# Patient Record
Sex: FEMALE | Race: WHITE | NOT HISPANIC OR LATINO | Employment: FULL TIME | URBAN - METROPOLITAN AREA
[De-identification: names, ages, dates, MRNs, and addresses within clinical notes are randomized per-mention and may not be internally consistent; named-entity substitution may affect disease eponyms.]

---

## 2020-01-14 ENCOUNTER — HOSPITAL ENCOUNTER (EMERGENCY)
Facility: HOSPITAL | Age: 65
Discharge: HOME | End: 2020-01-14
Attending: EMERGENCY MEDICINE
Payer: SUBSIDIZED

## 2020-01-14 VITALS
HEART RATE: 92 BPM | RESPIRATION RATE: 20 BRPM | SYSTOLIC BLOOD PRESSURE: 175 MMHG | DIASTOLIC BLOOD PRESSURE: 92 MMHG | TEMPERATURE: 98.3 F | WEIGHT: 130 LBS | OXYGEN SATURATION: 96 %

## 2020-01-14 DIAGNOSIS — T78.40XA ALLERGIC REACTION, INITIAL ENCOUNTER: Primary | ICD-10-CM

## 2020-01-14 DIAGNOSIS — L50.9 URTICARIA: ICD-10-CM

## 2020-01-14 PROCEDURE — 99284 EMERGENCY DEPT VISIT MOD MDM: CPT | Performed by: EMERGENCY MEDICINE

## 2020-01-14 PROCEDURE — 99282 EMERGENCY DEPT VISIT SF MDM: CPT

## 2020-01-14 RX ORDER — FAMOTIDINE 20 MG/1
40 TABLET, FILM COATED ORAL ONCE
Status: COMPLETED | OUTPATIENT
Start: 2020-01-14 | End: 2020-01-14

## 2020-01-14 RX ORDER — PREDNISONE 50 MG/1
50 TABLET ORAL DAILY
Qty: 5 TABLET | Refills: 0 | Status: SHIPPED | OUTPATIENT
Start: 2020-01-14 | End: 2020-01-19

## 2020-01-14 RX ORDER — FAMOTIDINE 20 MG/1
20 TABLET, FILM COATED ORAL 2 TIMES DAILY
Qty: 30 TABLET | Refills: 0 | Status: SHIPPED | OUTPATIENT
Start: 2020-01-14 | End: 2021-04-11

## 2020-01-14 RX ORDER — DIPHENHYDRAMINE HCL 25 MG
50 TABLET ORAL ONCE
Status: COMPLETED | OUTPATIENT
Start: 2020-01-14 | End: 2020-01-14

## 2020-01-14 RX ORDER — CETIRIZINE HYDROCHLORIDE 10 MG/1
10 TABLET ORAL DAILY
Qty: 10 TABLET | Refills: 0 | Status: SHIPPED | OUTPATIENT
Start: 2020-01-14 | End: 2021-04-11

## 2020-01-14 RX ADMIN — PREDNISONE 50 MG: 20 TABLET ORAL at 04:49

## 2020-01-14 RX ADMIN — FAMOTIDINE 40 MG: 20 TABLET ORAL at 04:49

## 2020-01-14 RX ADMIN — DIPHENHYDRAMINE HCL 50 MG: 25 TABLET, COATED ORAL at 04:49

## 2020-01-14 NOTE — DISCHARGE INSTRUCTIONS
Please take a list of all of your medications and discharge paperwork with you to all of your follow-up medical visits  Please take all of your medications as directed  Please call your family doctor or return to the ER if you have increased shortness of breath, chest pain, fevers, chills, nausea, vomiting, diarrhea, or any other worsening symptoms  General Allergic Reaction   WHAT YOU NEED TO KNOW:   An allergic reaction is your body's response to an allergen  Allergens include medicines, food, insect stings, animal dander, mold, latex, chemicals, and dust mites  Pollen from trees, grass, and weeds can also cause an allergic reaction  DISCHARGE INSTRUCTIONS:   Return to the emergency department if:   · You have a skin rash, hives, swelling, or itching that gets worse  · You have trouble breathing, shortness of breath, wheezing, or coughing  · Your throat tightens, or your lips or tongue swell  · You have trouble swallowing or speaking  · You have dizziness, lightheadedness, fainting, or confusion  · You have nausea, vomiting, diarrhea, or abdominal cramps  · You have chest pain or tightness  Contact your healthcare provider if:   · You have questions or concerns about your condition or care  Medicines:   · Medicines  may be given to relieve certain allergy symptoms such as itching, sneezing, and swelling  You may take them as a pill or use drops in your nose or eyes  Topical treatments may be given to put directly on your skin to help decrease itching or swelling  · Take your medicine as directed  Contact your healthcare provider if you think your medicine is not helping or if you have side effects  Tell him of her if you are allergic to any medicine  Keep a list of the medicines, vitamins, and herbs you take  Include the amounts, and when and why you take them  Bring the list or the pill bottles to follow-up visits   Carry your medicine list with you in case of an emergency  Follow up with your healthcare provider as directed:  Write down your questions so you remember to ask them during your visits  Self-care:   · Avoid the allergen  that you think may have caused your allergic reaction  · Use cold compresses  on your skin or eyes if they were affected by the allergic reaction  Cold compresses may help to soothe your skin or eyes  · Rinse your nasal passages  with a saline solution  Daily rinsing may help clear your nose of allergens  · Do not smoke  Your allergy symptoms may decrease if you are not around smoke  Nicotine and other chemicals in cigarettes and cigars can also cause lung damage  Ask your healthcare provider for information if you currently smoke and need help to quit  E-cigarettes or smokeless tobacco still contain nicotine  Talk to your healthcare provider before you use these products  © 2017 [facility] Information is for End User's use only and may not be sold, redistributed or otherwise used for commercial purposes  All illustrations and images included in CareNotes® are the copyrighted property of [employer]  or [employer]  The above information is an  only  It is not intended as medical advice for individual conditions or treatments  Talk to your doctor, nurse or pharmacist before following any medical regimen to see if it is safe and effective for you  Urticaria   WHAT YOU NEED TO KNOW:   What is urticaria? Urticaria is also called hives  Hives can change size and shape, and appear anywhere on your skin  They can be mild or severe and last from a few minutes to a few days  Hives may be a sign of a severe allergic reaction called anaphylaxis that needs immediate treatment  Urticaria that lasts longer than 6 weeks may be a chronic condition that needs long-term treatment  What causes urticaria? Hives are caused by an immune system reaction   The following are common triggers:  · Food allergies, such as to nuts, eggs, or shellfish    · Food dyes, additives, or preservatives    · Medicine allergies, such as to ibuprofen or antibiotics    · Infections, such as a cold or mono    · Bug bites    · Pets, plants, or latex    · Stress  How is the cause of urticaria diagnosed? Your healthcare provider will examine you and ask about your symptoms  He may also ask about your family medical history, medicines you take, and foods you eat  Tell your healthcare provider about any recent trauma, stress, or contact with allergens  You may need additional testing if you developed anaphylaxis after you were exposed to a trigger and then exercised  This is called exercise-induced anaphylaxis  You may need any of the following:  · A skin test  is used to see how your skin reacts to possible triggers  Your healthcare provider will put a small amount of the trigger onto your skin  He will cover the area with a patch that stays on for 2 days  Then he will check your skin for a reaction  · A challenge test  is used to give you increasing doses of what may be causing your hives  Your healthcare provider will watch for a reaction  How is urticaria treated? Hives often go away without treatment  Chronic urticaria may need to be treated with more than one medicine, or other medicines than listed below  The following are common medicines used to treat urticaria:  · Antihistamines  decrease mild symptoms such as itching or a rash  · Steroids  decrease redness, pain, and swelling  · Epinephrine  is used to treat severe allergic reactions such as anaphylaxis  What steps do I need to take for signs or symptoms of anaphylaxis? · Immediately  give 1 shot of epinephrine only into the outer thigh muscle  · Leave the shot in place  as directed  Your healthcare provider may recommend you leave it in place for up to 10 seconds before you remove it   This helps make sure all of the epinephrine is delivered  · Call 911 and go to the emergency department,  even if the shot improved symptoms  Do not drive yourself  Bring the used epinephrine shot with you  What safety precautions do I need to take if I am at risk for anaphylaxis? · Keep 2 shots of epinephrine with you at all times  You may need a second shot, because epinephrine only works for about 20 minutes and symptoms may return  Your healthcare provider can show you and family members how to give the shot  Check the expiration date every month and replace it before it expires  · Create an action plan  Your healthcare provider can help you create a written plan that explains the allergy and an emergency plan to treat a reaction  The plan explains when to give a second epinephrine shot if symptoms return or do not improve after the first  Give copies of the action plan and emergency instructions to family members, work and school staff, and  providers  Show them how to give a shot of epinephrine  · Be careful when you exercise  If you have had exercise-induced anaphylaxis, do not exercise right after you eat  Stop exercising right away if you start to develop any signs or symptoms of anaphylaxis  You may first feel tired, warm, or have itchy skin  Hives, swelling, and severe breathing problems may develop if you continue to exercise  · Carry medical alert identification  Wear medical alert jewelry or carry a card that explains the allergy  Ask your healthcare provider where to get these items  · Keep a record of triggers and symptoms  Record everything you eat, drink, or apply to your skin for 3 weeks  Include stressful events and what you were doing right before your hives started  Bring the record with you to follow-up visits with your healthcare provider  What can I do to manage urticaria? · Cool your skin  This may help decrease itching  Apply a cool pack to your hives   Dip a hand towel in cool water, wring it out, and place it on your hives  You may also soak your skin in a cool oatmeal bath  · Do not rub your hives  This can irritate your skin and cause more hives  · Wear loose clothing  Tight clothes may irritate your skin and cause more hives  · Manage stress  Stress may trigger hives, or make them worse  Learn new ways to relax, such as deep breathing  Call 911 for signs or symptoms of anaphylaxis,  such as trouble breathing, swelling in your mouth or throat, or wheezing  You may also have itching, a rash, or feel like you are going to faint  When should I seek immediate care? · Your heart is beating faster than it normally does  · You have cramping or severe pain in your abdomen  When should I contact my healthcare provider? · You have a fever  · Your skin still itches 24 hours after you take your medicine  · You still have hives after 7 days  · Your joints are painful and swollen  · You have questions or concerns about your condition or care  CARE AGREEMENT:   You have the right to help plan your care  Learn about your health condition and how it may be treated  Discuss treatment options with your caregivers to decide what care you want to receive  You always have the right to refuse treatment  The above information is an  only  It is not intended as medical advice for individual conditions or treatments  Talk to your doctor, nurse or pharmacist before following any medical regimen to see if it is safe and effective for you  © 2017 [address] [employer] Information is for End User's use only and may not be sold, redistributed or otherwise used for commercial purposes  All illustrations and images included in CareNotes® are the copyrighted property of [employer]  or [employer]

## 2020-01-14 NOTE — ED PROVIDER NOTES
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  Patient has had generalized cold symptoms since yesterday  She woke up this morning and took some Sudafed for nasal congestion  20 minutes later she started to have red, itchy rash throughout her body  Patient denies any shortness of breath, tongue swelling, throat/along, breathing difficulty  Patient came to the ER for further evaluation  Patient took Sudafed for the 1st time today  History provided by:  Patient  Rash   Associated symptoms: no abdominal pain, no diarrhea, no fever, no headaches, no joint pain, no nausea, no shortness of breath and not wheezing        None       History reviewed  No pertinent past medical history  Past Surgical History:   Procedure Laterality Date    HYSTERECTOMY      SINUS SURGERY         History reviewed  No pertinent family history  I have reviewed and agree with the history as documented  Social History     Tobacco Use    Smoking status: Current Every Day Smoker     Packs/day: 0 50    Smokeless tobacco: Never Used   Substance Use Topics    Alcohol use: Never     Frequency: Never    Drug use: Not on file        Review of Systems   Constitutional: Negative for activity change, appetite change, chills and fever  HENT: Negative for congestion and ear pain  Eyes: Negative for pain and discharge  Respiratory: Negative for cough, chest tightness, shortness of breath, wheezing and stridor  Cardiovascular: Negative for chest pain and palpitations  Gastrointestinal: Negative for abdominal distention, abdominal pain, constipation, diarrhea and nausea  Endocrine: Negative for cold intolerance  Genitourinary: Negative for dysuria, frequency and urgency  Musculoskeletal: Negative for arthralgias and back pain  Skin: Positive for rash  Negative for color change  Allergic/Immunologic: Negative for environmental allergies and food allergies  Neurological: Negative for dizziness, weakness, numbness and headaches  Hematological: Negative for adenopathy  Psychiatric/Behavioral: Negative for agitation, behavioral problems and confusion  The patient is not nervous/anxious  All other systems reviewed and are negative  Physical Exam  Physical Exam   Constitutional: She is oriented to person, place, and time  She appears well-developed and well-nourished  HENT:   Head: Normocephalic and atraumatic  Mouth/Throat: Oropharynx is clear and moist    No erythema, edema, signs of airway compromise noted to posterior oropharynx  No tongue swelling or lip swelling noted  Eyes: Conjunctivae and EOM are normal    Neck: Normal range of motion  Neck supple  Cardiovascular: Normal rate, regular rhythm, normal heart sounds and intact distal pulses  Pulmonary/Chest: Effort normal and breath sounds normal    Lungs are clear to auscultation bilateral    Abdominal: Soft  Bowel sounds are normal  She exhibits no distension  There is no tenderness  Musculoskeletal: Normal range of motion  Neurological: She is alert and oriented to person, place, and time  Skin: Skin is warm and dry  Scattered, maculopapular, urticarial lesions noted throughout upper extremity and anterior trunk  Psychiatric: She has a normal mood and affect  Her behavior is normal  Judgment and thought content normal    Nursing note and vitals reviewed        Vital Signs  ED Triage Vitals   Temperature Pulse Respirations Blood Pressure SpO2   01/14/20 0454 01/14/20 0445 01/14/20 0445 01/14/20 0445 01/14/20 0445   98 3 °F (36 8 °C) 92 20 (!) 198/100 96 %      Temp Source Heart Rate Source Patient Position - Orthostatic VS BP Location FiO2 (%)   01/14/20 0454 01/14/20 0445 01/14/20 0445 01/14/20 0445 --   Tympanic Monitor Sitting Left arm       Pain Score       01/14/20 0441       No Pain           Vitals:    01/14/20 0445 01/14/20 0520   BP: (!) 198/100 (!) 175/92   Pulse: 92    Patient Position - Orthostatic VS: Sitting          Visual Acuity      ED Medications  Medications   famotidine (PEPCID) tablet 40 mg (40 mg Oral Given 1/14/20 0449)   predniSONE tablet 50 mg (50 mg Oral Given 1/14/20 0449)   diphenhydrAMINE (BENADRYL) tablet 50 mg (50 mg Oral Given 1/14/20 0449)       Diagnostic Studies  Results Reviewed     None                 No orders to display              Procedures  Procedures         ED Course  ED Course as of Jan 14 0642 Tue Jan 14, 2020   0600 Patient re-examined at bedside  Patient's rash is significantly improved  Patient would like to go home  MDM  Number of Diagnoses or Management Options  Allergic reaction, initial encounter: new and requires workup  Urticaria: new and requires workup  Diagnosis management comments: Give prednisone, Benadryl, Pepcid and reassess symptoms  Amount and/or Complexity of Data Reviewed  Tests in the medicine section of CPT®: ordered and reviewed  Review and summarize past medical records: yes  Independent visualization of images, tracings, or specimens: yes    Risk of Complications, Morbidity, and/or Mortality  General comments: Patient's rash significantly improved in the ED with steroids, Pepcid, Benadryl  At this time patient's rash is most likely secondary to an allergic reaction to Sudafed  At this time patient placed on prednisone burst, Zyrtec and Pepcid b i d  With p r n  Benadryl for pruritus  Patient discharged home with follow-up to PCP as well as allergy  Close return instructions given to return to the ER for any worsening symptoms  Patient agrees with discharge plan  Patient well appearing at time of discharge        Patient Progress  Patient progress: stable        Disposition  Final diagnoses: Allergic reaction, initial encounter   Urticaria     Time reflects when diagnosis was documented in both MDM as applicable and the Disposition within this note     Time User Action Codes Description Comment    1/14/2020  6:00 AM Ferdous, Georgeana Cooks Add [T78 40XA] Allergic reaction, initial encounter     1/14/2020  6:00 AM Kalin Bonds Add [L50 9] Urticaria       ED Disposition     ED Disposition Condition Date/Time Comment    Discharge Stable Tue Jan 14, 2020  6:00 AM Anne Sung discharge to home/self care  Follow-up Information     Follow up With Specialties Details Why Contact Info        Follow up with family doctor in 3-4 days  All[facility] In 1 week If symptoms worsen [facility]  [phone]            Discharge Medication List as of 1/14/2020  6:02 AM      START taking these medications    Details   cetirizine (ZyrTEC) 10 mg tablet Take 1 tablet (10 mg total) by mouth daily, Starting Tue 1/14/2020, Print      famotidine (PEPCID) 20 mg tablet Take 1 tablet (20 mg total) by mouth 2 (two) times a day, Starting Tue 1/14/2020, Print      predniSONE 50 mg tablet Take 1 tablet (50 mg total) by mouth daily for 5 days, Starting Tue 1/14/2020, Until Sun 1/19/2020, Print           No discharge procedures on file      ED Provider  Electronically Signed by           Hilda Duran DO  01/14/20 9738

## 2021-02-18 ENCOUNTER — APPOINTMENT (EMERGENCY)
Dept: RADIOLOGY | Facility: HOSPITAL | Age: 66
End: 2021-02-18
Payer: MEDICARE

## 2021-02-18 ENCOUNTER — HOSPITAL ENCOUNTER (EMERGENCY)
Facility: HOSPITAL | Age: 66
Discharge: HOME | End: 2021-02-18
Attending: EMERGENCY MEDICINE | Admitting: EMERGENCY MEDICINE
Payer: MEDICARE

## 2021-02-18 ENCOUNTER — TELEPHONE (OUTPATIENT)
Dept: GASTROENTEROLOGY | Facility: AMBULARY SURGERY CENTER | Age: 66
End: 2021-02-18

## 2021-02-18 VITALS
SYSTOLIC BLOOD PRESSURE: 166 MMHG | WEIGHT: 128.8 LBS | RESPIRATION RATE: 17 BRPM | OXYGEN SATURATION: 96 % | HEART RATE: 68 BPM | TEMPERATURE: 96.7 F | DIASTOLIC BLOOD PRESSURE: 85 MMHG

## 2021-02-18 DIAGNOSIS — K83.1 BILIARY TRACT OBSTRUCTION: ICD-10-CM

## 2021-02-18 DIAGNOSIS — R74.01 TRANSAMINITIS: Primary | ICD-10-CM

## 2021-02-18 LAB
ALBUMIN SERPL BCP-MCNC: 3.6 G/DL (ref 3.5–5)
ALP SERPL-CCNC: 387 U/L (ref 46–116)
ALT SERPL W P-5'-P-CCNC: 136 U/L (ref 12–78)
ANION GAP SERPL CALCULATED.3IONS-SCNC: 9 MMOL/L (ref 4–13)
AST SERPL W P-5'-P-CCNC: 59 U/L (ref 5–45)
BACTERIA UR QL AUTO: ABNORMAL /HPF
BASOPHILS # BLD AUTO: 0.08 THOUSANDS/ΜL (ref 0–0.1)
BASOPHILS NFR BLD AUTO: 1 % (ref 0–1)
BILIRUB SERPL-MCNC: 0.5 MG/DL (ref 0.2–1)
BILIRUB UR QL STRIP: NEGATIVE
BUN SERPL-MCNC: 20 MG/DL (ref 5–25)
CALCIUM SERPL-MCNC: 8.9 MG/DL (ref 8.3–10.1)
CHLORIDE SERPL-SCNC: 103 MMOL/L (ref 100–108)
CLARITY UR: CLEAR
CO2 SERPL-SCNC: 26 MMOL/L (ref 21–32)
COLOR UR: YELLOW
CREAT SERPL-MCNC: 0.82 MG/DL (ref 0.6–1.3)
EOSINOPHIL # BLD AUTO: 0.12 THOUSAND/ΜL (ref 0–0.61)
EOSINOPHIL NFR BLD AUTO: 2 % (ref 0–6)
ERYTHROCYTE [DISTWIDTH] IN BLOOD BY AUTOMATED COUNT: 13.5 % (ref 11.6–15.1)
FLUAV RNA RESP QL NAA+PROBE: NEGATIVE
FLUBV RNA RESP QL NAA+PROBE: NEGATIVE
GFR SERPL CREATININE-BSD FRML MDRD: 75 ML/MIN/1.73SQ M
GLUCOSE SERPL-MCNC: 113 MG/DL (ref 65–140)
GLUCOSE UR STRIP-MCNC: NEGATIVE MG/DL
HCT VFR BLD AUTO: 45.3 % (ref 34.8–46.1)
HGB BLD-MCNC: 14 G/DL (ref 11.5–15.4)
HGB UR QL STRIP.AUTO: ABNORMAL
IMM GRANULOCYTES # BLD AUTO: 0.01 THOUSAND/UL (ref 0–0.2)
IMM GRANULOCYTES NFR BLD AUTO: 0 % (ref 0–2)
KETONES UR STRIP-MCNC: ABNORMAL MG/DL
LEUKOCYTE ESTERASE UR QL STRIP: NEGATIVE
LIPASE SERPL-CCNC: 93 U/L (ref 73–393)
LYMPHOCYTES # BLD AUTO: 1.86 THOUSANDS/ΜL (ref 0.6–4.47)
LYMPHOCYTES NFR BLD AUTO: 27 % (ref 14–44)
MAGNESIUM SERPL-MCNC: 2.3 MG/DL (ref 1.6–2.6)
MCH RBC QN AUTO: 29.2 PG (ref 26.8–34.3)
MCHC RBC AUTO-ENTMCNC: 30.9 G/DL (ref 31.4–37.4)
MCV RBC AUTO: 95 FL (ref 82–98)
MONOCYTES # BLD AUTO: 0.35 THOUSAND/ΜL (ref 0.17–1.22)
MONOCYTES NFR BLD AUTO: 5 % (ref 4–12)
NEUTROPHILS # BLD AUTO: 4.39 THOUSANDS/ΜL (ref 1.85–7.62)
NEUTS SEG NFR BLD AUTO: 65 % (ref 43–75)
NITRITE UR QL STRIP: NEGATIVE
NON-SQ EPI CELLS URNS QL MICRO: ABNORMAL /HPF
NRBC BLD AUTO-RTO: 0 /100 WBCS
PH UR STRIP.AUTO: 5 [PH]
PLATELET # BLD AUTO: 351 THOUSANDS/UL (ref 149–390)
PMV BLD AUTO: 9.7 FL (ref 8.9–12.7)
POTASSIUM SERPL-SCNC: 3.7 MMOL/L (ref 3.5–5.3)
PROT SERPL-MCNC: 7.8 G/DL (ref 6.4–8.2)
PROT UR STRIP-MCNC: NEGATIVE MG/DL
RBC # BLD AUTO: 4.79 MILLION/UL (ref 3.81–5.12)
RBC #/AREA URNS AUTO: ABNORMAL /HPF
RSV RNA RESP QL NAA+PROBE: NEGATIVE
SARS-COV-2 RNA RESP QL NAA+PROBE: NEGATIVE
SODIUM SERPL-SCNC: 138 MMOL/L (ref 136–145)
SP GR UR STRIP.AUTO: 1.01 (ref 1–1.03)
TROPONIN I SERPL-MCNC: <0.02 NG/ML
UROBILINOGEN UR QL STRIP.AUTO: 0.2 E.U./DL
WBC # BLD AUTO: 6.81 THOUSAND/UL (ref 4.31–10.16)
WBC #/AREA URNS AUTO: ABNORMAL /HPF

## 2021-02-18 PROCEDURE — 80053 COMPREHEN METABOLIC PANEL: CPT | Performed by: EMERGENCY MEDICINE

## 2021-02-18 PROCEDURE — 74183 MRI ABD W/O CNTR FLWD CNTR: CPT

## 2021-02-18 PROCEDURE — 83690 ASSAY OF LIPASE: CPT | Performed by: EMERGENCY MEDICINE

## 2021-02-18 PROCEDURE — 99284 EMERGENCY DEPT VISIT MOD MDM: CPT

## 2021-02-18 PROCEDURE — 96360 HYDRATION IV INFUSION INIT: CPT

## 2021-02-18 PROCEDURE — 81001 URINALYSIS AUTO W/SCOPE: CPT | Performed by: EMERGENCY MEDICINE

## 2021-02-18 PROCEDURE — 76376 3D RENDER W/INTRP POSTPROCES: CPT

## 2021-02-18 PROCEDURE — 74177 CT ABD & PELVIS W/CONTRAST: CPT

## 2021-02-18 PROCEDURE — 99285 EMERGENCY DEPT VISIT HI MDM: CPT | Performed by: EMERGENCY MEDICINE

## 2021-02-18 PROCEDURE — 36415 COLL VENOUS BLD VENIPUNCTURE: CPT | Performed by: EMERGENCY MEDICINE

## 2021-02-18 PROCEDURE — G1004 CDSM NDSC: HCPCS

## 2021-02-18 PROCEDURE — 84484 ASSAY OF TROPONIN QUANT: CPT | Performed by: EMERGENCY MEDICINE

## 2021-02-18 PROCEDURE — A9585 GADOBUTROL INJECTION: HCPCS | Performed by: EMERGENCY MEDICINE

## 2021-02-18 PROCEDURE — 96361 HYDRATE IV INFUSION ADD-ON: CPT

## 2021-02-18 PROCEDURE — 93005 ELECTROCARDIOGRAM TRACING: CPT

## 2021-02-18 PROCEDURE — 85025 COMPLETE CBC W/AUTO DIFF WBC: CPT | Performed by: EMERGENCY MEDICINE

## 2021-02-18 PROCEDURE — 0241U HB NFCT DS VIR RESP RNA 4 TRGT: CPT | Performed by: EMERGENCY MEDICINE

## 2021-02-18 PROCEDURE — 83735 ASSAY OF MAGNESIUM: CPT | Performed by: EMERGENCY MEDICINE

## 2021-02-18 RX ADMIN — GADOBUTROL 6 ML: 604.72 INJECTION INTRAVENOUS at 11:51

## 2021-02-18 RX ADMIN — IOHEXOL 100 ML: 350 INJECTION, SOLUTION INTRAVENOUS at 09:48

## 2021-02-18 RX ADMIN — SODIUM CHLORIDE 1000 ML: 0.9 INJECTION, SOLUTION INTRAVENOUS at 09:20

## 2021-02-18 NOTE — ED NOTES
MRI checklist completed and verified by this RN and with pt   Called MRI, pending response     Bhakti Brito, SHAY  02/18/21 3763

## 2021-02-18 NOTE — DISCHARGE INSTRUCTIONS
MRCP (Magnetic Resonance Cholangiopancreatography)   WHAT YOU NEED TO KNOW:   MRCP is a type of MRI used to take pictures of your gallbladder, bile duct, and pancreas  DISCHARGE INSTRUCTIONS:   Call your local emergency number [phone] in the [address]) if:   You have a medical device that has stopped working or is not working as it did before the MRI  Call your doctor or specialist if:   You have new or increased abdominal pain or other symptoms  You have questions or concerns about your condition or care  Drink liquids as directed:  Liquids will help flush the contrast liquid out of your body  Ask how much liquid to drink, and which liquids are best  Some foods, such as soup and fruit, also provide liquid  Follow up with your doctor or specialist as directed:  Write down your questions so you remember to ask them during your visits  © Copyright [address] Information is for End User's use only and may not be sold, redistributed or otherwise used for commercial purposes  All illustrations and images included in CareNotes® are the copyrighted property of A D A M , Inc  or [facility] [employer]   The above information is an  only  It is not intended as medical advice for individual conditions or treatments  Talk to your doctor, nurse or pharmacist before following any medical regimen to see if it is safe and effective for you

## 2021-02-18 NOTE — ED PROVIDER NOTES
History  Chief Complaint   Patient presents with    Nausea     pt complains of nausea with weakness since tuesday- she mentions generalized abd pain, c/o chills with occasional diarrhea, denies CP/SOB    Fatigue     Patient presents for evaluation of fatigue and nausea with decreased appetite  Patient initially nausea on Tuesday  Since then she has had decreased appetite 1 episode of vomiting and having nausea  She is also having some left lower quadrant abdominal pain as well that does not seem to be affected by food  States that she feels fatigued she just stating have the energy to go work she denies any chest pain or shortness of breath  Does feel chills flushed sometimes but states she is taking her temperature home and she has not had a fever as of yet  No known exposure to the [employer]  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  Also has been under increased stress recently as her son had major surgery last week  History provided by:  Patient   used: No    Nausea  The primary symptoms include fatigue, abdominal pain, nausea, vomiting and diarrhea  Primary symptoms do not include fever, dysuria or rash  The illness is also significant for chills  The illness does not include constipation or back pain  Fatigue  Associated symptoms: abdominal pain, diarrhea, nausea and vomiting    Associated symptoms: no chest pain, no cough, no dysuria, no fever, no headaches and no shortness of breath        Prior to Admission Medications   Prescriptions Last Dose Informant Patient Reported? Taking? cetirizine (ZyrTEC) 10 mg tablet   No No   Sig: Take 1 tablet (10 mg total) by mouth daily   famotidine (PEPCID) 20 mg tablet   No No   Sig: Take 1 tablet (20 mg total) by mouth 2 (two) times a day      Facility-Administered Medications: None       History reviewed  No pertinent past medical history      Past Surgical History:   Procedure Laterality Date    HYSTERECTOMY      SINUS SURGERY History reviewed  No pertinent family history  I have reviewed and agree with the history as documented  E-Cigarette/Vaping     E-Cigarette/Vaping Substances     Social History     Tobacco Use    Smoking status: Current Every Day Smoker     Packs/day: 0 50    Smokeless tobacco: Never Used   Substance Use Topics    Alcohol use: Never     Frequency: Never    Drug use: Never       Review of Systems   Constitutional: Positive for activity change, appetite change, chills and fatigue  Negative for fever  HENT: Negative for congestion, sore throat and trouble swallowing  No change in taste or smell   Respiratory: Negative for cough and shortness of breath  Cardiovascular: Negative for chest pain and leg swelling  Gastrointestinal: Positive for abdominal pain, diarrhea, nausea and vomiting  Negative for blood in stool and constipation  Genitourinary: Negative for difficulty urinating and dysuria  Musculoskeletal: Negative for back pain and neck pain  Skin: Negative for rash  Neurological: Negative for weakness, numbness and headaches  All other systems reviewed and are negative  Physical Exam  Physical Exam  Vitals signs and nursing note reviewed  Constitutional:       General: She is not in acute distress  Appearance: Normal appearance  HENT:      Head: Atraumatic  Nose: Nose normal  No congestion  Mouth/Throat:      Mouth: Mucous membranes are moist       Pharynx: Oropharynx is clear  No oropharyngeal exudate  Eyes:      General: No scleral icterus  Extraocular Movements: Extraocular movements intact  Conjunctiva/sclera: Conjunctivae normal       Pupils: Pupils are equal, round, and reactive to light  Cardiovascular:      Rate and Rhythm: Normal rate and regular rhythm  Pulses: Normal pulses  Pulmonary:      Effort: Pulmonary effort is normal  No respiratory distress  Breath sounds: Normal breath sounds  No wheezing, rhonchi or rales  Abdominal:      General: Abdomen is flat  Bowel sounds are normal  There is no distension  Palpations: Abdomen is soft  Tenderness: There is no abdominal tenderness  There is no guarding or rebound  Musculoskeletal: Normal range of motion  General: No deformity  Right lower leg: No edema  Left lower leg: No edema  Skin:     Capillary Refill: Capillary refill takes less than 2 seconds  Findings: No rash  Neurological:      General: No focal deficit present  Mental Status: She is alert and oriented to person, place, and time  Cranial Nerves: No cranial nerve deficit  Sensory: No sensory deficit  Motor: No weakness           Vital Signs  ED Triage Vitals   Temperature Pulse Respirations Blood Pressure SpO2   02/18/21 0832 02/18/21 0832 02/18/21 0832 02/18/21 0832 02/18/21 0832   (!) 96 7 °F (35 9 °C) 79 17 129/80 97 %      Temp Source Heart Rate Source Patient Position - Orthostatic VS BP Location FiO2 (%)   02/18/21 0832 02/18/21 0832 02/18/21 0832 02/18/21 0832 --   Tympanic Monitor Sitting Right arm       Pain Score       02/18/21 0830       No Pain           Vitals:    02/18/21 0832 02/18/21 0958 02/18/21 1045 02/18/21 1320   BP: 129/80 146/94 160/80 162/75   Pulse: 79 66 62 68   Patient Position - Orthostatic VS: Sitting Sitting Sitting Sitting         Visual Acuity      ED Medications  Medications   sodium chloride 0 9 % bolus 1,000 mL (1,000 mL Intravenous New Bag 2/18/21 0920)   iohexol (OMNIPAQUE) 350 MG/ML injection (SINGLE-DOSE) 100 mL (100 mL Intravenous Given 2/18/21 0948)   Gadobutrol injection (SINGLE-DOSE) SOLN 6 mL (6 mL Intravenous Given 2/18/21 1151)       Diagnostic Studies  Results Reviewed     Procedure Component Value Units Date/Time    Urine Microscopic [[id number]]  (Abnormal) Collected: 02/18/21 1320    Lab Status: Final result Specimen: Urine, Other Updated: 02/18/21 1336     RBC, UA None Seen /hpf      WBC, UA 4-10 /hpf Epithelial Cells Occasional /hpf      Bacteria, UA None Seen /hpf     UA (URINE) with reflex to Scope [[id number]]  (Abnormal) Collected: 02/18/21 1320    Lab Status: Final result Specimen: Urine, Other Updated: 02/18/21 1326     Color, UA Yellow     Clarity, UA Clear     Specific Gravity, UA 1 010     pH, UA 5 0     Leukocytes, UA Negative     Nitrite, UA Negative     Protein, UA Negative mg/dl      Glucose, UA Negative mg/dl      Ketones, UA 40 (2+) mg/dl      Urobilinogen, UA 0 2 E U /dl      Bilirubin, UA Negative     Blood, UA Trace-lysed    COVID19, Influenza A/B, RSV PCR, [facility] [[id number]]  (Normal) Collected: 02/18/21 0847    Lab Status: Final result Specimen: Nares from Nose Updated: 02/18/21 0940     SARS-CoV-2 Negative     INFLUENZA A PCR Negative     INFLUENZA B PCR Negative     RSV PCR Negative    Narrative: This test has been authorized by FDA under an EUA (Emergency Use Assay) for use by authorized laboratories  Clinical caution and judgement should be used with the interpretation of these results with consideration of the clinical impression and other laboratory testing  Testing reported as "Positive" or "Negative" has been proven to be accurate according to standard laboratory validation requirements  All testing is performed with control materials showing appropriate reactivity at standard intervals      Troponin I [[id number]]  (Normal) Collected: 02/18/21 0843    Lab Status: Final result Specimen: Blood from Arm, Left Updated: 02/18/21 0911     Troponin I <0 02 ng/mL     Comprehensive metabolic panel [[id number]]  (Abnormal) Collected: 02/18/21 0843    Lab Status: Final result Specimen: Blood from Arm, Left Updated: 02/18/21 0907     Sodium 138 mmol/L      Potassium 3 7 mmol/L      Chloride 103 mmol/L      CO2 26 mmol/L      ANION GAP 9 mmol/L      BUN 20 mg/dL      Creatinine 0 82 mg/dL      Glucose 113 mg/dL      Calcium 8 9 mg/dL      AST 59 U/L       U/L      Alkaline Phosphatase 387 U/L      Total Protein 7 8 g/dL      Albumin 3 6 g/dL      Total Bilirubin 0 50 mg/dL      eGFR 75 ml/min/1 73sq m     Narrative:      National Kidney Disease Foundation guidelines for Chronic Kidney Disease (CKD):     Stage 1 with normal or high GFR (GFR > 90 mL/min/1 73 square meters)    Stage 2 Mild CKD (GFR = 60-89 mL/min/1 73 square meters)    Stage 3A Moderate CKD (GFR = 45-59 mL/min/1 73 square meters)    Stage 3B Moderate CKD (GFR = 30-44 mL/min/1 73 square meters)    Stage 4 Severe CKD (GFR = 15-29 mL/min/1 73 square meters)    Stage 5 End Stage CKD (GFR <15 mL/min/1 73 square meters)  Note: GFR calculation is accurate only with a steady state creatinine    Lipase [[id number]]  (Normal) Collected: 02/18/21 0843    Lab Status: Final result Specimen: Blood from Arm, Left Updated: 02/18/21 0907     Lipase 93 u/L     Magnesium [[id number]]  (Normal) Collected: 02/18/21 0843    Lab Status: Final result Specimen: Blood from Arm, Left Updated: 02/18/21 0907     Magnesium 2 3 mg/dL     CBC and differential [[id number]]  (Abnormal) Collected: 02/18/21 0843    Lab Status: Final result Specimen: Blood from Arm, Left Updated: 02/18/21 0849     WBC 6 81 Thousand/uL      RBC 4 79 Million/uL      Hemoglobin 14 0 g/dL      Hematocrit 45 3 %      MCV 95 fL      MCH 29 2 pg      MCHC 30 9 g/dL      RDW 13 5 %      MPV 9 7 fL      Platelets 718 Thousands/uL      nRBC 0 /100 WBCs      Neutrophils Relative 65 %      Immat GRANS % 0 %      Lymphocytes Relative 27 %      Monocytes Relative 5 %      Eosinophils Relative 2 %      Basophils Relative 1 %      Neutrophils Absolute 4 39 Thousands/µL      Immature Grans Absolute 0 01 Thousand/uL      Lymphocytes Absolute 1 86 Thousands/µL      Monocytes Absolute 0 35 Thousand/µL      Eosinophils Absolute 0 12 Thousand/µL      Basophils Absolute 0 08 Thousands/µL                  MRI abdomen w wo contrast and mrcp   Final Result by Tessy Shore MD (02/18 2686)      Intra and extrahepatic biliary dilatation extending to the ampulla without evidence of obstructing mass lesion or choledocholithiasis  Consider ERCP for further characterization  Workstation performed: [id number]         CT abdomen pelvis with contrast   Final Result by Ricardo Ferrara MD (02/18 1020)      1  Intrahepatic and extra hepatic biliary ductal dilatation down to the level of the pancreatic head  No obvious stones or mass lesion by CT but an underlying lesion should be excluded  Recommend follow-up with ERCP or MRCP  5 mm hypodensity also    noted at the pancreatic head, nonspecific  This could be assessed with endoscopic ultrasound or on the MR  The study was marked in [facility] for immediate notification  Workstation performed: [id number]                    Procedures  ECG 12 Lead Documentation Only    Date/Time: 2/18/2021 8:46 AM  Performed by: Henny Deleon DO  Authorized by: Henny Deleon DO     Patient location:  ED  Interpretation:     Interpretation: abnormal    Rate:     ECG rate:  69    ECG rate assessment: normal    Rhythm:     Rhythm: sinus rhythm    Ectopy:     Ectopy: none    Conduction:     Conduction: abnormal      Abnormal conduction: LAFB    ST segments:     ST segments:  Non-specific  T waves:     T waves: normal               ED Course  ED Course as of Feb 18 1433   Thu Feb 18, 2021   1036 Discussed CT and lab work with patient recommending admission for further testing  However this time patient is hesitant as her son helping her son after his surgery  Will attempt to get MRCP in the ER to further evaluate need for further intervention      1320 Called reading room requested read on MRCP  SBIRT 22yo+      Most Recent Value   SBIRT (22 yo +)   In order to provide better care to our patients, we are screening all of our patients for alcohol and drug use  Would it be okay to ask you these screening questions?   No Filed at: 02/18/2021 0857                    [facility]  Number of Diagnoses or Management Options  Biliary tract obstruction:   Transaminitis:   Diagnosis management comments: Pulse ox 97% on room air indicating adequate oxygenation  Discussed CT, MRCP and lab results with patient  MRCP showed dilation of the intra and extra paradoxus without clear cause for obstruction  Patient has elevated LFTs and alk-phos but normal bilirubin unlikely a complete obstruction  Recommending ERCP at this time  Discussed this case with GI, Nickie Ferrer, as also recommending admission and ERCP tomorrow  This was discussed with the patient  However as we had discussed before with her she does not want to stay she wants to care for her son at home after his surgery  She would like to schedule this as an outpatient  This is discussed with GI they will call office will call her tomorrow to schedule appointment  She was advised if she develops any jaundice, fever, abdominal pain, or worsening nausea/vomiting she needs return immediately to the ER for evaluation    Patient verbalized understanding of this and understands the risk going home versus being admitted to the hospital          Amount and/or Complexity of Data Reviewed  Clinical lab tests: ordered and reviewed  Decide to obtain previous medical records or to obtain history from someone other than the patient: yes  Review and summarize past medical records: yes  Discuss the patient with other providers: yes    Patient Progress  Patient progress: stable      Disposition  Final diagnoses:   Transaminitis   Biliary tract obstruction     Time reflects when diagnosis was documented in both MDM as applicable and the Disposition within this note     Time User Action Codes Description Comment    2/18/2021  2:27 PM Melissa Adams [R74 [id number]] Transaminitis     2/18/2021  2:27 PM Brianna Summers [K83 [id number]] Biliary tract obstruction       ED Disposition     ED Disposition Condition Date/Time Comment    Discharge Stable u Feb 18, 2021  2:27 PM Lissy Baptiste discharge to home/self care  Follow-up Information     Follow up With Specialties Details Why Contact Info Additional Information    Odalys Mtz MD Gastroenterology In 1 day  [facility] [address]  [phone]       [address] [facility] Emergency Department Emergency Medicine  If symptoms worsen [address] [facility] [address]  [address] [address] Emergency Department, [place], [place], [address]          Patient's Medications   Discharge Prescriptions    No medications on file     No discharge procedures on file      PDMP Review     None          ED Provider  Electronically Signed by           Radha Jean DO  02/18/21 5967

## 2021-02-21 LAB
ATRIAL RATE: 69 BPM
P AXIS: 77 DEGREES
PR INTERVAL: 154 MS
QRS AXIS: -55 DEGREES
QRSD INTERVAL: 88 MS
QT INTERVAL: 392 MS
QTC INTERVAL: 420 MS
T WAVE AXIS: 19 DEGREES
VENTRICULAR RATE: 69 BPM

## 2021-02-21 PROCEDURE — 93010 ELECTROCARDIOGRAM REPORT: CPT | Performed by: INTERNAL MEDICINE

## 2021-03-02 ENCOUNTER — TELEPHONE (OUTPATIENT)
Dept: GASTROENTEROLOGY | Facility: CLINIC | Age: 66
End: 2021-03-02

## 2021-03-02 ENCOUNTER — OFFICE VISIT (OUTPATIENT)
Dept: GASTROENTEROLOGY | Facility: CLINIC | Age: 66
End: 2021-03-02
Payer: MEDICARE

## 2021-03-02 VITALS
TEMPERATURE: 98 F | HEIGHT: 63 IN | SYSTOLIC BLOOD PRESSURE: 141 MMHG | BODY MASS INDEX: 22.86 KG/M2 | HEART RATE: 87 BPM | WEIGHT: 129 LBS | DIASTOLIC BLOOD PRESSURE: 92 MMHG

## 2021-03-02 DIAGNOSIS — R79.89 ELEVATED LFTS: ICD-10-CM

## 2021-03-02 DIAGNOSIS — K83.8 DILATED BILE DUCT: Primary | ICD-10-CM

## 2021-03-02 PROCEDURE — 99204 OFFICE O/P NEW MOD 45 MIN: CPT | Performed by: INTERNAL MEDICINE

## 2021-03-02 NOTE — PROGRESS NOTES
Consultation - [facility]) [facility]  Tarun Ann 72 y o  female MRN: [id number]          Assessment & Plan:     77-year-old female presents to the emergency room with fatigue and nausea, found to have elevated LFTs, dilated common bile duct  1  Elevated LFTs/dilated CBD: Seen on CT scan and MRI with 5 mm hypoechoic lesion in the head of the pancreas, dilation of the common bile duct to the level of the ampulla without any obvious filling defects  -concern would be for underlying stricture versus malignant process   - we will schedule the patient for an EUS to further evaluate  - discussed with her risks of procedure including bleeding, surgery, perforation, pancreatitis, bleeding and infection to the pancreas  - we will repeat LFTs  -patient was instructed to monitor for any signs of fever, chills, abdominal pain    2  Postprandial abdominal bloating and fullness: Unclear etiology   - we will proceed with an upper endoscopy the same time as her EUS   -continue Pepcid for now     3  Colon cancer screening: Patient is due for screening colonoscopy she has never had 1, she would like to defer that evaluation until the above process has been evaluated            _____________________________________________________________        CC:  Abnormal imaging and elevated LFTs    HPI:  Tarun Ann is a 72 y  o female who was referred for evaluation of  Abnormal imaging studies and elevated LFTs  As you know this is an otherwise healthy 77-year-old female, with no significant medical or surgical history, recently presented to the emergency room with feeling severe fatigue, nausea, was concern for possible COVID infection  COVID testing was negative  Upon presentation she was noted to have elevated LFTs specifically alkaline phosphatase, ALT and AST  She had  CT scan which demonstrated significant dilation of common bile duct, there is possible 5 mm head of pancreas lesion    She then had an MRI which confirmed the same, 10 mm CBD down to the level of the ampulla with no filling defects, stones, mass  There was a 5 mm hypoechoic lesion in the head of the pancreas as well  Patient reports some postprandial abdominal bloating recently  Otherwise her appetite is good, she denies any nausea vomiting  Bowel function has been fairly regular, having his bowel movement every other day  Denies any melena, rectal bleeding  Her weight has been stable  Patient has never had a colonoscopy  Denies any significant acid reflux symptoms, denies any dysphagia  Since started the symptoms she has been taking Pepcid over-the-counter  Patient smokes half pack a day, rarely drinks alcohol  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  Family history is unremarkable for GI or associated malignancies        ROS:  The remainder of the ROS was negative except for the pertinent positives mentioned in HPI  Allergies: Penicillins and Tetracycline    Medications:   Current Outpatient Medications:     cetirizine (ZyrTEC) 10 mg tablet, Take 1 tablet (10 mg total) by mouth daily, Disp: 10 tablet, Rfl: 0    famotidine (PEPCID) 20 mg tablet, Take 1 tablet (20 mg total) by mouth 2 (two) times a day, Disp: 30 tablet, Rfl: 0'    History reviewed  No pertinent past medical history  Past Surgical History:   Procedure Laterality Date    HYSTERECTOMY      SINUS SURGERY         Family History   Problem Relation Age of Onset    Ulcerative colitis Mother         reports that she has been smoking  She has been smoking about 0 50 packs per day  She has never used smokeless tobacco  She reports that she does not drink alcohol or use drugs            Physical Exam:     /92 (BP Location: Right arm, Patient Position: Sitting, Cuff Size: Standard)   Pulse 87   Temp 98 °F (36 7 °C)   Ht 5' 3" (1 6 m)   Wt 58 5 kg (129 lb)   BMI 22 85 kg/m²     Gen: wn/wd, NAD, healthy-appearing female  HEENT: anicteric, MMM, no cervical LAD  CVS: RRR, no m/r/g  CHEST: CTA b/l  ABD: +BS, soft, NT,ND, no hepatosplenomegaly  EXT: no c/c/e  NEURO: aaox3  SKIN: NO rashes

## 2021-03-02 NOTE — TELEPHONE ENCOUNTER
----- Message from Francis Gutierrez MD sent at 3/2/2021 11:22 AM EST -----  Please see chart, elevated lfts, dilated cbd, 5mmg panc head lesions        Needs eus with any one of us please

## 2021-03-03 ENCOUNTER — PREP FOR PROCEDURE (OUTPATIENT)
Dept: GASTROENTEROLOGY | Facility: CLINIC | Age: 66
End: 2021-03-03

## 2021-03-03 DIAGNOSIS — K83.8 DILATED CBD, ACQUIRED: Primary | ICD-10-CM

## 2021-03-03 DIAGNOSIS — R79.89 ELEVATED LFTS: ICD-10-CM

## 2021-03-03 NOTE — TELEPHONE ENCOUNTER
EUS scheduled on 3/15/21 with Dr Gates at [facility] gave pt verbal instructions/mailed

## 2021-03-04 ENCOUNTER — LAB (OUTPATIENT)
Dept: LAB | Facility: CLINIC | Age: 66
End: 2021-03-04
Payer: MEDICARE

## 2021-03-04 DIAGNOSIS — K83.8 DILATED BILE DUCT: ICD-10-CM

## 2021-03-04 DIAGNOSIS — R79.89 ELEVATED LFTS: ICD-10-CM

## 2021-03-04 LAB
ALBUMIN SERPL BCP-MCNC: 4.2 G/DL (ref 3.5–5)
ALP SERPL-CCNC: 259 U/L (ref 46–116)
ALT SERPL W P-5'-P-CCNC: 79 U/L (ref 12–78)
ANION GAP SERPL CALCULATED.3IONS-SCNC: 4 MMOL/L (ref 4–13)
AST SERPL W P-5'-P-CCNC: 39 U/L (ref 5–45)
BASOPHILS # BLD AUTO: 0.09 THOUSANDS/ΜL (ref 0–0.1)
BASOPHILS NFR BLD AUTO: 1 % (ref 0–1)
BILIRUB DIRECT SERPL-MCNC: 0.13 MG/DL (ref 0–0.2)
BILIRUB SERPL-MCNC: 0.45 MG/DL (ref 0.2–1)
BUN SERPL-MCNC: 20 MG/DL (ref 5–25)
CALCIUM SERPL-MCNC: 10.4 MG/DL (ref 8.3–10.1)
CHLORIDE SERPL-SCNC: 109 MMOL/L (ref 100–108)
CO2 SERPL-SCNC: 29 MMOL/L (ref 21–32)
CREAT SERPL-MCNC: 0.77 MG/DL (ref 0.6–1.3)
EOSINOPHIL # BLD AUTO: 0.13 THOUSAND/ΜL (ref 0–0.61)
EOSINOPHIL NFR BLD AUTO: 2 % (ref 0–6)
ERYTHROCYTE [DISTWIDTH] IN BLOOD BY AUTOMATED COUNT: 13.4 % (ref 11.6–15.1)
GFR SERPL CREATININE-BSD FRML MDRD: 81 ML/MIN/1.73SQ M
GLUCOSE P FAST SERPL-MCNC: 100 MG/DL (ref 65–99)
HCT VFR BLD AUTO: 44.5 % (ref 34.8–46.1)
HGB BLD-MCNC: 14.3 G/DL (ref 11.5–15.4)
IMM GRANULOCYTES # BLD AUTO: 0.02 THOUSAND/UL (ref 0–0.2)
IMM GRANULOCYTES NFR BLD AUTO: 0 % (ref 0–2)
INR PPP: 0.95 (ref 0.84–1.19)
LYMPHOCYTES # BLD AUTO: 3.14 THOUSANDS/ΜL (ref 0.6–4.47)
LYMPHOCYTES NFR BLD AUTO: 36 % (ref 14–44)
MCH RBC QN AUTO: 29.7 PG (ref 26.8–34.3)
MCHC RBC AUTO-ENTMCNC: 32.1 G/DL (ref 31.4–37.4)
MCV RBC AUTO: 93 FL (ref 82–98)
MONOCYTES # BLD AUTO: 0.44 THOUSAND/ΜL (ref 0.17–1.22)
MONOCYTES NFR BLD AUTO: 5 % (ref 4–12)
NEUTROPHILS # BLD AUTO: 4.98 THOUSANDS/ΜL (ref 1.85–7.62)
NEUTS SEG NFR BLD AUTO: 56 % (ref 43–75)
NRBC BLD AUTO-RTO: 0 /100 WBCS
PLATELET # BLD AUTO: 360 THOUSANDS/UL (ref 149–390)
PMV BLD AUTO: 10.5 FL (ref 8.9–12.7)
POTASSIUM SERPL-SCNC: 5.1 MMOL/L (ref 3.5–5.3)
PROT SERPL-MCNC: 8.1 G/DL (ref 6.4–8.2)
PROTHROMBIN TIME: 12.7 SECONDS (ref 11.6–14.5)
RBC # BLD AUTO: 4.81 MILLION/UL (ref 3.81–5.12)
SODIUM SERPL-SCNC: 142 MMOL/L (ref 136–145)
WBC # BLD AUTO: 8.8 THOUSAND/UL (ref 4.31–10.16)

## 2021-03-04 PROCEDURE — 80076 HEPATIC FUNCTION PANEL: CPT

## 2021-03-04 PROCEDURE — 85025 COMPLETE CBC W/AUTO DIFF WBC: CPT

## 2021-03-04 PROCEDURE — 80048 BASIC METABOLIC PNL TOTAL CA: CPT

## 2021-03-04 PROCEDURE — 36415 COLL VENOUS BLD VENIPUNCTURE: CPT

## 2021-03-04 PROCEDURE — 85610 PROTHROMBIN TIME: CPT

## 2021-03-15 ENCOUNTER — ANESTHESIA (OUTPATIENT)
Dept: GASTROENTEROLOGY | Facility: HOSPITAL | Age: 66
End: 2021-03-15

## 2021-03-15 ENCOUNTER — HOSPITAL ENCOUNTER (OUTPATIENT)
Dept: GASTROENTEROLOGY | Facility: HOSPITAL | Age: 66
Setting detail: OUTPATIENT SURGERY
Discharge: HOME | End: 2021-03-15
Attending: INTERNAL MEDICINE | Admitting: INTERNAL MEDICINE
Payer: MEDICARE

## 2021-03-15 ENCOUNTER — ANESTHESIA EVENT (OUTPATIENT)
Dept: GASTROENTEROLOGY | Facility: HOSPITAL | Age: 66
End: 2021-03-15

## 2021-03-15 VITALS
SYSTOLIC BLOOD PRESSURE: 131 MMHG | WEIGHT: 129 LBS | OXYGEN SATURATION: 95 % | HEART RATE: 72 BPM | HEIGHT: 63 IN | RESPIRATION RATE: 18 BRPM | BODY MASS INDEX: 22.86 KG/M2 | DIASTOLIC BLOOD PRESSURE: 84 MMHG | TEMPERATURE: 97.2 F

## 2021-03-15 DIAGNOSIS — R79.89 ELEVATED LFTS: ICD-10-CM

## 2021-03-15 DIAGNOSIS — D49.0 NEOPLASM OF AMPULLA: Primary | ICD-10-CM

## 2021-03-15 DIAGNOSIS — K83.8 DILATED CBD, ACQUIRED: ICD-10-CM

## 2021-03-15 DIAGNOSIS — K83.8 DILATED BILE DUCT: ICD-10-CM

## 2021-03-15 PROCEDURE — 88342 IMHCHEM/IMCYTCHM 1ST ANTB: CPT | Performed by: PATHOLOGY

## 2021-03-15 PROCEDURE — 43237 ENDOSCOPIC US EXAM ESOPH: CPT | Performed by: INTERNAL MEDICINE

## 2021-03-15 PROCEDURE — 88305 TISSUE EXAM BY PATHOLOGIST: CPT | Performed by: PATHOLOGY

## 2021-03-15 PROCEDURE — 88341 IMHCHEM/IMCYTCHM EA ADD ANTB: CPT | Performed by: PATHOLOGY

## 2021-03-15 PROCEDURE — 43239 EGD BIOPSY SINGLE/MULTIPLE: CPT | Performed by: INTERNAL MEDICINE

## 2021-03-15 RX ORDER — DIPHENHYDRAMINE HYDROCHLORIDE 50 MG/ML
12.5 INJECTION INTRAMUSCULAR; INTRAVENOUS ONCE AS NEEDED
Status: CANCELLED | OUTPATIENT
Start: 2021-03-15

## 2021-03-15 RX ORDER — PROPOFOL 10 MG/ML
INJECTION, EMULSION INTRAVENOUS AS NEEDED
Status: DISCONTINUED | OUTPATIENT
Start: 2021-03-15 | End: 2021-03-15

## 2021-03-15 RX ORDER — LIDOCAINE HYDROCHLORIDE 10 MG/ML
0.5 INJECTION, SOLUTION EPIDURAL; INFILTRATION; INTRACAUDAL; PERINEURAL ONCE AS NEEDED
Status: CANCELLED | OUTPATIENT
Start: 2021-03-15

## 2021-03-15 RX ORDER — PROPOFOL 10 MG/ML
INJECTION, EMULSION INTRAVENOUS CONTINUOUS PRN
Status: DISCONTINUED | OUTPATIENT
Start: 2021-03-15 | End: 2021-03-15

## 2021-03-15 RX ORDER — ONDANSETRON 2 MG/ML
4 INJECTION INTRAMUSCULAR; INTRAVENOUS ONCE AS NEEDED
Status: CANCELLED | OUTPATIENT
Start: 2021-03-15

## 2021-03-15 RX ORDER — METOCLOPRAMIDE HYDROCHLORIDE 5 MG/ML
10 INJECTION INTRAMUSCULAR; INTRAVENOUS ONCE AS NEEDED
Status: CANCELLED | OUTPATIENT
Start: 2021-03-15

## 2021-03-15 RX ORDER — SODIUM CHLORIDE 9 MG/ML
INJECTION, SOLUTION INTRAVENOUS CONTINUOUS PRN
Status: DISCONTINUED | OUTPATIENT
Start: 2021-03-15 | End: 2021-03-15

## 2021-03-15 RX ORDER — LIDOCAINE HYDROCHLORIDE 10 MG/ML
INJECTION, SOLUTION EPIDURAL; INFILTRATION; INTRACAUDAL; PERINEURAL AS NEEDED
Status: DISCONTINUED | OUTPATIENT
Start: 2021-03-15 | End: 2021-03-15

## 2021-03-15 RX ADMIN — LIDOCAINE HYDROCHLORIDE 100 MG: 10 INJECTION, SOLUTION EPIDURAL; INFILTRATION; INTRACAUDAL; PERINEURAL at 14:24

## 2021-03-15 RX ADMIN — PROPOFOL 100 MG: 10 INJECTION, EMULSION INTRAVENOUS at 14:24

## 2021-03-15 RX ADMIN — SODIUM CHLORIDE: 0.9 INJECTION, SOLUTION INTRAVENOUS at 12:54

## 2021-03-15 RX ADMIN — PROPOFOL 120 MCG/KG/MIN: 10 INJECTION, EMULSION INTRAVENOUS at 14:24

## 2021-03-15 RX ADMIN — PROPOFOL 50 MG: 10 INJECTION, EMULSION INTRAVENOUS at 14:28

## 2021-03-15 NOTE — ANESTHESIA POSTPROCEDURE EVALUATION
Post-Op Assessment Note    CV Status:  Stable  Pain Score: 0    Pain management: adequate     Mental Status:  Alert and awake   Hydration Status:  Euvolemic   PONV Controlled:  Controlled   Airway Patency:  Patent      Post Op Vitals Reviewed: Yes      Staff: CRNA         No complications documented      /65 (03/15/21 1504)    Temp (!) 97 2 °F (36 2 °C) (03/15/21 1504)    Pulse 72 (03/15/21 1504)   Resp 16 (03/15/21 1504)    SpO2 96 % (03/15/21 1504)

## 2021-03-15 NOTE — ANESTHESIA PREPROCEDURE EVALUATION
Procedure:  ENDOSCOPIC ULTRASOUND (UPPER)    Relevant Problems   ANESTHESIA (within normal limits)      CARDIO (within normal limits)      ENDO (within normal limits)      GI/HEPATIC (within normal limits)      /RENAL (within normal limits)      GYN (within normal limits)      HEMATOLOGY (within normal limits)      MUSCULOSKELETAL (within normal limits)      NEURO/PSYCH (within normal limits)      PULMONARY (within normal limits)      Other   (+) Dilated bile duct   (+) Elevated LFTs        Physical Exam    Airway    Mallampati score: II  TM Distance: >3 FB  Neck ROM: full     Dental   No notable dental hx     Cardiovascular  Rhythm: regular, Rate: normal, Cardiovascular exam normal    Pulmonary  Pulmonary exam normal Breath sounds clear to auscultation,     Other Findings        Anesthesia Plan  ASA Score- 2     Anesthesia Type- IV sedation with anesthesia with ASA Monitors  Additional Monitors:   Airway Plan:           Plan Factors-    Chart reviewed  Existing labs reviewed  Patient summary reviewed  Induction- intravenous  Postoperative Plan-     Informed Consent- Anesthetic plan and risks discussed with patient  I personally reviewed this patient with the CRNA  Discussed and agreed on the Anesthesia Plan with the CRNA  Eder Mccoy Recent labs personally reviewed:  Lab Results   Component Value Date    WBC 8 80 03/04/2021    HGB 14 3 03/04/2021     03/04/2021     Lab Results   Component Value Date    K 5 1 03/04/2021    BUN 20 03/04/2021    CREATININE 0 77 03/04/2021     No results found for: PTT   Lab Results   Component Value Date    INR 0 95 03/04/2021     I, Tea Grant MD, have personally seen and evaluated the patient prior to anesthetic care  I have reviewed the pre-anesthetic record, and other medical records if appropriate to the anesthetic care  If a CRNA is involved in the case, I have reviewed the CRNA assessment, if present, and agree   Risks/benefits and alternatives discussed with patient including possible PONV, sore throat, and possibility of rare anesthetic and surgical emergencies

## 2021-03-16 ENCOUNTER — TELEPHONE (OUTPATIENT)
Dept: SURGICAL ONCOLOGY | Facility: CLINIC | Age: 66
End: 2021-03-16

## 2021-03-16 NOTE — TELEPHONE ENCOUNTER
New Patient GI Form   Patient Details:  Angelo Hall  1955  [id number]     Background Information:  [address] starts by opening a telephone encounter and gathering the following information   Who is calling to schedule and relationship?  self   Referring Provider Kaylynn Briceño   To which specialty is the referral?  surg onc   Reason for Visit? 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 yes   Date of Tissue Diagnosis  (If done outside of [facility] please obtain report and slides)  (If no tissue diagnosis, please stop and discuss with Navigator prior to scheduling)  3/15   Is patient aware of the diagnosis? yes   Has Imaging been completed? yes   If YES, where was the imaging done? (If outside [place] [address] obtain records)  [facility]   Have any endoscopies been done (colonoscopy, EGD, EUS)  yes   If YES where were they performed? (If outside of [facility]  obtain the records)  [facility]   Has blood work been done?  yes   If YES, where was the blood work done? (If outside of [facility] obtain records)  [facility]   Is there a personal history of cancer? (If YES please list type)  No   Is there a family history of cancer? (If YES please list type)  No   Scheduling Information:   Preferred [address]   Are there any days the patient cannot be seen? Miscellaneous: Pt requested that I schedule her appt and leave a voicemail on her phone with all the details  Pt was at work when she was called  After completing the above information, please route to finance, nurse navigation and clinical trials for review

## 2021-03-25 PROBLEM — D49.0: Status: ACTIVE | Noted: 2021-03-15

## 2021-03-30 ENCOUNTER — CONSULT (OUTPATIENT)
Dept: SURGICAL ONCOLOGY | Facility: CLINIC | Age: 66
End: 2021-03-30
Payer: MEDICARE

## 2021-03-30 VITALS
SYSTOLIC BLOOD PRESSURE: 140 MMHG | RESPIRATION RATE: 16 BRPM | WEIGHT: 128 LBS | HEIGHT: 63 IN | DIASTOLIC BLOOD PRESSURE: 96 MMHG | HEART RATE: 107 BPM | TEMPERATURE: 98.7 F | BODY MASS INDEX: 22.68 KG/M2

## 2021-03-30 DIAGNOSIS — D49.0 NEOPLASM OF AMPULLA: ICD-10-CM

## 2021-03-30 DIAGNOSIS — K83.8 DILATED BILE DUCT: ICD-10-CM

## 2021-03-30 DIAGNOSIS — D13.7 BENIGN NEOPLASM OF ENDOCRINE PANCREAS: ICD-10-CM

## 2021-03-30 DIAGNOSIS — D49.0 NEOPLASM OF AMPULLA OF VATER: Primary | ICD-10-CM

## 2021-03-30 DIAGNOSIS — Z51.89 ENCOUNTER FOR OTHER SPECIFIED AFTERCARE: ICD-10-CM

## 2021-03-30 DIAGNOSIS — R79.89 ELEVATED LFTS: ICD-10-CM

## 2021-03-30 PROCEDURE — 99205 OFFICE O/P NEW HI 60 MIN: CPT | Performed by: SURGERY

## 2021-03-30 RX ORDER — CEFAZOLIN SODIUM 1 G/50ML
1000 SOLUTION INTRAVENOUS ONCE
Status: CANCELLED | OUTPATIENT
Start: 2021-03-30

## 2021-03-30 NOTE — H&P (VIEW-ONLY)
Surgical Oncology Consult       [address] [facility] [address] [id number] [phone]    Je Langston  1955  [id number]  [address] [facility] [address] [id number] [phone]    Diagnoses and all orders for this visit:    Neoplasm of ampulla of Vater  -     Case request operating room: WHIPPLE PROCEDURE/PANCREATICO-DUODENECTOMY, ABLATION,SOFT TISSUE PANCREAS; Standing  -     PAT Covid Screening; Future  -     Type and screen; Future  -     Prepare Leukoreduced RBC: 2 Units; Future  -     Comprehensive metabolic panel; Future  -     CBC and differential; Future  -     APTT; Future  -     HEMOGLOBIN A1C W/ EAG ESTIMATION; Future  -     Protime-INR; Future  -     XR chest pa & lateral; Future    Elevated LFTs  -     Ambulatory referral to Surgical Oncology    Neoplasm of ampulla  -     Ambulatory referral to Surgical Oncology    Dilated bile duct  -     Ambulatory referral to Surgical Oncology    Encounter for other specified aftercare   -     APTT; Future  -     HEMOGLOBIN A1C W/ EAG ESTIMATION; Future  -     Protime-INR; Future    Benign neoplasm of endocrine pancreas   -     HEMOGLOBIN A1C W/ EAG ESTIMATION; Future    Other orders  -     Incentive spirometry; Standing  -     Insert and maintain IV line; Standing  -     Void On-Call to O R ; Standing  -     Place sequential compression device; Standing  -     Nursing communication Please give pre-op Carbohydrate drink to patient 2-4 hours prior to surgery (Drink is provided by [address] [place] [address]); Standing  -     ceFAZolin (ANCEF) IVPB (premix in dextrose) 1,000 mg 50 mL  -     metroNIDAZOLE (FLAGYL) IVPB (premix) 500 mg 100 mL        Chief Complaint   Patient presents with    Consult       No follow-ups on file  Oncology History    No history exists         History of Present Illness:   55-year-old female who recently went to the emergency room for some vague nausea and not feeling generally well  She had a CT performed on February 18, 2021 that revealed intra and extrahepatic biliary dilatation  No underlying mass was seen  Follow-up MRI on the same date revealed intra and extrahepatic biliary dilatation without a mass  EUS from March 15, 2021 revealed a 2 x 1 6 cm mass in the major papilla that appear to involve the distal common bile duct  Pathology revealed that the ampullary mass was a neoplasm, but it was unclear if this was an intraductal papillary mucinous neoplasm extending onto the ampullary surface or adenocarcinoma eroding into the ampulla  She comes in now to discuss further therapy  She still has some nausea with epigastric discomfort  No change in appetite or unintentional weight loss outside of approximately 3 lb  No history of jaundice or previous history of pancreatitis  Review of Systems  Complete ROS Surg Onc:   Constitutional: The patient denies new or recent history of general fatigue, no recent weight loss, no change in appetite  Eyes: No complaints of visual problems, no scleral icterus  ENT: no complaints of ear pain, no hoarseness, no difficulty swallowing,  no tinnitus and no new masses in head, oral cavity, or neck  Cardiovascular: No complaints of chest pain, no palpitations, no ankle edema  Respiratory: No complaints of shortness of breath, no cough  Gastrointestinal: No complaints of jaundice, no bloody stools, no pale stools  Genitourinary: No complaints of dysuria, no hematuria, no nocturia, no frequent urination, no urethral discharge  Musculoskeletal: No complaints of weakness, paralysis, joint stiffness or arthralgias  Integumentary: No complaints of rash, no new lesions  Neurological: No complaints of convulsions, no seizures, no dizziness  Hematologic/Lymphatic: No complaints of easy bruising     Endocrine:  No hot or cold intolerance  No polydipsia, polyphagia, or polyuria  Allergy/immunology:  No environmental allergies  No food allergies  Not immunocompromised  Skin:  No pallor or rash  No wound  Patient Active Problem List   Diagnosis    Elevated LFTs    Dilated bile duct    Neoplasm of ampulla of Vater     No past medical history on file    Past Surgical History:   Procedure Laterality Date    HYSTERECTOMY      SINUS SURGERY       Family History   Problem Relation Age of Onset    Ulcerative colitis Mother      Social History     Socioeconomic History    Marital status: Single     Spouse name: Not on file    Number of children: Not on file    Years of education: Not on file    Highest education level: Not on file   Occupational History    Not on file   Social Needs    Financial resource strain: Not on file    Food insecurity     Worry: Not on file     Inability: Not on file    Transportation needs     Medical: Not on file     Non-medical: Not on file   Tobacco Use    Smoking status: Current Every Day Smoker     Packs/day: 0 50    Smokeless tobacco: Never Used   Substance and Sexual Activity    Alcohol use: Never     Frequency: Never    Drug use: Never    Sexual activity: Never   Lifestyle    Physical activity     Days per week: Not on file     Minutes per session: Not on file    Stress: Not on file   Relationships    Social connections     Talks on phone: Not on file     Gets together: Not on file     Attends Taoism service: Not on file     Active member of club or organization: Not on file     Attends meetings of clubs or organizations: Not on file     Relationship status: Not on file    Intimate partner violence     Fear of current or ex partner: Not on file     Emotionally abused: Not on file     Physically abused: Not on file     Forced sexual activity: Not on file   Other Topics Concern    Not on file   Social History Narrative    Not on file       Current Outpatient Medications:     cetirizine (ZyrTEC) 10 mg tablet, Take 1 tablet (10 mg total) by mouth daily, Disp: 10 tablet, Rfl: 0    famotidine (PEPCID) 20 mg tablet, Take 1 tablet (20 mg total) by mouth 2 (two) times a day, Disp: 30 tablet, Rfl: 0  Allergies   Allergen Reactions    Penicillins     Tetracycline      Vitals:    03/30/21 1100   BP: 140/96   Pulse: (!) 107   Resp: 16   Temp: 98 7 °F (37 1 °C)       Physical Exam   Constitutional: General appearance: The Patient is well-developed and well-nourished who appears the stated age in no acute distress  Patient is pleasant and talkative  HEENT:  Normocephalic  Sclerae are anicteric  Mucous membranes are moist  Neck is supple without adenopathy  No JVD  Chest: The lungs are clear to auscultation  Cardiac: Heart is regular rate  Abdomen: Abdomen is soft, non-tender, non-distended and without masses  Extremities: There is no clubbing or cyanosis  There is no edema  Symmetric  Neuro: Grossly nonfocal  Gait is normal      Lymphatic: No evidence of cervical adenopathy bilaterally  No evidence of axillary adenopathy bilaterally  No evidence of inguinal adenopathy bilaterally  Skin: Warm, anicteric  Psych:  Patient is pleasant and talkative  Breasts:      Pathology:  Final Diagnosis   A  Duodenum, Duodenal bx-cold  biopsy:  -Benign small intestinal mucosa with intact villi and no evidence of increased  intraepithelial lymphocytes   -No evidence of Celiac disease   -No evidence of dysplasia or malignancy        B  Stomach, Gastric bx-cold  gastric biopsy:   -Benign gastric-antral type mucosa with mild chronic inflammation and reactive surface foveolar epithelial changes consistent with reactive/ chemical gastritis   -No evidence of ulceration   -No evidence of dysplasia or malignancy   -No H Pylori organisms identified on H&E stained slide     C   Ampulla of Vater, ampullary mass:  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  No normal epithelium seen  See diagnostic comment  Diagnosis Comment:  Whether this is an intraductal papillary mucinous neoplasm that hs extended onto the ampullary surface or a pancreatic ductal adenocarcinoma "colonizing" the ampulla is unclear on these small samples  -Ki-67 % stain highlights  many basal cells  P53, CA19 9 stains highlights rare cells        This case was sent out to ELLEN Bliss , and an expert in gastrointestinal pathology at [facility] Pathology consultation Service  The above mentioned diagnosis is exactly her consultation report diagnosis  A copy of this consultation report is on file at [facility] department of pathology  Please see her full consultation report in the notes section  Electronically signed by Yari Roper MD on 3/23/2021 at 10:59 AM   Comments: This is an appended report  These results have been appended to a previously preliminary verified report  Preliminary Diagnosis                Preliminary result electronically signed by Yari Roper MD on 3/17/2021 at  9:00 AM   Preliminary result electronically signed by Yari Roper MD on 3/16/2021 at  2:41 PM   Note    [address]  DIAGNOSIS     A  Duodenum (biopsy):    - Duodenal mucosa with preserved villous architecture  Negative for increased intraepithelial lymphocytes, granulomata or dysplasia     B  Stomach (biopsy):    - Gastric antral-type mucosa with reactive gastropathy  Gastric oxyntic-type mucosa with no specific histopathologic changes    -No Helicobacter pylori organisms seen on routinely stained H&E slides    C  Ampullary Mass (biopsy):    - 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  No normal epithelium seen  See diagnostic comment    Diagnosis Comment:  Whether this is an intraductal papillary mucinous neoplasm that hs extended onto the ampullary surface or a pancreatic ductal adenocarcinoma "colonizing" the ampulla is unclear on these small samples  Regardless, like Dr Isabel Barrera, we believe that the process is neoplastic      Jose A Cox MD     Interpretation performed at [facility]  MRI  notes   1  Elevated LFTs/dilated CBD: Seen on CT scan and MRI with 5 mm hypoechoic lesion in the head of the pancreas, dilation of the common bile duct to the level of the ampulla without any obvious filling defects  -concern would be for underlying stricture versus malignant process          Labs:      Imaging  No results found  I reviewed the above laboratory and imaging data  Discussion/Summary:   17-year-old female with an ampullary mass  It is unclear if this is benign or malignant  There is no evidence of distant metastasis by imaging  I would recommend that she undergo surgical intervention  We discussed ampullectomy, but if this is indeed malignant she would require pancreaticoduodenectomy to evaluate her lymph nodes  Based on the images from her EUS, ampullectomy may be difficult  I have recommended that she undergo pancreaticoduodenectomy with soft tissue ablation of the pancreas  The risks were explained including bleeding, infection, recurrence, need for further surgery, wound complications, adjacent organ injury, anastomotic leak, pancreatic and biliary fistula, sepsis, mi, DVT, stroke, pulmonary embolism, and death  Informed consent was obtained  We will schedule this at our earliest mutual convenience  She is agreeable to this  All her questions were answered

## 2021-03-30 NOTE — LETTER
March 30, 2021     Mila Sutherland MD  [address]    Patient: Amanda Newman   YOB: 1955   Date of Visit: 3/30/2021       Dear Dr Kathy Braden: Thank you for referring Amanda Newman to me for evaluation  Below are my notes for this consultation  If you have questions, please do not hesitate to call me  I look forward to following your patient along with you  Sincerely,        Lida Gay MD        CC: MD Lida Packer MD  3/30/2021 11:46 AM  Sign when Signing Visit               Surgical Oncology Consult       [address] [facility] [place]  [address] [facility] [id number]  [phone]    Amanda Newman  1955  [id number]  [address] [facility] [id number] [phone]    Diagnoses and all orders for this visit:    Neoplasm of ampulla of Vater  -     Case request operating room: WHIPPLE PROCEDURE/PANCREATICO-DUODENECTOMY, ABLATION,SOFT TISSUE PANCREAS; Standing  -     PAT Covid Screening; Future  -     Type and screen; Future  -     Prepare Leukoreduced RBC: 2 Units; Future  -     Comprehensive metabolic panel; Future  -     CBC and differential; Future  -     APTT; Future  -     HEMOGLOBIN A1C W/ EAG ESTIMATION; Future  -     Protime-INR; Future  -     XR chest pa & lateral; Future    Elevated LFTs  -     Ambulatory referral to Surgical Oncology    Neoplasm of ampulla  -     Ambulatory referral to Surgical Oncology    Dilated bile duct  -     Ambulatory referral to Surgical Oncology    Encounter for other specified aftercare   -     APTT; Future  -     HEMOGLOBIN A1C W/ EAG ESTIMATION; Future  -     Protime-INR; Future    Benign neoplasm of endocrine pancreas   -     HEMOGLOBIN A1C W/ EAG ESTIMATION;  Future    Other orders  -     Incentive spirometry; Standing  -     Insert and maintain IV line; Standing  -     Void On-Call to O R ; Standing  -     Place sequential compression device; Standing  -     Nursing communication Please give pre-op Carbohydrate drink to patient 2-4 hours prior to surgery (Drink is provided by [address] [place] [address]); Standing  -     ceFAZolin (ANCEF) IVPB (premix in dextrose) 1,000 mg 50 mL  -     metroNIDAZOLE (FLAGYL) IVPB (premix) 500 mg 100 mL        Chief Complaint   Patient presents with    Consult       No follow-ups on file  Oncology History    No history exists  History of Present Illness:   78-year-old female who recently went to the emergency room for some vague nausea and not feeling generally well  She had a CT performed on February 18, 2021 that revealed intra and extrahepatic biliary dilatation  No underlying mass was seen  Follow-up MRI on the same date revealed intra and extrahepatic biliary dilatation without a mass  EUS from March 15, 2021 revealed a 2 x 1 6 cm mass in the major papilla that appear to involve the distal common bile duct  Pathology revealed that the ampullary mass was a neoplasm, but it was unclear if this was an intraductal papillary mucinous neoplasm extending onto the ampullary surface or adenocarcinoma eroding into the ampulla  She comes in now to discuss further therapy  She still has some nausea with epigastric discomfort  No change in appetite or unintentional weight loss outside of approximately 3 lb  No history of jaundice or previous history of pancreatitis  Review of Systems  Complete ROS Surg Onc:   Constitutional: The patient denies new or recent history of general fatigue, no recent weight loss, no change in appetite  Eyes: No complaints of visual problems, no scleral icterus  ENT: no complaints of ear pain, no hoarseness, no difficulty swallowing,  no tinnitus and no new masses in head, oral cavity, or neck  Cardiovascular: No complaints of chest pain, no palpitations, no ankle edema     Respiratory: No complaints of shortness of breath, no cough  Gastrointestinal: No complaints of jaundice, no bloody stools, no pale stools  Genitourinary: No complaints of dysuria, no hematuria, no nocturia, no frequent urination, no urethral discharge  Musculoskeletal: No complaints of weakness, paralysis, joint stiffness or arthralgias  Integumentary: No complaints of rash, no new lesions  Neurological: No complaints of convulsions, no seizures, no dizziness  Hematologic/Lymphatic: No complaints of easy bruising  Endocrine:  No hot or cold intolerance  No polydipsia, polyphagia, or polyuria  Allergy/immunology:  No environmental allergies  No food allergies  Not immunocompromised  Skin:  No pallor or rash  No wound  Patient Active Problem List   Diagnosis    Elevated LFTs    Dilated bile duct    Neoplasm of ampulla of Vater     No past medical history on file    Past Surgical History:   Procedure Laterality Date    HYSTERECTOMY      SINUS SURGERY       Family History   Problem Relation Age of Onset    Ulcerative colitis Mother      Social History     Socioeconomic History    Marital status: Single     Spouse name: Not on file    Number of children: Not on file    Years of education: Not on file    Highest education level: Not on file   Occupational History    Not on file   Social Needs    Financial resource strain: Not on file    Food insecurity     Worry: Not on file     Inability: Not on file    Transportation needs     Medical: Not on file     Non-medical: Not on file   Tobacco Use    Smoking status: Current Every Day Smoker     Packs/day: 0 50    Smokeless tobacco: Never Used   Substance and Sexual Activity    Alcohol use: Never     Frequency: Never    Drug use: Never    Sexual activity: Never   Lifestyle    Physical activity     Days per week: Not on file     Minutes per session: Not on file    Stress: Not on file   Relationships    Social connections     Talks on phone: Not on file     Gets together: Not on file     Attends Catholic service: Not on file     Active member of club or organization: Not on file     Attends meetings of clubs or organizations: Not on file     Relationship status: Not on file    Intimate partner violence     Fear of current or ex partner: Not on file     Emotionally abused: Not on file     Physically abused: Not on file     Forced sexual activity: Not on file   Other Topics Concern    Not on file   Social History Narrative    Not on file       Current Outpatient Medications:     cetirizine (ZyrTEC) 10 mg tablet, Take 1 tablet (10 mg total) by mouth daily, Disp: 10 tablet, Rfl: 0    famotidine (PEPCID) 20 mg tablet, Take 1 tablet (20 mg total) by mouth 2 (two) times a day, Disp: 30 tablet, Rfl: 0  Allergies   Allergen Reactions    Penicillins     Tetracycline      Vitals:    03/30/21 1100   BP: 140/96   Pulse: (!) 107   Resp: 16   Temp: 98 7 °F (37 1 °C)       Physical Exam   Constitutional: General appearance: The Patient is well-developed and well-nourished who appears the stated age in no acute distress  Patient is pleasant and talkative  HEENT:  Normocephalic  Sclerae are anicteric  Mucous membranes are moist  Neck is supple without adenopathy  No JVD  Chest: The lungs are clear to auscultation  Cardiac: Heart is regular rate  Abdomen: Abdomen is soft, non-tender, non-distended and without masses  Extremities: There is no clubbing or cyanosis  There is no edema  Symmetric  Neuro: Grossly nonfocal  Gait is normal      Lymphatic: No evidence of cervical adenopathy bilaterally  No evidence of axillary adenopathy bilaterally  No evidence of inguinal adenopathy bilaterally  Skin: Warm, anicteric  Psych:  Patient is pleasant and talkative  Breasts:      Pathology:  Final Diagnosis   A   Duodenum, Duodenal bx-cold  biopsy:  -Benign small intestinal mucosa with intact villi and no evidence of increased  intraepithelial lymphocytes   -No evidence of Celiac disease   -No evidence of dysplasia or malignancy        B  Stomach, Gastric bx-cold  gastric biopsy:   -Benign gastric-antral type mucosa with mild chronic inflammation and reactive surface foveolar epithelial changes consistent with reactive/ chemical gastritis   -No evidence of ulceration   -No evidence of dysplasia or malignancy   -No H Pylori organisms identified on H&E stained slide     C  Ampulla of Vater, ampullary mass:  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  No normal epithelium seen  See diagnostic comment  Diagnosis Comment:  Whether this is an intraductal papillary mucinous neoplasm that hs extended onto the ampullary surface or a pancreatic ductal adenocarcinoma "colonizing" the ampulla is unclear on these small samples  -Ki-67 % stain highlights  many basal cells  P53, CA19 9 stains highlights rare cells        This case was sent out to ELLEN Jain and an expert in gastrointestinal pathology at [facility] Pathology consultation Service  The above mentioned diagnosis is exactly her consultation report diagnosis  A copy of this consultation report is on file at [facility] department of pathology  Please see her full consultation report in the notes section  Electronically signed by Dima Foster MD on 3/23/2021 at 10:59 AM   Comments: This is an appended report  These results have been appended to a previously preliminary verified report  Preliminary Diagnosis                Preliminary result electronically signed by Dima Foster MD on 3/17/2021 at  9:00 AM   Preliminary result electronically signed by Dima Foster MD on 3/16/2021 at  2:41 PM   Note    [address]  DIAGNOSIS     A  Duodenum (biopsy):    - Duodenal mucosa with preserved villous architecture  Negative for increased intraepithelial lymphocytes, granulomata or dysplasia     B   Stomach (biopsy):    - Gastric antral-type mucosa with reactive gastropathy  Gastric oxyntic-type mucosa with no specific histopathologic changes    -No Helicobacter pylori organisms seen on routinely stained H&E slides    C  Ampullary Mass (biopsy):    - 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  No normal epithelium seen  See diagnostic comment  Diagnosis Comment:  Whether this is an intraductal papillary mucinous neoplasm that hs extended onto the ampullary surface or a pancreatic ductal adenocarcinoma "colonizing" the ampulla is unclear on these small samples  Regardless, like Dr Zakiya Dominguez, we believe that the process is neoplastic      Pearle Castleman, MD     Interpretation performed at [facility] [place] [address]  MRI  notes   1  Elevated LFTs/dilated CBD: Seen on CT scan and MRI with 5 mm hypoechoic lesion in the head of the pancreas, dilation of the common bile duct to the level of the ampulla without any obvious filling defects  -concern would be for underlying stricture versus malignant process          Labs:      Imaging  No results found  I reviewed the above laboratory and imaging data  Discussion/Summary:   44-year-old female with an ampullary mass  It is unclear if this is benign or malignant  There is no evidence of distant metastasis by imaging  I would recommend that she undergo surgical intervention  We discussed ampullectomy, but if this is indeed malignant she would require pancreaticoduodenectomy to evaluate her lymph nodes  Based on the images from her EUS, ampullectomy may be difficult  I have recommended that she undergo pancreaticoduodenectomy with soft tissue ablation of the pancreas  The risks were explained including bleeding, infection, recurrence, need for further surgery, wound complications, adjacent organ injury, anastomotic leak, pancreatic and biliary fistula, sepsis, mi, DVT, stroke, pulmonary embolism, and death    Informed consent was obtained  We will schedule this at our earliest mutual convenience  She is agreeable to this  All her questions were answered

## 2021-03-30 NOTE — PROGRESS NOTES
Surgical Oncology Consult       [facility] [address] [id number] [phone]    [facility]  1955  [id number]  [facility] [address] [id number] [phone]    Diagnoses and all orders for this visit:    Neoplasm of ampulla of Vater  -     Case request operating room: WHIPPLE PROCEDURE/PANCREATICO-DUODENECTOMY, ABLATION,SOFT TISSUE PANCREAS; Standing  -     PAT Covid Screening; Future  -     Type and screen; Future  -     Prepare Leukoreduced RBC: 2 Units; Future  -     Comprehensive metabolic panel; Future  -     CBC and differential; Future  -     APTT; Future  -     HEMOGLOBIN A1C W/ EAG ESTIMATION; Future  -     Protime-INR; Future  -     XR chest pa & lateral; Future    Elevated LFTs  -     Ambulatory referral to Surgical Oncology    Neoplasm of ampulla  -     Ambulatory referral to Surgical Oncology    Dilated bile duct  -     Ambulatory referral to Surgical Oncology    Encounter for other specified aftercare   -     APTT; Future  -     HEMOGLOBIN A1C W/ EAG ESTIMATION; Future  -     Protime-INR; Future    Benign neoplasm of endocrine pancreas   -     HEMOGLOBIN A1C W/ EAG ESTIMATION; Future    Other orders  -     Incentive spirometry; Standing  -     Insert and maintain IV line; Standing  -     Void On-Call to O R ; Standing  -     Place sequential compression device; Standing  -     Nursing communication Please give pre-op Carbohydrate drink to patient 2-4 hours prior to surgery (Drink is provided by [address] [place] [address]); Standing  -     ceFAZolin (ANCEF) IVPB (premix in dextrose) 1,000 mg 50 mL  -     metroNIDAZOLE (FLAGYL) IVPB (premix) 500 mg 100 mL        Chief Complaint   Patient presents with    Consult       No follow-ups on file  Oncology History    No history exists         History of Present Illness:   22-year-old female who recently went to the emergency room for some vague nausea and not feeling generally well  She had a CT performed on February 18, 2021 that revealed intra and extrahepatic biliary dilatation  No underlying mass was seen  Follow-up MRI on the same date revealed intra and extrahepatic biliary dilatation without a mass  EUS from March 15, 2021 revealed a 2 x 1 6 cm mass in the major papilla that appear to involve the distal common bile duct  Pathology revealed that the ampullary mass was a neoplasm, but it was unclear if this was an intraductal papillary mucinous neoplasm extending onto the ampullary surface or adenocarcinoma eroding into the ampulla  She comes in now to discuss further therapy  She still has some nausea with epigastric discomfort  No change in appetite or unintentional weight loss outside of approximately 3 lb  No history of jaundice or previous history of pancreatitis  Review of Systems  Complete ROS Surg Onc:   Constitutional: The patient denies new or recent history of general fatigue, no recent weight loss, no change in appetite  Eyes: No complaints of visual problems, no scleral icterus  ENT: no complaints of ear pain, no hoarseness, no difficulty swallowing,  no tinnitus and no new masses in head, oral cavity, or neck  Cardiovascular: No complaints of chest pain, no palpitations, no ankle edema  Respiratory: No complaints of shortness of breath, no cough  Gastrointestinal: No complaints of jaundice, no bloody stools, no pale stools  Genitourinary: No complaints of dysuria, no hematuria, no nocturia, no frequent urination, no urethral discharge  Musculoskeletal: No complaints of weakness, paralysis, joint stiffness or arthralgias  Integumentary: No complaints of rash, no new lesions  Neurological: No complaints of convulsions, no seizures, no dizziness  Hematologic/Lymphatic: No complaints of easy bruising     Endocrine:  No hot or cold intolerance  No polydipsia, polyphagia, or polyuria  Allergy/immunology:  No environmental allergies  No food allergies  Not immunocompromised  Skin:  No pallor or rash  No wound  Patient Active Problem List   Diagnosis    Elevated LFTs    Dilated bile duct    Neoplasm of ampulla of Vater     No past medical history on file    Past Surgical History:   Procedure Laterality Date    HYSTERECTOMY      SINUS SURGERY       Family History   Problem Relation Age of Onset    Ulcerative colitis Mother      Social History     Socioeconomic History    Marital status: Single     Spouse name: Not on file    Number of children: Not on file    Years of education: Not on file    Highest education level: Not on file   Occupational History    Not on file   Social Needs    Financial resource strain: Not on file    Food insecurity     Worry: Not on file     Inability: Not on file    Transportation needs     Medical: Not on file     Non-medical: Not on file   Tobacco Use    Smoking status: Current Every Day Smoker     Packs/day: 0 50    Smokeless tobacco: Never Used   Substance and Sexual Activity    Alcohol use: Never     Frequency: Never    Drug use: Never    Sexual activity: Never   Lifestyle    Physical activity     Days per week: Not on file     Minutes per session: Not on file    Stress: Not on file   Relationships    Social connections     Talks on phone: Not on file     Gets together: Not on file     Attends Religion service: Not on file     Active member of club or organization: Not on file     Attends meetings of clubs or organizations: Not on file     Relationship status: Not on file    Intimate partner violence     Fear of current or ex partner: Not on file     Emotionally abused: Not on file     Physically abused: Not on file     Forced sexual activity: Not on file   Other Topics Concern    Not on file   Social History Narrative    Not on file       Current Outpatient Medications:     cetirizine (ZyrTEC) 10 mg tablet, Take 1 tablet (10 mg total) by mouth daily, Disp: 10 tablet, Rfl: 0    famotidine (PEPCID) 20 mg tablet, Take 1 tablet (20 mg total) by mouth 2 (two) times a day, Disp: 30 tablet, Rfl: 0  Allergies   Allergen Reactions    Penicillins     Tetracycline      Vitals:    03/30/21 1100   BP: 140/96   Pulse: (!) 107   Resp: 16   Temp: 98 7 °F (37 1 °C)       Physical Exam   Constitutional: General appearance: The Patient is well-developed and well-nourished who appears the stated age in no acute distress  Patient is pleasant and talkative  HEENT:  Normocephalic  Sclerae are anicteric  Mucous membranes are moist  Neck is supple without adenopathy  No JVD  Chest: The lungs are clear to auscultation  Cardiac: Heart is regular rate  Abdomen: Abdomen is soft, non-tender, non-distended and without masses  Extremities: There is no clubbing or cyanosis  There is no edema  Symmetric  Neuro: Grossly nonfocal  Gait is normal      Lymphatic: No evidence of cervical adenopathy bilaterally  No evidence of axillary adenopathy bilaterally  No evidence of inguinal adenopathy bilaterally  Skin: Warm, anicteric  Psych:  Patient is pleasant and talkative  Breasts:      Pathology:  Final Diagnosis   A  Duodenum, Duodenal bx-cold  biopsy:  -Benign small intestinal mucosa with intact villi and no evidence of increased  intraepithelial lymphocytes   -No evidence of Celiac disease   -No evidence of dysplasia or malignancy        B  Stomach, Gastric bx-cold  gastric biopsy:   -Benign gastric-antral type mucosa with mild chronic inflammation and reactive surface foveolar epithelial changes consistent with reactive/ chemical gastritis   -No evidence of ulceration   -No evidence of dysplasia or malignancy   -No H Pylori organisms identified on H&E stained slide     C   Ampulla of Vater, ampullary mass:  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  No normal epithelium seen  See diagnostic comment  Diagnosis Comment:  Whether this is an intraductal papillary mucinous neoplasm that hs extended onto the ampullary surface or a pancreatic ductal adenocarcinoma "colonizing" the ampulla is unclear on these small samples  -Ki-67 % stain highlights  many basal cells  P53, CA19 9 stains highlights rare cells        This case was sent out to ELLEN Walsh , and an expert in gastrointestinal pathology at [facility] Pathology consultation Service  The above mentioned diagnosis is exactly her consultation report diagnosis  A copy of this consultation report is on file at [facility] department of pathology  Please see her full consultation report in the notes section  Electronically signed by Veronica Navas MD on 3/23/2021 at 10:59 AM   Comments: This is an appended report  These results have been appended to a previously preliminary verified report  Preliminary Diagnosis                Preliminary result electronically signed by Veronica Nvaas MD on 3/17/2021 at  9:00 AM   Preliminary result electronically signed by Veronica Naavs MD on 3/16/2021 at  2:41 PM   Note    [address]  DIAGNOSIS     A  Duodenum (biopsy):    - Duodenal mucosa with preserved villous architecture  Negative for increased intraepithelial lymphocytes, granulomata or dysplasia     B  Stomach (biopsy):    - Gastric antral-type mucosa with reactive gastropathy  Gastric oxyntic-type mucosa with no specific histopathologic changes    -No Helicobacter pylori organisms seen on routinely stained H&E slides    C  Ampullary Mass (biopsy):    - 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  No normal epithelium seen  See diagnostic comment    Diagnosis Comment:  Whether this is an intraductal papillary mucinous neoplasm that hs extended onto the ampullary surface or a pancreatic ductal adenocarcinoma "colonizing" the ampulla is unclear on these small samples  Regardless, like Dr Thanh Montanez, we believe that the process is neoplastic      Vinnie Mckinney MD     Interpretation performed at [facility], [address]  MRI  notes   1  Elevated LFTs/dilated CBD: Seen on CT scan and MRI with 5 mm hypoechoic lesion in the head of the pancreas, dilation of the common bile duct to the level of the ampulla without any obvious filling defects  -concern would be for underlying stricture versus malignant process          Labs:      Imaging  No results found  I reviewed the above laboratory and imaging data  Discussion/Summary:   27-year-old female with an ampullary mass  It is unclear if this is benign or malignant  There is no evidence of distant metastasis by imaging  I would recommend that she undergo surgical intervention  We discussed ampullectomy, but if this is indeed malignant she would require pancreaticoduodenectomy to evaluate her lymph nodes  Based on the images from her EUS, ampullectomy may be difficult  I have recommended that she undergo pancreaticoduodenectomy with soft tissue ablation of the pancreas  The risks were explained including bleeding, infection, recurrence, need for further surgery, wound complications, adjacent organ injury, anastomotic leak, pancreatic and biliary fistula, sepsis, mi, DVT, stroke, pulmonary embolism, and death  Informed consent was obtained  We will schedule this at our earliest mutual convenience  She is agreeable to this  All her questions were answered

## 2021-04-08 ENCOUNTER — TELEPHONE (OUTPATIENT)
Dept: SURGICAL ONCOLOGY | Facility: CLINIC | Age: 66
End: 2021-04-08

## 2021-04-08 NOTE — TELEPHONE ENCOUNTER
Patient called in stating she is having nausea, abdominal pain, weakness and a decrease in appetite  She was unable to get out of bed yesterday  Today she feels more active after taking ibuprofen every 4 hours  I still advised  Patient to go to the [place] emergency room to be evaluated  She said she may go tomorrow instead of tonight

## 2021-04-09 ENCOUNTER — TRANSCRIBE ORDERS (OUTPATIENT)
Dept: LAB | Facility: CLINIC | Age: 66
End: 2021-04-09

## 2021-04-09 ENCOUNTER — TRANSCRIBE ORDERS (OUTPATIENT)
Dept: ADMINISTRATIVE | Facility: HOSPITAL | Age: 66
End: 2021-04-09

## 2021-04-09 ENCOUNTER — TELEPHONE (OUTPATIENT)
Dept: GYNECOLOGIC ONCOLOGY | Facility: CLINIC | Age: 66
End: 2021-04-09

## 2021-04-09 ENCOUNTER — APPOINTMENT (OUTPATIENT)
Dept: LAB | Facility: CLINIC | Age: 66
End: 2021-04-09
Payer: MEDICARE

## 2021-04-09 ENCOUNTER — HOSPITAL ENCOUNTER (OUTPATIENT)
Dept: RADIOLOGY | Facility: HOSPITAL | Age: 66
Discharge: HOME | End: 2021-04-09
Payer: MEDICARE

## 2021-04-09 ENCOUNTER — LAB REQUISITION (OUTPATIENT)
Dept: LAB | Facility: HOSPITAL | Age: 66
End: 2021-04-09
Payer: MEDICARE

## 2021-04-09 DIAGNOSIS — D13.7 BENIGN NEOPLASM OF ENDOCRINE PANCREAS: ICD-10-CM

## 2021-04-09 DIAGNOSIS — D49.0 NEOPLASM OF AMPULLA OF VATER: ICD-10-CM

## 2021-04-09 DIAGNOSIS — Z01.818 PRE-OP TESTING: Primary | ICD-10-CM

## 2021-04-09 DIAGNOSIS — Z51.89 ENCOUNTER FOR OTHER SPECIFIED AFTERCARE: ICD-10-CM

## 2021-04-09 DIAGNOSIS — Z01.818 PRE-OP TESTING: ICD-10-CM

## 2021-04-09 DIAGNOSIS — D49.0 NEOPLASM OF UNSPECIFIED BEHAVIOR OF DIGESTIVE SYSTEM: ICD-10-CM

## 2021-04-09 LAB
ALBUMIN SERPL BCP-MCNC: 3.6 G/DL (ref 3.5–5)
ALP SERPL-CCNC: 762 U/L (ref 46–116)
ALT SERPL W P-5'-P-CCNC: 466 U/L (ref 12–78)
ANION GAP SERPL CALCULATED.3IONS-SCNC: 7 MMOL/L (ref 4–13)
APTT PPP: 25 SECONDS (ref 23–37)
AST SERPL W P-5'-P-CCNC: 265 U/L (ref 5–45)
BASOPHILS # BLD AUTO: 0.05 THOUSANDS/ΜL (ref 0–0.1)
BASOPHILS NFR BLD AUTO: 1 % (ref 0–1)
BILIRUB SERPL-MCNC: 3.43 MG/DL (ref 0.2–1)
BUN SERPL-MCNC: 15 MG/DL (ref 5–25)
CALCIUM SERPL-MCNC: 10.1 MG/DL (ref 8.3–10.1)
CHLORIDE SERPL-SCNC: 103 MMOL/L (ref 100–108)
CO2 SERPL-SCNC: 27 MMOL/L (ref 21–32)
CREAT SERPL-MCNC: 0.7 MG/DL (ref 0.6–1.3)
EOSINOPHIL # BLD AUTO: 0.07 THOUSAND/ΜL (ref 0–0.61)
EOSINOPHIL NFR BLD AUTO: 1 % (ref 0–6)
ERYTHROCYTE [DISTWIDTH] IN BLOOD BY AUTOMATED COUNT: 15.2 % (ref 11.6–15.1)
EST. AVERAGE GLUCOSE BLD GHB EST-MCNC: 120 MG/DL
GFR SERPL CREATININE-BSD FRML MDRD: 91 ML/MIN/1.73SQ M
GLUCOSE P FAST SERPL-MCNC: 100 MG/DL (ref 65–99)
HBA1C MFR BLD: 5.8 %
HCT VFR BLD AUTO: 42.4 % (ref 34.8–46.1)
HGB BLD-MCNC: 13.8 G/DL (ref 11.5–15.4)
IMM GRANULOCYTES # BLD AUTO: 0.02 THOUSAND/UL (ref 0–0.2)
IMM GRANULOCYTES NFR BLD AUTO: 0 % (ref 0–2)
INR PPP: 0.84 (ref 0.84–1.19)
LYMPHOCYTES # BLD AUTO: 1.16 THOUSANDS/ΜL (ref 0.6–4.47)
LYMPHOCYTES NFR BLD AUTO: 14 % (ref 14–44)
MCH RBC QN AUTO: 30.3 PG (ref 26.8–34.3)
MCHC RBC AUTO-ENTMCNC: 32.5 G/DL (ref 31.4–37.4)
MCV RBC AUTO: 93 FL (ref 82–98)
MONOCYTES # BLD AUTO: 0.53 THOUSAND/ΜL (ref 0.17–1.22)
MONOCYTES NFR BLD AUTO: 6 % (ref 4–12)
NEUTROPHILS # BLD AUTO: 6.42 THOUSANDS/ΜL (ref 1.85–7.62)
NEUTS SEG NFR BLD AUTO: 78 % (ref 43–75)
NRBC BLD AUTO-RTO: 0 /100 WBCS
PLATELET # BLD AUTO: 383 THOUSANDS/UL (ref 149–390)
PMV BLD AUTO: 11.3 FL (ref 8.9–12.7)
POTASSIUM SERPL-SCNC: 4.5 MMOL/L (ref 3.5–5.3)
PROT SERPL-MCNC: 8.2 G/DL (ref 6.4–8.2)
PROTHROMBIN TIME: 11.5 SECONDS (ref 11.6–14.5)
RBC # BLD AUTO: 4.56 MILLION/UL (ref 3.81–5.12)
SODIUM SERPL-SCNC: 137 MMOL/L (ref 136–145)
WBC # BLD AUTO: 8.25 THOUSAND/UL (ref 4.31–10.16)

## 2021-04-09 PROCEDURE — 85025 COMPLETE CBC W/AUTO DIFF WBC: CPT

## 2021-04-09 PROCEDURE — 86900 BLOOD TYPING SEROLOGIC ABO: CPT | Performed by: SURGERY

## 2021-04-09 PROCEDURE — 86850 RBC ANTIBODY SCREEN: CPT | Performed by: SURGERY

## 2021-04-09 PROCEDURE — 71046 X-RAY EXAM CHEST 2 VIEWS: CPT

## 2021-04-09 PROCEDURE — 85610 PROTHROMBIN TIME: CPT

## 2021-04-09 PROCEDURE — 36415 COLL VENOUS BLD VENIPUNCTURE: CPT

## 2021-04-09 PROCEDURE — 80053 COMPREHEN METABOLIC PANEL: CPT

## 2021-04-09 PROCEDURE — 86901 BLOOD TYPING SEROLOGIC RH(D): CPT | Performed by: SURGERY

## 2021-04-09 PROCEDURE — 83036 HEMOGLOBIN GLYCOSYLATED A1C: CPT

## 2021-04-09 PROCEDURE — 85730 THROMBOPLASTIN TIME PARTIAL: CPT

## 2021-04-11 ENCOUNTER — APPOINTMENT (EMERGENCY)
Dept: RADIOLOGY | Facility: HOSPITAL | Age: 66
End: 2021-04-11
Payer: MEDICARE

## 2021-04-11 ENCOUNTER — HOSPITAL ENCOUNTER (EMERGENCY)
Facility: HOSPITAL | Age: 66
Discharge: HOME | End: 2021-04-11
Attending: EMERGENCY MEDICINE
Payer: MEDICARE

## 2021-04-11 VITALS
OXYGEN SATURATION: 96 % | TEMPERATURE: 97.1 F | WEIGHT: 115 LBS | SYSTOLIC BLOOD PRESSURE: 145 MMHG | BODY MASS INDEX: 20.38 KG/M2 | RESPIRATION RATE: 18 BRPM | HEART RATE: 60 BPM | DIASTOLIC BLOOD PRESSURE: 70 MMHG | HEIGHT: 63 IN

## 2021-04-11 DIAGNOSIS — K83.8 COMMON BILE DUCT MASS: ICD-10-CM

## 2021-04-11 DIAGNOSIS — R11.0 NAUSEA: ICD-10-CM

## 2021-04-11 DIAGNOSIS — R10.9 ABDOMINAL PAIN: Primary | ICD-10-CM

## 2021-04-11 LAB
ALBUMIN SERPL BCP-MCNC: 3.2 G/DL (ref 3.5–5)
ALP SERPL-CCNC: 727 U/L (ref 46–116)
ALT SERPL W P-5'-P-CCNC: 381 U/L (ref 12–78)
ANION GAP SERPL CALCULATED.3IONS-SCNC: 9 MMOL/L (ref 4–13)
AST SERPL W P-5'-P-CCNC: 173 U/L (ref 5–45)
BACTERIA UR QL AUTO: NORMAL /HPF
BASOPHILS # BLD AUTO: 0.02 THOUSANDS/ΜL (ref 0–0.1)
BASOPHILS NFR BLD AUTO: 0 % (ref 0–1)
BILIRUB SERPL-MCNC: 2.32 MG/DL (ref 0.2–1)
BILIRUB UR QL STRIP: NEGATIVE
BUN SERPL-MCNC: 12 MG/DL (ref 5–25)
CALCIUM ALBUM COR SERPL-MCNC: 10 MG/DL (ref 8.3–10.1)
CALCIUM SERPL-MCNC: 9.4 MG/DL (ref 8.3–10.1)
CHLORIDE SERPL-SCNC: 101 MMOL/L (ref 100–108)
CLARITY UR: CLEAR
CO2 SERPL-SCNC: 26 MMOL/L (ref 21–32)
COLOR UR: ABNORMAL
CREAT SERPL-MCNC: 0.71 MG/DL (ref 0.6–1.3)
EOSINOPHIL # BLD AUTO: 0.11 THOUSAND/ΜL (ref 0–0.61)
EOSINOPHIL NFR BLD AUTO: 1 % (ref 0–6)
ERYTHROCYTE [DISTWIDTH] IN BLOOD BY AUTOMATED COUNT: 14.9 % (ref 11.6–15.1)
GFR SERPL CREATININE-BSD FRML MDRD: 90 ML/MIN/1.73SQ M
GLUCOSE SERPL-MCNC: 108 MG/DL (ref 65–140)
GLUCOSE UR STRIP-MCNC: NEGATIVE MG/DL
HCT VFR BLD AUTO: 39.9 % (ref 34.8–46.1)
HGB BLD-MCNC: 12.9 G/DL (ref 11.5–15.4)
HGB UR QL STRIP.AUTO: ABNORMAL
IMM GRANULOCYTES # BLD AUTO: 0.02 THOUSAND/UL (ref 0–0.2)
IMM GRANULOCYTES NFR BLD AUTO: 0 % (ref 0–2)
KETONES UR STRIP-MCNC: NEGATIVE MG/DL
LEUKOCYTE ESTERASE UR QL STRIP: ABNORMAL
LIPASE SERPL-CCNC: 91 U/L (ref 73–393)
LYMPHOCYTES # BLD AUTO: 0.96 THOUSANDS/ΜL (ref 0.6–4.47)
LYMPHOCYTES NFR BLD AUTO: 12 % (ref 14–44)
MCH RBC QN AUTO: 30 PG (ref 26.8–34.3)
MCHC RBC AUTO-ENTMCNC: 32.3 G/DL (ref 31.4–37.4)
MCV RBC AUTO: 93 FL (ref 82–98)
MONOCYTES # BLD AUTO: 0.53 THOUSAND/ΜL (ref 0.17–1.22)
MONOCYTES NFR BLD AUTO: 7 % (ref 4–12)
NEUTROPHILS # BLD AUTO: 6.14 THOUSANDS/ΜL (ref 1.85–7.62)
NEUTS SEG NFR BLD AUTO: 80 % (ref 43–75)
NITRITE UR QL STRIP: NEGATIVE
NON-SQ EPI CELLS URNS QL MICRO: NORMAL /HPF
NRBC BLD AUTO-RTO: 0 /100 WBCS
PH UR STRIP.AUTO: 6 [PH]
PLATELET # BLD AUTO: 345 THOUSANDS/UL (ref 149–390)
PMV BLD AUTO: 10.4 FL (ref 8.9–12.7)
POTASSIUM SERPL-SCNC: 3.6 MMOL/L (ref 3.5–5.3)
PROT SERPL-MCNC: 7.7 G/DL (ref 6.4–8.2)
PROT UR STRIP-MCNC: NEGATIVE MG/DL
RBC # BLD AUTO: 4.3 MILLION/UL (ref 3.81–5.12)
RBC #/AREA URNS AUTO: NORMAL /HPF
SODIUM SERPL-SCNC: 136 MMOL/L (ref 136–145)
SP GR UR STRIP.AUTO: <=1.005 (ref 1–1.03)
UROBILINOGEN UR QL STRIP.AUTO: 0.2 E.U./DL
WBC # BLD AUTO: 7.78 THOUSAND/UL (ref 4.31–10.16)
WBC #/AREA URNS AUTO: NORMAL /HPF

## 2021-04-11 PROCEDURE — 96375 TX/PRO/DX INJ NEW DRUG ADDON: CPT

## 2021-04-11 PROCEDURE — 85025 COMPLETE CBC W/AUTO DIFF WBC: CPT | Performed by: PHYSICIAN ASSISTANT

## 2021-04-11 PROCEDURE — 81001 URINALYSIS AUTO W/SCOPE: CPT | Performed by: PHYSICIAN ASSISTANT

## 2021-04-11 PROCEDURE — 96361 HYDRATE IV INFUSION ADD-ON: CPT

## 2021-04-11 PROCEDURE — 74177 CT ABD & PELVIS W/CONTRAST: CPT

## 2021-04-11 PROCEDURE — 99285 EMERGENCY DEPT VISIT HI MDM: CPT | Performed by: PHYSICIAN ASSISTANT

## 2021-04-11 PROCEDURE — 36415 COLL VENOUS BLD VENIPUNCTURE: CPT | Performed by: PHYSICIAN ASSISTANT

## 2021-04-11 PROCEDURE — 83690 ASSAY OF LIPASE: CPT | Performed by: PHYSICIAN ASSISTANT

## 2021-04-11 PROCEDURE — G1004 CDSM NDSC: HCPCS

## 2021-04-11 PROCEDURE — 80053 COMPREHEN METABOLIC PANEL: CPT | Performed by: PHYSICIAN ASSISTANT

## 2021-04-11 PROCEDURE — 96374 THER/PROPH/DIAG INJ IV PUSH: CPT

## 2021-04-11 PROCEDURE — 99284 EMERGENCY DEPT VISIT MOD MDM: CPT

## 2021-04-11 RX ORDER — ONDANSETRON 4 MG/1
4 TABLET, ORALLY DISINTEGRATING ORAL EVERY 6 HOURS PRN
Qty: 20 TABLET | Refills: 0 | Status: SHIPPED | OUTPATIENT
Start: 2021-04-11 | End: 2021-05-26 | Stop reason: ALTCHOICE

## 2021-04-11 RX ORDER — ONDANSETRON 2 MG/ML
4 INJECTION INTRAMUSCULAR; INTRAVENOUS ONCE
Status: COMPLETED | OUTPATIENT
Start: 2021-04-11 | End: 2021-04-11

## 2021-04-11 RX ADMIN — SODIUM CHLORIDE 1000 ML: 0.9 INJECTION, SOLUTION INTRAVENOUS at 10:31

## 2021-04-11 RX ADMIN — IOHEXOL 85 ML: 350 INJECTION, SOLUTION INTRAVENOUS at 11:00

## 2021-04-11 RX ADMIN — ONDANSETRON 4 MG: 2 INJECTION INTRAMUSCULAR; INTRAVENOUS at 10:32

## 2021-04-11 RX ADMIN — MORPHINE SULFATE 2 MG: 2 INJECTION, SOLUTION INTRAMUSCULAR; INTRAVENOUS at 10:34

## 2021-04-11 NOTE — ED PROVIDER NOTES
History  Chief Complaint   Patient presents with    Abdominal Pain     Patient has a mass and is scheduled to have whipple procedure on April 26,but is in more increasing pain,was at [facility] on the ninth but no one saw her after waiting three hours     Patient is 42-year-old female presents for evaluation abdominal pain  Pain is primarily in the left upper and lower quadrant  Symptoms developed two days ago  Also associated with constipation  Has not have a bowel movement in two days  She is taking MiraLax  She hand does have a recent diagnosis of common bile duct mass and is scheduled to undergo a Whipple procedure at the end of this month  She did go to [facility] but left due to long wait time  Review of her chart notes that the patient is scheduled for the above procedure due to the possibility this mass may be malignant  It is unclear from the notes and epic whether not this is benign or malignant  Was instructed to come to the emergency room for further evaluation of pain and nausea  She denies any chest pain, shortness of breath, fever, chills, cough, headache, urinary complaints, back pain, vaginal bleeding, vaginal discharge  History provided by:  Patient  Abdominal Pain  Associated symptoms: nausea    Associated symptoms: no constipation, no fatigue and no sore throat        None       History reviewed  No pertinent past medical history  Past Surgical History:   Procedure Laterality Date    HYSTERECTOMY      SINUS SURGERY         Family History   Problem Relation Age of Onset    Ulcerative colitis Mother      I have reviewed and agree with the history as documented      E-Cigarette/Vaping    E-Cigarette Use Never User      E-Cigarette/Vaping Substances    Nicotine No     THC No     CBD No     Flavoring No     Other No     Unknown No      Social History     Tobacco Use    Smoking status: Current Every Day Smoker     Packs/day: 0 50    Smokeless tobacco: Never Used   Substance Use Topics    Alcohol use: Never     Frequency: Never    Drug use: Never       Review of Systems   Constitutional: Negative for activity change, appetite change and fatigue  HENT: Negative for nosebleeds, sneezing, sore throat, trouble swallowing and voice change  Eyes: Negative for photophobia, pain and visual disturbance  Respiratory: Negative for apnea, choking and stridor  Cardiovascular: Negative for palpitations and leg swelling  Gastrointestinal: Positive for abdominal pain and nausea  Negative for anal bleeding and constipation  Endocrine: Negative for cold intolerance, heat intolerance, polydipsia and polyphagia  Genitourinary: Negative for decreased urine volume, enuresis, frequency, genital sores and urgency  Musculoskeletal: Negative for joint swelling and myalgias  Allergic/Immunologic: Negative for environmental allergies and food allergies  Neurological: Negative for tremors, seizures, speech difficulty and weakness  Hematological: Negative for adenopathy  Psychiatric/Behavioral: Negative for behavioral problems, decreased concentration, dysphoric mood and hallucinations  All other systems reviewed and are negative  Physical Exam  Physical Exam  Vitals signs reviewed  Constitutional:       General: She is not in acute distress  Appearance: She is well-developed  She is not diaphoretic  HENT:      Head: Normocephalic and atraumatic  Right Ear: External ear normal       Left Ear: External ear normal       Nose: Nose normal    Eyes:      Conjunctiva/sclera: Conjunctivae normal       Pupils: Pupils are equal, round, and reactive to light  Neck:      Musculoskeletal: Normal range of motion and neck supple  Cardiovascular:      Rate and Rhythm: Normal rate and regular rhythm  Heart sounds: Normal heart sounds  No murmur  No friction rub  No gallop  Pulmonary:      Effort: Pulmonary effort is normal  No respiratory distress  Breath sounds: Normal breath sounds  No wheezing  Abdominal:      General: Bowel sounds are normal       Palpations: Abdomen is soft  Tenderness: There is abdominal tenderness in the suprapubic area, left upper quadrant and left lower quadrant  Genitourinary:     Vagina: No vaginal discharge, tenderness or bleeding  Musculoskeletal:         General: No swelling or tenderness  Skin:     General: Skin is warm and dry  Neurological:      Mental Status: She is alert and oriented to person, place, and time     Psychiatric:         Behavior: Behavior normal          Vital Signs  ED Triage Vitals [04/11/21 0932]   Temperature Pulse Respirations Blood Pressure SpO2   (!) 97 1 °F (36 2 °C) 103 18 (!) 181/93 98 %      Temp Source Heart Rate Source Patient Position - Orthostatic VS BP Location FiO2 (%)   Tympanic Monitor Lying Left arm --      Pain Score       --           Vitals:    04/11/21 0932   BP: (!) 181/93   Pulse: 103   Patient Position - Orthostatic VS: Lying         Visual Acuity      ED Medications  Medications   sodium chloride 0 9 % bolus 1,000 mL (has no administration in time range)   morphine injection 2 mg (has no administration in time range)   ondansetron (ZOFRAN) injection 4 mg (has no administration in time range)       Diagnostic Studies  Results Reviewed     Procedure Component Value Units Date/Time    Comprehensive metabolic panel [[id number]]     Lab Status: No result Specimen: Blood     Lipase [[id number]]     Lab Status: No result Specimen: Blood     UA w Reflex to Microscopic w Reflex to Culture [[id number]]     Lab Status: No result Specimen: Urine     CBC and differential [[id number]]     Lab Status: No result Specimen: Blood                  CT abdomen pelvis with contrast    (Results Pending)              Procedures  Procedures         ED Course                             SBIRT 20yo+      Most Recent Value   SBIRT (22 yo +)   In order to provide better care to our patients, we are screening all of our patients for alcohol and drug use  Would it be okay to ask you these screening questions? Yes Filed at: 04/11/2021 5074   Initial Alcohol Screen: US AUDIT-C    1  How often do you have a drink containing alcohol?  0 Filed at: 04/11/2021 0938   2  How many drinks containing alcohol do you have on a typical day you are drinking? 0 Filed at: 04/11/2021 0938   3a  Male UNDER 65: How often do you have five or more drinks on one occasion? 0 Filed at: 04/11/2021 0938   3b  FEMALE Any Age, or MALE 65+: How often do you have 4 or more drinks on one occassion? 0 Filed at: 04/11/2021 0938   Audit-C Score  0 Filed at: 04/11/2021 9131   YANE: How many times in the past year have you    Used an illegal drug or used a prescription medication for non-medical reasons? Never Filed at: 04/11/2021 1025                    MDM  Number of Diagnoses or Management Options  Diagnosis management comments: Spoke with Dr Iker Pierre on [facility] Text who informs me that his office will try to get the patient in sooner for Whipple procedure  Disposition  Final diagnoses:   None     ED Disposition     None      Follow-up Information    None         Patient's Medications   Discharge Prescriptions    No medications on file     No discharge procedures on file      PDMP Review     None          ED Provider  Electronically Signed by           Cary Denson PA-C  04/11/21 2080

## 2021-04-11 NOTE — ED NOTES
Patient with mass,states feels like a ball in her abdomen  Patient has been having nausea,no vomiting,but also decreased appetite   Patient has not been scanned since February,lab work was done on Friday  Call bell in [facility]  ER PA at bedside       Ann Salazar RN  04/11/21 9168

## 2021-04-12 LAB
BLD GP AB SCN SERPL QL: NEGATIVE
SPECIMEN EXPIRATION DATE: NORMAL

## 2021-04-19 DIAGNOSIS — D49.0 NEOPLASM OF AMPULLA OF VATER: ICD-10-CM

## 2021-04-19 PROCEDURE — U0003 INFECTIOUS AGENT DETECTION BY NUCLEIC ACID (DNA OR RNA); SEVERE ACUTE RESPIRATORY SYNDROME CORONAVIRUS 2 (SARS-COV-2) (CORONAVIRUS DISEASE [COVID-19]), AMPLIFIED PROBE TECHNIQUE, MAKING USE OF HIGH THROUGHPUT TECHNOLOGIES AS DESCRIBED BY CMS-2020-01-R: HCPCS | Performed by: SURGERY

## 2021-04-19 PROCEDURE — U0005 INFEC AGEN DETEC AMPLI PROBE: HCPCS | Performed by: SURGERY

## 2021-04-20 LAB — SARS-COV-2 RNA RESP QL NAA+PROBE: NEGATIVE

## 2021-04-21 NOTE — PRE-PROCEDURE INSTRUCTIONS
No outpatient medications have been marked as taking for the 4/26/21 encounter [facility] Encounter)  Have you had / have a sore throat? no  have you had / have a cough less than 1 week? no  Have you had / have a fever greater than 100 0 - 100  4? no  Are you experiencing any shortness of breath? no    Pre procedure instructions given, verbalizes understanding as per My surgical experience [facility]    Pt is taking no meds at this time verbalizes understanding of bathing and hospital instructions pt verbalizes understanding carb drinks ordered

## 2021-04-23 ENCOUNTER — ANESTHESIA EVENT (OUTPATIENT)
Dept: PERIOP | Facility: HOSPITAL | Age: 66
DRG: 407 | End: 2021-04-23
Payer: MEDICARE

## 2021-04-26 ENCOUNTER — APPOINTMENT (OUTPATIENT)
Dept: RADIOLOGY | Facility: HOSPITAL | Age: 66
DRG: 407 | End: 2021-04-26
Payer: MEDICARE

## 2021-04-26 ENCOUNTER — HOSPITAL ENCOUNTER (INPATIENT)
Facility: HOSPITAL | Age: 66
LOS: 7 days | Discharge: HOME HEALTH | DRG: 407 | End: 2021-05-03
Attending: SURGERY | Admitting: SURGERY
Payer: MEDICARE

## 2021-04-26 ENCOUNTER — ANESTHESIA (OUTPATIENT)
Dept: PERIOP | Facility: HOSPITAL | Age: 66
DRG: 407 | End: 2021-04-26
Payer: MEDICARE

## 2021-04-26 DIAGNOSIS — D49.0 NEOPLASM OF AMPULLA OF VATER: ICD-10-CM

## 2021-04-26 LAB
ABO GROUP BLD: NORMAL
ALBUMIN SERPL BCP-MCNC: 2.8 G/DL (ref 3.5–5)
ALP SERPL-CCNC: 806 U/L (ref 46–116)
ALT SERPL W P-5'-P-CCNC: 375 U/L (ref 12–78)
ANION GAP SERPL CALCULATED.3IONS-SCNC: 8 MMOL/L (ref 4–13)
ANISOCYTOSIS BLD QL SMEAR: PRESENT
AST SERPL W P-5'-P-CCNC: 350 U/L (ref 5–45)
BASE EXCESS BLDA CALC-SCNC: -1 MMOL/L (ref -2–3)
BASE EXCESS BLDA CALC-SCNC: -3 MMOL/L (ref -2–3)
BASOPHILS # BLD MANUAL: 0.12 THOUSAND/UL (ref 0–0.1)
BASOPHILS NFR MAR MANUAL: 1 % (ref 0–1)
BILIRUB SERPL-MCNC: 6.39 MG/DL (ref 0.2–1)
BUN SERPL-MCNC: 15 MG/DL (ref 5–25)
CA-I BLD-SCNC: 1.08 MMOL/L (ref 1.12–1.32)
CA-I BLD-SCNC: 1.15 MMOL/L (ref 1.12–1.32)
CALCIUM ALBUM COR SERPL-MCNC: 9.5 MG/DL (ref 8.3–10.1)
CALCIUM SERPL-MCNC: 8.5 MG/DL (ref 8.3–10.1)
CHLORIDE SERPL-SCNC: 112 MMOL/L (ref 100–108)
CO2 SERPL-SCNC: 21 MMOL/L (ref 21–32)
CREAT SERPL-MCNC: 0.63 MG/DL (ref 0.6–1.3)
EOSINOPHIL # BLD MANUAL: 0 THOUSAND/UL (ref 0–0.4)
EOSINOPHIL NFR BLD MANUAL: 0 % (ref 0–6)
ERYTHROCYTE [DISTWIDTH] IN BLOOD BY AUTOMATED COUNT: 17 % (ref 11.6–15.1)
GFR SERPL CREATININE-BSD FRML MDRD: 94 ML/MIN/1.73SQ M
GLUCOSE SERPL-MCNC: 132 MG/DL (ref 65–140)
GLUCOSE SERPL-MCNC: 135 MG/DL (ref 65–140)
GLUCOSE SERPL-MCNC: 146 MG/DL (ref 65–140)
GLUCOSE SERPL-MCNC: 150 MG/DL (ref 65–140)
HCO3 BLDA-SCNC: 22.2 MMOL/L (ref 22–28)
HCO3 BLDA-SCNC: 23.8 MMOL/L (ref 22–28)
HCT VFR BLD AUTO: 32.5 % (ref 34.8–46.1)
HCT VFR BLD CALC: 29 % (ref 34.8–46.1)
HCT VFR BLD CALC: 32 % (ref 34.8–46.1)
HGB BLD-MCNC: 10.9 G/DL (ref 11.5–15.4)
HGB BLDA-MCNC: 10.9 G/DL (ref 11.5–15.4)
HGB BLDA-MCNC: 9.9 G/DL (ref 11.5–15.4)
LACTATE SERPL-SCNC: 0.8 MMOL/L (ref 0.5–2)
LYMPHOCYTES # BLD AUTO: 0.35 THOUSAND/UL (ref 0.6–4.47)
LYMPHOCYTES # BLD AUTO: 3 % (ref 14–44)
MCH RBC QN AUTO: 30.2 PG (ref 26.8–34.3)
MCHC RBC AUTO-ENTMCNC: 33.5 G/DL (ref 31.4–37.4)
MCV RBC AUTO: 90 FL (ref 82–98)
MONOCYTES # BLD AUTO: 0 THOUSAND/UL (ref 0–1.22)
MONOCYTES NFR BLD: 0 % (ref 4–12)
NEUTROPHILS # BLD MANUAL: 11.36 THOUSAND/UL (ref 1.85–7.62)
NEUTS SEG NFR BLD AUTO: 96 % (ref 43–75)
NRBC BLD AUTO-RTO: 0 /100 WBCS
PCO2 BLD: 23 MMOL/L (ref 21–32)
PCO2 BLD: 25 MMOL/L (ref 21–32)
PCO2 BLD: 38.4 MM HG (ref 36–44)
PCO2 BLD: 39.5 MM HG (ref 36–44)
PH BLD: 7.37 [PH] (ref 7.35–7.45)
PH BLD: 7.39 [PH] (ref 7.35–7.45)
PLATELET # BLD AUTO: 423 THOUSANDS/UL (ref 149–390)
PLATELET # BLD AUTO: 429 THOUSANDS/UL (ref 149–390)
PLATELET BLD QL SMEAR: ABNORMAL
PMV BLD AUTO: 10.5 FL (ref 8.9–12.7)
PMV BLD AUTO: 10.9 FL (ref 8.9–12.7)
PO2 BLD: 132 MM HG (ref 75–129)
PO2 BLD: 158 MM HG (ref 75–129)
POTASSIUM BLD-SCNC: 3.3 MMOL/L (ref 3.5–5.3)
POTASSIUM BLD-SCNC: 3.7 MMOL/L (ref 3.5–5.3)
POTASSIUM SERPL-SCNC: 3.8 MMOL/L (ref 3.5–5.3)
PROT SERPL-MCNC: 7 G/DL (ref 6.4–8.2)
RBC # BLD AUTO: 3.61 MILLION/UL (ref 3.81–5.12)
RBC MORPH BLD: PRESENT
RH BLD: POSITIVE
SAO2 % BLD FROM PO2: 99 % (ref 60–85)
SAO2 % BLD FROM PO2: 99 % (ref 60–85)
SODIUM BLD-SCNC: 141 MMOL/L (ref 136–145)
SODIUM BLD-SCNC: 141 MMOL/L (ref 136–145)
SODIUM SERPL-SCNC: 141 MMOL/L (ref 136–145)
SPECIMEN SOURCE: ABNORMAL
SPECIMEN SOURCE: ABNORMAL
WBC # BLD AUTO: 11.83 THOUSAND/UL (ref 4.31–10.16)

## 2021-04-26 PROCEDURE — 80053 COMPREHEN METABOLIC PANEL: CPT | Performed by: SURGERY

## 2021-04-26 PROCEDURE — C9113 INJ PANTOPRAZOLE SODIUM, VIA: HCPCS | Performed by: SURGERY

## 2021-04-26 PROCEDURE — 88309 TISSUE EXAM BY PATHOLOGIST: CPT | Performed by: PATHOLOGY

## 2021-04-26 PROCEDURE — 88304 TISSUE EXAM BY PATHOLOGIST: CPT | Performed by: PATHOLOGY

## 2021-04-26 PROCEDURE — 84295 ASSAY OF SERUM SODIUM: CPT

## 2021-04-26 PROCEDURE — 88305 TISSUE EXAM BY PATHOLOGIST: CPT | Performed by: PATHOLOGY

## 2021-04-26 PROCEDURE — 0FBG0ZZ EXCISION OF PANCREAS, OPEN APPROACH: ICD-10-PCS | Performed by: SURGERY

## 2021-04-26 PROCEDURE — 82947 ASSAY GLUCOSE BLOOD QUANT: CPT

## 2021-04-26 PROCEDURE — 85049 AUTOMATED PLATELET COUNT: CPT | Performed by: SURGERY

## 2021-04-26 PROCEDURE — 82803 BLOOD GASES ANY COMBINATION: CPT

## 2021-04-26 PROCEDURE — 88331 PATH CONSLTJ SURG 1 BLK 1SPC: CPT | Performed by: PATHOLOGY

## 2021-04-26 PROCEDURE — 85007 BL SMEAR W/DIFF WBC COUNT: CPT | Performed by: SURGERY

## 2021-04-26 PROCEDURE — 83605 ASSAY OF LACTIC ACID: CPT | Performed by: PHYSICIAN ASSISTANT

## 2021-04-26 PROCEDURE — 85014 HEMATOCRIT: CPT

## 2021-04-26 PROCEDURE — 88363 XM ARCHIVE TISSUE MOLEC ANAL: CPT | Performed by: PATHOLOGY

## 2021-04-26 PROCEDURE — 48999 UNLISTED PROCEDURE PANCREAS: CPT | Performed by: SURGERY

## 2021-04-26 PROCEDURE — 0DB90ZZ EXCISION OF DUODENUM, OPEN APPROACH: ICD-10-PCS | Performed by: SURGERY

## 2021-04-26 PROCEDURE — 48150 PARTIAL REMOVAL OF PANCREAS: CPT | Performed by: SURGERY

## 2021-04-26 PROCEDURE — 0FB90ZZ EXCISION OF COMMON BILE DUCT, OPEN APPROACH: ICD-10-PCS | Performed by: SURGERY

## 2021-04-26 PROCEDURE — 86920 COMPATIBILITY TEST SPIN: CPT

## 2021-04-26 PROCEDURE — 82948 REAGENT STRIP/BLOOD GLUCOSE: CPT

## 2021-04-26 PROCEDURE — 0DB60ZZ EXCISION OF STOMACH, OPEN APPROACH: ICD-10-PCS | Performed by: SURGERY

## 2021-04-26 PROCEDURE — 85027 COMPLETE CBC AUTOMATED: CPT | Performed by: SURGERY

## 2021-04-26 PROCEDURE — 84132 ASSAY OF SERUM POTASSIUM: CPT

## 2021-04-26 PROCEDURE — 99222 1ST HOSP IP/OBS MODERATE 55: CPT | Performed by: EMERGENCY MEDICINE

## 2021-04-26 PROCEDURE — 82330 ASSAY OF CALCIUM: CPT

## 2021-04-26 PROCEDURE — 0FT40ZZ RESECTION OF GALLBLADDER, OPEN APPROACH: ICD-10-PCS | Performed by: SURGERY

## 2021-04-26 PROCEDURE — 07BB0ZZ EXCISION OF MESENTERIC LYMPHATIC, OPEN APPROACH: ICD-10-PCS | Performed by: SURGERY

## 2021-04-26 PROCEDURE — G9197 ORDER FOR CEPH: HCPCS | Performed by: SURGERY

## 2021-04-26 PROCEDURE — 74018 RADEX ABDOMEN 1 VIEW: CPT

## 2021-04-26 RX ORDER — LIDOCAINE HYDROCHLORIDE AND EPINEPHRINE 15; 5 MG/ML; UG/ML
INJECTION, SOLUTION EPIDURAL AS NEEDED
Status: DISCONTINUED | OUTPATIENT
Start: 2021-04-26 | End: 2021-04-26

## 2021-04-26 RX ORDER — SODIUM CHLORIDE 9 MG/ML
INJECTION, SOLUTION INTRAVENOUS CONTINUOUS PRN
Status: DISCONTINUED | OUTPATIENT
Start: 2021-04-26 | End: 2021-04-26

## 2021-04-26 RX ORDER — SODIUM CHLORIDE, SODIUM LACTATE, POTASSIUM CHLORIDE, CALCIUM CHLORIDE 600; 310; 30; 20 MG/100ML; MG/100ML; MG/100ML; MG/100ML
INJECTION, SOLUTION INTRAVENOUS CONTINUOUS PRN
Status: DISCONTINUED | OUTPATIENT
Start: 2021-04-26 | End: 2021-04-26

## 2021-04-26 RX ORDER — FENTANYL CITRATE 50 UG/ML
INJECTION, SOLUTION INTRAMUSCULAR; INTRAVENOUS AS NEEDED
Status: DISCONTINUED | OUTPATIENT
Start: 2021-04-26 | End: 2021-04-26

## 2021-04-26 RX ORDER — ROPIVACAINE HYDROCHLORIDE 2 MG/ML
INJECTION, SOLUTION EPIDURAL; INFILTRATION; PERINEURAL AS NEEDED
Status: DISCONTINUED | OUTPATIENT
Start: 2021-04-26 | End: 2021-04-26

## 2021-04-26 RX ORDER — POTASSIUM CHLORIDE 14.9 MG/ML
INJECTION INTRAVENOUS CONTINUOUS PRN
Status: DISCONTINUED | OUTPATIENT
Start: 2021-04-26 | End: 2021-04-26

## 2021-04-26 RX ORDER — ALBUMIN, HUMAN INJ 5% 5 %
SOLUTION INTRAVENOUS CONTINUOUS PRN
Status: DISCONTINUED | OUTPATIENT
Start: 2021-04-26 | End: 2021-04-26

## 2021-04-26 RX ORDER — EPHEDRINE SULFATE 50 MG/ML
INJECTION INTRAVENOUS AS NEEDED
Status: DISCONTINUED | OUTPATIENT
Start: 2021-04-26 | End: 2021-04-26

## 2021-04-26 RX ORDER — ONDANSETRON 2 MG/ML
INJECTION INTRAMUSCULAR; INTRAVENOUS AS NEEDED
Status: DISCONTINUED | OUTPATIENT
Start: 2021-04-26 | End: 2021-04-26

## 2021-04-26 RX ORDER — HYDROMORPHONE HCL/PF 1 MG/ML
0.5 SYRINGE (ML) INJECTION EVERY 2 HOUR PRN
Status: DISCONTINUED | OUTPATIENT
Start: 2021-04-26 | End: 2021-05-03 | Stop reason: HOSPADM

## 2021-04-26 RX ORDER — MIDAZOLAM HYDROCHLORIDE 2 MG/2ML
INJECTION, SOLUTION INTRAMUSCULAR; INTRAVENOUS AS NEEDED
Status: DISCONTINUED | OUTPATIENT
Start: 2021-04-26 | End: 2021-04-26

## 2021-04-26 RX ORDER — MAGNESIUM HYDROXIDE 1200 MG/15ML
LIQUID ORAL AS NEEDED
Status: DISCONTINUED | OUTPATIENT
Start: 2021-04-26 | End: 2021-04-26 | Stop reason: HOSPADM

## 2021-04-26 RX ORDER — CEFAZOLIN SODIUM 1 G/50ML
1000 SOLUTION INTRAVENOUS EVERY 8 HOURS
Status: COMPLETED | OUTPATIENT
Start: 2021-04-26 | End: 2021-04-27

## 2021-04-26 RX ORDER — FENTANYL CITRATE/PF 50 MCG/ML
25 SYRINGE (ML) INJECTION
Status: COMPLETED | OUTPATIENT
Start: 2021-04-26 | End: 2021-04-26

## 2021-04-26 RX ORDER — PANTOPRAZOLE SODIUM 40 MG/1
40 INJECTION, POWDER, FOR SOLUTION INTRAVENOUS
Status: DISCONTINUED | OUTPATIENT
Start: 2021-04-26 | End: 2021-04-30

## 2021-04-26 RX ORDER — HYDROMORPHONE HCL IN WATER/PF 6 MG/30 ML
0.2 PATIENT CONTROLLED ANALGESIA SYRINGE INTRAVENOUS
Status: DISCONTINUED | OUTPATIENT
Start: 2021-04-26 | End: 2021-04-26 | Stop reason: HOSPADM

## 2021-04-26 RX ORDER — HEPARIN SODIUM 5000 [USP'U]/ML
5000 INJECTION, SOLUTION INTRAVENOUS; SUBCUTANEOUS EVERY 8 HOURS SCHEDULED
Status: DISCONTINUED | OUTPATIENT
Start: 2021-04-26 | End: 2021-04-30

## 2021-04-26 RX ORDER — SODIUM CHLORIDE, SODIUM GLUCONATE, SODIUM ACETATE, POTASSIUM CHLORIDE, MAGNESIUM CHLORIDE, SODIUM PHOSPHATE, DIBASIC, AND POTASSIUM PHOSPHATE .53; .5; .37; .037; .03; .012; .00082 G/100ML; G/100ML; G/100ML; G/100ML; G/100ML; G/100ML; G/100ML
125 INJECTION, SOLUTION INTRAVENOUS CONTINUOUS
Status: DISCONTINUED | OUTPATIENT
Start: 2021-04-26 | End: 2021-04-27

## 2021-04-26 RX ORDER — CEFAZOLIN SODIUM 1 G/50ML
1000 SOLUTION INTRAVENOUS ONCE
Status: COMPLETED | OUTPATIENT
Start: 2021-04-26 | End: 2021-04-26

## 2021-04-26 RX ORDER — DEXAMETHASONE SODIUM PHOSPHATE 10 MG/ML
INJECTION, SOLUTION INTRAMUSCULAR; INTRAVENOUS AS NEEDED
Status: DISCONTINUED | OUTPATIENT
Start: 2021-04-26 | End: 2021-04-26

## 2021-04-26 RX ORDER — CHLORHEXIDINE GLUCONATE 0.12 MG/ML
15 RINSE ORAL EVERY 12 HOURS SCHEDULED
Status: DISCONTINUED | OUTPATIENT
Start: 2021-04-26 | End: 2021-04-28

## 2021-04-26 RX ORDER — ONDANSETRON 2 MG/ML
4 INJECTION INTRAMUSCULAR; INTRAVENOUS EVERY 6 HOURS PRN
Status: DISCONTINUED | OUTPATIENT
Start: 2021-04-26 | End: 2021-04-27

## 2021-04-26 RX ORDER — NEOSTIGMINE METHYLSULFATE 1 MG/ML
INJECTION INTRAVENOUS AS NEEDED
Status: DISCONTINUED | OUTPATIENT
Start: 2021-04-26 | End: 2021-04-26

## 2021-04-26 RX ORDER — HYDROMORPHONE HCL 110MG/55ML
PATIENT CONTROLLED ANALGESIA SYRINGE INTRAVENOUS AS NEEDED
Status: DISCONTINUED | OUTPATIENT
Start: 2021-04-26 | End: 2021-04-26

## 2021-04-26 RX ORDER — LIDOCAINE HYDROCHLORIDE 10 MG/ML
INJECTION, SOLUTION EPIDURAL; INFILTRATION; INTRACAUDAL; PERINEURAL AS NEEDED
Status: DISCONTINUED | OUTPATIENT
Start: 2021-04-26 | End: 2021-04-26

## 2021-04-26 RX ORDER — PROPOFOL 10 MG/ML
INJECTION, EMULSION INTRAVENOUS AS NEEDED
Status: DISCONTINUED | OUTPATIENT
Start: 2021-04-26 | End: 2021-04-26

## 2021-04-26 RX ORDER — ROCURONIUM BROMIDE 10 MG/ML
INJECTION, SOLUTION INTRAVENOUS AS NEEDED
Status: DISCONTINUED | OUTPATIENT
Start: 2021-04-26 | End: 2021-04-26

## 2021-04-26 RX ORDER — GLYCOPYRROLATE 0.2 MG/ML
INJECTION INTRAMUSCULAR; INTRAVENOUS AS NEEDED
Status: DISCONTINUED | OUTPATIENT
Start: 2021-04-26 | End: 2021-04-26

## 2021-04-26 RX ADMIN — CEFAZOLIN SODIUM 1000 MG: 1 SOLUTION INTRAVENOUS at 13:30

## 2021-04-26 RX ADMIN — GLYCOPYRROLATE 0.4 MG: 0.2 INJECTION, SOLUTION INTRAMUSCULAR; INTRAVENOUS at 14:39

## 2021-04-26 RX ADMIN — LIDOCAINE HYDROCHLORIDE AND EPINEPHRINE 3 ML: 15; 5 INJECTION, SOLUTION EPIDURAL at 10:57

## 2021-04-26 RX ADMIN — SODIUM CHLORIDE, SODIUM LACTATE, POTASSIUM CHLORIDE, AND CALCIUM CHLORIDE: .6; .31; .03; .02 INJECTION, SOLUTION INTRAVENOUS at 09:28

## 2021-04-26 RX ADMIN — MIDAZOLAM 1 MG: 1 INJECTION INTRAMUSCULAR; INTRAVENOUS at 09:05

## 2021-04-26 RX ADMIN — METRONIDAZOLE 500 MG: 500 INJECTION, SOLUTION INTRAVENOUS at 09:48

## 2021-04-26 RX ADMIN — ONDANSETRON 4 MG: 2 INJECTION INTRAMUSCULAR; INTRAVENOUS at 20:27

## 2021-04-26 RX ADMIN — ONDANSETRON 4 MG: 2 INJECTION INTRAMUSCULAR; INTRAVENOUS at 17:05

## 2021-04-26 RX ADMIN — SODIUM CHLORIDE, SODIUM GLUCONATE, SODIUM ACETATE, POTASSIUM CHLORIDE, MAGNESIUM CHLORIDE, SODIUM PHOSPHATE, DIBASIC, AND POTASSIUM PHOSPHATE 125 ML/HR: .53; .5; .37; .037; .03; .012; .00082 INJECTION, SOLUTION INTRAVENOUS at 16:05

## 2021-04-26 RX ADMIN — PHENYLEPHRINE HYDROCHLORIDE 200 MCG: 10 INJECTION INTRAVENOUS at 10:34

## 2021-04-26 RX ADMIN — PHENYLEPHRINE HYDROCHLORIDE 100 MCG: 10 INJECTION INTRAVENOUS at 12:23

## 2021-04-26 RX ADMIN — ROPIVACAINE HYDROCHLORIDE 5 ML: 2 INJECTION, SOLUTION EPIDURAL; INFILTRATION; PERINEURAL at 14:51

## 2021-04-26 RX ADMIN — Medication 25 MCG: at 15:12

## 2021-04-26 RX ADMIN — PROPOFOL 100 MG: 10 INJECTION, EMULSION INTRAVENOUS at 11:55

## 2021-04-26 RX ADMIN — ROPIVACAINE HYDROCHLORIDE 4 ML: 2 INJECTION, SOLUTION EPIDURAL; INFILTRATION; PERINEURAL at 11:55

## 2021-04-26 RX ADMIN — ALBUMIN (HUMAN): 12.5 INJECTION, SOLUTION INTRAVENOUS at 12:21

## 2021-04-26 RX ADMIN — SODIUM CHLORIDE, SODIUM LACTATE, POTASSIUM CHLORIDE, AND CALCIUM CHLORIDE: .6; .31; .03; .02 INJECTION, SOLUTION INTRAVENOUS at 12:05

## 2021-04-26 RX ADMIN — PHENYLEPHRINE HYDROCHLORIDE 200 MCG: 10 INJECTION INTRAVENOUS at 12:30

## 2021-04-26 RX ADMIN — ONDANSETRON 4 MG: 2 INJECTION INTRAMUSCULAR; INTRAVENOUS at 14:14

## 2021-04-26 RX ADMIN — HYDROMORPHONE HYDROCHLORIDE 0.2 MG: 0.2 INJECTION, SOLUTION INTRAMUSCULAR; INTRAVENOUS; SUBCUTANEOUS at 15:58

## 2021-04-26 RX ADMIN — EPHEDRINE SULFATE 10 MG: 50 INJECTION, SOLUTION INTRAVENOUS at 09:36

## 2021-04-26 RX ADMIN — HEPARIN SODIUM 5000 UNITS: 5000 INJECTION INTRAVENOUS; SUBCUTANEOUS at 22:40

## 2021-04-26 RX ADMIN — MIDAZOLAM 1 MG: 1 INJECTION INTRAMUSCULAR; INTRAVENOUS at 09:08

## 2021-04-26 RX ADMIN — HYDROMORPHONE HYDROCHLORIDE 0.5 MG: 1 INJECTION, SOLUTION INTRAMUSCULAR; INTRAVENOUS; SUBCUTANEOUS at 20:27

## 2021-04-26 RX ADMIN — SODIUM CHLORIDE: 0.9 INJECTION, SOLUTION INTRAVENOUS at 09:42

## 2021-04-26 RX ADMIN — ROCURONIUM BROMIDE 20 MG: 50 INJECTION, SOLUTION INTRAVENOUS at 11:10

## 2021-04-26 RX ADMIN — ROPIVACAINE HYDROCHLORIDE 2 ML: 2 INJECTION, SOLUTION EPIDURAL; INFILTRATION; PERINEURAL at 15:00

## 2021-04-26 RX ADMIN — LIDOCAINE HYDROCHLORIDE 50 MG: 10 INJECTION, SOLUTION EPIDURAL; INFILTRATION; INTRACAUDAL; PERINEURAL at 09:32

## 2021-04-26 RX ADMIN — HYDROMORPHONE HYDROCHLORIDE 0.5 MG: 1 INJECTION, SOLUTION INTRAMUSCULAR; INTRAVENOUS; SUBCUTANEOUS at 17:10

## 2021-04-26 RX ADMIN — CEFAZOLIN SODIUM 1000 MG: 1 SOLUTION INTRAVENOUS at 09:41

## 2021-04-26 RX ADMIN — FENTANYL CITRATE 50 MCG: 50 INJECTION INTRAMUSCULAR; INTRAVENOUS at 09:32

## 2021-04-26 RX ADMIN — SODIUM CHLORIDE: 0.9 INJECTION, SOLUTION INTRAVENOUS at 13:53

## 2021-04-26 RX ADMIN — PHENYLEPHRINE HYDROCHLORIDE 100 MCG: 10 INJECTION INTRAVENOUS at 11:08

## 2021-04-26 RX ADMIN — CEFAZOLIN SODIUM 1000 MG: 1 SOLUTION INTRAVENOUS at 17:06

## 2021-04-26 RX ADMIN — POTASSIUM CHLORIDE: 14.9 INJECTION, SOLUTION INTRAVENOUS at 10:51

## 2021-04-26 RX ADMIN — HYDROMORPHONE HYDROCHLORIDE 0.2 MG: 0.2 INJECTION, SOLUTION INTRAMUSCULAR; INTRAVENOUS; SUBCUTANEOUS at 15:49

## 2021-04-26 RX ADMIN — ROPIVACAINE HYDROCHLORIDE 5 ML: 2 INJECTION, SOLUTION EPIDURAL; INFILTRATION; PERINEURAL at 13:40

## 2021-04-26 RX ADMIN — HYDROMORPHONE HYDROCHLORIDE 0.5 MG: 1 INJECTION, SOLUTION INTRAMUSCULAR; INTRAVENOUS; SUBCUTANEOUS at 22:40

## 2021-04-26 RX ADMIN — HYDROMORPHONE HYDROCHLORIDE 1 MG: 2 INJECTION, SOLUTION INTRAMUSCULAR; INTRAVENOUS; SUBCUTANEOUS at 10:12

## 2021-04-26 RX ADMIN — Medication 25 MCG: at 15:20

## 2021-04-26 RX ADMIN — NEOSTIGMINE METHYLSULFATE 3 MG: 1 INJECTION INTRAVENOUS at 14:39

## 2021-04-26 RX ADMIN — ROCURONIUM BROMIDE 40 MG: 50 INJECTION, SOLUTION INTRAVENOUS at 09:32

## 2021-04-26 RX ADMIN — Medication: at 15:15

## 2021-04-26 RX ADMIN — REMIFENTANIL HYDROCHLORIDE 1 MCG/KG/MIN: 1 INJECTION, POWDER, LYOPHILIZED, FOR SOLUTION INTRAVENOUS at 09:45

## 2021-04-26 RX ADMIN — ALBUMIN (HUMAN): 12.5 INJECTION, SOLUTION INTRAVENOUS at 12:52

## 2021-04-26 RX ADMIN — ROCURONIUM BROMIDE 20 MG: 50 INJECTION, SOLUTION INTRAVENOUS at 13:29

## 2021-04-26 RX ADMIN — HEPARIN SODIUM 5000 UNITS: 5000 INJECTION INTRAVENOUS; SUBCUTANEOUS at 17:08

## 2021-04-26 RX ADMIN — ROCURONIUM BROMIDE 10 MG: 50 INJECTION, SOLUTION INTRAVENOUS at 10:12

## 2021-04-26 RX ADMIN — FENTANYL CITRATE 50 MCG: 50 INJECTION INTRAMUSCULAR; INTRAVENOUS at 09:05

## 2021-04-26 RX ADMIN — CHLORHEXIDINE GLUCONATE 15 ML: 1.2 SOLUTION ORAL at 20:26

## 2021-04-26 RX ADMIN — DEXAMETHASONE SODIUM PHOSPHATE 10 MG: 10 INJECTION, SOLUTION INTRAMUSCULAR; INTRAVENOUS at 10:12

## 2021-04-26 RX ADMIN — Medication 25 MCG: at 15:40

## 2021-04-26 RX ADMIN — ROCURONIUM BROMIDE 30 MG: 50 INJECTION, SOLUTION INTRAVENOUS at 11:46

## 2021-04-26 RX ADMIN — Medication 25 MCG: at 15:29

## 2021-04-26 RX ADMIN — PROPOFOL 100 MG: 10 INJECTION, EMULSION INTRAVENOUS at 09:32

## 2021-04-26 RX ADMIN — PHENYLEPHRINE HYDROCHLORIDE 100 MCG: 10 INJECTION INTRAVENOUS at 11:24

## 2021-04-26 RX ADMIN — ROPIVACAINE HYDROCHLORIDE 4 ML: 2 INJECTION, SOLUTION EPIDURAL; INFILTRATION; PERINEURAL at 11:46

## 2021-04-26 RX ADMIN — PANTOPRAZOLE SODIUM 40 MG: 40 INJECTION, POWDER, FOR SOLUTION INTRAVENOUS at 17:08

## 2021-04-26 RX ADMIN — PHENYLEPHRINE HYDROCHLORIDE 10 MCG/MIN: 10 INJECTION INTRAVENOUS at 11:06

## 2021-04-26 RX ADMIN — HYDROMORPHONE HYDROCHLORIDE 0.2 MG: 0.2 INJECTION, SOLUTION INTRAMUSCULAR; INTRAVENOUS; SUBCUTANEOUS at 16:28

## 2021-04-26 NOTE — ANESTHESIA PROCEDURE NOTES
Epidural Block    Patient location during procedure: pre-op  Reason for block: at surgeon's request and post-op pain management  Staffing  Anesthesiologist: Cali Lyon MD  Performed: anesthesiologist   Preanesthetic Checklist  Completed: patient identified, site marked, surgical consent, pre-op evaluation, timeout performed, IV checked, risks and benefits discussed and monitors and equipment checked  Epidural  Patient position: sitting  Prep: ChloraPrep and site prepped and draped  Patient monitoring: heart rate, cardiac monitor, continuous pulse ox and frequent blood pressure checks  Approach: midline  Location: thoracic (1-12)  Injection technique: RAFY saline  Needle  Needle type: Tuohy   Needle gauge: 18 G  Catheter type: side hole  Catheter size: 20 G  Catheter at skin depth: 8 cm  Epidural test dose: intra-op negative aspiration for CSF, negative aspiration for heme and no paresthesia on injection  patient tolerated the procedure well with no immediate complications

## 2021-04-26 NOTE — ANESTHESIA PROCEDURE NOTES
Arterial Line Insertion  Performed by: Priscilla Levin MD  Authorized by: Priscilla Levin MD   Consent: Verbal consent obtained  Written consent obtained  Consent given by: patient  Patient understanding: patient states understanding of the procedure being performed  Patient consent: the patient's understanding of the procedure matches consent given  Procedure consent: procedure consent matches procedure scheduled  Relevant documents: relevant documents present and verified  Test results: test results available and properly labeled  Site marked: the operative site was not marked  Radiology Images: No Radiology Images displayed or report reviewed  Required items: required blood products, implants, devices, and special equipment available  Patient identity confirmed: arm band  Time out: Immediately prior to procedure a "time out" was called to verify the correct patient, procedure, equipment, support staff and site/side marked as required  Preparation: Patient was prepped and draped in the usual sterile fashion  Indications: hemodynamic monitoring  Orientation:  Right  Location: radial arteryMedication group details: ga    Procedure Details:  Needle gauge: 20  Number of attempts: 1    Post-procedure:  Post-procedure: dressing applied  Waveform: good waveform and waveform confirmed  Patient tolerance: patient tolerated the procedure well with no immediate complications  Comments: Post-induction

## 2021-04-26 NOTE — OP NOTE
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]    Procedure(s) (LRB):  WHIPPLE PROCEDURE/PANCREATICO-DUODENECTOMY; PYLORIC PRESERVING (N/A)  INTRAOPERATIVE ULTRASOUND, ABLATION,SOFT TISSUE PANCREAS (N/A)  LAPAROTOMY EXPLORATORY (N/A)  CHOLECYSTECTOMY (N/A)    Specimen(s):  ID Type Source Tests Collected by Time Destination   1 :  Tissue Gallbladder TISSUE EXAM Laura Moore MD 2021 1036    2 : CELIAC LYMPH NODE Tissue Lymph Node TISSUE EXAM Laura Moore MD 2021 1044    3 : WHIPPLE RESECTION Tissue Pancreas TISSUE EXAM Laura Moore MD 2021 1213        Estimated Blood Loss:   100 mL    Drains:  Closed/Suction Drain Right Abdomen Bulb 19 Fr  (Active)   Site Description Unable to view 21   Dressing Status Clean;Dry; Intact 21   Drainage Appearance Serosanguineous 21   Status To bulb suction 21   Number of days: 0       NG/OG/Enteral Tube Nasogastric Left mouth (Active)   Site Assessment Intact;Dry;Clean 21   Status Suction-low continuous 21   Drainage Appearance Bloody 21   Number of days: 0       Urethral Catheter Latex;Straight-tip 16 Fr  (Active)   Reasons to continue Urinary Catheter  Post-operative urological requirements 21   Site Assessment Clean;Skin intact; Patent 21   Collection Container Standard drainage bag 21   Securement Method Securing device (Describe) 21   Number of days: 0       Anesthesia Type:   General w/ Epidural    Operative Indications:  Neoplasm of ampulla of Vater [D520]  44-year-old female with a mass near the ampulla of Vater  Pathology was indeterminate  It was recommended that she undergo resection  Risks and benefits were explained  Informed consent was obtained  Patient was brought to the operating room  Operative Findings:  No evidence of metastatic disease  Frozen section of the pancreas and bile duct were negative  Complications:   None    Procedure and Technique:  After identifying the patient, general anesthesia was achieved  A Mercer catheter was placed  Lines were placed by Anesthesia  Patient was prepped and draped in the usual sterile fashion  A time-out was performed  An upper midline incision was made  Using sharp dissection this was taken through the skin, subcutaneous tissue, through the fascia and into the peritoneal cavity  There was no evidence of liver metastasis or peritoneal disease  We now proceeded with resection  The Bookwalter retractor was placed  The gastrocolic omentum was divided in an avascular plane and we entered the lesser sac  We followed the middle colic down to the under surface of the pancreas  The under surface of the pancreas was sharply divided using the Harmonic scalpel  We ultimately were able to identify the SMV  The epiploic vessels, were isolated, clamped, and ligated with 2 0 silk suture  A wide Kocher maneuver was then performed to the level of the SMV  The mass appeared separate from the SMA  The gallbladder was then taken down in a dome down fashion  Cystic artery was clipped and divided  Cystic duct was clamped, divided and suture ligated with 3 0 Vicryl suture  We now dissected in the brain hepatis  There was a large celiac lymph node overlying the hepatic artery  Using sharp dissection and the Harmonic scalpel this was completely excised  We ultimately were able to identify the proper hepatic artery and then the common hepatic artery    We were able to dissect the pancreas away from the common hepatic artery and the gastroduodenal was identified  Consequently this was then suture ligated with 3 0 Prolene suture  The bile duct was now  encircled with a vessel loop  Portal vein superiorly was identified and freed from the pancreas  The bile duct was ligated with 2 0 silk suture and a bulldog clamp was placed proximally  This was then divided sharply  The stomach just distal to the pylorus was now divided with a JESÚS 75 stapling device  The stomach was packed out of harm's way  The superior and inferior pancreaticoduodenal also were now ligated with 2 0 silk suture  We now sharply divided the pancreas along the SMV freeing this off the SMV as we divided the pancreas  Pancreas was then completely divided  We now started to dissect this away from the portal vein any smaller branches were divided with the Harmonic scalpel  Larger branches were ligated with 2 0 silk suture and divided  We now divided the proximal jejunum, just distal to the ligament of Treitz with the JESÚS 75 stapling device  The mesentery was divided close to the bowel and taken down to the ligament of Treitz with a Harmonic scalpel  Once all the smaller vessels were divided with the Harmonic scalpel, the small bowel was rotated under the vessels to the right side of the patient  At this point we now dissected the portal vein free from the tumor  Small branches were divided with the Harmonic scalpel  We now proceeded with our soft tissue ablation of the pancreatic margin  Our probes were placed 2 cm apart, adjacent to the SMA  Using ultrasound guidance, we had noted that the probes were away from the SMA  We now used the Nanoknife and perform soft tissue ablation of this margin for margin enhancement  Once the energy ran off to efficacy, the probes were removed and we completed our resection  Once the mass was completely freed from the portal vein, we retracted this away from the SMA and divided the uncinate process away from the SMA using the Harmonic scalpel    We continued to dissect the pancreas away from the SMA using the Harmonic scalpel until it was completely dissected free from the SMA  There was excellent hemostasis  Specimen was now oriented  I took this to pathology  Frozen section on the pancreas and bile duct margins were negative  Wound was now copiously irrigated and there was excellent hemostasis  We now proceeded with our anastomosis  The small bowel was brought through the transverse colon mesentery adjacent to the pancreas  An enterotomy was made  A duct to mucosa anastomosis was created  The back wall was created with interrupted 2 0 silk suture  The duct to mucosa anastomosis was created with interrupted 5 0 PDS suture directly into the small intestine at the enterotomy site  The anterior layer was oversewn with interrupted 2 0 silk suture  We turned our attention to the biliary anastomosis  The bulldog clamp was removed  There was excellent hemostasis achieved with the Bovie electrocautery  An enterotomy was made in the small intestine  A choledocho jejunostomy was now created with interrupted 4 0 Vicryl suture  This area was now packed and there was ultimately no bile leak  An anti colic duodenojejunostomy was now created  We made our anastomosis approximately 30 cm distal to the biliary anastomosis  The duodenum was brought in an end-to-side fashion with the small intestine using interrupted 2 0 silk suture for the back wall of the anastomosis  The duodenal staple line was incorporated into our back wall for the inner layer  A duodenotomy and enterotomy were made  The back wall was created with a running 3-0 Vicryl suture in an interlocking fashion for the posterior layer and then converted to a Loi suture anteriorly  The anterior layer was completed with interrupted 2 0 silk suture in a Lembert fashion  An NG tube was placed proximal to the anastomosis  There was excellent hemostasis   A 19 [place] Andrea drain was placed through a separate stab incision and secured to the skin using 3 0 nylon suture  This was placed posterior to the biliary and pancreatic anastomosis  The wound was now copiously irrigated  There was excellent hemostasis  There was no evidence of any bile leak after the packs were removed  Sponge count was correct  The needle count was incorrect as there was a missing needle  X-ray did not reveal any needle in the abdomen  Bookwalter retractor was removed  The fascia was approximated with 1  PDS suture starting at either end of the incision and secured centrally  Subcutaneous tissue was irrigated  Skin was approximated with staples  Sterile dressings were applied  Patient was extubated having tolerated the procedure well and taken to the recovery room in stable condition  I was present and participated in all aspects of this procedure              I was present for the entire procedure    Patient Disposition:  PACU     SIGNATURE: Freddy Russ MD  DATE: April 26, 2021  TIME: 3:20 PM

## 2021-04-26 NOTE — ANESTHESIA POSTPROCEDURE EVALUATION
Post-Op Assessment Note    CV Status:  Stable  Pain Score: 3    Pain management: adequate     Mental Status:  Alert and awake   Hydration Status:  Euvolemic   PONV Controlled:  Controlled   Airway Patency:  Patent      Post Op Vitals Reviewed: Yes      Staff: CRNA   Comments: vss report rn        No complications documented      BP (!) 177/89 (04/26/21 1456)    Temp 98 3 °F (36 8 °C) (04/26/21 1456)    Pulse 100 (04/26/21 1456)   Resp 20 (04/26/21 1456)    SpO2 97 % (04/26/21 1456)

## 2021-04-26 NOTE — H&P
H&P Exam - [facility] 72 y o  female MRN: [id number]  Unit/Bed#: OR POOL Encounter: [id number]  -------------------------------------------------------------------------------------------------------------  Chief Complaint: This is a 70-year-old female who today underwent a Whipple procedure  The patient had a CT scan performed on February 18th 2021 that revealed an intra and extrahepatic biliary dilatation  There was no underlying mass seen  Follow-up MRI on the same date revealed intra and extrahepatic biliary dilatation without mass  EUS from March 15, 2021 revealed a 2 x 1 6 cm mass in the major papula that appear to involve the distal common bile duct  Pathology revealed that the ampullary mass was a neoplasm, but it was unclear if it was an intraductal papillary mucinous neoplasm extending onto the ampullary surface or adenocarcinoma eroding into the ambulance  Today, the patient underwent a Whipple procedure  She will be coming to the critical care unit in the interim for management after this surgery      History of Present Illness     History obtained from patient   -------------------------------------------------------------------------------------------------------------  Assessment and Plan:    Neuro:    Diagnosis:  Analgesia  o Plan:  Patient has an epidural catheter in place per Anesthesiology  o Continue multimodal pain regimen as ordered  o APS consulted for management of epidural   Diagnosis: Delirium Precautions  o Plan:  Cam ICU  o Regulate sleep-wake cycle  o Reorientation as necessary    CV:   o No acute issues  o Continue monitoring hemodynamics and telemetry    Pulm:   Diagnosis: Mild Hypoxia  o Plan: Patient is on 2L NC saturating 97%  o Continue weaning oxygen requirements as tolerated  o Aggressive pulmonary toilet  o Otherwise, no active issues    GI:    Diagnosis:  Neoplasm of ambulate of Vater  o Plan:  The patient is postop day 0 from a Whipple procedure with pancreatic-duodenectomy and pyloric preserving  o The patient also underwent an exploratory laparotomy and cholecystectomy  o Continue monitoring abdominal examinations  o GI prophylaxis with Protonix  o Follow-up on tissue cultures when available  o Continue trending endpoints and other labs as appropriate  o Appreciate surgical oncology's continued recommendations   Diagnosis: Transaminitis  o Plan:  Patient had elevated AST, ALT, and alkaline phosphatase on postoperative labs; patient also had a total bilirubin of 6 39 on postoperative labs  o Will continue monitoring on daily labs    :    Diagnosis: Renal Function  o Plan:  Creatinine is currently 0 63  Continue monitoring  F/E/N:    Plan:  Continue a electrolyte monitoring   K currently 3 8; will give 20 mEq K to replete   Continue monitoring endpoints - lactic and ABG just drawn    Heme/Onc:    Diagnosis: Anemia  o Plan:  Postoperative hemoglobin is 10 9 today - estimated blood loss during surgery approximately 100 mL  o Continue monitoring with CBC tomorrow morning  o Thrombocytosis at 429  Continue monitoring   Diagnosis: DVT Prophylaxis  o Plan: Will follow with anesthesia as patient currently has an epidural block in place  Endo:   o No active issues  o Continue blood glucose monitoring - glucose should remain less than 180      ID:    Diagnosis: Leukocytosis  o Plan:  Leukocytosis at 11 83 today; continue monitoring with white blood cell and fever curves  o Absolute neutrophils 11 36  o Continue monitoring for signs of infection  o Patient has remained afebrile at this time; continue monitoring with fever curves  o Patient received intraoperative Ancef and Flagyl; no plans to continue these at this time    MSK/Skin:   o Frequent repositioning and monitoring for signs of skin injury  o PT/OT as tolerated    Disposition: Admit to Critical Care   Code Status: No Order  -------------------------------------------------------------------------------------------------------------  Review of Systems   Constitutional: Positive for fatigue  Negative for chills and fever  Eyes: Negative for visual disturbance  Respiratory: Negative for cough, chest tightness, shortness of breath and wheezing  Cardiovascular: Negative for chest pain, palpitations and leg swelling  Gastrointestinal: Positive for abdominal pain  Skin: Negative for color change, pallor, rash and wound  Neurological: Negative for dizziness, syncope, weakness, light-headedness, numbness and headaches  Psychiatric/Behavioral: Negative for confusion  All other systems reviewed and are negative  A 12-point, complete review of systems was reviewed and negative except as stated above     Physical Exam  Vitals signs and nursing note reviewed  Constitutional:       General: She is not in acute distress  Appearance: Normal appearance  She is normal weight  She is not ill-appearing, toxic-appearing or diaphoretic  Comments: Appears fatigued   HENT:      Head: Normocephalic and atraumatic  Nose: Nose normal       Mouth/Throat:      Mouth: Mucous membranes are moist    Eyes:      General: Scleral icterus present  Right eye: No discharge  Left eye: No discharge  Extraocular Movements: Extraocular movements intact  Conjunctiva/sclera: Conjunctivae normal    Cardiovascular:      Rate and Rhythm: Normal rate and regular rhythm  Pulses: Normal pulses  Heart sounds: Normal heart sounds  No murmur  No friction rub  No gallop  Comments: 2+ radial and DP pulses bilaterally  Pulmonary:      Effort: Pulmonary effort is normal  No respiratory distress  Breath sounds: Normal breath sounds  No stridor  No wheezing, rhonchi or rales  Comments: On 2L NC saturating 97% on my examination  Chest:      Chest wall: No tenderness     Abdominal:      General: Abdomen is flat  Comments: Drain in place - approximately 75cc clear purple fluid  Non-peritoneal  Abdominal examination deferred   Musculoskeletal:      Right lower leg: No edema  Left lower leg: No edema  Skin:     General: Skin is warm and dry  Capillary Refill: Capillary refill takes less than 2 seconds  Coloration: Skin is jaundiced  Skin is not pale  Neurological:      General: No focal deficit present  Mental Status: She is alert and oriented to person, place, and time  Mental status is at baseline  Psychiatric:         Mood and Affect: Mood normal          Behavior: Behavior normal        -------------------------------------------------------------------------------------------------------------  Vitals:   Vitals:    04/26/21 1515 04/26/21 1530 04/26/21 1545 04/26/21 1600   BP: 148/72 149/70 152/73 149/70   Pulse: 80 72 74 80   Resp: 18 20 22 20   Temp:    98 2 °F (36 8 °C)   TempSrc:       SpO2: 93% 94% 94% 94%   Weight:       Height:         Temp  Min: 97 4 °F (36 3 °C)  Max: 98 7 °F (37 1 °C)  IBW (Ideal Body Weight): 50 1 kg  Height: 5' 2" (157 5 cm)  Body mass index is 21 03 kg/m²  N/A    Laboratory and Diagnostics:  Results from last 7 days   Lab Units 04/26/21  1516   WBC Thousand/uL 11 83*   HEMOGLOBIN g/dL 10 9*   HEMATOCRIT % 32 5*   PLATELETS Thousands/uL 429*   MONO PCT % 0*     Results from last 7 days   Lab Units 04/26/21  1516   SODIUM mmol/L 141   POTASSIUM mmol/L 3 8   CHLORIDE mmol/L 112*   CO2 mmol/L 21   ANION GAP mmol/L 8   BUN mg/dL 15   CREATININE mg/dL 0 63   CALCIUM mg/dL 8 5   GLUCOSE RANDOM mg/dL 146*   ALT U/L 375*   AST U/L 350*   ALK PHOS U/L 806*   ALBUMIN g/dL 2 8*   TOTAL BILIRUBIN mg/dL 6 39*                       ABG:    VBG:          Micro:        Imaging: I have personally reviewed pertinent reports  Historical Information   History reviewed  No pertinent past medical history    Past Surgical History:   Procedure Laterality Date    HYSTERECTOMY      SINUS SURGERY       Social History   Social History     Substance and Sexual Activity   Alcohol Use Never    Frequency: Never     Social History     Substance and Sexual Activity   Drug Use Never     Social History     Tobacco Use   Smoking Status Current Every Day Smoker    Packs/day: 0 50   Smokeless Tobacco Never Used   Tobacco Comment    education given     Family History   Problem Relation Age of Onset    Ulcerative colitis Mother     Heart disease Brother      I have reviewed this patient's family history and commented on sigificant items within the HPI    Medications:  Current Facility-Administered Medications   Medication Dose Route Frequency    HYDROmorphone (DILAUDID) injection 0 5 mg  0 5 mg Intravenous Q2H PRN    HYDROmorphone HCl (DILAUDID) injection 0 2 mg  0 2 mg Intravenous Q5 Min PRN    multi-electrolyte (PLASMALYTE-A/ISOLYTE-S PH 7 4) IV solution  125 mL/hr Intravenous Continuous    pantoprazole (PROTONIX) injection 40 mg  40 mg Intravenous Q24H [facility]    ropivacaine 0 1% and fentaNYL 2 mcg/mL PCEA   Epidural Continuous     Home medications:  Prior to Admission Medications   Prescriptions Last Dose Informant Patient Reported? Taking?   ondansetron (ZOFRAN-ODT) 4 mg disintegrating tablet Unknown at Unknown time  No No   Sig: Take 1 tablet (4 mg total) by mouth every 6 (six) hours as needed for nausea or vomiting      Facility-Administered Medications: None     Allergies:   Allergies   Allergen Reactions    Penicillins     Tetracycline      ------------------------------------------------------------------------------------------------------------  Advance Directive and Living Will:      Power of :    POLST:    ------------------------------------------------------------------------------------------------------------  Anticipated Length of Stay is > 2 midnights    Care Time Delivered:   Upon my evaluation, this patient had a high probability of imminent or life-threatening deterioration due to recent Whipple procedure, which required my direct attention, intervention, and personal management  I have personally provided 30 minutes (1600 to 1630) of critical care time, exclusive of procedures, teaching, family meetings, and any prior time recorded by providers other than myself  Gerson Weaver PA-C    Portions of the record may have been created with voice recognition software  Occasional wrong word or "sound a like" substitutions may have occurred due to the inherent limitations of voice recognition software    Read the chart carefully and recognize, using context, where substitutions have occurred

## 2021-04-26 NOTE — ANESTHESIA PREPROCEDURE EVALUATION
Procedure: WHIPPLE PROCEDURE/PANCREATICO-DUODENECTOMY (N/A Abdomen)  ABLATION,SOFT TISSUE PANCREAS (N/A Abdomen)    Relevant Problems   GI/HEPATIC   (+) Neoplasm of ampulla of Vater      hgb 12 9, plt 345, cr 0 71, inr 0 84         Anesthesia Plan  ASA Score- 3     Anesthesia Type- general with ASA Monitors  Additional Monitors: arterial line  Airway Plan: ETT  Comment: General anesthesia, endotracheal tube; standard ASA monitors  Risks and benefits discussed with patient; patient consented and agrees to proceed  I saw and evaluated the patient  If seen with CRNA, we have discussed the anesthetic plan and I am in agreement that the plan is appropriate for the patient  Yesi  Large bore IV access    Thoracic epidural for pain control requested by surgeon  Risks discussed including infection, bleeding, PDPH, neurological compromise  Pt understands, agrees to proceed          Plan Factors-    Induction- intravenous  Postoperative Plan- Plan for postoperative opioid use  Planned trial extubation    Informed Consent- Anesthetic plan and risks discussed with patient  I personally reviewed this patient with the CRNA  Discussed and agreed on the Anesthesia Plan with the CRNA  Parag Araya

## 2021-04-26 NOTE — QUICK NOTE
Oncology Surgery Post-Op Check  Je Langston 72 y o  female MRN: [id number]  Unit/Bed#: MICU 09 Encounter: [id number]     S: Complains pain around incision    O:   Vitals:    04/26/21 1733   BP:    Pulse: 68   Resp: (!) 11   Temp:    SpO2: 95%     I/O       04/24 0701 - 04/25 0700 04/25 0701 - 04/26 0700 04/26 0701 - 04/27 0700    I V  (mL/kg)   3465 5 (66 4)    IV Piggyback   700    Total Intake(mL/kg)   4165 5 (79 8)    Urine (mL/kg/hr)   830 (1 4)    Drains   20    Blood   100    Total Output   950    Net   +3215 5               PE:  Gen:  NAD  HENT: MMM  CV:  warm, well-perfused  Lung:  normal effort  Abd:  soft, ND, diffuse tenderness, dressing clean, NG in place, YENY ss  Ext:  no CCE  Neuro:  A&Ox3, M/S grossly intact     Lab Results   Component Value Date    WBC 11 83 (H) 04/26/2021    HGB 10 9 (L) 04/26/2021    HCT 32 5 (L) 04/26/2021    MCV 90 04/26/2021     (H) 04/26/2021     Lab Results   Component Value Date    CALCIUM 8 5 04/26/2021    K 3 8 04/26/2021    CO2 21 04/26/2021     (H) 04/26/2021    BUN 15 04/26/2021    CREATININE 0 63 04/26/2021         A/P: 72 y o  female Day of Surgery s/p Procedure(s) (LRB):  WHIPPLE PROCEDURE/PANCREATICO-DUODENECTOMY; PYLORIC PRESERVING (N/A)  INTRAOPERATIVE ULTRASOUND, ABLATION,SOFT TISSUE PANCREAS (N/A)  LAPAROTOMY EXPLORATORY (N/A)  CHOLECYSTECTOMY (N/A)   Continue NPO/NG   IVF    Trend LFTs   Continue pain management   Strict I/O - adequate UOP so far   Other care per critical care      Elsa Alberts MD  PGY3, General Surgery

## 2021-04-27 LAB
ABO GROUP BLD BPU: NORMAL
ABO GROUP BLD BPU: NORMAL
ALBUMIN SERPL BCP-MCNC: 2.6 G/DL (ref 3.5–5)
ALP SERPL-CCNC: 695 U/L (ref 46–116)
ALT SERPL W P-5'-P-CCNC: 325 U/L (ref 12–78)
ANION GAP SERPL CALCULATED.3IONS-SCNC: 9 MMOL/L (ref 4–13)
AST SERPL W P-5'-P-CCNC: 243 U/L (ref 5–45)
BASE EXCESS BLDA CALC-SCNC: 3.5 MMOL/L
BASOPHILS # BLD AUTO: 0.03 THOUSANDS/ΜL (ref 0–0.1)
BASOPHILS NFR BLD AUTO: 0 % (ref 0–1)
BILIRUB DIRECT SERPL-MCNC: 2.36 MG/DL (ref 0–0.2)
BILIRUB SERPL-MCNC: 3.01 MG/DL (ref 0.2–1)
BPU ID: NORMAL
BPU ID: NORMAL
BUN SERPL-MCNC: 12 MG/DL (ref 5–25)
CA-I BLD-SCNC: 1.04 MMOL/L (ref 1.12–1.32)
CALCIUM SERPL-MCNC: 8.8 MG/DL (ref 8.3–10.1)
CHLORIDE SERPL-SCNC: 103 MMOL/L (ref 100–108)
CO2 SERPL-SCNC: 25 MMOL/L (ref 21–32)
CREAT SERPL-MCNC: 0.51 MG/DL (ref 0.6–1.3)
EOSINOPHIL # BLD AUTO: 0 THOUSAND/ΜL (ref 0–0.61)
EOSINOPHIL NFR BLD AUTO: 0 % (ref 0–6)
ERYTHROCYTE [DISTWIDTH] IN BLOOD BY AUTOMATED COUNT: 17 % (ref 11.6–15.1)
GFR SERPL CREATININE-BSD FRML MDRD: 101 ML/MIN/1.73SQ M
GLUCOSE SERPL-MCNC: 140 MG/DL (ref 65–140)
HCO3 BLDA-SCNC: 27.1 MMOL/L (ref 22–28)
HCT VFR BLD AUTO: 32.7 % (ref 34.8–46.1)
HGB BLD-MCNC: 11 G/DL (ref 11.5–15.4)
IMM GRANULOCYTES # BLD AUTO: 0.07 THOUSAND/UL (ref 0–0.2)
IMM GRANULOCYTES NFR BLD AUTO: 1 % (ref 0–2)
LYMPHOCYTES # BLD AUTO: 1.04 THOUSANDS/ΜL (ref 0.6–4.47)
LYMPHOCYTES NFR BLD AUTO: 7 % (ref 14–44)
MAGNESIUM SERPL-MCNC: 2.1 MG/DL (ref 1.6–2.6)
MCH RBC QN AUTO: 30.2 PG (ref 26.8–34.3)
MCHC RBC AUTO-ENTMCNC: 33.6 G/DL (ref 31.4–37.4)
MCV RBC AUTO: 90 FL (ref 82–98)
MONOCYTES # BLD AUTO: 0.99 THOUSAND/ΜL (ref 0.17–1.22)
MONOCYTES NFR BLD AUTO: 7 % (ref 4–12)
NASAL CANNULA: 4
NEUTROPHILS # BLD AUTO: 12.64 THOUSANDS/ΜL (ref 1.85–7.62)
NEUTS SEG NFR BLD AUTO: 85 % (ref 43–75)
NRBC BLD AUTO-RTO: 0 /100 WBCS
O2 CT BLDA-SCNC: 15.4 ML/DL (ref 16–23)
OXYHGB MFR BLDA: 96.4 % (ref 94–97)
PCO2 BLDA: 37.4 MM HG (ref 36–44)
PH BLDA: 7.48 [PH] (ref 7.35–7.45)
PHOSPHATE SERPL-MCNC: 2.3 MG/DL (ref 2.3–4.1)
PLATELET # BLD AUTO: 432 THOUSANDS/UL (ref 149–390)
PMV BLD AUTO: 11 FL (ref 8.9–12.7)
PO2 BLDA: 83.6 MM HG (ref 75–129)
POTASSIUM SERPL-SCNC: 3.5 MMOL/L (ref 3.5–5.3)
PROT SERPL-MCNC: 6.4 G/DL (ref 6.4–8.2)
RBC # BLD AUTO: 3.64 MILLION/UL (ref 3.81–5.12)
SODIUM SERPL-SCNC: 137 MMOL/L (ref 136–145)
SPECIMEN SOURCE: ABNORMAL
UNIT DISPENSE STATUS: NORMAL
UNIT DISPENSE STATUS: NORMAL
UNIT PRODUCT CODE: NORMAL
UNIT PRODUCT CODE: NORMAL
UNIT RH: NORMAL
UNIT RH: NORMAL
WBC # BLD AUTO: 14.77 THOUSAND/UL (ref 4.31–10.16)

## 2021-04-27 PROCEDURE — 85025 COMPLETE CBC W/AUTO DIFF WBC: CPT | Performed by: PHYSICIAN ASSISTANT

## 2021-04-27 PROCEDURE — 83735 ASSAY OF MAGNESIUM: CPT | Performed by: PHYSICIAN ASSISTANT

## 2021-04-27 PROCEDURE — 97167 OT EVAL HIGH COMPLEX 60 MIN: CPT

## 2021-04-27 PROCEDURE — C9113 INJ PANTOPRAZOLE SODIUM, VIA: HCPCS | Performed by: SURGERY

## 2021-04-27 PROCEDURE — 99024 POSTOP FOLLOW-UP VISIT: CPT | Performed by: SURGERY

## 2021-04-27 PROCEDURE — NC001 PR NO CHARGE: Performed by: NURSE PRACTITIONER

## 2021-04-27 PROCEDURE — 80076 HEPATIC FUNCTION PANEL: CPT | Performed by: SURGERY

## 2021-04-27 PROCEDURE — 84100 ASSAY OF PHOSPHORUS: CPT | Performed by: PHYSICIAN ASSISTANT

## 2021-04-27 PROCEDURE — 82805 BLOOD GASES W/O2 SATURATION: CPT | Performed by: PHYSICIAN ASSISTANT

## 2021-04-27 PROCEDURE — 80048 BASIC METABOLIC PNL TOTAL CA: CPT | Performed by: PHYSICIAN ASSISTANT

## 2021-04-27 PROCEDURE — 82330 ASSAY OF CALCIUM: CPT | Performed by: PHYSICIAN ASSISTANT

## 2021-04-27 PROCEDURE — 99232 SBSQ HOSP IP/OBS MODERATE 35: CPT | Performed by: EMERGENCY MEDICINE

## 2021-04-27 PROCEDURE — 99221 1ST HOSP IP/OBS SF/LOW 40: CPT | Performed by: ANESTHESIOLOGY

## 2021-04-27 PROCEDURE — 97163 PT EVAL HIGH COMPLEX 45 MIN: CPT

## 2021-04-27 RX ORDER — DEXTROSE, SODIUM CHLORIDE, AND POTASSIUM CHLORIDE 5; .45; .15 G/100ML; G/100ML; G/100ML
100 INJECTION INTRAVENOUS CONTINUOUS
Status: DISCONTINUED | OUTPATIENT
Start: 2021-04-27 | End: 2021-04-28

## 2021-04-27 RX ORDER — METOCLOPRAMIDE HYDROCHLORIDE 5 MG/ML
5 INJECTION INTRAMUSCULAR; INTRAVENOUS EVERY 6 HOURS PRN
Status: DISCONTINUED | OUTPATIENT
Start: 2021-04-27 | End: 2021-04-28

## 2021-04-27 RX ORDER — POTASSIUM CHLORIDE 14.9 MG/ML
20 INJECTION INTRAVENOUS
Status: COMPLETED | OUTPATIENT
Start: 2021-04-27 | End: 2021-04-28

## 2021-04-27 RX ORDER — LABETALOL 20 MG/4 ML (5 MG/ML) INTRAVENOUS SYRINGE
10 EVERY 4 HOURS PRN
Status: DISCONTINUED | OUTPATIENT
Start: 2021-04-27 | End: 2021-05-03 | Stop reason: HOSPADM

## 2021-04-27 RX ORDER — CALCIUM GLUCONATE 20 MG/ML
1 INJECTION, SOLUTION INTRAVENOUS ONCE
Status: COMPLETED | OUTPATIENT
Start: 2021-04-27 | End: 2021-04-27

## 2021-04-27 RX ORDER — XYLITOL/YERBA SANTA
5 AEROSOL, SPRAY WITH PUMP (ML) MUCOUS MEMBRANE 4 TIMES DAILY PRN
Status: DISCONTINUED | OUTPATIENT
Start: 2021-04-27 | End: 2021-05-03 | Stop reason: HOSPADM

## 2021-04-27 RX ADMIN — HEPARIN SODIUM 5000 UNITS: 5000 INJECTION INTRAVENOUS; SUBCUTANEOUS at 21:01

## 2021-04-27 RX ADMIN — POTASSIUM CHLORIDE 20 MEQ: 14.9 INJECTION, SOLUTION INTRAVENOUS at 08:56

## 2021-04-27 RX ADMIN — HEPARIN SODIUM 5000 UNITS: 5000 INJECTION INTRAVENOUS; SUBCUTANEOUS at 15:44

## 2021-04-27 RX ADMIN — HEPARIN SODIUM 5000 UNITS: 5000 INJECTION INTRAVENOUS; SUBCUTANEOUS at 06:16

## 2021-04-27 RX ADMIN — ONDANSETRON 4 MG: 2 INJECTION INTRAMUSCULAR; INTRAVENOUS at 04:50

## 2021-04-27 RX ADMIN — HYDROMORPHONE HYDROCHLORIDE 0.5 MG: 1 INJECTION, SOLUTION INTRAMUSCULAR; INTRAVENOUS; SUBCUTANEOUS at 01:22

## 2021-04-27 RX ADMIN — CEFAZOLIN SODIUM 1000 MG: 1 SOLUTION INTRAVENOUS at 01:22

## 2021-04-27 RX ADMIN — ONDANSETRON 4 MG: 2 INJECTION INTRAMUSCULAR; INTRAVENOUS at 08:57

## 2021-04-27 RX ADMIN — SODIUM CHLORIDE, SODIUM GLUCONATE, SODIUM ACETATE, POTASSIUM CHLORIDE, MAGNESIUM CHLORIDE, SODIUM PHOSPHATE, DIBASIC, AND POTASSIUM PHOSPHATE 125 ML/HR: .53; .5; .37; .037; .03; .012; .00082 INJECTION, SOLUTION INTRAVENOUS at 00:17

## 2021-04-27 RX ADMIN — LABETALOL 20 MG/4 ML (5 MG/ML) INTRAVENOUS SYRINGE 10 MG: at 06:15

## 2021-04-27 RX ADMIN — HYDROMORPHONE HYDROCHLORIDE 0.5 MG: 1 INJECTION, SOLUTION INTRAMUSCULAR; INTRAVENOUS; SUBCUTANEOUS at 08:56

## 2021-04-27 RX ADMIN — PANTOPRAZOLE SODIUM 40 MG: 40 INJECTION, POWDER, FOR SOLUTION INTRAVENOUS at 09:09

## 2021-04-27 RX ADMIN — HYDROMORPHONE HYDROCHLORIDE 0.5 MG: 1 INJECTION, SOLUTION INTRAMUSCULAR; INTRAVENOUS; SUBCUTANEOUS at 13:57

## 2021-04-27 RX ADMIN — Medication: at 14:34

## 2021-04-27 RX ADMIN — CHLORHEXIDINE GLUCONATE 15 ML: 1.2 SOLUTION ORAL at 21:01

## 2021-04-27 RX ADMIN — CALCIUM GLUCONATE 1 G: 20 INJECTION, SOLUTION INTRAVENOUS at 06:15

## 2021-04-27 RX ADMIN — DEXTROSE, SODIUM CHLORIDE, AND POTASSIUM CHLORIDE 100 ML/HR: 5; .45; .15 INJECTION INTRAVENOUS at 15:44

## 2021-04-27 RX ADMIN — HYDROMORPHONE HYDROCHLORIDE 0.5 MG: 1 INJECTION, SOLUTION INTRAMUSCULAR; INTRAVENOUS; SUBCUTANEOUS at 23:19

## 2021-04-27 RX ADMIN — METOCLOPRAMIDE HYDROCHLORIDE 5 MG: 5 INJECTION INTRAMUSCULAR; INTRAVENOUS at 12:22

## 2021-04-27 RX ADMIN — POTASSIUM CHLORIDE 20 MEQ: 14.9 INJECTION, SOLUTION INTRAVENOUS at 10:00

## 2021-04-27 RX ADMIN — HYDROMORPHONE HYDROCHLORIDE 0.5 MG: 1 INJECTION, SOLUTION INTRAMUSCULAR; INTRAVENOUS; SUBCUTANEOUS at 04:50

## 2021-04-27 NOTE — PROGRESS NOTES
Daily Progress Note - Critical Care   Kourtney Labor 72 y o  female MRN: [id number]  Unit/Bed#: MICU 09 Encounter: [id number]    ----------------------------------------------------------------------------------------  HPI/24hr events:  · Patient was hypertensive overnight; -180s  · Otherwise, no overnight issues  ---------------------------------------------------------------------------------------  Review of Systems   Constitutional: Positive for fatigue  Negative for chills and fever  Eyes: Negative for visual disturbance  Respiratory: Negative for chest tightness, shortness of breath and wheezing  Cardiovascular: Negative for chest pain, palpitations and leg swelling  Gastrointestinal: Positive for abdominal pain  Negative for constipation, diarrhea, nausea and vomiting         + Incisional Pain   Genitourinary: Negative for dysuria  Musculoskeletal: Negative for arthralgias  Skin: Negative for color change, pallor, rash and wound  Neurological: Negative for dizziness, syncope, weakness, light-headedness, numbness and headaches  Psychiatric/Behavioral: Negative for confusion  All other systems reviewed and are negative  Review of systems was reviewed and negative unless stated above in HPI/24-hour events   ---------------------------------------------------------------------------------------  Assessment and Plan:    Neuro:    Diagnosis: Analgesia  o Plan:  Patient has an epidural catheter in place per Anesthesiology  o Patient following with acute pain services; appreciate their further recommendations  o Suspect the patient's hypertension is secondary to pain; appreciate acute pain service's further recommendations  o Continue multimodal pain regimen   Diagnosis:  Delirium precautions  o Plan:  [facility]  o Regulate sleep-wake cycle  o Reorientation as necessary    CV:    Diagnosis:  Hypertension  o Plan:  Patient had hypertension in the SBPs 160-180s    Likely multifactorial in the setting of pain  o Acute pain service is following  o Patient has p r n  Labetalol ordered for blood pressures greater than 118 systolic   o Continue hemodynamic monitoring in telemetry    Pulm:   Diagnosis: Mild Hypoxia  o Plan:  Patient came from surgery on 2 L nasal cannula saturating approximately 97%  o Current oxygen requirements - 4L NC saturating 100%  o Aggressive pulmonary toilet    GI:    Diagnosis:  Neoplasm of ampulla of Vater  o Plan:  The patient is postop day 1 from a Whipple procedure with pancreatic duodenectomy and pyloric preserving  o The patient also underwent an exploratory laparotomy and cholecystectomy  o Continue monitoring abdominal examinations  o GI prophylaxis with Protonix  o Follow-up on tissue cultures when available  o Continue monitoring endpoints - lactic cleared at 0 8  o Continue NPO/NG diet  o Right Abdominal Drain - 60cc serosanguinous output at time of my examination  o No BMs but passing gas   Diagnosis:  Transaminitis  o Plan:  Improved from yesterday - continue monitoring  - -243  - -325  - Alk  Phos  842-148  - T   Bili  6 39-3 01    :    Diagnosis: Renal Function  o Plan:  Creatinine 0 63-0 51  o Continue monitoring  o UOP 2 7L yesterday but patient was overall net positive 2 9 L    F/E/N:    Plan:  Continue monitoring electrolytes   Potassium 3 5 - will replete today   Phosphorus and magnesium both within normal limits   Calcium is 1 04 - in the process of being repleted    Heme/Onc:    Diagnosis: Anemia  o Plan:  Currently stable at hemoglobin of 11  o Estimated blood loss during surgery was 100 mL  o Continue monitoring   Diagnosis: DVT Prophylaxis  o Plan:  Heparin    Endo:   o No active issues - blood glucose has remained exclusively under 180  o Continue monitoring    ID:    Diagnosis:  Leukocytosis  o Plan:  White blood cells 11 83-14 77  o Absolute neutrophils 11 36-12 64  o Continue white blood cell and fever curve monitoring  o Patient has remained afebrile overnight  o Likely in the setting of an acute stress reaction    MSK/Skin:   o Frequent turning and repositioning to avoid skin breakdown  o Patient should be up an into chair  o PT/OT ordered    Lines and Drains:  · Plan:  Discontinue arterial line today    Disposition: Critical Care to sign off   Code Status: Level 1 - Full Code  ---------------------------------------------------------------------------------------  ICU CORE MEASURES    Prophylaxis   VTE Pharmacologic Prophylaxis: Heparin  VTE Mechanical Prophylaxis: sequential compression device  Stress Ulcer Prophylaxis: Pantoprazole IV     Invasive Devices Review  Invasive Devices     Peripheral Intravenous Line            Peripheral IV 21 Left Forearm less than 1 day    Peripheral IV 21 Right Antecubital less than 1 day          Epidural Line            Epidural Catheter 21 less than 1 day          Arterial Line            Arterial Line 21 Right Radial less than 1 day          Drain            NG/OG/Enteral Tube Nasogastric Left mouth 1 day    Closed/Suction Drain Right Abdomen Bulb 19 Fr  less than 1 day    Urethral Catheter Latex;Straight-tip 16 Fr  less than 1 day              OBJECTIVE    Vitals   Vitals:    21 0200 21 0300 21 0400 21 0500   BP:       Pulse: 58 (!) 54 (!) 54 66   Resp: 12 12 16 17   Temp:   98 9 °F (37 2 °C)    TempSrc:   Oral    SpO2: 97% 98% 98% 95%   Weight:       Height:         Temp (24hrs), Av 1 °F (36 7 °C), Min:97 4 °F (36 3 °C), Max:98 9 °F (37 2 °C)  Current: Temperature: 98 9 °F (37 2 °C)    Respiratory:  SpO2: SpO2: 95 %, SpO2 Activity: SpO2 Activity: At Rest, SpO2 Device: O2 Device: Nasal cannula, Capnography:    Nasal Cannula O2 Flow Rate (L/min): 4 L/min    Invasive/non-invasive ventilation settings   Respiratory    Lab Data (Last 4 hours)    None         O2/Vent Data (Last 4 hours)    None              Physical Exam  Vitals signs and nursing note reviewed  Constitutional:       General: She is not in acute distress  Appearance: Normal appearance  She is normal weight  She is not ill-appearing, toxic-appearing or diaphoretic  HENT:      Head: Normocephalic and atraumatic  Nose: Nose normal       Mouth/Throat:      Mouth: Mucous membranes are moist    Eyes:      General: Scleral icterus (still present, but much improved from yesterday) present  Right eye: No discharge  Left eye: No discharge  Extraocular Movements: Extraocular movements intact  Conjunctiva/sclera: Conjunctivae normal    Cardiovascular:      Rate and Rhythm: Normal rate and regular rhythm  Pulses: Normal pulses  Heart sounds: Normal heart sounds  No murmur  No friction rub  No gallop  Comments: 2+ radial and DP pulses bilaterally  Pulmonary:      Effort: Pulmonary effort is normal  No respiratory distress  Breath sounds: Normal breath sounds  No stridor  No wheezing, rhonchi or rales  Chest:      Chest wall: No tenderness  Abdominal:      Tenderness: There is no right CVA tenderness or left CVA tenderness  Comments: Incision appears dry and intact without any wound dehiscence  No signs of infection in the patient's abdomen  Tenderness to palpation to light palpation of abdomen  Abdominal drain with 60 cc serosanguineous fluid   Musculoskeletal:      Right lower leg: No edema  Left lower leg: No edema  Skin:     General: Skin is warm and dry  Capillary Refill: Capillary refill takes less than 2 seconds  Coloration: Skin is jaundiced (still present, but much improved from yesterday)  Skin is not pale  Neurological:      General: No focal deficit present  Mental Status: She is alert and oriented to person, place, and time  Mental status is at baseline     Psychiatric:         Mood and Affect: Mood normal          Behavior: Behavior normal        Laboratory and Diagnostics:  Results from last 7 days   Lab Units 04/27/21  0451 04/26/21  1659 04/26/21  1516 04/26/21  1241 04/26/21  1048   WBC Thousand/uL 14 77*  --  11 83*  --   --    HEMOGLOBIN g/dL 11 0*  --  10 9*  --   --    I STAT HEMOGLOBIN g/dl  --   --   --  9 9* 10 9*   HEMATOCRIT % 32 7*  --  32 5*  --   --    HEMATOCRIT, ISTAT %  --   --   --  29* 32*   PLATELETS Thousands/uL 432* 423* 429*  --   --    NEUTROS PCT % 85*  --   --   --   --    MONOS PCT % 7  --   --   --   --    MONO PCT %  --   --  0*  --   --      Results from last 7 days   Lab Units 04/27/21 0451 04/26/21  1516 04/26/21  1241 04/26/21  1048   SODIUM mmol/L 137 141  --   --    POTASSIUM mmol/L 3 5 3 8  --   --    CHLORIDE mmol/L 103 112*  --   --    CO2 mmol/L 25 21  --   --    CO2, I-STAT mmol/L  --   --  23 25   ANION GAP mmol/L 9 8  --   --    BUN mg/dL 12 15  --   --    CREATININE mg/dL 0 51* 0 63  --   --    CALCIUM mg/dL 8 8 8 5  --   --    GLUCOSE RANDOM mg/dL 140 146*  --   --    ALT U/L  --  375*  --   --    AST U/L  --  350*  --   --    ALK PHOS U/L  --  806*  --   --    ALBUMIN g/dL  --  2 8*  --   --    TOTAL BILIRUBIN mg/dL  --  6 39*  --   --      Results from last 7 days   Lab Units 04/27/21  0451   MAGNESIUM mg/dL 2 1   PHOSPHORUS mg/dL 2 3               Results from last 7 days   Lab Units 04/26/21  1659   LACTIC ACID mmol/L 0 8     ABG:    VBG:          Micro        Imaging:  I have personally reviewed pertinent reports  Intake and Output  I/O       04/25 0701 - 04/26 0700 04/26 0701 - 04/27 0700    I V  (mL/kg)  4839 2 (92 7)    IV Piggyback  850    Total Intake(mL/kg)  5689 2 (109)    Urine (mL/kg/hr)  2555 (2)    Drains  90    Blood  100    Total Output  2745    Net  +2944  2              Height and Weights   Height: 5' 2" (157 5 cm)  IBW (Ideal Body Weight): 50 1 kg  Body mass index is 21 03 kg/m²    Weight (last 2 days)     Date/Time   Weight    04/26/21 0827   52 2 (115)            Nutrition       Diet Orders   (From admission, onward) Start     Ordered    04/26/21 1653  Diet NPO  Diet effective now     Question Answer Comment   Diet Type NPO    RD to adjust diet per protocol?  No        04/26/21 1655              Active Medications  Scheduled Meds:  Current Facility-Administered Medications   Medication Dose Route Frequency Provider Last Rate    calcium gluconate  1 g Intravenous Once Alejandra Jimenez MD      chlorhexidine  15 mL Swish [employer] Spit Q12H [address] Virgia Heimlich Druckenmiller, PA-C      heparin (porcine)  5,000 Units Subcutaneous Q8H Jose C Gibson MD      HYDROmorphone  0 5 mg Intravenous Q2H PRN Esther Khan MD      lactated ringers  1,000 mL Intravenous Once PRN Esther Khan MD      And    lactated ringers  1,000 mL Intravenous Once PRN Esther Khan MD      multi-electrolyte  125 mL/hr Intravenous Continuous Esther Khan  mL/hr (04/27/21 0017)    ondansetron  4 mg Intravenous Q6H PRN Esther Khan MD      pantoprazole  40 mg Intravenous Q24H Jose C Gibson MD      ropivacaine 0 1% and fentaNYL 2 mcg/mL   Epidural Continuous Esther Khan MD      sodium chloride  1,000 mL Intravenous Once PRN Esther Khan MD      And    sodium chloride  1,000 mL Intravenous Once PRN Esther Khan MD       Continuous Infusions:  multi-electrolyte, 125 mL/hr, Last Rate: 125 mL/hr (04/27/21 0017)  ropivacaine 0 1% and fentaNYL 2 mcg/mL,       PRN Meds:   HYDROmorphone, 0 5 mg, Q2H PRN  lactated ringers, 1,000 mL, Once PRN    And  lactated ringers, 1,000 mL, Once PRN  ondansetron, 4 mg, Q6H PRN  sodium chloride, 1,000 mL, Once PRN    And  sodium chloride, 1,000 mL, Once PRN      Allergies   Allergies   Allergen Reactions    Penicillins     Tetracycline      ---------------------------------------------------------------------------------------  Advance Directive and Living Will:      Power of :    POLST:    ---------------------------------------------------------------------------------------  Care Time Delivered:   Upon my evaluation, this patient had a high probability of imminent or life-threatening deterioration due to recent Whipple procedure, which required my direct attention, intervention, and personal management  I have personally provided 30 minutes (0600 to 0630) of critical care time, exclusive of procedures, teaching, family meetings, and any prior time recorded by providers other than myself  Emanuel Carey PA-C    Portions of the record may have been created with voice recognition software  Occasional wrong word or "sound a like" substitutions may have occurred due to the inherent limitations of voice recognition software    Read the chart carefully and recognize, using context, where substitutions have occurred

## 2021-04-27 NOTE — PLAN OF CARE
.  Chief Complaint   Patient presents with    Routine Prenatal Visit     32 week      1. Have you been to the ER, urgent care clinic since your last visit? Hospitalized since your last visit? No    2. Have you seen or consulted any other health care providers outside of the [address] [place] [address] since your last visit? Include any pap smears or colon screening. No     Pt stated the last three days she has been wetting her underwears . Problem: PHYSICAL THERAPY ADULT  Goal: Performs mobility at highest level of function for planned discharge setting  See evaluation for individualized goals  Description: Treatment/Interventions: OT, Spoke to case management, Spoke to nursing, Gait training, Bed mobility, Patient/family training, Endurance training, LE strengthening/ROM, Functional transfer training          See flowsheet documentation for full assessment, interventions and recommendations  Note: Prognosis: Good  Problem List: Decreased strength, Decreased endurance, Impaired balance, Decreased mobility, Decreased cognition, Decreased safety awareness, Pain, Decreased skin integrity  Assessment: Pt is 72 y o  female seen for PT evaluation s/p admit to [facility] on 4/26/2021 w/ Neoplasm of ampulla of Vater  PT consulted to assess pt's functional mobility and d/c needs  Order placed for PT eval and tx  PTA, pt was requiring A for mobility, ambulates community distances and elevations, has 3 LIANG and works full time  Personal factors affecting pt at time of IE include: stairs to enter home, inability to navigate level surfaces w/o external assistance, decreased cognition, limited home support, positive fall history, decreased initiation and engagement, impulsivity, unable to perform physical activity, inability to perform IADLs and inability to perform ADLs  Please find objective findings from PT assessment regarding body systems outlined above with impairments and limitations including weakness, impaired balance, decreased endurance, impaired coordination, gait deviations, pain, decreased activity tolerance, decreased functional mobility tolerance, decreased safety awareness, fall risk, SOB upon exertion and decreased cognition  Pt required increased time to complete bed mobility with increased pain  Tolerated sitting EOB without increased dizziness although noted increased nausea   Pt required A to complete transfers with mild forward flexed positioning 2* to pain  AMbulated with slow although overall steady gait  Additional ambulation limited by pain and nausea  The following objective measures performed on IE also reveal limitations: The patient's AM-PAC Basic Mobility Inpatient Short Form Raw Score is 19, Standardized Score is 42 48  A standardized score greater than 42 9 suggests the patient may benefit from discharge to home  Please also refer to the recommendation of the Physical Therapist for safe discharge planning  Please also refer to the recommendation of the Physical Therapist for safe discharge planning  Pt's clinical presentation is currently unstable/unpredictable seen in pt's presentation of critical care monitoring, NG tube, YENY drain, pain  Pt to benefit from continued PT tx to address deficits as defined above and maximize level of functional independent mobility and consistency  From PT/mobility standpoint, recommendation at time of d/c would be Home PT pending progress in order to facilitate return to PLOF  Barriers to Discharge: Inaccessible home environment, Decreased caregiver support        PT Discharge Recommendation: Home with home health rehabilitation     PT - OK to Discharge: No    See flowsheet documentation for full assessment

## 2021-04-27 NOTE — PROGRESS NOTES
Pt arrived from MICU, upon review of epidural pump settings were incorrect and continuous rate was now 8ml/hr  Per MICU RN, Arleth Bravo, anesthesia saw pt earlier and said they may adjust epidural settings  Orders were not changed to reflect that  MICU RN sent tigertext to have orders changed to reflect new settings  Will await order

## 2021-04-27 NOTE — PHYSICAL THERAPY NOTE
Physical Therapy Evaluation     Patient's Name: Alexx Chan    Admitting Diagnosis  Neoplasm of ampulla of Vater [D49 0]    Problem List  Patient Active Problem List   Diagnosis    Elevated LFTs    Dilated bile duct    Neoplasm of ampulla of Vater       Past Medical History  History reviewed  No pertinent past medical history  Past Surgical History  Past Surgical History:   Procedure Laterality Date    ABLATION SOFT TISSUE N/A 4/26/2021    Procedure: INTRAOPERATIVE ULTRASOUND, ABLATION,SOFT TISSUE PANCREAS;  Surgeon: Zahraa Lopez MD;  [facility]: [facility];  Service: Surgical Oncology    CHOLECYSTECTOMY N/A 4/26/2021    Procedure: CHOLECYSTECTOMY;  Surgeon: Zahraa Lopez MD;  [facility];  Service: Surgical Oncology    HYSTERECTOMY      LAPAROTOMY N/A 4/26/2021    Procedure: LAPAROTOMY EXPLORATORY;  Surgeon: Zahraa Lopez MD;  [facility];  Service: Surgical Oncology    SINUS SURGERY      WHIPPLE PROCEDURE/PANCREATICO-DUODENECTOMY N/A 4/26/2021    Procedure:  WHIPPLE PROCEDURE/PANCREATICO-DUODENECTOMY; PYLORIC PRESERVING;  Surgeon: Zahraa Lopez MD;  [facility];  Service: Surgical Oncology        04/27/21 1057   PT Last Visit   PT Visit Date 04/27/21   Note Type   Note type Evaluation   Pain Assessment   Pain Assessment Tool FLACC   Pain Location/Orientation Location: Abdomen   Patient's Stated Pain Goal No pain   Hospital Pain Intervention(s) Repositioned   Pain Rating: FLACC (Rest) - Face 1   Pain Rating: FLACC (Rest) - Legs 0   Pain Rating: FLACC (Rest) - Activity 0   Pain Rating: FLACC (Rest) - Cry 1   Pain Rating: FLACC (Rest) - Consolability 0   Score: FLACC (Rest) 2   Pain Rating: FLACC (Activity) - Face 1   Pain Rating: FLACC (Activity) - Legs 0   Pain Rating: FLACC (Activity) - Activity 0   Pain Rating: FLACC (Activity) - Cry 1   Pain Rating: FLACC (Activity) - Consolability 0   Score: FLACC (Activity) 2   Home Living   Type of Home Apartment   Home Layout One level;Performs ADLs on one level  (3 LIANG)   Bathroom Shower/Tub Walk-in shower   Bathroom Toilet Standard   Prior Function   Level of [place] Independent with ADLs and functional mobility   Lives With Son   Aleshia 1850   IADLs Independent   Falls in the last 6 months 0   Vocational Full time employment  ( for [employer])   Comments Pt reports that son works although neighbor will be able to A prn   Restrictions/Precautions   Weight Bearing Precautions Per Order No   Other Precautions Chair Alarm; Bed Alarm;Multiple lines;O2;Fall Risk;Pain  (NG tube)   General   Family/Caregiver Present No   Cognition   Orientation Level Oriented X4   RLE Assessment   RLE Assessment WFL   LLE Assessment   LLE Assessment WFL   Coordination   Movements are Fluid and Coordinated 0   Bed Mobility   Supine to Sit 4  Minimal assistance   Additional items Assist x 1; Increased time required   Sit to Supine Unable to assess   Additional Comments Pt left resting in chair as requested, call bell in reach, chair alarm active   Transfers   Sit to Stand 4  Minimal assistance   Additional items Assist x 1; Increased time required   Stand to Sit 4  Minimal assistance   Additional items Assist x 1; Increased time required   Ambulation/Elevation   Gait pattern Excessively slow; Shuffling;Decreased foot clearance   Gait Assistance 4  Minimal assist   Additional items Assist x 1   Assistive Device Other (Comment)  (HHA)   Distance 4'   Balance   Static Sitting Good   Dynamic Sitting Fair +   Static Standing Fair   Dynamic Standing Fair   Ambulatory Fair -   Endurance Deficit   Endurance Deficit Yes   Endurance Deficit Description Pt limited by pain, nausea, fatigue   Activity Tolerance   Activity Tolerance Patient limited by fatigue;Patient limited by pain   Medical Staff Made Aware OT for D/C planning   Nurse Made Aware yes, nsg gave clearance to work with pt   Assessment   Prognosis Good Problem List Decreased strength;Decreased endurance; Impaired balance;Decreased mobility; Decreased cognition;Decreased safety awareness;Pain;Decreased skin integrity   Assessment Pt is 72 y o  female seen for PT evaluation s/p admit to [facility] on 4/26/2021 w/ Neoplasm of ampulla of Vater  PT consulted to assess pt's functional mobility and d/c needs  Order placed for PT eval and tx  PTA, pt was requiring A for mobility, ambulates community distances and elevations, has 3 LIANG and works full time  Personal factors affecting pt at time of IE include: stairs to enter home, inability to navigate level surfaces w/o external assistance, decreased cognition, limited home support, positive fall history, decreased initiation and engagement, impulsivity, unable to perform physical activity, inability to perform IADLs and inability to perform ADLs  Please find objective findings from PT assessment regarding body systems outlined above with impairments and limitations including weakness, impaired balance, decreased endurance, impaired coordination, gait deviations, pain, decreased activity tolerance, decreased functional mobility tolerance, decreased safety awareness, fall risk, SOB upon exertion and decreased cognition  Pt required increased time to complete bed mobility with increased pain  Tolerated sitting EOB without increased dizziness although noted increased nausea  Pt required A to complete transfers with mild forward flexed positioning 2* to pain  AMbulated with slow although overall steady gait  Additional ambulation limited by pain and nausea  The following objective measures performed on IE also reveal limitations: The patient's AM-PAC Basic Mobility Inpatient Short Form Raw Score is 19, Standardized Score is 42 48  A standardized score greater than 42 9 suggests the patient may benefit from discharge to home   Please also refer to the recommendation of the Physical Therapist for safe discharge planning  Please also refer to the recommendation of the Physical Therapist for safe discharge planning  Pt's clinical presentation is currently unstable/unpredictable seen in pt's presentation of critical care monitoring, NG tube, YENY drain, pain  Pt to benefit from continued PT tx to address deficits as defined above and maximize level of functional independent mobility and consistency  From PT/mobility standpoint, recommendation at time of d/c would be Home PT pending progress in order to facilitate return to PLOF  Barriers to Discharge Inaccessible home environment;Decreased caregiver support   Goals   Patient Goals To decrease pain   STG Expiration Date 05/09/21   Short Term Goal #1 1  Complete bed mobility and transfers I to decrease need for caregiver in home  2  Ambulate 300' I to complete household and community mobility without A  3  Improve dynamic balance to good to decrease need for UE support during ambulation  4  Be educated & demonstate 3 steps to be able to enter home without A  Plan   Treatment/Interventions OT; Spoke to case management;Spoke to nursing;Gait training;Bed mobility; Patient/family training; Endurance training;LE strengthening/ROM; Functional transfer training   PT Frequency Other (Comment)  (3-5x/wk)   Recommendation   PT Discharge Recommendation Home with home health rehabilitation   PT - OK to Discharge No   AM-PAC Basic Mobility Inpatient   Turning in Bed Without Bedrails 4   Lying on Back to Sitting on Edge of Flat Bed 4   Moving Bed to Chair 3   Standing Up From Chair 3   Walk in Room 3   Climb 3-5 Stairs 2   Basic Mobility Inpatient Raw Score 19   Basic Mobility Standardized Score 42 48           Cecilia Lowe, PT

## 2021-04-27 NOTE — NURSING NOTE
Still no change in epidural order, spoke with MICU RN and she did not hear anything back as well  On call APS  called by myself and made aware of situation  Orders changed to reflect current pump settings

## 2021-04-27 NOTE — PROGRESS NOTES
Progress Note - Oncology Surgery   Jessica Hopper 72 y o  female MRN: [id number]  Unit/Bed#: MICU 09 Encounter: [id number]    Assessment:  71 yo F s/p pylorus preserving pancreaticoduodenectomy     Plan:   NPO/NG   IVF   F/u APS for pain management   Ambulation/IS      Subjective/Objective     Subjective:   Complains pain around incision, no other complaints provided    Objective:    Blood pressure 149/73, pulse 66, temperature 98 9 °F (37 2 °C), temperature source Oral, resp  rate 17, height 5' 2" (1 575 m), weight 52 2 kg (115 lb), SpO2 95 %  ,Body mass index is 21 03 kg/m²        Intake/Output Summary (Last 24 hours) at 4/27/2021 0556  Last data filed at 4/27/2021 0400  Gross per 24 hour   Intake 5689 23 ml   Output 2745 ml   Net 2944 23 ml       Invasive Devices     Peripheral Intravenous Line            Peripheral IV 04/26/21 Left Forearm less than 1 day    Peripheral IV 04/26/21 Right Antecubital less than 1 day          Epidural Line            Epidural Catheter 04/26/21 less than 1 day          Arterial Line            Arterial Line 04/26/21 Right Radial less than 1 day          Drain            NG/OG/Enteral Tube Nasogastric Left mouth 1 day    Closed/Suction Drain Right Abdomen Bulb 19 Fr  less than 1 day    Urethral Catheter Latex;Straight-tip 16 Fr  less than 1 day                Physical Exam:   Gen:  NAD  CV:  warm, well-perfused  Lungs: nl effort  Abd:  soft, ND, tenderness around incision, incision cdi, [place] 50ml ss, NG in place, minimum output  Ext:  no CCE  Neuro: A&Ox3     Results from last 7 days   Lab Units 04/27/21  0451 04/26/21  1659 04/26/21  1516 04/26/21  1241   WBC Thousand/uL 14 77*  --  11 83*  --    HEMOGLOBIN g/dL 11 0*  --  10 9*  --    I STAT HEMOGLOBIN g/dl  --   --   --  9 9*   HEMATOCRIT % 32 7*  --  32 5*  --    HEMATOCRIT, ISTAT %  --   --   --  29*   PLATELETS Thousands/uL 432* 423* 429*  --      Results from last 7 days   Lab Units 04/27/21  0451 04/26/21  1516 04/26/21  1241   POTASSIUM mmol/L 3 5 3 8  --    CHLORIDE mmol/L 103 112*  --    CO2 mmol/L 25 21  --    CO2, I-STAT mmol/L  --   --  23   BUN mg/dL 12 15  --    CREATININE mg/dL 0 51* 0 63  --    GLUCOSE, ISTAT mg/dl  --   --  135   CALCIUM mg/dL 8 8 8 5  --

## 2021-04-27 NOTE — OCCUPATIONAL THERAPY NOTE
Occupational Therapy Evaluation     Patient Name: Pancho GREY Date: 4/27/2021  Problem List  Principal Problem:    Neoplasm of ampulla of Vater    Past Medical History  History reviewed  No pertinent past medical history  Past Surgical History  Past Surgical History:   Procedure Laterality Date    ABLATION SOFT TISSUE N/A 4/26/2021    Procedure: INTRAOPERATIVE ULTRASOUND, ABLATION,SOFT TISSUE PANCREAS;  Surgeon: Celestine Gomez MD;  [facility];  Service: Surgical Oncology    CHOLECYSTECTOMY N/A 4/26/2021    Procedure: CHOLECYSTECTOMY;  Surgeon: Celestine Gomez MD;  [facility];  Service: Surgical Oncology    HYSTERECTOMY      LAPAROTOMY N/A 4/26/2021    Procedure: LAPAROTOMY EXPLORATORY;  Surgeon: Celestine Gomez MD;  [facility];  Service: Surgical Oncology    SINUS SURGERY      WHIPPLE PROCEDURE/PANCREATICO-DUODENECTOMY N/A 4/26/2021    Procedure: WHIPPLE PROCEDURE/PANCREATICO-DUODENECTOMY; PYLORIC PRESERVING;  Surgeon: Celestine Gomez MD;  [facility];  Service: Surgical Oncology             04/27/21 1056   OT Last Visit   OT Visit Date 04/27/21   Note Type   Note type Evaluation   Restrictions/Precautions   Weight Bearing Precautions Per Order No   Other Precautions Bed Alarm; Chair Alarm;Multiple lines;Telemetry; Fall Risk;Pain;O2  (4L O2; NGT to suction; epidural)   Pain Assessment   Pain Assessment Tool FLACC   Pain Location/Orientation Location: Abdomen   Pain Onset/Description Onset: Ongoing; Descriptor: Aching;Descriptor: Discomfort   Patient's Stated Pain Goal No pain   Hospital Pain Intervention(s) Repositioned; Ambulation/increased activity; Emotional support   Pain Rating: FLACC (Rest) - Face 1   Pain Rating: FLACC (Rest) - Legs 0   Pain Rating: FLACC (Rest) - Activity 0   Pain Rating: FLACC (Rest) - Cry 1   Pain Rating: FLACC (Rest) - Consolability 0   Score: FLACC (Rest) 2   Pain Rating: FLACC (Activity) - Face 1   Pain Rating: FLACC (Activity) - Legs 0   Pain Rating: FLACC (Activity) - Activity 0   Pain Rating: FLACC (Activity) - Cry 1   Pain Rating: FLACC (Activity) - Consolability 0   Score: FLACC (Activity) 2   Home Living   Type of Home Apartment   Home Layout One level;Performs ADLs on one level; Able to live on main level with bedroom/bathroom  (3 LIANG)   Bathroom Shower/Tub Walk-in shower   Bathroom Toilet Standard   Bathroom Equipment Other (Comment)  (denies any)   Home Equipment Other (Comment)  (denies any)   Additional Comments Pt reports living in 1 floor apt w/ 3 LIANG   Prior Function   Level of [place] Independent with ADLs and functional mobility   Lives With Son   [facility]   IADLs Independent   Falls in the last 6 months 0   Vocational   (works but not currently)   Comments Pt reports being I w/ ADLS/IADLS, transfers and functional mobility PTA   Lifestyle   Autonomy I ADLS/IADLS, transfers and functional mobility PTA   Reciprocal Relationships Pt lives w/ her son who works during the day; reports neighbor will be home to assist as needed   Service to Others 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; not currently working though   [employer] [address] Enjoys taking her dog for a walk   Psychosocial   Psychosocial (WDL) [address] to assess   Eating Deficit NPO   Grooming Assistance 5  [address] [facility] 5  Supervision/Setup   LB [address] 4  [address] 5  [address] [facility] 3  Moderate Assistance   [address]  4  [address] [facility] 4  Minimal Assistance   Functional Deficit Steadying;Verbal cueing;Supervision/safety; Increased time to complete   Bed Mobility   Supine to Sit 4  Minimal assistance   Additional items Assist x 1; Increased time required;Verbal cues;LE management   Sit to Supine Unable to assess Additional Comments pt sat EOB w/ Fair sitting balance/trunk control; limited by pain; increased time required  Increased nausea while seated EOB   Transfers   Sit to Stand 4  Minimal assistance   Additional items Assist x 1; Increased time required;Verbal cues   Stand to Sit 4  Minimal assistance   Additional items Assist x 1; Increased time required;Verbal cues   Additional Comments Pt performed STS from EOB w/ Min A x1; HHA used  Increased time 2/2 pain   Functional Mobility   Functional Mobility 4  Minimal assistance   Additional Comments Pt took few small steps from EOB to chair w/ Min A x1, HHA used into standing   Additional items Hand hold assistance   Balance   Static Sitting Good   Dynamic Sitting Fair +   Static Standing Fair   Dynamic Standing Fair   Ambulatory Fair   Activity Tolerance   Activity Tolerance Patient tolerated treatment well   Medical Staff Made Aware PT   Nurse Made Aware yes, Jay Shell   RUE Assessment   RUE Assessment WFL   LUE Assessment   LUE Assessment WFL   Hand Function   Gross Motor Coordination Functional   Fine Motor Coordination Functional   Sensation   Light Touch No apparent deficits   Cognition   Overall Cognitive Status WFL   Arousal/Participation Responsive; Cooperative   Attention Within functional limits   Orientation Level Oriented X4   Memory Within functional limits   Following Commands Follows all commands and directions without difficulty   Comments pt is pleasant and cooperative; limited by pain/fatigue   Assessment   Limitation Decreased ADL status; Decreased endurance;Decreased self-care trans;Decreased high-level ADLs   Prognosis Fair   Assessment Pt is a 73 y/o female seen for OT eval s/p adm to [facility] w/ intra/extrahepatic biliary dilatations w/o mass  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  Pt is s/p whipple procedure 4/26  Pt  has no past medical history on file  Pt with active OT orders and up with assistance  orders   Pt lives with her son in 1 floor apt w/ 3 LIANG  Pt was I w/  ADLS and IADLS, drove, & required no use of DME PTA  Pt is currently demonstrating the following occupational deficits: Min A UB ADLS, Mod A LB ADLS, Min A bed mobility, Min A transfers and functional mobility w/ HHA  These deficits that are impacting pt's baseline areas of occupation are a result of the following impairments: pain, endurance, activity tolerance, functional mobility, forward functional reach, functional standing tolerance and decreased I w/ ADLS/IADLS  The following Occupational Performance Areas to address include: grooming, bathing/shower, toilet hygiene, dressing, functional mobility, clothing management and household maintenance  Based on the aforementioned OT evaluation, functional performance deficits, and assessments, pt has been identified as a high complexity evaluation  Recommend home OT upon D/C, when medically stable  The patient's raw score on the AM-PAC Daily Activity inpatient short form is 19, standardized score is 40 22, greater than 39 4  Patients at this level are likely to benefit from discharge to home  Please refer to the recommendation of the Occupational Therapist for safe discharge planning  Pt to continue to benefit from acute immediate OT services to address the following goals 3-5x/week to  w/in 10-14 days:    Goals   Patient Goals to have less pain   LTG Time Frame 10-14   Long Term Goal #1 see below listed goals   Plan   Treatment Interventions ADL retraining;Functional transfer training;UE strengthening/ROM; Endurance training;Cognitive reorientation;Patient/family training;Equipment evaluation/education; Compensatory technique education;Continued evaluation; Energy conservation; Activityengagement   Goal Expiration Date 21   OT Frequency 3-5x/wk   Recommendation   OT Discharge Recommendation Home with home health rehabilitation  (Home OT)   OT - OK to Discharge Yes  (when medically stable)   AM-PAC Daily Activity Inpatient   Lower Body Dressing 3   Bathing 3   Toileting 3   Upper Body Dressing 3   Grooming 3   Eating 4   Daily Activity Raw Score 19   Daily Activity Standardized Score (Calc for Raw Score >=11) 40 22   AM-PAC Applied Cognition Inpatient   Following a Speech/Presentation 4   Understanding Ordinary Conversation 4   Taking Medications 4   Remembering Where Things Are Placed or Put Away 4   Remembering List of 4-5 Errands 4   Taking Care of Complicated Tasks 4   Applied Cognition Raw Score 24   Applied Cognition Standardized Score 62 21      GOALS    1) Pt will improve activity tolerance to G for 30 min txment sessions for increase engagement in functional tasks  2) Pt will complete UB/LB dressing/self care w/ mod I using adaptive device and DME as needed  3) Pt will complete bathing w/ Mod I w/ use of AE and DME as needed  4) Pt will complete toileting w/ mod I w/ G hygiene/thoroughness using DME as needed  5) Pt will improve functional transfers to Mod I on/off all surfaces using DME as needed w/ G balance/safety   6) Pt will improve functional mobility during ADL/IADL/leisure tasks to Mod I using DME as needed w/ G balance/safety   7) Pt will participate in simulated IADL management task to increase independence to Mod I w/ G safety and endurance  8) Pt will be attentive 100% of the time during ongoing functional cognitive assessment w/ G participation to assist w/ safe d/c planning/recommendations  9) Pt will demonstrate G carryover of pt/caregiver education and training as appropriate w/o cues w/ good tolerance to increase safety during functional tasks  10) Pt will demonstrate 100% carryover of energy conservation techniques t/o functional I/ADL/leisure tasks w/o cues s/p skilled education to increase endurance during functional tasks    Mildred Gottron MS, OTR/L

## 2021-04-27 NOTE — CONSULTS
Consult Note- Acute Pain Service   Juan Mcallister 72 y o  female MRN: [id number]  Unit/Bed#: [facility] 09 Encounter: [id number]               Assessment/Plan     Assessment:   Patient Active Problem List   Diagnosis    Elevated LFTs    Dilated bile duct    Neoplasm of ampulla of Vater      Juan Mcallister is a 72 y o  female  POD 1 s/p pylorus preserving pancreaticoduodenectomy with epidural in place for postop pain control  Pt seen in her room, she is currently OOB to chair  NGT is in place on suction  She admits to some Nausea and has taken antiemetics earlier today  She does admit to pain as well, on upper part of her surgical incision  She was able to get OOB to chair with assist  Her pain is 8-10/10 overnight into today  She has pruritis of the lower extremity but states she does not require an antipruretic at this time  Since arriving to MICU, she has used dilaudid 0 5 mg IV x 6 doses  She has pushed her PCEA button this AM and has noticed some relief of pain in 5 min  Upon inspection of backside, epidural site is C/D/I, well secured  She is an opioid naiive patient, and has taken tylenol in the past for pain relief at home  Pt to be transferred to patient floor later today  Plan:   - modify epidural 0 1% ropivacaine with 2 mcg/cc fentanyl to rate of 8 cc/hr, keep 4 cc bolus, 15 min lockout, 3 doses/hr max  Anticipate epidural use for up to 5 days postop  - continue dilaudid 0 5 mg IV Q 2 hrs PRN breakthrough pain    - Bowel regimen when appropriate from surgical perspective    APS will continue to follow   Please call  / [phone] or [place] [facility] (/ between [phone] and on weekends) with questions or concerns    History of Present Illness    Admit Date:  4/26/2021  Hospital Day:  1 day  Primary Service:  Oncology-Surgical  Attending Provider:  Cinthya Mccullough MD  Reason for Consult / Principal Problem: acute postop pain, epidural in place, opioid naiive patient  HPI: Morenita Brown is a 72 y o  female who presents with (see above assessment section)    Current pain location(s): upper part of surgical incision site  Pain Scale:   8-10/10  Current Analgesic regimen:  Epidural PCEA, dilaudid PRN  Pain History: opioid naiive patient  Pain Management Provider:  none    I have reviewed the patient's controlled substance dispensing history in the Prescription Drug Monitoring Program in compliance with the [facility] regulations before prescribing any controlled substances  Consults    Review of Systems   Constitutional: Negative  HENT:        NGT in place, pt feels uncomfy and may be dry heaving from NGT in situ  Eyes: Negative  Respiratory: Negative  Cardiovascular: Negative  Gastrointestinal: Positive for abdominal pain  At site of surgical incision   Endocrine: Negative  Genitourinary: Negative  Musculoskeletal:        Epidural site C/D/I, denies back pain at area of epidural   Skin: Negative  Neurological: Positive for dizziness  Psychiatric/Behavioral: Negative  All other systems reviewed and are negative  Historical Information   History reviewed  No pertinent past medical history    Past Surgical History:   Procedure Laterality Date    HYSTERECTOMY      SINUS SURGERY       Social History   Social History     Substance and Sexual Activity   Alcohol Use Never    Frequency: Never     Social History     Substance and Sexual Activity   Drug Use Never     Social History     Tobacco Use   Smoking Status Current Every Day Smoker    Packs/day: 0 50   Smokeless Tobacco Never Used   Tobacco Comment    education given     Family History: non-contributory    Meds/Allergies   all current active meds have been reviewed, current meds:   Current Facility-Administered Medications   Medication Dose Route Frequency    chlorhexidine (PERIDEX) 0 12 % oral rinse 15 mL  15 mL Swish & Spit Q12H [address]    dextrose 5 % and sodium chloride 0 45 % with KCl 20 mEq/L infusion  100 mL/hr Intravenous Continuous    heparin (porcine) subcutaneous injection 5,000 Units  5,000 Units Subcutaneous Q8H [address]    HYDROmorphone (DILAUDID) injection 0 5 mg  0 5 mg Intravenous Q2H PRN    Labetalol HCl (NORMODYNE) injection 10 mg  10 mg Intravenous Q4H PRN    lactated ringers bolus 1,000 mL  1,000 mL Intravenous Once PRN    And    lactated ringers bolus 1,000 mL  1,000 mL Intravenous Once PRN    ondansetron (ZOFRAN) injection 4 mg  4 mg Intravenous Q6H PRN    pantoprazole (PROTONIX) injection 40 mg  40 mg Intravenous Q24H TORIBIO    phenol (CHLORASEPTIC) 1 4 % mucosal liquid 1 spray  1 spray Mouth/Throat Q2H PRN    ropivacaine 0 1% and fentaNYL 2 mcg/mL PCEA   Epidural Continuous    saliva substitute (MOUTH KOTE) mucosal solution 5 spray  5 spray Mouth/Throat 4x Daily PRN    sodium chloride 0 9 % bolus 1,000 mL  1,000 mL Intravenous Once PRN    And    sodium chloride 0 9 % bolus 1,000 mL  1,000 mL Intravenous Once PRN    and PTA meds:   Prior to Admission Medications   Prescriptions Last Dose Informant Patient Reported? Taking?   ondansetron (ZOFRAN-ODT) 4 mg disintegrating tablet Unknown at Unknown time  No No   Sig: Take 1 tablet (4 mg total) by mouth every 6 (six) hours as needed for nausea or vomiting      Facility-Administered Medications: None       Allergies   Allergen Reactions    Penicillins     Tetracycline        Objective   Temp:  [97 5 °F (36 4 °C)-98 9 °F (37 2 °C)] 98 8 °F (37 1 °C)  HR:  [] 56  Resp:  [11-31] 20  BP: (148-177)/(70-89) 149/73  Arterial Line BP: (104-180)/(66-80) 104/72    Intake/Output Summary (Last 24 hours) at 4/27/2021 1126  Last data filed at 4/27/2021 1001  Gross per 24 hour   Intake 6279 65 ml   Output 2985 ml   Net 3294 65 ml       Physical Exam  Vitals signs and nursing note reviewed  Constitutional:       Appearance: Normal appearance  HENT:      Head: Normocephalic and atraumatic        Nose:      Comments: NGT in place    Eyes:      Extraocular Movements: Extraocular movements intact  Pupils: Pupils are equal, round, and reactive to light  Neck:      Musculoskeletal: Normal range of motion  Cardiovascular:      Rate and Rhythm: Normal rate  Pulses: Normal pulses  Pulmonary:      Effort: Pulmonary effort is normal    Musculoskeletal:      Comments: Slow to move around, but was able to sit up and lean forward well without much pain or discomfort  Neurological:      General: No focal deficit present  Mental Status: She is alert and oriented to person, place, and time  Mental status is at baseline  Psychiatric:         Mood and Affect: Mood normal          Behavior: Behavior normal          Thought Content: Thought content normal          Judgment: Judgment normal          Lab Results:   I have personally reviewed pertinent labs  , CBC:   Lab Results   Component Value Date    WBC 14 77 (H) 04/27/2021    HGB 11 0 (L) 04/27/2021    HCT 32 7 (L) 04/27/2021    MCV 90 04/27/2021     (H) 04/27/2021    MCH 30 2 04/27/2021    MCHC 33 6 04/27/2021    RDW 17 0 (H) 04/27/2021    MPV 11 0 04/27/2021    NRBC 0 04/27/2021   , CMP:   Lab Results   Component Value Date    SODIUM 137 04/27/2021    K 3 5 04/27/2021     04/27/2021    CO2 25 04/27/2021    CO2 23 04/26/2021    BUN 12 04/27/2021    CREATININE 0 51 (L) 04/27/2021    GLUCOSE 135 04/26/2021    CALCIUM 8 8 04/27/2021     (H) 04/27/2021     (H) 04/27/2021    ALKPHOS 695 (H) 04/27/2021    EGFR 101 04/27/2021   , BMP:  Lab Results   Component Value Date    SODIUM 137 04/27/2021    K 3 5 04/27/2021     04/27/2021    CO2 25 04/27/2021    CO2 23 04/26/2021    BUN 12 04/27/2021    CREATININE 0 51 (L) 04/27/2021    GLUC 140 04/27/2021    CALCIUM 8 8 04/27/2021    AGAP 9 04/27/2021    EGFR 101 04/27/2021   , PT/PTT:No results found for: PT, PTT    Imaging Studies: I have personally reviewed pertinent reports      EKG, Pathology, and Other Studies: I have personally reviewed pertinent reports  Counseling / Coordination of Care  Total floor / unit time spent today Level 1 = 20 minutes  Greater than 50% of total time was spent with the patient and / or family counseling and / or coordination of care  A description of the counseling / coordination of care: discussed use of epidural for postop pain control  Please note that the APS provides consultative services regarding pain management only  With the exception of ketamine and epidural infusions and except when indicated, final decisions regarding starting or changing doses of analgesic medications are at the discretion of the consulting service  Off hours consultation and/or medication management is generally not available      Robert Case MD  Acute Pain Service

## 2021-04-27 NOTE — CONSULTS
APS consult completed  Please see notes for details of encounter      ANDRES Russell  Acute Pain Service

## 2021-04-27 NOTE — PLAN OF CARE
Problem: OCCUPATIONAL THERAPY ADULT  Goal: Performs self-care activities at highest level of function for planned discharge setting  See evaluation for individualized goals  Description: Treatment Interventions: ADL retraining, Functional transfer training, UE strengthening/ROM, Endurance training, Cognitive reorientation, Patient/family training, Equipment evaluation/education, Compensatory technique education, Continued evaluation, Energy conservation, Activityengagement          See flowsheet documentation for full assessment, interventions and recommendations  Note: Limitation: Decreased ADL status, Decreased endurance, Decreased self-care trans, Decreased high-level ADLs  Prognosis: Fair  Assessment: Pt is a 71 y/o female seen for OT eval s/p adm to [facility] w/ intra/extrahepatic biliary dilatations w/o mass  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  Pt is s/p whipple procedure 4/26  Pt  has no past medical history on file  Pt with active OT orders and up with assistance  orders  Pt lives with her son in 1 floor apt w/ 3 LIANG  Pt was I w/  ADLS and IADLS, drove, & required no use of DME PTA  Pt is currently demonstrating the following occupational deficits: Min A UB ADLS, Mod A LB ADLS, Min A bed mobility, Min A transfers and functional mobility w/ HHA  These deficits that are impacting pt's baseline areas of occupation are a result of the following impairments: pain, endurance, activity tolerance, functional mobility, forward functional reach, functional standing tolerance and decreased I w/ ADLS/IADLS  The following Occupational Performance Areas to address include: grooming, bathing/shower, toilet hygiene, dressing, functional mobility, clothing management and household maintenance  Based on the aforementioned OT evaluation, functional performance deficits, and assessments, pt has been identified as a high complexity evaluation  Recommend home OT upon D/C, when medically stable   The patient's raw score on the AM-PAC Daily Activity inpatient short form is 19, standardized score is 40 22, greater than 39 4  Patients at this level are likely to benefit from discharge to home  Please refer to the recommendation of the Occupational Therapist for safe discharge planning    Pt to continue to benefit from acute immediate OT services to address the following goals 3-5x/week to  w/in 10-14 days:      OT Discharge Recommendation: Home with home health rehabilitation(Home OT)  OT - OK to Discharge: Yes(when medically stable)     Giovanni Marcano MS, OTR/L

## 2021-04-27 NOTE — CASE MANAGEMENT
Pt not available, CM called emergency contact sudhakar Muñoz cm program/cm role: Info obtained from regino Bloom [phone]  Verify Address: [address] [place] [address]  LOS: 1 day  Bundle:  No        Unplanned Readmission Color: Green  30 Day Readmission: No                            Resides with: Son                     Type Home:  Apartment                  LIANG: 2 to porch 8 to main floor  Do they have rails? yes  Bedroom Located:  Main floor           Do you have a bathroom set up on the same floor yes  Primary Caregiver: IPTA  ADLs/Ambulations IPTA no device  Drive:  yes    PCP: Ulices Smith        Preferred Pharmacy: [facility]   Mojave star for dc meds: Y/N  Afford Medication: Yes  HHC: No Hx  DME: no hx  IP Rehab: no hx  Did you ever receive Dialysis: Denies          MH Illness: Denies meds? n/a  IP/OP Care n/a  Drug Abuse: Denies  Alcohol Abuse: Denies  Employed: yes  Primary Contact: Carina lacy [phone]  POA:No   If No would you like paperwork? No  LW No  Transportation Home: family to transport home    Await PT/OT recommendations  CM department to follow up    CM reviewed d/c planning process including the following: identifying help at home, patient preference for d/c planning needs, Discharge Lounge, [facility] Meds to Bed program, availability of treatment team to discuss questions or concerns patient and/or family may have regarding understanding medications and recognizing signs and symptoms once discharged  CM also encouraged patient to follow up with all recommended appointments after discharge  Patient advised of importance for patient and family to participate in managing patients medical well being  Patient/caregiver received discharge checklist   Content reviewed  Patient/caregiver encouraged to participate in discharge plan of care prior to discharge home

## 2021-04-28 LAB
ALBUMIN SERPL BCP-MCNC: 2.1 G/DL (ref 3.5–5)
ALBUMIN SERPL BCP-MCNC: 2.4 G/DL (ref 3.5–5)
ALP SERPL-CCNC: 621 U/L (ref 46–116)
ALP SERPL-CCNC: 632 U/L (ref 46–116)
ALT SERPL W P-5'-P-CCNC: 243 U/L (ref 12–78)
ALT SERPL W P-5'-P-CCNC: 249 U/L (ref 12–78)
ANION GAP SERPL CALCULATED.3IONS-SCNC: 5 MMOL/L (ref 4–13)
ANION GAP SERPL CALCULATED.3IONS-SCNC: 7 MMOL/L (ref 4–13)
AST SERPL W P-5'-P-CCNC: 106 U/L (ref 5–45)
AST SERPL W P-5'-P-CCNC: 117 U/L (ref 5–45)
BASOPHILS # BLD AUTO: 0.03 THOUSANDS/ΜL (ref 0–0.1)
BASOPHILS # BLD AUTO: 0.03 THOUSANDS/ΜL (ref 0–0.1)
BASOPHILS NFR BLD AUTO: 0 % (ref 0–1)
BASOPHILS NFR BLD AUTO: 0 % (ref 0–1)
BILIRUB SERPL-MCNC: 2.03 MG/DL (ref 0.2–1)
BILIRUB SERPL-MCNC: 2.09 MG/DL (ref 0.2–1)
BUN SERPL-MCNC: 6 MG/DL (ref 5–25)
BUN SERPL-MCNC: 6 MG/DL (ref 5–25)
CALCIUM ALBUM COR SERPL-MCNC: 10.1 MG/DL (ref 8.3–10.1)
CALCIUM ALBUM COR SERPL-MCNC: 10.2 MG/DL (ref 8.3–10.1)
CALCIUM SERPL-MCNC: 8.6 MG/DL (ref 8.3–10.1)
CALCIUM SERPL-MCNC: 8.9 MG/DL (ref 8.3–10.1)
CHLORIDE SERPL-SCNC: 97 MMOL/L (ref 100–108)
CHLORIDE SERPL-SCNC: 97 MMOL/L (ref 100–108)
CO2 SERPL-SCNC: 23 MMOL/L (ref 21–32)
CO2 SERPL-SCNC: 30 MMOL/L (ref 21–32)
CREAT SERPL-MCNC: 0.4 MG/DL (ref 0.6–1.3)
CREAT SERPL-MCNC: 0.42 MG/DL (ref 0.6–1.3)
EOSINOPHIL # BLD AUTO: 0.04 THOUSAND/ΜL (ref 0–0.61)
EOSINOPHIL # BLD AUTO: 0.05 THOUSAND/ΜL (ref 0–0.61)
EOSINOPHIL NFR BLD AUTO: 0 % (ref 0–6)
EOSINOPHIL NFR BLD AUTO: 0 % (ref 0–6)
ERYTHROCYTE [DISTWIDTH] IN BLOOD BY AUTOMATED COUNT: 16.5 % (ref 11.6–15.1)
ERYTHROCYTE [DISTWIDTH] IN BLOOD BY AUTOMATED COUNT: 16.9 % (ref 11.6–15.1)
GFR SERPL CREATININE-BSD FRML MDRD: 108 ML/MIN/1.73SQ M
GFR SERPL CREATININE-BSD FRML MDRD: 110 ML/MIN/1.73SQ M
GLUCOSE SERPL-MCNC: 140 MG/DL (ref 65–140)
GLUCOSE SERPL-MCNC: 147 MG/DL (ref 65–140)
HCT VFR BLD AUTO: 35.6 % (ref 34.8–46.1)
HCT VFR BLD AUTO: 37.1 % (ref 34.8–46.1)
HGB BLD-MCNC: 12.1 G/DL (ref 11.5–15.4)
HGB BLD-MCNC: 12.2 G/DL (ref 11.5–15.4)
IMM GRANULOCYTES # BLD AUTO: 0.08 THOUSAND/UL (ref 0–0.2)
IMM GRANULOCYTES # BLD AUTO: 0.09 THOUSAND/UL (ref 0–0.2)
IMM GRANULOCYTES NFR BLD AUTO: 1 % (ref 0–2)
IMM GRANULOCYTES NFR BLD AUTO: 1 % (ref 0–2)
LYMPHOCYTES # BLD AUTO: 1.03 THOUSANDS/ΜL (ref 0.6–4.47)
LYMPHOCYTES # BLD AUTO: 1.06 THOUSANDS/ΜL (ref 0.6–4.47)
LYMPHOCYTES NFR BLD AUTO: 6 % (ref 14–44)
LYMPHOCYTES NFR BLD AUTO: 6 % (ref 14–44)
MAGNESIUM SERPL-MCNC: 2.4 MG/DL (ref 1.6–2.6)
MAGNESIUM SERPL-MCNC: 2.5 MG/DL (ref 1.6–2.6)
MCH RBC QN AUTO: 30.5 PG (ref 26.8–34.3)
MCH RBC QN AUTO: 30.6 PG (ref 26.8–34.3)
MCHC RBC AUTO-ENTMCNC: 32.9 G/DL (ref 31.4–37.4)
MCHC RBC AUTO-ENTMCNC: 34 G/DL (ref 31.4–37.4)
MCV RBC AUTO: 90 FL (ref 82–98)
MCV RBC AUTO: 93 FL (ref 82–98)
MONOCYTES # BLD AUTO: 1.18 THOUSAND/ΜL (ref 0.17–1.22)
MONOCYTES # BLD AUTO: 1.34 THOUSAND/ΜL (ref 0.17–1.22)
MONOCYTES NFR BLD AUTO: 7 % (ref 4–12)
MONOCYTES NFR BLD AUTO: 7 % (ref 4–12)
NEUTROPHILS # BLD AUTO: 14.91 THOUSANDS/ΜL (ref 1.85–7.62)
NEUTROPHILS # BLD AUTO: 16.37 THOUSANDS/ΜL (ref 1.85–7.62)
NEUTS SEG NFR BLD AUTO: 86 % (ref 43–75)
NEUTS SEG NFR BLD AUTO: 86 % (ref 43–75)
NRBC BLD AUTO-RTO: 0 /100 WBCS
NRBC BLD AUTO-RTO: 0 /100 WBCS
PHOSPHATE SERPL-MCNC: 1.5 MG/DL (ref 2.3–4.1)
PHOSPHATE SERPL-MCNC: 1.7 MG/DL (ref 2.3–4.1)
PLATELET # BLD AUTO: 453 THOUSANDS/UL (ref 149–390)
PLATELET # BLD AUTO: 469 THOUSANDS/UL (ref 149–390)
PMV BLD AUTO: 10.5 FL (ref 8.9–12.7)
PMV BLD AUTO: 11.8 FL (ref 8.9–12.7)
POTASSIUM SERPL-SCNC: 3.7 MMOL/L (ref 3.5–5.3)
POTASSIUM SERPL-SCNC: 3.8 MMOL/L (ref 3.5–5.3)
PROT SERPL-MCNC: 7 G/DL (ref 6.4–8.2)
PROT SERPL-MCNC: 7.1 G/DL (ref 6.4–8.2)
RBC # BLD AUTO: 3.95 MILLION/UL (ref 3.81–5.12)
RBC # BLD AUTO: 4 MILLION/UL (ref 3.81–5.12)
SODIUM SERPL-SCNC: 127 MMOL/L (ref 136–145)
SODIUM SERPL-SCNC: 132 MMOL/L (ref 136–145)
WBC # BLD AUTO: 17.27 THOUSAND/UL (ref 4.31–10.16)
WBC # BLD AUTO: 18.94 THOUSAND/UL (ref 4.31–10.16)

## 2021-04-28 PROCEDURE — 84100 ASSAY OF PHOSPHORUS: CPT | Performed by: PHYSICIAN ASSISTANT

## 2021-04-28 PROCEDURE — 84100 ASSAY OF PHOSPHORUS: CPT | Performed by: SURGERY

## 2021-04-28 PROCEDURE — 80053 COMPREHEN METABOLIC PANEL: CPT | Performed by: PHYSICIAN ASSISTANT

## 2021-04-28 PROCEDURE — 83735 ASSAY OF MAGNESIUM: CPT | Performed by: PHYSICIAN ASSISTANT

## 2021-04-28 PROCEDURE — 83735 ASSAY OF MAGNESIUM: CPT | Performed by: SURGERY

## 2021-04-28 PROCEDURE — 85025 COMPLETE CBC W/AUTO DIFF WBC: CPT | Performed by: SURGERY

## 2021-04-28 PROCEDURE — C9113 INJ PANTOPRAZOLE SODIUM, VIA: HCPCS | Performed by: SURGERY

## 2021-04-28 PROCEDURE — 80053 COMPREHEN METABOLIC PANEL: CPT | Performed by: SURGERY

## 2021-04-28 PROCEDURE — 99024 POSTOP FOLLOW-UP VISIT: CPT | Performed by: SURGERY

## 2021-04-28 PROCEDURE — 85025 COMPLETE CBC W/AUTO DIFF WBC: CPT | Performed by: PHYSICIAN ASSISTANT

## 2021-04-28 RX ORDER — DEXTROSE, SODIUM CHLORIDE, AND POTASSIUM CHLORIDE 5; .9; .15 G/100ML; G/100ML; G/100ML
60 INJECTION INTRAVENOUS CONTINUOUS
Status: DISCONTINUED | OUTPATIENT
Start: 2021-04-28 | End: 2021-04-30

## 2021-04-28 RX ORDER — POTASSIUM CHLORIDE 14.9 MG/ML
20 INJECTION INTRAVENOUS ONCE
Status: COMPLETED | OUTPATIENT
Start: 2021-04-28 | End: 2021-04-29

## 2021-04-28 RX ORDER — METOCLOPRAMIDE HYDROCHLORIDE 5 MG/ML
10 INJECTION INTRAMUSCULAR; INTRAVENOUS EVERY 6 HOURS SCHEDULED
Status: DISCONTINUED | OUTPATIENT
Start: 2021-04-28 | End: 2021-05-03 | Stop reason: HOSPADM

## 2021-04-28 RX ADMIN — METOCLOPRAMIDE HYDROCHLORIDE 10 MG: 5 INJECTION INTRAMUSCULAR; INTRAVENOUS at 11:31

## 2021-04-28 RX ADMIN — METOCLOPRAMIDE HYDROCHLORIDE 10 MG: 5 INJECTION INTRAMUSCULAR; INTRAVENOUS at 23:26

## 2021-04-28 RX ADMIN — METOCLOPRAMIDE HYDROCHLORIDE 10 MG: 5 INJECTION INTRAMUSCULAR; INTRAVENOUS at 17:39

## 2021-04-28 RX ADMIN — HYDROMORPHONE HYDROCHLORIDE 0.5 MG: 1 INJECTION, SOLUTION INTRAMUSCULAR; INTRAVENOUS; SUBCUTANEOUS at 09:58

## 2021-04-28 RX ADMIN — DEXTROSE, SODIUM CHLORIDE, AND POTASSIUM CHLORIDE 100 ML/HR: 5; .45; .15 INJECTION INTRAVENOUS at 03:31

## 2021-04-28 RX ADMIN — POTASSIUM CHLORIDE 20 MEQ: 14.9 INJECTION, SOLUTION INTRAVENOUS at 10:15

## 2021-04-28 RX ADMIN — HEPARIN SODIUM 5000 UNITS: 5000 INJECTION INTRAVENOUS; SUBCUTANEOUS at 05:01

## 2021-04-28 RX ADMIN — HYDROMORPHONE HYDROCHLORIDE 0.5 MG: 1 INJECTION, SOLUTION INTRAMUSCULAR; INTRAVENOUS; SUBCUTANEOUS at 20:00

## 2021-04-28 RX ADMIN — HYDROMORPHONE HYDROCHLORIDE 0.5 MG: 1 INJECTION, SOLUTION INTRAMUSCULAR; INTRAVENOUS; SUBCUTANEOUS at 04:56

## 2021-04-28 RX ADMIN — HYDROMORPHONE HYDROCHLORIDE 0.5 MG: 1 INJECTION, SOLUTION INTRAMUSCULAR; INTRAVENOUS; SUBCUTANEOUS at 17:39

## 2021-04-28 RX ADMIN — HYDROMORPHONE HYDROCHLORIDE 0.5 MG: 1 INJECTION, SOLUTION INTRAMUSCULAR; INTRAVENOUS; SUBCUTANEOUS at 13:47

## 2021-04-28 RX ADMIN — FENTANYL CITRATE: 50 INJECTION INTRAMUSCULAR; INTRAVENOUS at 14:37

## 2021-04-28 RX ADMIN — DEXTROSE, SODIUM CHLORIDE, AND POTASSIUM CHLORIDE 75 ML/HR: 5; .9; .15 INJECTION INTRAVENOUS at 23:28

## 2021-04-28 RX ADMIN — METOCLOPRAMIDE HYDROCHLORIDE 5 MG: 5 INJECTION INTRAMUSCULAR; INTRAVENOUS at 04:57

## 2021-04-28 RX ADMIN — PANTOPRAZOLE SODIUM 40 MG: 40 INJECTION, POWDER, FOR SOLUTION INTRAVENOUS at 10:02

## 2021-04-28 RX ADMIN — HEPARIN SODIUM 5000 UNITS: 5000 INJECTION INTRAVENOUS; SUBCUTANEOUS at 13:43

## 2021-04-28 RX ADMIN — DEXTROSE, SODIUM CHLORIDE, AND POTASSIUM CHLORIDE 84 ML/HR: 5; .9; .15 INJECTION INTRAVENOUS at 10:21

## 2021-04-28 RX ADMIN — HEPARIN SODIUM 5000 UNITS: 5000 INJECTION INTRAVENOUS; SUBCUTANEOUS at 22:06

## 2021-04-28 RX ADMIN — POTASSIUM PHOSPHATE, MONOBASIC AND POTASSIUM PHOSPHATE, DIBASIC 21 MMOL: 224; 236 INJECTION, SOLUTION, CONCENTRATE INTRAVENOUS at 13:41

## 2021-04-28 RX ADMIN — CHLORHEXIDINE GLUCONATE 15 ML: 1.2 SOLUTION ORAL at 10:02

## 2021-04-28 NOTE — PROGRESS NOTES
Progress Note - Oncology Surgery   Darol Cover 72 y o  female MRN: [id number]  Unit/Bed#: [facility] 832-01 Encounter: [id number]    Assessment:  71 yo F s/p pylorus preserving pancreaticoduodenectomy     NG 50ml brown  Irlanda 160ml ss     Plan:  · NPO/NG  · IVF  · F/u APS for pain management  · Ambulation/IS      Subjective/Objective     Subjective:   Complains pain but better compared to yesterday, passed some gas but no BM    Objective:    Blood pressure 166/92, pulse 69, temperature 99 5 °F (37 5 °C), resp  rate 17, height 5' 2" (1 575 m), weight 58 kg (127 lb 12 8 oz), SpO2 93 %  ,Body mass index is 23 37 kg/m²        Intake/Output Summary (Last 24 hours) at 4/28/2021 0622  Last data filed at 4/28/2021 7575  Gross per 24 hour   Intake 2624 54 ml   Output 2760 ml   Net -135 46 ml       Invasive Devices     Peripheral Intravenous Line            Peripheral IV 04/26/21 Left Forearm 1 day    Peripheral IV 04/26/21 Right Antecubital 1 day          Epidural Line            Epidural Catheter 04/26/21 1 day          Drain            NG/OG/Enteral Tube Nasogastric Left mouth 2 days    Closed/Suction Drain Right Abdomen Bulb 19 Fr  1 day    Urethral Catheter Latex;Straight-tip 16 Fr  1 day                Physical Exam:   Gen:  NAD  CV:  warm, well-perfused  Lungs: nl effort  Abd:  soft, ND, appropriate tender, incision cdi, irlanda drain in place  Ext:  no CCE  Neuro: A&Ox3     Results from last 7 days   Lab Units 04/27/21  0451 04/26/21  1659 04/26/21  1516 04/26/21  1241   WBC Thousand/uL 14 77*  --  11 83*  --    HEMOGLOBIN g/dL 11 0*  --  10 9*  --    I STAT HEMOGLOBIN g/dl  --   --   --  9 9*   HEMATOCRIT % 32 7*  --  32 5*  --    HEMATOCRIT, ISTAT %  --   --   --  29*   PLATELETS Thousands/uL 432* 423* 429*  --      Results from last 7 days   Lab Units 04/27/21  0451 04/26/21  1516 04/26/21  1241   POTASSIUM mmol/L 3 5 3 8  --    CHLORIDE mmol/L 103 112*  --    CO2 mmol/L 25 21  --    CO2, I-STAT mmol/L  --   --  23 BUN mg/dL 12 15  --    CREATININE mg/dL 0 51* 0 63  --    GLUCOSE, ISTAT mg/dl  --   --  135   CALCIUM mg/dL 8 8 8 5  --

## 2021-04-28 NOTE — PROGRESS NOTES
Epidural Follow-up Note - Acute Pain Service    Morenita Brown 72 y o  female MRN: [id number]  Unit/Bed#: [facility] 832-01 Encounter: [id number]      Assessment:   Principal Problem:    Neoplasm of ampulla of Vater      Morenita Brown is a 72y o  year old female POD2 s/p pancreaticoduodenectomy  Pain doing better from pain standpoint but is still having to use tea breakthrough dilaudid for diffuse visceral pain  Will increase rate of pt's epidural today  Encouraged pt to use her PCEA over IV dilaudid to promote early return of bowel function    She is still NPO but her NGT was removed this am   She has some nausea, no vomiting,  Flatus, no BM    Plan:  Analgesia:  - Increase Thoracic epidural infusion of Ropivacaine 0 1% with fentanyl 2 mcg/mL to 10/4/15/3  - Continue Dilaudid 0 5 mg IV q2hr PRN for breakthrough pain  - Anticipate epidural removal and transition to primarily PO regimen 2-3days    Bowel Regimen:  - Bowel regimen when appropriate from surgical perspective    APS will continue to follow  Please call  / [phone] or [employer] Acute Pain [facility] (/ between [phone] and on weekends) with questions or concerns    Pain History  Current pain location(s): abdomen  Pain Scale:   5/10  Quality: aching, dull  24 hour history: as above       PCEA use: 45/74  Opioid requirement previous 24 hours: 2mg IV dilaudid    Meds/Allergies   current meds:   Current Facility-Administered Medications   Medication Dose Route Frequency    dextrose 5 % and sodium chloride 0 9 % with KCl 20 mEq/L infusion (premix)  75 mL/hr Intravenous Continuous    heparin (porcine) subcutaneous injection 5,000 Units  5,000 Units Subcutaneous Q8H [address]    HYDROmorphone (DILAUDID) injection 0 5 mg  0 5 mg Intravenous Q2H PRN    Labetalol HCl (NORMODYNE) injection 10 mg  10 mg Intravenous Q4H PRN    metoclopramide (REGLAN) injection 10 mg  10 mg Intravenous Q6H [address]    pantoprazole (PROTONIX) injection 40 mg  40 mg Intravenous Q24H [address]    phenol (CHLORASEPTIC) 1 4 % mucosal liquid 1 spray  1 spray Mouth/Throat Q2H PRN    potassium chloride 20 mEq IVPB (premix)  20 mEq Intravenous Once    potassium phosphates 21 mmol in sodium chloride 0 9 % 250 mL infusion  21 mmol Intravenous Once    ropivacaine 0 1% and fentaNYL 2 mcg/mL PCEA   Epidural Continuous    saliva substitute (MOUTH KOTE) mucosal solution 5 spray  5 spray Mouth/Throat 4x Daily PRN       Allergies   Allergen Reactions    Penicillins     Tetracycline        Objective     Temp:  [98 1 °F (36 7 °C)-99 5 °F (37 5 °C)] 98 3 °F (36 8 °C)  HR:  [58-71] 71  Resp:  [14-31] 18  BP: (140-169)/(76-93) 140/89    Physical Exam  Vitals signs reviewed  HENT:      Head: Normocephalic  Mouth/Throat:      Mouth: Mucous membranes are moist    Cardiovascular:      Rate and Rhythm: Normal rate  Pulmonary:      Effort: Pulmonary effort is normal    Abdominal:      General: Abdomen is flat  Tenderness: There is abdominal tenderness  Musculoskeletal: Normal range of motion  Comments: Distal motor and sensory intact   Skin:     General: Skin is warm and dry  Neurological:      General: No focal deficit present  Mental Status: She is alert and oriented to person, place, and time     Psychiatric:         Mood and Affect: Mood normal          Behavior: Behavior normal        Epidural: Site clean/dry/intact, no surrounding erythema/edema/induration, infusion functioning appropriately    Lab Results:   Results from last 7 days   Lab Units 04/28/21  0831   WBC Thousand/uL 18 94*   HEMOGLOBIN g/dL 12 1   HEMATOCRIT % 35 6   PLATELETS Thousands/uL 469*      Results from last 7 days   Lab Units 04/28/21  0831  04/26/21  1241   POTASSIUM mmol/L 3 7   < >  --    CHLORIDE mmol/L 97*   < >  --    CO2 mmol/L 30   < >  --    CO2, I-STAT mmol/L  --   --  23   BUN mg/dL 6   < >  --    CREATININE mg/dL 0 42*   < >  --    CALCIUM mg/dL 8 9   < >  --    ALK PHOS U/L 632*   < > --    ALT U/L 243*   < >  --    AST U/L 106*   < >  --    GLUCOSE, ISTAT mg/dl  --   --  135    < > = values in this interval not displayed  Imaging Studies: I have personally reviewed pertinent reports  EKG, Pathology, and Other Studies: I have personally reviewed pertinent reports  Counseling / Coordination of Care  Total floor / unit time spent today 20 minutes  Greater than 50% of total time was spent with the patient and / or family counseling and / or coordination of care  A description of the counseling / coordination of care: chart review, pt exam, epidural management, discussion of epidural regimen with pt, discussion of eventual transition to PO/IV pain regimen, communication with pt teams    Please note that the APS provides consultative services regarding pain management only  With the exception of ketamine, peripheral nerve catheters, and epidural infusions (and except when indicated), final decisions regarding starting or changing doses of analgesic medications are at the discretion of the consulting service  Off hours consultation and/or medication management is generally not available      Tiara Henderson MD  Acute Pain Service

## 2021-04-29 LAB
ANION GAP SERPL CALCULATED.3IONS-SCNC: 4 MMOL/L (ref 4–13)
BASOPHILS # BLD AUTO: 0.03 THOUSANDS/ΜL (ref 0–0.1)
BASOPHILS NFR BLD AUTO: 0 % (ref 0–1)
BUN SERPL-MCNC: 6 MG/DL (ref 5–25)
CALCIUM SERPL-MCNC: 9.1 MG/DL (ref 8.3–10.1)
CHLORIDE SERPL-SCNC: 100 MMOL/L (ref 100–108)
CO2 SERPL-SCNC: 29 MMOL/L (ref 21–32)
CREAT SERPL-MCNC: 0.39 MG/DL (ref 0.6–1.3)
EOSINOPHIL # BLD AUTO: 0.08 THOUSAND/ΜL (ref 0–0.61)
EOSINOPHIL NFR BLD AUTO: 1 % (ref 0–6)
ERYTHROCYTE [DISTWIDTH] IN BLOOD BY AUTOMATED COUNT: 15.8 % (ref 11.6–15.1)
GFR SERPL CREATININE-BSD FRML MDRD: 111 ML/MIN/1.73SQ M
GLUCOSE SERPL-MCNC: 143 MG/DL (ref 65–140)
HCT VFR BLD AUTO: 35.8 % (ref 34.8–46.1)
HGB BLD-MCNC: 11.5 G/DL (ref 11.5–15.4)
IMM GRANULOCYTES # BLD AUTO: 0.1 THOUSAND/UL (ref 0–0.2)
IMM GRANULOCYTES NFR BLD AUTO: 1 % (ref 0–2)
LYMPHOCYTES # BLD AUTO: 1.06 THOUSANDS/ΜL (ref 0.6–4.47)
LYMPHOCYTES NFR BLD AUTO: 6 % (ref 14–44)
MAGNESIUM SERPL-MCNC: 2.4 MG/DL (ref 1.6–2.6)
MCH RBC QN AUTO: 29.7 PG (ref 26.8–34.3)
MCHC RBC AUTO-ENTMCNC: 32.1 G/DL (ref 31.4–37.4)
MCV RBC AUTO: 93 FL (ref 82–98)
MONOCYTES # BLD AUTO: 1.13 THOUSAND/ΜL (ref 0.17–1.22)
MONOCYTES NFR BLD AUTO: 6 % (ref 4–12)
NEUTROPHILS # BLD AUTO: 15.32 THOUSANDS/ΜL (ref 1.85–7.62)
NEUTS SEG NFR BLD AUTO: 86 % (ref 43–75)
NRBC BLD AUTO-RTO: 0 /100 WBCS
PHOSPHATE SERPL-MCNC: 2.5 MG/DL (ref 2.3–4.1)
PLATELET # BLD AUTO: 443 THOUSANDS/UL (ref 149–390)
PMV BLD AUTO: 11.5 FL (ref 8.9–12.7)
POTASSIUM SERPL-SCNC: 3.4 MMOL/L (ref 3.5–5.3)
RBC # BLD AUTO: 3.87 MILLION/UL (ref 3.81–5.12)
SODIUM SERPL-SCNC: 133 MMOL/L (ref 136–145)
WBC # BLD AUTO: 17.72 THOUSAND/UL (ref 4.31–10.16)

## 2021-04-29 PROCEDURE — 99232 SBSQ HOSP IP/OBS MODERATE 35: CPT | Performed by: ANESTHESIOLOGY

## 2021-04-29 PROCEDURE — 85025 COMPLETE CBC W/AUTO DIFF WBC: CPT | Performed by: SURGERY

## 2021-04-29 PROCEDURE — 99024 POSTOP FOLLOW-UP VISIT: CPT | Performed by: SURGERY

## 2021-04-29 PROCEDURE — 80048 BASIC METABOLIC PNL TOTAL CA: CPT | Performed by: SURGERY

## 2021-04-29 PROCEDURE — C9113 INJ PANTOPRAZOLE SODIUM, VIA: HCPCS | Performed by: SURGERY

## 2021-04-29 PROCEDURE — 84100 ASSAY OF PHOSPHORUS: CPT | Performed by: SURGERY

## 2021-04-29 PROCEDURE — 83735 ASSAY OF MAGNESIUM: CPT | Performed by: SURGERY

## 2021-04-29 RX ORDER — POTASSIUM CHLORIDE 14.9 MG/ML
20 INJECTION INTRAVENOUS 2 TIMES DAILY
Status: COMPLETED | OUTPATIENT
Start: 2021-04-29 | End: 2021-04-30

## 2021-04-29 RX ADMIN — DEXTROSE, SODIUM CHLORIDE, AND POTASSIUM CHLORIDE 60 ML/HR: 5; .9; .15 INJECTION INTRAVENOUS at 13:47

## 2021-04-29 RX ADMIN — HYDROMORPHONE HYDROCHLORIDE 0.5 MG: 1 INJECTION, SOLUTION INTRAMUSCULAR; INTRAVENOUS; SUBCUTANEOUS at 13:47

## 2021-04-29 RX ADMIN — HYDROMORPHONE HYDROCHLORIDE 0.5 MG: 1 INJECTION, SOLUTION INTRAMUSCULAR; INTRAVENOUS; SUBCUTANEOUS at 01:06

## 2021-04-29 RX ADMIN — METOCLOPRAMIDE HYDROCHLORIDE 10 MG: 5 INJECTION INTRAMUSCULAR; INTRAVENOUS at 11:28

## 2021-04-29 RX ADMIN — PANTOPRAZOLE SODIUM 40 MG: 40 INJECTION, POWDER, FOR SOLUTION INTRAVENOUS at 08:29

## 2021-04-29 RX ADMIN — FENTANYL CITRATE: 50 INJECTION INTRAMUSCULAR; INTRAVENOUS at 23:32

## 2021-04-29 RX ADMIN — METOCLOPRAMIDE HYDROCHLORIDE 10 MG: 5 INJECTION INTRAMUSCULAR; INTRAVENOUS at 23:35

## 2021-04-29 RX ADMIN — HYDROMORPHONE HYDROCHLORIDE 0.5 MG: 1 INJECTION, SOLUTION INTRAMUSCULAR; INTRAVENOUS; SUBCUTANEOUS at 17:56

## 2021-04-29 RX ADMIN — POTASSIUM CHLORIDE 20 MEQ: 14.9 INJECTION, SOLUTION INTRAVENOUS at 08:30

## 2021-04-29 RX ADMIN — POTASSIUM CHLORIDE 20 MEQ: 14.9 INJECTION, SOLUTION INTRAVENOUS at 21:11

## 2021-04-29 RX ADMIN — HYDROMORPHONE HYDROCHLORIDE 0.5 MG: 1 INJECTION, SOLUTION INTRAMUSCULAR; INTRAVENOUS; SUBCUTANEOUS at 08:29

## 2021-04-29 RX ADMIN — METOCLOPRAMIDE HYDROCHLORIDE 10 MG: 5 INJECTION INTRAMUSCULAR; INTRAVENOUS at 17:07

## 2021-04-29 RX ADMIN — HEPARIN SODIUM 5000 UNITS: 5000 INJECTION INTRAVENOUS; SUBCUTANEOUS at 21:10

## 2021-04-29 RX ADMIN — HYDROMORPHONE HYDROCHLORIDE 0.5 MG: 1 INJECTION, SOLUTION INTRAMUSCULAR; INTRAVENOUS; SUBCUTANEOUS at 15:56

## 2021-04-29 RX ADMIN — HYDROMORPHONE HYDROCHLORIDE 0.5 MG: 1 INJECTION, SOLUTION INTRAMUSCULAR; INTRAVENOUS; SUBCUTANEOUS at 19:57

## 2021-04-29 RX ADMIN — METOCLOPRAMIDE HYDROCHLORIDE 10 MG: 5 INJECTION INTRAMUSCULAR; INTRAVENOUS at 05:16

## 2021-04-29 RX ADMIN — FENTANYL CITRATE: 50 INJECTION INTRAMUSCULAR; INTRAVENOUS at 08:24

## 2021-04-29 RX ADMIN — HEPARIN SODIUM 5000 UNITS: 5000 INJECTION INTRAVENOUS; SUBCUTANEOUS at 13:46

## 2021-04-29 RX ADMIN — HYDROMORPHONE HYDROCHLORIDE 0.5 MG: 1 INJECTION, SOLUTION INTRAMUSCULAR; INTRAVENOUS; SUBCUTANEOUS at 03:06

## 2021-04-29 RX ADMIN — HYDROMORPHONE HYDROCHLORIDE 0.5 MG: 1 INJECTION, SOLUTION INTRAMUSCULAR; INTRAVENOUS; SUBCUTANEOUS at 05:16

## 2021-04-29 RX ADMIN — HEPARIN SODIUM 5000 UNITS: 5000 INJECTION INTRAVENOUS; SUBCUTANEOUS at 05:16

## 2021-04-29 RX ADMIN — HYDROMORPHONE HYDROCHLORIDE 0.5 MG: 1 INJECTION, SOLUTION INTRAMUSCULAR; INTRAVENOUS; SUBCUTANEOUS at 22:15

## 2021-04-29 RX ADMIN — HYDROMORPHONE HYDROCHLORIDE 0.5 MG: 1 INJECTION, SOLUTION INTRAMUSCULAR; INTRAVENOUS; SUBCUTANEOUS at 11:28

## 2021-04-29 RX ADMIN — LABETALOL 20 MG/4 ML (5 MG/ML) INTRAVENOUS SYRINGE 10 MG: at 08:29

## 2021-04-29 NOTE — PROGRESS NOTES
Epidural Follow-up Note - Acute Pain Service    Richy Frias 72 y o  female MRN: [id number]  Unit/Bed#: [facility] 832-01 Encounter: [id number]      Assessment:   Principal Problem:    Neoplasm of ampulla of Vater      Richy Frias is a 72y o  year old female POD #3 S/P Whipple  She has an epidural in place which is working well to control her pain  Plan:  Analgesia:  - Continue Thoracic epidural infusion of Ropivacaine 0 1% with fentanyl 2 mcg/mL at 10/4/15/3  - Continue Dilaudid 0 5 mg IV q2hr PRN for breakthrough pain  - Anticipate epidural removal and transition to primarily PO regimen 2-3 days    Bowel Regimen:  - Bowel regimen when appropriate from surgical perspective    APS will continue to follow   Please call [phone] / [phone] or [facility] [facility] (/ between [phone] and on weekends) with questions or concerns    Pain History  Current pain location(s): abdomen  Pain Scale:   5/10  Quality: crampy  24 hour history: well controlled    PCEA use: 5/5  Opioid requirement previous 24 hours: 3 0 mg dilaudid    Meds/Allergies   current meds:   Current Facility-Administered Medications   Medication Dose Route Frequency    dextrose 5 % and sodium chloride 0 9 % with KCl 20 mEq/L infusion (premix)  60 mL/hr Intravenous Continuous    heparin (porcine) subcutaneous injection 5,000 Units  5,000 Units Subcutaneous Q8H [address]    HYDROmorphone (DILAUDID) injection 0 5 mg  0 5 mg Intravenous Q2H PRN    Labetalol HCl (NORMODYNE) injection 10 mg  10 mg Intravenous Q4H PRN    metoclopramide (REGLAN) injection 10 mg  10 mg Intravenous Q6H [address]    pantoprazole (PROTONIX) injection 40 mg  40 mg Intravenous Q24H [address]    phenol (CHLORASEPTIC) 1 4 % mucosal liquid 1 spray  1 spray Mouth/Throat Q2H PRN    potassium chloride 20 mEq IVPB (premix)  20 mEq Intravenous BID    ropivacaine 0 1% and fentaNYL 2 mcg/mL PCEA   Epidural Continuous    saliva substitute (MOUTH KOTE) mucosal solution 5 spray  5 spray Mouth/Throat 4x Daily PRN       Allergies   Allergen Reactions    Penicillins     Tetracycline        Objective     Temp:  [98 5 °F (36 9 °C)-99 8 °F (37 7 °C)] 98 7 °F (37 1 °C)  HR:  [68-85] 68  Resp:  [16-20] 16  BP: (145-189)/() 164/93    Physical Exam  Vitals signs reviewed  Constitutional:       General: She is not in acute distress  Appearance: Normal appearance  HENT:      Head: Normocephalic  Eyes:      Pupils: Pupils are equal, round, and reactive to light  Neck:      Musculoskeletal: Normal range of motion  Cardiovascular:      Rate and Rhythm: Normal rate and regular rhythm  Pulses: Normal pulses  Heart sounds: Normal heart sounds  Pulmonary:      Effort: Pulmonary effort is normal       Breath sounds: Normal breath sounds  Musculoskeletal: Normal range of motion  Neurological:      General: No focal deficit present  Mental Status: She is alert and oriented to person, place, and time  Psychiatric:         Mood and Affect: Mood normal          Behavior: Behavior normal        Epidural: Site clean/dry/intact, no surrounding erythema/edema/induration, infusion functioning appropriately    Lab Results:   Results from last 7 days   Lab Units 04/29/21  0506   WBC Thousand/uL 17 72*   HEMOGLOBIN g/dL 11 5   HEMATOCRIT % 35 8   PLATELETS Thousands/uL 443*      Results from last 7 days   Lab Units 04/29/21  0506 04/28/21  0831  04/26/21  1241   POTASSIUM mmol/L 3 4* 3 7   < >  --    CHLORIDE mmol/L 100 97*   < >  --    CO2 mmol/L 29 30   < >  --    CO2, I-STAT mmol/L  --   --   --  23   BUN mg/dL 6 6   < >  --    CREATININE mg/dL 0 39* 0 42*   < >  --    CALCIUM mg/dL 9 1 8 9   < >  --    ALK PHOS U/L  --  632*   < >  --    ALT U/L  --  243*   < >  --    AST U/L  --  106*   < >  --    GLUCOSE, ISTAT mg/dl  --   --   --  135    < > = values in this interval not displayed  Imaging Studies: I have personally reviewed pertinent reports      EKG, Pathology, and Other Studies: I have personally reviewed pertinent reports  Counseling / Coordination of Care  Total floor / unit time spent today 15 minutes  Greater than 50% of total time was spent with the patient and / or family counseling and / or coordination of care  Please note that the APS provides consultative services regarding pain management only  With the exception of ketamine, peripheral nerve catheters, and epidural infusions (and except when indicated), final decisions regarding starting or changing doses of analgesic medications are at the discretion of the consulting service  Off hours consultation and/or medication management is generally not available      Manju Cartwright MD  Acute Pain Service

## 2021-04-29 NOTE — PROGRESS NOTES
Progress Note - OncologySurgery   Kirstie Howard 72 y o  female MRN: [id number]  Unit/Bed#: [facility] 200-5 Encounter: [id number]    Assessment:  73 yo F s/p pylorus preserving pancreaticoduodenectomy      Andrea 190ml ss     Plan:  · CLD  · Decrease IVF  · F/u APS for pain management  · Ambulation/IS    Subjective/Objective     Subjective: Had pain last night but better now, tolerated clear liquid diet, some nausea no vomiting, passing gas but no bm    Objective:    Blood pressure (!) 172/93, pulse 84, temperature 99 °F (37 2 °C), resp  rate 16, height 5' 2" (1 575 m), weight 53 kg (116 lb 13 5 oz), SpO2 92 %  ,Body mass index is 21 37 kg/m²        Intake/Output Summary (Last 24 hours) at 4/29/2021 0749  Last data filed at 4/29/2021 0715  Gross per 24 hour   Intake 1390 8 ml   Output 2700 ml   Net -1309 2 ml       Invasive Devices     Peripheral Intravenous Line            Peripheral IV 04/26/21 Left Forearm 2 days    Peripheral IV 04/26/21 Right Antecubital 2 days          Epidural Line            Epidural Catheter 04/26/21 2 days          Drain            Closed/Suction Drain Right Abdomen Bulb 19 Fr  2 days                Physical Exam:   Gen:  NAD  CV:  warm, well-perfused  Lungs: nl effort  Abd:  soft, appropriate tender, incision cdi, drain 190ml ss  Ext:  no CCE  Neuro: A&Ox3     Results from last 7 days   Lab Units 04/29/21  0506 04/28/21  0831 04/28/21  0444   WBC Thousand/uL 17 72* 18 94* 17 27*   HEMOGLOBIN g/dL 11 5 12 1 12 2   HEMATOCRIT % 35 8 35 6 37 1   PLATELETS Thousands/uL 443* 469* 453*     Results from last 7 days   Lab Units 04/29/21  0506 04/28/21  0831 04/28/21  0444  04/26/21  1241   POTASSIUM mmol/L 3 4* 3 7 3 8   < >  --    CHLORIDE mmol/L 100 97* 97*   < >  --    CO2 mmol/L 29 30 23   < >  --    CO2, I-STAT mmol/L  --   --   --   --  23   BUN mg/dL 6 6 6   < >  --    CREATININE mg/dL 0 39* 0 42* 0 40*   < >  --    GLUCOSE, ISTAT mg/dl  --   --   --   --  135   CALCIUM mg/dL 9 1 8 9 8 6 < >  --     < > = values in this interval not displayed

## 2021-04-29 NOTE — RESTORATIVE TECHNICIAN NOTE
Restorative Specialist Mobility Note       Activity: Ambulate in bustillos     Assistive Device: None

## 2021-04-29 NOTE — QUICK NOTE
Progress Note - Oncology Surgery   Alexx Chan 72 y o  female MRN: [id number]  Unit/Bed#: [facility] 832-01 Encounter: [id number]    Assessment:  73 yo F s/p pylorus preserving pancreaticoduodenectomy     Andrea 190ml ss     Plan:  · CLD - will add toast and cracker  · Decrease IVF  · F/u APS for pain management  · Ambulation/IS    Subjective/Objective     Subjective: Had pain last night but better now, tolerated clear liquid diet, some nausea no vomiting, passing gas but no bm    Objective:    Blood pressure (!) 175/82, pulse 69, temperature 98 9 °F (37 2 °C), resp  rate 16, height 5' 2" (1 575 m), weight 58 kg (127 lb 12 8 oz), SpO2 93 %  ,Body mass index is 23 37 kg/m²        Intake/Output Summary (Last 24 hours) at 4/29/2021 0619  Last data filed at 4/29/2021 0516  Gross per 24 hour   Intake 1467 75 ml   Output 2490 ml   Net -1022 25 ml       Invasive Devices     Peripheral Intravenous Line            Peripheral IV 04/26/21 Left Forearm 2 days    Peripheral IV 04/26/21 Right Antecubital 2 days          Epidural Line            Epidural Catheter 04/26/21 2 days          Drain            Closed/Suction Drain Right Abdomen Bulb 19 Fr  2 days                Physical Exam:   Gen:  NAD  CV:  warm, well-perfused  Lungs: nl effort  Abd:  soft, slightly distended, appropriate tender, incision cdi, [id number]  Ext:  no CCE  Neuro: A&Ox3     Results from last 7 days   Lab Units 04/29/21  0506 04/28/21  0831 04/28/21  0444   WBC Thousand/uL 17 72* 18 94* 17 27*   HEMOGLOBIN g/dL 11 5 12 1 12 2   HEMATOCRIT % 35 8 35 6 37 1   PLATELETS Thousands/uL 443* 469* 453*     Results from last 7 days   Lab Units 04/28/21  0831 04/28/21  0444 04/27/21  0451  04/26/21  1241   POTASSIUM mmol/L 3 7 3 8 3 5   < >  --    CHLORIDE mmol/L 97* 97* 103   < >  --    CO2 mmol/L 30 23 25   < >  --    CO2, I-STAT mmol/L  --   --   --   --  23   BUN mg/dL 6 6 12   < >  --    CREATININE mg/dL 0 42* 0 40* 0 51*   < >  --    GLUCOSE, ISTAT mg/dl  -- --   --   --  135   CALCIUM mg/dL 8 9 8 6 8 8   < >  --     < > = values in this interval not displayed

## 2021-04-30 LAB
ALBUMIN SERPL BCP-MCNC: 2.5 G/DL (ref 3.5–5)
ALP SERPL-CCNC: 516 U/L (ref 46–116)
ALT SERPL W P-5'-P-CCNC: 151 U/L (ref 12–78)
AMYLASE FLD QL: 23 U/L
AMYLASE SERPL-CCNC: 19 IU/L (ref 25–115)
ANION GAP SERPL CALCULATED.3IONS-SCNC: 7 MMOL/L (ref 4–13)
AST SERPL W P-5'-P-CCNC: 43 U/L (ref 5–45)
BASOPHILS # BLD AUTO: 0.05 THOUSANDS/ΜL (ref 0–0.1)
BASOPHILS NFR BLD AUTO: 0 % (ref 0–1)
BILIRUB SERPL-MCNC: 1.85 MG/DL (ref 0.2–1)
BUN SERPL-MCNC: 5 MG/DL (ref 5–25)
CALCIUM ALBUM COR SERPL-MCNC: 10.6 MG/DL (ref 8.3–10.1)
CALCIUM SERPL-MCNC: 9.4 MG/DL (ref 8.3–10.1)
CHLORIDE SERPL-SCNC: 101 MMOL/L (ref 100–108)
CO2 SERPL-SCNC: 28 MMOL/L (ref 21–32)
CREAT SERPL-MCNC: 0.52 MG/DL (ref 0.6–1.3)
EOSINOPHIL # BLD AUTO: 0.25 THOUSAND/ΜL (ref 0–0.61)
EOSINOPHIL NFR BLD AUTO: 2 % (ref 0–6)
ERYTHROCYTE [DISTWIDTH] IN BLOOD BY AUTOMATED COUNT: 15.3 % (ref 11.6–15.1)
GFR SERPL CREATININE-BSD FRML MDRD: 101 ML/MIN/1.73SQ M
GLUCOSE SERPL-MCNC: 145 MG/DL (ref 65–140)
HCT VFR BLD AUTO: 38.7 % (ref 34.8–46.1)
HGB BLD-MCNC: 12.5 G/DL (ref 11.5–15.4)
IMM GRANULOCYTES # BLD AUTO: 0.08 THOUSAND/UL (ref 0–0.2)
IMM GRANULOCYTES NFR BLD AUTO: 1 % (ref 0–2)
LYMPHOCYTES # BLD AUTO: 0.98 THOUSANDS/ΜL (ref 0.6–4.47)
LYMPHOCYTES NFR BLD AUTO: 6 % (ref 14–44)
MAGNESIUM SERPL-MCNC: 2.3 MG/DL (ref 1.6–2.6)
MCH RBC QN AUTO: 29.8 PG (ref 26.8–34.3)
MCHC RBC AUTO-ENTMCNC: 32.3 G/DL (ref 31.4–37.4)
MCV RBC AUTO: 92 FL (ref 82–98)
MONOCYTES # BLD AUTO: 0.89 THOUSAND/ΜL (ref 0.17–1.22)
MONOCYTES NFR BLD AUTO: 6 % (ref 4–12)
NEUTROPHILS # BLD AUTO: 13.45 THOUSANDS/ΜL (ref 1.85–7.62)
NEUTS SEG NFR BLD AUTO: 85 % (ref 43–75)
NRBC BLD AUTO-RTO: 0 /100 WBCS
PLATELET # BLD AUTO: 507 THOUSANDS/UL (ref 149–390)
PMV BLD AUTO: 10.6 FL (ref 8.9–12.7)
POTASSIUM SERPL-SCNC: 3.7 MMOL/L (ref 3.5–5.3)
PROT SERPL-MCNC: 7.7 G/DL (ref 6.4–8.2)
RBC # BLD AUTO: 4.19 MILLION/UL (ref 3.81–5.12)
SODIUM SERPL-SCNC: 136 MMOL/L (ref 136–145)
WBC # BLD AUTO: 15.7 THOUSAND/UL (ref 4.31–10.16)

## 2021-04-30 PROCEDURE — 82150 ASSAY OF AMYLASE: CPT | Performed by: PHYSICIAN ASSISTANT

## 2021-04-30 PROCEDURE — 83735 ASSAY OF MAGNESIUM: CPT | Performed by: PHYSICIAN ASSISTANT

## 2021-04-30 PROCEDURE — C9113 INJ PANTOPRAZOLE SODIUM, VIA: HCPCS | Performed by: SURGERY

## 2021-04-30 PROCEDURE — 99024 POSTOP FOLLOW-UP VISIT: CPT | Performed by: SURGERY

## 2021-04-30 PROCEDURE — 80053 COMPREHEN METABOLIC PANEL: CPT | Performed by: PHYSICIAN ASSISTANT

## 2021-04-30 PROCEDURE — 85025 COMPLETE CBC W/AUTO DIFF WBC: CPT | Performed by: PHYSICIAN ASSISTANT

## 2021-04-30 RX ORDER — OXYCODONE HYDROCHLORIDE 5 MG/1
5 TABLET ORAL EVERY 4 HOURS PRN
Status: DISCONTINUED | OUTPATIENT
Start: 2021-04-30 | End: 2021-05-03 | Stop reason: HOSPADM

## 2021-04-30 RX ORDER — PANTOPRAZOLE SODIUM 40 MG/1
40 TABLET, DELAYED RELEASE ORAL
Status: DISCONTINUED | OUTPATIENT
Start: 2021-04-30 | End: 2021-05-03 | Stop reason: HOSPADM

## 2021-04-30 RX ORDER — HEPARIN SODIUM 5000 [USP'U]/ML
5000 INJECTION, SOLUTION INTRAVENOUS; SUBCUTANEOUS EVERY 8 HOURS
Status: DISCONTINUED | OUTPATIENT
Start: 2021-04-30 | End: 2021-05-03 | Stop reason: HOSPADM

## 2021-04-30 RX ORDER — OXYCODONE HYDROCHLORIDE 10 MG/1
10 TABLET ORAL EVERY 4 HOURS PRN
Status: DISCONTINUED | OUTPATIENT
Start: 2021-04-30 | End: 2021-05-03 | Stop reason: HOSPADM

## 2021-04-30 RX ORDER — DIAZEPAM 2 MG/1
2 TABLET ORAL EVERY 6 HOURS PRN
Status: DISCONTINUED | OUTPATIENT
Start: 2021-04-30 | End: 2021-05-03 | Stop reason: HOSPADM

## 2021-04-30 RX ORDER — ACETAMINOPHEN 325 MG/1
975 TABLET ORAL EVERY 6 HOURS SCHEDULED
Status: DISCONTINUED | OUTPATIENT
Start: 2021-04-30 | End: 2021-05-03 | Stop reason: HOSPADM

## 2021-04-30 RX ADMIN — HEPARIN SODIUM 5000 UNITS: 5000 INJECTION INTRAVENOUS; SUBCUTANEOUS at 23:03

## 2021-04-30 RX ADMIN — ACETAMINOPHEN 975 MG: 325 TABLET, FILM COATED ORAL at 11:10

## 2021-04-30 RX ADMIN — METOCLOPRAMIDE HYDROCHLORIDE 10 MG: 5 INJECTION INTRAMUSCULAR; INTRAVENOUS at 11:10

## 2021-04-30 RX ADMIN — METOCLOPRAMIDE HYDROCHLORIDE 10 MG: 5 INJECTION INTRAMUSCULAR; INTRAVENOUS at 23:02

## 2021-04-30 RX ADMIN — OXYCODONE HYDROCHLORIDE 10 MG: 10 TABLET ORAL at 15:12

## 2021-04-30 RX ADMIN — HYDROMORPHONE HYDROCHLORIDE 0.5 MG: 1 INJECTION, SOLUTION INTRAMUSCULAR; INTRAVENOUS; SUBCUTANEOUS at 05:11

## 2021-04-30 RX ADMIN — HYDROMORPHONE HYDROCHLORIDE 0.5 MG: 1 INJECTION, SOLUTION INTRAMUSCULAR; INTRAVENOUS; SUBCUTANEOUS at 20:41

## 2021-04-30 RX ADMIN — METOCLOPRAMIDE HYDROCHLORIDE 10 MG: 5 INJECTION INTRAMUSCULAR; INTRAVENOUS at 05:12

## 2021-04-30 RX ADMIN — PANTOPRAZOLE SODIUM 40 MG: 40 INJECTION, POWDER, FOR SOLUTION INTRAVENOUS at 08:09

## 2021-04-30 RX ADMIN — HYDROMORPHONE HYDROCHLORIDE 0.5 MG: 1 INJECTION, SOLUTION INTRAMUSCULAR; INTRAVENOUS; SUBCUTANEOUS at 00:16

## 2021-04-30 RX ADMIN — HYDROMORPHONE HYDROCHLORIDE 0.5 MG: 1 INJECTION, SOLUTION INTRAMUSCULAR; INTRAVENOUS; SUBCUTANEOUS at 07:39

## 2021-04-30 RX ADMIN — METOCLOPRAMIDE HYDROCHLORIDE 10 MG: 5 INJECTION INTRAMUSCULAR; INTRAVENOUS at 17:34

## 2021-04-30 RX ADMIN — HEPARIN SODIUM 5000 UNITS: 5000 INJECTION INTRAVENOUS; SUBCUTANEOUS at 17:34

## 2021-04-30 RX ADMIN — OXYCODONE HYDROCHLORIDE 10 MG: 10 TABLET ORAL at 11:10

## 2021-04-30 RX ADMIN — OXYCODONE HYDROCHLORIDE 10 MG: 10 TABLET ORAL at 19:24

## 2021-04-30 RX ADMIN — HEPARIN SODIUM 5000 UNITS: 5000 INJECTION INTRAVENOUS; SUBCUTANEOUS at 05:11

## 2021-04-30 RX ADMIN — ACETAMINOPHEN 975 MG: 325 TABLET, FILM COATED ORAL at 23:02

## 2021-04-30 RX ADMIN — ACETAMINOPHEN 975 MG: 325 TABLET, FILM COATED ORAL at 17:33

## 2021-04-30 RX ADMIN — DEXTROSE, SODIUM CHLORIDE, AND POTASSIUM CHLORIDE 60 ML/HR: 5; .9; .15 INJECTION INTRAVENOUS at 06:22

## 2021-04-30 NOTE — PROGRESS NOTES
Progress Note - Surgical Oncology  Hernandez Madrid 72 y o  female MRN: [id number]  Unit/Bed#: [facility] 832-01 Encounter: [id number]    Assessment:  71 yo F s/p pylorus preserving pancreaticoduodenectomy 4/26     Andrea: 210 ml from 190ml ss    Plan:   CLD  I/Os  DVT ppx  Pain/Nausea Control  OOB as tolerated   Incentive Spirometry   YENY and serum amylase today   Please [facility] On Call [facility] Resident if any questions    Subjective/Objective     Subjective:   No acute events overnight  With flatus  Tolerating clears    Pertinent review of systems as above  All other review of systems negative  Objective:    Blood pressure 168/96, pulse 77, temperature 98 6 °F (37 °C), resp  rate 18, height 5' 2" (1 575 m), weight 53 kg (116 lb 13 5 oz), SpO2 94 %  ,Body mass index is 21 37 kg/m²  Intake/Output Summary (Last 24 hours) at 4/30/2021 0440  Last data filed at 4/30/2021 0243  Gross per 24 hour   Intake 1575 95 ml   Output 2960 ml   Net -1384 05 ml       Invasive Devices     Peripheral Intravenous Line            Peripheral IV 04/26/21 Left Forearm 3 days    Peripheral IV 04/26/21 Right Antecubital 3 days          Epidural Line            Epidural Catheter 04/26/21 3 days          Drain            Closed/Suction Drain Right Abdomen Bulb 19 Fr  3 days                Physical Exam:   Gen:  NAD  HEENT: NCAT  MMM  CV: well perfused  Lungs: Normal respiratory effort  Abd: soft,mild tenderness, andrea drain intact  Skin: warm/ dry  Extremities: no peripheral edema, no clubbing or cyanosis  Neuro:  AxO x3      Results from last 7 days   Lab Units 04/29/21  0506 04/28/21  0831 04/28/21  0444   WBC Thousand/uL 17 72* 18 94* 17 27*   HEMOGLOBIN g/dL 11 5 12 1 12 2   HEMATOCRIT % 35 8 35 6 37 1   PLATELETS Thousands/uL 443* 469* 453*     Results from last 7 days   Lab Units 04/29/21  0506 04/28/21  0831 04/28/21  0444  04/26/21  1241   POTASSIUM mmol/L 3 4* 3 7 3 8   < >  --    CHLORIDE mmol/L 100 97* 97*   < >  --    CO2 mmol/L 29 30 23   < >  --    CO2, I-STAT mmol/L  --   --   --   --  23   BUN mg/dL 6 6 6   < >  --    CREATININE mg/dL 0 39* 0 42* 0 40*   < >  --    GLUCOSE, ISTAT mg/dl  --   --   --   --  135   CALCIUM mg/dL 9 1 8 9 8 6   < >  --     < > = values in this interval not displayed  I have personally reviewed pertinent films in PACS      Medications:   Scheduled Meds:  Current Facility-Administered Medications   Medication Dose Route Frequency Provider Last Rate    dextrose 5 % and sodium chloride 0 9 % with KCl 20 mEq/L  60 mL/hr Intravenous Continuous Teri Hilton PA-C 60 mL/hr (04/29/21 1347)    heparin (porcine)  5,000 Units Subcutaneous [facility] Mike Florian MD      HYDROmorphone  0 5 mg Intravenous Q2H PRN Mike Florian MD      Labetalol HCl  10 mg Intravenous Q4H PRN Mike Florian MD      metoclopramide  10 mg Intravenous Q6H [address] Sin Whittaker MD      pantoprazole  40 mg Intravenous Q24H [address] Mike Florian MD      phenol  1 spray Mouth/Throat Q2H PRN Mike Florian MD      ropivacaine 0 1% and fentaNYL 2 mcg/mL   Epidural Continuous Chris Matute MD      saliva substitute  5 spray Mouth/Throat 4x Daily PRN Mike Florian MD       Continuous Infusions:dextrose 5 % and sodium chloride 0 9 % with KCl 20 mEq/L, 60 mL/hr, Last Rate: 60 mL/hr (04/29/21 1347)  ropivacaine 0 1% and fentaNYL 2 mcg/mL,       PRN Meds:  HYDROmorphone, 0 5 mg, Q2H PRN  Labetalol HCl, 10 mg, Q4H PRN  phenol, 1 spray, Q2H PRN  saliva substitute, 5 spray, 4x Daily PRN      VTE Pharmacologic Prophylaxis: Heparin  VTE Mechanical Prophylaxis: sequential compression device

## 2021-04-30 NOTE — PROGRESS NOTES
Epidural Follow-up Note - Acute Pain Service    Alexx Chan 72 y o  female MRN: [id number]  Unit/Bed#: [facility] 832-01 Encounter: [id number]      Assessment:   Principal Problem:    Neoplasm of ampulla of Vater      Alexx Chan is a 72y o  year old female POD #4 S/P [place]  She has an epidural in place which was found to be inadvertently removed  Pt seen and examined  No overnight events per patient, this morning noted by nursing that epidural catheter was leaking  Pt examined on rounds and epidural tip coiled under bandaging, no longer in situ  Infusion stopped and catheter removed  Last heparin dose at 5am, plts nml on AM labs  Pt with no residual numbness over abdomen and incision, likely pointing to epidural falling out more than a few hrs ago  Discussed with pt signs and symptoms to watch for epidural hematoma given unknown removal time and heparin dosing  At this time, denies n/v/pruritus/LE weakness  No other questions or complaints    Plan:  - discontinue epidural infusion  - Oxycodone 5-10mg q4hrs PRN mod-severe pain  - IV Dilaudid 0 5mg q2hrs PRN breakthrough pain  - Tylenol 975mg TID  - Valium 2mg q6hrs PRN spasms    Bowel Regimen:  - Bowel regimen when appropriate from surgical perspective    APS will continue to follow  Please call  / [facility] [place] or [facility] (/ between [phone] and on weekends) with questions or concerns    Pain History  Current pain location(s): incision  Pain Scale:   8  Quality: sharp  24 hour history: as above    PCEA use: n/a  Opioid requirement previous 24 hours: 4 5mg IV dilaudid    Meds/Allergies   all current active meds have been reviewed    Allergies   Allergen Reactions    Penicillins     Tetracycline        Objective     Temp:  [98 °F (36 7 °C)-98 9 °F (37 2 °C)] 98 9 °F (37 2 °C)  HR:  [67-82] 82  Resp:  [16-20] 20  BP: (161-168)/(92-97) 165/93    Physical Exam  Vitals signs and nursing note reviewed  Constitutional:       Appearance: Normal appearance  Cardiovascular:      Rate and Rhythm: Normal rate  Pulmonary:      Effort: Pulmonary effort is normal    Abdominal:      General: There is no distension  Palpations: Abdomen is soft  Tenderness: There is abdominal tenderness (only to deep palpation)  Musculoskeletal:         General: No swelling  Skin:     General: Skin is warm and dry  Neurological:      Mental Status: She is alert and oriented to person, place, and time  Psychiatric:         Mood and Affect: Mood normal          Behavior: Behavior normal        Epidural: Site clean, no surrounding erythema/edema/induration, no TTP  Dressing wet and catheter coiled under with tip out from skin  Removed catheter and tip intact    Lab Results:   Results from last 7 days   Lab Units 04/30/21  0949   WBC Thousand/uL 15 70*   HEMOGLOBIN g/dL 12 5   HEMATOCRIT % 38 7   PLATELETS Thousands/uL 507*      Results from last 7 days   Lab Units 04/30/21  0949  04/26/21  1241   POTASSIUM mmol/L 3 7   < >  --    CHLORIDE mmol/L 101   < >  --    CO2 mmol/L 28   < >  --    CO2, I-STAT mmol/L  --   --  23   BUN mg/dL 5   < >  --    CREATININE mg/dL 0 52*   < >  --    CALCIUM mg/dL 9 4   < >  --    ALK PHOS U/L 516*   < >  --    ALT U/L 151*   < >  --    AST U/L 43   < >  --    GLUCOSE, ISTAT mg/dl  --   --  135    < > = values in this interval not displayed  Imaging Studies: I have personally reviewed pertinent reports  EKG, Pathology, and Other Studies: I have personally reviewed pertinent reports  Counseling / Coordination of Care  Total floor / unit time spent today 15 minutes  Greater than 50% of total time was spent with the patient and / or family counseling and / or coordination of care   A description of the counseling / coordination of care: chart review, post op pain and neuraxial pain management, discussion/planning with nursing/medical/surgical teams    Please note that the APS provides consultative services regarding pain management only  With the exception of ketamine, peripheral nerve catheters, and epidural infusions (and except when indicated), final decisions regarding starting or changing doses of analgesic medications are at the discretion of the consulting service  Off hours consultation and/or medication management is generally not available      Stew Ybarra MD  Acute Pain Service

## 2021-04-30 NOTE — PHYSICAL THERAPY NOTE
PT orders received  Chart reviewed  Pt not agreeable to PT at this time due to increased pain  Pt requested session in PM  Will try to attempted at a later time     Gabby Lopez, Pt, DPT     04/30/21 1126   PT Last Visit   PT Visit Date 04/30/21   Note Type   Note Type Treatment   Cancel Reasons Other

## 2021-05-01 LAB
ABO GROUP BLD BPU: NORMAL
ABO GROUP BLD BPU: NORMAL
ANION GAP SERPL CALCULATED.3IONS-SCNC: 7 MMOL/L (ref 4–13)
BASOPHILS # BLD AUTO: 0.07 THOUSANDS/ΜL (ref 0–0.1)
BASOPHILS NFR BLD AUTO: 1 % (ref 0–1)
BPU ID: NORMAL
BPU ID: NORMAL
BUN SERPL-MCNC: 11 MG/DL (ref 5–25)
CALCIUM SERPL-MCNC: 9.2 MG/DL (ref 8.3–10.1)
CHLORIDE SERPL-SCNC: 102 MMOL/L (ref 100–108)
CO2 SERPL-SCNC: 29 MMOL/L (ref 21–32)
CREAT SERPL-MCNC: 0.49 MG/DL (ref 0.6–1.3)
CROSSMATCH: NORMAL
CROSSMATCH: NORMAL
EOSINOPHIL # BLD AUTO: 0.65 THOUSAND/ΜL (ref 0–0.61)
EOSINOPHIL NFR BLD AUTO: 6 % (ref 0–6)
ERYTHROCYTE [DISTWIDTH] IN BLOOD BY AUTOMATED COUNT: 15.1 % (ref 11.6–15.1)
GFR SERPL CREATININE-BSD FRML MDRD: 103 ML/MIN/1.73SQ M
GLUCOSE SERPL-MCNC: 118 MG/DL (ref 65–140)
HCT VFR BLD AUTO: 35.6 % (ref 34.8–46.1)
HGB BLD-MCNC: 11.6 G/DL (ref 11.5–15.4)
IMM GRANULOCYTES # BLD AUTO: 0.04 THOUSAND/UL (ref 0–0.2)
IMM GRANULOCYTES NFR BLD AUTO: 0 % (ref 0–2)
LYMPHOCYTES # BLD AUTO: 1.47 THOUSANDS/ΜL (ref 0.6–4.47)
LYMPHOCYTES NFR BLD AUTO: 13 % (ref 14–44)
MCH RBC QN AUTO: 30.4 PG (ref 26.8–34.3)
MCHC RBC AUTO-ENTMCNC: 32.6 G/DL (ref 31.4–37.4)
MCV RBC AUTO: 93 FL (ref 82–98)
MONOCYTES # BLD AUTO: 0.8 THOUSAND/ΜL (ref 0.17–1.22)
MONOCYTES NFR BLD AUTO: 7 % (ref 4–12)
NEUTROPHILS # BLD AUTO: 7.96 THOUSANDS/ΜL (ref 1.85–7.62)
NEUTS SEG NFR BLD AUTO: 73 % (ref 43–75)
NRBC BLD AUTO-RTO: 0 /100 WBCS
PLATELET # BLD AUTO: 486 THOUSANDS/UL (ref 149–390)
PMV BLD AUTO: 10.6 FL (ref 8.9–12.7)
POTASSIUM SERPL-SCNC: 3 MMOL/L (ref 3.5–5.3)
RBC # BLD AUTO: 3.82 MILLION/UL (ref 3.81–5.12)
SODIUM SERPL-SCNC: 138 MMOL/L (ref 136–145)
UNIT DISPENSE STATUS: NORMAL
UNIT DISPENSE STATUS: NORMAL
UNIT PRODUCT CODE: NORMAL
UNIT PRODUCT CODE: NORMAL
UNIT RH: NORMAL
UNIT RH: NORMAL
WBC # BLD AUTO: 10.99 THOUSAND/UL (ref 4.31–10.16)

## 2021-05-01 PROCEDURE — 99024 POSTOP FOLLOW-UP VISIT: CPT | Performed by: SURGERY

## 2021-05-01 PROCEDURE — 85025 COMPLETE CBC W/AUTO DIFF WBC: CPT | Performed by: STUDENT IN AN ORGANIZED HEALTH CARE EDUCATION/TRAINING PROGRAM

## 2021-05-01 PROCEDURE — 99232 SBSQ HOSP IP/OBS MODERATE 35: CPT | Performed by: ANESTHESIOLOGY

## 2021-05-01 PROCEDURE — 80048 BASIC METABOLIC PNL TOTAL CA: CPT | Performed by: PHYSICIAN ASSISTANT

## 2021-05-01 RX ADMIN — OXYCODONE HYDROCHLORIDE 10 MG: 10 TABLET ORAL at 02:34

## 2021-05-01 RX ADMIN — OXYCODONE HYDROCHLORIDE 5 MG: 5 TABLET ORAL at 21:35

## 2021-05-01 RX ADMIN — ACETAMINOPHEN 975 MG: 325 TABLET, FILM COATED ORAL at 17:55

## 2021-05-01 RX ADMIN — HEPARIN SODIUM 5000 UNITS: 5000 INJECTION INTRAVENOUS; SUBCUTANEOUS at 16:34

## 2021-05-01 RX ADMIN — METOCLOPRAMIDE HYDROCHLORIDE 10 MG: 5 INJECTION INTRAMUSCULAR; INTRAVENOUS at 05:36

## 2021-05-01 RX ADMIN — OXYCODONE HYDROCHLORIDE 10 MG: 10 TABLET ORAL at 16:34

## 2021-05-01 RX ADMIN — METOCLOPRAMIDE HYDROCHLORIDE 10 MG: 5 INJECTION INTRAMUSCULAR; INTRAVENOUS at 17:55

## 2021-05-01 RX ADMIN — ACETAMINOPHEN 975 MG: 325 TABLET, FILM COATED ORAL at 05:36

## 2021-05-01 RX ADMIN — OXYCODONE HYDROCHLORIDE 10 MG: 10 TABLET ORAL at 09:47

## 2021-05-01 RX ADMIN — METOCLOPRAMIDE HYDROCHLORIDE 10 MG: 5 INJECTION INTRAMUSCULAR; INTRAVENOUS at 11:50

## 2021-05-01 RX ADMIN — PANTOPRAZOLE SODIUM 40 MG: 40 TABLET, DELAYED RELEASE ORAL at 05:36

## 2021-05-01 RX ADMIN — ACETAMINOPHEN 975 MG: 325 TABLET, FILM COATED ORAL at 11:50

## 2021-05-01 RX ADMIN — HEPARIN SODIUM 5000 UNITS: 5000 INJECTION INTRAVENOUS; SUBCUTANEOUS at 09:39

## 2021-05-01 NOTE — PROGRESS NOTES
Progress Note - Surgical Oncology  Juan Labor 72 y o  female MRN: [id number]  Unit/Bed#: [facility] 832-01 Encounter: [id number]    Assessment:  73 yo F s/p pylorus preserving pancreaticoduodenectomy 4/26     Andrea: 150ml, vitals stable on 2L     Plan:  -Diet: post gastrectomy   -I/Os  -DVT ppx  -Control Pain/Nausea   -OOB as tolerated, PT/OT  -Incentive Spirometry  -epidural removal   -continue drain until dc   -wean oxygen       Subjective/Objective     Subjective:   Patient seen and examined at bedside  No acute events overnight  Pain controlled  Denies nausea, vomiting, fever, chills  Positive flatus, denies bowel movement  Up out of bed, ambulating    Objective:    Blood pressure 135/90, pulse 64, temperature 98 4 °F (36 9 °C), resp  rate 14, height 5' 2" (1 575 m), weight 53 3 kg (117 lb 8 1 oz), SpO2 96 %  ,Body mass index is 21 49 kg/m²        Intake/Output Summary (Last 24 hours) at 5/1/2021 5358  Last data filed at 5/1/2021 0501  Gross per 24 hour   Intake 728 55 ml   Output 2600 ml   Net -1871 45 ml       Invasive Devices     Peripheral Intravenous Line            Peripheral IV 04/30/21 Left Antecubital less than 1 day          Drain            Closed/Suction Drain Right Abdomen Bulb 19 Fr  4 days                Physical Exam:   General: NAD  Head: normocephalic, atraumatic  CV: Pulse regular  Lungs: no conversational dyspnea  Abdomen: soft, non distended, non tender, no guarding or rebound, incision c/d/i, chris with ss output   Extremities: [place], motor sensory intact  Neuro: awake, alert, answers questions appropriately        Results from last 7 days   Lab Units 04/30/21  0949 04/29/21  0506 04/28/21  0831   WBC Thousand/uL 15 70* 17 72* 18 94*   HEMOGLOBIN g/dL 12 5 11 5 12 1   HEMATOCRIT % 38 7 35 8 35 6   PLATELETS Thousands/uL 507* 443* 469*     Results from last 7 days   Lab Units 04/30/21  0949 04/29/21  0506 04/28/21  0831  04/26/21  1241   POTASSIUM mmol/L 3 7 3 4* 3 7   < >  --    CHLORIDE mmol/L 101 100 97*   < >  --    CO2 mmol/L 28 29 30   < >  --    CO2, I-STAT mmol/L  --   --   --   --  23   BUN mg/dL 5 6 6   < >  --    CREATININE mg/dL 0 52* 0 39* 0 42*   < >  --    GLUCOSE, ISTAT mg/dl  --   --   --   --  135   CALCIUM mg/dL 9 4 9 1 8 9   < >  --     < > = values in this interval not displayed  I have personally reviewed pertinent films in PACS      Medications:   Scheduled Meds:  Current Facility-Administered Medications   Medication Dose Route Frequency Provider Last Rate    acetaminophen  975 mg Oral Q6H [facility] Teri Hilton PA-C      diazepam  2 mg Oral Q6H PRN Teri Hilton PA-C      heparin (porcine)  5,000 Units Subcutaneous Q8H Teri Hilton PA-C      HYDROmorphone  0 5 mg Intravenous Q2H PRN Aysha Ward MD      Labetalol HCl  10 mg Intravenous Q4H PRN Aysha Ward MD      metoclopramide  10 mg Intravenous Q6H [facility] Kylah Melchor MD      oxyCODONE  10 mg Oral Q4H PRN Teri Hilton PA-C      oxyCODONE  5 mg Oral Q4H PRN Teri Hilton PA-C      pantoprazole  40 mg Oral Early Morning Teri Hilton PA-C      phenol  1 spray Mouth/Throat Q2H PRN Aysha Ward MD      saliva substitute  5 spray Mouth/Throat 4x Daily PRN Aysha Ward MD       Continuous Infusions:   PRN Meds:  diazepam, 2 mg, Q6H PRN  HYDROmorphone, 0 5 mg, Q2H PRN  Labetalol HCl, 10 mg, Q4H PRN  oxyCODONE, 10 mg, Q4H PRN  oxyCODONE, 5 mg, Q4H PRN  phenol, 1 spray, Q2H PRN  saliva substitute, 5 spray, 4x Daily PRN      VTE Pharmacologic Prophylaxis: Heparin  VTE Mechanical Prophylaxis: sequential compression device

## 2021-05-01 NOTE — PROGRESS NOTES
Progress Note - Acute Pain Service    Isabela Verduzco 72 y o  female MRN: [id number]  Unit/Bed#: [facility] 832-01 Encounter: [id number]      Assessment:   Principal Problem:    Neoplasm of ampulla of Vater    Isabela Verduzco is a 72 y o  female  POD 5, with inadvertent removal of epidural on POD 4, currently on PO/IV pain regimen  Over 24 hr pd, pt has taken dilaudid 0 5 mg IV x 1 doses, and oxycodone 10 mg PO x 4 doses    Pt seen in her room, she is sitting up in bed, alert, communicative  She says she is doing well overall, and pain is well controlled with her current pain regimen  Pt is eager to go home tomorrow  Plan:   - continue oxycodone 5-10 mg PO Q 4 hrs PRN mod-severe pain  - continue dilaudid 0 5 mg IV Q 2 hrs PRn breakthrough pain  - continue tylenol 975 mg PO Q 8 hrs scheduled  - continue valium 2 mg PO Q 6 hrs PRN muscle spasms    - Bowel regimen when appropriate from surgical perspective    APS will sign off at this time  Thank you for the consult   All opioids and other analgesics to be written at discretion of primary team  Please call  / [phone] or [facility] (/ between [phone] and on weekends) with questions or concerns    Pain History  Current pain location(s): surgical incision site  Pain Scale:  7-8/10  24 hour history: epidural prematurely removed, reliance on PO/IV pain regimen    Meds/Allergies   all current active meds have been reviewed, current meds:   Current Facility-Administered Medications   Medication Dose Route Frequency    acetaminophen (TYLENOL) tablet 975 mg  975 mg Oral Q6H [address]    diazepam (VALIUM) tablet 2 mg  2 mg Oral Q6H PRN    heparin (porcine) subcutaneous injection 5,000 Units  5,000 Units Subcutaneous Q8H    HYDROmorphone (DILAUDID) injection 0 5 mg  0 5 mg Intravenous Q2H PRN    Labetalol HCl (NORMODYNE) injection 10 mg  10 mg Intravenous Q4H PRN    metoclopramide (REGLAN) injection 10 mg  10 mg Intravenous Q6H [address]    oxyCODONE (ROXICODONE) immediate release tablet 10 mg  10 mg Oral Q4H PRN    oxyCODONE (ROXICODONE) IR tablet 5 mg  5 mg Oral Q4H PRN    pantoprazole (PROTONIX) EC tablet 40 mg  40 mg Oral Early Morning    phenol (CHLORASEPTIC) 1 4 % mucosal liquid 1 spray  1 spray Mouth/Throat Q2H PRN    saliva substitute (MOUTH KOTE) mucosal solution 5 spray  5 spray Mouth/Throat 4x Daily PRN    and PTA meds:   Prior to Admission Medications   Prescriptions Last Dose Informant Patient Reported? Taking?   ondansetron (ZOFRAN-ODT) 4 mg disintegrating tablet Unknown at Unknown time  No No   Sig: Take 1 tablet (4 mg total) by mouth every 6 (six) hours as needed for nausea or vomiting      Facility-Administered Medications: None       Allergies   Allergen Reactions    Penicillins     Tetracycline        Objective     Temp:  [98 4 °F (36 9 °C)-98 9 °F (37 2 °C)] 98 4 °F (36 9 °C)  HR:  [60-82] 64  Resp:  [14-20] 14  BP: (135-168)/(81-96) 135/90    Physical Exam  Vitals signs and nursing note reviewed  Constitutional:       Appearance: Normal appearance  HENT:      Head: Normocephalic and atraumatic  Nose: Nose normal    Eyes:      Extraocular Movements: Extraocular movements intact  Pupils: Pupils are equal, round, and reactive to light  Neck:      Musculoskeletal: Normal range of motion  Cardiovascular:      Rate and Rhythm: Normal rate  Pulses: Normal pulses  Pulmonary:      Effort: Pulmonary effort is normal    Musculoskeletal: Normal range of motion  Skin:     General: Skin is warm  Neurological:      General: No focal deficit present  Mental Status: She is alert and oriented to person, place, and time  Mental status is at baseline  Psychiatric:         Mood and Affect: Mood normal          Behavior: Behavior normal          Thought Content:  Thought content normal          Judgment: Judgment normal          Lab Results:   Results from last 7 days   Lab Units 04/30/21  0949   WBC Thousand/uL 15 70*   HEMOGLOBIN g/dL 12 5   HEMATOCRIT % 38 7   PLATELETS Thousands/uL 507*      Results from last 7 days   Lab Units 04/30/21  0949  04/26/21  1241   POTASSIUM mmol/L 3 7   < >  --    CHLORIDE mmol/L 101   < >  --    CO2 mmol/L 28   < >  --    CO2, I-STAT mmol/L  --   --  23   BUN mg/dL 5   < >  --    CREATININE mg/dL 0 52*   < >  --    CALCIUM mg/dL 9 4   < >  --    ALK PHOS U/L 516*   < >  --    ALT U/L 151*   < >  --    AST U/L 43   < >  --    GLUCOSE, ISTAT mg/dl  --   --  135    < > = values in this interval not displayed  Imaging Studies: I have personally reviewed pertinent reports  EKG, Pathology, and Other Studies: I have personally reviewed pertinent reports  Counseling / Coordination of Care  Total floor / unit time spent today 15 minutes  Greater than 50% of total time was spent with the patient and / or family counseling and / or coordination of care  A description of the counseling / coordination of care: discussed current pain regimen and goals for pain control upon discharge  Please note that the APS provides consultative services regarding pain management only  With the exception of ketamine and epidural infusions and except when indicated, final decisions regarding starting or changing doses of analgesic medications are at the discretion of the consulting service  Off hours consultation and/or medication management is generally not available      Jaimee Zamora MD  Acute Pain Service

## 2021-05-01 NOTE — CASE MANAGEMENT
CM notified by resident that pt is anticipated to d/c tomorrow  CM noted pt accepted by [facility] over [facility]  CM alerted [facility] of possible d/c tomorrow  [facility] to be faxed to fax#[phone] when available

## 2021-05-01 NOTE — PLAN OF CARE
Problem: Prexisting or High Potential for Compromised Skin Integrity  Goal: Skin integrity is maintained or improved  Description: INTERVENTIONS:  - Identify patients at risk for skin breakdown  - Assess and monitor skin integrity  - Assess and monitor nutrition and hydration status  - Monitor labs   - Assess for incontinence   - Turn and reposition patient  - Assist with mobility/ambulation  - Relieve pressure over bony prominences  - Avoid friction and shearing  - Provide appropriate hygiene as needed including keeping skin clean and dry  - Evaluate need for skin moisturizer/barrier cream  - Collaborate with interdisciplinary team   - Patient/family teaching  - Consider wound care consult   4/30/2021 2058 by Nan Morrison RN  Outcome: Progressing  4/30/2021 2058 by Nan Morrison RN  Outcome: Progressing     Problem: Potential for Falls  Goal: Patient will remain free of falls  Description: INTERVENTIONS:  - Assess patient frequently for physical needs  -  Identify cognitive and physical deficits and behaviors that affect risk of falls    -  [place] fall precautions as indicated by assessment   - Educate patient/family on patient safety including physical limitations  - Instruct patient to call for assistance with activity based on assessment  - Modify environment to reduce risk of injury  - Consider OT/PT consult to assist with strengthening/mobility  4/30/2021 2058 by Nan Morrison RN  Outcome: Progressing  4/30/2021 2058 by Nan Morrison RN  Outcome: Progressing     Problem: PAIN - ADULT  Goal: Verbalizes/displays adequate comfort level or baseline comfort level  Description: Interventions:  - Encourage patient to monitor pain and request assistance  - Assess pain using appropriate pain scale  - Administer analgesics based on type and severity of pain and evaluate response  - Implement non-pharmacological measures as appropriate and evaluate response  - Consider cultural and social influences on pain and pain management  - Notify physician/advanced practitioner if interventions unsuccessful or patient reports new pain  Outcome: Progressing     Problem: INFECTION - ADULT  Goal: Absence or prevention of progression during hospitalization  Description: INTERVENTIONS:  - Assess and monitor for signs and symptoms of infection  - Monitor lab/diagnostic results  - Monitor all insertion sites, i e  indwelling lines, tubes, and drains  - Monitor endotracheal if appropriate and nasal secretions for changes in amount and color  - [place] appropriate cooling/warming therapies per order  - Administer medications as ordered  - Instruct and encourage patient and family to use good hand hygiene technique  - Identify and instruct in appropriate isolation precautions for identified infection/condition  Outcome: Progressing  Goal: Absence of fever/infection during neutropenic period  Description: INTERVENTIONS:  - Monitor WBC    Outcome: Progressing     Problem: SAFETY ADULT  Goal: Patient will remain free of falls  Description: INTERVENTIONS:  - Assess patient frequently for physical needs  -  Identify cognitive and physical deficits and behaviors that affect risk of falls    -  [place] fall precautions as indicated by assessment   - Educate patient/family on patient safety including physical limitations  - Instruct patient to call for assistance with activity based on assessment  - Modify environment to reduce risk of injury  - Consider OT/PT consult to assist with strengthening/mobility  4/30/2021 2058 by Tabatha Bettencourt RN  Outcome: Progressing  4/30/2021 2058 by Tabatha Bettencourt RN  Outcome: Progressing  Goal: Maintain or return to baseline ADL function  Description: INTERVENTIONS:  -  Assess patient's ability to carry out ADLs; assess patient's baseline for ADL function and identify physical deficits which impact ability to perform ADLs (bathing, care of mouth/teeth, toileting, grooming, dressing, etc )  - Assess/evaluate cause of self-care deficits   - Assess range of motion  - Assess patient's mobility; develop plan if impaired  - Assess patient's need for assistive devices and provide as appropriate  - Encourage maximum independence but intervene and supervise when necessary  - Involve family in performance of ADLs  - Assess for home care needs following discharge   - Consider OT consult to assist with ADL evaluation and planning for discharge  - Provide patient education as appropriate  Outcome: Progressing  Goal: Maintain or return mobility status to optimal level  Description: INTERVENTIONS:  - Assess patient's baseline mobility status (ambulation, transfers, stairs, etc )    - Identify cognitive and physical deficits and behaviors that affect mobility  - Identify mobility aids required to assist with transfers and/or ambulation (gait belt, sit-to-stand, lift, walker, cane, etc )  - [place] fall precautions as indicated by assessment  - Record patient progress and toleration of activity level on Mobility SBAR; progress patient to next Phase/Stage  - Instruct patient to call for assistance with activity based on assessment  - Consider rehabilitation consult to assist with strengthening/weightbearing, etc   Outcome: Progressing     Problem: DISCHARGE PLANNING  Goal: Discharge to home or other facility with appropriate resources  Description: INTERVENTIONS:  - Identify barriers to discharge w/patient and caregiver  - Arrange for needed discharge resources and transportation as appropriate  - Identify discharge learning needs (meds, wound care, etc )  - Arrange for interpretive services to assist at discharge as needed  - Refer to Case Management Department for coordinating discharge planning if the patient needs post-hospital services based on physician/advanced practitioner order or complex needs related to functional status, cognitive ability, or social support system  Outcome: Progressing     Problem: Knowledge Deficit  Goal: Patient/family/caregiver demonstrates understanding of disease process, treatment plan, medications, and discharge instructions  Description: Complete learning assessment and assess knowledge base    Interventions:  - Provide teaching at level of understanding  - Provide teaching via preferred learning methods  Outcome: Progressing

## 2021-05-02 ENCOUNTER — TELEPHONE (OUTPATIENT)
Dept: OTHER | Facility: OTHER | Age: 66
End: 2021-05-02

## 2021-05-02 LAB
AMYLASE FLD QL: 1866 U/L
AMYLASE SERPL-CCNC: 16 IU/L (ref 25–115)
ANION GAP SERPL CALCULATED.3IONS-SCNC: 5 MMOL/L (ref 4–13)
ANION GAP SERPL CALCULATED.3IONS-SCNC: 6 MMOL/L (ref 4–13)
BASOPHILS # BLD AUTO: 0.07 THOUSANDS/ΜL (ref 0–0.1)
BASOPHILS NFR BLD AUTO: 1 % (ref 0–1)
BUN SERPL-MCNC: 10 MG/DL (ref 5–25)
BUN SERPL-MCNC: 12 MG/DL (ref 5–25)
CALCIUM SERPL-MCNC: 9 MG/DL (ref 8.3–10.1)
CALCIUM SERPL-MCNC: 9.6 MG/DL (ref 8.3–10.1)
CHLORIDE SERPL-SCNC: 105 MMOL/L (ref 100–108)
CHLORIDE SERPL-SCNC: 99 MMOL/L (ref 100–108)
CO2 SERPL-SCNC: 28 MMOL/L (ref 21–32)
CO2 SERPL-SCNC: 31 MMOL/L (ref 21–32)
CREAT SERPL-MCNC: 0.52 MG/DL (ref 0.6–1.3)
CREAT SERPL-MCNC: 0.58 MG/DL (ref 0.6–1.3)
EOSINOPHIL # BLD AUTO: 0.58 THOUSAND/ΜL (ref 0–0.61)
EOSINOPHIL NFR BLD AUTO: 6 % (ref 0–6)
ERYTHROCYTE [DISTWIDTH] IN BLOOD BY AUTOMATED COUNT: 14.6 % (ref 11.6–15.1)
GFR SERPL CREATININE-BSD FRML MDRD: 101 ML/MIN/1.73SQ M
GFR SERPL CREATININE-BSD FRML MDRD: 97 ML/MIN/1.73SQ M
GLUCOSE SERPL-MCNC: 121 MG/DL (ref 65–140)
GLUCOSE SERPL-MCNC: 122 MG/DL (ref 65–140)
HCT VFR BLD AUTO: 38.9 % (ref 34.8–46.1)
HGB BLD-MCNC: 12.7 G/DL (ref 11.5–15.4)
IMM GRANULOCYTES # BLD AUTO: 0.04 THOUSAND/UL (ref 0–0.2)
IMM GRANULOCYTES NFR BLD AUTO: 0 % (ref 0–2)
LYMPHOCYTES # BLD AUTO: 1.32 THOUSANDS/ΜL (ref 0.6–4.47)
LYMPHOCYTES NFR BLD AUTO: 13 % (ref 14–44)
MAGNESIUM SERPL-MCNC: 2.1 MG/DL (ref 1.6–2.6)
MCH RBC QN AUTO: 30 PG (ref 26.8–34.3)
MCHC RBC AUTO-ENTMCNC: 32.6 G/DL (ref 31.4–37.4)
MCV RBC AUTO: 92 FL (ref 82–98)
MONOCYTES # BLD AUTO: 0.64 THOUSAND/ΜL (ref 0.17–1.22)
MONOCYTES NFR BLD AUTO: 6 % (ref 4–12)
NEUTROPHILS # BLD AUTO: 7.42 THOUSANDS/ΜL (ref 1.85–7.62)
NEUTS SEG NFR BLD AUTO: 74 % (ref 43–75)
NRBC BLD AUTO-RTO: 0 /100 WBCS
PLATELET # BLD AUTO: 541 THOUSANDS/UL (ref 149–390)
PMV BLD AUTO: 11.2 FL (ref 8.9–12.7)
POTASSIUM SERPL-SCNC: 2.8 MMOL/L (ref 3.5–5.3)
POTASSIUM SERPL-SCNC: 3.6 MMOL/L (ref 3.5–5.3)
RBC # BLD AUTO: 4.23 MILLION/UL (ref 3.81–5.12)
SODIUM SERPL-SCNC: 136 MMOL/L (ref 136–145)
SODIUM SERPL-SCNC: 138 MMOL/L (ref 136–145)
WBC # BLD AUTO: 10.07 THOUSAND/UL (ref 4.31–10.16)

## 2021-05-02 PROCEDURE — 83735 ASSAY OF MAGNESIUM: CPT | Performed by: SURGERY

## 2021-05-02 PROCEDURE — 80048 BASIC METABOLIC PNL TOTAL CA: CPT | Performed by: SURGERY

## 2021-05-02 PROCEDURE — 85025 COMPLETE CBC W/AUTO DIFF WBC: CPT | Performed by: SURGERY

## 2021-05-02 PROCEDURE — 82150 ASSAY OF AMYLASE: CPT | Performed by: SURGERY

## 2021-05-02 PROCEDURE — 99024 POSTOP FOLLOW-UP VISIT: CPT | Performed by: SURGERY

## 2021-05-02 PROCEDURE — NC001 PR NO CHARGE: Performed by: SURGERY

## 2021-05-02 RX ORDER — POTASSIUM CHLORIDE 20 MEQ/1
40 TABLET, EXTENDED RELEASE ORAL ONCE
Status: COMPLETED | OUTPATIENT
Start: 2021-05-02 | End: 2021-05-02

## 2021-05-02 RX ORDER — BISACODYL 10 MG
10 SUPPOSITORY, RECTAL RECTAL DAILY
Status: DISCONTINUED | OUTPATIENT
Start: 2021-05-02 | End: 2021-05-03 | Stop reason: HOSPADM

## 2021-05-02 RX ORDER — POLYETHYLENE GLYCOL 3350 17 G/17G
17 POWDER, FOR SOLUTION ORAL ONCE
Status: COMPLETED | OUTPATIENT
Start: 2021-05-02 | End: 2021-05-02

## 2021-05-02 RX ORDER — POTASSIUM CHLORIDE 14.9 MG/ML
20 INJECTION INTRAVENOUS
Status: COMPLETED | OUTPATIENT
Start: 2021-05-02 | End: 2021-05-02

## 2021-05-02 RX ADMIN — ACETAMINOPHEN 975 MG: 325 TABLET, FILM COATED ORAL at 22:22

## 2021-05-02 RX ADMIN — ACETAMINOPHEN 975 MG: 325 TABLET, FILM COATED ORAL at 05:50

## 2021-05-02 RX ADMIN — METOCLOPRAMIDE HYDROCHLORIDE 10 MG: 5 INJECTION INTRAMUSCULAR; INTRAVENOUS at 00:00

## 2021-05-02 RX ADMIN — BISACODYL 10 MG: 10 SUPPOSITORY RECTAL at 18:31

## 2021-05-02 RX ADMIN — POTASSIUM CHLORIDE 20 MEQ: 14.9 INJECTION, SOLUTION INTRAVENOUS at 13:23

## 2021-05-02 RX ADMIN — OXYCODONE HYDROCHLORIDE 10 MG: 10 TABLET ORAL at 05:51

## 2021-05-02 RX ADMIN — POLYETHYLENE GLYCOL 3350 17 G: 17 POWDER, FOR SOLUTION ORAL at 18:31

## 2021-05-02 RX ADMIN — OXYCODONE HYDROCHLORIDE 5 MG: 5 TABLET ORAL at 22:21

## 2021-05-02 RX ADMIN — PANTOPRAZOLE SODIUM 40 MG: 40 TABLET, DELAYED RELEASE ORAL at 05:50

## 2021-05-02 RX ADMIN — ACETAMINOPHEN 975 MG: 325 TABLET, FILM COATED ORAL at 00:00

## 2021-05-02 RX ADMIN — HEPARIN SODIUM 5000 UNITS: 5000 INJECTION INTRAVENOUS; SUBCUTANEOUS at 00:00

## 2021-05-02 RX ADMIN — POTASSIUM CHLORIDE 40 MEQ: 1500 TABLET, EXTENDED RELEASE ORAL at 14:44

## 2021-05-02 RX ADMIN — METOCLOPRAMIDE HYDROCHLORIDE 10 MG: 5 INJECTION INTRAMUSCULAR; INTRAVENOUS at 05:50

## 2021-05-02 RX ADMIN — POTASSIUM CHLORIDE 40 MEQ: 1500 TABLET, EXTENDED RELEASE ORAL at 11:22

## 2021-05-02 RX ADMIN — METOCLOPRAMIDE HYDROCHLORIDE 10 MG: 5 INJECTION INTRAMUSCULAR; INTRAVENOUS at 11:23

## 2021-05-02 RX ADMIN — ACETAMINOPHEN 975 MG: 325 TABLET, FILM COATED ORAL at 13:24

## 2021-05-02 RX ADMIN — POTASSIUM CHLORIDE 20 MEQ: 14.9 INJECTION, SOLUTION INTRAVENOUS at 11:23

## 2021-05-02 RX ADMIN — OXYCODONE HYDROCHLORIDE 5 MG: 5 TABLET ORAL at 10:01

## 2021-05-02 RX ADMIN — HEPARIN SODIUM 5000 UNITS: 5000 INJECTION INTRAVENOUS; SUBCUTANEOUS at 09:54

## 2021-05-02 NOTE — DISCHARGE INSTRUCTIONS
Surgery Discharge Instructions    Please follow-up as instructed  Activity:  - No lifting greater than 20 pounds or strenuous physical activity or exercise for at least 2 weeks  - Walking and normal light activities are encouraged  - Normal daily activities including climbing steps are okay  - No driving until no longer using pain medications  Diet:    - You may resume your normal diet  Wound Care:  - Your incisions were closed with staples  - Do NOT pick at [facility]  Dr Gina Mclain will remove them in the office   - You may shower and let soapy water run over your incisions, then gently dab dry  Do NOT scrub  No tub baths or swimming until cleared by your surgeon   - Wash incision gently with soap and water and pat dry  - Do not apply any creams or ointments unless instructed to do so by your surgeon     - Bruising is not unusual and will go away with a little time  You may apply a warm, moist compress that may help the bruising resolve quicker  Medications:    - You should continue your current medication regimen after discharge unless otherwise instructed  Please refer to your discharge medication list for further details  - Please take the pain medications as directed  - You may become constipated, especially if taking pain medications  You may take any over the counter stool softeners or laxatives as needed  Examples: Milk of Magnesia, Colace, Senna  Drain Care:  Please empty and record your YENY drain daily  Record the amount emptied from your YENY drain  Place the bulb back to suction after emptying    Additional Instructions:  - If you have any questions or concerns after discharge please call the office   - Call office or return to ER if fever greater than 101, chills, persistent nausea/vomiting, worsening/uncontrollable pain, and/or increasing redness or purulent/foul smelling drainage from incision(s)

## 2021-05-02 NOTE — PROGRESS NOTES
Progress Note - Surgical Oncology   Richy Frias 72 y o  female MRN: [id number]  [facility] Encounter: [id number]  05/02/21  10:10 AM      Subjective/Objective   No chief complaint on file  Subjective:  No complaints  Pain controlled    Objective:      Blood pressure 152/85, pulse 66, temperature 98 4 °F (36 9 °C), resp  rate 20, height 5' 2" (1 575 m), weight 53 3 kg (117 lb 8 1 oz), SpO2 91 %  ,Body mass index is 21 49 kg/m²  Intake/Output Summary (Last 24 hours) at 5/2/2021 1010  Last data filed at 5/1/2021 1701  Gross per 24 hour   Intake 300 ml   Output 230 ml   Net 70 ml       Invasive Devices     Peripheral Intravenous Line            Peripheral IV 04/30/21 Left Antecubital 2 days          Drain            Closed/Suction Drain Right Abdomen Bulb 19 Fr  5 days                Physical Exam:   Head and neck: is normocephalic  Neck is supple without adenopathy  Sclerae are anicteric  Mucous membranes are moist   Abdomen: Soft, nontender, nondistended, and without masses  Extremities: Without cyanosis, clubbing, or edema, symmetric  Neuro: Grossly nonfocal  Lymphatics: No cervical, axillary, or inguinal adenopathy bilaterally  Skin is warm and anicteric  Psych: Patient is pleasant and talkative              Lab Results:  Lab Results   Component Value Date    WBC 10 07 05/02/2021    HGB 12 7 05/02/2021    HCT 38 9 05/02/2021    MCV 92 05/02/2021     (H) 05/02/2021     Lab Results   Component Value Date    GLUCOSE 135 04/26/2021    CALCIUM 9 6 05/02/2021    K 2 8 (L) 05/02/2021    CO2 31 05/02/2021    CL 99 (L) 05/02/2021    BUN 10 05/02/2021    CREATININE 0 58 (L) 05/02/2021     No results found for: AFP  Lab Results   Component Value Date    CALCIUM 9 6 05/02/2021    PHOS 2 5 04/29/2021     No results found for: CEA        Assessment:  42-year-old female status post pyloric preserving pancreaticoduodenectomy    Plan:  Diet as tolerated  Await drain amylase    Likely drain removal prior to discharge later today      Zahraa Lopez MD  10:10 AM

## 2021-05-02 NOTE — TELEPHONE ENCOUNTER
[phone] / [address] [facility] home care / PT   Adron Aver [phone]/ PT is leaving hospital today was referred to home care, needs a doctor to sign order

## 2021-05-02 NOTE — CASE MANAGEMENT
CM informed pt is medically stable for d/c today  Pt will be discharged home with [facility]: SN/PT/OT  CM informed [facility] of same via [facility] [place] tomorrow 5/3  CM informed pt of same  AVS faxed to [facility] as requested F: [phone]  Pts family will provide transport home

## 2021-05-02 NOTE — PROGRESS NOTES
Progress Note - Surgical Oncology  Je Langston 72 y o  female MRN: [id number]  Unit/Bed#: [facility] 832-01 Encounter: [id number]    Assessment:  71 yo F s/p pylorus preserving pancreaticoduodenectomy 4/26     Andrea: 30 cc day shift, night shift not recorded, serous  Plan:  -Diet: post gastrectomy   -I/Os  -DVT ppx  -Control Pain/Nausea   -OOB as tolerated, PT/OT  -Incentive Spirometry  -continue drain until dc consider removal pending repeat amylase level  -plan for dc today      Subjective/Objective     Subjective:   Patient seen and examined at bedside  No acute events overnight  Pain controlled  Denies nausea, vomiting, fever, chills  Positive flatus, denies bowel movement  Up out of bed, ambulating    Objective:    Blood pressure 152/85, pulse 66, temperature 98 4 °F (36 9 °C), resp  rate 20, height 5' 2" (1 575 m), weight 53 3 kg (117 lb 8 1 oz), SpO2 91 %  ,Body mass index is 21 49 kg/m²        Intake/Output Summary (Last 24 hours) at 5/2/2021 0804  Last data filed at 5/1/2021 1701  Gross per 24 hour   Intake 300 ml   Output 230 ml   Net 70 ml       Invasive Devices     Peripheral Intravenous Line            Peripheral IV 04/30/21 Left Antecubital 1 day          Drain            Closed/Suction Drain Right Abdomen Bulb 19 Fr  5 days                Physical Exam:   General: NAD  Head: normocephalic, atraumatic  CV: Pulse regular  Lungs: no conversational dyspnea  Abdomen: soft, non distended, non tender, no guarding or rebound, incision c/d/i, chris with ss output   Extremities: [place], motor sensory intact  Neuro: awake, alert, answers questions appropriately        Results from last 7 days   Lab Units 05/02/21  0542 05/01/21  0634 04/30/21  0949   WBC Thousand/uL 10 07 10 99* 15 70*   HEMOGLOBIN g/dL 12 7 11 6 12 5   HEMATOCRIT % 38 9 35 6 38 7   PLATELETS Thousands/uL 541* 486* 507*     Results from last 7 days   Lab Units 05/02/21  0542 05/01/21  0634 04/30/21  0949  04/26/21  1241   POTASSIUM mmol/L 2 8* 3  0* 3 7   < >  --    CHLORIDE mmol/L 99* 102 101   < >  --    CO2 mmol/L 31 29 28   < >  --    CO2, I-STAT mmol/L  --   --   --   --  23   BUN mg/dL 10 11 5   < >  --    CREATININE mg/dL 0 58* 0 49* 0 52*   < >  --    GLUCOSE, ISTAT mg/dl  --   --   --   --  135   CALCIUM mg/dL 9 6 9 2 9 4   < >  --     < > = values in this interval not displayed  I have personally reviewed pertinent films in PACS      Medications:   Scheduled Meds:  Current Facility-Administered Medications   Medication Dose Route Frequency Provider Last Rate    acetaminophen  975 mg Oral Q6H [address] Teri Hilton PA-C      diazepam  2 mg Oral Q6H PRN Teri Hilton PA-C      heparin (porcine)  5,000 Units Subcutaneous Q8H Teri Hilton PA-C      HYDROmorphone  0 5 mg Intravenous Q2H PRN Marsha Brunner MD      Labetalol HCl  10 mg Intravenous Q4H PRN Marsha Brunner MD      metoclopramide  10 mg Intravenous Q6H [address] Janice De Leon MD      oxyCODONE  10 mg Oral Q4H PRN Teri Hilton PA-C      oxyCODONE  5 mg Oral Q4H PRN Teri Hilton PA-C      pantoprazole  40 mg Oral Early Morning Teri Hilton PA-C      phenol  1 spray Mouth/Throat Q2H PRN Marsha Brunner MD      saliva substitute  5 spray Mouth/Throat 4x Daily PRN Marsha Brunner MD       Continuous Infusions:   PRN Meds:  diazepam, 2 mg, Q6H PRN  HYDROmorphone, 0 5 mg, Q2H PRN  Labetalol HCl, 10 mg, Q4H PRN  oxyCODONE, 10 mg, Q4H PRN  oxyCODONE, 5 mg, Q4H PRN  phenol, 1 spray, Q2H PRN  saliva substitute, 5 spray, 4x Daily PRN      VTE Pharmacologic Prophylaxis: Heparin  VTE Mechanical Prophylaxis: sequential compression device

## 2021-05-03 VITALS
OXYGEN SATURATION: 94 % | WEIGHT: 117.5 LBS | HEIGHT: 62 IN | SYSTOLIC BLOOD PRESSURE: 139 MMHG | BODY MASS INDEX: 21.62 KG/M2 | HEART RATE: 70 BPM | RESPIRATION RATE: 16 BRPM | DIASTOLIC BLOOD PRESSURE: 81 MMHG | TEMPERATURE: 97.8 F

## 2021-05-03 PROCEDURE — 99024 POSTOP FOLLOW-UP VISIT: CPT | Performed by: SURGERY

## 2021-05-03 PROCEDURE — NC001 PR NO CHARGE: Performed by: SURGERY

## 2021-05-03 RX ORDER — OXYCODONE HYDROCHLORIDE 5 MG/1
5 TABLET ORAL EVERY 6 HOURS PRN
Qty: 16 TABLET | Refills: 0 | Status: SHIPPED | OUTPATIENT
Start: 2021-05-03 | End: 2021-05-07

## 2021-05-03 RX ADMIN — ACETAMINOPHEN 975 MG: 325 TABLET, FILM COATED ORAL at 05:53

## 2021-05-03 RX ADMIN — OXYCODONE HYDROCHLORIDE 5 MG: 5 TABLET ORAL at 05:53

## 2021-05-03 RX ADMIN — PANTOPRAZOLE SODIUM 40 MG: 40 TABLET, DELAYED RELEASE ORAL at 05:53

## 2021-05-03 RX ADMIN — ACETAMINOPHEN 975 MG: 325 TABLET, FILM COATED ORAL at 11:46

## 2021-05-03 NOTE — PROGRESS NOTES
Progress Note - Surgical Oncology  Shannon Cartwright 72 y o  female MRN: [id number]  Unit/Bed#: [facility] 832-01 Encounter: [id number]    Assessment:  73 yo F s/p pylorus preserving pancreaticoduodenectomy 4/26     YENY amylase:1,866- output poorly recorded yday  Only 30 recorded during day  Yellow consisteny  HTN, OVSS    +BM/+Flatus    Plan:  Continue post gastrectomy diet  Bowel regimen with miralax and suppository  DC home today with drain and VNA  OOB/ambulate  DVT ppx        Subjective/Objective     Subjective:   Doing well no complaints  Wants to go home  Passing flatus, had bm  No fevers/chills  Objective:    Blood pressure (!) 184/82, pulse 70, temperature 98 6 °F (37 °C), resp  rate 18, height 5' 2" (1 575 m), weight 53 3 kg (117 lb 8 1 oz), SpO2 94 %  ,Body mass index is 21 49 kg/m²  Intake/Output Summary (Last 24 hours) at 5/3/2021 0021  Last data filed at 5/2/2021 1417  Gross per 24 hour   Intake 325 ml   Output 30 ml   Net 295 ml       Invasive Devices     Drain            Closed/Suction Drain Right Abdomen Bulb 19 Fr  6 days                Physical Exam:   General: NAD  HENT: NCAT MMM  Neck: supple, no JVD  CV: nl rate  Lungs: nl wob  No resp distress  ABD: Soft, nontender, nondistended  Incision c/d/i  YENY in place with yellow output    Extrem: No CCE  Neuro: AAOx3          Results from last 7 days   Lab Units 05/02/21  0542 05/01/21  0634 04/30/21  0949   WBC Thousand/uL 10 07 10 99* 15 70*   HEMOGLOBIN g/dL 12 7 11 6 12 5   HEMATOCRIT % 38 9 35 6 38 7   PLATELETS Thousands/uL 541* 486* 507*     Results from last 7 days   Lab Units 05/02/21  1620 05/02/21  0542 05/01/21  0634  04/26/21  1241   POTASSIUM mmol/L 3 6 2 8* 3 0*   < >  --    CHLORIDE mmol/L 105 99* 102   < >  --    CO2 mmol/L 28 31 29   < >  --    CO2, I-STAT mmol/L  --   --   --   --  23   BUN mg/dL 12 10 11   < >  --    CREATININE mg/dL 0 52* 0 58* 0 49*   < >  --    GLUCOSE, ISTAT mg/dl  --   --   --   --  135   CALCIUM mg/dL 9 0 9 6 9 2   < >  --     < > = values in this interval not displayed  I have personally reviewed pertinent films in PACS      Medications:   Scheduled Meds:  Current Facility-Administered Medications   Medication Dose Route Frequency Provider Last Rate    acetaminophen  975 mg Oral Q6H [address] Teri Hilton PA-C      bisacodyl  10 mg Rectal Daily Rad Shay DO      diazepam  2 mg Oral Q6H PRN Teri Hilton PA-C      heparin (porcine)  5,000 Units Subcutaneous Q8H Teri Hilton PA-C      HYDROmorphone  0 5 mg Intravenous Q2H PRN Phillip Whitfield MD      Labetalol HCl  10 mg Intravenous Q4H PRN Phillip Whitfield MD      metoclopramide  10 mg Intravenous Q6H [address] Ganesh Florian MD      oxyCODONE  10 mg Oral Q4H PRN Teri Hilton PA-C      oxyCODONE  5 mg Oral Q4H PRN Teri Hilton PA-C      pantoprazole  40 mg Oral Early Morning Teri Hilton PA-C      phenol  1 spray Mouth/Throat Q2H PRN Phillip Whitfield MD      saliva substitute  5 spray Mouth/Throat 4x Daily PRN Phillip Whitfield MD       Continuous Infusions:   PRN Meds:  diazepam, 2 mg, Q6H PRN  HYDROmorphone, 0 5 mg, Q2H PRN  Labetalol HCl, 10 mg, Q4H PRN  oxyCODONE, 10 mg, Q4H PRN  oxyCODONE, 5 mg, Q4H PRN  phenol, 1 spray, Q2H PRN  saliva substitute, 5 spray, 4x Daily PRN

## 2021-05-03 NOTE — DISCHARGE SUMMARY
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]    HPI:  70-year-old female with no significant past medical history presented to the emergency room for some nausea and general fatigue on February 18, 2021  She underwent a workup which included a CT with evidence of intra and extrahepatic biliary dilation, however no underlying mass seen  She had a follow-up MRI which revealed the same findings without a mass  Upon discharge she had an EUS completed on March 15, 2021 which revealed a 2 x 1 6 centimeter mass in the major papilla that involve the distal common bile duct  Pathology report revealed an ampullary mass with suspicion for neoplasm  She followed up in the office with Dr Song Garner for discussion of further therapy versus surgical intervention  At the time of the office appointment she has vague nausea and epigastric discomfort, no weight loss or change in appetite  No previous history of pancreatitis in the past     Per Dr Song Garner it was recommended that she undergo surgical intervention  Risks and benefits were discussed with the patient as well as postoperative management and follow-up appointments  Patient was in agreement with plan  Procedures Performed:   04/26/2021:  Pylorus preserving Whipple by Dr Sera Lundy Course:  Patient underwent surgical intervention without complications  Intraoperative findings included a frozen section of the pancreas and bile duct which were negative  There was no evidence of metastatic disease  Immediately postoperatively patient was transferred to the intensive care unit for acute postoperative management overnight    She was made NPO, had an NG tube in place, YENY drain in place, epidural in place for pain control, started on IV fluids, Mercer catheter in place, strict I's and O's were maintained she was re-evaluated on postoperative day 1  On postoperative day 1 she was transferred out of the intensive care unit to the medical surgical floor as a level 2 step-down patient  Acute pain service was consulted in order to assist with postoperative pain control as an epidural was in place  On postoperative day 2 the patient's NG tube was removed, she was started on a clear liquid diet, and her Mercer was removed  Her diet was slowly advanced there after, her YENY drain remained serosanguineous, and her YENY amylase and serum amylase were checked  By postoperative day 7 patient was tolerating post gastrectomy diet without nausea or vomiting, she was out of bed and ambulating throughout the halls, her pain was controlled on p o  Pain control regimen, her YENY drain had serous fluid consistent with pancreatic fistula leak, she was urinating without any difficulties  PT/OT/VNA services were set up for patient at home  She was discharged with her YENY drain in place and educated on recording outputs and appearance is daily  Discharge instructions as well as postoperative follow-up appointments were discussed with the patient  Patient was in agreement with plan  Oxycodone 5 milligrams, 16 tablets, no refills were sent [facility]  PT/OT/cm were all consulted and remained on board throughout her hospital stay for early post operative ambulation and discharge needs  Significant Findings, Care, Treatment and Services Provided: CF1-3  Pancreatic and bile duct margins:   - The pancreatic and bile duct margins are negative for malignancy and high grade dysplasia  Lab Results: I have personally reviewed pertinent lab results: yes    Complications:  Pancreatic fistula, postop, drain in place    Discharge Diagnosis:     Suha-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 by Dr Fish Maldonado    Discharge Date:  05/03/2021    Condition at Discharge:  Good     Discharge instructions/Information to patient and family:   See after visit summary for information provided to patient and family  Provisions for Follow-Up Care:  See after visit summary for information related to follow-up care and any pertinent home health orders  Disposition:  Home with home PT OT and VNA services    Planned Readmission:  No  Discharge Statement   I spent 30 minutes discharging the patient  This time was spent on the day of discharge  I had direct contact with the patient on the day of discharge  Additional documentation is required if more than 30 minutes were spent on discharge  Discharge Medications:  See after visit summary for reconciled discharge medications provided to patient and family        Lionel Casey PA-C

## 2021-05-04 ENCOUNTER — TRANSITIONAL CARE MANAGEMENT (OUTPATIENT)
Dept: FAMILY MEDICINE CLINIC | Facility: CLINIC | Age: 66
End: 2021-05-04

## 2021-05-05 ENCOUNTER — TREATMENT (OUTPATIENT)
Dept: SURGICAL ONCOLOGY | Facility: CLINIC | Age: 66
End: 2021-05-05

## 2021-05-05 DIAGNOSIS — D49.0 NEOPLASM OF AMPULLA OF VATER: Primary | ICD-10-CM

## 2021-05-12 ENCOUNTER — TELEPHONE (OUTPATIENT)
Dept: SURGICAL ONCOLOGY | Facility: CLINIC | Age: 66
End: 2021-05-12

## 2021-05-12 NOTE — TELEPHONE ENCOUNTER
Nkechi Covington from [employer] called to relay the message that Nathaniel Stock has denied her social work visit  Nkechi Covington stated she had spoken to Nathaniel Stock regarding hospital bills Nathaniel Stock owes and has denied the social work visit

## 2021-05-13 ENCOUNTER — OFFICE VISIT (OUTPATIENT)
Dept: SURGICAL ONCOLOGY | Facility: CLINIC | Age: 66
End: 2021-05-13
Payer: MEDICARE

## 2021-05-13 ENCOUNTER — TELEPHONE (OUTPATIENT)
Dept: SURGICAL ONCOLOGY | Facility: CLINIC | Age: 66
End: 2021-05-13

## 2021-05-13 VITALS
TEMPERATURE: 98.5 F | WEIGHT: 114.2 LBS | BODY MASS INDEX: 21.02 KG/M2 | DIASTOLIC BLOOD PRESSURE: 84 MMHG | HEART RATE: 114 BPM | RESPIRATION RATE: 16 BRPM | SYSTOLIC BLOOD PRESSURE: 130 MMHG | HEIGHT: 62 IN

## 2021-05-13 DIAGNOSIS — E44.1 MILD PROTEIN-CALORIE MALNUTRITION (HCC): ICD-10-CM

## 2021-05-13 DIAGNOSIS — C24.1 MALIGNANT NEOPLASM OF AMPULLA OF VATER (HCC): ICD-10-CM

## 2021-05-13 DIAGNOSIS — D49.0 NEOPLASM OF AMPULLA OF VATER: Primary | ICD-10-CM

## 2021-05-13 PROCEDURE — 99213 OFFICE O/P EST LOW 20 MIN: CPT | Performed by: SURGERY

## 2021-05-13 NOTE — LETTER
May 13, 2021     [address]  [address]    Patient: Brandon Reyes   YOB: 1955   Date of Visit: 5/13/2021       Dear Dr Jun Matthews: Thank you for referring Brandon Reyes to me for evaluation  Below are my notes for this consultation  If you have questions, please do not hesitate to call me  I look forward to following your patient along with you  Sincerely,        Wes Fowler MD        CC: MD Wes Marlow MD  5/13/2021  9:28 AM  Sign when Signing Visit               Surgical Oncology Follow Up       [address] [facility] [place]  [address] [facility] [address]  [phone]    Brandon Reyes  1955  [id number]  [address] [facility] [address] [id number] [phone]    Diagnoses and all orders for this visit:    Neoplasm of ampulla of Vater  -     CT chest abdomen pelvis w contrast; Future  -     Ambulatory referral to Hematology / Oncology; Future  -     Ambulatory referral to Radiation Oncology; Future  -     BUN; Future  -     Creatinine, serum; Future  -     Cancer antigen 19-9; Future  -     Ambulatory Referral to Oncology Genetics; Future    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)   -     Cancer antigen 19-9; Future    Mild protein-calorie malnutrition ([facility] [address] )    Other orders  -     Cancel: Incentive spirometry; Standing  -     Cancel: Insert and maintain IV line; Standing  -     Cancel: Void On-Call to O R ; Standing  -     Cancel: Place sequential compression device; Standing  -     Cancel: Case request operating room: INSERTION VENOUS PORT (PORT-A-CATH); Standing        Chief Complaint   Patient presents with    Post-op     Patient here for a post op visit after a whipple  Incision healing well, drain intact  about 15 mL/day output for the YENY drain  Staples to be removed       Return in about 6 months (around 11/13/2021) for Office Visit, Imaging - See orders, Labs - See Treatment Plan  Oncology History    No history exists  Staging: [id number] periampullary carcinoma, April 2021  Treatment history:  Pancreaticoduodenectomy, April 2021  Current treatment:  Chemotherapy pending  Disease status: SEDA    History of Present Illness:  Patient returns after her pancreaticoduodenectomy  She is doing well  Her appetite is slowly improving  No nausea or vomiting  She is eating small frequent meals  Final pathology revealed periampullary and ampullary tumor with a positive node  Review of Systems  Complete ROS Surg Onc:   Complete ROS Surg Onc:   Constitutional: The patient denies new or recent history of general fatigue, no recent weight loss, no change in appetite  Eyes: No complaints of visual problems, no scleral icterus  ENT: no complaints of ear pain, no hoarseness, no difficulty swallowing,  no tinnitus and no new masses in head, oral cavity, or neck  Cardiovascular: No complaints of chest pain, no palpitations, no ankle edema  Respiratory: No complaints of shortness of breath, no cough  Gastrointestinal: No complaints of jaundice, no bloody stools, no pale stools  Genitourinary: No complaints of dysuria, no hematuria, no nocturia, no frequent urination, no urethral discharge  Musculoskeletal: No complaints of weakness, paralysis, joint stiffness or arthralgias  Integumentary: No complaints of rash, no new lesions  Neurological: No complaints of convulsions, no seizures, no dizziness  Hematologic/Lymphatic: No complaints of easy bruising  Endocrine:  No hot or cold intolerance  No polydipsia, polyphagia, or polyuria  Allergy/immunology:  No environmental allergies  No food allergies  Not immunocompromised  Skin:  No pallor or rash  Surgical wound          Patient Active Problem List   Diagnosis    Elevated LFTs    Dilated bile duct    Neoplasm of ampulla of Vater    Mild protein-calorie malnutrition (HCC)     No past medical history on file  Past Surgical History:   Procedure Laterality Date    ABLATION SOFT TISSUE N/A 4/26/2021    Procedure: INTRAOPERATIVE ULTRASOUND, ABLATION,SOFT TISSUE PANCREAS;  Surgeon: Narendra López MD;  [facility];  Service: Surgical Oncology    CHOLECYSTECTOMY N/A 4/26/2021    Procedure: CHOLECYSTECTOMY;  Surgeon: Narendra López MD;  [facility];  Service: Surgical Oncology    HYSTERECTOMY      LAPAROTOMY N/A 4/26/2021    Procedure: LAPAROTOMY EXPLORATORY;  Surgeon: Narendra López MD;  [facility];  Service: Surgical Oncology    SINUS SURGERY      WHIPPLE PROCEDURE/PANCREATICO-DUODENECTOMY N/A 4/26/2021    Procedure:  WHIPPLE PROCEDURE/PANCREATICO-DUODENECTOMY; PYLORIC PRESERVING;  Surgeon: Narendra López MD;  [facility];  Service: Surgical Oncology     Family History   Problem Relation Age of Onset    Ulcerative colitis Mother     Heart disease Brother      Social History     Socioeconomic History    Marital status: Single     Spouse name: Not on file    Number of children: Not on file    Years of education: Not on file    Highest education level: Not on file   Occupational History    Not on file   Social Needs    Financial resource strain: Not on file    Food insecurity     Worry: Not on file     Inability: Not on file    Transportation needs     Medical: Not on file     Non-medical: Not on file   Tobacco Use    Smoking status: Current Every Day Smoker     Packs/day: 0 50    Smokeless tobacco: Never Used    Tobacco comment: education given   Substance and Sexual Activity    Alcohol use: Never     Frequency: Never    Drug use: Never    Sexual activity: Never   Lifestyle    Physical activity     Days per week: Not on file     Minutes per session: Not on file    Stress: Not on file   Relationships    Social connections     Talks on phone: Not on file     Gets together: Not on file     Attends Christian service: Not on file     Active member of club or organization: Not on file     Attends meetings of clubs or organizations: Not on file     Relationship status: Not on file    Intimate partner violence     Fear of current or ex partner: Not on file     Emotionally abused: Not on file     Physically abused: Not on file     Forced sexual activity: Not on file   Other Topics Concern    Not on file   Social History Narrative    Not on file       Current Outpatient Medications:     ondansetron (ZOFRAN-ODT) 4 mg disintegrating tablet, Take 1 tablet (4 mg total) by mouth every 6 (six) hours as needed for nausea or vomiting, Disp: 20 tablet, Rfl: 0  Allergies   Allergen Reactions    Penicillins     Tetracycline      Vitals:    05/13/21 0836   BP: 130/84   Pulse: (!) 114   Resp: 16   Temp: 98 5 °F (36 9 °C)       Physical Exam  Constitutional: General appearance: The Patient is well-developed and well-nourished who appears the stated age in no acute distress  Patient is pleasant and talkative  HEENT:  Normocephalic  Sclerae are anicteric  Mucous membranes are moist  Neck is supple without adenopathy  No JVD  Chest: The lungs are clear to auscultation  Cardiac: Heart is regular rate  Abdomen: Abdomen is soft, non-tender, non-distended and without masses  Incision is C/D/I    Extremities: There is no clubbing or cyanosis  There is no edema  Symmetric  Neuro: Grossly nonfocal  Gait is normal      Lymphatic: No evidence of cervical adenopathy bilaterally  Skin: Warm, anicteric  Psych:  Patient is pleasant and talkative  Breasts:        Pathology:  Final Diagnosis   A   Gallbladder, cholecystectomy:       - Chronic cholecystitis  - No malignancy identified      - Reactive periductal lymph node with lipid granulomata; negative for malignancy (0/1)      B  Celiac lymph node:     - Single lymph node; negative for malignancy (0/1)      C  Whipple resection:     - Invasive poorly differentiated mucinous adenocarcinoma  - Tumor arises in the background of an adenoma with high grade dysplasia  - Lymphatic and venous channel invasion by tumor present  - Metastatic carcinoma present in one of twenty lymph nodes (1/20)  - All margins are negative for tumor       Electronically signed by Taina Wynne MD on 5/5/2021 at  2:59 PM   Comments: This is an appended report  These results have been appended to a previously preliminary verified report  Additional Information    All reported additional testing was performed with appropriately reactive controls   These tests were developed and their performance characteristics determined by [facility] or appropriate performing facility, though some tests may be performed on tissues which have not been validated for performance characteristics (such as staining performed on alcohol exposed cell blocks and decalcified tissues)   Results should be interpreted with caution and in the context of the patients clinical condition  These tests may not be cleared or approved by the [place]  Food and Drug Administration, though the FDA has determined that such clearance or approval is not necessary  These tests are used for clinical purposes and they should not be regarded as investigational or for research  This laboratory has been approved by [place] [employer], designated as a high-complexity laboratory and is qualified to perform these tests     - Interpretation performed at [facility], [address]    Comment: This is an appended report  These results have been appended to a previously preliminary verified report     Synoptic Checklist   AMPULLA OF VATER  AMPULLA OF VATER: AMPULLECTOMY, PANCREATICODUODENECTOMY - All Specimens  8th Edition - Protocol posted: 2/26/2020  SPECIMEN   Procedure  Pancreaticoduodenectomy (Whipple resection)    TUMOR   Tumor Site Intra-ampullary and cristine-ampullary (mixed type)    Histologic Type  Mucinous adenocarcinoma    Histologic Grade  G3: Poorly differentiated    Tumor Size  Greatest Dimension (Centimeters): 2 5 cm   Additional Dimension (Centimeters)  2 3 cm     1 cm   Tumor Extension  Tumor invades into muscularis propria of the duodenum      Tumor extends more than 0 5 cm into pancreas      Tumor extends into peripancreatic soft tissues      Tumor extends into periduodenal tissue    Lymphovascular Invasion  Present    Perineural Invasion  Not identified    MARGINS   Margins     Pancreatic Neck / Parenchymal Margin  Uninvolved by invasive carcinoma and pancreatic high-grade intraepithelial neoplasia    Distance of Invasive Carcinoma from Margin  3 5 cm   Uncinate (Retroperitoneal / Superior Mesenteric Artery) Margin  Uninvolved by invasive carcinoma    Distance of Invasive Carcinoma from Margin  4 0 cm   Bile Duct Margin  Uninvolved by invasive carcinoma and high-grade intraepithelial neoplasia    Distance of Invasive Carcinoma from Margin  3 3 cm   Proximal Margin (Gastric or Duodenal)  Uninvolved by invasive carcinoma and high-grade intraepithelial neoplasia    Distal Margin (Distal Duodenal or Jejunal)  Uninvolved by invasive carcinoma and high-grade intraepithelial neoplasia    LYMPH NODES   Number of Lymph Nodes Involved  1    Number of Lymph Nodes Examined  22    PATHOLOGIC STAGE CLASSIFICATION (pTNM, AJCC 8th Edition)      Primary Tumor (pT)  pT3b    Regional Lymph Nodes (pN)  pN1    ADDITIONAL FINDINGS   Additional Findings  Dysplasia / adenoma      PanIN 1B    Comment(s)   Comment(s)  AJCC Prognostic Stage Group (8th Ed ):  IIIA - pT3a, pN1, MX, G3             Labs:      Imaging  Xr Abdomen 1 View Kub    Result Date: 4/26/2021  Narrative: ABDOMEN INDICATION: Needle Miscount COMPARISON:  CT dated April 11, 2021 VIEWS:  AP supine Images: 2 FINDINGS: X-rays limited by pending on the operative table   NG tube, surgical drain, and numerous surgical clips are seen  No radiopaque needles are identified on this exam  As this is a perioperative x-ray, free air is expected  Rib cartilage calcifications are seen  Arterial atherosclerosis in the pelvis  Visualized lung bases are clear  Visualized osseous structures are unremarkable for the patient's age  Impression: No missing needle identified  I personally discussed this study with the x-ray technologist in the OR at the time on 4/26/2021 at 2:44 PM  Workstation performed: [id number]     I reviewed the above laboratory and imaging data  Discussion/Summary:  72 year female status post pancreaticoduodenectomy for a [id number] periampullary/ampullary carcinoma  We had a long discussion regarding treatment options  She will continue to eat small frequent meals  I have recommended that she undergo chemotherapy  She will need a port for chemotherapy  The risks were explained including bleeding, infection, need for further surgery, wound complications, port malfunction, DVT, pneumothorax  Informed consent was obtained  We will schedule this at our earliest mutual convenience, once she has seen Medical Oncology  We will set her up with Medical Oncology  I also discussed reasoning and rationale behind adjuvant radiation  I will set her up with radiation oncology as well  Given her new diagnosis, we will plan on having her set up with Medical Genetics as well  I will see her again in 6 months with a repeat CA 19-9 and CT  She is agreeable to this  All her questions were answered

## 2021-05-13 NOTE — PROGRESS NOTES
Surgical Oncology Follow Up       [address] [facility]  [address] [facility] [id number] [phone]    John Pauldda Conception  1955  [id number]  [address] [facility] [place]  [address] [facility] [address] [phone]    Diagnoses and all orders for this visit:    Neoplasm of ampulla of Vater  -     CT chest abdomen pelvis w contrast; Future  -     Ambulatory referral to Hematology / Oncology; Future  -     Ambulatory referral to Radiation Oncology; Future  -     BUN; Future  -     Creatinine, serum; Future  -     Cancer antigen 19-9; Future  -     Ambulatory Referral to Oncology Genetics; Future    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)   -     Cancer antigen 19-9; Future    Mild protein-calorie malnutrition ([facility] [address] )    Other orders  -     Cancel: Incentive spirometry; Standing  -     Cancel: Insert and maintain IV line; Standing  -     Cancel: Void On-Call to O R ; Standing  -     Cancel: Place sequential compression device; Standing  -     Cancel: Case request operating room: INSERTION VENOUS PORT (PORT-A-CATH); Standing        Chief Complaint   Patient presents with    Post-op     Patient here for a post op visit after a whipple  Incision healing well, drain intact  about 15 mL/day output for the YENY drain  Staples to be removed       Return in about 6 months (around 11/13/2021) for Office Visit, Imaging - See orders, Labs - See Treatment Plan  Oncology History    No history exists  Staging: [id number] periampullary carcinoma, April 2021  Treatment history:  Pancreaticoduodenectomy, April 2021  Current treatment:  Chemotherapy pending  Disease status: SEDA    History of Present Illness:  Patient returns after her pancreaticoduodenectomy  She is doing well  Her appetite is slowly improving  No nausea or vomiting  She is eating small frequent meals  Final pathology revealed periampullary and ampullary tumor with a positive node  Review of Systems  Complete ROS Surg Onc:   Complete ROS Surg Onc:   Constitutional: The patient denies new or recent history of general fatigue, no recent weight loss, no change in appetite  Eyes: No complaints of visual problems, no scleral icterus  ENT: no complaints of ear pain, no hoarseness, no difficulty swallowing,  no tinnitus and no new masses in head, oral cavity, or neck  Cardiovascular: No complaints of chest pain, no palpitations, no ankle edema  Respiratory: No complaints of shortness of breath, no cough  Gastrointestinal: No complaints of jaundice, no bloody stools, no pale stools  Genitourinary: No complaints of dysuria, no hematuria, no nocturia, no frequent urination, no urethral discharge  Musculoskeletal: No complaints of weakness, paralysis, joint stiffness or arthralgias  Integumentary: No complaints of rash, no new lesions  Neurological: No complaints of convulsions, no seizures, no dizziness  Hematologic/Lymphatic: No complaints of easy bruising  Endocrine:  No hot or cold intolerance  No polydipsia, polyphagia, or polyuria  Allergy/immunology:  No environmental allergies  No food allergies  Not immunocompromised  Skin:  No pallor or rash  Surgical wound  Patient Active Problem List   Diagnosis    Elevated LFTs    Dilated bile duct    Neoplasm of ampulla of Vater    Mild protein-calorie malnutrition ([address] )     No past medical history on file    Past Surgical History:   Procedure Laterality Date    ABLATION SOFT TISSUE N/A 4/26/2021    Procedure: INTRAOPERATIVE ULTRASOUND, ABLATION,SOFT TISSUE PANCREAS;  Surgeon: Celestine Gomez MD;  [facility];  Service: Surgical Oncology    CHOLECYSTECTOMY N/A 4/26/2021    Procedure: CHOLECYSTECTOMY;  Surgeon: Celestine Gomez MD;  [facility];  Service: Surgical Oncology    HYSTERECTOMY      LAPAROTOMY N/A 4/26/2021 Procedure: LAPAROTOMY EXPLORATORY;  Surgeon: Kirsten Hernandez MD;  [facility];  Service: Surgical Oncology    SINUS SURGERY      WHIPPLE PROCEDURE/PANCREATICO-DUODENECTOMY N/A 4/26/2021    Procedure:  WHIPPLE PROCEDURE/PANCREATICO-DUODENECTOMY; PYLORIC PRESERVING;  Surgeon: Kirsten Hernandez MD;  [facility];  Service: Surgical Oncology     Family History   Problem Relation Age of Onset    Ulcerative colitis Mother     Heart disease Brother      Social History     Socioeconomic History    Marital status: Single     Spouse name: Not on file    Number of children: Not on file    Years of education: Not on file    Highest education level: Not on file   Occupational History    Not on file   Social Needs    Financial resource strain: Not on file    Food insecurity     Worry: Not on file     Inability: Not on file    Transportation needs     Medical: Not on file     Non-medical: Not on file   Tobacco Use    Smoking status: Current Every Day Smoker     Packs/day: 0 50    Smokeless tobacco: Never Used    Tobacco comment: education given   Substance and Sexual Activity    Alcohol use: Never     Frequency: Never    Drug use: Never    Sexual activity: Never   Lifestyle    Physical activity     Days per week: Not on file     Minutes per session: Not on file    Stress: Not on file   Relationships    Social connections     Talks on phone: Not on file     Gets together: Not on file     Attends Restorationist service: Not on file     Active member of club or organization: Not on file     Attends meetings of clubs or organizations: Not on file     Relationship status: Not on file    Intimate partner violence     Fear of current or ex partner: Not on file     Emotionally abused: Not on file     Physically abused: Not on file     Forced sexual activity: Not on file   Other Topics Concern    Not on file   Social History Narrative    Not on file       Current Outpatient Medications:     ondansetron (ZOFRAN-ODT) 4 mg disintegrating tablet, Take 1 tablet (4 mg total) by mouth every 6 (six) hours as needed for nausea or vomiting, Disp: 20 tablet, Rfl: 0  Allergies   Allergen Reactions    Penicillins     Tetracycline      Vitals:    05/13/21 0836   BP: 130/84   Pulse: (!) 114   Resp: 16   Temp: 98 5 °F (36 9 °C)       Physical Exam  Constitutional: General appearance: The Patient is well-developed and well-nourished who appears the stated age in no acute distress  Patient is pleasant and talkative  HEENT:  Normocephalic  Sclerae are anicteric  Mucous membranes are moist  Neck is supple without adenopathy  No JVD  Chest: The lungs are clear to auscultation  Cardiac: Heart is regular rate  Abdomen: Abdomen is soft, non-tender, non-distended and without masses  Incision is C/D/I    Extremities: There is no clubbing or cyanosis  There is no edema  Symmetric  Neuro: Grossly nonfocal  Gait is normal      Lymphatic: No evidence of cervical adenopathy bilaterally  Skin: Warm, anicteric  Psych:  Patient is pleasant and talkative  Breasts:        Pathology:  Final Diagnosis   A   Gallbladder, cholecystectomy:       - Chronic cholecystitis  - No malignancy identified      - Reactive periductal lymph node with lipid granulomata; negative for malignancy (0/1)      B  Celiac lymph node:     - Single lymph node; negative for malignancy (0/1)      C  Whipple resection:     - Invasive poorly differentiated mucinous adenocarcinoma  - Tumor arises in the background of an adenoma with high grade dysplasia  - Lymphatic and venous channel invasion by tumor present  - Metastatic carcinoma present in one of twenty lymph nodes (1/20)  - All margins are negative for tumor       Electronically signed by Donato Trujillo MD on 5/5/2021 at  2:59 PM   Comments: This is an appended report  These results have been appended to a previously preliminary verified report        Additional Information    All reported additional testing was performed with appropriately reactive controls   These tests were developed and their performance characteristics determined by [facility] or appropriate performing facility, though some tests may be performed on tissues which have not been validated for performance characteristics (such as staining performed on alcohol exposed cell blocks and decalcified tissues)   Results should be interpreted with caution and in the context of the patients clinical condition  These tests may not be cleared or approved by the [place]  Food and Drug Administration, though the FDA has determined that such clearance or approval is not necessary  These tests are used for clinical purposes and they should not be regarded as investigational or for research  This laboratory has been approved by [place] [id number], designated as a high-complexity laboratory and is qualified to perform these tests     - Interpretation performed at [facility], [address]    Comment: This is an appended report  These results have been appended to a previously preliminary verified report     Synoptic Checklist   AMPULLA OF VATER  AMPULLA OF VATER: AMPULLECTOMY, PANCREATICODUODENECTOMY - All Specimens  8th Edition - Protocol posted: 2/26/2020  SPECIMEN   Procedure  Pancreaticoduodenectomy (Whipple resection)    TUMOR   Tumor Site  Intra-ampullary and cristine-ampullary (mixed type)    Histologic Type  Mucinous adenocarcinoma    Histologic Grade  G3: Poorly differentiated    Tumor Size  Greatest Dimension (Centimeters): 2 5 cm   Additional Dimension (Centimeters)  2 3 cm     1 cm   Tumor Extension  Tumor invades into muscularis propria of the duodenum      Tumor extends more than 0 5 cm into pancreas      Tumor extends into peripancreatic soft tissues      Tumor extends into periduodenal tissue    Lymphovascular Invasion  Present    Perineural Invasion  Not identified    MARGINS Margins     Pancreatic Neck / Parenchymal Margin  Uninvolved by invasive carcinoma and pancreatic high-grade intraepithelial neoplasia    Distance of Invasive Carcinoma from Margin  3 5 cm   Uncinate (Retroperitoneal / Superior Mesenteric Artery) Margin  Uninvolved by invasive carcinoma    Distance of Invasive Carcinoma from Margin  4 0 cm   Bile Duct Margin  Uninvolved by invasive carcinoma and high-grade intraepithelial neoplasia    Distance of Invasive Carcinoma from Margin  3 3 cm   Proximal Margin (Gastric or Duodenal)  Uninvolved by invasive carcinoma and high-grade intraepithelial neoplasia    Distal Margin (Distal Duodenal or Jejunal)  Uninvolved by invasive carcinoma and high-grade intraepithelial neoplasia    LYMPH NODES   Number of Lymph Nodes Involved  1    Number of Lymph Nodes Examined  22    PATHOLOGIC STAGE CLASSIFICATION (pTNM, AJCC 8th Edition)      Primary Tumor (pT)  pT3b    Regional Lymph Nodes (pN)  pN1    ADDITIONAL FINDINGS   Additional Findings  Dysplasia / adenoma      PanIN 1B    Comment(s)   Comment(s)  AJCC Prognostic Stage Group (8th Ed ):  IIIA - pT3a, pN1, MX, G3             Labs:      Imaging  Xr Abdomen 1 View Kub    Result Date: 4/26/2021  Narrative: ABDOMEN INDICATION: Needle Miscount COMPARISON:  CT dated April 11, 2021 VIEWS:  AP supine Images: 2 FINDINGS: X-rays limited by pending on the operative table  NG tube, surgical drain, and numerous surgical clips are seen  No radiopaque needles are identified on this exam  As this is a perioperative x-ray, free air is expected  Rib cartilage calcifications are seen  Arterial atherosclerosis in the pelvis  Visualized lung bases are clear  Visualized osseous structures are unremarkable for the patient's age  Impression: No missing needle identified   I personally discussed this study with the x-ray technologist in the OR at the time on 4/26/2021 at 2:44 PM  Workstation performed: [id number]     I reviewed the above laboratory and imaging data  Discussion/Summary:  72 year female status post pancreaticoduodenectomy for a [id number] periampullary/ampullary carcinoma  We had a long discussion regarding treatment options  She will continue to eat small frequent meals  I have recommended that she undergo chemotherapy  She will need a port for chemotherapy  The risks were explained including bleeding, infection, need for further surgery, wound complications, port malfunction, DVT, pneumothorax  Informed consent was obtained  We will schedule this at our earliest mutual convenience, once she has seen Medical Oncology  We will set her up with Medical Oncology  I also discussed reasoning and rationale behind adjuvant radiation  I will set her up with radiation oncology as well  Given her new diagnosis, we will plan on having her set up with Medical Genetics as well  I will see her again in 6 months with a repeat CA 19-9 and CT  She is agreeable to this  All her questions were answered  This office visit took 20 minutes of face-to-face time the patient greater than 50% time spent counseling and coordinating care for the newly diagnosed ampullary carcinoma, need for IV access and adjuvant treatment discussion

## 2021-05-13 NOTE — TELEPHONE ENCOUNTER
Intra-Department Referral    Patient Details:  Shannon Cartwright  1955  [id number]   Background Information:  [address] starts by opening a telephone encounter and gathering the following information   Who is calling to schedule and relationship? Dr Vernon Singh office   Diagnosis D49 0 (ICD-10-CM) - Neoplasm of ampulla of Vater   What department was patient seen in last? Surg Onc   Scheduling Information:    Preferred [facility]   Are there any days the patient cannot be seen? Miscellaneous:  Or [place]    After completing the above information, please route to nurse navigation, clinical trials (for re-evaluation) and [place]

## 2021-05-13 NOTE — H&P (VIEW-ONLY)
Surgical Oncology Follow Up       [address] [place]  [address] [facility] [address] [address]  [phone]    Kirstie Howard  1955  [id number]  [address] [place]  [address] [facility] [address] [address] [phone]    Diagnoses and all orders for this visit:    Neoplasm of ampulla of Vater  -     CT chest abdomen pelvis w contrast; Future  -     Ambulatory referral to Hematology / Oncology; Future  -     Ambulatory referral to Radiation Oncology; Future  -     BUN; Future  -     Creatinine, serum; Future  -     Cancer antigen 19-9; Future  -     Ambulatory Referral to Oncology Genetics; Future    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 [address] )    Other orders  -     Cancel: Incentive spirometry; Standing  -     Cancel: Insert and maintain IV line; Standing  -     Cancel: Void On-Call to O R ; Standing  -     Cancel: Place sequential compression device; Standing  -     Cancel: Case request operating room: INSERTION VENOUS PORT (PORT-A-CATH); Standing        Chief Complaint   Patient presents with    Post-op     Patient here for a post op visit after a whipple  Incision healing well, drain intact  about 15 mL/day output for the YENY drain  Staples to be removed       Return in about 6 months (around 11/13/2021) for Office Visit, Imaging - See orders, Labs - See Treatment Plan  Oncology History    No history exists  Staging: [id number] periampullary carcinoma, April 2021  Treatment history:  Pancreaticoduodenectomy, April 2021  Current treatment:  Chemotherapy pending  Disease status: SDEA    History of Present Illness:  Patient returns after her pancreaticoduodenectomy  She is doing well  Her appetite is slowly improving  No nausea or vomiting  She is eating small frequent meals  Final pathology revealed periampullary and ampullary tumor with a positive node  Review of Systems  Complete ROS Surg Onc:   Complete ROS Surg Onc:   Constitutional: The patient denies new or recent history of general fatigue, no recent weight loss, no change in appetite  Eyes: No complaints of visual problems, no scleral icterus  ENT: no complaints of ear pain, no hoarseness, no difficulty swallowing,  no tinnitus and no new masses in head, oral cavity, or neck  Cardiovascular: No complaints of chest pain, no palpitations, no ankle edema  Respiratory: No complaints of shortness of breath, no cough  Gastrointestinal: No complaints of jaundice, no bloody stools, no pale stools  Genitourinary: No complaints of dysuria, no hematuria, no nocturia, no frequent urination, no urethral discharge  Musculoskeletal: No complaints of weakness, paralysis, joint stiffness or arthralgias  Integumentary: No complaints of rash, no new lesions  Neurological: No complaints of convulsions, no seizures, no dizziness  Hematologic/Lymphatic: No complaints of easy bruising  Endocrine:  No hot or cold intolerance  No polydipsia, polyphagia, or polyuria  Allergy/immunology:  No environmental allergies  No food allergies  Not immunocompromised  Skin:  No pallor or rash  Surgical wound  Patient Active Problem List   Diagnosis    Elevated LFTs    Dilated bile duct    Neoplasm of ampulla of Vater    Mild protein-calorie malnutrition ([facility] [address] )     No past medical history on file    Past Surgical History:   Procedure Laterality Date    ABLATION SOFT TISSUE N/A 4/26/2021    Procedure: INTRAOPERATIVE ULTRASOUND, ABLATION,SOFT TISSUE PANCREAS;  Surgeon: Jg Villela MD;  [facility];  Service: Surgical Oncology    CHOLECYSTECTOMY N/A 4/26/2021    Procedure: CHOLECYSTECTOMY;  Surgeon: Jg Villela MD;  [facility];  Service: Surgical Oncology    HYSTERECTOMY      LAPAROTOMY N/A 4/26/2021 Procedure: LAPAROTOMY EXPLORATORY;  Surgeon: Doni De Los Santos MD;  [facility];  Service: Surgical Oncology    SINUS SURGERY      WHIPPLE PROCEDURE/PANCREATICO-DUODENECTOMY N/A 4/26/2021    Procedure:  WHIPPLE PROCEDURE/PANCREATICO-DUODENECTOMY; PYLORIC PRESERVING;  Surgeon: Doni De Los Santos MD;  [facility];  Service: Surgical Oncology     Family History   Problem Relation Age of Onset    Ulcerative colitis Mother     Heart disease Brother      Social History     Socioeconomic History    Marital status: Single     Spouse name: Not on file    Number of children: Not on file    Years of education: Not on file    Highest education level: Not on file   Occupational History    Not on file   Social Needs    Financial resource strain: Not on file    Food insecurity     Worry: Not on file     Inability: Not on file    Transportation needs     Medical: Not on file     Non-medical: Not on file   Tobacco Use    Smoking status: Current Every Day Smoker     Packs/day: 0 50    Smokeless tobacco: Never Used    Tobacco comment: education given   Substance and Sexual Activity    Alcohol use: Never     Frequency: Never    Drug use: Never    Sexual activity: Never   Lifestyle    Physical activity     Days per week: Not on file     Minutes per session: Not on file    Stress: Not on file   Relationships    Social connections     Talks on phone: Not on file     Gets together: Not on file     Attends Yazidi service: Not on file     Active member of club or organization: Not on file     Attends meetings of clubs or organizations: Not on file     Relationship status: Not on file    Intimate partner violence     Fear of current or ex partner: Not on file     Emotionally abused: Not on file     Physically abused: Not on file     Forced sexual activity: Not on file   Other Topics Concern    Not on file   Social History Narrative    Not on file       Current Outpatient Medications:     ondansetron (ZOFRAN-ODT) 4 mg disintegrating tablet, Take 1 tablet (4 mg total) by mouth every 6 (six) hours as needed for nausea or vomiting, Disp: 20 tablet, Rfl: 0  Allergies   Allergen Reactions    Penicillins     Tetracycline      Vitals:    05/13/21 0836   BP: 130/84   Pulse: (!) 114   Resp: 16   Temp: 98 5 °F (36 9 °C)       Physical Exam  Constitutional: General appearance: The Patient is well-developed and well-nourished who appears the stated age in no acute distress  Patient is pleasant and talkative  HEENT:  Normocephalic  Sclerae are anicteric  Mucous membranes are moist  Neck is supple without adenopathy  No JVD  Chest: The lungs are clear to auscultation  Cardiac: Heart is regular rate  Abdomen: Abdomen is soft, non-tender, non-distended and without masses  Incision is C/D/I    Extremities: There is no clubbing or cyanosis  There is no edema  Symmetric  Neuro: Grossly nonfocal  Gait is normal      Lymphatic: No evidence of cervical adenopathy bilaterally  Skin: Warm, anicteric  Psych:  Patient is pleasant and talkative  Breasts:        Pathology:  Final Diagnosis   A   Gallbladder, cholecystectomy:       - Chronic cholecystitis  - No malignancy identified      - Reactive periductal lymph node with lipid granulomata; negative for malignancy (0/1)      B  Celiac lymph node:     - Single lymph node; negative for malignancy (0/1)      C  Whipple resection:     - Invasive poorly differentiated mucinous adenocarcinoma  - Tumor arises in the background of an adenoma with high grade dysplasia  - Lymphatic and venous channel invasion by tumor present  - Metastatic carcinoma present in one of twenty lymph nodes (1/20)  - All margins are negative for tumor       Electronically signed by Malinda Klein MD on 5/5/2021 at  2:59 PM   Comments: This is an appended report  These results have been appended to a previously preliminary verified report        Additional Information    All reported additional testing was performed with appropriately reactive controls   These tests were developed and their performance characteristics determined by [address] [place] [address] [facility] or appropriate performing facility, though some tests may be performed on tissues which have not been validated for performance characteristics (such as staining performed on alcohol exposed cell blocks and decalcified tissues)   Results should be interpreted with caution and in the context of the patients clinical condition  These tests may not be cleared or approved by the [place]  Food and Drug Administration, though the FDA has determined that such clearance or approval is not necessary  These tests are used for clinical purposes and they should not be regarded as investigational or for research  This laboratory has been approved by [place] [address], designated as a high-complexity laboratory and is qualified to perform these tests     - Interpretation performed at [facility], [address]    Comment: This is an appended report  These results have been appended to a previously preliminary verified report     Synoptic Checklist   AMPULLA OF VATER  AMPULLA OF VATER: AMPULLECTOMY, PANCREATICODUODENECTOMY - All Specimens  8th Edition - Protocol posted: 2/26/2020  SPECIMEN   Procedure  Pancreaticoduodenectomy (Whipple resection)    TUMOR   Tumor Site  Intra-ampullary and cristine-ampullary (mixed type)    Histologic Type  Mucinous adenocarcinoma    Histologic Grade  G3: Poorly differentiated    Tumor Size  Greatest Dimension (Centimeters): 2 5 cm   Additional Dimension (Centimeters)  2 3 cm     1 cm   Tumor Extension  Tumor invades into muscularis propria of the duodenum      Tumor extends more than 0 5 cm into pancreas      Tumor extends into peripancreatic soft tissues      Tumor extends into periduodenal tissue    Lymphovascular Invasion  Present    Perineural Invasion  Not identified    MARGINS Margins     Pancreatic Neck / Parenchymal Margin  Uninvolved by invasive carcinoma and pancreatic high-grade intraepithelial neoplasia    Distance of Invasive Carcinoma from Margin  3 5 cm   Uncinate (Retroperitoneal / Superior Mesenteric Artery) Margin  Uninvolved by invasive carcinoma    Distance of Invasive Carcinoma from Margin  4 0 cm   Bile Duct Margin  Uninvolved by invasive carcinoma and high-grade intraepithelial neoplasia    Distance of Invasive Carcinoma from Margin  3 3 cm   Proximal Margin (Gastric or Duodenal)  Uninvolved by invasive carcinoma and high-grade intraepithelial neoplasia    Distal Margin (Distal Duodenal or Jejunal)  Uninvolved by invasive carcinoma and high-grade intraepithelial neoplasia    LYMPH NODES   Number of Lymph Nodes Involved  1    Number of Lymph Nodes Examined  22    PATHOLOGIC STAGE CLASSIFICATION (pTNM, AJCC 8th Edition)      Primary Tumor (pT)  pT3b    Regional Lymph Nodes (pN)  pN1    ADDITIONAL FINDINGS   Additional Findings  Dysplasia / adenoma      PanIN 1B    Comment(s)   Comment(s)  AJCC Prognostic Stage Group (8th Ed ):  IIIA - pT3a, pN1, MX, G3             Labs:      Imaging  Xr Abdomen 1 View Kub    Result Date: 4/26/2021  Narrative: ABDOMEN INDICATION: Needle Miscount COMPARISON:  CT dated April 11, 2021 VIEWS:  AP supine Images: 2 FINDINGS: X-rays limited by pending on the operative table  NG tube, surgical drain, and numerous surgical clips are seen  No radiopaque needles are identified on this exam  As this is a perioperative x-ray, free air is expected  Rib cartilage calcifications are seen  Arterial atherosclerosis in the pelvis  Visualized lung bases are clear  Visualized osseous structures are unremarkable for the patient's age  Impression: No missing needle identified   I personally discussed this study with the x-ray technologist in the OR at the time on 4/26/2021 at 2:44 PM  Workstation performed: [id number]     I reviewed the above laboratory and imaging data  Discussion/Summary:  72 year female status post pancreaticoduodenectomy for a [id number] periampullary/ampullary carcinoma  We had a long discussion regarding treatment options  She will continue to eat small frequent meals  I have recommended that she undergo chemotherapy  She will need a port for chemotherapy  The risks were explained including bleeding, infection, need for further surgery, wound complications, port malfunction, DVT, pneumothorax  Informed consent was obtained  We will schedule this at our earliest mutual convenience, once she has seen Medical Oncology  We will set her up with Medical Oncology  I also discussed reasoning and rationale behind adjuvant radiation  I will set her up with radiation oncology as well  Given her new diagnosis, we will plan on having her set up with Medical Genetics as well  I will see her again in 6 months with a repeat CA 19-9 and CT  She is agreeable to this  All her questions were answered  This office visit took 20 minutes of face-to-face time the patient greater than 50% time spent counseling and coordinating care for the newly diagnosed ampullary carcinoma, need for IV access and adjuvant treatment discussion

## 2021-05-14 ENCOUNTER — TELEPHONE (OUTPATIENT)
Dept: INFUSION CENTER | Facility: CLINIC | Age: 66
End: 2021-05-14

## 2021-05-14 ENCOUNTER — TELEPHONE (OUTPATIENT)
Dept: SURGICAL ONCOLOGY | Facility: CLINIC | Age: 66
End: 2021-05-14

## 2021-05-14 ENCOUNTER — IMMUNIZATIONS (OUTPATIENT)
Dept: FAMILY MEDICINE CLINIC | Facility: HOSPITAL | Age: 66
End: 2021-05-14

## 2021-05-14 DIAGNOSIS — Z23 ENCOUNTER FOR IMMUNIZATION: Primary | ICD-10-CM

## 2021-05-14 PROCEDURE — 91301 SARS-COV-2 / COVID-19 MRNA VACCINE (MODERNA) 100 MCG: CPT

## 2021-05-14 PROCEDURE — 0011A SARS-COV-2 / COVID-19 MRNA VACCINE (MODERNA) 100 MCG: CPT

## 2021-05-14 RX ORDER — OCTREOTIDE ACETATE 100 UG/ML
100 INJECTION, SOLUTION INTRAVENOUS; SUBCUTANEOUS ONCE
Status: CANCELLED
Start: 2021-05-17

## 2021-05-14 NOTE — TELEPHONE ENCOUNTER
Palak Baptiste returned call  Confirmed that she is not experiencing any COVID symptoms nor has she had any exposure to COVID  She confirmed her appnt for Mon 5/17  She is aware of the [facility]'s "no visitor" policy during [facility]

## 2021-05-14 NOTE — TELEPHONE ENCOUNTER
Genetics New Patient Intake Form     Patient Details:     Richy Frias     1955     [id number]     Background Information:           Who is calling to schedule?                                            self   If not self, relationship to patient? Referring Provider Mamie Patricia   Is the referral marked STAT No   Is patient newly diagnosed with cancer, have metastatic disease, or pending surgery? Yes   If yes, which is it? Diagnosed mid March    If the patient is pending surgery Needs to be scheduled within 48 hours   If none available, schedule patient then email Stat cancer genetics   If the patient is metastatic or newly diagnosed Needs to be scheduled within 2 weeks   If none available, schedule patient then email Stat cancer genetics   Have you had genetic testing that showed a positive genetic mutation? (If yes, schedule within 2 weeks or email STAT cancer genetics) Did Not Speak to Patient   Has your family member had genetic testing that resulted in a positive genetic mutation? (If yes in the last 6 months, schedule within 3 weeks )  (If yes but over 6 months, schedule as usual) Did Not Speak to Patient   Is this a personal or family history? personal   What is the type of tumor? Per referral- Neoplasm of ampulla of Vater   Scheduling Information:   Preferred [place]: [facility]   Are there any dates/time the patient cannot be seen? Did Not Speak to Patient   Did the patient schedule an appointment? Yes   If yes, list appointment date and provider name 6/29   If no, briefly state why na   Miscellaneous At time of call pt was receiving her COVID vaccine and requested that I go ahead and schedule an appt and leave the information on her VMB      After completing the above information, please route to Oncology Genetics

## 2021-05-17 ENCOUNTER — PATIENT OUTREACH (OUTPATIENT)
Dept: HEMATOLOGY ONCOLOGY | Facility: CLINIC | Age: 66
End: 2021-05-17

## 2021-05-17 ENCOUNTER — HOSPITAL ENCOUNTER (OUTPATIENT)
Dept: INFUSION CENTER | Facility: CLINIC | Age: 66
Discharge: HOME | End: 2021-05-17
Payer: MEDICARE

## 2021-05-17 VITALS — TEMPERATURE: 96.3 F

## 2021-05-17 DIAGNOSIS — D49.0 NEOPLASM OF AMPULLA OF VATER: Primary | ICD-10-CM

## 2021-05-17 PROCEDURE — 96372 THER/PROPH/DIAG INJ SC/IM: CPT

## 2021-05-17 RX ORDER — OXYCODONE HYDROCHLORIDE 5 MG/1
5 TABLET ORAL
COMMUNITY
Start: 2021-05-05 | End: 2021-05-26 | Stop reason: ALTCHOICE

## 2021-05-17 RX ORDER — OCTREOTIDE ACETATE 100 UG/ML
100 INJECTION, SOLUTION INTRAVENOUS; SUBCUTANEOUS ONCE
Status: CANCELLED
Start: 2021-05-17

## 2021-05-17 RX ORDER — ACETAMINOPHEN 500 MG
1000 TABLET ORAL
COMMUNITY
Start: 2021-05-05

## 2021-05-17 RX ORDER — OCTREOTIDE ACETATE 100 UG/ML
100 INJECTION, SOLUTION INTRAVENOUS; SUBCUTANEOUS ONCE
Status: COMPLETED | OUTPATIENT
Start: 2021-05-17 | End: 2021-05-17

## 2021-05-17 RX ADMIN — OCTREOTIDE ACETATE 100 MCG: 100 INJECTION, SOLUTION INTRAVENOUS; SUBCUTANEOUS at 14:42

## 2021-05-17 NOTE — PROGRESS NOTES
Al Harry as she was leaving her appointment in infusion, introduced myself, explained my role and provided my contact information, provided her with an appointment with Dr Cheli Menendez at [facility] 6/1/21 at 2pm, she is doing well since her surgery, she has some pain at night prior to bed that is relieved by Tylenol, we discussed some of the resources available such as palliative care, the dietitians, cancer support community, told her if there is anything else she may need she should reach out to me, in the meantime, I will be working with Dr Deyvi Mathur  to find a time for a port placement sometime after her consult with Dr Odilia Luo, once we have that, will call her to inform her of that as well, she stated her daughter brings her to appointments and she lives with her son, she was appreciative of the help, instructed her to call with any other questions or concerns

## 2021-05-17 NOTE — TELEPHONE ENCOUNTER
Insurance listed as medicare only  Left message asking that patient contact me to confirm whether or not she has a secondary insurance and to discuss options  Per billing notes, [facility] application has been sent the patient

## 2021-05-17 NOTE — PROGRESS NOTES
Pt here for first Sandostatin injection  Pt oriented to infusion center, emotional support provided as patient was anxious about injection and reports that her situation has been taking a toll on her  Patient was open to conversation  Sandostatin given in R arm without complications  Pt is aware of next infusion appointment on Friday, explained that it will be the same medication but administered differently due to the dose  Pt states understanding  AVS declined

## 2021-05-18 ENCOUNTER — TELEPHONE (OUTPATIENT)
Dept: SURGICAL ONCOLOGY | Facility: CLINIC | Age: 66
End: 2021-05-18

## 2021-05-18 ENCOUNTER — PATIENT OUTREACH (OUTPATIENT)
Dept: HEMATOLOGY ONCOLOGY | Facility: CLINIC | Age: 66
End: 2021-05-18

## 2021-05-18 ENCOUNTER — PREP FOR PROCEDURE (OUTPATIENT)
Dept: SURGICAL ONCOLOGY | Facility: CLINIC | Age: 66
End: 2021-05-18

## 2021-05-18 DIAGNOSIS — D49.0 NEOPLASM OF AMPULLA OF VATER: Primary | ICD-10-CM

## 2021-05-18 DIAGNOSIS — R79.1 ABNORMAL COAGULATION PROFILE: ICD-10-CM

## 2021-05-18 RX ORDER — CEFAZOLIN SODIUM 1 G/50ML
1000 SOLUTION INTRAVENOUS ONCE
Status: CANCELLED | OUTPATIENT
Start: 2021-05-18 | End: 2021-05-18

## 2021-05-18 NOTE — TELEPHONE ENCOUNTER
Called patient and reviewed date and instructions for port removal for Dr Fish Maldonado  Will give all paper work to patient this Friday at appointment  Patient also informed me her drain was putting out about 3 mL/day for the past 2 days  Patient scheduled for a drain check this Friday with Dr Fish Maldonado  Instructed patient to call the office if more drainage comes out

## 2021-05-18 NOTE — PROGRESS NOTES
Call to [phone] to discuss date/time/location for port placement with Dr Gina Mclain, no answer, left her a message asking her to return my call

## 2021-05-20 ENCOUNTER — DOCUMENTATION (OUTPATIENT)
Dept: HEMATOLOGY ONCOLOGY | Facility: CLINIC | Age: 66
End: 2021-05-20

## 2021-05-20 NOTE — PROGRESS NOTES
RECTAL/GI MULTIDISCIPLINARY CASE REVIEW    DATE: 5/20/21      PRESENTING DOCTOR: Dr Yamilex Pak      DIAGNOSIS: Neoplasm of Ampulla of Vater      Linda Paz was presented at the Rectal/GI Multidisciplinary Conference today  PHYSICIAN RECOMMENDED PLAN:    -Adjuvant chemotherapy x4 months  -Consider concurrent chemo/RT x6 weeks    -Consult with Dr Estrada Gross on 5/26/21  -Consult with Dr Myrna Christine on 6/1/21  -Port placement scheduled for 6/2/21 with Dr Yamilex Pak  -Patient scheduled for genetics consult on 6/29/21    Team agreed to plan      NCCN guidelines were readily available for review at this discussion

## 2021-05-21 ENCOUNTER — OFFICE VISIT (OUTPATIENT)
Dept: SURGICAL ONCOLOGY | Facility: CLINIC | Age: 66
End: 2021-05-21

## 2021-05-21 ENCOUNTER — HOSPITAL ENCOUNTER (OUTPATIENT)
Dept: INFUSION CENTER | Facility: CLINIC | Age: 66
Discharge: HOME | End: 2021-05-21
Payer: MEDICARE

## 2021-05-21 VITALS
RESPIRATION RATE: 16 BRPM | SYSTOLIC BLOOD PRESSURE: 122 MMHG | HEIGHT: 62 IN | DIASTOLIC BLOOD PRESSURE: 82 MMHG | BODY MASS INDEX: 20.98 KG/M2 | HEART RATE: 99 BPM | WEIGHT: 114 LBS | OXYGEN SATURATION: 97 % | TEMPERATURE: 98.1 F

## 2021-05-21 VITALS — TEMPERATURE: 98.3 F

## 2021-05-21 DIAGNOSIS — D49.0 NEOPLASM OF AMPULLA OF VATER: Primary | ICD-10-CM

## 2021-05-21 PROCEDURE — 96372 THER/PROPH/DIAG INJ SC/IM: CPT

## 2021-05-21 PROCEDURE — 1124F ACP DISCUSS-NO DSCNMKR DOCD: CPT | Performed by: SURGERY

## 2021-05-21 PROCEDURE — 99024 POSTOP FOLLOW-UP VISIT: CPT | Performed by: SURGERY

## 2021-05-21 RX ADMIN — OCTREOTIDE ACETATE 30 MG: KIT at 14:59

## 2021-05-21 NOTE — PROGRESS NOTES
RECTAL/GI MULTIDISCIPLINARY CASE REVIEW    DATE: 5/20/21      PRESENTING DOCTOR: Dr Fish Maldonado      DIAGNOSIS: Neoplasm of Ampulla of Vater      Ivanna Sandoval was presented at the Rectal/GI Multidisciplinary Conference today  PHYSICIAN RECOMMENDED PLAN:    -Adjuvant chemotherapy x4 months  -Consider concurrent chemo/RT x6 weeks    -Consult with Dr Valerie Ramires on 5/26/21  -Consult with Dr Paula Comer on 6/1/21  -Port placement scheduled for 6/2/21 with Dr Fish Maldonado  -Patient scheduled for genetics consult on 6/29/21    Team agreed to plan      NCCN guidelines were readily available for review at this discussion

## 2021-05-21 NOTE — PROGRESS NOTES
Surgical Oncology Follow Up       [address] [facility] [place]  [address] [facility] [place]  [facility] [address]  [phone]    Shannon Cartwright  1955  [id number]  [address] [facility]ER [facility] [address] [id number] [phone]    Diagnoses and all orders for this visit:    Neoplasm of ampulla of Vater        Chief Complaint   Patient presents with    Post-op       No follow-ups on file  Oncology History    No history exists  Staging: [id number] periampullary carcinoma, April 2021  Treatment history:  Pancreaticoduodenectomy, April 2021  Current treatment:  Chemotherapy pending  Disease status: SEDA    History of Present Illness:  Patient returns in follow-up  Her drain is putting out 5 cc or less a day  Review of Systems  Complete ROS Surg Onc:   Complete ROS Surg Onc:   Constitutional: The patient denies new or recent history of general fatigue, no recent weight loss, no change in appetite  Eyes: No complaints of visual problems, no scleral icterus  ENT: no complaints of ear pain, no hoarseness, no difficulty swallowing,  no tinnitus and no new masses in head, oral cavity, or neck  Cardiovascular: No complaints of chest pain, no palpitations, no ankle edema  Respiratory: No complaints of shortness of breath, no cough  Gastrointestinal: No complaints of jaundice, no bloody stools, no pale stools  Genitourinary: No complaints of dysuria, no hematuria, no nocturia, no frequent urination, no urethral discharge  Musculoskeletal: No complaints of weakness, paralysis, joint stiffness or arthralgias  Integumentary: No complaints of rash, no new lesions  Neurological: No complaints of convulsions, no seizures, no dizziness  Hematologic/Lymphatic: No complaints of easy bruising  Endocrine:  No hot or cold intolerance    No polydipsia, polyphagia, or polyuria  Allergy/immunology:  No environmental allergies  No food allergies  Not immunocompromised  Skin:  No pallor or rash  No wound  Patient Active Problem List   Diagnosis    Elevated LFTs    Dilated bile duct    Neoplasm of ampulla of Vater    Mild protein-calorie malnutrition ([facility] [address] )     No past medical history on file  Past Surgical History:   Procedure Laterality Date    ABLATION SOFT TISSUE N/A 4/26/2021    Procedure: INTRAOPERATIVE ULTRASOUND, ABLATION,SOFT TISSUE PANCREAS;  Surgeon: Jg Villela MD;  [facility];  Service: Surgical Oncology    CHOLECYSTECTOMY N/A 4/26/2021    Procedure: CHOLECYSTECTOMY;  Surgeon: Jg Villela MD;  [facility];  Service: Surgical Oncology    HYSTERECTOMY      LAPAROTOMY N/A 4/26/2021    Procedure: LAPAROTOMY EXPLORATORY;  Surgeon: Jg Villela MD;  [facility];  Service: Surgical Oncology    SINUS SURGERY      WHIPPLE PROCEDURE/PANCREATICO-DUODENECTOMY N/A 4/26/2021    Procedure:  WHIPPLE PROCEDURE/PANCREATICO-DUODENECTOMY; PYLORIC PRESERVING;  Surgeon: Jg Villela MD;  [facility];  Service: Surgical Oncology     Family History   Problem Relation Age of Onset    Ulcerative colitis Mother     Heart disease Brother      Social History     Socioeconomic History    Marital status: Single     Spouse name: Not on file    Number of children: Not on file    Years of education: Not on file    Highest education level: Not on file   Occupational History    Not on file   Social Needs    Financial resource strain: Not on file    Food insecurity     Worry: Not on file     Inability: Not on file    Transportation needs     Medical: Not on file     Non-medical: Not on file   Tobacco Use    Smoking status: Current Every Day Smoker     Packs/day: 0 50    Smokeless tobacco: Never Used    Tobacco comment: education given-working on quitting   Substance and Sexual Activity    Alcohol use: Never     Frequency: Never  Drug use: Never    Sexual activity: Never   Lifestyle    Physical activity     Days per week: Not on file     Minutes per session: Not on file    Stress: Not on file   Relationships    Social connections     Talks on phone: Not on file     Gets together: Not on file     Attends Advent service: Not on file     Active member of club or organization: Not on file     Attends meetings of clubs or organizations: Not on file     Relationship status: Not on file    Intimate partner violence     Fear of current or ex partner: Not on file     Emotionally abused: Not on file     Physically abused: Not on file     Forced sexual activity: Not on file   Other Topics Concern    Not on file   Social History Narrative    Not on file       Current Outpatient Medications:     acetaminophen (TYLENOL) 500 mg tablet, Take 1,000 mg by mouth, Disp: , Rfl:     ondansetron (ZOFRAN-ODT) 4 mg disintegrating tablet, Take 1 tablet (4 mg total) by mouth every 6 (six) hours as needed for nausea or vomiting, Disp: 20 tablet, Rfl: 0    oxyCODONE (ROXICODONE) 5 mg immediate release tablet, Take 5 mg by mouth, Disp: , Rfl:   Allergies   Allergen Reactions    Penicillins     Tetracycline      Vitals:    05/21/21 1352   BP: 122/82   Pulse: 99   Resp: 16   Temp: 98 1 °F (36 7 °C)   SpO2: 97%       Physical Exam  Constitutional: General appearance: The Patient is well-developed and well-nourished who appears the stated age in no acute distress  Patient is pleasant and talkative  HEENT:  Normocephalic  Sclerae are anicteric  Mucous membranes are moist     Abdomen: Abdomen is soft, non-tender, non-distended and without masses  Extremities: There is no clubbing or cyanosis  There is no edema  Symmetric  Neuro: Grossly nonfocal  Gait is normal      Skin: Warm, anicteric  Psych:  Patient is pleasant and talkative    Breasts:        Pathology:  [unfilled]    Labs:      Imaging  Xr Abdomen 1 View Kub    Result Date: 4/26/2021  Narrative: ABDOMEN INDICATION: Needle Miscount COMPARISON:  CT dated April 11, 2021 VIEWS:  AP supine Images: 2 FINDINGS: X-rays limited by pending on the operative table  NG tube, surgical drain, and numerous surgical clips are seen  No radiopaque needles are identified on this exam  As this is a perioperative x-ray, free air is expected  Rib cartilage calcifications are seen  Arterial atherosclerosis in the pelvis  Visualized lung bases are clear  Visualized osseous structures are unremarkable for the patient's age  Impression: No missing needle identified  I personally discussed this study with the x-ray technologist in the OR at the time on 4/26/2021 at 2:44 PM  Workstation performed: [id number]     I reviewed the above laboratory and imaging data  Discussion/Summary:  72 year female status post pancreaticoduodenectomy for a [id number] periampullary/ampullary carcinoma  Since her drain is putting out less than 5 cc a day, we did remove this  I have told her to get her shot of octreotide later today  I also discussed that she should observe for worsening appetite, worsening abdominal pain or drainage from the drain site, fevers or chills  She will contact us if these occur  Her case was also discussed at upper GI working group  We will plan on 6 months of adjuvant treatment  This may be 4 months of chemotherapy followed by chemo radiation or 6 months of only chemotherapy  This will be reassessed by Medical Oncology after 4 months of chemotherapy  I will see her again in 6 months  She is agreeable to this  All her questions were answered

## 2021-05-21 NOTE — PROGRESS NOTES
Pt here for Sandostatin injection  Offers no complaints at this time  Confirmed with Dr Gege Tee may not need future injections, but patient will follow up with concerns of increased drainage from drain site  Patient placed on the schedule for 4 weeks as ordered, subject to change-patient is aware of this  Sandostatin given in R gluteal site without complications  AVS declined

## 2021-05-21 NOTE — LETTER
May 21, 2021     [facility], [address]    Patient: Pancho Brody   YOB: 1955   Date of Visit: 5/21/2021       Dear Dr Navid Pro: Thank you for referring Pancho Brody to me for evaluation  Below are my notes for this consultation  If you have questions, please do not hesitate to call me  I look forward to following your patient along with you  Sincerely,        Celestine Gomez MD        CC: MD Celestine Betancourt MD  5/21/2021  2:13 PM  Sign when Signing Visit               Surgical Oncology Follow Up       [address] [facility] [address] [address]  [phone]    Pancho Brody  1955  [id number]  [address] [facility] [place]  [address] [place] [id number] [phone]    Diagnoses and all orders for this visit:    Neoplasm of ampulla of Vater        Chief Complaint   Patient presents with    Post-op       No follow-ups on file  Oncology History    No history exists  Staging: [id number] periampullary carcinoma, April 2021  Treatment history:  Pancreaticoduodenectomy, April 2021  Current treatment:  Chemotherapy pending  Disease status: SEDA    History of Present Illness:  Patient returns in follow-up  Her drain is putting out 5 cc or less a day  Review of Systems  Complete ROS Surg Onc:   Complete ROS Surg Onc:   Constitutional: The patient denies new or recent history of general fatigue, no recent weight loss, no change in appetite  Eyes: No complaints of visual problems, no scleral icterus  ENT: no complaints of ear pain, no hoarseness, no difficulty swallowing,  no tinnitus and no new masses in head, oral cavity, or neck  Cardiovascular: No complaints of chest pain, no palpitations, no ankle edema     Respiratory: No complaints of shortness of breath, no cough  Gastrointestinal: No complaints of jaundice, no bloody stools, no pale stools  Genitourinary: No complaints of dysuria, no hematuria, no nocturia, no frequent urination, no urethral discharge  Musculoskeletal: No complaints of weakness, paralysis, joint stiffness or arthralgias  Integumentary: No complaints of rash, no new lesions  Neurological: No complaints of convulsions, no seizures, no dizziness  Hematologic/Lymphatic: No complaints of easy bruising  Endocrine:  No hot or cold intolerance  No polydipsia, polyphagia, or polyuria  Allergy/immunology:  No environmental allergies  No food allergies  Not immunocompromised  Skin:  No pallor or rash  No wound  Patient Active Problem List   Diagnosis    Elevated LFTs    Dilated bile duct    Neoplasm of ampulla of Vater    Mild protein-calorie malnutrition ([facility] [address] )     No past medical history on file  Past Surgical History:   Procedure Laterality Date    ABLATION SOFT TISSUE N/A 4/26/2021    Procedure: INTRAOPERATIVE ULTRASOUND, ABLATION,SOFT TISSUE PANCREAS;  Surgeon: Seth Bales MD;  [facility];  Service: Surgical Oncology    CHOLECYSTECTOMY N/A 4/26/2021    Procedure: CHOLECYSTECTOMY;  Surgeon: Seth Bales MD;  [facility];  Service: Surgical Oncology    HYSTERECTOMY      LAPAROTOMY N/A 4/26/2021    Procedure: LAPAROTOMY EXPLORATORY;  Surgeon: Seth Bales MD;  [facility];  Service: Surgical Oncology    SINUS SURGERY      WHIPPLE PROCEDURE/PANCREATICO-DUODENECTOMY N/A 4/26/2021    Procedure:  WHIPPLE PROCEDURE/PANCREATICO-DUODENECTOMY; PYLORIC PRESERVING;  Surgeon: Seth Bales MD;  [facility];  Service: Surgical Oncology     Family History   Problem Relation Age of Onset    Ulcerative colitis Mother     Heart disease Brother      Social History     Socioeconomic History    Marital status: Single     Spouse name: Not on file    Number of children: Not on file    Years of education: Not on file    Highest education level: Not on file   Occupational History    Not on file   Social Needs    Financial resource strain: Not on file    Food insecurity     Worry: Not on file     Inability: Not on file    Transportation needs     Medical: Not on file     Non-medical: Not on file   Tobacco Use    Smoking status: Current Every Day Smoker     Packs/day: 0 50    Smokeless tobacco: Never Used    Tobacco comment: education given-working on quitting   Substance and Sexual Activity    Alcohol use: Never     Frequency: Never    Drug use: Never    Sexual activity: Never   Lifestyle    Physical activity     Days per week: Not on file     Minutes per session: Not on file    Stress: Not on file   Relationships    Social connections     Talks on phone: Not on file     Gets together: Not on file     Attends Sabianist service: Not on file     Active member of club or organization: Not on file     Attends meetings of clubs or organizations: Not on file     Relationship status: Not on file    Intimate partner violence     Fear of current or ex partner: Not on file     Emotionally abused: Not on file     Physically abused: Not on file     Forced sexual activity: Not on file   Other Topics Concern    Not on file   Social History Narrative    Not on file       Current Outpatient Medications:     acetaminophen (TYLENOL) 500 mg tablet, Take 1,000 mg by mouth, Disp: , Rfl:     ondansetron (ZOFRAN-ODT) 4 mg disintegrating tablet, Take 1 tablet (4 mg total) by mouth every 6 (six) hours as needed for nausea or vomiting, Disp: 20 tablet, Rfl: 0    oxyCODONE (ROXICODONE) 5 mg immediate release tablet, Take 5 mg by mouth, Disp: , Rfl:   Allergies   Allergen Reactions    Penicillins     Tetracycline      Vitals:    05/21/21 1352   BP: 122/82   Pulse: 99   Resp: 16   Temp: 98 1 °F (36 7 °C)   SpO2: 97%       Physical Exam  Constitutional: General appearance:  The Patient is well-developed and well-nourished who appears the stated age in no acute distress  Patient is pleasant and talkative  HEENT:  Normocephalic  Sclerae are anicteric  Mucous membranes are moist     Abdomen: Abdomen is soft, non-tender, non-distended and without masses  Extremities: There is no clubbing or cyanosis  There is no edema  Symmetric  Neuro: Grossly nonfocal  Gait is normal      Skin: Warm, anicteric  Psych:  Patient is pleasant and talkative  Breasts:        Pathology:  [unfilled]    Labs:      Imaging  Xr Abdomen 1 View Kub    Result Date: 4/26/2021  Narrative: ABDOMEN INDICATION: Needle Miscount COMPARISON:  CT dated April 11, 2021 VIEWS:  AP supine Images: 2 FINDINGS: X-rays limited by pending on the operative table  NG tube, surgical drain, and numerous surgical clips are seen  No radiopaque needles are identified on this exam  As this is a perioperative x-ray, free air is expected  Rib cartilage calcifications are seen  Arterial atherosclerosis in the pelvis  Visualized lung bases are clear  Visualized osseous structures are unremarkable for the patient's age  Impression: No missing needle identified  I personally discussed this study with the x-ray technologist in the OR at the time on 4/26/2021 at 2:44 PM  Workstation performed: [id number]     I reviewed the above laboratory and imaging data  Discussion/Summary:  72 year female status post pancreaticoduodenectomy for a [id number] periampullary/ampullary carcinoma  Since her drain is putting out less than 5 cc a day, we did remove this  I have told her to get her shot of octreotide later today  I also discussed that she should observe for worsening appetite, worsening abdominal pain or drainage from the drain site, fevers or chills  She will contact us if these occur  Her case was also discussed at upper GI working group  We will plan on 6 months of adjuvant treatment    This may be 4 months of chemotherapy followed by chemo radiation or 6 months of only chemotherapy  This will be reassessed by Medical Oncology after 4 months of chemotherapy  I will see her again in 6 months  She is agreeable to this  All her questions were answered

## 2021-05-26 ENCOUNTER — CONSULT (OUTPATIENT)
Dept: HEMATOLOGY ONCOLOGY | Facility: CLINIC | Age: 66
End: 2021-05-26
Payer: MEDICARE

## 2021-05-26 ENCOUNTER — APPOINTMENT (OUTPATIENT)
Dept: LAB | Facility: CLINIC | Age: 66
End: 2021-05-26
Payer: MEDICARE

## 2021-05-26 ENCOUNTER — DOCUMENTATION (OUTPATIENT)
Dept: HEMATOLOGY ONCOLOGY | Facility: CLINIC | Age: 66
End: 2021-05-26

## 2021-05-26 VITALS
DIASTOLIC BLOOD PRESSURE: 90 MMHG | HEIGHT: 62 IN | HEART RATE: 76 BPM | TEMPERATURE: 97.8 F | WEIGHT: 115.4 LBS | SYSTOLIC BLOOD PRESSURE: 140 MMHG | RESPIRATION RATE: 16 BRPM | OXYGEN SATURATION: 98 % | BODY MASS INDEX: 21.23 KG/M2

## 2021-05-26 DIAGNOSIS — D49.0 NEOPLASM OF AMPULLA OF VATER: Primary | ICD-10-CM

## 2021-05-26 DIAGNOSIS — R11.0 NAUSEA: ICD-10-CM

## 2021-05-26 DIAGNOSIS — R79.1 ABNORMAL COAGULATION PROFILE: ICD-10-CM

## 2021-05-26 DIAGNOSIS — D49.0 NEOPLASM OF AMPULLA OF VATER: ICD-10-CM

## 2021-05-26 LAB
ALBUMIN SERPL BCP-MCNC: 3.6 G/DL (ref 3.5–5)
ALP SERPL-CCNC: 118 U/L (ref 46–116)
ALT SERPL W P-5'-P-CCNC: 30 U/L (ref 12–78)
ANION GAP SERPL CALCULATED.3IONS-SCNC: 4 MMOL/L (ref 4–13)
AST SERPL W P-5'-P-CCNC: 18 U/L (ref 5–45)
BASOPHILS # BLD AUTO: 0.07 THOUSANDS/ΜL (ref 0–0.1)
BASOPHILS NFR BLD AUTO: 1 % (ref 0–1)
BILIRUB SERPL-MCNC: 0.59 MG/DL (ref 0.2–1)
BUN SERPL-MCNC: 24 MG/DL (ref 5–25)
CALCIUM SERPL-MCNC: 9.9 MG/DL (ref 8.3–10.1)
CHLORIDE SERPL-SCNC: 104 MMOL/L (ref 100–108)
CO2 SERPL-SCNC: 30 MMOL/L (ref 21–32)
CREAT SERPL-MCNC: 0.59 MG/DL (ref 0.6–1.3)
EOSINOPHIL # BLD AUTO: 0.36 THOUSAND/ΜL (ref 0–0.61)
EOSINOPHIL NFR BLD AUTO: 4 % (ref 0–6)
ERYTHROCYTE [DISTWIDTH] IN BLOOD BY AUTOMATED COUNT: 13.6 % (ref 11.6–15.1)
GFR SERPL CREATININE-BSD FRML MDRD: 96 ML/MIN/1.73SQ M
GLUCOSE P FAST SERPL-MCNC: 102 MG/DL (ref 65–99)
HCT VFR BLD AUTO: 40.9 % (ref 34.8–46.1)
HGB BLD-MCNC: 13.1 G/DL (ref 11.5–15.4)
IMM GRANULOCYTES # BLD AUTO: 0.02 THOUSAND/UL (ref 0–0.2)
IMM GRANULOCYTES NFR BLD AUTO: 0 % (ref 0–2)
INR PPP: 0.95 (ref 0.84–1.19)
LYMPHOCYTES # BLD AUTO: 2.14 THOUSANDS/ΜL (ref 0.6–4.47)
LYMPHOCYTES NFR BLD AUTO: 25 % (ref 14–44)
MCH RBC QN AUTO: 30.1 PG (ref 26.8–34.3)
MCHC RBC AUTO-ENTMCNC: 32 G/DL (ref 31.4–37.4)
MCV RBC AUTO: 94 FL (ref 82–98)
MONOCYTES # BLD AUTO: 0.42 THOUSAND/ΜL (ref 0.17–1.22)
MONOCYTES NFR BLD AUTO: 5 % (ref 4–12)
NEUTROPHILS # BLD AUTO: 5.51 THOUSANDS/ΜL (ref 1.85–7.62)
NEUTS SEG NFR BLD AUTO: 65 % (ref 43–75)
NRBC BLD AUTO-RTO: 0 /100 WBCS
PLATELET # BLD AUTO: 468 THOUSANDS/UL (ref 149–390)
PMV BLD AUTO: 10.1 FL (ref 8.9–12.7)
POTASSIUM SERPL-SCNC: 5.1 MMOL/L (ref 3.5–5.3)
PROT SERPL-MCNC: 7.7 G/DL (ref 6.4–8.2)
PROTHROMBIN TIME: 12.7 SECONDS (ref 11.6–14.5)
RBC # BLD AUTO: 4.35 MILLION/UL (ref 3.81–5.12)
SODIUM SERPL-SCNC: 138 MMOL/L (ref 136–145)
WBC # BLD AUTO: 8.52 THOUSAND/UL (ref 4.31–10.16)

## 2021-05-26 PROCEDURE — 99205 OFFICE O/P NEW HI 60 MIN: CPT | Performed by: INTERNAL MEDICINE

## 2021-05-26 PROCEDURE — 85610 PROTHROMBIN TIME: CPT

## 2021-05-26 PROCEDURE — 80053 COMPREHEN METABOLIC PANEL: CPT

## 2021-05-26 PROCEDURE — 36415 COLL VENOUS BLD VENIPUNCTURE: CPT

## 2021-05-26 PROCEDURE — 85025 COMPLETE CBC W/AUTO DIFF WBC: CPT

## 2021-05-26 RX ORDER — SODIUM CHLORIDE 9 MG/ML
20 INJECTION, SOLUTION INTRAVENOUS ONCE AS NEEDED
Status: CANCELLED | OUTPATIENT
Start: 2021-06-09

## 2021-05-26 RX ORDER — FLUOROURACIL 50 MG/ML
400 INJECTION, SOLUTION INTRAVENOUS ONCE
Status: CANCELLED | OUTPATIENT
Start: 2021-06-23

## 2021-05-26 RX ORDER — FLUOROURACIL 50 MG/ML
400 INJECTION, SOLUTION INTRAVENOUS ONCE
Status: CANCELLED | OUTPATIENT
Start: 2021-06-09

## 2021-05-26 RX ORDER — DEXTROSE MONOHYDRATE 50 MG/ML
20 INJECTION, SOLUTION INTRAVENOUS ONCE
Status: CANCELLED | OUTPATIENT
Start: 2021-06-09

## 2021-05-26 RX ORDER — ONDANSETRON 4 MG/1
4 TABLET, ORALLY DISINTEGRATING ORAL EVERY 6 HOURS PRN
Qty: 30 TABLET | Refills: 1 | Status: SHIPPED | OUTPATIENT
Start: 2021-05-26 | End: 2022-06-01 | Stop reason: ALTCHOICE

## 2021-05-26 RX ORDER — SODIUM CHLORIDE 9 MG/ML
20 INJECTION, SOLUTION INTRAVENOUS ONCE AS NEEDED
Status: CANCELLED | OUTPATIENT
Start: 2021-06-23

## 2021-05-26 RX ORDER — DEXTROSE MONOHYDRATE 50 MG/ML
20 INJECTION, SOLUTION INTRAVENOUS ONCE
Status: CANCELLED | OUTPATIENT
Start: 2021-06-23

## 2021-05-26 NOTE — PROGRESS NOTES
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    History of Presenting Illness:   49-year-old  female with history of rheumatoid arthritis who was seen in the hospital on February 2021 with obstructive jaundice, CT scan showed intra and extrahepatic biliary dilation, there was a questionable mass measuring 1 5 cm by the end of the common bile duct     MRI of the abdomen was negative for mass, EUS in March 2021 showed 2 x 1 6 cm mass in the ampulla of Vater and it appears to involve the distal common bile duct, biopsy showed mucinous poorly differentiated adenocarcinoma    Status post Whipple procedure on 04/26/2021, biopsy showed invasive poorly differentiated mucinous adenocarcinoma the tumor arises in the background of adenoma with high-grade dysplasia, with lymphatic and venous channel invasion, metastatic carcinoma is present in 1/22 lymph nodes, all margins are negative for tumor with good distance    AMPULLA OF VATER  AMPULLA OF VATER: AMPULLECTOMY, PANCREATICODUODENECTOMY - All Specimens  8th Edition - Protocol posted: 2/26/2020  SPECIMEN   Procedure  Pancreaticoduodenectomy (Whipple resection)    TUMOR   Tumor Site  Intra-ampullary and cristine-ampullary (mixed type)    Histologic Type  Mucinous adenocarcinoma    Histologic Grade  G3: Poorly differentiated    Tumor Size  Greatest Dimension (Centimeters): 2 5 cm   Additional Dimension (Centimeters)  2 3 cm     1 cm   Tumor Extension  Tumor invades into muscularis propria of the duodenum      Tumor extends more than 0 5 cm into pancreas      Tumor extends into peripancreatic soft tissues      Tumor extends into periduodenal tissue    Lymphovascular Invasion  Present    Perineural Invasion  Not identified    MARGINS   Margins     Pancreatic Neck / Parenchymal Margin  Uninvolved by invasive carcinoma and pancreatic high-grade intraepithelial neoplasia    Distance of Invasive Carcinoma from Margin  3 5 cm   Uncinate (Retroperitoneal / Superior Mesenteric Artery) Margin  Uninvolved by invasive carcinoma    Distance of Invasive Carcinoma from Margin  4 0 cm   Bile Duct Margin  Uninvolved by invasive carcinoma and high-grade intraepithelial neoplasia    Distance of Invasive Carcinoma from Margin  3 3 cm   Proximal Margin (Gastric or Duodenal)  Uninvolved by invasive carcinoma and high-grade intraepithelial neoplasia    Distal Margin (Distal Duodenal or Jejunal)  Uninvolved by invasive carcinoma and high-grade intraepithelial neoplasia    LYMPH NODES   Number of Lymph Nodes Involved  1    Number of Lymph Nodes Examined  22    PATHOLOGIC STAGE CLASSIFICATION (pTNM, AJCC 8th Edition)      Primary Tumor (pT)  pT3b    Regional Lymph Nodes (pN)  pN1    ADDITIONAL FINDINGS   Additional Findings  Dysplasia / adenoma      PanIN 1B    Comment(s)   Comment(s)  AJCC Prognostic Stage Group (8th Ed ):  IIIA - pT3a, pN1, MX, G3           final pathology showed stage III A (pT3a, pN1, MX, grade 3)    Chest x-ray was normal     She recovered very well    Denies any headache blurred vision diplopia odynophagia chest pain dysuria hematuria melena hematochezia she had intermittent diarrhea and abdominal bloating    Family history significant for prostate cancer in her brother and in her father    He quit smoking when she was diagnosed with the cancer, she used to smoke 1 pack of cigarettes daily, she does not drink alcohol  Review of Systems - As stated in the HPI otherwise the fourteen point review of systems was negative  No past medical history on file      Social History     Socioeconomic History    Marital status: Single     Spouse name: None    Number of children: None    Years of education: None    Highest education level: None   Occupational History    None   Social Needs    Financial resource strain: None   [place] insecurity     Worry: None     Inability: None    Transportation needs     Medical: None     Non-medical: None   Tobacco Use    Smoking status: Current Every Day Smoker     Packs/day: 0 50    Smokeless tobacco: Never Used    Tobacco comment: education given-working on quitting   Substance and Sexual Activity    Alcohol use: Never     Frequency: Never    Drug use: Never    Sexual activity: Never   Lifestyle    Physical activity     Days per week: None     Minutes per session: None    Stress: None   Relationships    Social connections     Talks on phone: None     Gets together: None     Attends Church service: None     Active member of club or organization: None     Attends meetings of clubs or organizations: None     Relationship status: None    Intimate partner violence     Fear of current or ex partner: None     Emotionally abused: None     Physically abused: None     Forced sexual activity: None   Other Topics Concern    None   Social History Narrative    None       Family History   Problem Relation Age of Onset    Ulcerative colitis Mother     Heart disease Brother        Allergies   Allergen Reactions    Penicillins     Tetracycline          Current Outpatient Medications:     acetaminophen (TYLENOL) 500 mg tablet, Take 1,000 mg by mouth, Disp: , Rfl:     ondansetron (ZOFRAN-ODT) 4 mg disintegrating tablet, Take 1 tablet (4 mg total) by mouth every 6 (six) hours as needed for nausea or vomiting (Patient not taking: Reported on 5/26/2021), Disp: 20 tablet, Rfl: 0    oxyCODONE (ROXICODONE) 5 mg immediate release tablet, Take 5 mg by mouth, Disp: , Rfl:       /90 (BP Location: Left arm, Patient Position: Sitting, Cuff Size: Adult)   Pulse 76   Temp 97 8 °F (36 6 °C) (Tympanic)   Resp 16   Ht 5' 2" (1 575 m)   Wt 52 3 kg (115 lb 6 4 oz)   SpO2 98%   BMI 21 11 kg/m²       General Appearance:    Alert, oriented        Eyes:    PERRL   Ears:    Normal external ear canals, both ears Nose:   Nares normal, septum midline   Throat:   Mucosa moist  Pharynx without injection  Neck:   Supple       Lungs:     Clear to auscultation bilaterally   Chest Wall:    No tenderness or deformity    Heart:    Regular rate and rhythm       Abdomen:     Soft, non-tender, bowel sounds +, no organomegaly           Extremities:   Extremities no cyanosis or edema       Skin:   no rash or icterus  Lymph nodes:   Cervical, supraclavicular, and axillary nodes normal   Neurologic:   CNII-XII intact, normal strength, sensation and reflexes     Throughout               No results found for this or any previous visit (from the past 48 hour(s))  Xr Abdomen 1 View Kub    Result Date: 4/26/2021  Narrative: ABDOMEN INDICATION: Needle Miscount COMPARISON:  CT dated April 11, 2021 VIEWS:  AP supine Images: 2 FINDINGS: X-rays limited by pending on the operative table  NG tube, surgical drain, and numerous surgical clips are seen  No radiopaque needles are identified on this exam  As this is a perioperative x-ray, free air is expected  Rib cartilage calcifications are seen  Arterial atherosclerosis in the pelvis  Visualized lung bases are clear  Visualized osseous structures are unremarkable for the patient's age  Impression: No missing needle identified   I personally discussed this study with the x-ray technologist in the OR at the time on 4/26/2021 at 2:44 PM  Workstation performed: [id number]     ECOG :1      Assessment and plan:   75-year-old with rheumatoid arthritis presented with obstructive jaundice she was found to have poorly differentiated mucinous adenocarcinoma of the ampulla of Vater with extension into the common bile duct status post pancreatoduodenectomy in April 2021, final pathology showed stage III A ( pT3a, pN1, MX, grade 3) with 1/22 positive lymph nodes margins are free, however there is lymphovascular and venous invasion, the tumor invades into muscularis propria of the duodenum with extension into the peripancreatic soft tissue and into cristine duodenal tissue    This is a high risk tumor for recurrence in the future    This is a rare tumor, the patient is candidate for adjuvant chemotherapy, multiple chemotherapy regimen are available such as FOLFIRINOX, FOLFOX, Abraxane/gemcitabine    Her case was discussed in the tumor conference    I believe treatment with adjuvant FOLFOX for 4 months followed by local radiation therapy +/-capecitabine is good approach    Side effects of FOLFOX that are but not limited to nausea, vomiting, mucositis, diarrhea, neuropathy, pancytopenia, sepsis, elevated liver enzymes, dehydration told she agree to proceed, she signed the consent     Will send for molecular tests on the tissue biopsy to determine any actionable mutation or MSI status    Patient to meet with genetic counselor as well    Patient to follow-up with Dr Niurka Phoenix because of the proximity    Patient to have CT scan of the chest abdomen and pelvis in 3 months

## 2021-05-26 NOTE — PRE-PROCEDURE INSTRUCTIONS
Pre-Surgery Instructions:   Medication Instructions    acetaminophen (TYLENOL) 500 mg tablet Instructed patient per Anesthesia Guidelines  Pt is not taking any meds currently, except for chemo infusions  She has hibiclens & understands bathing instructions

## 2021-06-01 ENCOUNTER — ANESTHESIA EVENT (OUTPATIENT)
Dept: PERIOP | Facility: HOSPITAL | Age: 66
End: 2021-06-01
Payer: MEDICARE

## 2021-06-01 ENCOUNTER — RADIATION ONCOLOGY CONSULT (OUTPATIENT)
Dept: RADIATION ONCOLOGY | Facility: HOSPITAL | Age: 66
End: 2021-06-01
Attending: RADIOLOGY
Payer: MEDICARE

## 2021-06-01 VITALS
SYSTOLIC BLOOD PRESSURE: 118 MMHG | TEMPERATURE: 98.3 F | RESPIRATION RATE: 18 BRPM | HEART RATE: 69 BPM | OXYGEN SATURATION: 98 % | BODY MASS INDEX: 20.94 KG/M2 | WEIGHT: 114.5 LBS | DIASTOLIC BLOOD PRESSURE: 72 MMHG

## 2021-06-01 DIAGNOSIS — D49.0 NEOPLASM OF AMPULLA OF VATER: Primary | ICD-10-CM

## 2021-06-01 DIAGNOSIS — D49.0 NEOPLASM OF AMPULLA OF VATER: ICD-10-CM

## 2021-06-01 PROCEDURE — 99211 OFF/OP EST MAY X REQ PHY/QHP: CPT | Performed by: RADIOLOGY

## 2021-06-01 NOTE — PROGRESS NOTES
Donfarzad Yahaira 1955 is a 72 y o  female     Patient presents for radiation consult for neoplasm of ampulla of vater, 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  She is referred by Dr Janis Ac  72year old female with history of RA, taking tylenol prn     2/18/21 To ED with  fatigue and nausea with decreased appetite and some left lower quadrant abdominal pain as well that does not seem to be affected by food  Discussed CT and lab work with patient recommending admission for further testing  However this time patient is hesitant as she is helping her son after his major surgery  Will attempt to get MRCP in the ER to further evaluate need for further intervention     2/18/21 CT abdomen and pelvis for abdominal pain  1  Intrahepatic and extra hepatic biliary ductal dilatation down to the level of the pancreatic head  No obvious stones or mass lesion by CT but an underlying lesion should be excluded  Recommend follow-up with ERCP or MRCP  5 mm hypodensity also noted at the pancreatic head, nonspecific  This could be assessed with endoscopic ultrasound or on the MR     2/18/21 MRI abdomen  Intra and extrahepatic biliary dilatation extending to the ampulla without evidence of obstructing mass lesion or choledocholithiasis  Consider ERCP for further characterization  3/15/21 Upper endoscopic ultrasound Marcial Granado MD   2 0 x 1 6 cm mass at the ampulla appeared to involve the distal common bile duct  Biliary dilation up to 1 8 cm  This is not amenable to endoscopic resection    Ampullary Mass (biopsy):    - 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  No normal epithelium seen  See diagnostic comment  Diagnosis Comment:  Whether this is an intraductal papillary mucinous neoplasm that hs extended onto the ampullary surface or a pancreatic ductal adenocarcinoma "colonizing" the ampulla is unclear on these small samples   Regardless, like   Labeeb, we believe that the process is neoplastic  Claudette Makos, [address]      4/9/21 T bili 3 43      Alk Phos 762    4/11/21 CT abdomen and pelvis  1  Grossly unchanged moderate intrahepatic and extrahepatic biliary ductal dilation with abrupt narrowing/transition in the distal CBD where there is a small obstructing periampullary lesion measuring 1 1 x 1 0 cm, likely corresponding to the lesion   seen on prior endoscopic ultrasound  2   Otherwise, no acute findings in the abdomen or pelvis  4/26/21 T bili 6 39      Alk Phos 806    4/26/21 Whipple procedure/pancreatico-duodenectomy, intraoperative soft tissue ablation pancreas, cholecystectomy Seth Bales MD  B  Celiac lymph node:     - Single lymph node; negative for malignancy (0/1)  C   Whipple resection:     - Invasive poorly differentiated mucinous adenocarcinoma  - Tumor arises in the background of an adenoma with high grade dysplasia  - Lymphatic and venous channel invasion by tumor present  - Metastatic carcinoma present in one of twenty lymph nodes (1/20)  - All margins are negative for tumor  IIIA - pT3a, pN1, MX, G3       5/20/21 Rectal/GI Multidisciplinary Conference   -Adjuvant chemotherapy x4 months  -Consider concurrent chemo/RT x6 weeks     -Consult with Dr Ada Kearns on 5/26/21  -Consult with Dr Hannah Tamez on 6/1/21  -Port placement scheduled for 6/2/21 with Dr Donna May  -Patient scheduled for genetics consult on 6/29/21 5/21/21 Seth Bales MD  Her case was also discussed at upper GI working group  We will plan on 6 months of adjuvant treatment  This may be 4 months of chemotherapy followed by chemo radiation or 6 months of only chemotherapy  This will be reassessed by Medical Oncology after 4 months of chemotherapy         5/26/21 Mar Lu MD  Plan: adjuvant FOLFOX for 4 months followed by local radiation therapy +/-capecitabine       6/2/21 [facility] insertion  6/9/21 Infusion at [address] [place] History   Neoplasm of ampulla of Vater   3/15/2021 Initial Diagnosis    Neoplasm of ampulla of Vater     4/26/2021 Surgery    B  Celiac lymph node:     - Single lymph node; negative for malignancy (0/1)  C   Whipple resection:     - Invasive poorly differentiated mucinous adenocarcinoma  - Tumor arises in the background of an adenoma with high grade dysplasia  - Lymphatic and venous channel invasion by tumor present  - Metastatic carcinoma present in one of twenty lymph nodes (1/20)  - All margins are negative for tumor       6/9/2021 -  Chemotherapy    fluorouracil (ADRUCIL) injection 605 mg, 400 mg/m2 = 605 mg, Intravenous, Once, 0 of 8 cycles  leucovorin 604 mg in dextrose 5 % 250 mL IVPB, 400 mg/m2 = 604 mg, Intravenous, Once, 0 of 8 cycles  oxaliplatin (ELOXATIN) 128 35 mg in dextrose 5 % 250 mL chemo infusion, 85 mg/m2 = 128 35 mg, Intravenous, Once, 0 of 8 cycles         Clinical Trial: no      Health Maintenance   Topic Date Due    Hepatitis C Screening  Never done    Medicare Annual Wellness Visit (AWV)  Never done    MAMMOGRAM  Never done    Depression Screening PHQ  Never done    HIV Screening  Never done    DTaP,Tdap,and Td Vaccines (1 - Tdap) Never done    Colorectal Cancer Screening  Never done    Fall Risk  Never done    Pneumococcal Vaccine: 65+ Years (1 of 1 - PPSV23) Never done    Influenza Vaccine (Season Ended) 09/01/2021    COVID-19 Vaccine (2 - Moderna 2-dose series) 06/11/2021    BMI: Adult  05/26/2022    HIB Vaccine  Aged Out    Hepatitis B Vaccine  Aged Out    IPV Vaccine  Aged Out    Hepatitis A Vaccine  Aged Out    Meningococcal ACWY Vaccine  Aged Out    HPV Vaccine  Aged Out       Past Medical History:   Diagnosis Date    Cancer [facility])     pancreatic       Past Surgical History:   Procedure Laterality Date    ABLATION SOFT TISSUE N/A 4/26/2021    Procedure: INTRAOPERATIVE ULTRASOUND, ABLATION,SOFT TISSUE PANCREAS;  Surgeon: Woodrow Vang MD;  [facility];  Service: Surgical Oncology    CHOLECYSTECTOMY N/A 2021    Procedure: CHOLECYSTECTOMY;  Surgeon: Woodrow Vang MD;  [facility];  Service: Surgical Oncology    HYSTERECTOMY      LAPAROTOMY N/A 2021    Procedure: LAPAROTOMY EXPLORATORY;  Surgeon: Woodrow Vang MD;  [facility];  Service: Surgical Oncology    SINUS SURGERY      WHIPPLE PROCEDURE/PANCREATICO-DUODENECTOMY N/A 2021    Procedure: WHIPPLE PROCEDURE/PANCREATICO-DUODENECTOMY; PYLORIC PRESERVING;  Surgeon: Woodrow Vang MD;  [facility];  Service: Surgical Oncology       Family History   Problem Relation Age of Onset    Ulcerative colitis Mother     Prostate cancer Father     Heart disease Brother     Prostate cancer Brother     Melanoma Sister        Social History     Tobacco Use    Smoking status: Former Smoker     Packs/day: 0 50     Start date: 65     Quit date: 2021     Years since quittin 1    Smokeless tobacco: Never Used    Tobacco comment: recently stop smoking    Substance Use Topics    Alcohol use: Never     Frequency: Never    Drug use: Never          Current Outpatient Medications:     acetaminophen (TYLENOL) 500 mg tablet, Take 1,000 mg by mouth, Disp: , Rfl:     ondansetron (ZOFRAN-ODT) 4 mg disintegrating tablet, Take 1 tablet (4 mg total) by mouth every 6 (six) hours as needed for nausea or vomiting, Disp: 30 tablet, Rfl: 1    Allergies   Allergen Reactions    Penicillins     Tetracycline         Review of Systems:  Review of Systems   Constitutional: Positive for appetite change (eating smaller portions since surgery) and fatigue (mild, recovering since surgery)  HENT: Negative  Eyes: Negative  Respiratory: Negative  Gastrointestinal: Positive for abdominal pain (mild tenderness, taking tylenol ) and diarrhea (depending on what she eats)  Endocrine: Negative  Genitourinary: Negative  Musculoskeletal: Positive for arthralgias (history of RA - taking tylenol prn)  Skin:        Healed abdominal incision    Allergic/Immunologic: Negative  Neurological: Negative  Hematological: Negative  Psychiatric/Behavioral: Negative  Vitals:    06/01/21 1401   BP: 118/72   BP Location: Left arm   Pulse: 69   Resp: 18   Temp: 98 3 °F (36 8 °C)   TempSrc: Temporal   SpO2: 98%   Weight: 51 9 kg (114 lb 8 oz)            PFT: n/a    Imaging:No images are attached to the encounter       Teaching: NCI radiation packet    MST completed     Implantable Devices [facility], Pacemaker, pain stimulator): port placement scheduled 6/2/21    Hip Replacement: no

## 2021-06-01 NOTE — PROGRESS NOTES
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  She presents today to discuss the role of adjuvant chemoradiation therapy  Workup to date as below:    Patient presents for radiation consult for neoplasm of ampulla of vater, 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  She is referred by Dr Nohelia Nicolas  72year old female with history of RA, taking tylenol prn     21 To ED with  fatigue and nausea with decreased appetite and some left lower quadrant abdominal pain as well that does not seem to be affected by food  Discussed CT and lab work with patient recommending admission for further testing  However this time patient is hesitant as she is helping her son after his major surgery  Will attempt to get MRCP in the ER to further evaluate need for further intervention     21 CT abdomen and pelvis for abdominal pain  1  Intrahepatic and extra hepatic biliary ductal dilatation down to the level of the pancreatic head  No obvious stones or mass lesion by CT but an underlying lesion should be excluded  Recommend follow-up with ERCP or MRCP  5 mm hypodensity also noted at the pancreatic head, nonspecific    This could be assessed with endoscopic ultrasound or on the MR     21 MRI abdomen  Intra and extrahepatic biliary dilatation extending to the ampulla without evidence of obstructing mass lesion or choledocholithiasis  Consider ERCP for further characterization  3/15/21 Upper endoscopic ultrasound Sury Chowdhury MD   2 0 x 1 6 cm mass at the ampulla appeared to involve the distal common bile duct  Biliary dilation up to 1 8 cm  This is not amenable to endoscopic resection    Ampullary Mass (biopsy):    - 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  No normal epithelium seen  See diagnostic comment  Diagnosis Comment:  Whether this is an intraductal papillary mucinous neoplasm that hs extended onto the ampullary surface or a pancreatic ductal adenocarcinoma "colonizing" the ampulla is unclear on these small samples  Regardless, like Dr Omar Whitlock, we believe that the process is neoplastic  Nallely Godoy, [address]      4/9/21 T bili 3 43      Alk Phos 762    4/11/21 CT abdomen and pelvis  1  Grossly unchanged moderate intrahepatic and extrahepatic biliary ductal dilation with abrupt narrowing/transition in the distal CBD where there is a small obstructing periampullary lesion measuring 1 1 x 1 0 cm, likely corresponding to the lesion   seen on prior endoscopic ultrasound  2   Otherwise, no acute findings in the abdomen or pelvis  4/26/21 T bili 6 39      Alk Phos 806    4/26/21 Whipple procedure/pancreatico-duodenectomy, intraoperative soft tissue ablation pancreas, cholecystectomy Gagandeep Casillas MD  B  Celiac lymph node:     - Single lymph node; negative for malignancy (0/1)  C   Whipple resection:     - Invasive poorly differentiated mucinous adenocarcinoma  - Tumor arises in the background of an adenoma with high grade dysplasia  - Lymphatic and venous channel invasion by tumor present  - Metastatic carcinoma present in one of twenty lymph nodes (1/20)  - All margins are negative for tumor    IIIA - pT3a, pN1, MX, G3       5/20/21 Rectal/GI Multidisciplinary Conference   -Adjuvant chemotherapy x4 months  -Consider concurrent chemo/RT x6 weeks     -Consult with Dr Pedro Plunkett on 5/26/21  -Consult with Dr Steve Mcguire on 6/1/21  -Port placement scheduled for 6/2/21 with Dr Juan Guerra  -Patient scheduled for genetics consult on 6/29/21 5/21/21 Toñito Rod MD  Her case was also discussed at upper GI working group  We will plan on 6 months of adjuvant treatment  This may be 4 months of chemotherapy followed by chemo radiation or 6 months of only chemotherapy  This will be reassessed by Medical Oncology after 4 months of chemotherapy  5/26/21 Carlos Orosco MD  Plan: adjuvant FOLFOX for 4 months followed by local radiation therapy +/-capecitabine       6/2/21 Port insertion  6/9/21 Infusion at [address]   Oncology History   Neoplasm of ampulla of Vater   3/15/2021 Initial Diagnosis    Neoplasm of ampulla of Vater     4/26/2021 Surgery    B  Celiac lymph node:     - Single lymph node; negative for malignancy (0/1)  C   Whipple resection:     - Invasive poorly differentiated mucinous adenocarcinoma  - Tumor arises in the background of an adenoma with high grade dysplasia  - Lymphatic and venous channel invasion by tumor present  - Metastatic carcinoma present in one of twenty lymph nodes (1/20)  - All margins are negative for tumor       6/1/2021 -  Cancer Staged    Staging form: Ampulla of Vater, AJCC 8th Edition  - Pathologic: Stage IIIA (pT3a, pN1, cM0) - Signed by Sangeetha Beach MD on 6/1/2021  Histologic grade (G): G3  Histologic grading system: 3 grade system  Residual tumor (R): R0 - None       6/9/2021 -  Chemotherapy    fluorouracil (ADRUCIL) injection 605 mg, 400 mg/m2 = 605 mg, Intravenous, Once, 0 of 8 cycles  leucovorin 604 mg in dextrose 5 % 250 mL IVPB, 400 mg/m2 = 604 mg, Intravenous, Once, 0 of 8 cycles  oxaliplatin (ELOXATIN) 128 35 mg in dextrose 5 % 250 mL chemo infusion, 85 mg/m2 = 128 35 mg, Intravenous, Once, 0 of 8 cycles           Past Medical History:   Diagnosis Date    Cancer [facility])     pancreatic     Past Surgical History:   Procedure Laterality Date    ABLATION SOFT TISSUE N/A 2021    Procedure: INTRAOPERATIVE ULTRASOUND, ABLATION,SOFT TISSUE PANCREAS;  Surgeon: Zahraa Lopez MD;  [facility];  Service: Surgical Oncology    CHOLECYSTECTOMY N/A 2021    Procedure: CHOLECYSTECTOMY;  Surgeon: Zahraa Lopez MD;  [facility];  Service: Surgical Oncology    HYSTERECTOMY      LAPAROTOMY N/A 2021    Procedure: LAPAROTOMY EXPLORATORY;  Surgeon: Zahraa Lopez MD;  [facility];  Service: Surgical Oncology    SINUS SURGERY      WHIPPLE PROCEDURE/PANCREATICO-DUODENECTOMY N/A 2021    Procedure:  WHIPPLE PROCEDURE/PANCREATICO-DUODENECTOMY; PYLORIC PRESERVING;  Surgeon: Zahraa Lopez MD;  [facility];  Service: Surgical Oncology       Family History   Problem Relation Age of Onset    Ulcerative colitis Mother     Prostate cancer Father     Heart disease Brother     Prostate cancer Brother     Melanoma Sister        Social History   Social History     Substance and Sexual Activity   Alcohol Use Never    Frequency: Never     Social History     Substance and Sexual Activity   Drug Use Never     Social History     Tobacco Use   Smoking Status Former Smoker    Packs/day: 0 50    Start date: 65    Quit date: 2021    Years since quittin 1   Smokeless Tobacco Never Used   Tobacco Comment    recently stop smoking          Meds/Allergies     Current Outpatient Medications:     acetaminophen (TYLENOL) 500 mg tablet, Take 1,000 mg by mouth, Disp: , Rfl:     ondansetron (ZOFRAN-ODT) 4 mg disintegrating tablet, Take 1 tablet (4 mg total) by mouth every 6 (six) hours as needed for nausea or vomiting, Disp: 30 tablet, Rfl: 1    [START ON 2021] fluorouracil 3,625 mg in CADD infusion pump, Infuse 3,625 mg (1,200 mg/m2/day x 1 51 m2 (Treatment plan recorded BSA)) into a venous catheter over 46 hours for 2 days, Disp: 1 each, Rfl: 0  Allergies   Allergen Reactions    Penicillins     Tetracycline          Review of Systems   Review of Systems   Constitutional: Positive for appetite change (eating smaller portions since surgery) and fatigue (mild, recovering since surgery)  HENT: Negative  Eyes: Negative  Respiratory: Negative  Gastrointestinal: Positive for abdominal pain (mild tenderness, taking tylenol ) and diarrhea (depending on what she eats)  Endocrine: Negative  Genitourinary: Negative  Musculoskeletal: Positive for arthralgias (history of RA - taking tylenol prn)  Skin:        Healed abdominal incision    Allergic/Immunologic: Negative  Neurological: Negative  Hematological: Negative  Psychiatric/Behavioral: Negative  OBJECTIVE:   /72 (BP Location: Left arm)   Pulse 69   Temp 98 3 °F (36 8 °C) (Temporal)   Resp 18   Wt 51 9 kg (114 lb 8 oz)   SpO2 98%   BMI 20 94 kg/m²   Pain Assessment:  0  Performance Status: Karnofsky: 90 - Able to carry on normal activity; minor signs or symptoms of disease     Physical Exam     The patient presents today no apparent distress  Sclera anicteric  Normal respiratory effort  Abdomen soft nontender  Midline scar well healed  Normal speech  Normal affect        RESULTS  Lab Results    Chemistry        Component Value Date/Time    K 5 1 05/26/2021 1031     05/26/2021 1031    CO2 30 05/26/2021 1031    CO2 23 04/26/2021 1241    BUN 24 05/26/2021 1031    CREATININE 0 59 (L) 05/26/2021 1031        Component Value Date/Time    CALCIUM 9 9 05/26/2021 1031    ALKPHOS 118 (H) 05/26/2021 1031    AST 18 05/26/2021 1031    ALT 30 05/26/2021 1031            Lab Results   Component Value Date    WBC 8 52 05/26/2021    HGB 13 1 05/26/2021    HCT 40 9 05/26/2021    MCV 94 05/26/2021     (H) 05/26/2021         Imaging Studies  No results found  ASSESSMENT  1  Neoplasm of ampulla of Vater  Ambulatory referral to Radiation Oncology     Cancer Staging  Neoplasm of ampulla of Vater  Staging form: Ampulla of Vater, AJCC 8th Edition  - Pathologic: Stage IIIA (pT3a, pN1, cM0) - Signed by Jason Lagunas MD on 6/1/2021  Histologic grade (G): G3  Histologic grading system: 3 grade system  Residual tumor (R): R0 - None        PLAN/DISCUSSION  No orders of the defined types were placed in this encounter  Perley Lesch is a 72y o  year old female with  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, status post Whipple resection with negative margins, Stage IIIA (pT3a, pN1, cM0)  She has met with Medical Oncology and will be receiving 4 months of adjuvant systemic therapy  Following completion of systemic therapy, assuming no evidence of distant progression, we agree with the recommendation for adjuvant chemoradiation therapy in order to reduce her risk of local regional recurrence  The associated risks and toxicities of treatment were discussed with the patient in detail, including, but not limited to, fatigue, nausea, decreased appetite, abdominal discomfort, diarrhea, and long-term bowel injury in the form ulceration, bleed, and or obstruction  She will be referred back to our department following completion of full-dose systemic therapy  Jason Lagunas MD  6/1/2021,3:25 PM      Portions of the record may have been created with voice recognition software  Occasional wrong word or "sound a like" substitutions may have occurred due to the inherent limitations of voice recognition software  Read the chart carefully and recognize, using context, where substitutions have occurred

## 2021-06-02 ENCOUNTER — APPOINTMENT (OUTPATIENT)
Dept: RADIOLOGY | Facility: HOSPITAL | Age: 66
End: 2021-06-02
Payer: MEDICARE

## 2021-06-02 ENCOUNTER — ANESTHESIA (OUTPATIENT)
Dept: PERIOP | Facility: HOSPITAL | Age: 66
End: 2021-06-02
Payer: MEDICARE

## 2021-06-02 ENCOUNTER — HOSPITAL ENCOUNTER (OUTPATIENT)
Facility: HOSPITAL | Age: 66
Setting detail: OUTPATIENT SURGERY
Discharge: HOME | End: 2021-06-02
Attending: SURGERY | Admitting: SURGERY
Payer: MEDICARE

## 2021-06-02 VITALS
DIASTOLIC BLOOD PRESSURE: 77 MMHG | BODY MASS INDEX: 21.16 KG/M2 | WEIGHT: 115 LBS | TEMPERATURE: 97.8 F | HEIGHT: 62 IN | OXYGEN SATURATION: 96 % | SYSTOLIC BLOOD PRESSURE: 141 MMHG | RESPIRATION RATE: 16 BRPM | HEART RATE: 54 BPM

## 2021-06-02 PROCEDURE — 77001 FLUOROGUIDE FOR VEIN DEVICE: CPT

## 2021-06-02 PROCEDURE — 77001 FLUOROGUIDE FOR VEIN DEVICE: CPT | Performed by: SURGERY

## 2021-06-02 PROCEDURE — 36561 INSERT TUNNELED CV CATH: CPT | Performed by: SURGERY

## 2021-06-02 PROCEDURE — C1788 PORT, INDWELLING, IMP: HCPCS | Performed by: SURGERY

## 2021-06-02 DEVICE — PORT HIGH PROFILE 8FR KIT PRO-FUSE: Type: IMPLANTABLE DEVICE | Site: SUBCLAVIAN | Status: FUNCTIONAL

## 2021-06-02 RX ORDER — SODIUM CHLORIDE, SODIUM LACTATE, POTASSIUM CHLORIDE, CALCIUM CHLORIDE 600; 310; 30; 20 MG/100ML; MG/100ML; MG/100ML; MG/100ML
INJECTION, SOLUTION INTRAVENOUS CONTINUOUS PRN
Status: DISCONTINUED | OUTPATIENT
Start: 2021-06-02 | End: 2021-06-02

## 2021-06-02 RX ORDER — MIDAZOLAM HYDROCHLORIDE 2 MG/2ML
INJECTION, SOLUTION INTRAMUSCULAR; INTRAVENOUS AS NEEDED
Status: DISCONTINUED | OUTPATIENT
Start: 2021-06-02 | End: 2021-06-02

## 2021-06-02 RX ORDER — FENTANYL CITRATE 50 UG/ML
INJECTION, SOLUTION INTRAMUSCULAR; INTRAVENOUS AS NEEDED
Status: DISCONTINUED | OUTPATIENT
Start: 2021-06-02 | End: 2021-06-02

## 2021-06-02 RX ORDER — PROPOFOL 10 MG/ML
INJECTION, EMULSION INTRAVENOUS AS NEEDED
Status: DISCONTINUED | OUTPATIENT
Start: 2021-06-02 | End: 2021-06-02

## 2021-06-02 RX ORDER — ONDANSETRON 2 MG/ML
4 INJECTION INTRAMUSCULAR; INTRAVENOUS ONCE AS NEEDED
Status: DISCONTINUED | OUTPATIENT
Start: 2021-06-02 | End: 2021-06-02 | Stop reason: HOSPADM

## 2021-06-02 RX ORDER — CEFAZOLIN SODIUM 1 G/50ML
1000 SOLUTION INTRAVENOUS ONCE
Status: COMPLETED | OUTPATIENT
Start: 2021-06-02 | End: 2021-06-02

## 2021-06-02 RX ORDER — PROPOFOL 10 MG/ML
INJECTION, EMULSION INTRAVENOUS CONTINUOUS PRN
Status: DISCONTINUED | OUTPATIENT
Start: 2021-06-02 | End: 2021-06-02

## 2021-06-02 RX ORDER — BUPIVACAINE HYDROCHLORIDE 2.5 MG/ML
INJECTION, SOLUTION EPIDURAL; INFILTRATION; INTRACAUDAL AS NEEDED
Status: DISCONTINUED | OUTPATIENT
Start: 2021-06-02 | End: 2021-06-02 | Stop reason: HOSPADM

## 2021-06-02 RX ORDER — GLYCOPYRROLATE 0.2 MG/ML
INJECTION INTRAMUSCULAR; INTRAVENOUS AS NEEDED
Status: DISCONTINUED | OUTPATIENT
Start: 2021-06-02 | End: 2021-06-02

## 2021-06-02 RX ORDER — FENTANYL CITRATE/PF 50 MCG/ML
50 SYRINGE (ML) INJECTION
Status: DISCONTINUED | OUTPATIENT
Start: 2021-06-02 | End: 2021-06-02 | Stop reason: HOSPADM

## 2021-06-02 RX ORDER — HYDROMORPHONE HCL/PF 1 MG/ML
0.5 SYRINGE (ML) INJECTION
Status: DISCONTINUED | OUTPATIENT
Start: 2021-06-02 | End: 2021-06-02 | Stop reason: HOSPADM

## 2021-06-02 RX ORDER — MULTIVITAMIN
1 TABLET ORAL DAILY
COMMUNITY

## 2021-06-02 RX ORDER — MAGNESIUM HYDROXIDE 1200 MG/15ML
LIQUID ORAL AS NEEDED
Status: DISCONTINUED | OUTPATIENT
Start: 2021-06-02 | End: 2021-06-02 | Stop reason: HOSPADM

## 2021-06-02 RX ADMIN — GLYCOPYRROLATE 0.2 MG: 0.2 INJECTION, SOLUTION INTRAMUSCULAR; INTRAVENOUS at 07:45

## 2021-06-02 RX ADMIN — PROPOFOL 40 MG: 10 INJECTION, EMULSION INTRAVENOUS at 07:47

## 2021-06-02 RX ADMIN — FENTANYL CITRATE 25 MCG: 50 INJECTION INTRAMUSCULAR; INTRAVENOUS at 08:06

## 2021-06-02 RX ADMIN — FENTANYL CITRATE 25 MCG: 50 INJECTION INTRAMUSCULAR; INTRAVENOUS at 07:50

## 2021-06-02 RX ADMIN — SODIUM CHLORIDE, SODIUM LACTATE, POTASSIUM CHLORIDE, AND CALCIUM CHLORIDE: .6; .31; .03; .02 INJECTION, SOLUTION INTRAVENOUS at 07:38

## 2021-06-02 RX ADMIN — MIDAZOLAM 2 MG: 1 INJECTION INTRAMUSCULAR; INTRAVENOUS at 07:30

## 2021-06-02 RX ADMIN — FENTANYL CITRATE 25 MCG: 50 INJECTION INTRAMUSCULAR; INTRAVENOUS at 07:47

## 2021-06-02 RX ADMIN — FENTANYL CITRATE 25 MCG: 50 INJECTION INTRAMUSCULAR; INTRAVENOUS at 07:59

## 2021-06-02 RX ADMIN — PROPOFOL 150 MCG/KG/MIN: 10 INJECTION, EMULSION INTRAVENOUS at 07:47

## 2021-06-02 RX ADMIN — CEFAZOLIN SODIUM 1000 MG: 1 SOLUTION INTRAVENOUS at 07:25

## 2021-06-02 NOTE — ANESTHESIA POSTPROCEDURE EVALUATION
Post-Op Assessment Note    CV Status:  Stable  Pain Score: 0    Pain management: adequate     Mental Status:  Alert and awake   Hydration Status:  Euvolemic   PONV Controlled:  Controlled   Airway Patency:  Patent and adequate      Post Op Vitals Reviewed: Yes      Staff: CRNA         No complications documented      BP   117/72   Temp  97 3   Pulse 72   Resp 20   SpO2 97

## 2021-06-02 NOTE — INTERVAL H&P NOTE
H&P reviewed  After examining the patient I find no changes in the patients condition since the H&P had been written      Vitals:    06/02/21 0636   BP: 109/76   Pulse: 64   Resp: 16   Temp: (!) 97 2 °F (36 2 °C)   SpO2: 97%

## 2021-06-02 NOTE — DISCHARGE INSTRUCTIONS
POST-OPERATIVE WOUND CARE INSTRUCTIONS    Your wound is closed with:   Sterile strips-white pieces of tape that hold your incision together       Wound care: You may remove your dressing after 24 hours   You may shower using soap and water to clean your wound  Gently pat it dry  You may redress your wound for comfort as needed  Activity:   Did not perform any heavy lifting or strenuous physical activity for 7 days  Your activity restrictions will be reevaluated at your postoperative visit  Medications: You may resume all your preoperative medications and diet  Pain medication as directed on the prescription given in the office  Other:   May use ice on the wound for 24-48 hours as needed for comfort  Call the office at [phone] if you have any of the followin  Redness, swelling, heat, unusual drainage or heavy bleeding from the wound     2  Fever greater than 101°F    3  Pain not relieved by the prescribed pain medication

## 2021-06-02 NOTE — ANESTHESIA PREPROCEDURE EVALUATION
Procedure:  INSERTION VENOUS PORT (PORT-A-CATH) (N/A Chest)    Relevant Problems   GI/HEPATIC   (+) Neoplasm of ampulla of Vater        Physical Exam    Airway    Mallampati score: II  TM Distance: >3 FB  Neck ROM: full     Dental   No notable dental hx     Cardiovascular  Rhythm: regular, Rate: normal, Cardiovascular exam normal    Pulmonary  Pulmonary exam normal Breath sounds clear to auscultation,     Other Findings        Anesthesia Plan  ASA Score- 3     Anesthesia Type- IV sedation with anesthesia with ASA Monitors  Additional Monitors:   Airway Plan: NTT  Plan Factors-Exercise tolerance (METS): >4 METS  Chart reviewed  EKG reviewed  Imaging results reviewed  Existing labs reviewed  Patient summary reviewed  Obstructive sleep apnea risk education given perioperatively  Induction- intravenous  Postoperative Plan- Plan for postoperative opioid use  Informed Consent- Anesthetic plan and risks discussed with patient  I personally reviewed this patient with the CRNA  Discussed and agreed on the Anesthesia Plan with the CRNA  Eliceo Jay

## 2021-06-02 NOTE — OP NOTE
OPERATIVE REPORT  PATIENT NAME: Klevin Syed    :  1955  MRN: [id number]  Pt Location: [facility] 06    SURGERY DATE: 2021    Surgeon(s) and Role:     * Narendra López MD - Primary     * Minerva Valdes MD - Assisting    Preop Diagnosis:  Neoplasm of ampulla of Vater [D49 0]    Post-Op Diagnosis Codes: * Neoplasm of ampulla of Vater [D49 0]    Procedure(s) (LRB):  INSERTION VENOUS PORT (PORT-A-CATH) (Left)    Specimen(s):  * No specimens in log *    Estimated Blood Loss:   Minimal    Drains:  Closed/Suction Drain Right Abdomen Bulb 19 Fr  (Active)   Number of days: 37       Anesthesia Type:   Choice    Operative Indications:  Neoplasm of ampulla of Vater [D520]  70-year-old female who needs a port for chemotherapy  Risks and benefits were explained  Informed consent was obtained  Patient was brought to the operating room  Operative Findings:  Catheter tip at the cavoatrial junction  Complications:   None    Procedure and Technique:  After identifying the patient, the patient was brought to the operating room  Patient was prepped and draped in the usual sterile fashion  A time-out was performed  Adequate MAC anesthesia was achieved with 0 25% Marcaine and sedation  An incision was made in the left deltopectoral groove  No suitable cephalic vein was identified  Consequently the left subclavian vein was accessed via the Seldinger technique  The guidewire was confirmed to be in the superior vena cava using fluoroscopy  A subcutaneous port pocket was created using sharp dissection  There was excellent hemostasis  Three 2-0 Prolene sutures were placed in the port pocket and attached to the port  Introducer and dilator were placed over the guidewire  Guidewire dilator were removed  The catheter was placed through the introducer sheath and the introducer sheath was removed  Using fluoroscopy, the catheter was manipulated into the cavoatrial junction    Catheter was cut and attached to the port and the port was secured using the previously placed Prolene sutures  The port withdrew blood easily and flushed very easily  Fluoroscopy confirmed the catheter tip at the cavoatrial junction  Wound was now copiously irrigated there was excellent hemostasis  The incision was approximated with interrupted 3-0 Vicryl suture subcutaneously and a running 4 Monocryl suture in a subcuticular fashion  Steri-Strips were applied  Port was once again accessed and withdrew blood easily and flushed very easily and was ultimately flushed with the final Hep-Lock solution  Sterile dressings were applied  Patient tolerated the procedure well and was taken to the recovery room in stable condition  I was present and participated in all aspects of this procedure  A chest x-ray was pending at the time of this dictation         I was present for the entire procedure    Patient Disposition:  PACU     SIGNATURE: Kirsten Hernandez MD  DATE: June 2, 2021  TIME: 8:20 AM

## 2021-06-04 ENCOUNTER — PATIENT OUTREACH (OUTPATIENT)
Dept: HEMATOLOGY ONCOLOGY | Facility: CLINIC | Age: 66
End: 2021-06-04

## 2021-06-04 DIAGNOSIS — Z95.828 PORT-A-CATH IN PLACE: ICD-10-CM

## 2021-06-04 DIAGNOSIS — D49.0 NEOPLASM OF AMPULLA OF VATER: Primary | ICD-10-CM

## 2021-06-04 RX ORDER — LIDOCAINE AND PRILOCAINE 25; 25 MG/G; MG/G
CREAM TOPICAL AS NEEDED
Qty: 30 G | Refills: 0 | Status: SHIPPED | OUTPATIENT
Start: 2021-06-04 | End: 2022-03-15

## 2021-06-04 NOTE — PROGRESS NOTES
Received a call from Tereza Beard, she was told by someone about a numbing cream for her port, we talked about EMLA cream, she asked if she can have this, told her I would reach out to Dr Lieutenant Julia DAY to request an order to her pharmacy, GI assessment also complete, instructed her to call with any other questions or concerns    Jamee or Blessing, can you please order EMLA cream for her? Pharmacy is [facility] in [address] [place] [address]  Thanks!

## 2021-06-07 ENCOUNTER — APPOINTMENT (OUTPATIENT)
Dept: LAB | Facility: CLINIC | Age: 66
End: 2021-06-07
Payer: MEDICARE

## 2021-06-07 DIAGNOSIS — D49.0 NEOPLASM OF AMPULLA OF VATER: ICD-10-CM

## 2021-06-07 LAB
ALBUMIN SERPL BCP-MCNC: 4 G/DL (ref 3.5–5)
ALP SERPL-CCNC: 108 U/L (ref 46–116)
ALT SERPL W P-5'-P-CCNC: 24 U/L (ref 12–78)
ANION GAP SERPL CALCULATED.3IONS-SCNC: 3 MMOL/L (ref 4–13)
AST SERPL W P-5'-P-CCNC: 15 U/L (ref 5–45)
BASOPHILS # BLD AUTO: 0.06 THOUSANDS/ΜL (ref 0–0.1)
BASOPHILS NFR BLD AUTO: 1 % (ref 0–1)
BILIRUB SERPL-MCNC: 0.55 MG/DL (ref 0.2–1)
BUN SERPL-MCNC: 26 MG/DL (ref 5–25)
CALCIUM SERPL-MCNC: 9.8 MG/DL (ref 8.3–10.1)
CHLORIDE SERPL-SCNC: 103 MMOL/L (ref 100–108)
CO2 SERPL-SCNC: 30 MMOL/L (ref 21–32)
CREAT SERPL-MCNC: 0.65 MG/DL (ref 0.6–1.3)
EOSINOPHIL # BLD AUTO: 0.46 THOUSAND/ΜL (ref 0–0.61)
EOSINOPHIL NFR BLD AUTO: 4 % (ref 0–6)
ERYTHROCYTE [DISTWIDTH] IN BLOOD BY AUTOMATED COUNT: 13.6 % (ref 11.6–15.1)
GFR SERPL CREATININE-BSD FRML MDRD: 93 ML/MIN/1.73SQ M
GLUCOSE P FAST SERPL-MCNC: 107 MG/DL (ref 65–99)
HCT VFR BLD AUTO: 42.9 % (ref 34.8–46.1)
HGB BLD-MCNC: 13.7 G/DL (ref 11.5–15.4)
IMM GRANULOCYTES # BLD AUTO: 0.03 THOUSAND/UL (ref 0–0.2)
IMM GRANULOCYTES NFR BLD AUTO: 0 % (ref 0–2)
LYMPHOCYTES # BLD AUTO: 2.1 THOUSANDS/ΜL (ref 0.6–4.47)
LYMPHOCYTES NFR BLD AUTO: 20 % (ref 14–44)
MCH RBC QN AUTO: 29.7 PG (ref 26.8–34.3)
MCHC RBC AUTO-ENTMCNC: 31.9 G/DL (ref 31.4–37.4)
MCV RBC AUTO: 93 FL (ref 82–98)
MONOCYTES # BLD AUTO: 0.52 THOUSAND/ΜL (ref 0.17–1.22)
MONOCYTES NFR BLD AUTO: 5 % (ref 4–12)
NEUTROPHILS # BLD AUTO: 7.6 THOUSANDS/ΜL (ref 1.85–7.62)
NEUTS SEG NFR BLD AUTO: 70 % (ref 43–75)
NRBC BLD AUTO-RTO: 0 /100 WBCS
PLATELET # BLD AUTO: 372 THOUSANDS/UL (ref 149–390)
PMV BLD AUTO: 10.4 FL (ref 8.9–12.7)
POTASSIUM SERPL-SCNC: 4.8 MMOL/L (ref 3.5–5.3)
PROT SERPL-MCNC: 7.8 G/DL (ref 6.4–8.2)
RBC # BLD AUTO: 4.61 MILLION/UL (ref 3.81–5.12)
SODIUM SERPL-SCNC: 136 MMOL/L (ref 136–145)
WBC # BLD AUTO: 10.77 THOUSAND/UL (ref 4.31–10.16)

## 2021-06-07 PROCEDURE — 85025 COMPLETE CBC W/AUTO DIFF WBC: CPT

## 2021-06-07 PROCEDURE — 80053 COMPREHEN METABOLIC PANEL: CPT

## 2021-06-07 PROCEDURE — 36415 COLL VENOUS BLD VENIPUNCTURE: CPT

## 2021-06-09 ENCOUNTER — HOSPITAL ENCOUNTER (OUTPATIENT)
Dept: INFUSION CENTER | Facility: HOSPITAL | Age: 66
Discharge: HOME | End: 2021-06-09
Payer: MEDICARE

## 2021-06-09 VITALS
TEMPERATURE: 97.4 F | OXYGEN SATURATION: 97 % | BODY MASS INDEX: 20.31 KG/M2 | SYSTOLIC BLOOD PRESSURE: 138 MMHG | HEIGHT: 63 IN | WEIGHT: 114.64 LBS | DIASTOLIC BLOOD PRESSURE: 90 MMHG | HEART RATE: 84 BPM | RESPIRATION RATE: 16 BRPM

## 2021-06-09 DIAGNOSIS — D49.0 NEOPLASM OF AMPULLA OF VATER: Primary | ICD-10-CM

## 2021-06-09 PROCEDURE — 96367 TX/PROPH/DG ADDL SEQ IV INF: CPT

## 2021-06-09 PROCEDURE — G0498 CHEMO EXTEND IV INFUS W/PUMP: HCPCS

## 2021-06-09 PROCEDURE — 96413 CHEMO IV INFUSION 1 HR: CPT

## 2021-06-09 PROCEDURE — 96411 CHEMO IV PUSH ADDL DRUG: CPT

## 2021-06-09 PROCEDURE — 96368 THER/DIAG CONCURRENT INF: CPT

## 2021-06-09 PROCEDURE — 96415 CHEMO IV INFUSION ADDL HR: CPT

## 2021-06-09 RX ORDER — SODIUM CHLORIDE 9 MG/ML
20 INJECTION, SOLUTION INTRAVENOUS ONCE AS NEEDED
Status: DISCONTINUED | OUTPATIENT
Start: 2021-06-09 | End: 2021-06-12 | Stop reason: HOSPADM

## 2021-06-09 RX ORDER — FLUOROURACIL 50 MG/ML
400 INJECTION, SOLUTION INTRAVENOUS ONCE
Status: COMPLETED | OUTPATIENT
Start: 2021-06-09 | End: 2021-06-09

## 2021-06-09 RX ORDER — DEXTROSE MONOHYDRATE 50 MG/ML
20 INJECTION, SOLUTION INTRAVENOUS ONCE
Status: COMPLETED | OUTPATIENT
Start: 2021-06-09 | End: 2021-06-09

## 2021-06-09 RX ADMIN — FLUOROURACIL 605 MG: 50 INJECTION, SOLUTION INTRAVENOUS at 12:00

## 2021-06-09 RX ADMIN — LEUCOVORIN CALCIUM 600 MG: 200 INJECTION, POWDER, LYOPHILIZED, FOR SUSPENSION INTRAMUSCULAR; INTRAVENOUS at 09:39

## 2021-06-09 RX ADMIN — OXALIPLATIN 128.35 MG: 5 INJECTION, SOLUTION, CONCENTRATE INTRAVENOUS at 09:42

## 2021-06-09 RX ADMIN — DEXTROSE 20 ML/HR: 50 INJECTION, SOLUTION INTRAVENOUS at 09:38

## 2021-06-09 RX ADMIN — DEXAMETHASONE SODIUM PHOSPHATE: 10 INJECTION, SOLUTION INTRAMUSCULAR; INTRAVENOUS at 08:53

## 2021-06-09 RX ADMIN — SODIUM CHLORIDE 20 ML/HR: 0.9 INJECTION, SOLUTION INTRAVENOUS at 08:56

## 2021-06-09 NOTE — PLAN OF CARE
Problem: GASTROINTESTINAL - ADULT  Goal: Minimal or absence of nausea and/or vomiting  Description: INTERVENTIONS:  - Administer ordered antiemetic medications as needed  - Provide nonpharmacologic comfort measures as appropriate  - Consider nutrition services referral to assist patient with adequate nutrition and appropriate food choices  Outcome: Progressing

## 2021-06-11 ENCOUNTER — TELEPHONE (OUTPATIENT)
Dept: HEMATOLOGY ONCOLOGY | Facility: CLINIC | Age: 66
End: 2021-06-11

## 2021-06-11 ENCOUNTER — HOSPITAL ENCOUNTER (OUTPATIENT)
Dept: INFUSION CENTER | Facility: HOSPITAL | Age: 66
Discharge: HOME | End: 2021-06-11

## 2021-06-11 VITALS — TEMPERATURE: 97.8 F

## 2021-06-11 DIAGNOSIS — D49.0 NEOPLASM OF AMPULLA OF VATER: Primary | ICD-10-CM

## 2021-06-11 NOTE — TELEPHONE ENCOUNTER
Patient calling to report she has been very nauseous   She vomited x 1 this morning  Patient did not realize Zofran was sent to her pharmacy on 5/26/21  Instructed her to  Rx asap  Reviewed Q 6 hour PRN instructions    She will call back with any additional questions or concerns    Will send to RN as [place] [facility]

## 2021-06-15 ENCOUNTER — TELEPHONE (OUTPATIENT)
Dept: INFUSION CENTER | Facility: CLINIC | Age: 66
End: 2021-06-15

## 2021-06-15 NOTE — TELEPHONE ENCOUNTER
Left a voicemail for Dr Luan Rivera  Pt is scheduled for an injection on Friday 6/18/21  There is no order placed  There is a note on the appointment desk stating that the patient is to follow up with the doctor to see if she will need this appointment  Left the infusion # for a call back from the office to see if she needs to be treated and that we will need an order

## 2021-06-16 ENCOUNTER — IMMUNIZATIONS (OUTPATIENT)
Dept: FAMILY MEDICINE CLINIC | Facility: HOSPITAL | Age: 66
End: 2021-06-16

## 2021-06-16 DIAGNOSIS — Z23 ENCOUNTER FOR IMMUNIZATION: Primary | ICD-10-CM

## 2021-06-16 PROCEDURE — 91301 SARS-COV-2 / COVID-19 MRNA VACCINE (MODERNA) 100 MCG: CPT

## 2021-06-16 PROCEDURE — 0012A SARS-COV-2 / COVID-19 MRNA VACCINE (MODERNA) 100 MCG: CPT

## 2021-06-16 NOTE — TELEPHONE ENCOUNTER
Received voicemail from Lui Ranch at Dr Gina Chapman office  Patient no longer needs to receive Sandostatin injections and plan was d/c'd  Lui Hoyt requested appt on 6/18 to be canceled  Appt has been canceled as requested

## 2021-06-18 ENCOUNTER — HOSPITAL ENCOUNTER (OUTPATIENT)
Dept: INFUSION CENTER | Facility: CLINIC | Age: 66
End: 2021-06-18

## 2021-06-21 ENCOUNTER — HOSPITAL ENCOUNTER (OUTPATIENT)
Dept: INFUSION CENTER | Facility: HOSPITAL | Age: 66
Discharge: HOME | End: 2021-06-21
Payer: MEDICARE

## 2021-06-21 VITALS — TEMPERATURE: 98.3 F

## 2021-06-21 DIAGNOSIS — D49.0 NEOPLASM OF AMPULLA OF VATER: Primary | ICD-10-CM

## 2021-06-21 LAB
ALBUMIN SERPL BCP-MCNC: 3.4 G/DL (ref 3.5–5)
ALP SERPL-CCNC: 90 U/L (ref 46–116)
ALT SERPL W P-5'-P-CCNC: 33 U/L (ref 12–78)
ANION GAP SERPL CALCULATED.3IONS-SCNC: 7 MMOL/L (ref 4–13)
AST SERPL W P-5'-P-CCNC: 18 U/L (ref 5–45)
BASOPHILS # BLD AUTO: 0.03 THOUSANDS/ΜL (ref 0–0.1)
BASOPHILS NFR BLD AUTO: 0 % (ref 0–1)
BILIRUB SERPL-MCNC: 0.25 MG/DL (ref 0.2–1)
BUN SERPL-MCNC: 27 MG/DL (ref 5–25)
CALCIUM ALBUM COR SERPL-MCNC: 9 MG/DL (ref 8.3–10.1)
CALCIUM SERPL-MCNC: 8.5 MG/DL (ref 8.3–10.1)
CHLORIDE SERPL-SCNC: 105 MMOL/L (ref 100–108)
CO2 SERPL-SCNC: 29 MMOL/L (ref 21–32)
CREAT SERPL-MCNC: 0.64 MG/DL (ref 0.6–1.3)
EOSINOPHIL # BLD AUTO: 0.45 THOUSAND/ΜL (ref 0–0.61)
EOSINOPHIL NFR BLD AUTO: 6 % (ref 0–6)
ERYTHROCYTE [DISTWIDTH] IN BLOOD BY AUTOMATED COUNT: 13.4 % (ref 11.6–15.1)
GFR SERPL CREATININE-BSD FRML MDRD: 94 ML/MIN/1.73SQ M
GLUCOSE SERPL-MCNC: 99 MG/DL (ref 65–140)
HCT VFR BLD AUTO: 36.9 % (ref 34.8–46.1)
HGB BLD-MCNC: 11.8 G/DL (ref 11.5–15.4)
IMM GRANULOCYTES # BLD AUTO: 0.01 THOUSAND/UL (ref 0–0.2)
IMM GRANULOCYTES NFR BLD AUTO: 0 % (ref 0–2)
LYMPHOCYTES # BLD AUTO: 2.68 THOUSANDS/ΜL (ref 0.6–4.47)
LYMPHOCYTES NFR BLD AUTO: 35 % (ref 14–44)
MCH RBC QN AUTO: 29.6 PG (ref 26.8–34.3)
MCHC RBC AUTO-ENTMCNC: 32 G/DL (ref 31.4–37.4)
MCV RBC AUTO: 93 FL (ref 82–98)
MONOCYTES # BLD AUTO: 0.49 THOUSAND/ΜL (ref 0.17–1.22)
MONOCYTES NFR BLD AUTO: 6 % (ref 4–12)
NEUTROPHILS # BLD AUTO: 4.03 THOUSANDS/ΜL (ref 1.85–7.62)
NEUTS SEG NFR BLD AUTO: 53 % (ref 43–75)
NRBC BLD AUTO-RTO: 0 /100 WBCS
PLATELET # BLD AUTO: 294 THOUSANDS/UL (ref 149–390)
PMV BLD AUTO: 9.3 FL (ref 8.9–12.7)
POTASSIUM SERPL-SCNC: 4.2 MMOL/L (ref 3.5–5.3)
PROT SERPL-MCNC: 7 G/DL (ref 6.4–8.2)
RBC # BLD AUTO: 3.99 MILLION/UL (ref 3.81–5.12)
SODIUM SERPL-SCNC: 141 MMOL/L (ref 136–145)
WBC # BLD AUTO: 7.69 THOUSAND/UL (ref 4.31–10.16)

## 2021-06-21 PROCEDURE — 85025 COMPLETE CBC W/AUTO DIFF WBC: CPT | Performed by: INTERNAL MEDICINE

## 2021-06-21 PROCEDURE — 80053 COMPREHEN METABOLIC PANEL: CPT | Performed by: INTERNAL MEDICINE

## 2021-06-21 NOTE — PROGRESS NOTES
Labs drawn via port  Patient offers no complaints   Next appointment verified, patient declines AVS

## 2021-06-21 NOTE — PLAN OF CARE
Problem: SAFETY ADULT  Goal: Patient will remain free of falls  Description: INTERVENTIONS:  - Educate patient/family on patient safety including physical limitations  - Instruct patient to call for assistance with activity   - Consult OT/PT to assist with strengthening/mobility   - Keep Call bell within reach  - Keep bed low and locked with side rails adjusted as appropriate  - Keep care items and personal belongings within reach  - Initiate and maintain comfort rounds  - Make Fall Risk Sign visible to staff    Outcome: Progressing     Problem: Knowledge Deficit  Goal: Patient/family/caregiver demonstrates understanding of disease process, treatment plan, medications, and discharge instructions  Description: Complete learning assessment and assess knowledge base    Interventions:  - Provide teaching at level of understanding  - Provide teaching via preferred learning methods  Outcome: Progressing

## 2021-06-23 ENCOUNTER — HOSPITAL ENCOUNTER (OUTPATIENT)
Dept: INFUSION CENTER | Facility: HOSPITAL | Age: 66
Discharge: HOME | End: 2021-06-23
Payer: MEDICARE

## 2021-06-23 VITALS
RESPIRATION RATE: 20 BRPM | BODY MASS INDEX: 20.62 KG/M2 | OXYGEN SATURATION: 98 % | HEIGHT: 63 IN | DIASTOLIC BLOOD PRESSURE: 88 MMHG | SYSTOLIC BLOOD PRESSURE: 174 MMHG | TEMPERATURE: 99 F | HEART RATE: 82 BPM | WEIGHT: 116.4 LBS

## 2021-06-23 DIAGNOSIS — D49.0 NEOPLASM OF AMPULLA OF VATER: Primary | ICD-10-CM

## 2021-06-23 PROCEDURE — 96413 CHEMO IV INFUSION 1 HR: CPT

## 2021-06-23 PROCEDURE — 96415 CHEMO IV INFUSION ADDL HR: CPT

## 2021-06-23 PROCEDURE — 96368 THER/DIAG CONCURRENT INF: CPT

## 2021-06-23 PROCEDURE — 96367 TX/PROPH/DG ADDL SEQ IV INF: CPT

## 2021-06-23 PROCEDURE — G0498 CHEMO EXTEND IV INFUS W/PUMP: HCPCS

## 2021-06-23 PROCEDURE — 96411 CHEMO IV PUSH ADDL DRUG: CPT

## 2021-06-23 RX ORDER — SODIUM CHLORIDE 9 MG/ML
20 INJECTION, SOLUTION INTRAVENOUS ONCE AS NEEDED
Status: DISCONTINUED | OUTPATIENT
Start: 2021-06-23 | End: 2021-06-26 | Stop reason: HOSPADM

## 2021-06-23 RX ORDER — DEXTROSE MONOHYDRATE 50 MG/ML
20 INJECTION, SOLUTION INTRAVENOUS ONCE
Status: COMPLETED | OUTPATIENT
Start: 2021-06-23 | End: 2021-06-23

## 2021-06-23 RX ORDER — FLUOROURACIL 50 MG/ML
400 INJECTION, SOLUTION INTRAVENOUS ONCE
Status: COMPLETED | OUTPATIENT
Start: 2021-06-23 | End: 2021-06-23

## 2021-06-23 RX ADMIN — SODIUM CHLORIDE 20 ML/HR: 9 INJECTION, SOLUTION INTRAVENOUS at 08:34

## 2021-06-23 RX ADMIN — FLUOROURACIL 605 MG: 50 INJECTION, SOLUTION INTRAVENOUS at 11:26

## 2021-06-23 RX ADMIN — OXALIPLATIN 128.35 MG: 5 INJECTION, SOLUTION INTRAVENOUS at 09:14

## 2021-06-23 RX ADMIN — DEXAMETHASONE SODIUM PHOSPHATE: 10 INJECTION, SOLUTION INTRAMUSCULAR; INTRAVENOUS at 08:34

## 2021-06-23 RX ADMIN — LEUCOVORIN CALCIUM 600 MG: 200 INJECTION, POWDER, LYOPHILIZED, FOR SUSPENSION INTRAMUSCULAR; INTRAVENOUS at 09:13

## 2021-06-23 RX ADMIN — DEXTROSE 20 ML/HR: 50 INJECTION, SOLUTION INTRAVENOUS at 09:09

## 2021-06-23 NOTE — PLAN OF CARE
Problem: SAFETY ADULT  Goal: Patient will remain free of falls  Description: INTERVENTIONS:  - Educate patient/family on patient safety including physical limitations  - Instruct patient to call for assistance with activity   Outcome: Progressing     Problem: Knowledge Deficit  Goal: Patient/family/caregiver demonstrates understanding of disease process, treatment plan, medications, and discharge instructions  Description: Complete learning assessment and assess knowledge base  Interventions:  - Provide teaching at level of understanding  - Provide teaching via preferred learning methods  Outcome: Progressing     Problem: Potential for Falls  Goal: Patient will remain free of falls  Description: INTERVENTIONS:  - Assess patient frequently for physical needs  -  Identify cognitive and physical deficits and behaviors that affect risk of falls    -  [place] fall precautions as indicated by assessment   - Educate patient/family on patient safety including physical limitations  - Instruct patient to call for assistance with activity based on assessment  - Modify environment to reduce risk of injury  - Consider OT/PT consult to assist with strengthening/mobility  Outcome: Progressing

## 2021-06-25 ENCOUNTER — HOSPITAL ENCOUNTER (OUTPATIENT)
Dept: INFUSION CENTER | Facility: HOSPITAL | Age: 66
Discharge: HOME | End: 2021-06-25

## 2021-06-25 VITALS — TEMPERATURE: 97.9 F

## 2021-06-25 DIAGNOSIS — D49.0 NEOPLASM OF AMPULLA OF VATER: Primary | ICD-10-CM

## 2021-06-25 NOTE — PROGRESS NOTES
Pt arrived reporting nausea and vomiting  She confirmed she is taking zofran at home  She declined calling office nurse and declined having me contact office for her  She said she will call them if n/v continues but said it happened last tx the same and resolved on its own  She verbalized understanding to call the office or go to ED if unable to keep fluids down because of increased risk of dehydration  She is alert and oriented and stable on her feet, denies dizziness or any other symptoms  CADD disconnected per protocol

## 2021-06-28 ENCOUNTER — TELEPHONE (OUTPATIENT)
Dept: SURGICAL ONCOLOGY | Facility: CLINIC | Age: 66
End: 2021-06-28

## 2021-06-29 ENCOUNTER — TELEPHONE (OUTPATIENT)
Dept: GENETICS | Facility: CLINIC | Age: 66
End: 2021-06-29

## 2021-06-29 ENCOUNTER — TELEPHONE (OUTPATIENT)
Dept: SURGICAL ONCOLOGY | Facility: CLINIC | Age: 66
End: 2021-06-29

## 2021-06-29 NOTE — TELEPHONE ENCOUNTER
I called patient and left a message to reschedule her genetics appointment that she has scheduled for today with Morgan Liu at 2:00 pm, patient called to cancel  I advised patient to call our office at [phone]

## 2021-06-29 NOTE — TELEPHONE ENCOUNTER
Tereza Beard called, she was not feeling well she asked if she could cancel  Please call Tereza Beard when you have the time to reschedule

## 2021-07-01 ENCOUNTER — OFFICE VISIT (OUTPATIENT)
Dept: HEMATOLOGY ONCOLOGY | Facility: MEDICAL CENTER | Age: 66
End: 2021-07-01
Payer: MEDICARE

## 2021-07-01 VITALS
BODY MASS INDEX: 20.98 KG/M2 | SYSTOLIC BLOOD PRESSURE: 148 MMHG | DIASTOLIC BLOOD PRESSURE: 82 MMHG | TEMPERATURE: 98.4 F | HEIGHT: 62 IN | HEART RATE: 56 BPM | RESPIRATION RATE: 16 BRPM | WEIGHT: 114 LBS | OXYGEN SATURATION: 98 %

## 2021-07-01 DIAGNOSIS — D49.0 NEOPLASM OF AMPULLA OF VATER: Primary | ICD-10-CM

## 2021-07-01 PROCEDURE — 99215 OFFICE O/P EST HI 40 MIN: CPT | Performed by: INTERNAL MEDICINE

## 2021-07-01 NOTE — PROGRESS NOTES
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  Issues:    Status post Whipple  Patient will follow up with Dr Scot Dove as directed; next office visit scheduled for November 2021  Ampulla of vater  adenocarcinoma  Patient was presented at the GI tumor Conference  The plan is 4 months of adjuvant FOLFOX, then likely rescanning and evaluation by Radiation Oncology for concurrent adjuvant treatment  Third cycle FOLFOX is scheduled for next week  Nausea/vomiting  We discussed options; patient spent time with nursing staff today  Premeds, antiemetics are being manipulated  Patient understands that more than 1 manipulation may be necessary to find a combination that allows patient to tolerate the regimen optimally  Pain control  No pain control issues at this time  No chest CT  This office will check to see if 1 was performed at another institution  After completing FOLFOX, I would rescan the chest and abdomen/pelvis before proceeding with radiation oncology evaluation/treatment  History of RA  Patient states that she does not have any pain control issues at this time; has not seen a rheumatologist in a number of years  When patient is farther through her chemotherapy, Mrs La  will reconsider a rheumatology referral     Patient is to return in 2 weeks  Patient knows to call the hematology/oncology office if there are any other questions or concerns  Carefully review your medication list and verify that the list is accurate and up-to-date   Please call the hematology/oncology office if there are medications missing from the list, medications on the list that you are not currently taking or if there is a dosage or instruction that is different from how you're taking that medication  Patient goals and areas of care:  Continue with chemotherapy  Barriers to care:  None  Patient is able to self-care   _____________________________________________________________________________________    Chief Complaint   Patient presents with    Follow-up     Ampulla of vater adenocarcinoma     Oncology History   Neoplasm of ampulla of Vater   3/15/2021 Initial Diagnosis    Neoplasm of ampulla of Vater     4/26/2021 Surgery    B  Celiac lymph node:     - Single lymph node; negative for malignancy (0/1)  C   Whipple resection:     - Invasive poorly differentiated mucinous adenocarcinoma  - Tumor arises in the background of an adenoma with high grade dysplasia  - Lymphatic and venous channel invasion by tumor present  - Metastatic carcinoma present in one of twenty lymph nodes (1/20)  - All margins are negative for tumor       6/1/2021 -  Cancer Staged    Staging form: Ampulla of Vater, AJCC 8th Edition  - Pathologic: Stage IIIA (pT3a, pN1, cM0) - Signed by Cindy Mcadams MD on 6/1/2021  Histologic grade (G): G3  Histologic grading system: 3 grade system  Residual tumor (R): R0 - None       6/9/2021 -  Chemotherapy    fluorouracil (ADRUCIL), 400 mg/m2 = 605 mg, Intravenous, Once, 2 of 8 cycles  Administration: 605 mg (6/9/2021), 605 mg (6/23/2021)  fosaprepitant (EMEND) IVPB, 150 mg, Intravenous, Once, 0 of 6 cycles  leucovorin calcium IVPB, 604 mg, Intravenous, Once, 2 of 8 cycles  Administration: 600 mg (6/9/2021), 600 mg (6/23/2021)  oxaliplatin (ELOXATIN) chemo infusion, 85 mg/m2 = 128 35 mg, Intravenous, Once, 2 of 8 cycles  Administration: 128 35 mg (6/9/2021), 128 35 mg (6/23/2021)  fluorouracil (ADRUCIL) ambulatory infusion Soln, 1,200 mg/m2/day = 3,625 mg, Intravenous, Over 46 hours, 2 of 8 cycles       History of Present Illness:  77-year-old female with a history of rheumatoid arthritis seen in the hospital in February 2021 with obstructive jaundice  CT scan demonstrated intra and extrahepatic biliary dilatation with a questionable mass at the end of the common bile duct  EUS in March 2021 demonstrated a 2 x 1 6 cm mass in the ampullary Vater involving the distal common bile duct  Biopsy demonstrated poorly differentiated mucinous adenocarcinoma  Patient underwent Whipple procedure on April 26, 2021  Results demonstrated mucinous adenocarcinoma in a background of adenoma with lymphatic and venous channel invasion and 1/22 positive lymph nodes  Margins were negative  Patient was presented at the GI tumor Board and the plan was 4 months of adjuvant FOLFOX followed by radiation oncology evaluation for concurrent treatment  Patient has completed 2 cycles of FOLFOX  Mrs Kyle Kohler states feeling okay, about the same as before  Patient's biggest complaint is nausea with some vomiting during the pump time  Appetite is otherwise okay, weight is stable  No abdominal pain  No GI bleeding  No  or GYN issues  No fevers, chills or sweats  No shortness of breath or dyspnea on exertion  No port issues  Patient has received her COVID vaccination  Review of Systems   Constitutional: Negative  HENT: Negative  Eyes: Negative  Respiratory: Negative  Cardiovascular: Negative  Gastrointestinal: Positive for nausea  Endocrine: Negative  Genitourinary: Negative  Musculoskeletal: Negative  Skin: Negative  Allergic/Immunologic: Negative  Neurological: Negative  Hematological: Negative  Psychiatric/Behavioral: Negative  All other systems reviewed and are negative      Patient Active Problem List   Diagnosis    Elevated LFTs    Dilated bile duct    Neoplasm of ampulla of Vater    Mild protein-calorie malnutrition ([address] )     Past Medical History:   Diagnosis Date    Cancer [facility])     pancreatic     Past Surgical History:   Procedure Laterality Date    ABLATION SOFT TISSUE N/A 4/26/2021    Procedure: INTRAOPERATIVE ULTRASOUND, ABLATION,SOFT TISSUE PANCREAS;  Surgeon: Lulu Ellis MD;  [facility];  Service: Surgical Oncology    CHOLECYSTECTOMY N/A 2021    Procedure: CHOLECYSTECTOMY;  Surgeon: Lulu Ellis MD;  [facility];  Service: Surgical Oncology    FL GUIDED CENTRAL VENOUS ACCESS DEVICE INSERTION  2021    HYSTERECTOMY      LAPAROTOMY N/A 2021    Procedure: LAPAROTOMY EXPLORATORY;  Surgeon: Lulu Ellis MD;  [facility];  Service: Surgical Oncology    SINUS SURGERY      TUNNELED VENOUS PORT PLACEMENT Left 2021    Procedure: INSERTION VENOUS PORT (PORT-A-CATH); Surgeon: Lulu Ellis MD;  [facility];  Service: Surgical Oncology    WHIPPLE PROCEDURE/PANCREATICO-DUODENECTOMY N/A 2021    Procedure:  WHIPPLE PROCEDURE/PANCREATICO-DUODENECTOMY; PYLORIC PRESERVING;  Surgeon: Lulu Ellis MD;  [facility];  Service: Surgical Oncology     Family History   Problem Relation Age of Onset    Ulcerative colitis Mother     Prostate cancer Father     Heart disease Brother     Prostate cancer Brother     Melanoma Sister      Social History     Socioeconomic History    Marital status: Single     Spouse name: Not on file    Number of children: Not on file    Years of education: Not on file    Highest education level: Not on file   Occupational History    Not on file   Tobacco Use    Smoking status: Former Smoker     Packs/day: 0 50     Start date:      Quit date: 2021     Years since quittin 2    Smokeless tobacco: Never Used    Tobacco comment: recently stop smoking    Vaping Use    Vaping Use: Never used   Substance and Sexual Activity    Alcohol use: Never    Drug use: Never    Sexual activity: Never   Other Topics Concern    Not on file   Social History Narrative    Not on file     Social Determinants of Health     Financial Resource Strain:     Difficulty of Paying Living Expenses:    Food Insecurity:     Worried About [address] [facility] in the Last Year:    [address] in the Last Year:    Transportation Needs:     Lack of Transportation (Medical):  Lack of Transportation (Non-Medical):    Physical Activity:     Days of Exercise per Week:     Minutes of Exercise per Session:    Stress:     Feeling of Stress :    Social Connections:     Frequency of Communication with Friends and Family:     Frequency of Social Gatherings with Friends and Family:     Attends Yazidi Services:     Active Member of Clubs or Organizations:     Attends Club or Organization Meetings:     Marital Status:    Intimate Partner Violence:     Fear of Current or Ex-Partner:     Emotionally Abused:     Physically Abused:     Sexually Abused:        Current Outpatient Medications:     acetaminophen (TYLENOL) 500 mg tablet, Take 1,000 mg by mouth, Disp: , Rfl:     [START ON 7/7/2021] fluorouracil 3,625 mg in CADD infusion pump, Infuse 3,625 mg (1,200 mg/m2/day x 1 51 m2 (Treatment Plan Recorded BSA)) into a venous catheter over 46 hours for 2 days, Disp: 1 each, Rfl: 0    lidocaine-prilocaine (EMLA) cream, Apply topically as needed for mild pain Apply topically to port site 1 hour prior to port accessing, Disp: 30 g, Rfl: 0    Multiple Vitamin (multivitamin) tablet, Take 1 tablet by mouth daily, Disp: , Rfl:     ondansetron (ZOFRAN-ODT) 4 mg disintegrating tablet, Take 1 tablet (4 mg total) by mouth every 6 (six) hours as needed for nausea or vomiting, Disp: 30 tablet, Rfl: 1    Allergies   Allergen Reactions    Penicillins Rash    Tetracycline Rash       Vitals:    07/01/21 0858   BP: 148/82   Pulse: 56   Resp: 16   Temp: 98 4 °F (36 9 °C)   SpO2: 98%     Physical Exam  Constitutional:       Appearance: She is well-developed  Comments: Thin but relatively well-nourished female, no respiratory distress, no signs of pain   HENT:      Head: Normocephalic and atraumatic        Right Ear: External ear normal  Left Ear: External ear normal    Eyes:      Conjunctiva/sclera: Conjunctivae normal       Pupils: Pupils are equal, round, and reactive to light  Cardiovascular:      Rate and Rhythm: Normal rate and regular rhythm  Heart sounds: Normal heart sounds  Pulmonary:      Effort: Pulmonary effort is normal       Breath sounds: Normal breath sounds  Abdominal:      General: Bowel sounds are normal       Palpations: Abdomen is soft  Comments: Well-healed midline suture line, nontender, +bowel sounds, no guarding   Musculoskeletal:         General: Normal range of motion  Cervical back: Normal range of motion and neck supple  Skin:     General: Skin is warm  Comments: Slightly pale, warm, moist, no petechiae or ecchymoses, left anterior chest wall port area clean and dry   Neurological:      Mental Status: She is alert and oriented to person, place, and time  Deep Tendon Reflexes: Reflexes are normal and symmetric  Psychiatric:         Behavior: Behavior normal          Thought Content: Thought content normal          Judgment: Judgment normal      Extremities:  No lower extremity edema bilaterally, no cords, pulses are 1+  Lymphatics:  No adenopathy in the neck, supraclavicular region, axilla and groin bilaterally    Performance Status: 1 - Symptomatic but completely ambulatory    Labs    06/21/2021 WBC = 7 69 hemoglobin = 11 8 hematocrit = 36 9 platelet = 130 neutrophil = 53% BUN = 27 creatinine = 0 64 calcium = 8 5 AST = 18 ALT = 33 alkaline phosphatase = 90 total protein = 7 0 total bilirubin = 0 25    Imaging    06/02/2021 chest x-ray portable  Impression stated no acute cardiopulmonary disease, left port in lower SVC with no pneumothorax  04/11/2021 CT scan abdomen pelvis    1    Grossly unchanged moderate intrahepatic and extrahepatic biliary ductal dilation with abrupt narrowing/transition in the distal CBD where there is a small obstructing periampullary lesion measuring 1 1 x 1 0 cm, likely corresponding to the lesion   seen on prior endoscopic ultrasound  2   Otherwise, no acute findings in the abdomen or pelvis  02/18/2021 MRI abdomen with and without contrast and MRCP    Intra and extrahepatic biliary dilatation extending to the ampulla without evidence of obstructing mass lesion or choledocholithiasis  Consider ERCP for further characterization      Pathology    Case Report   Surgical Pathology Report                         Case: [id number]                                    Authorizing Provider: Alireza Still MD           Collected:           04/26/2021 1036               Ordering Location:     [facility]      Received:            04/26/2021 1221                                      Hospital Operating Room                                                       Pathologist:           Wendy Davison MD                                                          Intraop:               Wendy Davison MD                                                          Specimens:   A) - Gallbladder                                                                                     B) - Lymph Node, CELIAC LYMPH NODE                                                                   C) - Pancreas, WHIPPLE RESECTION                                                           Addendum   At the request of Dr Gordon See, unstained slides from paraffin BLOCK C9 containing the patient's cancer cells were sent to [facility] for MI Profile testing  Upon completion of testing, the [facility] report will be directly sent to the requesting physician as well as posted in the Media Tab of the patient's [employer] EMR by the [employer] Pathology Department      Please note:  The [facility]'s analysis and report is performed independently of [address] [place] [address], and neither the Hospital nor the Pathology Department screen, review or comment upon [facility]'s report  Because the role of testing in cancer diagnosis and management is subject to evolving development, usage and interpretation, we strongly urge that the test limitations described in the [facility] report be carefully read, appropriately shared with the patient, and critically considered when reaching treatment decisions      This pathology material was removed from archive for purpose of molecular testing  It was reviewed and approved by Dr Yamile Rodriguez        Addendum electronically signed by Yuliya Bob MD on 6/2/2021 at  1:12 PM   Final Diagnosis   A   Gallbladder, cholecystectomy:       - Chronic cholecystitis  - No malignancy identified      - Reactive periductal lymph node with lipid granulomata; negative for malignancy (0/1)      B  Celiac lymph node:     - Single lymph node; negative for malignancy (0/1)      C  Whipple resection:     - Invasive poorly differentiated mucinous adenocarcinoma  - Tumor arises in the background of an adenoma with high grade dysplasia  - Lymphatic and venous channel invasion by tumor present  - Metastatic carcinoma present in one of twenty lymph nodes (1/20)  - All margins are negative for tumor       Electronically signed by Yuliya Bob MD on 5/5/2021 at  2:59 PM   Comments: This is an appended report  These results have been appended to a previously preliminary verified report        Additional Information    All reported additional testing was performed with appropriately reactive controls   These tests were developed and their performance characteristics determined by [facility] or appropriate performing facility, though some tests may be performed on tissues which have not been validated for performance characteristics (such as staining performed on alcohol exposed cell blocks and decalcified tissues)   Results should be interpreted with caution and in the context of the patients clinical condition  These tests may not be cleared or approved by the [place]  Food and Drug Administration, though the FDA has determined that such clearance or approval is not necessary  These tests are used for clinical purposes and they should not be regarded as investigational or for research  This laboratory has been approved by CLIA 88, designated as a high-complexity laboratory and is qualified to perform these tests     - Interpretation performed at [facility], [address]    Comment: This is an appended report  These results have been appended to a previously preliminary verified report     Synoptic Checklist   AMPULLA OF VATER  8th Edition - Protocol posted: 2/26/2020  AMPULLA OF VATER: AMPULLECTOMY, PANCREATICODUODENECTOMY - All Specimens  SPECIMEN   Procedure  Pancreaticoduodenectomy (Whipple resection)    TUMOR   Tumor Site  Intra-ampullary and cristine-ampullary (mixed type)    Histologic Type  Mucinous adenocarcinoma    Histologic Grade  G3: Poorly differentiated    Tumor Size  Greatest Dimension (Centimeters): 2 5 cm   Additional Dimension (Centimeters)  2 3 cm     1 cm   Tumor Extension  Tumor invades into muscularis propria of the duodenum      Tumor extends more than 0 5 cm into pancreas      Tumor extends into peripancreatic soft tissues      Tumor extends into periduodenal tissue    Lymphovascular Invasion  Present    Perineural Invasion  Not identified    MARGINS   Margins     Pancreatic Neck / Parenchymal Margin  Uninvolved by invasive carcinoma and pancreatic high-grade intraepithelial neoplasia    Distance of Invasive Carcinoma from Margin  3 5 cm   Uncinate (Retroperitoneal / Superior Mesenteric Artery) Margin  Uninvolved by invasive carcinoma    Distance of Invasive Carcinoma from Margin  4 0 cm   Bile Duct Margin  Uninvolved by invasive carcinoma and high-grade intraepithelial neoplasia    Distance of Invasive Carcinoma from Margin  3 3 cm   Proximal Margin (Gastric or Duodenal)  Uninvolved by invasive carcinoma and high-grade intraepithelial neoplasia    Distal Margin (Distal Duodenal or Jejunal)  Uninvolved by invasive carcinoma and high-grade intraepithelial neoplasia    LYMPH NODES   Number of Lymph Nodes Involved  1    Number of Lymph Nodes Examined  22    PATHOLOGIC STAGE CLASSIFICATION (pTNM, AJCC 8th Edition)      Primary Tumor (pT)  pT3b    Regional Lymph Nodes (pN)  pN1    ADDITIONAL FINDINGS   Additional Findings  Dysplasia / adenoma      PanIN 1B    Comment(s)   Comment(s)  AJCC Prognostic Stage Group (8th Ed ):  IIIA - pT3a, pN1, MX, G3      Intraoperative Consultation       CF1-3    Pancreatic and bile duct margins:   - The pancreatic and bile duct margins are negative for malignancy and high grade dysplasia      Reviewed by ELLEN Cottrell   - Interpretation performed at [facility], [address] [facility] [address]

## 2021-07-02 ENCOUNTER — TELEPHONE (OUTPATIENT)
Dept: HEMATOLOGY ONCOLOGY | Facility: CLINIC | Age: 66
End: 2021-07-02

## 2021-07-02 NOTE — TELEPHONE ENCOUNTER
Patient calling to reschedule her Genetic Consult for sometime in September  Chucho Noss could best be reached at [facility]: [phone]

## 2021-07-06 ENCOUNTER — HOSPITAL ENCOUNTER (OUTPATIENT)
Dept: INFUSION CENTER | Facility: HOSPITAL | Age: 66
Discharge: HOME | End: 2021-07-06
Payer: MEDICARE

## 2021-07-06 VITALS — TEMPERATURE: 97.2 F

## 2021-07-06 DIAGNOSIS — D49.0 NEOPLASM OF AMPULLA OF VATER: Primary | ICD-10-CM

## 2021-07-06 LAB
ALBUMIN SERPL BCP-MCNC: 3.4 G/DL (ref 3.5–5)
ALP SERPL-CCNC: 85 U/L (ref 46–116)
ALT SERPL W P-5'-P-CCNC: 40 U/L (ref 12–78)
ANION GAP SERPL CALCULATED.3IONS-SCNC: 9 MMOL/L (ref 4–13)
AST SERPL W P-5'-P-CCNC: 19 U/L (ref 5–45)
BASOPHILS # BLD AUTO: 0.04 THOUSANDS/ΜL (ref 0–0.1)
BASOPHILS NFR BLD AUTO: 1 % (ref 0–1)
BILIRUB SERPL-MCNC: 0.26 MG/DL (ref 0.2–1)
BUN SERPL-MCNC: 32 MG/DL (ref 5–25)
CALCIUM ALBUM COR SERPL-MCNC: 9.4 MG/DL (ref 8.3–10.1)
CALCIUM SERPL-MCNC: 8.9 MG/DL (ref 8.3–10.1)
CHLORIDE SERPL-SCNC: 104 MMOL/L (ref 100–108)
CO2 SERPL-SCNC: 27 MMOL/L (ref 21–32)
CREAT SERPL-MCNC: 0.75 MG/DL (ref 0.6–1.3)
EOSINOPHIL # BLD AUTO: 0.11 THOUSAND/ΜL (ref 0–0.61)
EOSINOPHIL NFR BLD AUTO: 1 % (ref 0–6)
ERYTHROCYTE [DISTWIDTH] IN BLOOD BY AUTOMATED COUNT: 13.9 % (ref 11.6–15.1)
GFR SERPL CREATININE-BSD FRML MDRD: 84 ML/MIN/1.73SQ M
GLUCOSE SERPL-MCNC: 102 MG/DL (ref 65–140)
HCT VFR BLD AUTO: 35.4 % (ref 34.8–46.1)
HGB BLD-MCNC: 11.3 G/DL (ref 11.5–15.4)
IMM GRANULOCYTES # BLD AUTO: 0.01 THOUSAND/UL (ref 0–0.2)
IMM GRANULOCYTES NFR BLD AUTO: 0 % (ref 0–2)
LYMPHOCYTES # BLD AUTO: 2.72 THOUSANDS/ΜL (ref 0.6–4.47)
LYMPHOCYTES NFR BLD AUTO: 32 % (ref 14–44)
MCH RBC QN AUTO: 29.3 PG (ref 26.8–34.3)
MCHC RBC AUTO-ENTMCNC: 31.9 G/DL (ref 31.4–37.4)
MCV RBC AUTO: 92 FL (ref 82–98)
MONOCYTES # BLD AUTO: 0.61 THOUSAND/ΜL (ref 0.17–1.22)
MONOCYTES NFR BLD AUTO: 7 % (ref 4–12)
NEUTROPHILS # BLD AUTO: 5.02 THOUSANDS/ΜL (ref 1.85–7.62)
NEUTS SEG NFR BLD AUTO: 59 % (ref 43–75)
NRBC BLD AUTO-RTO: 0 /100 WBCS
PLATELET # BLD AUTO: 270 THOUSANDS/UL (ref 149–390)
PMV BLD AUTO: 9.4 FL (ref 8.9–12.7)
POTASSIUM SERPL-SCNC: 4 MMOL/L (ref 3.5–5.3)
PROT SERPL-MCNC: 7.1 G/DL (ref 6.4–8.2)
RBC # BLD AUTO: 3.86 MILLION/UL (ref 3.81–5.12)
SODIUM SERPL-SCNC: 140 MMOL/L (ref 136–145)
WBC # BLD AUTO: 8.51 THOUSAND/UL (ref 4.31–10.16)

## 2021-07-06 PROCEDURE — 80053 COMPREHEN METABOLIC PANEL: CPT | Performed by: INTERNAL MEDICINE

## 2021-07-06 PROCEDURE — 85025 COMPLETE CBC W/AUTO DIFF WBC: CPT | Performed by: INTERNAL MEDICINE

## 2021-07-07 ENCOUNTER — HOSPITAL ENCOUNTER (OUTPATIENT)
Dept: INFUSION CENTER | Facility: HOSPITAL | Age: 66
Discharge: HOME | End: 2021-07-07
Payer: MEDICARE

## 2021-07-07 VITALS
RESPIRATION RATE: 18 BRPM | DIASTOLIC BLOOD PRESSURE: 76 MMHG | HEART RATE: 59 BPM | TEMPERATURE: 96.4 F | HEIGHT: 62 IN | SYSTOLIC BLOOD PRESSURE: 145 MMHG | BODY MASS INDEX: 21.42 KG/M2 | WEIGHT: 116.4 LBS | OXYGEN SATURATION: 99 %

## 2021-07-07 DIAGNOSIS — D49.0 NEOPLASM OF AMPULLA OF VATER: Primary | ICD-10-CM

## 2021-07-07 PROCEDURE — 96368 THER/DIAG CONCURRENT INF: CPT

## 2021-07-07 PROCEDURE — G0498 CHEMO EXTEND IV INFUS W/PUMP: HCPCS

## 2021-07-07 PROCEDURE — 96415 CHEMO IV INFUSION ADDL HR: CPT

## 2021-07-07 PROCEDURE — 96367 TX/PROPH/DG ADDL SEQ IV INF: CPT

## 2021-07-07 PROCEDURE — 96413 CHEMO IV INFUSION 1 HR: CPT

## 2021-07-07 PROCEDURE — 96411 CHEMO IV PUSH ADDL DRUG: CPT

## 2021-07-07 RX ORDER — SODIUM CHLORIDE 9 MG/ML
20 INJECTION, SOLUTION INTRAVENOUS ONCE AS NEEDED
Status: DISCONTINUED | OUTPATIENT
Start: 2021-07-07 | End: 2021-07-10 | Stop reason: HOSPADM

## 2021-07-07 RX ORDER — DEXTROSE MONOHYDRATE 50 MG/ML
20 INJECTION, SOLUTION INTRAVENOUS ONCE
Status: COMPLETED | OUTPATIENT
Start: 2021-07-07 | End: 2021-07-07

## 2021-07-07 RX ORDER — FLUOROURACIL 50 MG/ML
400 INJECTION, SOLUTION INTRAVENOUS ONCE
Status: COMPLETED | OUTPATIENT
Start: 2021-07-07 | End: 2021-07-07

## 2021-07-07 RX ADMIN — DEXAMETHASONE SODIUM PHOSPHATE: 10 INJECTION, SOLUTION INTRAMUSCULAR; INTRAVENOUS at 08:54

## 2021-07-07 RX ADMIN — FLUOROURACIL 605 MG: 50 INJECTION, SOLUTION INTRAVENOUS at 12:11

## 2021-07-07 RX ADMIN — FOSAPREPITANT 150 MG: 150 INJECTION, POWDER, LYOPHILIZED, FOR SOLUTION INTRAVENOUS at 09:23

## 2021-07-07 RX ADMIN — SODIUM CHLORIDE 20 ML/HR: 9 INJECTION, SOLUTION INTRAVENOUS at 08:54

## 2021-07-07 RX ADMIN — DEXTROSE 20 ML/HR: 50 INJECTION, SOLUTION INTRAVENOUS at 10:00

## 2021-07-07 RX ADMIN — OXALIPLATIN 128.35 MG: 5 INJECTION, SOLUTION INTRAVENOUS at 10:05

## 2021-07-07 RX ADMIN — LEUCOVORIN CALCIUM 600 MG: 200 INJECTION, POWDER, LYOPHILIZED, FOR SOLUTION INTRAMUSCULAR; INTRAVENOUS at 10:05

## 2021-07-07 NOTE — PLAN OF CARE
Problem: Potential for Falls  Goal: Patient will remain free of falls  Description: INTERVENTIONS:  - Educate patient/family on patient safety including physical limitations  - Instruct patient to call for assistance with activity   - Consult OT/PT to assist with strengthening/mobility   - Keep Call bell within reach  - Keep bed low and locked with side rails adjusted as appropriate  - Keep care items and personal belongings within reach  - Initiate and maintain comfort rounds  - Make Fall Risk Sign visible to staff  - Outcome: Progressing

## 2021-07-09 ENCOUNTER — HOSPITAL ENCOUNTER (OUTPATIENT)
Dept: INFUSION CENTER | Facility: HOSPITAL | Age: 66
Discharge: HOME | End: 2021-07-09

## 2021-07-09 VITALS — TEMPERATURE: 97.4 F

## 2021-07-09 DIAGNOSIS — D49.0 NEOPLASM OF AMPULLA OF VATER: Primary | ICD-10-CM

## 2021-07-09 NOTE — PROGRESS NOTES
CADD pump has 0 8ml remaining  Patient wants it disconnected now and understands infusion is not complete  She also wants to cancel appointments for labs via port in the future and said she prefers to use the Outpatient lab

## 2021-07-12 ENCOUNTER — OFFICE VISIT (OUTPATIENT)
Dept: HEMATOLOGY ONCOLOGY | Facility: MEDICAL CENTER | Age: 66
End: 2021-07-12
Payer: MEDICARE

## 2021-07-12 VITALS
SYSTOLIC BLOOD PRESSURE: 132 MMHG | OXYGEN SATURATION: 98 % | TEMPERATURE: 98.5 F | DIASTOLIC BLOOD PRESSURE: 80 MMHG | HEART RATE: 84 BPM | HEIGHT: 62 IN | WEIGHT: 112 LBS | BODY MASS INDEX: 20.61 KG/M2 | RESPIRATION RATE: 16 BRPM

## 2021-07-12 DIAGNOSIS — D49.0 NEOPLASM OF AMPULLA OF VATER: Primary | ICD-10-CM

## 2021-07-12 PROCEDURE — 99214 OFFICE O/P EST MOD 30 MIN: CPT | Performed by: INTERNAL MEDICINE

## 2021-07-12 RX ORDER — PALONOSETRON 0.05 MG/ML
0.25 INJECTION, SOLUTION INTRAVENOUS ONCE
Status: CANCELLED | OUTPATIENT
Start: 2021-07-21

## 2021-07-12 RX ORDER — FLUOROURACIL 50 MG/ML
400 INJECTION, SOLUTION INTRAVENOUS ONCE
Status: CANCELLED | OUTPATIENT
Start: 2021-07-21

## 2021-07-12 RX ORDER — SODIUM CHLORIDE 9 MG/ML
20 INJECTION, SOLUTION INTRAVENOUS ONCE AS NEEDED
Status: CANCELLED | OUTPATIENT
Start: 2021-07-21

## 2021-07-12 RX ORDER — PROCHLORPERAZINE MALEATE 10 MG
10 TABLET ORAL EVERY 6 HOURS PRN
Qty: 30 TABLET | Refills: 1 | Status: SHIPPED | OUTPATIENT
Start: 2021-07-12 | End: 2022-06-01 | Stop reason: ALTCHOICE

## 2021-07-12 RX ORDER — DEXTROSE MONOHYDRATE 50 MG/ML
20 INJECTION, SOLUTION INTRAVENOUS ONCE
Status: CANCELLED | OUTPATIENT
Start: 2021-07-21

## 2021-07-12 NOTE — PROGRESS NOTES
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  Issues:    Status post Whipple  Patient will follow up with Dr Elizabeth Alonso as directed; next office visit scheduled for November 2021  Ampulla of vater  adenocarcinoma  Patient was presented at the GI tumor Conference  The plan is 4 months of adjuvant FOLFOX, then likely rescanning and evaluation by Radiation Oncology for concurrent adjuvant treatment  4th cycle FOLFOX is scheduled for next week  Nausea/vomiting  This continues to be an issue the day after chemotherapy; more or less during the 5 FU pump  Emend was recently added  The plan now is to change the Zofran to Aloxi  Patient was also given a prescription for Compazine  Pain control  No pain control issues at this time  No chest CT  This office was not able to find a CT scan of the chest pre treatment  As discussed previously, after completing FOLFOX, I would rescan the chest and abdomen/pelvis before proceeding with radiation oncology evaluation/treatment  History of RA  Patient states that she does not have any pain control issues at this time; has not seen a rheumatologist in a number of years  When patient is farther through her chemotherapy, Mrs Susan Blount will reconsider a rheumatology referral     Patient is to return in 2 weeks  Patient knows to call the hematology/oncology office if there are any other questions or concerns  Carefully review your medication list and verify that the list is accurate and up-to-date   Please call the hematology/oncology office if there are medications missing from the list, medications on the list that you are not currently taking or if there is a dosage or instruction that is different from how you're taking that medication  Patient goals and areas of care:  Continue with chemotherapy  Barriers to care:  None  Patient is able to self-care   _____________________________________________________________________________________    Chief Complaint   Patient presents with    Follow-up     Ampulla of vater carcinoma     Oncology History   Neoplasm of ampulla of Vater   3/15/2021 Initial Diagnosis    Neoplasm of ampulla of Vater     4/26/2021 Surgery    B  Celiac lymph node:     - Single lymph node; negative for malignancy (0/1)  C   Whipple resection:     - Invasive poorly differentiated mucinous adenocarcinoma  - Tumor arises in the background of an adenoma with high grade dysplasia  - Lymphatic and venous channel invasion by tumor present  - Metastatic carcinoma present in one of twenty lymph nodes (1/20)  - All margins are negative for tumor       6/1/2021 -  Cancer Staged    Staging form: Ampulla of Vater, AJCC 8th Edition  - Pathologic: Stage IIIA (pT3a, pN1, cM0) - Signed by Chauncey Caceres MD on 6/1/2021  Histologic grade (G): G3  Histologic grading system: 3 grade system  Residual tumor (R): R0 - None       6/9/2021 -  Chemotherapy    fluorouracil (ADRUCIL), 400 mg/m2 = 605 mg, Intravenous, Once, 3 of 8 cycles  Administration: 605 mg (6/9/2021), 605 mg (6/23/2021), 605 mg (7/7/2021)  fosaprepitant (EMEND) IVPB, 150 mg, Intravenous, Once, 1 of 6 cycles  Administration: 150 mg (7/7/2021)  leucovorin calcium IVPB, 604 mg, Intravenous, Once, 3 of 8 cycles  Administration: 600 mg (6/9/2021), 600 mg (6/23/2021), 600 mg (7/7/2021)  oxaliplatin (ELOXATIN) chemo infusion, 85 mg/m2 = 128 35 mg, Intravenous, Once, 3 of 8 cycles  Administration: 128 35 mg (6/9/2021), 128 35 mg (6/23/2021), 128 35 mg (7/7/2021)  fluorouracil (ADRUCIL) ambulatory infusion Soln, 1,200 mg/m2/day = 3,625 mg, Intravenous, Over 46 hours, 3 of 8 cycles       History of Present Illness:  78-year-old female with a history of rheumatoid arthritis seen in the hospital in February 2021 with obstructive jaundice  CT scan demonstrated intra and extrahepatic biliary dilatation with a questionable mass at the end of the common bile duct  EUS in March 2021 demonstrated a 2 x 1 6 cm mass in the ampullary Vater involving the distal common bile duct  Biopsy demonstrated poorly differentiated mucinous adenocarcinoma  Patient underwent Whipple procedure on April 26, 2021  Results demonstrated mucinous adenocarcinoma in a background of adenoma with lymphatic and venous channel invasion and 1/22 positive lymph nodes  Margins were negative  Patient was presented at the GI tumor Board and the plan was 4 months of adjuvant FOLFOX followed by radiation oncology evaluation for concurrent treatment  Patient has completed 3 cycles of FOLFOX  Mrs Ciara Hennessy continues to have problems with nausea and vomiting the day after chemotherapy begins, for 1-2 days  Patient otherwise states feeling better afterwards  Appetite is usually pretty good otherwise, no weight change  No fevers, chills or sweats  No  or gyn issues, no diarrhea  Patient has received her COVID vaccination  Review of Systems   Constitutional: Negative  HENT: Negative  Eyes: Negative  Respiratory: Negative  Cardiovascular: Negative  Gastrointestinal: Positive for nausea  Endocrine: Negative  Genitourinary: Negative  Musculoskeletal: Negative  Skin: Negative  Allergic/Immunologic: Negative  Neurological: Negative  Hematological: Negative  Psychiatric/Behavioral: Negative  All other systems reviewed and are negative      Patient Active Problem List   Diagnosis    Elevated LFTs    Dilated bile duct    Neoplasm of ampulla of Vater    Mild protein-calorie malnutrition ([address] )     Past Medical History:   Diagnosis Date    Cancer [facility])     pancreatic     Past Surgical History:   Procedure Laterality Date    ABLATION SOFT TISSUE N/A 2021    Procedure: INTRAOPERATIVE ULTRASOUND, ABLATION,SOFT TISSUE PANCREAS;  Surgeon: Ryan Keating MD;  [facility];  Service: Surgical Oncology    CHOLECYSTECTOMY N/A 2021    Procedure: CHOLECYSTECTOMY;  Surgeon: Ryan Keating MD;  [facility];  Service: Surgical Oncology    FL GUIDED CENTRAL VENOUS ACCESS DEVICE INSERTION  2021    HYSTERECTOMY      LAPAROTOMY N/A 2021    Procedure: LAPAROTOMY EXPLORATORY;  Surgeon: Ryan Keating MD;  [facility];  Service: Surgical Oncology    SINUS SURGERY      TUNNELED VENOUS PORT PLACEMENT Left 2021    Procedure: INSERTION VENOUS PORT (PORT-A-CATH); Surgeon: Ryan Keating MD;  [facility];  Service: Surgical Oncology    WHIPPLE PROCEDURE/PANCREATICO-DUODENECTOMY N/A 2021    Procedure:  WHIPPLE PROCEDURE/PANCREATICO-DUODENECTOMY; PYLORIC PRESERVING;  Surgeon: Ryan Keating MD;  [facility];  Service: Surgical Oncology     Family History   Problem Relation Age of Onset    Ulcerative colitis Mother     Prostate cancer Father     Heart disease Brother     Prostate cancer Brother     Melanoma Sister      Social History     Socioeconomic History    Marital status: Single     Spouse name: Not on file    Number of children: Not on file    Years of education: Not on file    Highest education level: Not on file   Occupational History    Not on file   Tobacco Use    Smoking status: Former Smoker     Packs/day: 0 50     Start date:      Quit date: 2021     Years since quittin 2    Smokeless tobacco: Never Used    Tobacco comment: recently stop smoking    Vaping Use    Vaping Use: Never used   Substance and Sexual Activity    Alcohol use: Never    Drug use: Never    Sexual activity: Never   Other Topics Concern    Not on file   Social History Narrative    Not on file     Social Determinants of Health     Financial Resource Strain:     Difficulty of Paying Living Expenses:    Food Insecurity:     Worried About Running Out of Food in the Last Year:     [address] in the Last Year:    Transportation Needs:     Lack of Transportation (Medical):  Lack of Transportation (Non-Medical):    Physical Activity:     Days of Exercise per Week:     Minutes of Exercise per Session:    Stress:     Feeling of Stress :    Social Connections:     Frequency of Communication with Friends and Family:     Frequency of Social Gatherings with Friends and Family:     Attends Anabaptism Services:     Active Member of Clubs or Organizations:     Attends Club or Organization Meetings:     Marital Status:    Intimate Partner Violence:     Fear of Current or Ex-Partner:     Emotionally Abused:     Physically Abused:     Sexually Abused:        Current Outpatient Medications:     acetaminophen (TYLENOL) 500 mg tablet, Take 1,000 mg by mouth, Disp: , Rfl:     lidocaine-prilocaine (EMLA) cream, Apply topically as needed for mild pain Apply topically to port site 1 hour prior to port accessing, Disp: 30 g, Rfl: 0    Multiple Vitamin (multivitamin) tablet, Take 1 tablet by mouth daily, Disp: , Rfl:     ondansetron (ZOFRAN-ODT) 4 mg disintegrating tablet, Take 1 tablet (4 mg total) by mouth every 6 (six) hours as needed for nausea or vomiting, Disp: 30 tablet, Rfl: 1    Allergies   Allergen Reactions    Penicillins Rash    Tetracycline Rash       Vitals:    07/12/21 1045   BP: 132/80   Pulse: 84   Resp: 16   Temp: 98 5 °F (36 9 °C)   SpO2: 98%     Physical Exam  Constitutional:       Appearance: She is well-developed  Comments: Thin but relatively well-nourished female, no respiratory distress, no signs of pain   HENT:      Head: Normocephalic and atraumatic  Right Ear: External ear normal       Left Ear: External ear normal    Eyes:      Conjunctiva/sclera: Conjunctivae normal       Pupils: Pupils are equal, round, and reactive to light     Cardiovascular:      Rate and Rhythm: Normal rate and regular rhythm  Heart sounds: Normal heart sounds  Pulmonary:      Effort: Pulmonary effort is normal       Breath sounds: Normal breath sounds  Abdominal:      General: Bowel sounds are normal       Palpations: Abdomen is soft  Comments: Well-healed midline suture line, nontender, +bowel sounds, no guarding   Musculoskeletal:         General: Normal range of motion  Cervical back: Normal range of motion and neck supple  Skin:     General: Skin is warm  Comments: Slightly pale, warm, moist, no petechiae or ecchymoses, left anterior chest wall port area clean and dry   Neurological:      Mental Status: She is alert and oriented to person, place, and time  Deep Tendon Reflexes: Reflexes are normal and symmetric  Psychiatric:         Behavior: Behavior normal          Thought Content: Thought content normal          Judgment: Judgment normal      Extremities:  No lower extremity edema bilaterally, no cords, pulses are 1+  Lymphatics:  No adenopathy in the neck, supraclavicular region, axilla and groin bilaterally    Performance Status: 1 - Symptomatic but completely ambulatory    Labs    07/06/2021 WBC = 8 51 hemoglobin = 11 3 hematocrit = 35 4 platelet = 466 neutrophil = 59% BUN = 32 creatinine = 0 75 calcium = 8 9 LFTs WNL    06/21/2021 WBC = 7 69 hemoglobin = 11 8 hematocrit = 36 9 platelet = 318 neutrophil = 53% BUN = 27 creatinine = 0 64 calcium = 8 5 AST = 18 ALT = 33 alkaline phosphatase = 90 total protein = 7 0 total bilirubin = 0 25    Imaging    06/02/2021 chest x-ray portable  Impression stated no acute cardiopulmonary disease, left port in lower SVC with no pneumothorax  04/11/2021 CT scan abdomen pelvis    1    Grossly unchanged moderate intrahepatic and extrahepatic biliary ductal dilation with abrupt narrowing/transition in the distal CBD where there is a small obstructing periampullary lesion measuring 1 1 x 1 0 cm, likely corresponding to the lesion   seen on prior endoscopic ultrasound  2   Otherwise, no acute findings in the abdomen or pelvis  02/18/2021 MRI abdomen with and without contrast and MRCP    Intra and extrahepatic biliary dilatation extending to the ampulla without evidence of obstructing mass lesion or choledocholithiasis  Consider ERCP for further characterization      Pathology    Case Report   Surgical Pathology Report                         Case: [id number]                                    Authorizing Provider: Balbina Masters MD           Collected:           04/26/2021 1036               Ordering Location:     [facility]      Received:            04/26/2021 [facility]                                      Hospital Operating Room                                                       Pathologist:           Jaskaran Irwin MD                                                          Intraop:               Jaskaran Irwin MD                                                          Specimens:   A) - Gallbladder                                                                                     B) - Lymph Node, CELIAC LYMPH NODE                                                                   C) - Pancreas, WHIPPLE RESECTION                                                           Addendum   At the request of Dr Argelia Miles, unstained slides from paraffin BLOCK C9 containing the patient's cancer cells were sent to [facility] for MI Profile testing  Upon completion of testing, the [facility] report will be directly sent to the requesting physician as well as posted in the Media Tab of the patient's [employer] EMR by the [facility] Department      Please note: The [facility] Lab's analysis and report is performed independently of [facility], and neither the Hospital nor the Pathology Department screen, review or comment upon [address] report  Because the role of testing in cancer diagnosis and management is subject to evolving development, usage and interpretation, we strongly urge that the test limitations described in the [facility] report be carefully read, appropriately shared with the patient, and critically considered when reaching treatment decisions      This pathology material was removed from archive for purpose of molecular testing  It was reviewed and approved by Dr Ibis Calzada        Addendum electronically signed by Joy Primrose, MD on 6/2/2021 at  1:12 PM   Final Diagnosis   A   Gallbladder, cholecystectomy:       - Chronic cholecystitis  - No malignancy identified      - Reactive periductal lymph node with lipid granulomata; negative for malignancy (0/1)      B  Celiac lymph node:     - Single lymph node; negative for malignancy (0/1)      C  Whipple resection:     - Invasive poorly differentiated mucinous adenocarcinoma  - Tumor arises in the background of an adenoma with high grade dysplasia  - Lymphatic and venous channel invasion by tumor present  - Metastatic carcinoma present in one of twenty lymph nodes (1/20)  - All margins are negative for tumor       Electronically signed by Joy Primrose, MD on 5/5/2021 at  2:59 PM   Comments: This is an appended report  These results have been appended to a previously preliminary verified report  Additional Information    All reported additional testing was performed with appropriately reactive controls   These tests were developed and their performance characteristics determined by [facility] or appropriate performing facility, though some tests may be performed on tissues which have not been validated for performance characteristics (such as staining performed on alcohol exposed cell blocks and decalcified tissues)   Results should be interpreted with caution and in the context of the patients clinical condition   These tests may not be cleared or approved by the [place]  Food and Drug Administration, though the FDA has determined that such clearance or approval is not necessary  These tests are used for clinical purposes and they should not be regarded as investigational or for research  This laboratory has been approved by CLIA 88, designated as a high-complexity laboratory and is qualified to perform these tests     - Interpretation performed at [facility], [address]    Comment: This is an appended report  These results have been appended to a previously preliminary verified report     Synoptic Checklist   AMPULLA OF VATER  8th Edition - Protocol posted: 2/26/2020  AMPULLA OF VATER: AMPULLECTOMY, PANCREATICODUODENECTOMY - All Specimens  SPECIMEN   Procedure  Pancreaticoduodenectomy (Whipple resection)    TUMOR   Tumor Site  Intra-ampullary and cristine-ampullary (mixed type)    Histologic Type  Mucinous adenocarcinoma    Histologic Grade  G3: Poorly differentiated    Tumor Size  Greatest Dimension (Centimeters): 2 5 cm   Additional Dimension (Centimeters)  2 3 cm     1 cm   Tumor Extension  Tumor invades into muscularis propria of the duodenum      Tumor extends more than 0 5 cm into pancreas      Tumor extends into peripancreatic soft tissues      Tumor extends into periduodenal tissue    Lymphovascular Invasion  Present    Perineural Invasion  Not identified    MARGINS   Margins     Pancreatic Neck / Parenchymal Margin  Uninvolved by invasive carcinoma and pancreatic high-grade intraepithelial neoplasia    Distance of Invasive Carcinoma from Margin  3 5 cm   Uncinate (Retroperitoneal / Superior Mesenteric Artery) Margin  Uninvolved by invasive carcinoma    Distance of Invasive Carcinoma from Margin  4 0 cm   Bile Duct Margin  Uninvolved by invasive carcinoma and high-grade intraepithelial neoplasia    Distance of Invasive Carcinoma from Margin  3 3 cm   Proximal Margin (Gastric or Duodenal)  Uninvolved by invasive carcinoma and high-grade intraepithelial neoplasia    Distal Margin (Distal Duodenal or Jejunal)  Uninvolved by invasive carcinoma and high-grade intraepithelial neoplasia    LYMPH NODES   Number of Lymph Nodes Involved  1    Number of Lymph Nodes Examined  22    PATHOLOGIC STAGE CLASSIFICATION (pTNM, AJCC 8th Edition)      Primary Tumor (pT)  pT3b    Regional Lymph Nodes (pN)  pN1    ADDITIONAL FINDINGS   Additional Findings  Dysplasia / adenoma      PanIN 1B    Comment(s)   Comment(s)  AJCC Prognostic Stage Group (8th Ed ):  IIIA - pT3a, pN1, MX, G3      Intraoperative Consultation       CF1-3    Pancreatic and bile duct margins:   - The pancreatic and bile duct margins are negative for malignancy and high grade dysplasia      Reviewed by ELLEN Mendes   - Interpretation performed at [facility], [address] [facility] [address]

## 2021-07-14 DIAGNOSIS — D49.0 NEOPLASM OF AMPULLA OF VATER: Primary | ICD-10-CM

## 2021-07-19 ENCOUNTER — APPOINTMENT (OUTPATIENT)
Dept: LAB | Facility: CLINIC | Age: 66
End: 2021-07-19
Payer: MEDICARE

## 2021-07-19 DIAGNOSIS — D49.0 NEOPLASM OF AMPULLA OF VATER: ICD-10-CM

## 2021-07-19 LAB
BASOPHILS # BLD AUTO: 0.06 THOUSANDS/ΜL (ref 0–0.1)
BASOPHILS NFR BLD AUTO: 1 % (ref 0–1)
EOSINOPHIL # BLD AUTO: 0.12 THOUSAND/ΜL (ref 0–0.61)
EOSINOPHIL NFR BLD AUTO: 1 % (ref 0–6)
ERYTHROCYTE [DISTWIDTH] IN BLOOD BY AUTOMATED COUNT: 14.9 % (ref 11.6–15.1)
HCT VFR BLD AUTO: 36.2 % (ref 34.8–46.1)
HGB BLD-MCNC: 11.6 G/DL (ref 11.5–15.4)
IMM GRANULOCYTES # BLD AUTO: 0.04 THOUSAND/UL (ref 0–0.2)
IMM GRANULOCYTES NFR BLD AUTO: 1 % (ref 0–2)
LYMPHOCYTES # BLD AUTO: 2.71 THOUSANDS/ΜL (ref 0.6–4.47)
LYMPHOCYTES NFR BLD AUTO: 31 % (ref 14–44)
MCH RBC QN AUTO: 29.6 PG (ref 26.8–34.3)
MCHC RBC AUTO-ENTMCNC: 32 G/DL (ref 31.4–37.4)
MCV RBC AUTO: 92 FL (ref 82–98)
MONOCYTES # BLD AUTO: 0.68 THOUSAND/ΜL (ref 0.17–1.22)
MONOCYTES NFR BLD AUTO: 8 % (ref 4–12)
NEUTROPHILS # BLD AUTO: 5.14 THOUSANDS/ΜL (ref 1.85–7.62)
NEUTS SEG NFR BLD AUTO: 58 % (ref 43–75)
NRBC BLD AUTO-RTO: 0 /100 WBCS
PLATELET # BLD AUTO: 281 THOUSANDS/UL (ref 149–390)
PMV BLD AUTO: 10.1 FL (ref 8.9–12.7)
RBC # BLD AUTO: 3.92 MILLION/UL (ref 3.81–5.12)
WBC # BLD AUTO: 8.75 THOUSAND/UL (ref 4.31–10.16)

## 2021-07-19 PROCEDURE — 85025 COMPLETE CBC W/AUTO DIFF WBC: CPT

## 2021-07-19 PROCEDURE — 36415 COLL VENOUS BLD VENIPUNCTURE: CPT

## 2021-07-21 ENCOUNTER — HOSPITAL ENCOUNTER (OUTPATIENT)
Dept: INFUSION CENTER | Facility: HOSPITAL | Age: 66
Discharge: HOME | End: 2021-07-21
Payer: MEDICARE

## 2021-07-21 VITALS
WEIGHT: 117.5 LBS | OXYGEN SATURATION: 98 % | RESPIRATION RATE: 18 BRPM | HEIGHT: 62 IN | TEMPERATURE: 96.1 F | BODY MASS INDEX: 21.62 KG/M2 | DIASTOLIC BLOOD PRESSURE: 60 MMHG | HEART RATE: 95 BPM | SYSTOLIC BLOOD PRESSURE: 149 MMHG

## 2021-07-21 DIAGNOSIS — D49.0 NEOPLASM OF AMPULLA OF VATER: Primary | ICD-10-CM

## 2021-07-21 PROCEDURE — 96368 THER/DIAG CONCURRENT INF: CPT

## 2021-07-21 PROCEDURE — 96415 CHEMO IV INFUSION ADDL HR: CPT

## 2021-07-21 PROCEDURE — 96367 TX/PROPH/DG ADDL SEQ IV INF: CPT

## 2021-07-21 PROCEDURE — G0498 CHEMO EXTEND IV INFUS W/PUMP: HCPCS

## 2021-07-21 PROCEDURE — 96375 TX/PRO/DX INJ NEW DRUG ADDON: CPT

## 2021-07-21 PROCEDURE — 96411 CHEMO IV PUSH ADDL DRUG: CPT

## 2021-07-21 PROCEDURE — 96413 CHEMO IV INFUSION 1 HR: CPT

## 2021-07-21 RX ORDER — SODIUM CHLORIDE 9 MG/ML
20 INJECTION, SOLUTION INTRAVENOUS ONCE AS NEEDED
Status: DISCONTINUED | OUTPATIENT
Start: 2021-07-21 | End: 2021-07-24 | Stop reason: HOSPADM

## 2021-07-21 RX ORDER — DEXTROSE MONOHYDRATE 50 MG/ML
20 INJECTION, SOLUTION INTRAVENOUS ONCE
Status: COMPLETED | OUTPATIENT
Start: 2021-07-21 | End: 2021-07-21

## 2021-07-21 RX ORDER — PALONOSETRON 0.05 MG/ML
0.25 INJECTION, SOLUTION INTRAVENOUS ONCE
Status: COMPLETED | OUTPATIENT
Start: 2021-07-21 | End: 2021-07-21

## 2021-07-21 RX ORDER — FLUOROURACIL 50 MG/ML
400 INJECTION, SOLUTION INTRAVENOUS ONCE
Status: COMPLETED | OUTPATIENT
Start: 2021-07-21 | End: 2021-07-21

## 2021-07-21 RX ADMIN — SODIUM CHLORIDE 20 ML/HR: 9 INJECTION, SOLUTION INTRAVENOUS at 08:45

## 2021-07-21 RX ADMIN — FLUOROURACIL 605 MG: 50 INJECTION, SOLUTION INTRAVENOUS at 12:46

## 2021-07-21 RX ADMIN — FOSAPREPITANT 150 MG: 150 INJECTION, POWDER, LYOPHILIZED, FOR SOLUTION INTRAVENOUS at 09:50

## 2021-07-21 RX ADMIN — OXALIPLATIN 128.35 MG: 5 INJECTION, SOLUTION INTRAVENOUS at 10:34

## 2021-07-21 RX ADMIN — PALONOSETRON 0.25 MG: 0.25 INJECTION, SOLUTION INTRAVENOUS at 09:45

## 2021-07-21 RX ADMIN — DEXAMETHASONE SODIUM PHOSPHATE 10 MG: 10 INJECTION, SOLUTION INTRAMUSCULAR; INTRAVENOUS at 08:45

## 2021-07-21 RX ADMIN — LEUCOVORIN CALCIUM 600 MG: 200 INJECTION, POWDER, LYOPHILIZED, FOR SUSPENSION INTRAMUSCULAR; INTRAVENOUS at 10:31

## 2021-07-21 RX ADMIN — DEXTROSE 20 ML/HR: 50 INJECTION, SOLUTION INTRAVENOUS at 10:29

## 2021-07-21 NOTE — PLAN OF CARE
Problem: Potential for Falls  Goal: Patient will remain free of falls  Description: INTERVENTIONS:  - Educate patient/family on patient safety including physical limitations  - Instruct patient to call for assistance with activity   Outcome: Progressing     Problem: Knowledge Deficit  Goal: Patient/family/caregiver demonstrates understanding of disease process, treatment plan, medications, and discharge instructions  Description: Complete learning assessment and assess knowledge base    Interventions:  - Provide teaching at level of understanding  - Provide teaching via preferred learning methods  Outcome: Progressing

## 2021-07-21 NOTE — PROGRESS NOTES
Pt c/o "tightness" around port site  Pt has smaller upper body and port is very prominent  Explained this to pt that this may be the reason for the tightness  Port does not have any swelling or redness  Brisk blood return when accessed

## 2021-07-23 ENCOUNTER — HOSPITAL ENCOUNTER (OUTPATIENT)
Dept: INFUSION CENTER | Facility: HOSPITAL | Age: 66
Discharge: HOME | End: 2021-07-23

## 2021-07-23 VITALS — TEMPERATURE: 96.7 F

## 2021-07-23 DIAGNOSIS — D49.0 NEOPLASM OF AMPULLA OF VATER: Primary | ICD-10-CM

## 2021-07-23 NOTE — PLAN OF CARE
Problem: Knowledge Deficit  Goal: Patient/family/caregiver demonstrates understanding of disease process, treatment plan, medications, and discharge instructions  Description: Complete learning assessment and assess knowledge base    Interventions:  - Provide teaching at level of understanding  - Provide teaching via preferred learning methods  Outcome: Progressing     Problem: Potential for Falls  Goal: Patient will remain free of falls  Description: INTERVENTIONS:  - Educate patient/family on patient safety including physical limitations  - Instruct patient to call for assistance with activity   Outcome: Progressing

## 2021-07-26 ENCOUNTER — OFFICE VISIT (OUTPATIENT)
Dept: HEMATOLOGY ONCOLOGY | Facility: MEDICAL CENTER | Age: 66
End: 2021-07-26
Payer: MEDICARE

## 2021-07-26 VITALS
DIASTOLIC BLOOD PRESSURE: 86 MMHG | HEART RATE: 75 BPM | HEIGHT: 62 IN | SYSTOLIC BLOOD PRESSURE: 141 MMHG | OXYGEN SATURATION: 95 % | BODY MASS INDEX: 20.68 KG/M2 | TEMPERATURE: 97.5 F | RESPIRATION RATE: 17 BRPM | WEIGHT: 112.4 LBS

## 2021-07-26 DIAGNOSIS — D49.0 NEOPLASM OF AMPULLA OF VATER: Primary | ICD-10-CM

## 2021-07-26 PROCEDURE — 99214 OFFICE O/P EST MOD 30 MIN: CPT | Performed by: INTERNAL MEDICINE

## 2021-07-26 NOTE — PROGRESS NOTES
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  Issues:    Status post Whipple  Patient will follow up with Dr Terri Brittle as directed; next office visit scheduled for November 2021  Ampulla of vater  adenocarcinoma  Patient was presented at the GI tumor Conference  The plan is 4 months of adjuvant FOLFOX, then likely rescanning an evaluation by Radiation Oncology for concurrent adjuvant treatment  5th cycle FOLFOX is scheduled for next week  Patient states having all required medications at home  Patient feels comfortable being seen in 4 weeks  Nausea/vomiting  Antiemetics were recently manipulated  No recent vomiting, still with some nausea but less/better than before  As discussed previously, the Zofran has been changed to Aloxi and Emend was added  Patient states having all required medications at home  Pain control  No pain control issues at this time  No chest CT  This office was not able to find a CT scan of the chest pre treatment  As discussed previously, after completing FOLFOX, I would rescan the chest and abdomen/pelvis before proceeding with radiation oncology evaluation/treatment  History of RA  As above no pain control issues, no arthritic complaints but patient has not seen a rheumatologist and a number of years  Patient is willing to see the rheumatologist after the chemotherapy has been completed  Patient states that she does not have any pain control issues at this time; has not seen a rheumatologist in a number of years  When patient is farther through her chemotherapy, Mrs Yaakov Thomas will reconsider a rheumatology referral     Patient is to return in 4 weeks    Patient knows to call the hematology/oncology office if there are any other questions or concerns  Carefully review your medication list and verify that the list is accurate and up-to-date  Please call the hematology/oncology office if there are medications missing from the list, medications on the list that you are not currently taking or if there is a dosage or instruction that is different from how you're taking that medication  Patient goals and areas of care:  Continue with chemotherapy  Barriers to care:  None  Patient is able to self-care   _____________________________________________________________________________________    Chief Complaint   Patient presents with    Follow-up     ampulla of vater  adenocarcinoma     Oncology History   Neoplasm of ampulla of Vater   3/15/2021 Initial Diagnosis    Neoplasm of ampulla of Vater     4/26/2021 Surgery    B  Celiac lymph node:     - Single lymph node; negative for malignancy (0/1)  C   Whipple resection:     - Invasive poorly differentiated mucinous adenocarcinoma  - Tumor arises in the background of an adenoma with high grade dysplasia  - Lymphatic and venous channel invasion by tumor present  - Metastatic carcinoma present in one of twenty lymph nodes (1/20)  - All margins are negative for tumor       6/1/2021 -  Cancer Staged    Staging form: Ampulla of Vater, AJCC 8th Edition  - Pathologic: Stage IIIA (pT3a, pN1, cM0) - Signed by Te Arambula MD on 6/1/2021  Histologic grade (G): G3  Histologic grading system: 3 grade system  Residual tumor (R): R0 - None       6/9/2021 -  Chemotherapy    palonosetron (ALOXI), 0 25 mg, Intravenous, Once, 1 of 5 cycles  Administration: 0 25 mg (7/21/2021)  fluorouracil (ADRUCIL), 400 mg/m2 = 605 mg, Intravenous, Once, 4 of 8 cycles  Administration: 605 mg (6/9/2021), 605 mg (6/23/2021), 605 mg (7/7/2021), 605 mg (7/21/2021)  fosaprepitant (EMEND) IVPB, 150 mg, Intravenous, Once, 2 of 6 cycles  Administration: 150 mg (7/7/2021), 150 mg (7/21/2021)  leucovorin calcium IVPB, 604 mg, Intravenous, Once, 4 of 8 cycles  Administration: 600 mg (6/9/2021), 600 mg (6/23/2021), 600 mg (7/7/2021), 600 mg (7/21/2021)  oxaliplatin (ELOXATIN) chemo infusion, 85 mg/m2 = 128 35 mg, Intravenous, Once, 4 of 8 cycles  Administration: 128 35 mg (6/9/2021), 128 35 mg (6/23/2021), 128 35 mg (7/7/2021), 128 35 mg (7/21/2021)  fluorouracil (ADRUCIL) ambulatory infusion Soln, 1,200 mg/m2/day = 3,625 mg, Intravenous, Over 46 hours, 4 of 8 cycles       History of Present Illness:  60-year-old female with a history of rheumatoid arthritis seen in the hospital in February 2021 with obstructive jaundice  CT scan demonstrated intra and extrahepatic biliary dilatation with a questionable mass at the end of the common bile duct  EUS in March 2021 demonstrated a 2 x 1 6 cm mass in the ampullary Vater involving the distal common bile duct  Biopsy demonstrated poorly differentiated mucinous adenocarcinoma  Patient underwent Whipple procedure on April 26, 2021  Results demonstrated mucinous adenocarcinoma in a background of adenoma with lymphatic and venous channel invasion and 1/22 positive lymph nodes  Margins were negative  Patient was presented at the GI tumor Board and the plan was 4 months of adjuvant FOLFOX followed by radiation oncology evaluation for concurrent treatment  Patient has completed 3 cycles of FOLFOX  Mrs Rodriguez Bullion states feeling better  No vomiting on the recent cycle, still with nausea  Still with some diarrhea but patient continues to eat and drink plenty of fluids  No pain control issues  No port issues  Patient feels more tired when she is disconnected, takes 1-2 days to recover  Patient has received her COVID vaccination  Review of Systems   Constitutional: Positive for fatigue  HENT: Negative  Eyes: Negative  Respiratory: Negative  Cardiovascular: Negative  Gastrointestinal: Positive for nausea  Endocrine: Negative  Genitourinary: Negative  Musculoskeletal: Negative  Skin: Negative  Allergic/Immunologic: Negative  Neurological: Negative  Hematological: Negative  Psychiatric/Behavioral: Negative  All other systems reviewed and are negative  Patient Active Problem List   Diagnosis    Elevated LFTs    Dilated bile duct    Neoplasm of ampulla of Vater    Mild protein-calorie malnutrition ([facility] [address] )     Past Medical History:   Diagnosis Date    Cancer [facility])     pancreatic     Past Surgical History:   Procedure Laterality Date    ABLATION SOFT TISSUE N/A 4/26/2021    Procedure: INTRAOPERATIVE ULTRASOUND, ABLATION,SOFT TISSUE PANCREAS;  Surgeon: Sukhi Hess MD;  [facility];  Service: Surgical Oncology    CHOLECYSTECTOMY N/A 4/26/2021    Procedure: CHOLECYSTECTOMY;  Surgeon: Sukhi Hess MD;  [facility];  Service: Surgical Oncology    FL GUIDED CENTRAL VENOUS ACCESS DEVICE INSERTION  6/2/2021    HYSTERECTOMY      LAPAROTOMY N/A 4/26/2021    Procedure: LAPAROTOMY EXPLORATORY;  Surgeon: Sukhi Hess MD;  [facility];  Service: Surgical Oncology    SINUS SURGERY      TUNNELED VENOUS PORT PLACEMENT Left 6/2/2021    Procedure: INSERTION VENOUS PORT (PORT-A-CATH); Surgeon: Sukhi Hess MD;  [facility];  Service: Surgical Oncology    WHIPPLE PROCEDURE/PANCREATICO-DUODENECTOMY N/A 4/26/2021    Procedure:  WHIPPLE PROCEDURE/PANCREATICO-DUODENECTOMY; PYLORIC PRESERVING;  Surgeon: Sukhi Hess MD;  [facility];  Service: Surgical Oncology     Family History   Problem Relation Age of Onset    Ulcerative colitis Mother     Prostate cancer Father     Heart disease Brother     Prostate cancer Brother     Melanoma Sister      Social History     Socioeconomic History    Marital status: Single     Spouse name: Not on file    Number of children: Not on file    Years of education: Not on file    Highest education level: Not on file   Occupational History    Not on file   Tobacco Use    Smoking status: Former Smoker     Packs/day: 0 50     Start date: 65     Quit date: 2021     Years since quittin 3    Smokeless tobacco: Never Used    Tobacco comment: recently stop smoking    Vaping Use    Vaping Use: Never used   Substance and Sexual Activity    Alcohol use: Never    Drug use: Never    Sexual activity: Never   Other Topics Concern    Not on file   Social History Narrative    Not on file     Social Determinants of Health     Financial Resource Strain:     Difficulty of Paying Living Expenses:    Food Insecurity:     Worried About Running Out of Food in the Last Year:     [address] in the Last Year:    Transportation Needs:     Lack of Transportation (Medical):      Lack of Transportation (Non-Medical):    Physical Activity:     Days of Exercise per Week:     Minutes of Exercise per Session:    Stress:     Feeling of Stress :    Social Connections:     Frequency of Communication with Friends and Family:     Frequency of Social Gatherings with Friends and Family:     Attends [employer]oration[employer] Services:     Active Member of Clubs or Organizations:     Attends Club or Organization Meetings:     Marital Status:    Intimate Partner Violence:     Fear of Current or Ex-Partner:     Emotionally Abused:     Physically Abused:     Sexually Abused:        Current Outpatient Medications:     acetaminophen (TYLENOL) 500 mg tablet, Take 1,000 mg by mouth, Disp: , Rfl:     lidocaine-prilocaine (EMLA) cream, Apply topically as needed for mild pain Apply topically to port site 1 hour prior to port accessing, Disp: 30 g, Rfl: 0    Multiple Vitamin (multivitamin) tablet, Take 1 tablet by mouth daily, Disp: , Rfl:     ondansetron (ZOFRAN-ODT) 4 mg disintegrating tablet, Take 1 tablet (4 mg total) by mouth every 6 (six) hours as needed for nausea or vomiting, Disp: 30 tablet, Rfl: 1    prochlorperazine (COMPAZINE) 10 mg tablet, Take 1 tablet (10 mg total) by mouth every 6 (six) hours as needed for nausea or vomiting, Disp: 30 tablet, Rfl: 1    Allergies   Allergen Reactions    Penicillins Rash    Tetracycline Rash       Vitals:    07/26/21 1440   BP: 141/86   Pulse: 75   Resp: 17   Temp: 97 5 °F (36 4 °C)   SpO2: 95%     Physical Exam  Constitutional:       Appearance: She is well-developed  Comments: Thin but relatively well-nourished female, no respiratory distress, no signs of pain   HENT:      Head: Normocephalic and atraumatic  Right Ear: External ear normal       Left Ear: External ear normal    Eyes:      Conjunctiva/sclera: Conjunctivae normal       Pupils: Pupils are equal, round, and reactive to light  Cardiovascular:      Rate and Rhythm: Normal rate and regular rhythm  Heart sounds: Normal heart sounds  Pulmonary:      Effort: Pulmonary effort is normal       Breath sounds: Normal breath sounds  Abdominal:      General: Bowel sounds are normal       Palpations: Abdomen is soft  Comments: Well-healed midline suture line, nontender, +bowel sounds, no guarding   Musculoskeletal:         General: Normal range of motion  Cervical back: Normal range of motion and neck supple  Skin:     General: Skin is warm  Comments: Warm, moist, relatively good color, no petechiae or ecchymoses, left anterior chest wall port area healed well   Neurological:      Mental Status: She is alert and oriented to person, place, and time  Deep Tendon Reflexes: Reflexes are normal and symmetric  Psychiatric:         Behavior: Behavior normal          Thought Content:  Thought content normal          Judgment: Judgment normal      Extremities:  No lower extremity edema bilaterally, no cords, pulses are 1+  Lymphatics:  No adenopathy in the neck, supraclavicular region, axilla and groin bilaterally    Performance Status: 1 - Symptomatic but completely ambulatory    Labs    07/19/2021 WBC = 8 75 hemoglobin = 11 6 hematocrit = 36 2 platelet = 807 neutrophil = 58% BUN = 23 creatinine = 0 71 calcium = 9 4 LFTs WNL    07/06/2021 WBC = 8 51 hemoglobin = 11 3 hematocrit = 35 4 platelet = 865 neutrophil = 59% BUN = 32 creatinine = 0 75 calcium = 8 9 LFTs WNL    Imaging    06/02/2021 chest x-ray portable  Impression stated no acute cardiopulmonary disease, left port in lower SVC with no pneumothorax  04/11/2021 CT scan abdomen pelvis    1  Grossly unchanged moderate intrahepatic and extrahepatic biliary ductal dilation with abrupt narrowing/transition in the distal CBD where there is a small obstructing periampullary lesion measuring 1 1 x 1 0 cm, likely corresponding to the lesion   seen on prior endoscopic ultrasound  2   Otherwise, no acute findings in the abdomen or pelvis  02/18/2021 MRI abdomen with and without contrast and MRCP    Intra and extrahepatic biliary dilatation extending to the ampulla without evidence of obstructing mass lesion or choledocholithiasis    Consider ERCP for further characterization      Pathology    Case Report   Surgical Pathology Report                         Case: [id number]                                    Authorizing Provider: Cesar Reyes MD           Collected:           04/26/2021 1036               Ordering Location:     [facility]      Received:            04/26/2021 1221                                      Hospital Operating Room                                                       Pathologist:           Gosia Steen MD                                                          Intraop:               Gosia Steen MD                                                          Specimens:   A) - Gallbladder                                                                                     B) - Lymph Node, CELIAC LYMPH NODE                                                                   C) - Pancreas, WHIPPLE RESECTION                                                     Addendum   At the request of Dr Savanna Rizzo, unstained slides from paraffin BLOCK C9 containing the patient's cancer cells were sent to [facility] for MI Profile testing  Upon completion of testing, the [facility] report will be directly sent to the requesting physician as well as posted in the Media Tab of the patient's [employer] EMR by the [facility] Pathology Department      Please note: The [facility] Lab's analysis and report is performed independently of [facility], and neither the Hospital nor the Pathology Department screen, review or comment upon [id number] report  Because the role of testing in cancer diagnosis and management is subject to evolving development, usage and interpretation, we strongly urge that the test limitations described in the [facility] report be carefully read, appropriately shared with the patient, and critically considered when reaching treatment decisions      This pathology material was removed from archive for purpose of molecular testing  It was reviewed and approved by Dr Jacek Kay        Addendum electronically signed by Hardy Moon MD on 6/2/2021 at  1:12 PM   Final Diagnosis   A   Gallbladder, cholecystectomy:       - Chronic cholecystitis  - No malignancy identified      - Reactive periductal lymph node with lipid granulomata; negative for malignancy (0/1)      B  Celiac lymph node:     - Single lymph node; negative for malignancy (0/1)      C  Whipple resection:     - Invasive poorly differentiated mucinous adenocarcinoma  - Tumor arises in the background of an adenoma with high grade dysplasia  - Lymphatic and venous channel invasion by tumor present  - Metastatic carcinoma present in one of twenty lymph nodes (1/20)  - All margins are negative for tumor       Electronically signed by Hardy Moon MD on 5/5/2021 at  2:59 PM   Comments:    This is an appended report  These results have been appended to a previously preliminary verified report  Additional Information    All reported additional testing was performed with appropriately reactive controls   These tests were developed and their performance characteristics determined by [facility] or appropriate performing facility, though some tests may be performed on tissues which have not been validated for performance characteristics (such as staining performed on alcohol exposed cell blocks and decalcified tissues)   Results should be interpreted with caution and in the context of the patients clinical condition  These tests may not be cleared or approved by the [place]  Food and Drug Administration, though the FDA has determined that such clearance or approval is not necessary  These tests are used for clinical purposes and they should not be regarded as investigational or for research  This laboratory has been approved by [place] [id number], designated as a high-complexity laboratory and is qualified to perform these tests     - Interpretation performed at [facility], [address]    Comment: This is an appended report  These results have been appended to a previously preliminary verified report     Synoptic Checklist   AMPULLA OF VATER  8th Edition - Protocol posted: 2/26/2020  AMPULLA OF VATER: AMPULLECTOMY, PANCREATICODUODENECTOMY - All Specimens  SPECIMEN   Procedure  Pancreaticoduodenectomy (Whipple resection)    TUMOR   Tumor Site  Intra-ampullary and cristine-ampullary (mixed type)    Histologic Type  Mucinous adenocarcinoma    Histologic Grade  G3: Poorly differentiated    Tumor Size  Greatest Dimension (Centimeters): 2 5 cm   Additional Dimension (Centimeters)  2 3 cm     1 cm   Tumor Extension  Tumor invades into muscularis propria of the duodenum      Tumor extends more than 0 5 cm into pancreas      Tumor extends into peripancreatic soft tissues      Tumor extends into periduodenal tissue    Lymphovascular Invasion  Present    Perineural Invasion  Not identified    MARGINS   Margins     Pancreatic Neck / Parenchymal Margin  Uninvolved by invasive carcinoma and pancreatic high-grade intraepithelial neoplasia    Distance of Invasive Carcinoma from Margin  3 5 cm   Uncinate (Retroperitoneal / Superior Mesenteric Artery) Margin  Uninvolved by invasive carcinoma    Distance of Invasive Carcinoma from Margin  4 0 cm   Bile Duct Margin  Uninvolved by invasive carcinoma and high-grade intraepithelial neoplasia    Distance of Invasive Carcinoma from Margin  3 3 cm   Proximal Margin (Gastric or Duodenal)  Uninvolved by invasive carcinoma and high-grade intraepithelial neoplasia    Distal Margin (Distal Duodenal or Jejunal)  Uninvolved by invasive carcinoma and high-grade intraepithelial neoplasia    LYMPH NODES   Number of Lymph Nodes Involved  1    Number of Lymph Nodes Examined  22    PATHOLOGIC STAGE CLASSIFICATION (pTNM, AJCC 8th Edition)      Primary Tumor (pT)  pT3b    Regional Lymph Nodes (pN)  pN1    ADDITIONAL FINDINGS   Additional Findings  Dysplasia / adenoma      PanIN 1B    Comment(s)   Comment(s)  AJCC Prognostic Stage Group (8th Ed ):  IIIA - pT3a, pN1, MX, G3      Intraoperative Consultation       CF1-3    Pancreatic and bile duct margins:   - The pancreatic and bile duct margins are negative for malignancy and high grade dysplasia      Reviewed by LELEN Dobson   - Interpretation performed at [facility], [address] [facility] [address]

## 2021-07-27 DIAGNOSIS — D49.0 NEOPLASM OF AMPULLA OF VATER: Primary | ICD-10-CM

## 2021-07-27 RX ORDER — PALONOSETRON 0.05 MG/ML
0.25 INJECTION, SOLUTION INTRAVENOUS ONCE
Status: CANCELLED | OUTPATIENT
Start: 2021-08-04

## 2021-07-27 RX ORDER — DEXTROSE MONOHYDRATE 50 MG/ML
20 INJECTION, SOLUTION INTRAVENOUS ONCE
Status: CANCELLED | OUTPATIENT
Start: 2021-08-04

## 2021-07-27 RX ORDER — SODIUM CHLORIDE 9 MG/ML
20 INJECTION, SOLUTION INTRAVENOUS ONCE AS NEEDED
Status: CANCELLED | OUTPATIENT
Start: 2021-08-04

## 2021-07-27 RX ORDER — FLUOROURACIL 50 MG/ML
400 INJECTION, SOLUTION INTRAVENOUS ONCE
Status: CANCELLED | OUTPATIENT
Start: 2021-08-04

## 2021-07-28 NOTE — ADDENDUM NOTE
Addendum  created 04/27/21 1141 by Baron Rohith MD    Clinical Note Signed
Addendum  created 04/27/21 1851 by Anette Campbell MD    Order list changed
Addendum  created 05/01/21 1232 by Chrissy Joel MD    Care Teams modified, Clinical Note Signed
Detail Level: Detailed
Detail Level: Simple

## 2021-08-02 ENCOUNTER — APPOINTMENT (OUTPATIENT)
Dept: LAB | Facility: CLINIC | Age: 66
End: 2021-08-02
Payer: MEDICARE

## 2021-08-02 DIAGNOSIS — D49.0 NEOPLASM OF AMPULLA OF VATER: ICD-10-CM

## 2021-08-02 LAB
ALBUMIN SERPL BCP-MCNC: 3.6 G/DL (ref 3.5–5)
ALP SERPL-CCNC: 100 U/L (ref 46–116)
ALT SERPL W P-5'-P-CCNC: 65 U/L (ref 12–78)
ANION GAP SERPL CALCULATED.3IONS-SCNC: 6 MMOL/L (ref 4–13)
AST SERPL W P-5'-P-CCNC: 47 U/L (ref 5–45)
BASOPHILS # BLD AUTO: 0.06 THOUSANDS/ΜL (ref 0–0.1)
BASOPHILS NFR BLD AUTO: 1 % (ref 0–1)
BILIRUB SERPL-MCNC: 0.26 MG/DL (ref 0.2–1)
BUN SERPL-MCNC: 16 MG/DL (ref 5–25)
CALCIUM SERPL-MCNC: 9.1 MG/DL (ref 8.3–10.1)
CHLORIDE SERPL-SCNC: 106 MMOL/L (ref 100–108)
CO2 SERPL-SCNC: 28 MMOL/L (ref 21–32)
CREAT SERPL-MCNC: 0.57 MG/DL (ref 0.6–1.3)
EOSINOPHIL # BLD AUTO: 0.15 THOUSAND/ΜL (ref 0–0.61)
EOSINOPHIL NFR BLD AUTO: 2 % (ref 0–6)
ERYTHROCYTE [DISTWIDTH] IN BLOOD BY AUTOMATED COUNT: 16.1 % (ref 11.6–15.1)
GFR SERPL CREATININE-BSD FRML MDRD: 98 ML/MIN/1.73SQ M
GLUCOSE P FAST SERPL-MCNC: 109 MG/DL (ref 65–99)
HCT VFR BLD AUTO: 36.2 % (ref 34.8–46.1)
HGB BLD-MCNC: 11.4 G/DL (ref 11.5–15.4)
IMM GRANULOCYTES # BLD AUTO: 0.03 THOUSAND/UL (ref 0–0.2)
IMM GRANULOCYTES NFR BLD AUTO: 0 % (ref 0–2)
LYMPHOCYTES # BLD AUTO: 2.55 THOUSANDS/ΜL (ref 0.6–4.47)
LYMPHOCYTES NFR BLD AUTO: 35 % (ref 14–44)
MCH RBC QN AUTO: 29.3 PG (ref 26.8–34.3)
MCHC RBC AUTO-ENTMCNC: 31.5 G/DL (ref 31.4–37.4)
MCV RBC AUTO: 93 FL (ref 82–98)
MONOCYTES # BLD AUTO: 0.64 THOUSAND/ΜL (ref 0.17–1.22)
MONOCYTES NFR BLD AUTO: 9 % (ref 4–12)
NEUTROPHILS # BLD AUTO: 3.96 THOUSANDS/ΜL (ref 1.85–7.62)
NEUTS SEG NFR BLD AUTO: 53 % (ref 43–75)
NRBC BLD AUTO-RTO: 0 /100 WBCS
PLATELET # BLD AUTO: 255 THOUSANDS/UL (ref 149–390)
PMV BLD AUTO: 10.4 FL (ref 8.9–12.7)
POTASSIUM SERPL-SCNC: 4 MMOL/L (ref 3.5–5.3)
PROT SERPL-MCNC: 7.2 G/DL (ref 6.4–8.2)
RBC # BLD AUTO: 3.89 MILLION/UL (ref 3.81–5.12)
SODIUM SERPL-SCNC: 140 MMOL/L (ref 136–145)
WBC # BLD AUTO: 7.39 THOUSAND/UL (ref 4.31–10.16)

## 2021-08-02 PROCEDURE — 85025 COMPLETE CBC W/AUTO DIFF WBC: CPT

## 2021-08-02 PROCEDURE — 36415 COLL VENOUS BLD VENIPUNCTURE: CPT

## 2021-08-02 PROCEDURE — 80053 COMPREHEN METABOLIC PANEL: CPT

## 2021-08-04 ENCOUNTER — HOSPITAL ENCOUNTER (OUTPATIENT)
Dept: INFUSION CENTER | Facility: HOSPITAL | Age: 66
Discharge: HOME | End: 2021-08-04
Payer: MEDICARE

## 2021-08-04 VITALS
BODY MASS INDEX: 21.38 KG/M2 | SYSTOLIC BLOOD PRESSURE: 147 MMHG | DIASTOLIC BLOOD PRESSURE: 72 MMHG | WEIGHT: 116.18 LBS | RESPIRATION RATE: 18 BRPM | HEART RATE: 60 BPM | HEIGHT: 62 IN | TEMPERATURE: 98.4 F | OXYGEN SATURATION: 99 %

## 2021-08-04 DIAGNOSIS — D49.0 NEOPLASM OF AMPULLA OF VATER: Primary | ICD-10-CM

## 2021-08-04 PROCEDURE — G0498 CHEMO EXTEND IV INFUS W/PUMP: HCPCS

## 2021-08-04 PROCEDURE — 96368 THER/DIAG CONCURRENT INF: CPT

## 2021-08-04 PROCEDURE — 96367 TX/PROPH/DG ADDL SEQ IV INF: CPT

## 2021-08-04 PROCEDURE — 96415 CHEMO IV INFUSION ADDL HR: CPT

## 2021-08-04 PROCEDURE — 96411 CHEMO IV PUSH ADDL DRUG: CPT

## 2021-08-04 PROCEDURE — 96375 TX/PRO/DX INJ NEW DRUG ADDON: CPT

## 2021-08-04 PROCEDURE — 96413 CHEMO IV INFUSION 1 HR: CPT

## 2021-08-04 RX ORDER — FLUOROURACIL 50 MG/ML
400 INJECTION, SOLUTION INTRAVENOUS ONCE
Status: COMPLETED | OUTPATIENT
Start: 2021-08-04 | End: 2021-08-04

## 2021-08-04 RX ORDER — SODIUM CHLORIDE 9 MG/ML
20 INJECTION, SOLUTION INTRAVENOUS ONCE AS NEEDED
Status: DISCONTINUED | OUTPATIENT
Start: 2021-08-04 | End: 2021-08-07 | Stop reason: HOSPADM

## 2021-08-04 RX ORDER — DEXTROSE MONOHYDRATE 50 MG/ML
20 INJECTION, SOLUTION INTRAVENOUS ONCE
Status: COMPLETED | OUTPATIENT
Start: 2021-08-04 | End: 2021-08-04

## 2021-08-04 RX ORDER — PALONOSETRON 0.05 MG/ML
0.25 INJECTION, SOLUTION INTRAVENOUS ONCE
Status: COMPLETED | OUTPATIENT
Start: 2021-08-04 | End: 2021-08-04

## 2021-08-04 RX ADMIN — OXALIPLATIN 128.35 MG: 5 INJECTION, SOLUTION INTRAVENOUS at 09:57

## 2021-08-04 RX ADMIN — FOSAPREPITANT 150 MG: 150 INJECTION, POWDER, LYOPHILIZED, FOR SOLUTION INTRAVENOUS at 09:13

## 2021-08-04 RX ADMIN — FLUOROURACIL 605 MG: 50 INJECTION, SOLUTION INTRAVENOUS at 12:08

## 2021-08-04 RX ADMIN — LEUCOVORIN CALCIUM 600 MG: 200 INJECTION, POWDER, LYOPHILIZED, FOR SUSPENSION INTRAMUSCULAR; INTRAVENOUS at 09:57

## 2021-08-04 RX ADMIN — DEXAMETHASONE SODIUM PHOSPHATE 10 MG: 10 INJECTION, SOLUTION INTRAMUSCULAR; INTRAVENOUS at 08:43

## 2021-08-04 RX ADMIN — PALONOSETRON HYDROCHLORIDE 0.25 MG: 0.25 INJECTION INTRAVENOUS at 09:46

## 2021-08-04 RX ADMIN — DEXTROSE 20 ML/HR: 50 INJECTION, SOLUTION INTRAVENOUS at 09:56

## 2021-08-04 RX ADMIN — SODIUM CHLORIDE 20 ML/HR: 0.9 INJECTION, SOLUTION INTRAVENOUS at 08:43

## 2021-08-06 ENCOUNTER — HOSPITAL ENCOUNTER (OUTPATIENT)
Dept: INFUSION CENTER | Facility: HOSPITAL | Age: 66
Discharge: HOME | End: 2021-08-06

## 2021-08-06 VITALS — TEMPERATURE: 97.8 F

## 2021-08-06 DIAGNOSIS — D49.0 NEOPLASM OF AMPULLA OF VATER: Primary | ICD-10-CM

## 2021-08-10 RX ORDER — DEXTROSE MONOHYDRATE 50 MG/ML
20 INJECTION, SOLUTION INTRAVENOUS ONCE
Status: CANCELLED | OUTPATIENT
Start: 2021-08-18

## 2021-08-10 RX ORDER — FLUOROURACIL 50 MG/ML
400 INJECTION, SOLUTION INTRAVENOUS ONCE
Status: CANCELLED | OUTPATIENT
Start: 2021-08-18

## 2021-08-10 RX ORDER — PALONOSETRON 0.05 MG/ML
0.25 INJECTION, SOLUTION INTRAVENOUS ONCE
Status: CANCELLED | OUTPATIENT
Start: 2021-08-18

## 2021-08-10 RX ORDER — SODIUM CHLORIDE 9 MG/ML
20 INJECTION, SOLUTION INTRAVENOUS ONCE AS NEEDED
Status: CANCELLED | OUTPATIENT
Start: 2021-08-18

## 2021-08-11 DIAGNOSIS — D49.0 NEOPLASM OF AMPULLA OF VATER: Primary | ICD-10-CM

## 2021-08-16 ENCOUNTER — APPOINTMENT (OUTPATIENT)
Dept: LAB | Facility: CLINIC | Age: 66
End: 2021-08-16
Payer: MEDICARE

## 2021-08-16 DIAGNOSIS — D49.0 NEOPLASM OF AMPULLA OF VATER: ICD-10-CM

## 2021-08-16 LAB
ALBUMIN SERPL BCP-MCNC: 3.5 G/DL (ref 3.5–5)
ALP SERPL-CCNC: 143 U/L (ref 46–116)
ALT SERPL W P-5'-P-CCNC: 119 U/L (ref 12–78)
ANION GAP SERPL CALCULATED.3IONS-SCNC: 4 MMOL/L (ref 4–13)
AST SERPL W P-5'-P-CCNC: 102 U/L (ref 5–45)
BASOPHILS # BLD AUTO: 0.04 THOUSANDS/ΜL (ref 0–0.1)
BASOPHILS NFR BLD AUTO: 1 % (ref 0–1)
BILIRUB SERPL-MCNC: 0.25 MG/DL (ref 0.2–1)
BUN SERPL-MCNC: 22 MG/DL (ref 5–25)
CALCIUM SERPL-MCNC: 9.2 MG/DL (ref 8.3–10.1)
CHLORIDE SERPL-SCNC: 107 MMOL/L (ref 100–108)
CO2 SERPL-SCNC: 27 MMOL/L (ref 21–32)
CREAT SERPL-MCNC: 0.64 MG/DL (ref 0.6–1.3)
EOSINOPHIL # BLD AUTO: 0.2 THOUSAND/ΜL (ref 0–0.61)
EOSINOPHIL NFR BLD AUTO: 3 % (ref 0–6)
ERYTHROCYTE [DISTWIDTH] IN BLOOD BY AUTOMATED COUNT: 17 % (ref 11.6–15.1)
GFR SERPL CREATININE-BSD FRML MDRD: 93 ML/MIN/1.73SQ M
GLUCOSE P FAST SERPL-MCNC: 92 MG/DL (ref 65–99)
HCT VFR BLD AUTO: 37.3 % (ref 34.8–46.1)
HGB BLD-MCNC: 11.4 G/DL (ref 11.5–15.4)
IMM GRANULOCYTES # BLD AUTO: 0.02 THOUSAND/UL (ref 0–0.2)
IMM GRANULOCYTES NFR BLD AUTO: 0 % (ref 0–2)
LYMPHOCYTES # BLD AUTO: 2.34 THOUSANDS/ΜL (ref 0.6–4.47)
LYMPHOCYTES NFR BLD AUTO: 35 % (ref 14–44)
MCH RBC QN AUTO: 28.6 PG (ref 26.8–34.3)
MCHC RBC AUTO-ENTMCNC: 30.6 G/DL (ref 31.4–37.4)
MCV RBC AUTO: 94 FL (ref 82–98)
MONOCYTES # BLD AUTO: 0.73 THOUSAND/ΜL (ref 0.17–1.22)
MONOCYTES NFR BLD AUTO: 11 % (ref 4–12)
NEUTROPHILS # BLD AUTO: 3.4 THOUSANDS/ΜL (ref 1.85–7.62)
NEUTS SEG NFR BLD AUTO: 50 % (ref 43–75)
NRBC BLD AUTO-RTO: 0 /100 WBCS
PLATELET # BLD AUTO: 255 THOUSANDS/UL (ref 149–390)
PMV BLD AUTO: 10.1 FL (ref 8.9–12.7)
POTASSIUM SERPL-SCNC: 4.8 MMOL/L (ref 3.5–5.3)
PROT SERPL-MCNC: 7.3 G/DL (ref 6.4–8.2)
RBC # BLD AUTO: 3.99 MILLION/UL (ref 3.81–5.12)
SODIUM SERPL-SCNC: 138 MMOL/L (ref 136–145)
WBC # BLD AUTO: 6.73 THOUSAND/UL (ref 4.31–10.16)

## 2021-08-16 PROCEDURE — 80053 COMPREHEN METABOLIC PANEL: CPT

## 2021-08-16 PROCEDURE — 36415 COLL VENOUS BLD VENIPUNCTURE: CPT

## 2021-08-16 PROCEDURE — 85025 COMPLETE CBC W/AUTO DIFF WBC: CPT

## 2021-08-17 RX ORDER — FLUOROURACIL 50 MG/ML
340 INJECTION, SOLUTION INTRAVENOUS ONCE
Status: CANCELLED | OUTPATIENT
Start: 2021-08-18

## 2021-08-17 NOTE — PROGRESS NOTES
Patient's LFTs drawn before the 6 cycle are higher/more abnormal than before  Patient is otherwise asymptomatic  Oxaliplatin, Leucovorin and the bolus 5 FU have been all reduced 85%; the continues infusion 5 FU has been left at 100%

## 2021-08-18 ENCOUNTER — HOSPITAL ENCOUNTER (OUTPATIENT)
Dept: INFUSION CENTER | Facility: HOSPITAL | Age: 66
Discharge: HOME | End: 2021-08-18
Payer: MEDICARE

## 2021-08-18 ENCOUNTER — TREATMENT (OUTPATIENT)
Dept: HEMATOLOGY ONCOLOGY | Facility: MEDICAL CENTER | Age: 66
End: 2021-08-18
Payer: MEDICARE

## 2021-08-18 VITALS
WEIGHT: 116.4 LBS | SYSTOLIC BLOOD PRESSURE: 165 MMHG | TEMPERATURE: 97.2 F | RESPIRATION RATE: 18 BRPM | OXYGEN SATURATION: 94 % | HEART RATE: 84 BPM | DIASTOLIC BLOOD PRESSURE: 78 MMHG | HEIGHT: 62 IN | BODY MASS INDEX: 21.42 KG/M2

## 2021-08-18 DIAGNOSIS — D49.0 NEOPLASM OF AMPULLA OF VATER: Primary | ICD-10-CM

## 2021-08-18 PROCEDURE — 99214 OFFICE O/P EST MOD 30 MIN: CPT | Performed by: INTERNAL MEDICINE

## 2021-08-18 PROCEDURE — 96411 CHEMO IV PUSH ADDL DRUG: CPT

## 2021-08-18 PROCEDURE — 96361 HYDRATE IV INFUSION ADD-ON: CPT

## 2021-08-18 PROCEDURE — G0498 CHEMO EXTEND IV INFUS W/PUMP: HCPCS

## 2021-08-18 PROCEDURE — 96368 THER/DIAG CONCURRENT INF: CPT

## 2021-08-18 PROCEDURE — 96367 TX/PROPH/DG ADDL SEQ IV INF: CPT

## 2021-08-18 PROCEDURE — 96375 TX/PRO/DX INJ NEW DRUG ADDON: CPT

## 2021-08-18 PROCEDURE — 96413 CHEMO IV INFUSION 1 HR: CPT

## 2021-08-18 RX ORDER — FLUOROURACIL 50 MG/ML
340 INJECTION, SOLUTION INTRAVENOUS ONCE
Status: COMPLETED | OUTPATIENT
Start: 2021-08-18 | End: 2021-08-18

## 2021-08-18 RX ORDER — FAMOTIDINE 20 MG/50ML
20 INJECTION, SOLUTION INTRAVENOUS ONCE
Status: COMPLETED | OUTPATIENT
Start: 2021-08-18 | End: 2021-08-18

## 2021-08-18 RX ORDER — DIPHENHYDRAMINE HYDROCHLORIDE 50 MG/ML
25 INJECTION INTRAMUSCULAR; INTRAVENOUS
Status: ACTIVE | OUTPATIENT
Start: 2021-08-18 | End: 2021-08-18

## 2021-08-18 RX ORDER — DEXTROSE MONOHYDRATE 50 MG/ML
20 INJECTION, SOLUTION INTRAVENOUS ONCE
Status: COMPLETED | OUTPATIENT
Start: 2021-08-18 | End: 2021-08-18

## 2021-08-18 RX ORDER — SODIUM CHLORIDE 9 MG/ML
20 INJECTION, SOLUTION INTRAVENOUS ONCE AS NEEDED
Status: DISCONTINUED | OUTPATIENT
Start: 2021-08-18 | End: 2021-08-21 | Stop reason: HOSPADM

## 2021-08-18 RX ORDER — PALONOSETRON 0.05 MG/ML
0.25 INJECTION, SOLUTION INTRAVENOUS ONCE
Status: COMPLETED | OUTPATIENT
Start: 2021-08-18 | End: 2021-08-18

## 2021-08-18 RX ADMIN — PALONOSETRON HYDROCHLORIDE 0.25 MG: 0.25 INJECTION INTRAVENOUS at 10:22

## 2021-08-18 RX ADMIN — LEUCOVORIN CALCIUM 517 MG: 200 INJECTION, POWDER, LYOPHILIZED, FOR SUSPENSION INTRAMUSCULAR; INTRAVENOUS at 10:29

## 2021-08-18 RX ADMIN — OXALIPLATIN 109.82 MG: 5 INJECTION, SOLUTION INTRAVENOUS at 10:30

## 2021-08-18 RX ADMIN — FOSAPREPITANT 150 MG: 150 INJECTION, POWDER, LYOPHILIZED, FOR SOLUTION INTRAVENOUS at 09:30

## 2021-08-18 RX ADMIN — DIPHENHYDRAMINE HYDROCHLORIDE 25 MG: 50 INJECTION, SOLUTION INTRAMUSCULAR; INTRAVENOUS at 10:53

## 2021-08-18 RX ADMIN — FAMOTIDINE 20 MG: 20 INJECTION, SOLUTION INTRAVENOUS at 10:56

## 2021-08-18 RX ADMIN — DEXAMETHASONE SODIUM PHOSPHATE 10 MG: 10 INJECTION, SOLUTION INTRAMUSCULAR; INTRAVENOUS at 08:41

## 2021-08-18 RX ADMIN — HYDROCORTISONE SODIUM SUCCINATE 50 MG: 100 INJECTION, POWDER, FOR SOLUTION INTRAMUSCULAR; INTRAVENOUS at 10:55

## 2021-08-18 RX ADMIN — DEXTROSE 20 ML/HR: 50 INJECTION, SOLUTION INTRAVENOUS at 10:04

## 2021-08-18 RX ADMIN — SODIUM CHLORIDE 500 ML: 0.9 INJECTION, SOLUTION INTRAVENOUS at 10:56

## 2021-08-18 RX ADMIN — FLUOROURACIL 515 MG: 50 INJECTION, SOLUTION INTRAVENOUS at 12:10

## 2021-08-18 RX ADMIN — SODIUM CHLORIDE 20 ML/HR: 0.9 INJECTION, SOLUTION INTRAVENOUS at 08:41

## 2021-08-18 NOTE — PROGRESS NOTES
Pt c/o itchy palms at 1052  Chemo immediately stopped  Itching and hives, redness progressed rapidly throughout hands, arms, face, legs, feet  Pt also c/o chest tightness and diaphoresis  Pt slightly desat'd, O2 2L via n/c applied  Chemo hypersensitivity protocol followed while monitoring pt's vs  Pt stabilized after administration of hypersensitivity meds and NS bolus initiated  Isidro Thompson RN notified  Per Zoya Irizarry we are to keep pt here and monitor until Dr Jean Andover comes in to evaluate pt  See flowsheet for vs  Hives resolving, states itching resolved  Pt comforted and given cool towels as requested for diaphoresis

## 2021-08-18 NOTE — PROGRESS NOTES
Pt monitored  Vss  States she feels better  Hives completely resolved  Dr Frankie Smith in to see pt  Per Dr Frankie Smith, pt will not complete remainder of Oxaliplatin and LCV today, but ok to receive 5FU bolus and continuous CADD  Pt will f/u with Dr Frankie Smith before next cycle to discuss options for treatment

## 2021-08-18 NOTE — PROGRESS NOTES
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  Mrs Kasi Church reacted to the oxaliplatin/Leucovorin today  Patient received only approximately 1/8 the combination  Patient was treated as per the reaction protocol, subsequently did well without any respiratory or cutaneous issues  I saw the patient within 1 hour of the reaction  Presently Mrs Kasi Church feels well and clinically there are no concerning signs  Patient is to continue with the 5 FU bolus and then the continuous infusion  Patient will then be brought back to the office to discuss what to do moving forward - FOLFOX is no longer an option  LFTs are also higher/abnormal as compared to before  These will need to be rechecked before starting a new regimen  Patient knows to call the hematology/oncology office if there are any other questions or concerns  Carefully review your medication list and verify that the list is accurate and up-to-date  Please call the hematology/oncology office if there are medications missing from the list, medications on the list that you are not currently taking or if there is a dosage or instruction that is different from how you're taking that medication  Patient goals and areas of care:  Complete the FOLFOX backbone for this cycle  Barriers to care:  None  Patient is able to self-care   _____________________________________________________________________________________    Chief complaint: Ampulla of vater adenocarcinoma, reaction    Oncology History   Neoplasm of ampulla of Vater   3/15/2021 Initial Diagnosis    Neoplasm of ampulla of Vater     4/26/2021 Surgery    B    Celiac lymph node:     - Single lymph node; negative for malignancy (0/1)  C   Whipple resection:     - Invasive poorly differentiated mucinous adenocarcinoma  - Tumor arises in the background of an adenoma with high grade dysplasia  - Lymphatic and venous channel invasion by tumor present  - Metastatic carcinoma present in one of twenty lymph nodes (1/20)  - All margins are negative for tumor       6/1/2021 -  Cancer Staged    Staging form: Ampulla of Vater, AJCC 8th Edition  - Pathologic: Stage IIIA (pT3a, pN1, cM0) - Signed by Jayde Wasserman MD on 6/1/2021  Histologic grade (G): G3  Histologic grading system: 3 grade system  Residual tumor (R): R0 - None       6/9/2021 -  Chemotherapy    palonosetron (ALOXI), 0 25 mg, Intravenous, Once, 3 of 5 cycles  Administration: 0 25 mg (7/21/2021), 0 25 mg (8/4/2021), 0 25 mg (8/18/2021)  fluorouracil (ADRUCIL), 400 mg/m2 = 605 mg, Intravenous, Once, 6 of 8 cycles  Dose modification: 340 mg/m2 (85 % of original dose 400 mg/m2, Cycle 6, Reason: Dose Not Tolerated)  Administration: 605 mg (6/9/2021), 605 mg (6/23/2021), 605 mg (7/7/2021), 605 mg (7/21/2021), 605 mg (8/4/2021), 515 mg (8/18/2021)  fosaprepitant (EMEND) IVPB, 150 mg, Intravenous, Once, 4 of 6 cycles  Administration: 150 mg (7/7/2021), 150 mg (7/21/2021), 150 mg (8/4/2021), 150 mg (8/18/2021)  leucovorin calcium IVPB, 604 mg, Intravenous, Once, 6 of 8 cycles  Dose modification: 340 mg/m2 (85 % of original dose 400 mg/m2, Cycle 6, Reason: Dose Not Tolerated)  Administration: 600 mg (6/9/2021), 600 mg (6/23/2021), 600 mg (7/7/2021), 600 mg (7/21/2021), 600 mg (8/4/2021), 517 mg (8/18/2021)  oxaliplatin (ELOXATIN) chemo infusion, 85 mg/m2 = 128 35 mg, Intravenous, Once, 6 of 8 cycles  Dose modification: 72 25 mg/m2 (85 % of original dose 85 mg/m2, Cycle 6, Reason: Dose Not Tolerated)  Administration: 128 35 mg (6/9/2021), 128 35 mg (6/23/2021), 128 35 mg (7/7/2021), 128 35 mg (7/21/2021), 128 35 mg (8/4/2021), 109 82 mg (8/18/2021)  fluorouracil (ADRUCIL) ambulatory infusion Soln, 1,200 mg/m2/day = 3,625 mg, Intravenous, Over 46 hours, 6 of 8 cycles       History of Present Illness: 42-year-old female with a history of rheumatoid arthritis seen in the hospital in February 2021 with obstructive jaundice  CT scan demonstrated intra and extrahepatic biliary dilatation with a questionable mass at the end of the common bile duct  EUS in March 2021 demonstrated a 2 x 1 6 cm mass in the ampullary Vater involving the distal common bile duct  Biopsy demonstrated poorly differentiated mucinous adenocarcinoma      Patient underwent Whipple procedure on April 26, 2021  Results demonstrated mucinous adenocarcinoma in a background of adenoma with lymphatic and venous channel invasion and 1/22 positive lymph nodes  Margins were negative  Patient was presented at the GI tumor Board and the plan was 4 months of adjuvant FOLFOX followed by radiation oncology evaluation for concurrent treatment        Mrs Gonzales  was in the chemotherapy infusion room to begin her 6 cycle of treatment today  Patient received approximately 10 minutes of the Leucovorin/Oxaliplatin when she began to have chest pain, trouble breathing and significant itching  Patient was found to have a rash  Chemotherapy was discontinued  Patient received ++ fluids, Benadryl, Tagamet and and hydrocortisone  Patient did not have any respiratory issues, blood pressure was stable  Presently patient states feeling better  No pain control issues  No respiratory issues  Itching and rash have gone  No nausea vomiting  Patient feels that she can resume with the other part of the chemotherapy now  Review of Systems   Constitutional: Positive for fatigue  HENT: Negative  Eyes: Negative  Respiratory: Negative  Cardiovascular: Negative  Gastrointestinal: Negative  Endocrine: Negative  Genitourinary: Negative  Musculoskeletal: Negative  Skin: Negative      Allergic/Immunologic: Negative  Neurological: Negative  Hematological: Negative  Psychiatric/Behavioral: Negative  All other systems reviewed and are negative  Patient Active Problem List   Diagnosis    Elevated LFTs    Dilated bile duct    Neoplasm of ampulla of Vater    Mild protein-calorie malnutrition ([facility] [address] )     Past Medical History:   Diagnosis Date    Cancer [facility])     pancreatic     Past Surgical History:   Procedure Laterality Date    ABLATION SOFT TISSUE N/A 4/26/2021    Procedure: INTRAOPERATIVE ULTRASOUND, ABLATION,SOFT TISSUE PANCREAS;  Surgeon: Natan Rehman MD;  [facility];  Service: Surgical Oncology    CHOLECYSTECTOMY N/A 4/26/2021    Procedure: CHOLECYSTECTOMY;  Surgeon: Natan Rehman MD;  [facility];  Service: Surgical Oncology    [place] GUIDED CENTRAL VENOUS ACCESS DEVICE INSERTION  6/2/2021    HYSTERECTOMY      LAPAROTOMY N/A 4/26/2021    Procedure: LAPAROTOMY EXPLORATORY;  Surgeon: Natan Rehman MD;  [facility];  Service: Surgical Oncology    SINUS SURGERY      TUNNELED VENOUS PORT PLACEMENT Left 6/2/2021    Procedure: INSERTION VENOUS PORT (PORT-A-CATH); Surgeon: Natan Rehman MD;  [facility];  Service: Surgical Oncology    WHIPPLE PROCEDURE/PANCREATICO-DUODENECTOMY N/A 4/26/2021    Procedure:  WHIPPLE PROCEDURE/PANCREATICO-DUODENECTOMY; PYLORIC PRESERVING;  Surgeon: Natan Rehman MD;  [facility];  Service: Surgical Oncology     Family History   Problem Relation Age of Onset    Ulcerative colitis Mother     Prostate cancer Father     Heart disease Brother     Prostate cancer Brother     Melanoma Sister      Social History     Socioeconomic History    Marital status: Single     Spouse name: Not on file    Number of children: Not on file    Years of education: Not on file    Highest education level: Not on file   Occupational History    Not on file   Tobacco Use    Smoking status: Former Smoker     Packs/day: 0 50     Start date: 1975 Quit date: 2021     Years since quittin 3    Smokeless tobacco: Never Used    Tobacco comment: recently stop smoking    Vaping Use    Vaping Use: Never used   Substance and Sexual Activity    Alcohol use: Never    Drug use: Never    Sexual activity: Never   Other Topics Concern    Not on file   Social History Narrative    Not on file     Social Determinants of Health     Financial Resource Strain:     Difficulty of Paying Living Expenses:    Food Insecurity:     Worried About Running Out of Food in the Last Year:     Ran Out of Food in the Last Year:    Transportation Needs:     Lack of Transportation (Medical):      Lack of Transportation (Non-Medical):    Physical Activity:     Days of Exercise per Week:     Minutes of Exercise per Session:    Stress:     Feeling of Stress :    Social Connections:     Frequency of Communication with Friends and Family:     Frequency of Social Gatherings with Friends and Family:     Attends Restoration[employer] Services:     Active Member of Clubs or Organizations:     Attends Club or Organization Meetings:     Marital Status:    Intimate Partner Violence:     Fear of Current or Ex-Partner:     Emotionally Abused:     Physically Abused:     Sexually Abused:        Current Outpatient Medications:     acetaminophen (TYLENOL) 500 mg tablet, Take 1,000 mg by mouth, Disp: , Rfl:     fluorouracil 3,650 mg in CADD infusion pump, Infuse 3,650 mg (1,200 mg/m2/day x 1 52 m2 (Treatment Plan Recorded BSA)) into a venous catheter over 46 hours for 2 days, Disp: 1 each, Rfl: 0    lidocaine-prilocaine (EMLA) cream, Apply topically as needed for mild pain Apply topically to port site 1 hour prior to port accessing, Disp: 30 g, Rfl: 0    Multiple Vitamin (multivitamin) tablet, Take 1 tablet by mouth daily, Disp: , Rfl:     ondansetron (ZOFRAN-ODT) 4 mg disintegrating tablet, Take 1 tablet (4 mg total) by mouth every 6 (six) hours as needed for nausea or vomiting, Disp: 30 tablet, Rfl: 1    prochlorperazine (COMPAZINE) 10 mg tablet, Take 1 tablet (10 mg total) by mouth every 6 (six) hours as needed for nausea or vomiting, Disp: 30 tablet, Rfl: 1  No current facility-administered medications for this visit  Facility-Administered Medications Ordered in Other Visits:     sodium chloride 0 9 % infusion, 20 mL/hr, Intravenous, Once PRN, Ruben Case MD, Stopped at 08/18/21 1153    Allergies   Allergen Reactions    Penicillins Rash    Tetracycline Rash     There were no vitals filed for this visit  Physical Exam  Constitutional:       Appearance: She is well-developed  Comments: Thin but relatively well-nourished female, no respiratory distress, no signs of pain   HENT:      Head: Normocephalic and atraumatic  Right Ear: External ear normal       Left Ear: External ear normal    Eyes:      Conjunctiva/sclera: Conjunctivae normal       Pupils: Pupils are equal, round, and reactive to light  Cardiovascular:      Rate and Rhythm: Normal rate and regular rhythm  Heart sounds: Normal heart sounds  Pulmonary:      Effort: Pulmonary effort is normal       Breath sounds: Normal breath sounds  Comments: Good air entry bilaterally, clear  Abdominal:      General: Bowel sounds are normal       Palpations: Abdomen is soft  Comments: Soft, nontender, +bowel sounds, no guarding, no rigidity or rebound   Musculoskeletal:         General: Normal range of motion  Cervical back: Normal range of motion and neck supple  Skin:     General: Skin is warm  Comments: Warm, moist, good color, no petechiae or ecchymoses, no rash, no signs of any inflammation   Neurological:      Mental Status: She is alert and oriented to person, place, and time  Deep Tendon Reflexes: Reflexes are normal and symmetric  Psychiatric:         Behavior: Behavior normal          Thought Content:  Thought content normal          Judgment: Judgment normal      Extremities: Lower extremity edema bilaterally, no cords, pulses are 1+  Lymphatics:  No adenopathy in the neck, supraclavicular region, axilla and groin bilaterally    Labs    08/16/2021 WBC = 6 73 hemoglobin = 11 4 hematocrit = 37 3 platelet = 784 neutrophil = 50% BUN = 22 creatinine = 0 64 AST = 102 ALT = 119 alkaline phosphatase = 143 total bilirubin = 0 25

## 2021-08-20 ENCOUNTER — HOSPITAL ENCOUNTER (OUTPATIENT)
Dept: INFUSION CENTER | Facility: HOSPITAL | Age: 66
Discharge: HOME | End: 2021-08-20

## 2021-08-20 VITALS — TEMPERATURE: 96.2 F

## 2021-08-20 DIAGNOSIS — D49.0 NEOPLASM OF AMPULLA OF VATER: Primary | ICD-10-CM

## 2021-08-21 ENCOUNTER — HOSPITAL ENCOUNTER (EMERGENCY)
Facility: HOSPITAL | Age: 66
Discharge: HOME | End: 2021-08-21
Attending: EMERGENCY MEDICINE | Admitting: EMERGENCY MEDICINE
Payer: MEDICARE

## 2021-08-21 VITALS
TEMPERATURE: 97.5 F | WEIGHT: 117.4 LBS | HEIGHT: 62 IN | HEART RATE: 77 BPM | SYSTOLIC BLOOD PRESSURE: 141 MMHG | BODY MASS INDEX: 21.6 KG/M2 | DIASTOLIC BLOOD PRESSURE: 89 MMHG | RESPIRATION RATE: 16 BRPM | OXYGEN SATURATION: 97 %

## 2021-08-21 DIAGNOSIS — K56.41 FECAL IMPACTION IN RECTUM (HCC): Primary | ICD-10-CM

## 2021-08-21 PROCEDURE — 99284 EMERGENCY DEPT VISIT MOD MDM: CPT | Performed by: EMERGENCY MEDICINE

## 2021-08-21 PROCEDURE — 99283 EMERGENCY DEPT VISIT LOW MDM: CPT

## 2021-08-21 NOTE — ED PROVIDER NOTES
History  Chief Complaint   Patient presents with    Constipation     Patient had chemo on wednesday for pancreatic cancer; states she has not had a bowel movement since  Usually gets diarrhea but not this time  States, "It really hurts and feels like it's right there "      66yoF hx pancreatic cancer on chemo, last dose on Wed, complains of severe constipation  No abd pain or vomiting  Prior to Admission Medications   Prescriptions Last Dose Informant Patient Reported? Taking?    Multiple Vitamin (multivitamin) tablet 8/21/2021 at Unknown time Self Yes Yes   Sig: Take 1 tablet by mouth daily   acetaminophen (TYLENOL) 500 mg tablet 8/21/2021 at Unknown time Self Yes Yes   Sig: Take 1,000 mg by mouth   fluorouracil 3,650 mg in CADD infusion pump   No No   Sig: Infuse 3,650 mg (1,200 mg/m2/day x 1 52 m2 (Treatment Plan Recorded BSA)) into a venous catheter over 46 hours for 2 days   lidocaine-prilocaine (EMLA) cream Past Week at Unknown time Self No Yes   Sig: Apply topically as needed for mild pain Apply topically to port site 1 hour prior to port accessing   ondansetron (ZOFRAN-ODT) 4 mg disintegrating tablet Past Week at Unknown time Self No Yes   Sig: Take 1 tablet (4 mg total) by mouth every 6 (six) hours as needed for nausea or vomiting   prochlorperazine (COMPAZINE) 10 mg tablet More than a month at Unknown time  No No   Sig: Take 1 tablet (10 mg total) by mouth every 6 (six) hours as needed for nausea or vomiting      Facility-Administered Medications: None       Past Medical History:   Diagnosis Date    Cancer [facility])     pancreatic       Past Surgical History:   Procedure Laterality Date    ABLATION SOFT TISSUE N/A 4/26/2021    Procedure: INTRAOPERATIVE ULTRASOUND, ABLATION,SOFT TISSUE PANCREAS;  Surgeon: Florence Robles MD;  [facility];  Service: Surgical Oncology    CHOLECYSTECTOMY N/A 4/26/2021    Procedure: CHOLECYSTECTOMY;  Surgeon: Florence Robles MD;  [facility];  Service: Surgical Oncology    [place] GUIDED CENTRAL VENOUS ACCESS DEVICE INSERTION  2021    HYSTERECTOMY      LAPAROTOMY N/A 2021    Procedure: LAPAROTOMY EXPLORATORY;  Surgeon: Dayanara Kelley MD;  [facility];  Service: Surgical Oncology    SINUS SURGERY      TUNNELED VENOUS PORT PLACEMENT Left 2021    Procedure: INSERTION VENOUS PORT (PORT-A-CATH); Surgeon: Dayanara Kelley MD;  [facility];  Service: Surgical Oncology    WHIPPLE PROCEDURE/PANCREATICO-DUODENECTOMY N/A 2021    Procedure: WHIPPLE PROCEDURE/PANCREATICO-DUODENECTOMY; PYLORIC PRESERVING;  Surgeon: Dayanara Kelley MD;  [facility];  Service: Surgical Oncology       Family History   Problem Relation Age of Onset    Ulcerative colitis Mother     Prostate cancer Father     Heart disease Brother     Prostate cancer Brother     Melanoma Sister      I have reviewed and agree with the history as documented  E-Cigarette/Vaping    E-Cigarette Use Never User      E-Cigarette/Vaping Substances    Nicotine No     THC No     CBD No     Flavoring No     Other No     Unknown No      Social History     Tobacco Use    Smoking status: Former Smoker     Packs/day: 0 50     Start date: 65     Quit date: 2021     Years since quittin 3    Smokeless tobacco: Never Used    Tobacco comment: recently stop smoking    Vaping Use    Vaping Use: Never used   Substance Use Topics    Alcohol use: Never    Drug use: Never       Review of Systems   Constitutional: Negative for fever  Respiratory: Negative for cough  Gastrointestinal: Negative for abdominal pain  Musculoskeletal: Negative for back pain  Neurological: Negative for headaches  All other systems reviewed and are negative  Physical Exam  Physical Exam  Vitals reviewed  Constitutional:       Appearance: She is well-developed  HENT:      Head: Normocephalic and atraumatic        Right Ear: External ear normal       Left Ear: External ear normal  Nose: Nose normal  No rhinorrhea  Mouth/Throat:      Mouth: Mucous membranes are moist    Eyes:      Conjunctiva/sclera: Conjunctivae normal    Cardiovascular:      Rate and Rhythm: Normal rate and regular rhythm  Pulmonary:      Effort: Pulmonary effort is normal       Breath sounds: Normal breath sounds  No wheezing or rales  Abdominal:      General: There is distension (mild)  Tenderness: There is no abdominal tenderness  Comments: firm   Genitourinary:     Comments: Hard stool ball in rectum  Musculoskeletal:      Cervical back: Neck supple  Right lower leg: No edema  Left lower leg: No edema  Skin:     General: Skin is warm and dry  Neurological:      Mental Status: She is alert and oriented to person, place, and time  Psychiatric:         Mood and Affect: Mood normal          Vital Signs  ED Triage Vitals [08/21/21 1454]   Temperature Pulse Respirations Blood Pressure SpO2   97 5 °F (36 4 °C) 79 16 (!) 182/108 96 %      Temp Source Heart Rate Source Patient Position - Orthostatic VS BP Location FiO2 (%)   Tympanic Monitor Sitting Right arm --      Pain Score       --           Vitals:    08/21/21 1454   BP: (!) 182/108   Pulse: 79   Patient Position - Orthostatic VS: Sitting         Visual Acuity      ED Medications  Medications - No data to display    Diagnostic Studies  Results Reviewed     None                 No orders to display              Procedures  General Procedure    Date/Time: 8/21/2021 3:35 PM  Performed by: Angus Warren DO  Authorized by: Angus Warren DO     Patient location:  ED  Consent:     Consent obtained:  Verbal    Consent given by:  Patient  Anesthesia (see MAR for exact dosages): Anesthesia method:  None  Procedure Detail:     Procedure note (site, laterality, method, findings):  Stool ball removed from rectum manually, no complications  Post-procedure details:     Patient tolerance of procedure:   Tolerated well, no immediate complications             ED Course                                           MDM  Number of Diagnoses or Management Options  Fecal impaction in [facility])  Diagnosis management comments: Pt arrives well appearing with benign abdomen, fecal impaction on exam    Disimpacted, Rx fleet enema and GoLytely partial prep  F/u PCP this wk  Disposition  Final diagnoses:   Fecal impaction in [facility])     Time reflects when diagnosis was documented in both MDM as applicable and the Disposition within this note     Time User Action Codes Description Comment    8/21/2021  3:32 PM Jose Manuel Pleasant Add [K56 41] Fecal impaction in [facility])       ED Disposition     ED Disposition Condition Date/Time Comment    Discharge Stable Sat Aug 21, 2021  3:32 PM Yvrose Chappell discharge to home/self care  Follow-up Information     Follow up With Specialties Details Why Contact Info    Leighton Ya DO Family Medicine In 1 week  [address]  [phone]            Patient's Medications   Discharge Prescriptions    BISACODYL (FLEET) 10 MG/30ML ENEM    Insert 30 mL (10 mg total) into the rectum once for 1 dose       Start Date: 8/21/2021 End Date: 8/21/2021       Order Dose: 10 mg       Quantity: 30 mL    Refills: 0    POLYETHYLENE GLYCOL (GOLYTELY) 4000 ML SOLUTION    1 cup every 15 minutes until stooling begins, then stop  No need to drink all 4L       Start Date: 8/21/2021 End Date: --       Order Dose: --       Quantity: 4000 mL    Refills: 0     No discharge procedures on file      PDMP Review       Value Time User    PDMP Reviewed  Yes 5/3/2021 10:12 AM Yvette LOCKE&#39;KVNG Monsivais          ED Provider  Electronically Signed by           Jan Vasquez DO  08/21/21 2402

## 2021-08-21 NOTE — DISCHARGE INSTRUCTIONS
When you get home use one Fleet enema  Wait an hour, then start GoLytely bowel prep  Drink 1 cup ever 15 minutes until stooling begins, then stop

## 2021-08-23 ENCOUNTER — TELEPHONE (OUTPATIENT)
Dept: HEMATOLOGY ONCOLOGY | Facility: CLINIC | Age: 66
End: 2021-08-23

## 2021-08-23 NOTE — TELEPHONE ENCOUNTER
Patient was in the ER on 8/21/21 for a fecal impaction  Patient is calling reporting that she had a large bowel movement on Saturday post a fleets enema and Golytely  Patient then had diarrhea  Patient had a small bowel movement on Sunday for mushy stool/liquid diarrhea  Patient is staying adequately hydrated  Reviewed dietary recommendations  Going forward patient what bowel regimen would Dr Khris Wood like patient to be on? Recommended OTC COLACE  Patient verbalized understanding of above  Will forward to Dr Yaneth Gabriel RN as Watson Banks (Self) [phone] (H)

## 2021-08-24 DIAGNOSIS — D49.0 NEOPLASM OF AMPULLA OF VATER: Primary | ICD-10-CM

## 2021-08-30 ENCOUNTER — APPOINTMENT (OUTPATIENT)
Dept: LAB | Facility: CLINIC | Age: 66
End: 2021-08-30
Payer: MEDICARE

## 2021-08-30 ENCOUNTER — TELEPHONE (OUTPATIENT)
Dept: HEMATOLOGY ONCOLOGY | Facility: MEDICAL CENTER | Age: 66
End: 2021-08-30

## 2021-08-30 DIAGNOSIS — D49.0 NEOPLASM OF AMPULLA OF VATER: ICD-10-CM

## 2021-08-30 LAB
ALBUMIN SERPL BCP-MCNC: 3.3 G/DL (ref 3.5–5)
ALP SERPL-CCNC: 112 U/L (ref 46–116)
ALT SERPL W P-5'-P-CCNC: 68 U/L (ref 12–78)
ANION GAP SERPL CALCULATED.3IONS-SCNC: 2 MMOL/L (ref 4–13)
AST SERPL W P-5'-P-CCNC: 43 U/L (ref 5–45)
BASOPHILS # BLD AUTO: 0.07 THOUSANDS/ΜL (ref 0–0.1)
BASOPHILS NFR BLD AUTO: 1 % (ref 0–1)
BILIRUB SERPL-MCNC: 0.3 MG/DL (ref 0.2–1)
BUN SERPL-MCNC: 19 MG/DL (ref 5–25)
CALCIUM ALBUM COR SERPL-MCNC: 9.6 MG/DL (ref 8.3–10.1)
CALCIUM SERPL-MCNC: 9 MG/DL (ref 8.3–10.1)
CHLORIDE SERPL-SCNC: 107 MMOL/L (ref 100–108)
CO2 SERPL-SCNC: 28 MMOL/L (ref 21–32)
CREAT SERPL-MCNC: 0.61 MG/DL (ref 0.6–1.3)
EOSINOPHIL # BLD AUTO: 0.13 THOUSAND/ΜL (ref 0–0.61)
EOSINOPHIL NFR BLD AUTO: 2 % (ref 0–6)
ERYTHROCYTE [DISTWIDTH] IN BLOOD BY AUTOMATED COUNT: 18.5 % (ref 11.6–15.1)
GFR SERPL CREATININE-BSD FRML MDRD: 95 ML/MIN/1.73SQ M
GLUCOSE SERPL-MCNC: 91 MG/DL (ref 65–140)
HCT VFR BLD AUTO: 37.3 % (ref 34.8–46.1)
HGB BLD-MCNC: 11.6 G/DL (ref 11.5–15.4)
IMM GRANULOCYTES # BLD AUTO: 0.02 THOUSAND/UL (ref 0–0.2)
IMM GRANULOCYTES NFR BLD AUTO: 0 % (ref 0–2)
LYMPHOCYTES # BLD AUTO: 2.42 THOUSANDS/ΜL (ref 0.6–4.47)
LYMPHOCYTES NFR BLD AUTO: 35 % (ref 14–44)
MCH RBC QN AUTO: 29.8 PG (ref 26.8–34.3)
MCHC RBC AUTO-ENTMCNC: 31.1 G/DL (ref 31.4–37.4)
MCV RBC AUTO: 96 FL (ref 82–98)
MONOCYTES # BLD AUTO: 0.59 THOUSAND/ΜL (ref 0.17–1.22)
MONOCYTES NFR BLD AUTO: 9 % (ref 4–12)
NEUTROPHILS # BLD AUTO: 3.62 THOUSANDS/ΜL (ref 1.85–7.62)
NEUTS SEG NFR BLD AUTO: 53 % (ref 43–75)
NRBC BLD AUTO-RTO: 0 /100 WBCS
PLATELET # BLD AUTO: 323 THOUSANDS/UL (ref 149–390)
PMV BLD AUTO: 10 FL (ref 8.9–12.7)
POTASSIUM SERPL-SCNC: 4.8 MMOL/L (ref 3.5–5.3)
PROT SERPL-MCNC: 7.1 G/DL (ref 6.4–8.2)
RBC # BLD AUTO: 3.89 MILLION/UL (ref 3.81–5.12)
SODIUM SERPL-SCNC: 137 MMOL/L (ref 136–145)
WBC # BLD AUTO: 6.85 THOUSAND/UL (ref 4.31–10.16)

## 2021-08-30 PROCEDURE — 80053 COMPREHEN METABOLIC PANEL: CPT

## 2021-08-30 PROCEDURE — 36415 COLL VENOUS BLD VENIPUNCTURE: CPT

## 2021-08-30 PROCEDURE — 85025 COMPLETE CBC W/AUTO DIFF WBC: CPT

## 2021-08-30 NOTE — TELEPHONE ENCOUNTER
Patient made aware disablity claim is ready to be picked up in office  Patient asked to have faxed over   Forms faxed to [phone]

## 2021-08-31 ENCOUNTER — OFFICE VISIT (OUTPATIENT)
Dept: HEMATOLOGY ONCOLOGY | Facility: MEDICAL CENTER | Age: 66
End: 2021-08-31
Payer: MEDICARE

## 2021-08-31 VITALS
DIASTOLIC BLOOD PRESSURE: 82 MMHG | BODY MASS INDEX: 22.38 KG/M2 | WEIGHT: 121.6 LBS | HEIGHT: 62 IN | SYSTOLIC BLOOD PRESSURE: 144 MMHG | TEMPERATURE: 97.2 F | HEART RATE: 61 BPM | RESPIRATION RATE: 16 BRPM | OXYGEN SATURATION: 98 %

## 2021-08-31 DIAGNOSIS — D49.0 NEOPLASM OF AMPULLA OF VATER: Primary | ICD-10-CM

## 2021-08-31 PROCEDURE — 99215 OFFICE O/P EST HI 40 MIN: CPT | Performed by: INTERNAL MEDICINE

## 2021-08-31 NOTE — PROGRESS NOTES
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  Issues:    Status post Whipple  Patient will follow up with Dr Arlette Cutler as directed; next office visit scheduled for November 2021  Ampulla of vater  adenocarcinoma, recent chemotherapy reaction  Patient was presented at the GI tumor Conference previously  The plan was for 4 months of adjuvant FOLFOX, then rescanning and evaluation by Radiation Oncology for the possibility of concurrent adjuvant treatment  Patient was able to complete 5 cycles without any issues, reacted to oxaliplatin on the 6th cycle (see the chemotherapy infusion note from 08/18/2021 - patient was able to get through the oxaliplatin reaction and subsequently completed the cycle #6 5 FU bolus and 5 FU continuous infusion)  We discussed options  The plan is to proceed with the 7th and 8th cycle of treatment but with holding the oxaliplatin  Afterwards, patient will undergo repeat scanning and will be referred to Radiation Oncology for evaluation  Patient was due for the next cycle tomorrow but the Infusion date was canceled previously  Because of the upcoming, Labor Day holiday, finding a time for chemotherapy has been difficult - staff working on this issue now  Patient states having all required medications at home  Patient feels comfortable being seen in 4 weeks  Nausea/vomiting  This was an issue in the past, not recently  Aloxi and Emend have been effective  Pain control  No pain control issues at this time  No chest CT  This office was not able to find a CT scan of the chest pre treatment  As discussed previously, after completing FOLFOX/Backbone, patient will undergo repeat scanning  History of RA    As above no pain control issues, no arthritic complaints but patient has not seen a rheumatologist and a number of years  Patient is willing to see the rheumatologist after the chemotherapy has been completed  Patient is to return in 4 weeks  Patient knows to call the hematology/oncology office if there are any other questions or concerns  Carefully review your medication list and verify that the list is accurate and up-to-date  Please call the hematology/oncology office if there are medications missing from the list, medications on the list that you are not currently taking or if there is a dosage or instruction that is different from how you're taking that medication  Patient goals and areas of care:  Continue with chemotherapy with manipulation  Barriers to care:  None  Patient is able to self-care   _____________________________________________________________________________________    Chief Complaint   Patient presents with    Follow-up     ampulla of vater carcinoma     Oncology History   Neoplasm of ampulla of Vater   3/15/2021 Initial Diagnosis    Neoplasm of ampulla of Vater     4/26/2021 Surgery    B  Celiac lymph node:     - Single lymph node; negative for malignancy (0/1)  C   Whipple resection:     - Invasive poorly differentiated mucinous adenocarcinoma  - Tumor arises in the background of an adenoma with high grade dysplasia  - Lymphatic and venous channel invasion by tumor present  - Metastatic carcinoma present in one of twenty lymph nodes (1/20)  - All margins are negative for tumor       6/1/2021 -  Cancer Staged    Staging form: Ampulla of Vater, AJCC 8th Edition  - Pathologic: Stage IIIA (pT3a, pN1, cM0) - Signed by Hetal Martinez MD on 6/1/2021  Histologic grade (G): G3  Histologic grading system: 3 grade system  Residual tumor (R): R0 - None       6/9/2021 -  Chemotherapy    palonosetron (ALOXI), 0 25 mg, Intravenous, Once, 3 of 5 cycles  Administration: 0 25 mg (7/21/2021), 0 25 mg (8/4/2021), 0 25 mg (8/18/2021)  fluorouracil (ADRUCIL), 400 mg/m2 = 605 mg, Intravenous, Once, 6 of 8 cycles  Dose modification: 340 mg/m2 (85 % of original dose 400 mg/m2, Cycle 6, Reason: Dose Not Tolerated)  Administration: 605 mg (6/9/2021), 605 mg (6/23/2021), 605 mg (7/7/2021), 605 mg (7/21/2021), 605 mg (8/4/2021), 515 mg (8/18/2021)  fosaprepitant (EMEND) IVPB, 150 mg, Intravenous, Once, 4 of 6 cycles  Administration: 150 mg (7/7/2021), 150 mg (7/21/2021), 150 mg (8/4/2021), 150 mg (8/18/2021)  leucovorin calcium IVPB, 604 mg, Intravenous, Once, 6 of 8 cycles  Dose modification: 340 mg/m2 (85 % of original dose 400 mg/m2, Cycle 6, Reason: Dose Not Tolerated)  Administration: 600 mg (6/9/2021), 600 mg (6/23/2021), 600 mg (7/7/2021), 600 mg (7/21/2021), 600 mg (8/4/2021), 517 mg (8/18/2021)  oxaliplatin (ELOXATIN) chemo infusion, 85 mg/m2 = 128 35 mg, Intravenous, Once, 6 of 6 cycles  Dose modification: 72 25 mg/m2 (85 % of original dose 85 mg/m2, Cycle 6, Reason: Dose Not Tolerated)  Administration: 128 35 mg (6/9/2021), 128 35 mg (6/23/2021), 128 35 mg (7/7/2021), 128 35 mg (7/21/2021), 128 35 mg (8/4/2021), 109 82 mg (8/18/2021)  fluorouracil (ADRUCIL) ambulatory infusion Soln, 1,200 mg/m2/day = 3,625 mg, Intravenous, Over 46 hours, 6 of 8 cycles       History of Present Illness:  71-year-old female with a history of rheumatoid arthritis seen in the hospital in February 2021 with obstructive jaundice  CT scan demonstrated intra and extrahepatic biliary dilatation with a questionable mass at the end of the common bile duct  EUS in March 2021 demonstrated a 2 x 1 6 cm mass in the ampullary Vater involving the distal common bile duct  Biopsy demonstrated poorly differentiated mucinous adenocarcinoma  Patient underwent Whipple procedure on April 26, 2021    Results demonstrated mucinous adenocarcinoma in a background of adenoma with lymphatic and venous channel invasion and 1/22 positive lymph nodes  Margins were negative  Patient was presented at the GI tumor Board and the plan was 4 months of adjuvant FOLFOX followed by radiation oncology evaluation for concurrent treatment  Patient has completed 5 cycles of FOLFOX and 1 cycle of Leucovorin/5FU bolus/5FU continues infusion  Mrs Taylor Bland reacted to the oxaliplatin on the last cycle  Patient was treated with the reaction protocol - did well afterwards and was able to complete the backbone  Presently patient states feeling okay, back to baseline  No fevers, chills or sweats  No rash  No respiratory issues  No GI issues, no nausea or vomiting, no abdominal pain  Appetite is good, weight is stable  Activities baseline  Patient has received her COVID vaccination  Review of Systems   Constitutional: Positive for fatigue  HENT: Negative  Eyes: Negative  Respiratory: Negative  Cardiovascular: Negative  Gastrointestinal: Negative for nausea  Endocrine: Negative  Genitourinary: Negative  Musculoskeletal: Negative  Skin: Negative  Allergic/Immunologic: Negative  Neurological: Negative  Hematological: Negative  Psychiatric/Behavioral: Negative  All other systems reviewed and are negative      Patient Active Problem List   Diagnosis    Elevated LFTs    Dilated bile duct    Neoplasm of ampulla of Vater    Mild protein-calorie malnutrition ([address] )     Past Medical History:   Diagnosis Date    Cancer [facility])     pancreatic     Past Surgical History:   Procedure Laterality Date    ABLATION SOFT TISSUE N/A 4/26/2021    Procedure: INTRAOPERATIVE ULTRASOUND, ABLATION,SOFT TISSUE PANCREAS;  Surgeon: Jani Hernandes MD;  [facility];  Service: Surgical Oncology    CHOLECYSTECTOMY N/A 4/26/2021    Procedure: CHOLECYSTECTOMY;  Surgeon: Jani Hernandes MD;  [facility];  Service: Surgical Oncology    FL GUIDED CENTRAL VENOUS ACCESS DEVICE INSERTION  6/2/2021    HYSTERECTOMY      LAPAROTOMY N/A 2021    Procedure: LAPAROTOMY EXPLORATORY;  Surgeon: Deandra Rodriguez MD;  [facility];  Service: Surgical Oncology    SINUS SURGERY      TUNNELED VENOUS PORT PLACEMENT Left 2021    Procedure: INSERTION VENOUS PORT (PORT-A-CATH); Surgeon: Deandra Rodriguez MD;  [facility];  Service: Surgical Oncology    WHIPPLE PROCEDURE/PANCREATICO-DUODENECTOMY N/A 2021    Procedure: WHIPPLE PROCEDURE/PANCREATICO-DUODENECTOMY; PYLORIC PRESERVING;  Surgeon: Deandra Rodriguez MD;  [facility];  Service: Surgical Oncology     Family History   Problem Relation Age of Onset    Ulcerative colitis Mother     Prostate cancer Father     Heart disease Brother     Prostate cancer Brother     Melanoma Sister      Social History     Socioeconomic History    Marital status: Single     Spouse name: Not on file    Number of children: Not on file    Years of education: Not on file    Highest education level: Not on file   Occupational History    Not on file   Tobacco Use    Smoking status: Former Smoker     Packs/day: 0 50     Start date:      Quit date: 2021     Years since quittin 4    Smokeless tobacco: Never Used    Tobacco comment: recently stop smoking    Vaping Use    Vaping Use: Never used   Substance and Sexual Activity    Alcohol use: Never    Drug use: Never    Sexual activity: Never   Other Topics Concern    Not on file   Social History Narrative    Not on file     Social Determinants of Health     Financial Resource Strain:     Difficulty of Paying Living Expenses:    Food Insecurity:     Worried About [address] in the Last Year:     [address] in the Last Year:    Transportation Needs:     Lack of Transportation (Medical):      Lack of Transportation (Non-Medical):    Physical Activity:     Days of Exercise per Week:     Minutes of Exercise per Session:    Stress:     Feeling of Stress :    Social Connections:     Frequency of Communication with Friends and Family:     Frequency of Social Gatherings with Friends and Family:     Attends [employer] Services:     Active Member of Clubs or Organizations:     Attends Club or Organization Meetings:     Marital Status:    Intimate Partner Violence:     Fear of Current or Ex-Partner:     Emotionally Abused:     Physically Abused:     Sexually Abused:        Current Outpatient Medications:     acetaminophen (TYLENOL) 500 mg tablet, Take 1,000 mg by mouth, Disp: , Rfl:     [START ON 9/1/2021] fluorouracil 3,650 mg in CADD infusion pump, Infuse 3,650 mg (1,200 mg/m2/day x 1 52 m2 (Treatment Plan Recorded BSA)) into a venous catheter over 46 hours for 2 days, Disp: 1 each, Rfl: 0    lidocaine-prilocaine (EMLA) cream, Apply topically as needed for mild pain Apply topically to port site 1 hour prior to port accessing, Disp: 30 g, Rfl: 0    Multiple Vitamin (multivitamin) tablet, Take 1 tablet by mouth daily, Disp: , Rfl:     polyethylene glycol (GOLYTELY) 4000 mL solution, 1 cup every 15 minutes until stooling begins, then stop  No need to drink all 4L, Disp: 4000 mL, Rfl: 0    prochlorperazine (COMPAZINE) 10 mg tablet, Take 1 tablet (10 mg total) by mouth every 6 (six) hours as needed for nausea or vomiting, Disp: 30 tablet, Rfl: 1    bisacodyl (FLEET) 10 MG/30ML ENEM, Insert 30 mL (10 mg total) into the rectum once for 1 dose, Disp: 30 mL, Rfl: 0    ondansetron (ZOFRAN-ODT) 4 mg disintegrating tablet, Take 1 tablet (4 mg total) by mouth every 6 (six) hours as needed for nausea or vomiting (Patient not taking: Reported on 8/31/2021), Disp: 30 tablet, Rfl: 1    Allergies   Allergen Reactions    Penicillins Rash    Tetracycline Rash       Vitals:    08/31/21 1407   BP: 144/82   Pulse: 61   Resp: 16   Temp: (!) 97 2 °F (36 2 °C)   SpO2: 98%     Physical Exam  Constitutional:       Appearance: She is well-developed  Comments:  Thin but relatively well-nourished female, no respiratory distress, no signs of pain   HENT:      Head: Normocephalic and atraumatic  Right Ear: External ear normal       Left Ear: External ear normal    Eyes:      Conjunctiva/sclera: Conjunctivae normal       Pupils: Pupils are equal, round, and reactive to light  Cardiovascular:      Rate and Rhythm: Normal rate and regular rhythm  Heart sounds: Normal heart sounds  Pulmonary:      Effort: Pulmonary effort is normal       Breath sounds: Normal breath sounds  Abdominal:      General: Bowel sounds are normal       Palpations: Abdomen is soft  Comments: Well-healed midline suture line, nontender, +bowel sounds, no guarding   Musculoskeletal:         General: Normal range of motion  Cervical back: Normal range of motion and neck supple  Skin:     General: Skin is warm  Comments: Warm, moist, relatively good color, no petechiae or ecchymoses, left anterior chest wall port area healed well   Neurological:      Mental Status: She is alert and oriented to person, place, and time  Deep Tendon Reflexes: Reflexes are normal and symmetric  Psychiatric:         Behavior: Behavior normal          Thought Content: Thought content normal          Judgment: Judgment normal      Extremities:  No lower extremity edema bilaterally, no cords, pulses are 1+  Lymphatics:  No adenopathy in the neck, supraclavicular region, axilla and groin bilaterally    Performance Status: 1 - Symptomatic but completely ambulatory    Labs    08/30/2021 WBC = 6 85 hemoglobin = 11 6 hematocrit = 37 3 platelet = 791 neutrophil = 53% BUN = 19 creatinine = 0 61 LFTs WNL    07/19/2021 WBC = 8 75 hemoglobin = 11 6 hematocrit = 36 2 platelet = 014 neutrophil = 58% BUN = 23 creatinine = 0 71 calcium = 9 4 LFTs WNL    Imaging    06/02/2021 chest x-ray portable  Impression stated no acute cardiopulmonary disease, left port in lower SVC with no pneumothorax  04/11/2021 CT scan abdomen pelvis    1    Grossly unchanged moderate intrahepatic and extrahepatic biliary ductal dilation with abrupt narrowing/transition in the distal CBD where there is a small obstructing periampullary lesion measuring 1 1 x 1 0 cm, likely corresponding to the lesion   seen on prior endoscopic ultrasound  2   Otherwise, no acute findings in the abdomen or pelvis  02/18/2021 MRI abdomen with and without contrast and MRCP    Intra and extrahepatic biliary dilatation extending to the ampulla without evidence of obstructing mass lesion or choledocholithiasis  Consider ERCP for further characterization      Pathology    Case Report   Surgical Pathology Report                         Case: [id number]                                    Authorizing Provider: Ade Arias MD           Collected:           04/26/2021 1036               Ordering Location:     [facility]      Received:            04/26/2021 1221                                      Hospital Operating Room                                                       Pathologist:           Janice Tobias MD                                                          Intraop:               Janice Tobias MD                                                          Specimens:   A) - Gallbladder                                                                                     B) - Lymph Node, CELIAC LYMPH NODE                                                                   C) - Pancreas, WHIPPLE RESECTION                                                           Addendum   At the request of Dr Yumiko Merlos, unstained slides from paraffin BLOCK C9 containing the patient's cancer cells were sent to [facility] for MI Profile testing  Upon completion of testing, the [facility] report will be directly sent to the requesting physician as well as posted in the Media Tab of the patient's [employer] EMR by the [facility] Department      Please note:  The [facility]'s analysis and report is performed independently of [address] [place] [address], and neither the Hospital nor the Pathology Department screen, review or comment upon [id number] report  Because the role of testing in cancer diagnosis and management is subject to evolving development, usage and interpretation, we strongly urge that the test limitations described in the [facility] report be carefully read, appropriately shared with the patient, and critically considered when reaching treatment decisions      This pathology material was removed from archive for purpose of molecular testing  It was reviewed and approved by Dr Winnie Ivan        Addendum electronically signed by Winford Pallas, MD on 6/2/2021 at  1:12 PM   Final Diagnosis   A   Gallbladder, cholecystectomy:       - Chronic cholecystitis  - No malignancy identified      - Reactive periductal lymph node with lipid granulomata; negative for malignancy (0/1)      B  Celiac lymph node:     - Single lymph node; negative for malignancy (0/1)      C  Whipple resection:     - Invasive poorly differentiated mucinous adenocarcinoma  - Tumor arises in the background of an adenoma with high grade dysplasia  - Lymphatic and venous channel invasion by tumor present  - Metastatic carcinoma present in one of twenty lymph nodes (1/20)  - All margins are negative for tumor       Electronically signed by Winford Pallas, MD on 5/5/2021 at  2:59 PM   Comments: This is an appended report  These results have been appended to a previously preliminary verified report        Additional Information    All reported additional testing was performed with appropriately reactive controls   These tests were developed and their performance characteristics determined by [address] [place] [place] [address] or appropriate performing facility, though some tests may be performed on tissues which have not been validated for performance characteristics (such as staining performed on alcohol exposed cell blocks and decalcified tissues)   Results should be interpreted with caution and in the context of the patients clinical condition  These tests may not be cleared or approved by the [place]  Food and Drug Administration, though the FDA has determined that such clearance or approval is not necessary  These tests are used for clinical purposes and they should not be regarded as investigational or for research  This laboratory has been approved by [place] [id number], designated as a high-complexity laboratory and is qualified to perform these tests     - Interpretation performed at [facility], [address]    Comment: This is an appended report  These results have been appended to a previously preliminary verified report     Synoptic Checklist   AMPULLA OF VATER  8th Edition - Protocol posted: 2/26/2020  AMPULLA OF VATER: AMPULLECTOMY, PANCREATICODUODENECTOMY - All Specimens  SPECIMEN   Procedure  Pancreaticoduodenectomy (Whipple resection)    TUMOR   Tumor Site  Intra-ampullary and cristine-ampullary (mixed type)    Histologic Type  Mucinous adenocarcinoma    Histologic Grade  G3: Poorly differentiated    Tumor Size  Greatest Dimension (Centimeters): 2 5 cm   Additional Dimension (Centimeters)  2 3 cm     1 cm   Tumor Extension  Tumor invades into muscularis propria of the duodenum      Tumor extends more than 0 5 cm into pancreas      Tumor extends into peripancreatic soft tissues      Tumor extends into periduodenal tissue    Lymphovascular Invasion  Present    Perineural Invasion  Not identified    MARGINS   Margins     Pancreatic Neck / Parenchymal Margin  Uninvolved by invasive carcinoma and pancreatic high-grade intraepithelial neoplasia    Distance of Invasive Carcinoma from Margin  3 5 cm   Uncinate (Retroperitoneal / Superior Mesenteric Artery) Margin  Uninvolved by invasive carcinoma    Distance of Invasive Carcinoma from Margin  4 0 cm   Bile Duct Margin  Uninvolved by invasive carcinoma and high-grade intraepithelial neoplasia    Distance of Invasive Carcinoma from Margin  3 3 cm   Proximal Margin (Gastric or Duodenal)  Uninvolved by invasive carcinoma and high-grade intraepithelial neoplasia    Distal Margin (Distal Duodenal or Jejunal)  Uninvolved by invasive carcinoma and high-grade intraepithelial neoplasia    LYMPH NODES   Number of Lymph Nodes Involved  1    Number of Lymph Nodes Examined  22    PATHOLOGIC STAGE CLASSIFICATION (pTNM, AJCC 8th Edition)      Primary Tumor (pT)  pT3b    Regional Lymph Nodes (pN)  pN1    ADDITIONAL FINDINGS   Additional Findings  Dysplasia / adenoma      PanIN 1B    Comment(s)   Comment(s)  AJCC Prognostic Stage Group (8th Ed ):  IIIA - pT3a, pN1, MX, G3      Intraoperative Consultation       CF1-3    Pancreatic and bile duct margins:   - The pancreatic and bile duct margins are negative for malignancy and high grade dysplasia      Reviewed by ELLEN Zamora   - Interpretation performed at [facility], [address] [facility] [address]

## 2021-09-01 ENCOUNTER — HOSPITAL ENCOUNTER (OUTPATIENT)
Dept: INFUSION CENTER | Facility: HOSPITAL | Age: 66
Discharge: HOME | End: 2021-09-01

## 2021-09-07 ENCOUNTER — TELEPHONE (OUTPATIENT)
Dept: HEMATOLOGY ONCOLOGY | Facility: CLINIC | Age: 66
End: 2021-09-07

## 2021-09-07 ENCOUNTER — CLINICAL SUPPORT (OUTPATIENT)
Dept: GENETICS | Facility: CLINIC | Age: 66
End: 2021-09-07

## 2021-09-07 DIAGNOSIS — D49.0 NEOPLASM OF AMPULLA OF VATER: Primary | ICD-10-CM

## 2021-09-07 DIAGNOSIS — Z80.42 FAMILY HISTORY OF PROSTATE CANCER: ICD-10-CM

## 2021-09-07 PROCEDURE — NC001 PR NO CHARGE: Performed by: GENETIC COUNSELOR, MS

## 2021-09-07 NOTE — TELEPHONE ENCOUNTER
Patient is calling in requesting an update on her handicap paperwork, I have reached out to Minerva Lee who has indicated that paperwork will be filled out today and she will reach out to patient once it is completed   Patient voiced understanding

## 2021-09-07 NOTE — PROGRESS NOTES
Pre-Test Genetic Counseling Consult Note    Patient Name: Erasto Gooden   /Age: 1955/66 y o  Referring Provider: Shayy Salguero MD    Date of Service: 2021  Genetic Counselor: Mando Ayala MS, [facility]  Interpretation Services: None  Location: In-person consult at 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    Neoplasm of ampulla of Vater   3/15/2021 Initial Diagnosis     Neoplasm of ampulla of Vater      2021 Surgery     B   Celiac lymph node:     - Single lymph node; negative for malignancy (0)      C   Whipple resection:     - Invasive poorly differentiated mucinous adenocarcinoma      - Tumor arises in the background of an adenoma with high grade dysplasia      - Lymphatic and venous channel invasion by tumor present      - Metastatic carcinoma present in one of twenty lymph nodes (20)       - All margins are negative for tumor       2021 -  Cancer Staged     Staging form: Ampulla of Vater, AJCC 8th Edition  - Pathologic: Stage IIIA (pT3a, pN1, cM0) - Signed by Moncho Byrd MD on 2021  Histologic grade (G): G3  Histologic grading system: 3 grade system  Residual tumor (R): R0 - None         2021 -  Chemotherapy     Caris Test Result Test Report Date: 2021             Screening Hx:   Breast:  Mammogram: Never had a mammogram  Breast biopsy: None     Colon:  Colonoscopy: Never had a colonoscopy Gynecologic:  Ovaries/Uterus: JOE/BSO due to heavy bleed and ovarian cyst     Skin:  Skin cancer screening: Not routinely     Reproductive History: Not assessed     Medical and Surgical History  Pertinent surgical history:   Past Surgical History:   Procedure Laterality Date    ABLATION SOFT TISSUE N/A 4/26/2021    Procedure: INTRAOPERATIVE ULTRASOUND, ABLATION,SOFT TISSUE PANCREAS;  Surgeon: Kiel Moore MD;  [facility];  Service: Surgical Oncology    CHOLECYSTECTOMY N/A 4/26/2021    Procedure: CHOLECYSTECTOMY;  Surgeon: Kiel Moore MD;  [facility];  Service: Surgical Oncology    FL GUIDED CENTRAL VENOUS ACCESS DEVICE INSERTION  6/2/2021    HYSTERECTOMY      LAPAROTOMY N/A 4/26/2021    Procedure: LAPAROTOMY EXPLORATORY;  Surgeon: Kiel Moore MD;  [facility];  Service: Surgical Oncology    SINUS SURGERY      TUNNELED VENOUS PORT PLACEMENT Left 6/2/2021    Procedure: INSERTION VENOUS PORT (PORT-A-CATH); Surgeon: Kiel Moore MD;  [facility];  Service: Surgical Oncology    WHIPPLE PROCEDURE/PANCREATICO-DUODENECTOMY N/A 4/26/2021    Procedure: WHIPPLE PROCEDURE/PANCREATICO-DUODENECTOMY; PYLORIC PRESERVING;  Surgeon: Kiel Moore MD;  [facility];  Service: Surgical Oncology      Pertinent medical history:  Past Medical History:   Diagnosis Date    Cancer [facility])     pancreatic       Other History:  Height:   Ht Readings from Last 1 Encounters:   08/31/21 5' 2" (1 575 m)     Weight:   Wt Readings from Last 1 Encounters:   08/31/21 55 2 kg (121 lb 9 6 oz)     Relevant Family History   Patient reports no Ashkenazi Tenriism ancestry       Brother (age 59) with prostate cancer age 63's  Father (d age 80) with prostate cancer age 66's  There is no known history of cancer in [facility] 1 paternal uncle or paternal grandparents; Suni Cespedes does not have any paternal cousins  Mother (d age 66) with no history of cancer  There is no known history of cancer in [facility]'s 2 maternal uncles, cousins or grandparents      Please refer to the scanned pedigree in the Media Tab for a complete family history     *All history is reported as provided by the patient; records are not available for review, except where indicated  Assessment:  We discussed sporadic, familial and hereditary cancer  We also discussed the many factors that influence our risk for cancer such as age, environmental exposures, lifestyle choices and family history  We reviewed the indications suggestive of a hereditary predisposition to cancer  We discussed that ampullary cancer is rare and is known to be associated with pathogenic variants in the Hannon syndrome and APC genes  Ana Maria's personal and family history is not suspicious for these hereditary cancer conditions  However, Ana Maria's tumor was sent for genomic testing and there were several genetic variants found including variants in the BRCA2, PTEN, RB1, SMAD4 and STK11 genes  One of the variants in the BRCA2 gene, reported at a 29% variant frequency, is a variant previously reported in the germline of individuals with HBOC  Additionally, a recent article  titled "Ampullary Cancer: Evaluation of Somatic and Germline Genetic Alterations and Association with Clinical Outcomes" used data from the [facility] data and found that 8 of 44 patients with ampullary cancer had germline pathogenic variants including 3 in the BRCA2 gene, 3 in the DRE gene, 1 in the APC gene, 1 in the MUTYH gene and 1 in the RAD50 gene  Given this information it is reasonable for Brandy Jaramillo to consider genetic testing to rule out a hereditary predisposition for cancer            Genetic testing is indicated for Brandy Jaramillo based on the following criteria: Meets NCCN [id number] General Testing Criteria for Breast, Ovarian, and/or Pancreatic Cancer Genes based on having a mutation identified on tumor genomic testing that has clinical implications if also identified in the germline    The risks, benefits, and limitations of genetic testing were reviewed with the patient, as well as genetic discrimination laws, and possible test results (positive, negative, variants of uncertain significance) and their clinical implications  If positive for a mutation, options for managing cancer risk including increased surveillance, chemoprevention, and in some cases prophylactic surgery were discussed  Chelita Kay was informed that if a hereditary cancer syndrome was identified in her, first degree relatives (parents, siblings, and children) have a chance of also inheriting the condition  Genetic testing would allow for predictive genetic testing in other relatives, who may also be at risk depending on their degree of relation  Plan: Patient decided to proceed with testing and provided consent  Summary:     Sample Collection:  The patient was given a blood kit and instructed to go to a [facility] lab  Chelita Kay verbalized that she is going to have her blood drawn Monday 9/13 for chemotherapy and she prefers to have all the blood work drawn at the same time  Genetic Testing Preformed: [facility] Common Hereditary Cancers Panel (47 genes): APC, DRE, AXIN2, BARD1, BMPR1A, BRCA1, BRCA2, BRIP1, CDH1, CDK4, CDKN2A, CHEK2, CTNNA1, DICER1, EPCAM, GREM1, HOXB13, KIT, MEN1, MLH1, MSH2, MSH3, MSH6, MUTYH, NBN, NF1, NTHL1, PALB2, PDGFRA, PMS2, POLD1, POLE, PTEN, RAD50, RAD51C, RAD51D, SDHA, SDHB, SDHC, SDHD, SMAD4, SMARCA4, STK11, TP53, TSC1, TSC2, VHL    Results take approximately 2-3 weeks to complete once test is started  We will contact Chelita Kay once results are available  Additional recommendations for surveillance/medical management will be made pending genetic test results

## 2021-09-09 ENCOUNTER — TELEPHONE (OUTPATIENT)
Dept: HEMATOLOGY ONCOLOGY | Facility: CLINIC | Age: 66
End: 2021-09-09

## 2021-09-09 DIAGNOSIS — D49.0 NEOPLASM OF AMPULLA OF VATER: Primary | ICD-10-CM

## 2021-09-09 NOTE — TELEPHONE ENCOUNTER
Patient stated [facility] is requesting an actual script from Dr Isidoro Daniels for handicap tavia Beasley could best be reached at [phone]  Patient will be picking it up when script is ready

## 2021-09-13 ENCOUNTER — APPOINTMENT (OUTPATIENT)
Dept: LAB | Facility: CLINIC | Age: 66
End: 2021-09-13
Payer: MEDICARE

## 2021-09-13 ENCOUNTER — TRANSCRIBE ORDERS (OUTPATIENT)
Dept: LAB | Facility: CLINIC | Age: 66
End: 2021-09-13

## 2021-09-13 DIAGNOSIS — D49.0 DUDLEY-KLINGENSTEIN SYNDROME: Primary | ICD-10-CM

## 2021-09-13 DIAGNOSIS — D49.0 NEOPLASM OF AMPULLA OF VATER: ICD-10-CM

## 2021-09-13 DIAGNOSIS — Z80.42 FAMILY HISTORY OF MALIGNANT NEOPLASM OF PROSTATE: ICD-10-CM

## 2021-09-13 LAB
ALBUMIN SERPL BCP-MCNC: 3.5 G/DL (ref 3.5–5)
ALP SERPL-CCNC: 113 U/L (ref 46–116)
ALT SERPL W P-5'-P-CCNC: 66 U/L (ref 12–78)
ANION GAP SERPL CALCULATED.3IONS-SCNC: 4 MMOL/L (ref 4–13)
AST SERPL W P-5'-P-CCNC: 36 U/L (ref 5–45)
BASOPHILS # BLD AUTO: 0.07 THOUSANDS/ΜL (ref 0–0.1)
BASOPHILS NFR BLD AUTO: 1 % (ref 0–1)
BILIRUB SERPL-MCNC: 0.28 MG/DL (ref 0.2–1)
BUN SERPL-MCNC: 26 MG/DL (ref 5–25)
CALCIUM SERPL-MCNC: 9.6 MG/DL (ref 8.3–10.1)
CHLORIDE SERPL-SCNC: 106 MMOL/L (ref 100–108)
CO2 SERPL-SCNC: 28 MMOL/L (ref 21–32)
CREAT SERPL-MCNC: 0.62 MG/DL (ref 0.6–1.3)
EOSINOPHIL # BLD AUTO: 0.11 THOUSAND/ΜL (ref 0–0.61)
EOSINOPHIL NFR BLD AUTO: 1 % (ref 0–6)
ERYTHROCYTE [DISTWIDTH] IN BLOOD BY AUTOMATED COUNT: 18 % (ref 11.6–15.1)
GFR SERPL CREATININE-BSD FRML MDRD: 94 ML/MIN/1.73SQ M
GLUCOSE P FAST SERPL-MCNC: 82 MG/DL (ref 65–99)
HCT VFR BLD AUTO: 38.2 % (ref 34.8–46.1)
HGB BLD-MCNC: 12.1 G/DL (ref 11.5–15.4)
IMM GRANULOCYTES # BLD AUTO: 0.02 THOUSAND/UL (ref 0–0.2)
IMM GRANULOCYTES NFR BLD AUTO: 0 % (ref 0–2)
LYMPHOCYTES # BLD AUTO: 2.04 THOUSANDS/ΜL (ref 0.6–4.47)
LYMPHOCYTES NFR BLD AUTO: 27 % (ref 14–44)
MCH RBC QN AUTO: 30.7 PG (ref 26.8–34.3)
MCHC RBC AUTO-ENTMCNC: 31.7 G/DL (ref 31.4–37.4)
MCV RBC AUTO: 97 FL (ref 82–98)
MONOCYTES # BLD AUTO: 0.46 THOUSAND/ΜL (ref 0.17–1.22)
MONOCYTES NFR BLD AUTO: 6 % (ref 4–12)
NEUTROPHILS # BLD AUTO: 4.9 THOUSANDS/ΜL (ref 1.85–7.62)
NEUTS SEG NFR BLD AUTO: 65 % (ref 43–75)
NRBC BLD AUTO-RTO: 0 /100 WBCS
PLATELET # BLD AUTO: 319 THOUSANDS/UL (ref 149–390)
PMV BLD AUTO: 10.6 FL (ref 8.9–12.7)
POTASSIUM SERPL-SCNC: 5.3 MMOL/L (ref 3.5–5.3)
PROT SERPL-MCNC: 7.6 G/DL (ref 6.4–8.2)
RBC # BLD AUTO: 3.94 MILLION/UL (ref 3.81–5.12)
SODIUM SERPL-SCNC: 138 MMOL/L (ref 136–145)
WBC # BLD AUTO: 7.6 THOUSAND/UL (ref 4.31–10.16)

## 2021-09-13 PROCEDURE — 80053 COMPREHEN METABOLIC PANEL: CPT

## 2021-09-13 PROCEDURE — 36415 COLL VENOUS BLD VENIPUNCTURE: CPT

## 2021-09-13 PROCEDURE — 85025 COMPLETE CBC W/AUTO DIFF WBC: CPT

## 2021-09-15 ENCOUNTER — HOSPITAL ENCOUNTER (OUTPATIENT)
Dept: INFUSION CENTER | Facility: HOSPITAL | Age: 66
Discharge: HOME | End: 2021-09-15
Payer: MEDICARE

## 2021-09-15 VITALS
DIASTOLIC BLOOD PRESSURE: 84 MMHG | WEIGHT: 121.69 LBS | HEIGHT: 62 IN | RESPIRATION RATE: 16 BRPM | BODY MASS INDEX: 22.39 KG/M2 | SYSTOLIC BLOOD PRESSURE: 154 MMHG | OXYGEN SATURATION: 99 % | HEART RATE: 72 BPM | TEMPERATURE: 97.8 F

## 2021-09-15 DIAGNOSIS — D49.0 NEOPLASM OF AMPULLA OF VATER: Primary | ICD-10-CM

## 2021-09-15 LAB — MISCELLANEOUS LAB TEST RESULT: NORMAL

## 2021-09-15 PROCEDURE — 96367 TX/PROPH/DG ADDL SEQ IV INF: CPT

## 2021-09-15 PROCEDURE — 96375 TX/PRO/DX INJ NEW DRUG ADDON: CPT

## 2021-09-15 PROCEDURE — 96366 THER/PROPH/DIAG IV INF ADDON: CPT

## 2021-09-15 PROCEDURE — G0498 CHEMO EXTEND IV INFUS W/PUMP: HCPCS

## 2021-09-15 PROCEDURE — 96409 CHEMO IV PUSH SNGL DRUG: CPT

## 2021-09-15 RX ORDER — SODIUM CHLORIDE 9 MG/ML
20 INJECTION, SOLUTION INTRAVENOUS ONCE AS NEEDED
Status: DISCONTINUED | OUTPATIENT
Start: 2021-09-15 | End: 2021-09-18 | Stop reason: HOSPADM

## 2021-09-15 RX ORDER — FLUOROURACIL 50 MG/ML
400 INJECTION, SOLUTION INTRAVENOUS ONCE
Status: COMPLETED | OUTPATIENT
Start: 2021-09-15 | End: 2021-09-15

## 2021-09-15 RX ORDER — PALONOSETRON 0.05 MG/ML
0.25 INJECTION, SOLUTION INTRAVENOUS ONCE
Status: COMPLETED | OUTPATIENT
Start: 2021-09-15 | End: 2021-09-15

## 2021-09-15 RX ADMIN — PALONOSETRON 0.25 MG: 0.25 INJECTION, SOLUTION INTRAVENOUS at 09:06

## 2021-09-15 RX ADMIN — DEXAMETHASONE SODIUM PHOSPHATE 10 MG: 10 INJECTION, SOLUTION INTRAMUSCULAR; INTRAVENOUS at 08:42

## 2021-09-15 RX ADMIN — FLUOROURACIL 610 MG: 50 INJECTION, SOLUTION INTRAVENOUS at 12:12

## 2021-09-15 RX ADMIN — FOSAPREPITANT 150 MG: 150 INJECTION, POWDER, LYOPHILIZED, FOR SOLUTION INTRAVENOUS at 09:07

## 2021-09-15 RX ADMIN — SODIUM CHLORIDE 20 ML/HR: 0.9 INJECTION, SOLUTION INTRAVENOUS at 08:42

## 2021-09-15 RX ADMIN — LEUCOVORIN CALCIUM 600 MG: 200 INJECTION, POWDER, LYOPHILIZED, FOR SUSPENSION INTRAMUSCULAR; INTRAVENOUS at 09:57

## 2021-09-17 ENCOUNTER — HOSPITAL ENCOUNTER (OUTPATIENT)
Dept: INFUSION CENTER | Facility: HOSPITAL | Age: 66
Discharge: HOME | End: 2021-09-17

## 2021-09-17 VITALS — TEMPERATURE: 98 F

## 2021-09-17 DIAGNOSIS — D49.0 NEOPLASM OF AMPULLA OF VATER: Primary | ICD-10-CM

## 2021-09-22 RX ORDER — SODIUM CHLORIDE 9 MG/ML
20 INJECTION, SOLUTION INTRAVENOUS ONCE AS NEEDED
Status: CANCELLED | OUTPATIENT
Start: 2021-09-29

## 2021-09-22 RX ORDER — PALONOSETRON 0.05 MG/ML
0.25 INJECTION, SOLUTION INTRAVENOUS ONCE
Status: CANCELLED | OUTPATIENT
Start: 2021-09-29

## 2021-09-22 RX ORDER — FLUOROURACIL 50 MG/ML
400 INJECTION, SOLUTION INTRAVENOUS ONCE
Status: CANCELLED | OUTPATIENT
Start: 2021-09-29

## 2021-09-23 DIAGNOSIS — D49.0 NEOPLASM OF AMPULLA OF VATER: Primary | ICD-10-CM

## 2021-09-27 ENCOUNTER — APPOINTMENT (OUTPATIENT)
Dept: LAB | Facility: CLINIC | Age: 66
End: 2021-09-27
Payer: MEDICARE

## 2021-09-27 ENCOUNTER — OFFICE VISIT (OUTPATIENT)
Dept: HEMATOLOGY ONCOLOGY | Facility: MEDICAL CENTER | Age: 66
End: 2021-09-27
Payer: MEDICARE

## 2021-09-27 VITALS
OXYGEN SATURATION: 98 % | RESPIRATION RATE: 16 BRPM | TEMPERATURE: 98 F | WEIGHT: 121 LBS | HEIGHT: 62 IN | BODY MASS INDEX: 22.26 KG/M2 | DIASTOLIC BLOOD PRESSURE: 88 MMHG | HEART RATE: 96 BPM | SYSTOLIC BLOOD PRESSURE: 142 MMHG

## 2021-09-27 DIAGNOSIS — C24.1 MALIGNANT NEOPLASM OF AMPULLA OF VATER (HCC): ICD-10-CM

## 2021-09-27 DIAGNOSIS — D49.0 NEOPLASM OF AMPULLA OF VATER: Primary | ICD-10-CM

## 2021-09-27 DIAGNOSIS — D49.0 NEOPLASM OF AMPULLA OF VATER: ICD-10-CM

## 2021-09-27 LAB
BUN SERPL-MCNC: 18 MG/DL (ref 5–25)
CREAT SERPL-MCNC: 0.72 MG/DL (ref 0.6–1.3)
GFR SERPL CREATININE-BSD FRML MDRD: 88 ML/MIN/1.73SQ M

## 2021-09-27 PROCEDURE — 82565 ASSAY OF CREATININE: CPT

## 2021-09-27 PROCEDURE — 84520 ASSAY OF UREA NITROGEN: CPT

## 2021-09-27 PROCEDURE — 99214 OFFICE O/P EST MOD 30 MIN: CPT | Performed by: INTERNAL MEDICINE

## 2021-09-27 PROCEDURE — 86301 IMMUNOASSAY TUMOR CA 19-9: CPT

## 2021-09-27 PROCEDURE — 36415 COLL VENOUS BLD VENIPUNCTURE: CPT

## 2021-09-27 NOTE — PROGRESS NOTES
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  Issues:    Status post Whipple  Patient will follow up with Dr Janice Daniels as directed; next office visit scheduled for November 2021  Ampulla of vater  adenocarcinoma, recent chemotherapy reaction  Patient was presented at the GI tumor Conference previously  The plan was for 4 months of adjuvant FOLFOX, then rescanning and evaluation by Radiation Oncology for the possibility of concurrent adjuvant treatment  Patient was able to complete 5 cycles without any issues, reacted to oxaliplatin on the 6th cycle (see the chemotherapy infusion note from 08/18/2021 - patient was able to get through the oxaliplatin reaction and subsequently completed the cycle #6 5 FU bolus and 5 FU continuous infusion)  Patient subsequently went on to complete the 7 cycle without the oxaliplatin - no issues at all  Eighth cycle is scheduled for later on this week  Patient will then return to Radiation Oncology for evaluation  Patient states having all required medications at home  The etiology for the right lower quadrant occasional swelling is unclear  Patient states that the drainage tube was located and that area postoperatively  There is no evidence of inguinal adenopathy bilaterally  We will wait and see what Radiation Oncology wishes, patient can undergo repeat scans before beginning concurrent treatment  As discussed previously, patient had never undergone a CT scan of the chest - this would be an appropriate time to do both  Patient states having all required medications at home  Patient to return in approximately 3 weeks; will eventually need teaching on the Xeloda  Pain control    No pain control issues at this time  History of RA  As above no pain control issues, no arthritic complaints but patient has not seen a rheumatologist and a number of years  Patient is willing to see the rheumatologist after the chemotherapy has been completed  Patient is to return in 3 weeks  Patient knows to call the hematology/oncology office if there are any other questions or concerns  Carefully review your medication list and verify that the list is accurate and up-to-date  Please call the hematology/oncology office if there are medications missing from the list, medications on the list that you are not currently taking or if there is a dosage or instruction that is different from how you're taking that medication  Patient goals and areas of care:  Continue with chemotherapy with manipulation  Barriers to care:  None  Patient is able to self-care   _____________________________________________________________________________________    Chief Complaint   Patient presents with    Follow-up     Ampullary of Vater adenocarcinoma     Oncology History   Neoplasm of ampulla of Vater   3/15/2021 Initial Diagnosis    Neoplasm of ampulla of Vater     4/26/2021 Surgery    B  Celiac lymph node:     - Single lymph node; negative for malignancy (0/1)  C   Whipple resection:     - Invasive poorly differentiated mucinous adenocarcinoma  - Tumor arises in the background of an adenoma with high grade dysplasia  - Lymphatic and venous channel invasion by tumor present  - Metastatic carcinoma present in one of twenty lymph nodes (1/20)  - All margins are negative for tumor       6/1/2021 -  Cancer Staged    Staging form: Ampulla of Vater, AJCC 8th Edition  - Pathologic: Stage IIIA (pT3a, pN1, cM0) - Signed by Madeline Ibarra MD on 6/1/2021  Histologic grade (G): G3  Histologic grading system: 3 grade system  Residual tumor (R): R0 - None       6/9/2021 -  Chemotherapy    palonosetron (ALOXI), 0 25 mg, Intravenous, Once, 4 of 5 cycles  Administration: 0 25 mg (7/21/2021), 0 25 mg (8/4/2021), 0 25 mg (8/18/2021), 0 25 mg (9/15/2021)  fluorouracil (ADRUCIL), 400 mg/m2 = 605 mg, Intravenous, Once, 7 of 8 cycles  Dose modification: 340 mg/m2 (85 % of original dose 400 mg/m2, Cycle 6, Reason: Dose Not Tolerated)  Administration: 605 mg (6/9/2021), 605 mg (6/23/2021), 605 mg (7/7/2021), 605 mg (7/21/2021), 605 mg (8/4/2021), 515 mg (8/18/2021), 610 mg (9/15/2021)  fosaprepitant (EMEND) IVPB, 150 mg, Intravenous, Once, 5 of 6 cycles  Administration: 150 mg (7/7/2021), 150 mg (7/21/2021), 150 mg (8/4/2021), 150 mg (8/18/2021), 150 mg (9/15/2021)  leucovorin calcium IVPB, 604 mg, Intravenous, Once, 7 of 8 cycles  Dose modification: 340 mg/m2 (85 % of original dose 400 mg/m2, Cycle 6, Reason: Dose Not Tolerated)  Administration: 600 mg (6/9/2021), 600 mg (6/23/2021), 600 mg (7/7/2021), 600 mg (7/21/2021), 600 mg (8/4/2021), 517 mg (8/18/2021), 600 mg (9/15/2021)  oxaliplatin (ELOXATIN) chemo infusion, 85 mg/m2 = 128 35 mg, Intravenous, Once, 6 of 6 cycles  Dose modification: 72 25 mg/m2 (85 % of original dose 85 mg/m2, Cycle 6, Reason: Dose Not Tolerated)  Administration: 128 35 mg (6/9/2021), 128 35 mg (6/23/2021), 128 35 mg (7/7/2021), 128 35 mg (7/21/2021), 128 35 mg (8/4/2021), 109 82 mg (8/18/2021)  fluorouracil (ADRUCIL) ambulatory infusion Soln, 1,200 mg/m2/day = 3,625 mg, Intravenous, Over 46 hours, 7 of 8 cycles       History of Present Illness:  59-year-old female with a history of rheumatoid arthritis seen in the hospital in February 2021 with obstructive jaundice  CT scan demonstrated intra and extrahepatic biliary dilatation with a questionable mass at the end of the common bile duct  EUS in March 2021 demonstrated a 2 x 1 6 cm mass in the ampullary Vater involving the distal common bile duct  Biopsy demonstrated poorly differentiated mucinous adenocarcinoma  Patient underwent Whipple procedure on April 26, 2021    Results demonstrated mucinous adenocarcinoma in a background of adenoma with lymphatic and venous channel invasion and 1/22 positive lymph nodes  Margins were negative  Patient was presented at the GI tumor Board and the plan was 4 months of adjuvant FOLFOX followed by radiation oncology evaluation for concurrent treatment  Patient has completed 5 cycles of FOLFOX and 1 cycle of Leucovorin/5FU bolus/5FU continues infusion  Mrs Glenna Chaney reacted to the oxaliplatin on the 6th cycle  Patient was able to get through the 7 cycle with the oxaliplatin dropped  Presently patient states feeling well, baseline  No rashes, no trouble breathing, no pain control issues  Appetite is good, weight is stable  No GI problems, no abdominal pain although patient feels an occasional bulge in her right lower quadrant  No  or gyn issues  Patient has received her COVID vaccination  Review of Systems   Constitutional: Positive for fatigue  HENT: Negative  Eyes: Negative  Respiratory: Negative  Cardiovascular: Negative  Gastrointestinal: Negative for nausea  Endocrine: Negative  Genitourinary: Negative  Musculoskeletal: Negative  Skin: Negative  Allergic/Immunologic: Negative  Neurological: Negative  Hematological: Negative  Psychiatric/Behavioral: Negative  All other systems reviewed and are negative      Patient Active Problem List   Diagnosis    Elevated LFTs    Dilated bile duct    Neoplasm of ampulla of Vater    Mild protein-calorie malnutrition ([address] )     Past Medical History:   Diagnosis Date    Cancer [facility])     pancreatic     Past Surgical History:   Procedure Laterality Date    ABLATION SOFT TISSUE N/A 4/26/2021    Procedure: INTRAOPERATIVE ULTRASOUND, ABLATION,SOFT TISSUE PANCREAS;  Surgeon: Hakeem Baumann MD;  [facility];  Service: Surgical Oncology    CHOLECYSTECTOMY N/A 4/26/2021    Procedure: CHOLECYSTECTOMY;  Surgeon: Hakeem Baumann MD;  [facility]; Service: Surgical Oncology    [facility] GUIDED CENTRAL VENOUS ACCESS DEVICE INSERTION  2021    HYSTERECTOMY      LAPAROTOMY N/A 2021    Procedure: LAPAROTOMY EXPLORATORY;  Surgeon: Natan Rehman MD;  [facility];  Service: Surgical Oncology    SINUS SURGERY      TUNNELED VENOUS PORT PLACEMENT Left 2021    Procedure: INSERTION VENOUS PORT (PORT-A-CATH); Surgeon: Natan Rehman MD;  [facility];  Service: Surgical Oncology    WHIPPLE PROCEDURE/PANCREATICO-DUODENECTOMY N/A 2021    Procedure: WHIPPLE PROCEDURE/PANCREATICO-DUODENECTOMY; PYLORIC PRESERVING;  Surgeon: Natan Rehman MD;  [facility];  Service: Surgical Oncology     Family History   Problem Relation Age of Onset    Ulcerative colitis Mother     Prostate cancer Father     Heart disease Brother     Prostate cancer Brother     Melanoma Sister      Social History     Socioeconomic History    Marital status: Single     Spouse name: Not on file    Number of children: Not on file    Years of education: Not on file    Highest education level: Not on file   Occupational History    Not on file   Tobacco Use    Smoking status: Former Smoker     Packs/day: 0 50     Start date: 65     Quit date: 2021     Years since quittin 4    Smokeless tobacco: Never Used    Tobacco comment: recently stop smoking    Vaping Use    Vaping Use: Never used   Substance and Sexual Activity    Alcohol use: Never    Drug use: Never    Sexual activity: Never   Other Topics Concern    Not on file   Social History Narrative    Not on file     Social Determinants of Health     Financial Resource Strain:     Difficulty of Paying Living Expenses:    Food Insecurity:     Worried About [address] in the Last Year:     [address] in the Last Year:    Transportation Needs:     Lack of Transportation (Medical):      Lack of Transportation (Non-Medical):    Physical Activity:     Days of Exercise per Week:     Minutes of Exercise per Session:    Stress:     Feeling of Stress :    Social Connections:     Frequency of Communication with Friends and Family:     Frequency of Social Gatherings with Friends and Family:     Attends Scientologist Services:     Active Member of Clubs or Organizations:     Attends Club or Organization Meetings:     Marital Status:    Intimate Partner Violence:     Fear of Current or Ex-Partner:     Emotionally Abused:     Physically Abused:     Sexually Abused:        Current Outpatient Medications:     acetaminophen (TYLENOL) 500 mg tablet, Take 1,000 mg by mouth, Disp: , Rfl:     bisacodyl (FLEET) 10 MG/30ML ENEM, Insert 30 mL (10 mg total) into the rectum once for 1 dose, Disp: 30 mL, Rfl: 0    [START ON 9/29/2021] fluorouracil 3,650 mg in CADD infusion pump, Infuse 3,650 mg (1,200 mg/m2/day x 1 52 m2 (Treatment Plan Recorded BSA)) into a venous catheter over 46 hours for 2 days, Disp: 1 each, Rfl: 0    lidocaine-prilocaine (EMLA) cream, Apply topically as needed for mild pain Apply topically to port site 1 hour prior to port accessing, Disp: 30 g, Rfl: 0    Medical ID Plate MISC, Use once for 1 dose Severely and permanently limited in the ability to walk because of an arthritic, neurological, or orthopedic condition: or cannot walk two hundred feet without stopping to rest and meets requirements for disability license plate, Disp: 1 each, Rfl: 0    Multiple Vitamin (multivitamin) tablet, Take 1 tablet by mouth daily, Disp: , Rfl:     ondansetron (ZOFRAN-ODT) 4 mg disintegrating tablet, Take 1 tablet (4 mg total) by mouth every 6 (six) hours as needed for nausea or vomiting (Patient not taking: Reported on 8/31/2021), Disp: 30 tablet, Rfl: 1    polyethylene glycol (GOLYTELY) 4000 mL solution, 1 cup every 15 minutes until stooling begins, then stop    No need to drink all 4L, Disp: 4000 mL, Rfl: 0    prochlorperazine (COMPAZINE) 10 mg tablet, Take 1 tablet (10 mg total) by mouth every 6 (six) hours as needed for nausea or vomiting, Disp: 30 tablet, Rfl: 1    Allergies   Allergen Reactions    Penicillins Rash    Tetracycline Rash       Vitals:    09/27/21 1042   BP: 142/88   Pulse: 96   Resp: 16   Temp: 98 °F (36 7 °C)   SpO2: 98%     Physical Exam  Constitutional:       Appearance: She is well-developed  Comments: Thin but relatively well-nourished female, no respiratory distress, no signs of pain   HENT:      Head: Normocephalic and atraumatic  Right Ear: External ear normal       Left Ear: External ear normal    Eyes:      Conjunctiva/sclera: Conjunctivae normal       Pupils: Pupils are equal, round, and reactive to light  Cardiovascular:      Rate and Rhythm: Normal rate and regular rhythm  Heart sounds: Normal heart sounds  Pulmonary:      Effort: Pulmonary effort is normal       Breath sounds: Normal breath sounds  Abdominal:      General: Bowel sounds are normal       Palpations: Abdomen is soft  Comments: Well-healed midline suture line, nontender, +bowel sounds, no guarding   Musculoskeletal:         General: Normal range of motion  Cervical back: Normal range of motion and neck supple  Skin:     General: Skin is warm  Comments: Warm, moist, relatively good color, no petechiae or ecchymoses, left anterior chest wall port area healed well   Neurological:      Mental Status: She is alert and oriented to person, place, and time  Deep Tendon Reflexes: Reflexes are normal and symmetric  Psychiatric:         Behavior: Behavior normal          Thought Content:  Thought content normal          Judgment: Judgment normal      Extremities:  No lower extremity edema bilaterally, no cords, pulses are 1+  Lymphatics:  No adenopathy in the neck, supraclavicular region, axilla and groin bilaterally    Performance Status: 1 - Symptomatic but completely ambulatory    Labs    09/13/2021 WBC = 7 6 hemoglobin = 12 1 hematocrit = 38 MCV = 97 platelet = 778 neutrophil = 65% BUN = 26 creatinine = 0 62 calcium = 9 6 LFTs WNL    08/30/2021 WBC = 6 85 hemoglobin = 11 6 hematocrit = 37 3 platelet = 474 neutrophil = 53% BUN = 19 creatinine = 0 61 LFTs WNL  07/19/2021 WBC = 8 75 hemoglobin = 11 6 hematocrit = 36 2 platelet = 716 neutrophil = 58% BUN = 23 creatinine = 0 71 calcium = 9 4 LFTs WNL    Imaging    06/02/2021 chest x-ray portable  Impression stated no acute cardiopulmonary disease, left port in lower SVC with no pneumothorax  04/11/2021 CT scan abdomen pelvis    1  Grossly unchanged moderate intrahepatic and extrahepatic biliary ductal dilation with abrupt narrowing/transition in the distal CBD where there is a small obstructing periampullary lesion measuring 1 1 x 1 0 cm, likely corresponding to the lesion   seen on prior endoscopic ultrasound  2   Otherwise, no acute findings in the abdomen or pelvis  02/18/2021 MRI abdomen with and without contrast and MRCP    Intra and extrahepatic biliary dilatation extending to the ampulla without evidence of obstructing mass lesion or choledocholithiasis    Consider ERCP for further characterization      Pathology    Case Report   Surgical Pathology Report                         Case: [id number]                                    Authorizing Provider: Elsa Carias MD           Collected:           04/26/2021 1036               Ordering Location:     [facility]      Received:            04/26/2021 1221                                      Hospital Operating Room                                                       Pathologist:           Wilma Colmenares MD                                                          Intraop:               Wilma Colmenares MD                                                          Specimens:   A) - Gallbladder                                                                                     B) - Lymph Node, CELIAC LYMPH NODE                                                                   C) - Pancreas, WHIPPLE RESECTION                                                           Addendum   At the request of Dr Judi Pearce, unstained slides from paraffin BLOCK C9 containing the patient's cancer cells were sent to [facility] for MI Profile testing  Upon completion of testing, the [facility] report will be directly sent to the requesting physician as well as posted in the Media Tab of the patient's [employer] EMR by the [place] [address] Pathology Department      Please note: The [facility]'s analysis and report is performed independently of [facility], and neither the Hospital nor the Pathology Department screen, review or comment upon [id number] report  Because the role of testing in cancer diagnosis and management is subject to evolving development, usage and interpretation, we strongly urge that the test limitations described in the [facility] report be carefully read, appropriately shared with the patient, and critically considered when reaching treatment decisions      This pathology material was removed from archive for purpose of molecular testing  It was reviewed and approved by Dr Evelin Mendenhall        Addendum electronically signed by Susi Busch MD on 6/2/2021 at  1:12 PM   Final Diagnosis   A   Gallbladder, cholecystectomy:       - Chronic cholecystitis  - No malignancy identified      - Reactive periductal lymph node with lipid granulomata; negative for malignancy (0/1)      B  Celiac lymph node:     - Single lymph node; negative for malignancy (0/1)      C  Whipple resection:     - Invasive poorly differentiated mucinous adenocarcinoma  - Tumor arises in the background of an adenoma with high grade dysplasia  - Lymphatic and venous channel invasion by tumor present  - Metastatic carcinoma present in one of twenty lymph nodes (1/20)       - All margins are negative for tumor       Electronically signed by Janice Tobias MD on 5/5/2021 at  2:59 PM   Comments: This is an appended report  These results have been appended to a previously preliminary verified report  Additional Information    All reported additional testing was performed with appropriately reactive controls   These tests were developed and their performance characteristics determined by [facility] or appropriate performing facility, though some tests may be performed on tissues which have not been validated for performance characteristics (such as staining performed on alcohol exposed cell blocks and decalcified tissues)   Results should be interpreted with caution and in the context of the patients clinical condition  These tests may not be cleared or approved by the [place]  Food and Drug Administration, though the FDA has determined that such clearance or approval is not necessary  These tests are used for clinical purposes and they should not be regarded as investigational or for research  This laboratory has been approved by [facility], designated as a high-complexity laboratory and is qualified to perform these tests     - Interpretation performed at [facility], [address]    Comment: This is an appended report  These results have been appended to a previously preliminary verified report     Synoptic Checklist   AMPULLA OF VATER  8th Edition - Protocol posted: 2/26/2020  AMPULLA OF VATER: AMPULLECTOMY, PANCREATICODUODENECTOMY - All Specimens  SPECIMEN   Procedure  Pancreaticoduodenectomy (Whipple resection)    TUMOR   Tumor Site  Intra-ampullary and cristine-ampullary (mixed type)    Histologic Type  Mucinous adenocarcinoma    Histologic Grade  G3: Poorly differentiated    Tumor Size  Greatest Dimension (Centimeters): 2 5 cm   Additional Dimension (Centimeters)  2 3 cm     1 cm   Tumor Extension  Tumor invades into muscularis propria of the duodenum      Tumor extends more than 0 5 cm into pancreas      Tumor extends into peripancreatic soft tissues      Tumor extends into periduodenal tissue    Lymphovascular Invasion  Present    Perineural Invasion  Not identified    MARGINS   Margins     Pancreatic Neck / Parenchymal Margin  Uninvolved by invasive carcinoma and pancreatic high-grade intraepithelial neoplasia    Distance of Invasive Carcinoma from Margin  3 5 cm   Uncinate (Retroperitoneal / Superior Mesenteric Artery) Margin  Uninvolved by invasive carcinoma    Distance of Invasive Carcinoma from Margin  4 0 cm   Bile Duct Margin  Uninvolved by invasive carcinoma and high-grade intraepithelial neoplasia    Distance of Invasive Carcinoma from Margin  3 3 cm   Proximal Margin (Gastric or Duodenal)  Uninvolved by invasive carcinoma and high-grade intraepithelial neoplasia    Distal Margin (Distal Duodenal or Jejunal)  Uninvolved by invasive carcinoma and high-grade intraepithelial neoplasia    LYMPH NODES   Number of Lymph Nodes Involved  1    Number of Lymph Nodes Examined  22    PATHOLOGIC STAGE CLASSIFICATION (pTNM, AJCC 8th Edition)      Primary Tumor (pT)  pT3b    Regional Lymph Nodes (pN)  pN1    ADDITIONAL FINDINGS   Additional Findings  Dysplasia / adenoma      PanIN 1B    Comment(s)   Comment(s)  AJCC Prognostic Stage Group (8th Ed ):  IIIA - pT3a, pN1, MX, G3      Intraoperative Consultation       CF1-3    Pancreatic and bile duct margins:   - The pancreatic and bile duct margins are negative for malignancy and high grade dysplasia      Reviewed by ELLEN Jefferson   - Interpretation performed at [facility], [place], [address] [facility] [address]

## 2021-09-28 LAB — CANCER AG19-9 SERPL-ACNC: <2 U/ML (ref 0–35)

## 2021-09-29 ENCOUNTER — HOSPITAL ENCOUNTER (OUTPATIENT)
Dept: INFUSION CENTER | Facility: HOSPITAL | Age: 66
Discharge: HOME | End: 2021-09-29
Payer: MEDICARE

## 2021-09-29 VITALS
OXYGEN SATURATION: 99 % | TEMPERATURE: 97.5 F | DIASTOLIC BLOOD PRESSURE: 80 MMHG | WEIGHT: 122 LBS | BODY MASS INDEX: 22.45 KG/M2 | RESPIRATION RATE: 16 BRPM | HEART RATE: 56 BPM | SYSTOLIC BLOOD PRESSURE: 144 MMHG | HEIGHT: 62 IN

## 2021-09-29 DIAGNOSIS — D49.0 NEOPLASM OF AMPULLA OF VATER: Primary | ICD-10-CM

## 2021-09-29 LAB
ALBUMIN SERPL BCP-MCNC: 3.7 G/DL (ref 3.5–5)
ALP SERPL-CCNC: 113 U/L (ref 46–116)
ALT SERPL W P-5'-P-CCNC: 42 U/L (ref 12–78)
ANION GAP SERPL CALCULATED.3IONS-SCNC: 11 MMOL/L (ref 4–13)
AST SERPL W P-5'-P-CCNC: 30 U/L (ref 5–45)
BASOPHILS # BLD AUTO: 0.06 THOUSANDS/ΜL (ref 0–0.1)
BASOPHILS NFR BLD AUTO: 1 % (ref 0–1)
BILIRUB SERPL-MCNC: 0.25 MG/DL (ref 0.2–1)
BUN SERPL-MCNC: 14 MG/DL (ref 5–25)
CALCIUM SERPL-MCNC: 9 MG/DL (ref 8.3–10.1)
CHLORIDE SERPL-SCNC: 107 MMOL/L (ref 100–108)
CO2 SERPL-SCNC: 26 MMOL/L (ref 21–32)
CREAT SERPL-MCNC: 0.7 MG/DL (ref 0.6–1.3)
EOSINOPHIL # BLD AUTO: 0.13 THOUSAND/ΜL (ref 0–0.61)
EOSINOPHIL NFR BLD AUTO: 2 % (ref 0–6)
ERYTHROCYTE [DISTWIDTH] IN BLOOD BY AUTOMATED COUNT: 16.6 % (ref 11.6–15.1)
GFR SERPL CREATININE-BSD FRML MDRD: 91 ML/MIN/1.73SQ M
GLUCOSE SERPL-MCNC: 96 MG/DL (ref 65–140)
HCT VFR BLD AUTO: 37.4 % (ref 34.8–46.1)
HGB BLD-MCNC: 11.8 G/DL (ref 11.5–15.4)
IMM GRANULOCYTES # BLD AUTO: 0.03 THOUSAND/UL (ref 0–0.2)
IMM GRANULOCYTES NFR BLD AUTO: 0 % (ref 0–2)
LYMPHOCYTES # BLD AUTO: 1.62 THOUSANDS/ΜL (ref 0.6–4.47)
LYMPHOCYTES NFR BLD AUTO: 24 % (ref 14–44)
MCH RBC QN AUTO: 30.7 PG (ref 26.8–34.3)
MCHC RBC AUTO-ENTMCNC: 31.6 G/DL (ref 31.4–37.4)
MCV RBC AUTO: 97 FL (ref 82–98)
MONOCYTES # BLD AUTO: 0.45 THOUSAND/ΜL (ref 0.17–1.22)
MONOCYTES NFR BLD AUTO: 7 % (ref 4–12)
NEUTROPHILS # BLD AUTO: 4.51 THOUSANDS/ΜL (ref 1.85–7.62)
NEUTS SEG NFR BLD AUTO: 66 % (ref 43–75)
NRBC BLD AUTO-RTO: 0 /100 WBCS
PLATELET # BLD AUTO: 335 THOUSANDS/UL (ref 149–390)
PMV BLD AUTO: 9.4 FL (ref 8.9–12.7)
POTASSIUM SERPL-SCNC: 3.9 MMOL/L (ref 3.5–5.3)
PROT SERPL-MCNC: 7.3 G/DL (ref 6.4–8.2)
RBC # BLD AUTO: 3.84 MILLION/UL (ref 3.81–5.12)
SODIUM SERPL-SCNC: 144 MMOL/L (ref 136–145)
WBC # BLD AUTO: 6.8 THOUSAND/UL (ref 4.31–10.16)

## 2021-09-29 PROCEDURE — 80053 COMPREHEN METABOLIC PANEL: CPT | Performed by: INTERNAL MEDICINE

## 2021-09-29 PROCEDURE — 96367 TX/PROPH/DG ADDL SEQ IV INF: CPT

## 2021-09-29 PROCEDURE — 96375 TX/PRO/DX INJ NEW DRUG ADDON: CPT

## 2021-09-29 PROCEDURE — G0498 CHEMO EXTEND IV INFUS W/PUMP: HCPCS

## 2021-09-29 PROCEDURE — 85025 COMPLETE CBC W/AUTO DIFF WBC: CPT | Performed by: INTERNAL MEDICINE

## 2021-09-29 PROCEDURE — 96366 THER/PROPH/DIAG IV INF ADDON: CPT

## 2021-09-29 PROCEDURE — 96409 CHEMO IV PUSH SNGL DRUG: CPT

## 2021-09-29 RX ORDER — SODIUM CHLORIDE 9 MG/ML
20 INJECTION, SOLUTION INTRAVENOUS ONCE AS NEEDED
Status: DISCONTINUED | OUTPATIENT
Start: 2021-09-29 | End: 2021-10-02 | Stop reason: HOSPADM

## 2021-09-29 RX ORDER — PALONOSETRON 0.05 MG/ML
0.25 INJECTION, SOLUTION INTRAVENOUS ONCE
Status: COMPLETED | OUTPATIENT
Start: 2021-09-29 | End: 2021-09-29

## 2021-09-29 RX ORDER — FLUOROURACIL 50 MG/ML
400 INJECTION, SOLUTION INTRAVENOUS ONCE
Status: COMPLETED | OUTPATIENT
Start: 2021-09-29 | End: 2021-09-29

## 2021-09-29 RX ADMIN — SODIUM CHLORIDE 20 ML/HR: 0.9 INJECTION, SOLUTION INTRAVENOUS at 08:32

## 2021-09-29 RX ADMIN — FOSAPREPITANT 150 MG: 150 INJECTION, POWDER, LYOPHILIZED, FOR SOLUTION INTRAVENOUS at 10:17

## 2021-09-29 RX ADMIN — DEXAMETHASONE SODIUM PHOSPHATE 10 MG: 10 INJECTION, SOLUTION INTRAMUSCULAR; INTRAVENOUS at 09:48

## 2021-09-29 RX ADMIN — LEUCOVORIN CALCIUM 600 MG: 200 INJECTION, POWDER, LYOPHILIZED, FOR SUSPENSION INTRAMUSCULAR; INTRAVENOUS at 10:53

## 2021-09-29 RX ADMIN — FLUOROURACIL 610 MG: 50 INJECTION, SOLUTION INTRAVENOUS at 13:09

## 2021-09-29 RX ADMIN — PALONOSETRON 0.25 MG: 0.25 INJECTION, SOLUTION INTRAVENOUS at 09:45

## 2021-10-01 ENCOUNTER — TELEPHONE (OUTPATIENT)
Dept: GENETICS | Facility: CLINIC | Age: 66
End: 2021-10-01

## 2021-10-01 ENCOUNTER — HOSPITAL ENCOUNTER (OUTPATIENT)
Dept: INFUSION CENTER | Facility: HOSPITAL | Age: 66
Discharge: HOME | End: 2021-10-01

## 2021-10-01 VITALS — TEMPERATURE: 96.9 F

## 2021-10-01 DIAGNOSIS — Z15.01 BRCA2 GENE MUTATION POSITIVE: Primary | ICD-10-CM

## 2021-10-01 DIAGNOSIS — Z15.09 BRCA2 GENE MUTATION POSITIVE: Primary | ICD-10-CM

## 2021-10-01 DIAGNOSIS — D49.0 NEOPLASM OF AMPULLA OF VATER: Primary | ICD-10-CM

## 2021-10-05 ENCOUNTER — TELEPHONE (OUTPATIENT)
Dept: GENETICS | Facility: CLINIC | Age: 66
End: 2021-10-05

## 2021-10-12 ENCOUNTER — CLINICAL SUPPORT (OUTPATIENT)
Dept: RADIATION ONCOLOGY | Facility: HOSPITAL | Age: 66
End: 2021-10-12
Attending: RADIOLOGY

## 2021-10-12 ENCOUNTER — APPOINTMENT (OUTPATIENT)
Dept: RADIATION ONCOLOGY | Facility: HOSPITAL | Age: 66
End: 2021-10-12
Attending: RADIOLOGY
Payer: MEDICARE

## 2021-10-12 ENCOUNTER — RADIATION ONCOLOGY CONSULT (OUTPATIENT)
Dept: RADIATION ONCOLOGY | Facility: HOSPITAL | Age: 66
End: 2021-10-12
Attending: RADIOLOGY

## 2021-10-12 VITALS
BODY MASS INDEX: 23.27 KG/M2 | HEART RATE: 65 BPM | OXYGEN SATURATION: 97 % | WEIGHT: 127.2 LBS | TEMPERATURE: 97.2 F | RESPIRATION RATE: 18 BRPM

## 2021-10-12 DIAGNOSIS — D49.0 NEOPLASM OF AMPULLA OF VATER: Primary | ICD-10-CM

## 2021-10-12 PROCEDURE — 77399 UNLISTED PX MED RADJ PHYSICS: CPT | Performed by: RADIOLOGY

## 2021-10-12 PROCEDURE — 77334 RADIATION TREATMENT AID(S): CPT | Performed by: RADIOLOGY

## 2021-10-15 ENCOUNTER — HOSPITAL ENCOUNTER (OUTPATIENT)
Dept: RADIOLOGY | Facility: HOSPITAL | Age: 66
Discharge: HOME | End: 2021-10-15
Attending: SURGERY
Payer: MEDICARE

## 2021-10-15 DIAGNOSIS — D49.0 NEOPLASM OF AMPULLA OF VATER: ICD-10-CM

## 2021-10-15 PROCEDURE — 71260 CT THORAX DX C+: CPT

## 2021-10-15 PROCEDURE — 74177 CT ABD & PELVIS W/CONTRAST: CPT

## 2021-10-15 PROCEDURE — 77300 RADIATION THERAPY DOSE PLAN: CPT | Performed by: RADIOLOGY

## 2021-10-15 PROCEDURE — 77301 RADIOTHERAPY DOSE PLAN IMRT: CPT | Performed by: RADIOLOGY

## 2021-10-15 PROCEDURE — 77338 DESIGN MLC DEVICE FOR IMRT: CPT | Performed by: RADIOLOGY

## 2021-10-15 RX ADMIN — IOHEXOL 100 ML: 350 INJECTION, SOLUTION INTRAVENOUS at 09:21

## 2021-10-18 PROCEDURE — 77470 SPECIAL RADIATION TREATMENT: CPT | Performed by: RADIOLOGY

## 2021-10-21 ENCOUNTER — OFFICE VISIT (OUTPATIENT)
Dept: HEMATOLOGY ONCOLOGY | Facility: MEDICAL CENTER | Age: 66
End: 2021-10-21
Payer: MEDICARE

## 2021-10-21 VITALS
BODY MASS INDEX: 22.87 KG/M2 | HEIGHT: 62 IN | HEART RATE: 60 BPM | TEMPERATURE: 97.8 F | DIASTOLIC BLOOD PRESSURE: 80 MMHG | WEIGHT: 124.3 LBS | RESPIRATION RATE: 16 BRPM | SYSTOLIC BLOOD PRESSURE: 118 MMHG

## 2021-10-21 DIAGNOSIS — D49.0 NEOPLASM OF AMPULLA OF VATER: Primary | ICD-10-CM

## 2021-10-21 PROCEDURE — 99215 OFFICE O/P EST HI 40 MIN: CPT | Performed by: INTERNAL MEDICINE

## 2021-10-22 ENCOUNTER — TELEPHONE (OUTPATIENT)
Dept: HEMATOLOGY ONCOLOGY | Facility: MEDICAL CENTER | Age: 66
End: 2021-10-22

## 2021-10-25 ENCOUNTER — APPOINTMENT (OUTPATIENT)
Dept: LAB | Facility: CLINIC | Age: 66
End: 2021-10-25
Payer: MEDICARE

## 2021-10-25 LAB
ALBUMIN SERPL BCP-MCNC: 3.7 G/DL (ref 3.5–5)
ALP SERPL-CCNC: 118 U/L (ref 46–116)
ALT SERPL W P-5'-P-CCNC: 37 U/L (ref 12–78)
ANION GAP SERPL CALCULATED.3IONS-SCNC: 3 MMOL/L (ref 4–13)
AST SERPL W P-5'-P-CCNC: 31 U/L (ref 5–45)
BASOPHILS # BLD AUTO: 0.07 THOUSANDS/ΜL (ref 0–0.1)
BASOPHILS NFR BLD AUTO: 1 % (ref 0–1)
BILIRUB SERPL-MCNC: 0.34 MG/DL (ref 0.2–1)
BUN SERPL-MCNC: 19 MG/DL (ref 5–25)
CALCIUM SERPL-MCNC: 10 MG/DL (ref 8.3–10.1)
CHLORIDE SERPL-SCNC: 106 MMOL/L (ref 100–108)
CO2 SERPL-SCNC: 28 MMOL/L (ref 21–32)
CREAT SERPL-MCNC: 0.64 MG/DL (ref 0.6–1.3)
EOSINOPHIL # BLD AUTO: 0.12 THOUSAND/ΜL (ref 0–0.61)
EOSINOPHIL NFR BLD AUTO: 1 % (ref 0–6)
ERYTHROCYTE [DISTWIDTH] IN BLOOD BY AUTOMATED COUNT: 15.9 % (ref 11.6–15.1)
GFR SERPL CREATININE-BSD FRML MDRD: 93 ML/MIN/1.73SQ M
GLUCOSE P FAST SERPL-MCNC: 102 MG/DL (ref 65–99)
HCT VFR BLD AUTO: 39.8 % (ref 34.8–46.1)
HGB BLD-MCNC: 12.7 G/DL (ref 11.5–15.4)
IMM GRANULOCYTES # BLD AUTO: 0.03 THOUSAND/UL (ref 0–0.2)
IMM GRANULOCYTES NFR BLD AUTO: 0 % (ref 0–2)
LYMPHOCYTES # BLD AUTO: 2.5 THOUSANDS/ΜL (ref 0.6–4.47)
LYMPHOCYTES NFR BLD AUTO: 29 % (ref 14–44)
MCH RBC QN AUTO: 30.8 PG (ref 26.8–34.3)
MCHC RBC AUTO-ENTMCNC: 31.9 G/DL (ref 31.4–37.4)
MCV RBC AUTO: 96 FL (ref 82–98)
MONOCYTES # BLD AUTO: 0.46 THOUSAND/ΜL (ref 0.17–1.22)
MONOCYTES NFR BLD AUTO: 5 % (ref 4–12)
NEUTROPHILS # BLD AUTO: 5.5 THOUSANDS/ΜL (ref 1.85–7.62)
NEUTS SEG NFR BLD AUTO: 64 % (ref 43–75)
NRBC BLD AUTO-RTO: 0 /100 WBCS
PLATELET # BLD AUTO: 357 THOUSANDS/UL (ref 149–390)
PMV BLD AUTO: 10.3 FL (ref 8.9–12.7)
POTASSIUM SERPL-SCNC: 4.7 MMOL/L (ref 3.5–5.3)
PROT SERPL-MCNC: 7.9 G/DL (ref 6.4–8.2)
RBC # BLD AUTO: 4.13 MILLION/UL (ref 3.81–5.12)
SODIUM SERPL-SCNC: 137 MMOL/L (ref 136–145)
WBC # BLD AUTO: 8.68 THOUSAND/UL (ref 4.31–10.16)

## 2021-10-25 PROCEDURE — 80053 COMPREHEN METABOLIC PANEL: CPT | Performed by: INTERNAL MEDICINE

## 2021-10-25 PROCEDURE — 36415 COLL VENOUS BLD VENIPUNCTURE: CPT | Performed by: INTERNAL MEDICINE

## 2021-10-25 PROCEDURE — 85025 COMPLETE CBC W/AUTO DIFF WBC: CPT | Performed by: INTERNAL MEDICINE

## 2021-10-26 ENCOUNTER — APPOINTMENT (OUTPATIENT)
Dept: RADIATION ONCOLOGY | Facility: HOSPITAL | Age: 66
End: 2021-10-26
Attending: RADIOLOGY
Payer: MEDICARE

## 2021-10-27 ENCOUNTER — APPOINTMENT (OUTPATIENT)
Dept: RADIATION ONCOLOGY | Facility: HOSPITAL | Age: 66
End: 2021-10-27
Payer: MEDICARE

## 2021-10-28 ENCOUNTER — APPOINTMENT (OUTPATIENT)
Dept: RADIATION ONCOLOGY | Facility: HOSPITAL | Age: 66
End: 2021-10-28
Attending: STUDENT IN AN ORGANIZED HEALTH CARE EDUCATION/TRAINING PROGRAM
Payer: MEDICARE

## 2021-10-28 PROCEDURE — 77014 CHG CT GUIDANCE PLACEMENT RAD THERAPY FIELDS: CPT | Performed by: STUDENT IN AN ORGANIZED HEALTH CARE EDUCATION/TRAINING PROGRAM

## 2021-10-28 PROCEDURE — 77427 RADIATION TX MANAGEMENT X5: CPT | Performed by: STUDENT IN AN ORGANIZED HEALTH CARE EDUCATION/TRAINING PROGRAM

## 2021-10-28 PROCEDURE — 77386 HB NTSTY MODUL RAD TX DLVR CPLX: CPT | Performed by: STUDENT IN AN ORGANIZED HEALTH CARE EDUCATION/TRAINING PROGRAM

## 2021-10-29 ENCOUNTER — HOSPITAL ENCOUNTER (OUTPATIENT)
Dept: RADIOLOGY | Facility: HOSPITAL | Age: 66
Discharge: HOME | End: 2021-10-29
Attending: FAMILY MEDICINE
Payer: MEDICARE

## 2021-10-29 ENCOUNTER — APPOINTMENT (OUTPATIENT)
Dept: RADIATION ONCOLOGY | Facility: HOSPITAL | Age: 66
End: 2021-10-29
Attending: STUDENT IN AN ORGANIZED HEALTH CARE EDUCATION/TRAINING PROGRAM
Payer: MEDICARE

## 2021-10-29 VITALS — HEIGHT: 62 IN | BODY MASS INDEX: 23.74 KG/M2 | WEIGHT: 129 LBS

## 2021-10-29 DIAGNOSIS — Z12.31 ENCOUNTER FOR SCREENING MAMMOGRAM FOR BREAST CANCER: ICD-10-CM

## 2021-10-29 PROCEDURE — 77067 SCR MAMMO BI INCL CAD: CPT

## 2021-10-29 PROCEDURE — 77063 BREAST TOMOSYNTHESIS BI: CPT

## 2021-10-29 PROCEDURE — 77386 HB NTSTY MODUL RAD TX DLVR CPLX: CPT | Performed by: STUDENT IN AN ORGANIZED HEALTH CARE EDUCATION/TRAINING PROGRAM

## 2021-10-29 PROCEDURE — 77014 CHG CT GUIDANCE PLACEMENT RAD THERAPY FIELDS: CPT | Performed by: STUDENT IN AN ORGANIZED HEALTH CARE EDUCATION/TRAINING PROGRAM

## 2021-11-01 ENCOUNTER — APPOINTMENT (OUTPATIENT)
Dept: RADIATION ONCOLOGY | Facility: HOSPITAL | Age: 66
End: 2021-11-01
Attending: STUDENT IN AN ORGANIZED HEALTH CARE EDUCATION/TRAINING PROGRAM
Payer: MEDICARE

## 2021-11-01 PROCEDURE — 77386 HB NTSTY MODUL RAD TX DLVR CPLX: CPT | Performed by: STUDENT IN AN ORGANIZED HEALTH CARE EDUCATION/TRAINING PROGRAM

## 2021-11-01 PROCEDURE — 77014 CHG CT GUIDANCE PLACEMENT RAD THERAPY FIELDS: CPT | Performed by: STUDENT IN AN ORGANIZED HEALTH CARE EDUCATION/TRAINING PROGRAM

## 2021-11-02 ENCOUNTER — APPOINTMENT (OUTPATIENT)
Dept: RADIATION ONCOLOGY | Facility: HOSPITAL | Age: 66
End: 2021-11-02
Attending: STUDENT IN AN ORGANIZED HEALTH CARE EDUCATION/TRAINING PROGRAM
Payer: MEDICARE

## 2021-11-02 ENCOUNTER — APPOINTMENT (OUTPATIENT)
Dept: RADIATION ONCOLOGY | Facility: HOSPITAL | Age: 66
End: 2021-11-02
Payer: MEDICARE

## 2021-11-02 ENCOUNTER — APPOINTMENT (OUTPATIENT)
Dept: LAB | Facility: CLINIC | Age: 66
End: 2021-11-02
Payer: MEDICARE

## 2021-11-02 PROCEDURE — 77386 HB NTSTY MODUL RAD TX DLVR CPLX: CPT | Performed by: STUDENT IN AN ORGANIZED HEALTH CARE EDUCATION/TRAINING PROGRAM

## 2021-11-02 PROCEDURE — 77014 CHG CT GUIDANCE PLACEMENT RAD THERAPY FIELDS: CPT | Performed by: STUDENT IN AN ORGANIZED HEALTH CARE EDUCATION/TRAINING PROGRAM

## 2021-11-03 ENCOUNTER — APPOINTMENT (OUTPATIENT)
Dept: RADIATION ONCOLOGY | Facility: HOSPITAL | Age: 66
End: 2021-11-03
Attending: STUDENT IN AN ORGANIZED HEALTH CARE EDUCATION/TRAINING PROGRAM
Payer: MEDICARE

## 2021-11-03 PROCEDURE — 77014 CHG CT GUIDANCE PLACEMENT RAD THERAPY FIELDS: CPT | Performed by: STUDENT IN AN ORGANIZED HEALTH CARE EDUCATION/TRAINING PROGRAM

## 2021-11-03 PROCEDURE — 77386 HB NTSTY MODUL RAD TX DLVR CPLX: CPT | Performed by: STUDENT IN AN ORGANIZED HEALTH CARE EDUCATION/TRAINING PROGRAM

## 2021-11-03 PROCEDURE — 77336 RADIATION PHYSICS CONSULT: CPT | Performed by: STUDENT IN AN ORGANIZED HEALTH CARE EDUCATION/TRAINING PROGRAM

## 2021-11-04 ENCOUNTER — APPOINTMENT (OUTPATIENT)
Dept: RADIATION ONCOLOGY | Facility: HOSPITAL | Age: 66
End: 2021-11-04
Attending: STUDENT IN AN ORGANIZED HEALTH CARE EDUCATION/TRAINING PROGRAM
Payer: MEDICARE

## 2021-11-04 PROCEDURE — 77386 HB NTSTY MODUL RAD TX DLVR CPLX: CPT | Performed by: STUDENT IN AN ORGANIZED HEALTH CARE EDUCATION/TRAINING PROGRAM

## 2021-11-04 PROCEDURE — 77427 RADIATION TX MANAGEMENT X5: CPT | Performed by: RADIOLOGY

## 2021-11-04 PROCEDURE — 77014 CHG CT GUIDANCE PLACEMENT RAD THERAPY FIELDS: CPT | Performed by: STUDENT IN AN ORGANIZED HEALTH CARE EDUCATION/TRAINING PROGRAM

## 2021-11-05 ENCOUNTER — APPOINTMENT (OUTPATIENT)
Dept: RADIATION ONCOLOGY | Facility: HOSPITAL | Age: 66
End: 2021-11-05
Attending: STUDENT IN AN ORGANIZED HEALTH CARE EDUCATION/TRAINING PROGRAM
Payer: MEDICARE

## 2021-11-05 ENCOUNTER — OFFICE VISIT (OUTPATIENT)
Dept: HEMATOLOGY ONCOLOGY | Facility: MEDICAL CENTER | Age: 66
End: 2021-11-05
Payer: MEDICARE

## 2021-11-05 VITALS
TEMPERATURE: 97.3 F | BODY MASS INDEX: 23.74 KG/M2 | SYSTOLIC BLOOD PRESSURE: 138 MMHG | HEART RATE: 75 BPM | OXYGEN SATURATION: 98 % | RESPIRATION RATE: 16 BRPM | WEIGHT: 129 LBS | DIASTOLIC BLOOD PRESSURE: 78 MMHG | HEIGHT: 62 IN

## 2021-11-05 DIAGNOSIS — D49.0 NEOPLASM OF AMPULLA OF VATER: Primary | ICD-10-CM

## 2021-11-05 DIAGNOSIS — N63.0 BREAST MASS: ICD-10-CM

## 2021-11-05 PROCEDURE — 77014 CHG CT GUIDANCE PLACEMENT RAD THERAPY FIELDS: CPT | Performed by: STUDENT IN AN ORGANIZED HEALTH CARE EDUCATION/TRAINING PROGRAM

## 2021-11-05 PROCEDURE — 99215 OFFICE O/P EST HI 40 MIN: CPT | Performed by: INTERNAL MEDICINE

## 2021-11-05 PROCEDURE — 77386 HB NTSTY MODUL RAD TX DLVR CPLX: CPT | Performed by: STUDENT IN AN ORGANIZED HEALTH CARE EDUCATION/TRAINING PROGRAM

## 2021-11-08 ENCOUNTER — APPOINTMENT (OUTPATIENT)
Dept: RADIATION ONCOLOGY | Facility: HOSPITAL | Age: 66
End: 2021-11-08
Payer: MEDICARE

## 2021-11-08 ENCOUNTER — APPOINTMENT (OUTPATIENT)
Dept: RADIATION ONCOLOGY | Facility: HOSPITAL | Age: 66
End: 2021-11-08
Attending: STUDENT IN AN ORGANIZED HEALTH CARE EDUCATION/TRAINING PROGRAM
Payer: MEDICARE

## 2021-11-08 PROCEDURE — 77386 HB NTSTY MODUL RAD TX DLVR CPLX: CPT | Performed by: RADIOLOGY

## 2021-11-08 PROCEDURE — 77014 CHG CT GUIDANCE PLACEMENT RAD THERAPY FIELDS: CPT | Performed by: RADIOLOGY

## 2021-11-09 ENCOUNTER — APPOINTMENT (OUTPATIENT)
Dept: RADIATION ONCOLOGY | Facility: HOSPITAL | Age: 66
End: 2021-11-09
Attending: STUDENT IN AN ORGANIZED HEALTH CARE EDUCATION/TRAINING PROGRAM
Payer: MEDICARE

## 2021-11-09 ENCOUNTER — APPOINTMENT (OUTPATIENT)
Dept: RADIATION ONCOLOGY | Facility: HOSPITAL | Age: 66
End: 2021-11-09
Payer: MEDICARE

## 2021-11-09 ENCOUNTER — APPOINTMENT (OUTPATIENT)
Dept: LAB | Facility: CLINIC | Age: 66
End: 2021-11-09
Payer: MEDICARE

## 2021-11-09 PROCEDURE — 77014 CHG CT GUIDANCE PLACEMENT RAD THERAPY FIELDS: CPT | Performed by: RADIOLOGY

## 2021-11-09 PROCEDURE — 77386 HB NTSTY MODUL RAD TX DLVR CPLX: CPT | Performed by: RADIOLOGY

## 2021-11-10 ENCOUNTER — APPOINTMENT (OUTPATIENT)
Dept: RADIATION ONCOLOGY | Facility: HOSPITAL | Age: 66
End: 2021-11-10
Attending: STUDENT IN AN ORGANIZED HEALTH CARE EDUCATION/TRAINING PROGRAM
Payer: MEDICARE

## 2021-11-10 PROCEDURE — 77014 CHG CT GUIDANCE PLACEMENT RAD THERAPY FIELDS: CPT | Performed by: STUDENT IN AN ORGANIZED HEALTH CARE EDUCATION/TRAINING PROGRAM

## 2021-11-10 PROCEDURE — 77336 RADIATION PHYSICS CONSULT: CPT | Performed by: RADIOLOGY

## 2021-11-10 PROCEDURE — 77386 HB NTSTY MODUL RAD TX DLVR CPLX: CPT | Performed by: STUDENT IN AN ORGANIZED HEALTH CARE EDUCATION/TRAINING PROGRAM

## 2021-11-11 ENCOUNTER — APPOINTMENT (OUTPATIENT)
Dept: RADIATION ONCOLOGY | Facility: HOSPITAL | Age: 66
End: 2021-11-11
Attending: STUDENT IN AN ORGANIZED HEALTH CARE EDUCATION/TRAINING PROGRAM
Payer: MEDICARE

## 2021-11-11 PROCEDURE — 77014 CHG CT GUIDANCE PLACEMENT RAD THERAPY FIELDS: CPT | Performed by: STUDENT IN AN ORGANIZED HEALTH CARE EDUCATION/TRAINING PROGRAM

## 2021-11-11 PROCEDURE — 77386 HB NTSTY MODUL RAD TX DLVR CPLX: CPT | Performed by: STUDENT IN AN ORGANIZED HEALTH CARE EDUCATION/TRAINING PROGRAM

## 2021-11-11 PROCEDURE — 77427 RADIATION TX MANAGEMENT X5: CPT | Performed by: STUDENT IN AN ORGANIZED HEALTH CARE EDUCATION/TRAINING PROGRAM

## 2021-11-12 ENCOUNTER — APPOINTMENT (OUTPATIENT)
Dept: RADIATION ONCOLOGY | Facility: HOSPITAL | Age: 66
End: 2021-11-12
Attending: STUDENT IN AN ORGANIZED HEALTH CARE EDUCATION/TRAINING PROGRAM
Payer: MEDICARE

## 2021-11-12 PROCEDURE — 77386 HB NTSTY MODUL RAD TX DLVR CPLX: CPT | Performed by: RADIOLOGY

## 2021-11-12 PROCEDURE — 77014 CHG CT GUIDANCE PLACEMENT RAD THERAPY FIELDS: CPT | Performed by: RADIOLOGY

## 2021-11-15 ENCOUNTER — APPOINTMENT (OUTPATIENT)
Dept: RADIATION ONCOLOGY | Facility: HOSPITAL | Age: 66
End: 2021-11-15
Attending: STUDENT IN AN ORGANIZED HEALTH CARE EDUCATION/TRAINING PROGRAM
Payer: MEDICARE

## 2021-11-15 ENCOUNTER — APPOINTMENT (OUTPATIENT)
Dept: LAB | Facility: CLINIC | Age: 66
End: 2021-11-15
Payer: MEDICARE

## 2021-11-15 PROCEDURE — 77386 HB NTSTY MODUL RAD TX DLVR CPLX: CPT | Performed by: STUDENT IN AN ORGANIZED HEALTH CARE EDUCATION/TRAINING PROGRAM

## 2021-11-15 PROCEDURE — 77014 CHG CT GUIDANCE PLACEMENT RAD THERAPY FIELDS: CPT | Performed by: STUDENT IN AN ORGANIZED HEALTH CARE EDUCATION/TRAINING PROGRAM

## 2021-11-16 ENCOUNTER — APPOINTMENT (OUTPATIENT)
Dept: RADIATION ONCOLOGY | Facility: HOSPITAL | Age: 66
End: 2021-11-16
Payer: MEDICARE

## 2021-11-16 ENCOUNTER — APPOINTMENT (OUTPATIENT)
Dept: RADIATION ONCOLOGY | Facility: HOSPITAL | Age: 66
End: 2021-11-16
Attending: STUDENT IN AN ORGANIZED HEALTH CARE EDUCATION/TRAINING PROGRAM
Payer: MEDICARE

## 2021-11-16 PROCEDURE — 77386 HB NTSTY MODUL RAD TX DLVR CPLX: CPT | Performed by: RADIOLOGY

## 2021-11-16 PROCEDURE — 77014 CHG CT GUIDANCE PLACEMENT RAD THERAPY FIELDS: CPT | Performed by: RADIOLOGY

## 2021-11-17 ENCOUNTER — APPOINTMENT (OUTPATIENT)
Dept: RADIATION ONCOLOGY | Facility: HOSPITAL | Age: 66
End: 2021-11-17
Attending: STUDENT IN AN ORGANIZED HEALTH CARE EDUCATION/TRAINING PROGRAM
Payer: MEDICARE

## 2021-11-17 PROCEDURE — 77336 RADIATION PHYSICS CONSULT: CPT | Performed by: STUDENT IN AN ORGANIZED HEALTH CARE EDUCATION/TRAINING PROGRAM

## 2021-11-17 PROCEDURE — 77386 HB NTSTY MODUL RAD TX DLVR CPLX: CPT | Performed by: STUDENT IN AN ORGANIZED HEALTH CARE EDUCATION/TRAINING PROGRAM

## 2021-11-17 PROCEDURE — 77014 CHG CT GUIDANCE PLACEMENT RAD THERAPY FIELDS: CPT | Performed by: STUDENT IN AN ORGANIZED HEALTH CARE EDUCATION/TRAINING PROGRAM

## 2021-11-18 ENCOUNTER — TELEPHONE (OUTPATIENT)
Dept: HEMATOLOGY ONCOLOGY | Facility: MEDICAL CENTER | Age: 66
End: 2021-11-18

## 2021-11-18 ENCOUNTER — OFFICE VISIT (OUTPATIENT)
Dept: HEMATOLOGY ONCOLOGY | Facility: MEDICAL CENTER | Age: 66
End: 2021-11-18
Payer: MEDICARE

## 2021-11-18 ENCOUNTER — HOSPITAL ENCOUNTER (OUTPATIENT)
Dept: RADIOLOGY | Facility: HOSPITAL | Age: 66
Discharge: HOME | End: 2021-11-18
Attending: FAMILY MEDICINE
Payer: MEDICARE

## 2021-11-18 ENCOUNTER — APPOINTMENT (OUTPATIENT)
Dept: RADIATION ONCOLOGY | Facility: HOSPITAL | Age: 66
End: 2021-11-18
Attending: STUDENT IN AN ORGANIZED HEALTH CARE EDUCATION/TRAINING PROGRAM
Payer: MEDICARE

## 2021-11-18 VITALS
OXYGEN SATURATION: 98 % | WEIGHT: 129 LBS | DIASTOLIC BLOOD PRESSURE: 78 MMHG | SYSTOLIC BLOOD PRESSURE: 142 MMHG | BODY MASS INDEX: 23.74 KG/M2 | HEART RATE: 73 BPM | HEIGHT: 62 IN | RESPIRATION RATE: 16 BRPM | TEMPERATURE: 97.4 F

## 2021-11-18 VITALS — HEIGHT: 62 IN | BODY MASS INDEX: 23.74 KG/M2 | WEIGHT: 129 LBS

## 2021-11-18 DIAGNOSIS — D49.0 NEOPLASM OF AMPULLA OF VATER: Primary | ICD-10-CM

## 2021-11-18 DIAGNOSIS — R92.8 ABNORMAL SCREENING MAMMOGRAM: ICD-10-CM

## 2021-11-18 DIAGNOSIS — Z95.828 PORT-A-CATH IN PLACE: ICD-10-CM

## 2021-11-18 DIAGNOSIS — N60.41 DUCT ECTASIA OF BREAST, RIGHT: ICD-10-CM

## 2021-11-18 PROCEDURE — 77386 HB NTSTY MODUL RAD TX DLVR CPLX: CPT | Performed by: STUDENT IN AN ORGANIZED HEALTH CARE EDUCATION/TRAINING PROGRAM

## 2021-11-18 PROCEDURE — 77014 CHG CT GUIDANCE PLACEMENT RAD THERAPY FIELDS: CPT | Performed by: STUDENT IN AN ORGANIZED HEALTH CARE EDUCATION/TRAINING PROGRAM

## 2021-11-18 PROCEDURE — G0279 TOMOSYNTHESIS, MAMMO: HCPCS

## 2021-11-18 PROCEDURE — 99215 OFFICE O/P EST HI 40 MIN: CPT | Performed by: PHYSICIAN ASSISTANT

## 2021-11-18 PROCEDURE — 77066 DX MAMMO INCL CAD BI: CPT

## 2021-11-18 PROCEDURE — 77427 RADIATION TX MANAGEMENT X5: CPT | Performed by: STUDENT IN AN ORGANIZED HEALTH CARE EDUCATION/TRAINING PROGRAM

## 2021-11-18 PROCEDURE — 76642 ULTRASOUND BREAST LIMITED: CPT

## 2021-11-19 ENCOUNTER — OFFICE VISIT (OUTPATIENT)
Dept: SURGICAL ONCOLOGY | Facility: CLINIC | Age: 66
End: 2021-11-19
Payer: MEDICARE

## 2021-11-19 ENCOUNTER — APPOINTMENT (OUTPATIENT)
Dept: RADIATION ONCOLOGY | Facility: HOSPITAL | Age: 66
End: 2021-11-19
Attending: STUDENT IN AN ORGANIZED HEALTH CARE EDUCATION/TRAINING PROGRAM
Payer: MEDICARE

## 2021-11-19 ENCOUNTER — DOCUMENTATION (OUTPATIENT)
Dept: HEMATOLOGY ONCOLOGY | Facility: MEDICAL CENTER | Age: 66
End: 2021-11-19

## 2021-11-19 VITALS
OXYGEN SATURATION: 98 % | DIASTOLIC BLOOD PRESSURE: 80 MMHG | TEMPERATURE: 97 F | RESPIRATION RATE: 16 BRPM | HEART RATE: 84 BPM | SYSTOLIC BLOOD PRESSURE: 138 MMHG | WEIGHT: 130 LBS | BODY MASS INDEX: 23.92 KG/M2 | HEIGHT: 62 IN

## 2021-11-19 DIAGNOSIS — C24.1 MALIGNANT NEOPLASM OF AMPULLA OF VATER (HCC): ICD-10-CM

## 2021-11-19 DIAGNOSIS — D49.0 NEOPLASM OF AMPULLA OF VATER: Primary | ICD-10-CM

## 2021-11-19 DIAGNOSIS — Z15.01 BRCA2 GENE MUTATION POSITIVE: ICD-10-CM

## 2021-11-19 DIAGNOSIS — Z15.09 BRCA2 GENE MUTATION POSITIVE: ICD-10-CM

## 2021-11-19 PROCEDURE — 77386 HB NTSTY MODUL RAD TX DLVR CPLX: CPT | Performed by: RADIOLOGY

## 2021-11-19 PROCEDURE — 99215 OFFICE O/P EST HI 40 MIN: CPT | Performed by: SURGERY

## 2021-11-19 PROCEDURE — 77014 CHG CT GUIDANCE PLACEMENT RAD THERAPY FIELDS: CPT | Performed by: RADIOLOGY

## 2021-11-21 ENCOUNTER — RADIATION ONCOLOGY ON TREATMENT (OUTPATIENT)
Dept: RADIATION ONCOLOGY | Facility: HOSPITAL | Age: 66
End: 2021-11-21
Attending: STUDENT IN AN ORGANIZED HEALTH CARE EDUCATION/TRAINING PROGRAM
Payer: MEDICARE

## 2021-11-21 ENCOUNTER — APPOINTMENT (OUTPATIENT)
Dept: RADIATION ONCOLOGY | Facility: HOSPITAL | Age: 66
End: 2021-11-21
Payer: MEDICARE

## 2021-11-21 PROCEDURE — 77386 HB NTSTY MODUL RAD TX DLVR CPLX: CPT | Performed by: STUDENT IN AN ORGANIZED HEALTH CARE EDUCATION/TRAINING PROGRAM

## 2021-11-21 PROCEDURE — 77014 CHG CT GUIDANCE PLACEMENT RAD THERAPY FIELDS: CPT | Performed by: STUDENT IN AN ORGANIZED HEALTH CARE EDUCATION/TRAINING PROGRAM

## 2021-11-22 ENCOUNTER — APPOINTMENT (OUTPATIENT)
Dept: RADIATION ONCOLOGY | Facility: HOSPITAL | Age: 66
End: 2021-11-22
Attending: STUDENT IN AN ORGANIZED HEALTH CARE EDUCATION/TRAINING PROGRAM
Payer: MEDICARE

## 2021-11-22 ENCOUNTER — TELEPHONE (OUTPATIENT)
Dept: HEMATOLOGY ONCOLOGY | Facility: MEDICAL CENTER | Age: 66
End: 2021-11-22

## 2021-11-22 PROCEDURE — 77014 CHG CT GUIDANCE PLACEMENT RAD THERAPY FIELDS: CPT | Performed by: STUDENT IN AN ORGANIZED HEALTH CARE EDUCATION/TRAINING PROGRAM

## 2021-11-22 PROCEDURE — 77386 HB NTSTY MODUL RAD TX DLVR CPLX: CPT | Performed by: STUDENT IN AN ORGANIZED HEALTH CARE EDUCATION/TRAINING PROGRAM

## 2021-11-23 ENCOUNTER — APPOINTMENT (OUTPATIENT)
Dept: RADIATION ONCOLOGY | Facility: HOSPITAL | Age: 66
End: 2021-11-23
Attending: STUDENT IN AN ORGANIZED HEALTH CARE EDUCATION/TRAINING PROGRAM
Payer: MEDICARE

## 2021-11-23 ENCOUNTER — APPOINTMENT (OUTPATIENT)
Dept: LAB | Facility: HOSPITAL | Age: 66
End: 2021-11-23
Attending: INTERNAL MEDICINE
Payer: MEDICARE

## 2021-11-23 ENCOUNTER — HOSPITAL ENCOUNTER (OUTPATIENT)
Dept: INFUSION CENTER | Facility: HOSPITAL | Age: 66
Discharge: HOME | End: 2021-11-23

## 2021-11-23 ENCOUNTER — APPOINTMENT (OUTPATIENT)
Dept: RADIATION ONCOLOGY | Facility: HOSPITAL | Age: 66
End: 2021-11-23
Payer: MEDICARE

## 2021-11-23 PROCEDURE — 77336 RADIATION PHYSICS CONSULT: CPT | Performed by: STUDENT IN AN ORGANIZED HEALTH CARE EDUCATION/TRAINING PROGRAM

## 2021-11-23 PROCEDURE — 77386 HB NTSTY MODUL RAD TX DLVR CPLX: CPT | Performed by: RADIOLOGY

## 2021-11-23 PROCEDURE — 77014 CHG CT GUIDANCE PLACEMENT RAD THERAPY FIELDS: CPT | Performed by: RADIOLOGY

## 2021-11-24 ENCOUNTER — APPOINTMENT (OUTPATIENT)
Dept: RADIATION ONCOLOGY | Facility: HOSPITAL | Age: 66
End: 2021-11-24
Attending: STUDENT IN AN ORGANIZED HEALTH CARE EDUCATION/TRAINING PROGRAM
Payer: MEDICARE

## 2021-11-24 PROCEDURE — 77427 RADIATION TX MANAGEMENT X5: CPT | Performed by: STUDENT IN AN ORGANIZED HEALTH CARE EDUCATION/TRAINING PROGRAM

## 2021-11-24 PROCEDURE — 77386 HB NTSTY MODUL RAD TX DLVR CPLX: CPT | Performed by: STUDENT IN AN ORGANIZED HEALTH CARE EDUCATION/TRAINING PROGRAM

## 2021-11-24 PROCEDURE — 77014 CHG CT GUIDANCE PLACEMENT RAD THERAPY FIELDS: CPT | Performed by: STUDENT IN AN ORGANIZED HEALTH CARE EDUCATION/TRAINING PROGRAM

## 2021-11-29 ENCOUNTER — APPOINTMENT (OUTPATIENT)
Dept: RADIATION ONCOLOGY | Facility: HOSPITAL | Age: 66
End: 2021-11-29
Attending: STUDENT IN AN ORGANIZED HEALTH CARE EDUCATION/TRAINING PROGRAM
Payer: MEDICARE

## 2021-11-29 ENCOUNTER — APPOINTMENT (OUTPATIENT)
Dept: LAB | Facility: CLINIC | Age: 66
End: 2021-11-29
Payer: MEDICARE

## 2021-11-29 DIAGNOSIS — D49.0 NEOPLASM OF AMPULLA OF VATER: ICD-10-CM

## 2021-11-29 LAB
ALBUMIN SERPL BCP-MCNC: 3.8 G/DL (ref 3.5–5)
ALP SERPL-CCNC: 120 U/L (ref 46–116)
ALT SERPL W P-5'-P-CCNC: 56 U/L (ref 12–78)
ANION GAP SERPL CALCULATED.3IONS-SCNC: 8 MMOL/L (ref 4–13)
AST SERPL W P-5'-P-CCNC: 44 U/L (ref 5–45)
BASOPHILS # BLD AUTO: 0.05 THOUSANDS/ΜL (ref 0–0.1)
BASOPHILS NFR BLD AUTO: 1 % (ref 0–1)
BILIRUB SERPL-MCNC: 0.46 MG/DL (ref 0.2–1)
BUN SERPL-MCNC: 25 MG/DL (ref 5–25)
CALCIUM SERPL-MCNC: 9.7 MG/DL (ref 8.3–10.1)
CHLORIDE SERPL-SCNC: 107 MMOL/L (ref 100–108)
CO2 SERPL-SCNC: 24 MMOL/L (ref 21–32)
CREAT SERPL-MCNC: 0.63 MG/DL (ref 0.6–1.3)
EOSINOPHIL # BLD AUTO: 0.12 THOUSAND/ΜL (ref 0–0.61)
EOSINOPHIL NFR BLD AUTO: 2 % (ref 0–6)
ERYTHROCYTE [DISTWIDTH] IN BLOOD BY AUTOMATED COUNT: 17.6 % (ref 11.6–15.1)
GFR SERPL CREATININE-BSD FRML MDRD: 94 ML/MIN/1.73SQ M
GLUCOSE SERPL-MCNC: 102 MG/DL (ref 65–140)
HCT VFR BLD AUTO: 39.8 % (ref 34.8–46.1)
HGB BLD-MCNC: 12.9 G/DL (ref 11.5–15.4)
IMM GRANULOCYTES # BLD AUTO: 0.02 THOUSAND/UL (ref 0–0.2)
IMM GRANULOCYTES NFR BLD AUTO: 0 % (ref 0–2)
LYMPHOCYTES # BLD AUTO: 0.71 THOUSANDS/ΜL (ref 0.6–4.47)
LYMPHOCYTES NFR BLD AUTO: 12 % (ref 14–44)
MCH RBC QN AUTO: 31.1 PG (ref 26.8–34.3)
MCHC RBC AUTO-ENTMCNC: 32.4 G/DL (ref 31.4–37.4)
MCV RBC AUTO: 96 FL (ref 82–98)
MONOCYTES # BLD AUTO: 0.57 THOUSAND/ΜL (ref 0.17–1.22)
MONOCYTES NFR BLD AUTO: 9 % (ref 4–12)
NEUTROPHILS # BLD AUTO: 4.66 THOUSANDS/ΜL (ref 1.85–7.62)
NEUTS SEG NFR BLD AUTO: 76 % (ref 43–75)
NRBC BLD AUTO-RTO: 0 /100 WBCS
PLATELET # BLD AUTO: 221 THOUSANDS/UL (ref 149–390)
PMV BLD AUTO: 10.2 FL (ref 8.9–12.7)
POTASSIUM SERPL-SCNC: 4.2 MMOL/L (ref 3.5–5.3)
PROT SERPL-MCNC: 7.7 G/DL (ref 6.4–8.2)
RBC # BLD AUTO: 4.15 MILLION/UL (ref 3.81–5.12)
SODIUM SERPL-SCNC: 139 MMOL/L (ref 136–145)
WBC # BLD AUTO: 6.13 THOUSAND/UL (ref 4.31–10.16)

## 2021-11-29 PROCEDURE — 85025 COMPLETE CBC W/AUTO DIFF WBC: CPT

## 2021-11-29 PROCEDURE — 77014 CHG CT GUIDANCE PLACEMENT RAD THERAPY FIELDS: CPT | Performed by: STUDENT IN AN ORGANIZED HEALTH CARE EDUCATION/TRAINING PROGRAM

## 2021-11-29 PROCEDURE — 77386 HB NTSTY MODUL RAD TX DLVR CPLX: CPT | Performed by: STUDENT IN AN ORGANIZED HEALTH CARE EDUCATION/TRAINING PROGRAM

## 2021-11-29 PROCEDURE — 36415 COLL VENOUS BLD VENIPUNCTURE: CPT

## 2021-11-29 PROCEDURE — 80053 COMPREHEN METABOLIC PANEL: CPT

## 2021-11-30 ENCOUNTER — APPOINTMENT (OUTPATIENT)
Dept: RADIATION ONCOLOGY | Facility: HOSPITAL | Age: 66
End: 2021-11-30
Payer: MEDICARE

## 2021-11-30 ENCOUNTER — APPOINTMENT (OUTPATIENT)
Dept: RADIATION ONCOLOGY | Facility: HOSPITAL | Age: 66
End: 2021-11-30
Attending: STUDENT IN AN ORGANIZED HEALTH CARE EDUCATION/TRAINING PROGRAM
Payer: MEDICARE

## 2021-11-30 PROCEDURE — 77014 CHG CT GUIDANCE PLACEMENT RAD THERAPY FIELDS: CPT | Performed by: RADIOLOGY

## 2021-11-30 PROCEDURE — 77386 HB NTSTY MODUL RAD TX DLVR CPLX: CPT | Performed by: RADIOLOGY

## 2021-12-01 ENCOUNTER — APPOINTMENT (OUTPATIENT)
Dept: RADIATION ONCOLOGY | Facility: HOSPITAL | Age: 66
End: 2021-12-01
Attending: STUDENT IN AN ORGANIZED HEALTH CARE EDUCATION/TRAINING PROGRAM
Payer: MEDICARE

## 2021-12-01 PROCEDURE — 77014 CHG CT GUIDANCE PLACEMENT RAD THERAPY FIELDS: CPT | Performed by: STUDENT IN AN ORGANIZED HEALTH CARE EDUCATION/TRAINING PROGRAM

## 2021-12-01 PROCEDURE — 77386 HB NTSTY MODUL RAD TX DLVR CPLX: CPT | Performed by: STUDENT IN AN ORGANIZED HEALTH CARE EDUCATION/TRAINING PROGRAM

## 2021-12-02 ENCOUNTER — APPOINTMENT (OUTPATIENT)
Dept: RADIATION ONCOLOGY | Facility: HOSPITAL | Age: 66
End: 2021-12-02
Payer: MEDICARE

## 2021-12-02 ENCOUNTER — APPOINTMENT (OUTPATIENT)
Dept: RADIATION ONCOLOGY | Facility: HOSPITAL | Age: 66
End: 2021-12-02
Attending: STUDENT IN AN ORGANIZED HEALTH CARE EDUCATION/TRAINING PROGRAM
Payer: MEDICARE

## 2021-12-02 PROCEDURE — 77386 HB NTSTY MODUL RAD TX DLVR CPLX: CPT | Performed by: STUDENT IN AN ORGANIZED HEALTH CARE EDUCATION/TRAINING PROGRAM

## 2021-12-02 PROCEDURE — 77336 RADIATION PHYSICS CONSULT: CPT | Performed by: STUDENT IN AN ORGANIZED HEALTH CARE EDUCATION/TRAINING PROGRAM

## 2021-12-02 PROCEDURE — 77014 CHG CT GUIDANCE PLACEMENT RAD THERAPY FIELDS: CPT | Performed by: STUDENT IN AN ORGANIZED HEALTH CARE EDUCATION/TRAINING PROGRAM

## 2021-12-06 ENCOUNTER — TELEPHONE (OUTPATIENT)
Dept: HEMATOLOGY ONCOLOGY | Facility: MEDICAL CENTER | Age: 66
End: 2021-12-06

## 2021-12-06 DIAGNOSIS — D49.0 NEOPLASM OF AMPULLA OF VATER: Primary | ICD-10-CM

## 2021-12-07 ENCOUNTER — HOSPITAL ENCOUNTER (OUTPATIENT)
Dept: INFUSION CENTER | Facility: HOSPITAL | Age: 66
Discharge: HOME | End: 2021-12-07
Payer: MEDICARE

## 2021-12-07 VITALS — TEMPERATURE: 96.1 F

## 2021-12-07 DIAGNOSIS — D49.0 NEOPLASM OF AMPULLA OF VATER: Primary | ICD-10-CM

## 2021-12-07 PROCEDURE — 96523 IRRIG DRUG DELIVERY DEVICE: CPT

## 2021-12-13 ENCOUNTER — APPOINTMENT (OUTPATIENT)
Dept: LAB | Facility: CLINIC | Age: 66
End: 2021-12-13
Payer: MEDICARE

## 2021-12-13 DIAGNOSIS — D49.0 NEOPLASM OF AMPULLA OF VATER: ICD-10-CM

## 2021-12-13 LAB
ALBUMIN SERPL BCP-MCNC: 3.9 G/DL (ref 3.5–5)
ALP SERPL-CCNC: 171 U/L (ref 46–116)
ALT SERPL W P-5'-P-CCNC: 85 U/L (ref 12–78)
ANION GAP SERPL CALCULATED.3IONS-SCNC: 2 MMOL/L (ref 4–13)
AST SERPL W P-5'-P-CCNC: 50 U/L (ref 5–45)
BASOPHILS # BLD AUTO: 0.04 THOUSANDS/ΜL (ref 0–0.1)
BASOPHILS NFR BLD AUTO: 1 % (ref 0–1)
BILIRUB SERPL-MCNC: 0.29 MG/DL (ref 0.2–1)
BUN SERPL-MCNC: 20 MG/DL (ref 5–25)
CALCIUM SERPL-MCNC: 9.6 MG/DL (ref 8.3–10.1)
CHLORIDE SERPL-SCNC: 106 MMOL/L (ref 100–108)
CO2 SERPL-SCNC: 29 MMOL/L (ref 21–32)
CREAT SERPL-MCNC: 0.61 MG/DL (ref 0.6–1.3)
EOSINOPHIL # BLD AUTO: 0.07 THOUSAND/ΜL (ref 0–0.61)
EOSINOPHIL NFR BLD AUTO: 1 % (ref 0–6)
ERYTHROCYTE [DISTWIDTH] IN BLOOD BY AUTOMATED COUNT: 17.2 % (ref 11.6–15.1)
GFR SERPL CREATININE-BSD FRML MDRD: 95 ML/MIN/1.73SQ M
GLUCOSE SERPL-MCNC: 91 MG/DL (ref 65–140)
HCT VFR BLD AUTO: 38.5 % (ref 34.8–46.1)
HGB BLD-MCNC: 12.6 G/DL (ref 11.5–15.4)
IMM GRANULOCYTES # BLD AUTO: 0.01 THOUSAND/UL (ref 0–0.2)
IMM GRANULOCYTES NFR BLD AUTO: 0 % (ref 0–2)
LYMPHOCYTES # BLD AUTO: 0.73 THOUSANDS/ΜL (ref 0.6–4.47)
LYMPHOCYTES NFR BLD AUTO: 14 % (ref 14–44)
MCH RBC QN AUTO: 31.3 PG (ref 26.8–34.3)
MCHC RBC AUTO-ENTMCNC: 32.7 G/DL (ref 31.4–37.4)
MCV RBC AUTO: 96 FL (ref 82–98)
MONOCYTES # BLD AUTO: 0.44 THOUSAND/ΜL (ref 0.17–1.22)
MONOCYTES NFR BLD AUTO: 9 % (ref 4–12)
NEUTROPHILS # BLD AUTO: 3.86 THOUSANDS/ΜL (ref 1.85–7.62)
NEUTS SEG NFR BLD AUTO: 75 % (ref 43–75)
NRBC BLD AUTO-RTO: 0 /100 WBCS
PLATELET # BLD AUTO: 218 THOUSANDS/UL (ref 149–390)
PMV BLD AUTO: 10.3 FL (ref 8.9–12.7)
POTASSIUM SERPL-SCNC: 4.7 MMOL/L (ref 3.5–5.3)
PROT SERPL-MCNC: 7.9 G/DL (ref 6.4–8.2)
RBC # BLD AUTO: 4.02 MILLION/UL (ref 3.81–5.12)
SODIUM SERPL-SCNC: 137 MMOL/L (ref 136–145)
WBC # BLD AUTO: 5.15 THOUSAND/UL (ref 4.31–10.16)

## 2021-12-13 PROCEDURE — 80053 COMPREHEN METABOLIC PANEL: CPT

## 2021-12-13 PROCEDURE — 85025 COMPLETE CBC W/AUTO DIFF WBC: CPT

## 2021-12-13 PROCEDURE — 36415 COLL VENOUS BLD VENIPUNCTURE: CPT

## 2021-12-14 ENCOUNTER — OFFICE VISIT (OUTPATIENT)
Dept: HEMATOLOGY ONCOLOGY | Facility: MEDICAL CENTER | Age: 66
End: 2021-12-14
Payer: MEDICARE

## 2021-12-14 VITALS
HEIGHT: 62 IN | OXYGEN SATURATION: 97 % | BODY MASS INDEX: 22.08 KG/M2 | SYSTOLIC BLOOD PRESSURE: 120 MMHG | WEIGHT: 120 LBS | DIASTOLIC BLOOD PRESSURE: 82 MMHG | RESPIRATION RATE: 16 BRPM | HEART RATE: 67 BPM | TEMPERATURE: 98 F

## 2021-12-14 DIAGNOSIS — N63.0 BREAST MASS: ICD-10-CM

## 2021-12-14 DIAGNOSIS — D49.0 NEOPLASM OF AMPULLA OF VATER: Primary | ICD-10-CM

## 2021-12-14 PROCEDURE — 99215 OFFICE O/P EST HI 40 MIN: CPT | Performed by: INTERNAL MEDICINE

## 2021-12-23 ENCOUNTER — TELEPHONE (OUTPATIENT)
Dept: HEMATOLOGY ONCOLOGY | Facility: MEDICAL CENTER | Age: 66
End: 2021-12-23

## 2022-01-03 ENCOUNTER — TELEMEDICINE (OUTPATIENT)
Dept: RADIATION ONCOLOGY | Facility: HOSPITAL | Age: 67
End: 2022-01-03
Attending: STUDENT IN AN ORGANIZED HEALTH CARE EDUCATION/TRAINING PROGRAM

## 2022-01-03 ENCOUNTER — OFFICE VISIT (OUTPATIENT)
Dept: OBGYN CLINIC | Facility: CLINIC | Age: 67
End: 2022-01-03
Payer: MEDICARE

## 2022-01-03 VITALS
SYSTOLIC BLOOD PRESSURE: 108 MMHG | TEMPERATURE: 97 F | WEIGHT: 131 LBS | BODY MASS INDEX: 24.11 KG/M2 | DIASTOLIC BLOOD PRESSURE: 76 MMHG | HEIGHT: 62 IN

## 2022-01-03 DIAGNOSIS — N63.0 BREAST MASS: ICD-10-CM

## 2022-01-03 DIAGNOSIS — Z01.419 ENCOUNTER FOR GYNECOLOGICAL EXAMINATION WITHOUT ABNORMAL FINDING: Primary | ICD-10-CM

## 2022-01-03 DIAGNOSIS — D49.0 NEOPLASM OF AMPULLA OF VATER: Primary | ICD-10-CM

## 2022-01-03 PROCEDURE — 99024 POSTOP FOLLOW-UP VISIT: CPT | Performed by: STUDENT IN AN ORGANIZED HEALTH CARE EDUCATION/TRAINING PROGRAM

## 2022-01-03 PROCEDURE — G0101 CA SCREEN;PELVIC/BREAST EXAM: HCPCS | Performed by: OBSTETRICS & GYNECOLOGY

## 2022-01-03 NOTE — PROGRESS NOTES
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  Patient continues to see Dr Alejandra Gant and has also had pancreatic cancer  Patient reports no bleeding, spotting, or discharge  Patient reports Yes  hot flashes/night sweats,  Not sexually active, No vaginal dryness, sleeping so/so  Wakes at night sometimes trouble getting back to sleep  Patient has scheduled follow-ups with MRIs and CT scans  Current contraception: status post hysterectomy  History of abnormal Pap smear: no  Family history of uterine or ovarian cancer: no  Regular self breast exam: yes  History of abnormal mammogram: yes -  Had breast mass also BRCA1 and 2  Family history of breast cancer: no    Menstrual History:  OB History        3    Para   3    Term   3            AB        Living           SAB        IAB        Ectopic        Multiple        Live Births                    Menarche age: 15  No LMP recorded  Patient has had a hysterectomy         Past Medical History:   Diagnosis Date    BRCA1 positive     BRCA2 positive     Cancer ([facility] [address] )     pancreatic    History of chemotherapy     History of radiation therapy     Pancreatic carcinoma (HCC)      Past Surgical History:   Procedure Laterality Date    ABLATION SOFT TISSUE N/A 2021    Procedure: INTRAOPERATIVE ULTRASOUND, ABLATION,SOFT TISSUE PANCREAS;  Surgeon: Dash Rhoades MD;  [facility];  Service: Surgical Oncology    CHOLECYSTECTOMY N/A 2021    Procedure: CHOLECYSTECTOMY;  Surgeon: Dash Rhoades MD;  [facility];  Service: Surgical Oncology    FL GUIDED CENTRAL VENOUS ACCESS DEVICE INSERTION  2021    HYSTERECTOMY      LAPAROTOMY N/A 2021    Procedure: LAPAROTOMY EXPLORATORY;  Surgeon: Dash Rhoades MD;  [facility];  Service: Surgical Oncology    Heaven Mackay SINUS SURGERY      TUNNELED VENOUS PORT PLACEMENT Left 2021    Procedure: INSERTION VENOUS PORT (PORT-A-CATH); Surgeon: Ivett Lindsay MD;  [facility];  Service: Surgical Oncology    WHIPPLE PROCEDURE/PANCREATICO-DUODENECTOMY N/A 2021    Procedure: WHIPPLE PROCEDURE/PANCREATICO-DUODENECTOMY; PYLORIC PRESERVING;  Surgeon: Ivett Lindsay MD;  [facility];  Service: Surgical Oncology     OB History        3    Para   3    Term   3            AB        Living           SAB        IAB        Ectopic        Multiple        Live Births                     Current Outpatient Medications:     acetaminophen (TYLENOL) 500 mg tablet, Take 1,000 mg by mouth, Disp: , Rfl:     lidocaine-prilocaine (EMLA) cream, Apply topically as needed for mild pain Apply topically to port site 1 hour prior to port accessing, Disp: 30 g, Rfl: 0    Multiple Vitamin (multivitamin) tablet, Take 1 tablet by mouth daily, Disp: , Rfl:     polyethylene glycol (GOLYTELY) 4000 mL solution, 1 cup every 15 minutes until stooling begins, then stop    No need to drink all 4L, Disp: 4000 mL, Rfl: 0    bisacodyl (FLEET) 10 MG/30ML ENEM, Insert 30 mL (10 mg total) into the rectum once for 1 dose, Disp: 30 mL, Rfl: 0    Medical ID Plate MISC, Use once for 1 dose Severely and permanently limited in the ability to walk because of an arthritic, neurological, or orthopedic condition: or cannot walk two hundred feet without stopping to rest and meets requirements for disability license plate, Disp: 1 each, Rfl: 0    ondansetron (ZOFRAN-ODT) 4 mg disintegrating tablet, Take 1 tablet (4 mg total) by mouth every 6 (six) hours as needed for nausea or vomiting (Patient not taking: Reported on 1/3/2022 ), Disp: 30 tablet, Rfl: 1    prochlorperazine (COMPAZINE) 10 mg tablet, Take 1 tablet (10 mg total) by mouth every 6 (six) hours as needed for nausea or vomiting (Patient not taking: Reported on 1/3/2022 ), Disp: 30 tablet, Rfl: 1  Allergies   Allergen Reactions    Penicillins Rash    Tetracycline Rash     Social History     Tobacco Use    Smoking status: Former Smoker     Packs/day: 0 50     Start date: 65     Quit date: 2021     Years since quittin 7    Smokeless tobacco: Never Used    Tobacco comment: recently stop smoking    Vaping Use    Vaping Use: Never used   Substance Use Topics    Alcohol use: Never    Drug use: Never         Review of Systems  Review of Systems   Constitutional: Negative for fatigue, fever and unexpected weight change  HENT: Negative for dental problem, sinus pressure and sinus pain  Eyes: Negative for visual disturbance  Respiratory: Negative for cough, shortness of breath and wheezing  Cardiovascular: Negative for chest pain and leg swelling  Gastrointestinal: Negative for blood in stool, constipation, diarrhea, nausea and vomiting  Endocrine: Negative for cold intolerance, heat intolerance and polydipsia  Genitourinary: Negative for dysuria, frequency, hematuria, menstrual problem and pelvic pain  Musculoskeletal: Negative for arthralgias and back pain  Neurological: Negative for dizziness, seizures and headaches  Psychiatric/Behavioral: The patient is not nervous/anxious                Objective      Temp (!) 97 °F (36 1 °C) (Temporal)   Ht 5' 2" (1 575 m)   Wt 59 4 kg (131 lb)   BMI 23 96 kg/m²   Vitals:    22 1010   Temp: (!) 97 °F (36 1 °C)   TempSrc: Temporal   Weight: 59 4 kg (131 lb)   Height: 5' 2" (1 575 m)       General:   alert and oriented, in no acute distress   Heart: regular rate and rhythm, S1, S2 normal, no murmur, click, rub or gallop   Lungs: clear to auscultation bilaterally   Abdomen: soft, non-tender, without masses or organomegaly   Vulva: normal   Vagina: normal mucosa   Cervix: surgically absent   Uterus: surgically absent   Adnexa: surgically absent   Breast inspection negative, no nipple discharge or bleeding, no masses or nodularity palpable and evidence of prior surgery well healed  Rectal deferred,  Patient planning follow-up with Dr Janey Rodriguez  Thyroid normal no masses or nodules     Assessment    35-year-old  with history of hysterectomy BSO positive BRCA1 and 2 and history of pancreatic cancer     Pap not indicated due to hysterectomy and following up breast imaging with Dr Sameer Wood    return in 1-2 years for annual or sooner as needed

## 2022-01-03 NOTE — PROGRESS NOTES
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  She underwent Whipple resection followed by adjuvant FOLFOX x8 cycles  On 21, she completed a course of adjuvant RT to a dose of 4875 cGy in 25 fraction delivered to her abdomen  She returns today for follow-up  Interval History  The patient was last seen in follow-up on 21 at the completion of chemoRT  She tolerated RT well overall with minimal acute toxicity  21 Hem/Onc - Dr Kanchan Chaves  No concerning signs   Continue with surveillance  Since then the patient is doing well overall  She has some fatigue but no pain, no nausea, no vomiting  She is scheduled for repeat imaging in 2022  Upcomin22 Infusion  3/15/22 Hem/OncANISHA  22 CT CAP w contrast/MRI breast bilateral w and wo contrast w cad  22 Surg/Onc - [facility]        Historical Information   Oncology History   Neoplasm of ampulla of Vater   3/15/2021 Initial Diagnosis    Neoplasm of ampulla of Vater     2021 Surgery    B  Celiac lymph node:     - Single lymph node; negative for malignancy (0)  C   Whipple resection:     - Invasive poorly differentiated mucinous adenocarcinoma  - Tumor arises in the background of an adenoma with high grade dysplasia  - Lymphatic and venous channel invasion by tumor present  - Metastatic carcinoma present in one of twenty lymph nodes ()  - All margins are negative for tumor       2021 -  Cancer Staged    Staging form: Ampulla of Vater, AJCC 8th Edition  - Pathologic: Stage IIIA (pT3a, pN1, cM0) - Signed by Bruno Lyn MD on 6/1/2021  Histologic grade (G): G3  Histologic grading system: 3 grade system  Residual tumor (R): R0 - None       6/9/2021 - 10/1/2021 Chemotherapy    Cycles 1-6  6/2021 through 8/20/21:  FOLFOX    Cycles 7 -8:  9/15/2021- 10/12/2021  Leucovorin 400mg/m2, IV, Day 1  5-Fu 400 mg/m2, IV , Day 1   5-Fu 1200 mg/m2, IV, CI x 46 hours  Cycle length = 2 weeks    palonosetron (ALOXI), 0 25 mg, Intravenous, Once, 5 of 5 cycles  Administration: 0 25 mg (7/21/2021), 0 25 mg (8/4/2021), 0 25 mg (8/18/2021), 0 25 mg (9/15/2021), 0 25 mg (9/29/2021)  fluorouracil (ADRUCIL), 400 mg/m2 = 605 mg, Intravenous, Once, 8 of 8 cycles  Dose modification: 340 mg/m2 (85 % of original dose 400 mg/m2, Cycle 6, Reason: Dose Not Tolerated)  Administration: 605 mg (6/9/2021), 605 mg (6/23/2021), 605 mg (7/7/2021), 605 mg (7/21/2021), 605 mg (8/4/2021), 515 mg (8/18/2021), 610 mg (9/15/2021), 610 mg (9/29/2021)  fosaprepitant (EMEND) IVPB, 150 mg, Intravenous, Once, 6 of 6 cycles  Administration: 150 mg (7/7/2021), 150 mg (7/21/2021), 150 mg (8/4/2021), 150 mg (8/18/2021), 150 mg (9/15/2021), 150 mg (9/29/2021)  leucovorin calcium IVPB, 604 mg, Intravenous, Once, 8 of 8 cycles  Dose modification: 340 mg/m2 (85 % of original dose 400 mg/m2, Cycle 6, Reason: Dose Not Tolerated)  Administration: 600 mg (6/9/2021), 600 mg (6/23/2021), 600 mg (7/7/2021), 600 mg (7/21/2021), 600 mg (8/4/2021), 517 mg (8/18/2021), 600 mg (9/15/2021), 600 mg (9/29/2021)  oxaliplatin (ELOXATIN) chemo infusion, 85 mg/m2 = 128 35 mg, Intravenous, Once, 6 of 6 cycles  Dose modification: 72 25 mg/m2 (85 % of original dose 85 mg/m2, Cycle 6, Reason: Dose Not Tolerated)  Administration: 128 35 mg (6/9/2021), 128 35 mg (6/23/2021), 128 35 mg (7/7/2021), 128 35 mg (7/21/2021), 128 35 mg (8/4/2021), 109 82 mg (8/18/2021)  fluorouracil (ADRUCIL) ambulatory infusion Soln, 1,200 mg/m2/day = 3,625 mg, Intravenous, Over 46 hours, 8 of 8 cycles     10/1/2021 Genetic Testing    Results revealed patient has the following mutation(s):  Positive for a BRCA2 pathogenic variant      10/26/2021 -  Radiation         10/26/2021 -  Chemotherapy    Xeloda 825 mg/m2 BID  BSA = 1 59  Calculated to 1300 mg BID with rounding  10/28/2021 - 12/2/2021 Radiation    Treatments:  Course: C1    Plan ID Energy Fractions Dose per Fraction (cGy) Dose Correction (cGy) Total Dose Delivered (cGy) Elapsed Days   Abdomen 6X 25 / 25 195 0 4,875 35      Treatment Dates:  10/28/2021 - 12/2/2021  Past Medical History:   Diagnosis Date    BRCA1 positive     BRCA2 positive     Cancer ([facility] [address] )     pancreatic    History of chemotherapy     History of radiation therapy     Pancreatic carcinoma (HCC)      Past Surgical History:   Procedure Laterality Date    ABLATION SOFT TISSUE N/A 4/26/2021    Procedure: INTRAOPERATIVE ULTRASOUND, ABLATION,SOFT TISSUE PANCREAS;  Surgeon: Celestine Gomez MD;  [facility];  Service: Surgical Oncology    CHOLECYSTECTOMY N/A 4/26/2021    Procedure: CHOLECYSTECTOMY;  Surgeon: Celestine Gomez MD;  [facility];  Service: Surgical Oncology    FL GUIDED CENTRAL VENOUS ACCESS DEVICE INSERTION  6/2/2021    HYSTERECTOMY      LAPAROTOMY N/A 4/26/2021    Procedure: LAPAROTOMY EXPLORATORY;  Surgeon: Celestine Gomez MD;  [facility];  Service: Surgical Oncology    OOPHORECTOMY      SINUS SURGERY      TUNNELED VENOUS PORT PLACEMENT Left 6/2/2021    Procedure: INSERTION VENOUS PORT (PORT-A-CATH); Surgeon: Celestine Gomez MD;  [facility];  Service: Surgical Oncology    WHIPPLE PROCEDURE/PANCREATICO-DUODENECTOMY N/A 4/26/2021    Procedure:  WHIPPLE PROCEDURE/PANCREATICO-DUODENECTOMY; PYLORIC PRESERVING;  Surgeon: Celestine Gomez MD;  [facility];  Service: Surgical Oncology       Social History   Social History     Substance and Sexual Activity   Alcohol Use Never     Social History     Substance and Sexual Activity   Drug Use Never     Social History     Tobacco Use   Smoking Status Former Smoker    Packs/day: 0 50    Start date: 65    Quit date: 2021    Years since quittin 7   Smokeless Tobacco Never Used   Tobacco Comment    recently stop smoking          Meds/Allergies     Current Outpatient Medications:     acetaminophen (TYLENOL) 500 mg tablet, Take 1,000 mg by mouth, Disp: , Rfl:     bisacodyl (FLEET) 10 MG/30ML ENEM, Insert 30 mL (10 mg total) into the rectum once for 1 dose, Disp: 30 mL, Rfl: 0    lidocaine-prilocaine (EMLA) cream, Apply topically as needed for mild pain Apply topically to port site 1 hour prior to port accessing, Disp: 30 g, Rfl: 0    Medical ID Plate MISC, Use once for 1 dose Severely and permanently limited in the ability to walk because of an arthritic, neurological, or orthopedic condition: or cannot walk two hundred feet without stopping to rest and meets requirements for disability license plate, Disp: 1 each, Rfl: 0    Multiple Vitamin (multivitamin) tablet, Take 1 tablet by mouth daily, Disp: , Rfl:     ondansetron (ZOFRAN-ODT) 4 mg disintegrating tablet, Take 1 tablet (4 mg total) by mouth every 6 (six) hours as needed for nausea or vomiting (Patient not taking: Reported on 1/3/2022 ), Disp: 30 tablet, Rfl: 1    polyethylene glycol (GOLYTELY) 4000 mL solution, 1 cup every 15 minutes until stooling begins, then stop  No need to drink all 4L, Disp: 4000 mL, Rfl: 0    prochlorperazine (COMPAZINE) 10 mg tablet, Take 1 tablet (10 mg total) by mouth every 6 (six) hours as needed for nausea or vomiting (Patient not taking: Reported on 1/3/2022 ), Disp: 30 tablet, Rfl: 1  Allergies   Allergen Reactions    Penicillins Rash    Tetracycline Rash       OBJECTIVE:   There were no vitals taken for this visit  Telephone follow-up       RESULTS    Lab Results:   Recent Results (from the past 672 hour(s))   CBC and differential    Collection Time: 12/13/21 10:31 AM   Result Value Ref Range    WBC 5 15 4 31 - 10 16 Thousand/uL    RBC 4 02 3 81 - 5 12 Million/uL    Hemoglobin 12 6 11 5 - 15 4 g/dL    Hematocrit 38 5 34 8 - 46 1 %    MCV 96 82 - 98 fL    MCH 31 3 26 8 - 34 3 pg    MCHC 32 7 31 4 - 37 4 g/dL    RDW 17 2 (H) 11 6 - 15 1 %    MPV 10 3 8 9 - 12 7 fL    Platelets 585 524 - 493 Thousands/uL    nRBC 0 /100 WBCs    Neutrophils Relative 75 43 - 75 %    Immat GRANS % 0 0 - 2 %    Lymphocytes Relative 14 14 - 44 %    Monocytes Relative 9 4 - 12 %    Eosinophils Relative 1 0 - 6 %    Basophils Relative 1 0 - 1 %    Neutrophils Absolute 3 86 1 85 - 7 62 Thousands/µL    Immature Grans Absolute 0 01 0 00 - 0 20 Thousand/uL    Lymphocytes Absolute 0 73 0 60 - 4 47 Thousands/µL    Monocytes Absolute 0 44 0 17 - 1 22 Thousand/µL    Eosinophils Absolute 0 07 0 00 - 0 61 Thousand/µL    Basophils Absolute 0 04 0 00 - 0 10 Thousands/µL   Comprehensive metabolic panel    Collection Time: 12/13/21 10:31 AM   Result Value Ref Range    Sodium 137 136 - 145 mmol/L    Potassium 4 7 3 5 - 5 3 mmol/L    Chloride 106 100 - 108 mmol/L    CO2 29 21 - 32 mmol/L    ANION GAP 2 (L) 4 - 13 mmol/L    BUN 20 5 - 25 mg/dL    Creatinine 0 61 0 60 - 1 30 mg/dL    Glucose 91 65 - 140 mg/dL    Calcium 9 6 8 3 - 10 1 mg/dL    AST 50 (H) 5 - 45 U/L    ALT 85 (H) 12 - 78 U/L    Alkaline Phosphatase 171 (H) 46 - 116 U/L    Total Protein 7 9 6 4 - 8 2 g/dL    Albumin 3 9 3 5 - 5 0 g/dL    Total Bilirubin 0 29 0 20 - 1 00 mg/dL    eGFR 95 ml/min/1 73sq m       Imaging Studies:No results found  Assessment/Plan:  No orders of the defined types were placed in this encounter  Approximately 1 month following completion of chemoRT, the patient is doing well overall  She had minimal radiation related side effects during treatment and today remains well apart from mild fatigue   She will continue to follow with her other managing medical providers including Dr Uriel Darnell and Dr Brody Coreaem  She is scheduled for CT C/A/P in 5/2022  I will plan to see her in follow-up shortly thereafter  Linda Mead MD  1/3/2022,10:41 AM    Portions of the record may have been created with voice recognition software   Occasional wrong word or "sound a like" substitutions may have occurred due to the inherent limitations of voice recognition software   Read the chart carefully and recognize, using context, where substitutions have occurred

## 2022-01-17 ENCOUNTER — HOSPITAL ENCOUNTER (OUTPATIENT)
Dept: INFUSION CENTER | Facility: HOSPITAL | Age: 67
Discharge: HOME | End: 2022-01-17
Payer: MEDICARE

## 2022-01-17 DIAGNOSIS — D49.0 NEOPLASM OF AMPULLA OF VATER: Primary | ICD-10-CM

## 2022-01-17 PROCEDURE — 96523 IRRIG DRUG DELIVERY DEVICE: CPT

## 2022-01-24 ENCOUNTER — DOCUMENTATION (OUTPATIENT)
Dept: HEMATOLOGY ONCOLOGY | Facility: MEDICAL CENTER | Age: 67
End: 2022-01-24

## 2022-02-23 ENCOUNTER — HOSPITAL ENCOUNTER (OUTPATIENT)
Dept: INFUSION CENTER | Facility: HOSPITAL | Age: 67
Discharge: HOME | End: 2022-02-23
Payer: MEDICARE

## 2022-02-23 DIAGNOSIS — D49.0 NEOPLASM OF AMPULLA OF VATER: Primary | ICD-10-CM

## 2022-02-23 PROCEDURE — 96523 IRRIG DRUG DELIVERY DEVICE: CPT

## 2022-03-09 ENCOUNTER — TELEPHONE (OUTPATIENT)
Dept: HEMATOLOGY ONCOLOGY | Facility: CLINIC | Age: 67
End: 2022-03-09

## 2022-03-14 ENCOUNTER — APPOINTMENT (OUTPATIENT)
Dept: LAB | Facility: CLINIC | Age: 67
End: 2022-03-14
Payer: MEDICARE

## 2022-03-14 DIAGNOSIS — D49.0 NEOPLASM OF AMPULLA OF VATER: ICD-10-CM

## 2022-03-14 LAB
ALBUMIN SERPL BCP-MCNC: 4 G/DL (ref 3.5–5)
ALP SERPL-CCNC: 198 U/L (ref 46–116)
ALT SERPL W P-5'-P-CCNC: 52 U/L (ref 12–78)
ANION GAP SERPL CALCULATED.3IONS-SCNC: 3 MMOL/L (ref 4–13)
AST SERPL W P-5'-P-CCNC: 34 U/L (ref 5–45)
BASOPHILS # BLD AUTO: 0.06 THOUSANDS/ΜL (ref 0–0.1)
BASOPHILS NFR BLD AUTO: 1 % (ref 0–1)
BILIRUB SERPL-MCNC: 0.44 MG/DL (ref 0.2–1)
BUN SERPL-MCNC: 22 MG/DL (ref 5–25)
CALCIUM SERPL-MCNC: 9.8 MG/DL (ref 8.3–10.1)
CHLORIDE SERPL-SCNC: 107 MMOL/L (ref 100–108)
CO2 SERPL-SCNC: 28 MMOL/L (ref 21–32)
CREAT SERPL-MCNC: 0.74 MG/DL (ref 0.6–1.3)
EOSINOPHIL # BLD AUTO: 0.08 THOUSAND/ΜL (ref 0–0.61)
EOSINOPHIL NFR BLD AUTO: 1 % (ref 0–6)
ERYTHROCYTE [DISTWIDTH] IN BLOOD BY AUTOMATED COUNT: 13.7 % (ref 11.6–15.1)
GFR SERPL CREATININE-BSD FRML MDRD: 84 ML/MIN/1.73SQ M
GLUCOSE P FAST SERPL-MCNC: 107 MG/DL (ref 65–99)
HCT VFR BLD AUTO: 42.4 % (ref 34.8–46.1)
HGB BLD-MCNC: 13.4 G/DL (ref 11.5–15.4)
IMM GRANULOCYTES # BLD AUTO: 0.02 THOUSAND/UL (ref 0–0.2)
IMM GRANULOCYTES NFR BLD AUTO: 0 % (ref 0–2)
LYMPHOCYTES # BLD AUTO: 1.04 THOUSANDS/ΜL (ref 0.6–4.47)
LYMPHOCYTES NFR BLD AUTO: 18 % (ref 14–44)
MCH RBC QN AUTO: 30.3 PG (ref 26.8–34.3)
MCHC RBC AUTO-ENTMCNC: 31.6 G/DL (ref 31.4–37.4)
MCV RBC AUTO: 96 FL (ref 82–98)
MONOCYTES # BLD AUTO: 0.38 THOUSAND/ΜL (ref 0.17–1.22)
MONOCYTES NFR BLD AUTO: 7 % (ref 4–12)
NEUTROPHILS # BLD AUTO: 4.12 THOUSANDS/ΜL (ref 1.85–7.62)
NEUTS SEG NFR BLD AUTO: 73 % (ref 43–75)
NRBC BLD AUTO-RTO: 0 /100 WBCS
PLATELET # BLD AUTO: 322 THOUSANDS/UL (ref 149–390)
PMV BLD AUTO: 10.4 FL (ref 8.9–12.7)
POTASSIUM SERPL-SCNC: 5 MMOL/L (ref 3.5–5.3)
PROT SERPL-MCNC: 8 G/DL (ref 6.4–8.2)
RBC # BLD AUTO: 4.42 MILLION/UL (ref 3.81–5.12)
SODIUM SERPL-SCNC: 138 MMOL/L (ref 136–145)
WBC # BLD AUTO: 5.7 THOUSAND/UL (ref 4.31–10.16)

## 2022-03-14 PROCEDURE — 85025 COMPLETE CBC W/AUTO DIFF WBC: CPT

## 2022-03-14 PROCEDURE — 80053 COMPREHEN METABOLIC PANEL: CPT

## 2022-03-14 PROCEDURE — 36415 COLL VENOUS BLD VENIPUNCTURE: CPT

## 2022-03-15 ENCOUNTER — OFFICE VISIT (OUTPATIENT)
Dept: HEMATOLOGY ONCOLOGY | Facility: MEDICAL CENTER | Age: 67
End: 2022-03-15
Payer: MEDICARE

## 2022-03-15 VITALS
DIASTOLIC BLOOD PRESSURE: 80 MMHG | RESPIRATION RATE: 16 BRPM | TEMPERATURE: 97.8 F | OXYGEN SATURATION: 97 % | BODY MASS INDEX: 24.55 KG/M2 | HEART RATE: 54 BPM | HEIGHT: 62 IN | WEIGHT: 133.4 LBS | SYSTOLIC BLOOD PRESSURE: 134 MMHG

## 2022-03-15 DIAGNOSIS — R74.8 ELEVATED ALKALINE PHOSPHATASE LEVEL: Primary | ICD-10-CM

## 2022-03-15 DIAGNOSIS — K21.9 GERD WITHOUT ESOPHAGITIS: ICD-10-CM

## 2022-03-15 DIAGNOSIS — Z95.828 PORT-A-CATH IN PLACE: ICD-10-CM

## 2022-03-15 DIAGNOSIS — K43.9 VENTRAL HERNIA WITHOUT OBSTRUCTION OR GANGRENE: ICD-10-CM

## 2022-03-15 DIAGNOSIS — D49.0 NEOPLASM OF AMPULLA OF VATER: ICD-10-CM

## 2022-03-15 DIAGNOSIS — C24.8 MALIGNANT NEOPLASM OF OVERLAPPING SITES OF BILIARY TRACT (HCC): ICD-10-CM

## 2022-03-15 PROCEDURE — 99215 OFFICE O/P EST HI 40 MIN: CPT | Performed by: PHYSICIAN ASSISTANT

## 2022-03-15 RX ORDER — LIDOCAINE AND PRILOCAINE 25; 25 MG/G; MG/G
CREAM TOPICAL AS NEEDED
Qty: 30 G | Refills: 0 | Status: SHIPPED | OUTPATIENT
Start: 2022-03-15
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
## 2022-04-06 ENCOUNTER — HOSPITAL ENCOUNTER (OUTPATIENT)
Dept: INFUSION CENTER | Facility: HOSPITAL | Age: 67
Discharge: HOME | End: 2022-04-06
Payer: MEDICARE

## 2022-04-06 DIAGNOSIS — R74.8 ELEVATED ALKALINE PHOSPHATASE LEVEL: Primary | ICD-10-CM

## 2022-04-06 DIAGNOSIS — D49.0 NEOPLASM OF AMPULLA OF VATER: ICD-10-CM

## 2022-04-06 DIAGNOSIS — C24.8 MALIGNANT NEOPLASM OF OVERLAPPING SITES OF BILIARY TRACT (HCC): ICD-10-CM

## 2022-04-06 LAB
ALBUMIN SERPL BCP-MCNC: 3.6 G/DL (ref 3.5–5)
ALP SERPL-CCNC: 185 U/L (ref 46–116)
ALT SERPL W P-5'-P-CCNC: 57 U/L (ref 12–78)
ANION GAP SERPL CALCULATED.3IONS-SCNC: 10 MMOL/L (ref 4–13)
AST SERPL W P-5'-P-CCNC: 41 U/L (ref 5–45)
BASOPHILS # BLD AUTO: 0.05 THOUSANDS/ΜL (ref 0–0.1)
BASOPHILS NFR BLD AUTO: 1 % (ref 0–1)
BILIRUB SERPL-MCNC: 0.28 MG/DL (ref 0.2–1)
BUN SERPL-MCNC: 22 MG/DL (ref 5–25)
CALCIUM SERPL-MCNC: 8.6 MG/DL (ref 8.3–10.1)
CHLORIDE SERPL-SCNC: 103 MMOL/L (ref 100–108)
CO2 SERPL-SCNC: 26 MMOL/L (ref 21–32)
CREAT SERPL-MCNC: 0.65 MG/DL (ref 0.6–1.3)
CRP SERPL QL: <0.5 MG/L
EOSINOPHIL # BLD AUTO: 0.11 THOUSAND/ΜL (ref 0–0.61)
EOSINOPHIL NFR BLD AUTO: 2 % (ref 0–6)
ERYTHROCYTE [DISTWIDTH] IN BLOOD BY AUTOMATED COUNT: 13.8 % (ref 11.6–15.1)
ERYTHROCYTE [SEDIMENTATION RATE] IN BLOOD: 27 MM/HOUR (ref 0–29)
GFR SERPL CREATININE-BSD FRML MDRD: 92 ML/MIN/1.73SQ M
GLUCOSE SERPL-MCNC: 87 MG/DL (ref 65–140)
HCT VFR BLD AUTO: 39.7 % (ref 34.8–46.1)
HGB BLD-MCNC: 12.6 G/DL (ref 11.5–15.4)
IMM GRANULOCYTES # BLD AUTO: 0.01 THOUSAND/UL (ref 0–0.2)
IMM GRANULOCYTES NFR BLD AUTO: 0 % (ref 0–2)
LYMPHOCYTES # BLD AUTO: 1.54 THOUSANDS/ΜL (ref 0.6–4.47)
LYMPHOCYTES NFR BLD AUTO: 23 % (ref 14–44)
MCH RBC QN AUTO: 29.4 PG (ref 26.8–34.3)
MCHC RBC AUTO-ENTMCNC: 31.7 G/DL (ref 31.4–37.4)
MCV RBC AUTO: 93 FL (ref 82–98)
MONOCYTES # BLD AUTO: 0.41 THOUSAND/ΜL (ref 0.17–1.22)
MONOCYTES NFR BLD AUTO: 6 % (ref 4–12)
NEUTROPHILS # BLD AUTO: 4.7 THOUSANDS/ΜL (ref 1.85–7.62)
NEUTS SEG NFR BLD AUTO: 68 % (ref 43–75)
NRBC BLD AUTO-RTO: 0 /100 WBCS
PLATELET # BLD AUTO: 288 THOUSANDS/UL (ref 149–390)
PMV BLD AUTO: 9.7 FL (ref 8.9–12.7)
POTASSIUM SERPL-SCNC: 3.8 MMOL/L (ref 3.5–5.3)
PROT SERPL-MCNC: 7.4 G/DL (ref 6.4–8.2)
RBC # BLD AUTO: 4.28 MILLION/UL (ref 3.81–5.12)
SODIUM SERPL-SCNC: 139 MMOL/L (ref 136–145)
WBC # BLD AUTO: 6.82 THOUSAND/UL (ref 4.31–10.16)

## 2022-04-06 PROCEDURE — 85652 RBC SED RATE AUTOMATED: CPT

## 2022-04-06 PROCEDURE — 86301 IMMUNOASSAY TUMOR CA 19-9: CPT

## 2022-04-06 PROCEDURE — 85025 COMPLETE CBC W/AUTO DIFF WBC: CPT

## 2022-04-06 PROCEDURE — 86038 ANTINUCLEAR ANTIBODIES: CPT

## 2022-04-06 PROCEDURE — 86430 RHEUMATOID FACTOR TEST QUAL: CPT

## 2022-04-06 PROCEDURE — 80053 COMPREHEN METABOLIC PANEL: CPT

## 2022-04-06 PROCEDURE — 86140 C-REACTIVE PROTEIN: CPT

## 2022-04-07 LAB — RHEUMATOID FACT SER QL LA: NEGATIVE

## 2022-04-08 ENCOUNTER — TELEPHONE (OUTPATIENT)
Dept: HEMATOLOGY ONCOLOGY | Facility: CLINIC | Age: 67
End: 2022-04-08

## 2022-04-08 LAB
CANCER AG19-9 SERPL-ACNC: <2 U/ML (ref 0–35)
RYE IGE QN: NEGATIVE

## 2022-04-08 NOTE — TELEPHONE ENCOUNTER
Patient would like their lab results     Person calling in  Name if not patient  Patient   Lab Draw Date 04/06   Lab Draw [id number] [facility]    Call Back Number  [phone]

## 2022-04-08 NOTE — TELEPHONE ENCOUNTER
Returned call to patient, reviewed all results  Advised that we added an additional test and will let her know the results once available  She was appreciative of the phone call  All questions answered

## 2022-04-08 NOTE — TELEPHONE ENCOUNTER
Alk phos came down slightly -- added GGT  It's a test to see if alk phos is coming from the bones ( lots of arthritic complaints) or from the liver  If positive, I would move up imaging

## 2022-04-15 ENCOUNTER — OFFICE VISIT (OUTPATIENT)
Dept: URGENT CARE | Facility: CLINIC | Age: 67
End: 2022-04-15
Payer: MEDICARE

## 2022-04-15 VITALS
DIASTOLIC BLOOD PRESSURE: 93 MMHG | RESPIRATION RATE: 18 BRPM | HEART RATE: 95 BPM | TEMPERATURE: 98.5 F | BODY MASS INDEX: 23.92 KG/M2 | SYSTOLIC BLOOD PRESSURE: 181 MMHG | HEIGHT: 62 IN | OXYGEN SATURATION: 95 % | WEIGHT: 130 LBS

## 2022-04-15 DIAGNOSIS — J01.00 ACUTE MAXILLARY SINUSITIS, RECURRENCE NOT SPECIFIED: Primary | ICD-10-CM

## 2022-04-15 DIAGNOSIS — R05.1 ACUTE COUGH: ICD-10-CM

## 2022-04-15 PROCEDURE — 99203 OFFICE O/P NEW LOW 30 MIN: CPT | Performed by: PHYSICIAN ASSISTANT

## 2022-04-15 PROCEDURE — 87636 SARSCOV2 & INF A&B AMP PRB: CPT | Performed by: PHYSICIAN ASSISTANT

## 2022-04-15 RX ORDER — AZITHROMYCIN 250 MG/1
TABLET, FILM COATED ORAL
Qty: 6 TABLET | Refills: 0 | Status: SHIPPED | OUTPATIENT
Start: 2022-04-15 | End: 2022-04-19

## 2022-04-15 NOTE — PATIENT INSTRUCTIONS
Patient Instructions   COVID testing initiated  Results may take up to 5-10 days to return, but often come back sooner (2-4 days)     If the patient has a [facility] My Chart account, results may be accessed on line  If the patient does not have the [facility] Chart account, please establish one so results can be accessed  This will be the easiest and quickest way to get a copy of your test results if you require printed documentation  If patient is symptomatic and until results are obtained, home quarantine / self isolation strongly encouraged  If testing is done for screening purposes and patient is not symptomatic, we still recommend masking, social distancing, good hygiene practices be followed  If COVID test is positive, patient / care giver will be contacted by ordering provider or designated staff  If COVID test is positive, please call the primary care provider office to inform of positive test and request follow up evaluation appointment  (Generally, primary care providers are doing telemedicine visits with their positive COVID patients )  If COVID test is positive, please again review all information below  Further questions may be addressed by the primary care provider or the [address] [place] [address] [place] at [phone]  If the patient / caregiver has not heard about test results or has been unable to access results on the patient My Chart account in a timely fashion, please call the provider's office where test was ordered (or Hot Line if applicable)  to inquire about results  If results are negative and patient / care giver has been found to have already accessed results through the [address]  [facility] Chart sisi, no call will be made  Until results are obtained, home quarantine / self isolation strongly encouraged       If the patient would develop profound weakness, chest pain, shortness of breath please proceed to an emergency room for further evaluation otherwise we do recommend that patient follow-up with their primary care provider in the next 5-7 days if not improving  Symptomatic treatment as needed for symptoms relief based on age / medical status of patient  Things like warm salt water gargles, Tylenol or Ibuprofen (if not contraindicated), drinking plenty of fluids, nasal saline rinses / spray, warm tea with honey (not for patients less than 1 year of age),  etc may provide symptoms relief  [address]     Your healthcare provider and/or public health staff have evaluated you and have determined that you do not need to remain in the hospital at this time  At this time you can be isolated at home where you will be monitored by staff from your local or state health department  You should carefully follow the prevention and isolation steps below until a healthcare provider or local or state health department says that you can return to your normal activities  Stay home except to get medical care     People who are mildly ill with COVID-19 are able to isolate at home during their illness  You should restrict activities outside your home, except for getting medical care  Do not go to work, school, or public areas  Avoid using public transportation, ride-sharing, or taxis  Separate yourself from other people and animals in your home     People: As much as possible, you should stay in a specific room and away from other people in your home  Also, you should use a separate bathroom, if available  Animals: You should restrict contact with pets and other animals while you are sick with COVID-19, just like you would around other people  Although there have not been reports of pets or other animals becoming sick with COVID-19, it is still recommended that people sick with COVID-19 limit contact with animals until more information is known about the virus   When possible, have another member of your household care for your animals while you are sick  If you are sick with COVID-19, avoid contact with your pet, including petting, snuggling, being kissed or licked, and sharing food  If you must care for your pet or be around animals while you are sick, wash your hands before and after you interact with pets and wear a facemask  See COVID-19 and Animals for more information  Call ahead before visiting your doctor     If you have a medical appointment, call the healthcare provider and tell them that you have or may have COVID-19  This will help the healthcare providers office take steps to keep other people from getting infected or exposed  Wear a facemask     You should wear a facemask when you are around other people (e g , sharing a room or vehicle) or pets and before you enter a healthcare providers office  If you are not able to wear a facemask (for example, because it causes trouble breathing), then people who live with you should not stay in the same room with you, or they should wear a facemask if they enter your room  Cover your coughs and sneezes     Cover your mouth and nose with a tissue when you cough or sneeze  Throw used tissues in a lined trash can  Immediately wash your hands with soap and water for at least 20 seconds or, if soap and water are not available, clean your hands with an alcohol-based hand  that contains at least 60% alcohol  Clean your hands often     Wash your hands often with soap and water for at least 20 seconds, especially after blowing your nose, coughing, or sneezing; going to the bathroom; and before eating or preparing food  If soap and water are not readily available, use an alcohol-based hand  with at least 60% alcohol, covering all surfaces of your hands and rubbing them together until they feel dry  Soap and water are the best option if hands are visibly dirty  Avoid touching your eyes, nose, and mouth with unwashed hands       Avoid sharing personal household items     You should not share dishes, drinking glasses, cups, eating utensils, towels, or bedding with other people or pets in your home  After using these items, they should be washed thoroughly with soap and water  Clean all high-touch surfaces everyday     High touch surfaces include counters, tabletops, doorknobs, bathroom fixtures, toilets, phones, keyboards, tablets, and bedside tables  Also, clean any surfaces that may have blood, stool, or body fluids on them  Use a household cleaning spray or wipe, according to the label instructions  Labels contain instructions for safe and effective use of the cleaning product including precautions you should take when applying the product, such as wearing gloves and making sure you have good ventilation during use of the product  Monitor your symptoms     Seek prompt medical attention if your illness is worsening (e g , difficulty breathing)  Before seeking care, call your healthcare provider and tell them that you have, or are being evaluated for, COVID-19  Put on a facemask before you enter the facility  These steps will help the healthcare providers office to keep other people in the office or waiting room from getting infected or exposed  Ask your healthcare provider to call the local or [facility] health department  Persons who are placed under active monitoring or facilitated self-monitoring should follow instructions provided by their local health department or occupational health professionals, as appropriate  If you have a medical emergency and need to call 911, notify the dispatch personnel that you have, or are being evaluated for COVID-19  If possible, put on a facemask before emergency medical services arrive       Discontinuing home isolation     Patients with confirmed COVID-19 should remain under home isolation precautions until the following conditions are met:   § They have had no fever for at least 24 hours (that is one full day of no fever without the use medicine that reduces fevers)  AND  § other symptoms have improved (for example, when their cough or shortness of breath have improved)  AND  § at least 10 days have passed since their symptoms first appeared     Patients with confirmed COVID-19 should also notify close contacts (including their workplace) and ask that they self-quarantine  Currently, close contact is defined as being within 6 feet for for a cumulative total of 15 minutes or more over a 24 hour period starting from 2 days before illness onset  (or, for asymptomatic patients, 2 days prior to test specimen collection)  Close contacts of patients diagnosed with COVID-19 should be instructed by the patient to self-quarantine for 14 days from the last time of their last contact with the patient        Source: [employer]

## 2022-04-15 NOTE — PROGRESS NOTES
[facility] [employer] [facility]        NAME: Ruddy Worthington is a 77 y o  female  : 1955    MRN: [id number]  DATE: April 15, 2022  TIME: 9:06 AM    Assessment and Plan   Acute maxillary sinusitis, recurrence not specified [J01 00]  1  Acute maxillary sinusitis, recurrence not specified  azithromycin (ZITHROMAX) 250 mg tablet   2  Acute cough  Covid/Flu-Office Collect         Patient Instructions     Continue to monitor symptoms  If new or worsening symptoms develop, go immediately to Er  Drink plenty of fluids  Follow up with Family Doctor this week  Chief Complaint     Chief Complaint   Patient presents with    URI     URI s/s with loss of taste  History of Present Illness       URI   This is a new problem  Episode onset: 5 days ago  The problem has been unchanged  There has been no fever  Associated symptoms include congestion, coughing, sinus pain, sneezing and a sore throat  Pertinent negatives include no abdominal pain, chest pain, diarrhea, dysuria, ear pain, headaches, nausea, neck pain, rash, rhinorrhea, vomiting or wheezing  Associated symptoms comments: Loss of sense of taste this morning  Treatments tried: SUSUyrbernice  Review of Systems   Review of Systems   Constitutional: Negative for chills, diaphoresis, fatigue and fever  HENT: Positive for congestion, postnasal drip, sinus pressure, sinus pain, sneezing and sore throat  Negative for ear pain, rhinorrhea and voice change  Eyes: Negative  Respiratory: Positive for cough  Negative for chest tightness, shortness of breath and wheezing  Cardiovascular: Negative for chest pain and palpitations  Gastrointestinal: Negative for abdominal pain, constipation, diarrhea, nausea and vomiting  Endocrine: Negative  Genitourinary: Negative for dysuria  Musculoskeletal: Negative for back pain, myalgias and neck pain  Skin: Negative for pallor and rash  Allergic/Immunologic: Negative      Neurological: Negative for dizziness, syncope and headaches  Hematological: Negative  Psychiatric/Behavioral: Negative  Current Medications       Current Outpatient Medications:     acetaminophen (TYLENOL) 500 mg tablet, Take 1,000 mg by mouth, Disp: , Rfl:     azithromycin (ZITHROMAX) 250 mg tablet, Take 2 tablets today then 1 tablet daily x 4 days, Disp: 6 tablet, Rfl: 0    bisacodyl (FLEET) 10 MG/30ML ENEM, Insert 30 mL (10 mg total) into the rectum once for 1 dose, Disp: 30 mL, Rfl: 0    lidocaine-prilocaine (EMLA) cream, Apply topically as needed for mild pain, Disp: 30 g, Rfl: 0    Medical ID Plate MISC, Use once for 1 dose Severely and permanently limited in the ability to walk because of an arthritic, neurological, or orthopedic condition: or cannot walk two hundred feet without stopping to rest and meets requirements for disability license plate, Disp: 1 each, Rfl: 0    Multiple Vitamin (multivitamin) tablet, Take 1 tablet by mouth daily, Disp: , Rfl:     ondansetron (ZOFRAN-ODT) 4 mg disintegrating tablet, Take 1 tablet (4 mg total) by mouth every 6 (six) hours as needed for nausea or vomiting (Patient not taking: Reported on 1/3/2022 ), Disp: 30 tablet, Rfl: 1    polyethylene glycol (GOLYTELY) 4000 mL solution, 1 cup every 15 minutes until stooling begins, then stop    No need to drink all 4L (Patient not taking: Reported on 3/15/2022 ), Disp: 4000 mL, Rfl: 0    prochlorperazine (COMPAZINE) 10 mg tablet, Take 1 tablet (10 mg total) by mouth every 6 (six) hours as needed for nausea or vomiting (Patient not taking: Reported on 1/3/2022 ), Disp: 30 tablet, Rfl: 1    Current Allergies     Allergies as of 04/15/2022 - Reviewed 04/15/2022   Allergen Reaction Noted    Penicillins Rash 01/14/2020    Tetracycline Rash 01/14/2020            The following portions of the patient's history were reviewed and updated as appropriate: allergies, current medications, past family history, past medical history, past social history, past surgical history and problem list      Past Medical History:   Diagnosis Date    BRCA1 positive     BRCA2 positive     Cancer ([facility] [address] )     pancreatic    History of chemotherapy     History of radiation therapy     Pancreatic carcinoma (HCC)        Past Surgical History:   Procedure Laterality Date    ABLATION SOFT TISSUE N/A 4/26/2021    Procedure: INTRAOPERATIVE ULTRASOUND, ABLATION,SOFT TISSUE PANCREAS;  Surgeon: Namrata Mendenhall MD;  [facility];  Service: Surgical Oncology    CHOLECYSTECTOMY N/A 4/26/2021    Procedure: CHOLECYSTECTOMY;  Surgeon: Namrata Mendenhall MD;  [facility];  Service: Surgical Oncology    FL GUIDED CENTRAL VENOUS ACCESS DEVICE INSERTION  6/2/2021    HYSTERECTOMY      LAPAROTOMY N/A 4/26/2021    Procedure: LAPAROTOMY EXPLORATORY;  Surgeon: Namrata Mendenhall MD;  [facility];  Service: Surgical Oncology    OOPHORECTOMY      SINUS SURGERY      TUNNELED VENOUS PORT PLACEMENT Left 6/2/2021    Procedure: INSERTION VENOUS PORT (PORT-A-CATH); Surgeon: Namrata Mendenhall MD;  [facility];  Service: Surgical Oncology    WHIPPLE PROCEDURE/PANCREATICO-DUODENECTOMY N/A 4/26/2021    Procedure: WHIPPLE PROCEDURE/PANCREATICO-DUODENECTOMY; PYLORIC PRESERVING;  Surgeon: Namrata Mendenhall MD;  [facility];  Service: Surgical Oncology       Family History   Problem Relation Age of Onset    Ulcerative colitis Mother     Prostate cancer Father     Heart disease Brother     Prostate cancer Brother     Melanoma Sister          Medications have been verified  Objective   BP (!) 181/93   Pulse 95   Temp 98 5 °F (36 9 °C)   Resp 18   Ht 5' 2" (1 575 m)   Wt 59 kg (130 lb)   SpO2 95%   BMI 23 78 kg/m²        Physical Exam     Physical Exam  Vitals and nursing note reviewed  Constitutional:       General: She is not in acute distress  Appearance: Normal appearance  She is well-developed  She is not ill-appearing or diaphoretic     HENT:      Head: Normocephalic and atraumatic  Right Ear: Tympanic membrane, ear canal and external ear normal       Left Ear: Tympanic membrane, ear canal and external ear normal       Nose: Congestion present  No rhinorrhea  Mouth/Throat:      Pharynx: Posterior oropharyngeal erythema present  No oropharyngeal exudate  Eyes:      General:         Right eye: No discharge  Left eye: No discharge  Conjunctiva/sclera: Conjunctivae normal    Cardiovascular:      Rate and Rhythm: Normal rate and regular rhythm  Heart sounds: Normal heart sounds  Pulmonary:      Effort: Pulmonary effort is normal  No respiratory distress  Breath sounds: Normal breath sounds  No wheezing, rhonchi or rales  Musculoskeletal:      Cervical back: Normal range of motion and neck supple  Lymphadenopathy:      Cervical: No cervical adenopathy  Skin:     General: Skin is warm  Capillary Refill: Capillary refill takes less than 2 seconds  Findings: No rash  Neurological:      Mental Status: She is alert

## 2022-04-15 NOTE — LETTER
April 15, 2022     Patient: Lorrin Scale   YOB: 1955   Date of Visit: 4/15/2022       To Whom It May Concern: It is my medical opinion that Lorrin Scale may return to work on 4/16/2022  If you have any questions or concerns, please don't hesitate to call           Sincerely,        Eloisa Ellington PA-C    CC: No Recipients

## 2022-04-16 LAB
FLUAV RNA RESP QL NAA+PROBE: NEGATIVE
FLUBV RNA RESP QL NAA+PROBE: NEGATIVE
SARS-COV-2 RNA RESP QL NAA+PROBE: NEGATIVE

## 2022-05-18 ENCOUNTER — TELEPHONE (OUTPATIENT)
Dept: OTHER | Facility: OTHER | Age: 67
End: 2022-05-18

## 2022-05-18 NOTE — TELEPHONE ENCOUNTER
Patient had to cancel her 9:00 appt  Due to having COVID  Patient is requesting a call back to reschedule

## 2022-05-20 ENCOUNTER — HOSPITAL ENCOUNTER (OUTPATIENT)
Dept: RADIOLOGY | Facility: HOSPITAL | Age: 67
Discharge: HOME | End: 2022-05-20
Payer: MEDICARE

## 2022-05-20 ENCOUNTER — HOSPITAL ENCOUNTER (OUTPATIENT)
Dept: INFUSION CENTER | Facility: HOSPITAL | Age: 67
Discharge: HOME | End: 2022-05-20
Payer: MEDICARE

## 2022-05-20 ENCOUNTER — HOSPITAL ENCOUNTER (OUTPATIENT)
Dept: RADIOLOGY | Facility: HOSPITAL | Age: 67
End: 2022-05-20
Payer: MEDICARE

## 2022-05-20 VITALS — TEMPERATURE: 98 F

## 2022-05-20 DIAGNOSIS — Z15.01 BRCA2 GENE MUTATION POSITIVE: ICD-10-CM

## 2022-05-20 DIAGNOSIS — Z15.09 BRCA2 GENE MUTATION POSITIVE: ICD-10-CM

## 2022-05-20 DIAGNOSIS — D49.0 NEOPLASM OF AMPULLA OF VATER: Primary | ICD-10-CM

## 2022-05-20 PROCEDURE — A9585 GADOBUTROL INJECTION: HCPCS | Performed by: SURGERY

## 2022-05-20 PROCEDURE — C8908 MRI W/O FOL W/CONT, BREAST,: HCPCS

## 2022-05-20 PROCEDURE — G1004 CDSM NDSC: HCPCS

## 2022-05-20 PROCEDURE — C8937 CAD BREAST MRI: HCPCS

## 2022-05-20 PROCEDURE — 96523 IRRIG DRUG DELIVERY DEVICE: CPT

## 2022-05-20 RX ADMIN — GADOBUTROL 6 ML: 604.72 INJECTION INTRAVENOUS at 14:36

## 2022-05-23 ENCOUNTER — HOSPITAL ENCOUNTER (EMERGENCY)
Facility: HOSPITAL | Age: 67
Discharge: HOME | End: 2022-05-23
Attending: EMERGENCY MEDICINE | Admitting: EMERGENCY MEDICINE
Payer: MEDICARE

## 2022-05-23 VITALS
TEMPERATURE: 97.8 F | RESPIRATION RATE: 18 BRPM | OXYGEN SATURATION: 96 % | WEIGHT: 130 LBS | BODY MASS INDEX: 23.78 KG/M2 | DIASTOLIC BLOOD PRESSURE: 80 MMHG | SYSTOLIC BLOOD PRESSURE: 171 MMHG | HEART RATE: 82 BPM

## 2022-05-23 DIAGNOSIS — U07.1 COVID-19: Primary | ICD-10-CM

## 2022-05-23 PROCEDURE — 99283 EMERGENCY DEPT VISIT LOW MDM: CPT

## 2022-05-23 PROCEDURE — 99283 EMERGENCY DEPT VISIT LOW MDM: CPT | Performed by: EMERGENCY MEDICINE

## 2022-05-23 NOTE — ED PROVIDER NOTES
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 Taking?    Medical ID Plate MISC   No No   Sig: Use once for 1 dose Severely and permanently limited in the ability to walk because of an arthritic, neurological, or orthopedic condition: or cannot walk two hundred feet without stopping to rest and meets requirements for disability license plate   Multiple Vitamin (multivitamin) tablet  Self Yes No   Sig: Take 1 tablet by mouth daily   acetaminophen (TYLENOL) 500 mg tablet  Self Yes No   Sig: Take 1,000 mg by mouth   bisacodyl (FLEET) 10 MG/30ML ENEM   No No   Sig: Insert 30 mL (10 mg total) into the rectum once for 1 dose   lidocaine-prilocaine (EMLA) cream   No No   Sig: Apply topically as needed for mild pain   ondansetron (ZOFRAN-ODT) 4 mg disintegrating tablet  Self No No   Sig: Take 1 tablet (4 mg total) by mouth every 6 (six) hours as needed for nausea or vomiting   Patient not taking: Reported on 1/3/2022    polyethylene glycol (GOLYTELY) 4000 mL solution  Self No No   Si cup every 15 minutes until stooling begins, then stop  No need to drink all 4L   Patient not taking: Reported on 3/15/2022    prochlorperazine (COMPAZINE) 10 mg tablet  Self No No   Sig: Take 1 tablet (10 mg total) by mouth every 6 (six) hours as needed for nausea or vomiting   Patient not taking: Reported on 1/3/2022       Facility-Administered Medications: None       Past Medical History:   Diagnosis Date    BRCA1 positive     BRCA2 positive     Cancer ([facility] [address] )     pancreatic    History of chemotherapy     History of radiation therapy     Pancreatic carcinoma (HCC)        Past Surgical History:   Procedure Laterality Date    ABLATION SOFT TISSUE N/A 2021    Procedure: INTRAOPERATIVE ULTRASOUND, ABLATION,SOFT TISSUE PANCREAS;  Surgeon: Emily Monreal MD;  [facility];  Service: Surgical Oncology    CHOLECYSTECTOMY N/A 2021    Procedure: CHOLECYSTECTOMY;  Surgeon: Emily Monreal MD;  [facility];  Service: Surgical Oncology    FL GUIDED CENTRAL VENOUS ACCESS DEVICE INSERTION  2021    HYSTERECTOMY      LAPAROTOMY N/A 2021    Procedure: LAPAROTOMY EXPLORATORY;  Surgeon: Emily Monreal MD;  [facility];  Service: Surgical Oncology    OOPHORECTOMY      SINUS SURGERY      TUNNELED VENOUS PORT PLACEMENT Left 2021    Procedure: INSERTION VENOUS PORT (PORT-A-CATH); Surgeon: Emily Monreal MD;  [facility];  Service: Surgical Oncology    WHIPPLE PROCEDURE/PANCREATICO-DUODENECTOMY N/A 2021    Procedure:  WHIPPLE PROCEDURE/PANCREATICO-DUODENECTOMY; PYLORIC PRESERVING;  Surgeon: Emily Monreal MD;  [facility];  Service: Surgical Oncology       Family History   Problem Relation Age of Onset    Ulcerative colitis Mother     Prostate cancer Father     Heart disease Brother     Prostate cancer Brother     Melanoma Sister      I have reviewed and agree with the history as documented  E-Cigarette/Vaping    E-Cigarette Use Never User      E-Cigarette/Vaping Substances    Nicotine No     THC No     CBD No     Flavoring No     Other No     Unknown No      Social History     Tobacco Use    Smoking status: Former Smoker     Packs/day: 0 50     Start date: 65     Quit date: 2021     Years since quittin 1    Smokeless tobacco: Never Used    Tobacco comment: recently stop smoking    Vaping Use    Vaping Use: Never used   Substance Use Topics    Alcohol use: Never    Drug use: Never       Review of Systems   Constitutional: Negative  Negative for diaphoresis and fever  HENT: Positive for congestion  Respiratory: Positive for cough  Negative for shortness of breath and wheezing  Cardiovascular: Negative  Negative for chest pain, palpitations and leg swelling  Gastrointestinal: Negative for abdominal distention, abdominal pain, nausea and vomiting  Genitourinary: Negative  Musculoskeletal: Negative  Skin: Negative  Neurological: Negative  Psychiatric/Behavioral: Negative  All other systems reviewed and are negative  Physical Exam  Physical Exam  Vitals and nursing note reviewed  Constitutional:       General: She is not in acute distress  Appearance: She is well-developed  HENT:      Head: Normocephalic and atraumatic  Eyes:      Conjunctiva/sclera: Conjunctivae normal    Cardiovascular:      Rate and Rhythm: Normal rate and regular rhythm  Heart sounds: No murmur heard  Pulmonary:      Effort: Pulmonary effort is normal  No respiratory distress  Breath sounds: Normal breath sounds  Abdominal:      Palpations: Abdomen is soft  Tenderness: There is no abdominal tenderness  Musculoskeletal:      Cervical back: Neck supple  Skin:     General: Skin is warm and dry  Neurological:      Mental Status: She is alert           Vital Signs  ED Triage Vitals [22 0115]   Temperature Pulse Respirations Blood Pressure SpO2   97 8 °F (36 6 °C) 82 18 (!) 174/119 96 %      Temp Source Heart Rate Source Patient Position - Orthostatic VS BP Location FiO2 (%)   Tympanic Monitor Sitting Right arm --      Pain Score       --           Vitals:    05/23/22 0115   BP: (!) 174/119   Pulse: 82   Patient Position - Orthostatic VS: Sitting         Visual Acuity      ED Medications  Medications - No data to display    Diagnostic Studies  Results Reviewed     None                 No orders to display              Procedures  Procedures         ED Course                                             MDM    Disposition  Final diagnoses:   COVID-19     Time reflects when diagnosis was documented in both MDM as applicable and the Disposition within this note     Time User Action Codes Description Comment    5/23/2022  1:36 AM Alber Reason Add [U07 1] COVID-19       ED Disposition     ED Disposition   Discharge    Condition   Stable    Date/Time   Mon May 23, 2022  1:36 AM    Comment   Tia Breach discharge to home/self care  Follow-up Information     Follow up With Specialties Details Why Contact Info Additional Information    Rafael Harm, DO Family Medicine Schedule an appointment as soon as possible for a visit  As needed [address] [place] [address] Emergency Department Emergency Medicine  If symptoms worsen [address] [address]  [address] [place] [address] Emergency Department, [facility], [facility]          Patient's Medications   Discharge Prescriptions    MOLNUPIRAVIR 200 MG CAPSULE    Take 4 capsules (800 mg total) by mouth every 12 (twelve) hours for 5 days       Start Date: 5/23/2022 End Date: 5/28/2022       Order Dose: 800 mg       Quantity: 40 capsule    Refills: 0       No discharge procedures on file      PDMP Review       Value Time User    PDMP Reviewed  Yes 5/3/2021 10:12 AM Lori Hernandez PA-C          ED Provider  Electronically Signed by           Jayde Aguila MD  05/23/22 6521

## 2022-05-23 NOTE — Clinical Note
Betty Calloway was seen and treated in our emergency department on 5/23/2022  No restrictions            Diagnosis:     Missy Mcfadden  may return to work on return date  She may return on this date: 05/30/2022         If you have any questions or concerns, please don't hesitate to call        Mari Thomas MD    ______________________________           _______________          _______________  [facility] Representative                              Date                                Time

## 2022-06-01 ENCOUNTER — OFFICE VISIT (OUTPATIENT)
Dept: FAMILY MEDICINE CLINIC | Facility: CLINIC | Age: 67
End: 2022-06-01
Payer: MEDICARE

## 2022-06-01 VITALS
HEART RATE: 64 BPM | BODY MASS INDEX: 25.4 KG/M2 | RESPIRATION RATE: 18 BRPM | SYSTOLIC BLOOD PRESSURE: 120 MMHG | OXYGEN SATURATION: 97 % | HEIGHT: 62 IN | WEIGHT: 138 LBS | TEMPERATURE: 96.7 F | DIASTOLIC BLOOD PRESSURE: 78 MMHG

## 2022-06-01 DIAGNOSIS — Z01.818 PREOPERATIVE CLEARANCE: Primary | ICD-10-CM

## 2022-06-01 DIAGNOSIS — R22.1 NECK MASS: ICD-10-CM

## 2022-06-01 PROCEDURE — 99213 OFFICE O/P EST LOW 20 MIN: CPT | Performed by: FAMILY MEDICINE

## 2022-06-02 ENCOUNTER — RADIATION ONCOLOGY FOLLOW-UP (OUTPATIENT)
Dept: RADIATION ONCOLOGY | Facility: HOSPITAL | Age: 67
End: 2022-06-02
Attending: STUDENT IN AN ORGANIZED HEALTH CARE EDUCATION/TRAINING PROGRAM
Payer: MEDICARE

## 2022-06-02 VITALS
BODY MASS INDEX: 25.54 KG/M2 | SYSTOLIC BLOOD PRESSURE: 140 MMHG | DIASTOLIC BLOOD PRESSURE: 86 MMHG | OXYGEN SATURATION: 96 % | HEIGHT: 62 IN | RESPIRATION RATE: 18 BRPM | TEMPERATURE: 97.8 F | HEART RATE: 74 BPM | WEIGHT: 138.8 LBS

## 2022-06-02 DIAGNOSIS — D49.0 NEOPLASM OF AMPULLA OF VATER: Primary | ICD-10-CM

## 2022-06-02 PROCEDURE — 99211 OFF/OP EST MAY X REQ PHY/QHP: CPT | Performed by: STUDENT IN AN ORGANIZED HEALTH CARE EDUCATION/TRAINING PROGRAM

## 2022-06-02 PROCEDURE — 99212 OFFICE O/P EST SF 10 MIN: CPT | Performed by: STUDENT IN AN ORGANIZED HEALTH CARE EDUCATION/TRAINING PROGRAM

## 2022-06-02 NOTE — PROGRESS NOTES
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  She underwent Whipple resection followed by adjuvant FOLFOX x8 cycles  On 21, she completed a course of adjuvant RT to a dose of 4875 cGy in 25 fraction delivered to her abdomen  The pt was last seen in radiation on 22  She returns today for her follow up       03/15/22 - Yoshi Persaud  Pt's Alkaline Phosphatase level has increased over the last 4 months  Pt to have repeat labs to keep an eye on this      Currently the patient is doing well overall  She has no nausea/vomiting, though does have occasional reflux  She has no diarrhea or loose stool  No abdominal pain  She is pending cataract surgery        Upcomin22 - Yoshi Persaud  22 - CT chest abdomen pelvis w contrast  22 - Surg OncPriscilla    Historical Information   Oncology History   Neoplasm of ampulla of Vater   3/15/2021 Initial Diagnosis    Neoplasm of ampulla of Vater     2021 Surgery    B  Celiac lymph node:     - Single lymph node; negative for malignancy (0/)  C   Whipple resection:     - Invasive poorly differentiated mucinous adenocarcinoma  - Tumor arises in the background of an adenoma with high grade dysplasia  - Lymphatic and venous channel invasion by tumor present  - Metastatic carcinoma present in one of twenty lymph nodes ()  - All margins are negative for tumor       2021 -  Cancer Staged    Staging form: Ampulla of Vater, AJCC 8th Edition  - Pathologic: Stage IIIA (pT3a, pN1, cM0) - Signed by Yu Wise MD on 6/1/2021  Histologic grade (G): G3  Histologic grading system: 3 grade system  Residual tumor (R): R0 - None       6/9/2021 - 10/1/2021 Chemotherapy    Cycles 1-6  6/2021 through 8/20/21:  FOLFOX    Cycles 7 -8:  9/15/2021- 10/12/2021  Leucovorin 400mg/m2, IV, Day 1  5-Fu 400 mg/m2, IV , Day 1   5-Fu 1200 mg/m2, IV, CI x 46 hours  Cycle length = 2 weeks    palonosetron (ALOXI), 0 25 mg, Intravenous, Once, 5 of 5 cycles  Administration: 0 25 mg (7/21/2021), 0 25 mg (8/4/2021), 0 25 mg (8/18/2021), 0 25 mg (9/15/2021), 0 25 mg (9/29/2021)  fluorouracil (ADRUCIL), 400 mg/m2 = 605 mg, Intravenous, Once, 8 of 8 cycles  Dose modification: 340 mg/m2 (85 % of original dose 400 mg/m2, Cycle 6, Reason: Dose Not Tolerated)  Administration: 605 mg (6/9/2021), 605 mg (6/23/2021), 605 mg (7/7/2021), 605 mg (7/21/2021), 605 mg (8/4/2021), 515 mg (8/18/2021), 610 mg (9/15/2021), 610 mg (9/29/2021)  fosaprepitant (EMEND) IVPB, 150 mg, Intravenous, Once, 6 of 6 cycles  Administration: 150 mg (7/7/2021), 150 mg (7/21/2021), 150 mg (8/4/2021), 150 mg (8/18/2021), 150 mg (9/15/2021), 150 mg (9/29/2021)  leucovorin calcium IVPB, 604 mg, Intravenous, Once, 8 of 8 cycles  Dose modification: 340 mg/m2 (85 % of original dose 400 mg/m2, Cycle 6, Reason: Dose Not Tolerated)  Administration: 600 mg (6/9/2021), 600 mg (6/23/2021), 600 mg (7/7/2021), 600 mg (7/21/2021), 600 mg (8/4/2021), 517 mg (8/18/2021), 600 mg (9/15/2021), 600 mg (9/29/2021)  oxaliplatin (ELOXATIN) chemo infusion, 85 mg/m2 = 128 35 mg, Intravenous, Once, 6 of 6 cycles  Dose modification: 72 25 mg/m2 (85 % of original dose 85 mg/m2, Cycle 6, Reason: Dose Not Tolerated)  Administration: 128 35 mg (6/9/2021), 128 35 mg (6/23/2021), 128 35 mg (7/7/2021), 128 35 mg (7/21/2021), 128 35 mg (8/4/2021), 109 82 mg (8/18/2021)  fluorouracil (ADRUCIL) ambulatory infusion Soln, 1,200 mg/m2/day = 3,625 mg, Intravenous, Over 46 hours, 8 of 8 cycles     10/1/2021 Genetic Testing    Results revealed patient has the following mutation(s):  Positive for a BRCA2 pathogenic variant      10/26/2021 -  Radiation         10/26/2021 -  Chemotherapy    Xeloda 825 mg/m2 BID  BSA = 1 59  Calculated to 1300 mg BID with rounding  10/28/2021 - 12/2/2021 Radiation    Treatments:  Course: C1    Plan ID Energy Fractions Dose per Fraction (cGy) Dose Correction (cGy) Total Dose Delivered (cGy) Elapsed Days   Abdomen 6X 25 / 25 195 0 4,875 35      Treatment Dates:  10/28/2021 - 12/2/2021  Past Medical History:   Diagnosis Date    BRCA1 positive     BRCA2 positive     Cancer ([facility] [address] )     pancreatic    History of chemotherapy     History of radiation therapy     Pancreatic carcinoma (HCC)      Past Surgical History:   Procedure Laterality Date    ABLATION SOFT TISSUE N/A 4/26/2021    Procedure: INTRAOPERATIVE ULTRASOUND, ABLATION,SOFT TISSUE PANCREAS;  Surgeon: Jobe Bloch, MD;  [facility];  Service: Surgical Oncology    CHOLECYSTECTOMY N/A 4/26/2021    Procedure: CHOLECYSTECTOMY;  Surgeon: Jobe Bloch, MD;  [facility];  Service: Surgical Oncology    FL GUIDED CENTRAL VENOUS ACCESS DEVICE INSERTION  6/2/2021    HYSTERECTOMY      LAPAROTOMY N/A 4/26/2021    Procedure: LAPAROTOMY EXPLORATORY;  Surgeon: Jobe Bloch, MD;  [facility];  Service: Surgical Oncology    OOPHORECTOMY      SINUS SURGERY      TUNNELED VENOUS PORT PLACEMENT Left 6/2/2021    Procedure: INSERTION VENOUS PORT (PORT-A-CATH); Surgeon: Jobe Bloch, MD;  [facility];  Service: Surgical Oncology    WHIPPLE PROCEDURE/PANCREATICO-DUODENECTOMY N/A 4/26/2021    Procedure:  WHIPPLE PROCEDURE/PANCREATICO-DUODENECTOMY; PYLORIC PRESERVING;  Surgeon: Jobe Bloch, MD;  [facility];  Service: Surgical Oncology       Social History   Social History     Substance and Sexual Activity   Alcohol Use Never Social History     Substance and Sexual Activity   Drug Use Never           Meds/Allergies     Current Outpatient Medications:     acetaminophen (TYLENOL) 500 mg tablet, Take 1,000 mg by mouth, Disp: , Rfl:     lidocaine-prilocaine (EMLA) cream, Apply topically as needed for mild pain, Disp: 30 g, Rfl: 0    Multiple Vitamin (multivitamin) tablet, Take 1 tablet by mouth daily, Disp: , Rfl:     Medical ID Plate MISC, Use once for 1 dose Severely and permanently limited in the ability to walk because of an arthritic, neurological, or orthopedic condition: or cannot walk two hundred feet without stopping to rest and meets requirements for disability license plate, Disp: 1 each, Rfl: 0  Allergies   Allergen Reactions    Penicillins Rash    Tetracycline Rash     Review of Systems   Constitutional: Positive for appetite change (improving and she's gaining some weight back) and fatigue (mild in the afternoons)  Eating small portions more frequently   HENT: Negative  Eyes:        Cataract surgery 6/7 & 6/13   Respiratory: Negative  Cardiovascular: Negative  Gastrointestinal: Negative  Negative for abdominal pain  Endocrine: Positive for heat intolerance  Genitourinary: Negative  Musculoskeletal: Positive for arthralgias (fingers, bilateral knees)  Skin: Negative  Allergic/Immunologic: Negative  Neurological: Positive for numbness (right knee r/t arthritis)  Hematological: Negative  Does not bruise/bleed easily  Psychiatric/Behavioral: Negative          OBJECTIVE:   /86 (BP Location: Left arm, Patient Position: Sitting, Cuff Size: Standard)   Pulse 74   Temp 97 8 °F (36 6 °C) (Temporal)   Resp 18   Ht 5' 2" (1 575 m)   Wt 63 kg (138 lb 12 8 oz)   SpO2 96%   BMI 25 39 kg/m²   Pain Assessment:  0  ECOG/Zubrod/WHO: 0 - Asymptomatic    Physical Exam   Well-appearing  NAD  No increased work of breathing  Skull are anicteric  No evidence of jaundice    Abdomen soft, nontender, nondistended  No masses appreciated  Central epigastric scar well healed  No supraclavicular lymphadenopathy appreciated  Extremities warm well perfused without edema  RESULTS    Lab Results: No results found for this or any previous visit (from the past 672 hour(s))  Imaging Studies:MRI breast bilateral w and wo contrast w cad    Result Date: 5/20/2022  Narrative: DIAGNOSIS: BRCA2 gene mutation positive TECHNIQUE: MRI of both breasts was performed in axial and sagittal planes utilizing a combination of axial diffusion, fat suppressed sagittal T2 , gradient echo in phase T1 weighting followed by sagittal dynamic post contrast imaging with 3D fat suppressed gradient-echo T1 sequences (VIBRANT) at 1 5 minute intervals  Axial VIBRANT post contrast images were obtained  Sagittal subtraction images were generated  This exam was read in conjunction with [email or web address] software  Intravenous contrast was administered at 2cc/sec, without documented contrast reaction  MEDICATIONS ADMINISTERED: Gadobutrol injection (SINGLE-DOSE) SOLN 6 mL, Total Given: 6 mL (1 dose) COMPARISONS:  No prior breast MRI is available for comparison  Mammograms and ultrasounds dating between 10/29/2021 and 11/18/2021 were reviewed  RELEVANT HISTORY: Family Breast Cancer History: No known family history of breast cancer  Family Medical History: No known relevant family medical history  Personal History: No known relevant hormone history  Surgical history includes hysterectomy and oophorectomy  Medical history includes BRCA 1 positive, BRCA 2 positive, and history of chemotherapy  RISK ASSESSMENT: 5 Year Tyrer-Cuzick: 9 99 % 10 Year Tyrer-Cuzick: 16 32 % Lifetime Tyrer-Cuzick: 25 74 %  TISSUE DENSITY: FGT: The breasts have heterogeneous fibroglandular tissue  BPE: The background parenchymal enhancement is minimal  INDICATION: Otoniel Miller is a 77 y o  female presenting for high risk screening    FINDINGS: Bilateral There are no suspicious enhancing masses or suspicious areas of non-mass enhancement  There is no axillary or internal mammary adenopathy  Impression:   No suspicious enhancement identified in either breast  ASSESSMENT/BI-RADS CATEGORY: Left: 1 - Negative Right: 1 - Negative Overall: 1 - Negative RECOMMENDATION:      - Routine screening for both breasts  Workstation ID: [id number]          Assessment/Plan:  No orders of the defined types were placed in this encounter  Approximately 6 months following completion of adjuvant RT, the patient is doing well overall is largely recovered from her prior treatment  She has minimal residual morbidity and continues to follow with other managing medical providers  She is scheduled to see medical oncology next month and Surgical Oncology the month following with repeat imaging  We discussed recommendations for continued surveillance and will plan to see her again in 02/2023  I will remain available interim should any additional questions arise  Lisa Brady MD  6/2/2022,10:57 AM    Portions of the record may have been created with voice recognition software   Occasional wrong word or "sound a like" substitutions may have occurred due to the inherent limitations of voice recognition software   Read the chart carefully and recognize, using context, where substitutions have occurred

## 2022-06-02 NOTE — PROGRESS NOTES
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  She underwent Whipple resection followed by adjuvant FOLFOX x8 cycles  On 21, she completed a course of adjuvant RT to a dose of 4875 cGy in 25 fraction delivered to her abdomen  The pt was last seen in radiation on 22  She returns today for her follow up       03/15/22 - Yoshi Persaud  Pt's Alkaline Phosphatase level has increased over the last 4 months  Pt to have repeat labs to keep an eye on this       Upcomin22 - Yoshi Persaud  22 - CT chest abdomen pelvis w contrast  22 - Surg Bhakti Ellis    Oncology History   Neoplasm of ampulla of Vater   3/15/2021 Initial Diagnosis    Neoplasm of ampulla of Vater     2021 Surgery    B  Celiac lymph node:     - Single lymph node; negative for malignancy (0/)  C   Whipple resection:     - Invasive poorly differentiated mucinous adenocarcinoma  - Tumor arises in the background of an adenoma with high grade dysplasia  - Lymphatic and venous channel invasion by tumor present  - Metastatic carcinoma present in one of twenty lymph nodes ()  - All margins are negative for tumor       2021 -  Cancer Staged    Staging form: Ampulla of Vater, AJCC 8th Edition  - Pathologic: Stage IIIA (pT3a, pN1, cM0) - Signed by Aria Bhandari MD on 2021  Histologic grade (G): G3  Histologic grading system: 3 grade system  Residual tumor (R): R0 - None       2021 - 10/1/2021 Chemotherapy    Cycles 1-6  2021 through 21:  FOLFOX    Cycles 7 -8:  9/15/2021- 10/12/2021  Leucovorin 400mg/m2, IV, Day 1  5-Fu 400 mg/m2, IV , Day 1   5-Fu 1200 mg/m2, IV, CI x 46 hours  Cycle length = 2 weeks    palonosetron (ALOXI), 0 25 mg, Intravenous, Once, 5 of 5 cycles  Administration: 0 25 mg (2021), 0 25 mg (2021), 0 25 mg (2021), 0 25 mg (9/15/2021), 0 25 mg (9/29/2021)  fluorouracil (ADRUCIL), 400 mg/m2 = 605 mg, Intravenous, Once, 8 of 8 cycles  Dose modification: 340 mg/m2 (85 % of original dose 400 mg/m2, Cycle 6, Reason: Dose Not Tolerated)  Administration: 605 mg (6/9/2021), 605 mg (6/23/2021), 605 mg (7/7/2021), 605 mg (7/21/2021), 605 mg (8/4/2021), 515 mg (8/18/2021), 610 mg (9/15/2021), 610 mg (9/29/2021)  fosaprepitant (EMEND) IVPB, 150 mg, Intravenous, Once, 6 of 6 cycles  Administration: 150 mg (7/7/2021), 150 mg (7/21/2021), 150 mg (8/4/2021), 150 mg (8/18/2021), 150 mg (9/15/2021), 150 mg (9/29/2021)  leucovorin calcium IVPB, 604 mg, Intravenous, Once, 8 of 8 cycles  Dose modification: 340 mg/m2 (85 % of original dose 400 mg/m2, Cycle 6, Reason: Dose Not Tolerated)  Administration: 600 mg (6/9/2021), 600 mg (6/23/2021), 600 mg (7/7/2021), 600 mg (7/21/2021), 600 mg (8/4/2021), 517 mg (8/18/2021), 600 mg (9/15/2021), 600 mg (9/29/2021)  oxaliplatin (ELOXATIN) chemo infusion, 85 mg/m2 = 128 35 mg, Intravenous, Once, 6 of 6 cycles  Dose modification: 72 25 mg/m2 (85 % of original dose 85 mg/m2, Cycle 6, Reason: Dose Not Tolerated)  Administration: 128 35 mg (6/9/2021), 128 35 mg (6/23/2021), 128 35 mg (7/7/2021), 128 35 mg (7/21/2021), 128 35 mg (8/4/2021), 109 82 mg (8/18/2021)  fluorouracil (ADRUCIL) ambulatory infusion Soln, 1,200 mg/m2/day = 3,625 mg, Intravenous, Over 46 hours, 8 of 8 cycles     10/1/2021 Genetic Testing    Results revealed patient has the following mutation(s):  Positive for a BRCA2 pathogenic variant      10/26/2021 -  Radiation         10/26/2021 -  Chemotherapy    Xeloda 825 mg/m2 BID  BSA = 1 59  Calculated to 1300 mg BID with rounding  10/28/2021 - 12/2/2021 Radiation    Treatments:  Course: C1    Plan ID Energy Fractions Dose per Fraction (cGy) Dose Correction (cGy) Total Dose Delivered (cGy) Elapsed Days   Abdomen 6X 25 / 25 195 0 4,875 35      Treatment Dates:  10/28/2021 - 12/2/2021  Review of Systems:  Review of Systems   Constitutional: Positive for appetite change (improving and she's gaining some weight back) and fatigue (mild in the afternoons)  Eating small portions more frequently   HENT: Negative  Eyes:        Cataract surgery 6/7 & 6/13   Respiratory: Negative  Cardiovascular: Negative  Gastrointestinal: Negative  Negative for abdominal pain  Endocrine: Positive for heat intolerance  Genitourinary: Negative  Musculoskeletal: Positive for arthralgias (fingers, bilateral knees)  Skin: Negative  Allergic/Immunologic: Negative  Neurological: Positive for numbness (right knee r/t arthritis)  Hematological: Negative  Does not bruise/bleed easily  Psychiatric/Behavioral: Negative          Clinical Trial: no    Teaching:     Covid Vaccine Status: Vaccinated no booster    Health Maintenance   Topic Date Due    Hepatitis C Screening  Never done    Medicare Annual Wellness Visit (AWV)  Never done    Pneumococcal Vaccine: 65+ Years (1 - PCV) Never done    BMI: Followup Plan  Never done    DTaP,Tdap,and Td Vaccines (1 - Tdap) Never done    Colorectal Cancer Screening  Never done    Osteoporosis Screening  Never done    Fall Risk  Never done    COVID-19 Vaccine (3 - Moderna risk series) 07/14/2021    Influenza Vaccine (Season Ended) 09/01/2022    Breast Cancer Screening: Mammogram  11/18/2022    MAMMOGRAPHY BRCA POSITIVE  11/18/2022    Depression Screening  06/01/2023    BMI: Adult  06/01/2023    HIB Vaccine  Aged Out    Hepatitis B Vaccine  Aged Out    IPV Vaccine  Aged Out    Hepatitis A Vaccine  Aged Out    Meningococcal ACWY Vaccine  Aged Out    HPV Vaccine  Aged Out     Patient Active Problem List   Diagnosis    Elevated LFTs    Dilated bile duct    Neoplasm of ampulla of Vater    Mild protein-calorie malnutrition ([address] )    Breast mass     Past Medical History:   Diagnosis Date    BRCA1 positive     BRCA2 positive     Cancer [facility])     pancreatic    History of chemotherapy     History of radiation therapy     Pancreatic carcinoma ([facility] [address] )      Past Surgical History:   Procedure Laterality Date    ABLATION SOFT TISSUE N/A 4/26/2021    Procedure: INTRAOPERATIVE ULTRASOUND, ABLATION,SOFT TISSUE PANCREAS;  Surgeon: Maame Galan MD;  [facility];  Service: Surgical Oncology    CHOLECYSTECTOMY N/A 4/26/2021    Procedure: CHOLECYSTECTOMY;  Surgeon: Maame Galan MD;  [facility];  Service: Surgical Oncology    FL GUIDED CENTRAL VENOUS ACCESS DEVICE INSERTION  6/2/2021    HYSTERECTOMY      LAPAROTOMY N/A 4/26/2021    Procedure: LAPAROTOMY EXPLORATORY;  Surgeon: Maame Galan MD;  [facility];  Service: Surgical Oncology    OOPHORECTOMY      SINUS SURGERY      TUNNELED VENOUS PORT PLACEMENT Left 6/2/2021    Procedure: INSERTION VENOUS PORT (PORT-A-CATH); Surgeon: Maame Galan MD;  [facility];  Service: Surgical Oncology    WHIPPLE PROCEDURE/PANCREATICO-DUODENECTOMY N/A 4/26/2021    Procedure:  WHIPPLE PROCEDURE/PANCREATICO-DUODENECTOMY; PYLORIC PRESERVING;  Surgeon: Maame Galan MD;  [facility];  Service: Surgical Oncology     Family History   Problem Relation Age of Onset    Ulcerative colitis Mother     Prostate cancer Father     Melanoma Sister     Heart disease Brother     Prostate cancer Brother          Current Outpatient Medications:     acetaminophen (TYLENOL) 500 mg tablet, Take 1,000 mg by mouth, Disp: , Rfl:     lidocaine-prilocaine (EMLA) cream, Apply topically as needed for mild pain, Disp: 30 g, Rfl: 0    Medical ID Plate MISC, Use once for 1 dose Severely and permanently limited in the ability to walk because of an arthritic, neurological, or orthopedic condition: or cannot walk two hundred feet without stopping to rest and meets requirements for disability license plate, Disp: 1 each, Rfl: 0    Multiple Vitamin (multivitamin) tablet, Take 1 tablet by mouth daily, Disp: , Rfl:   Allergies   Allergen Reactions    Penicillins Rash    Tetracycline Rash     There were no vitals filed for this visit

## 2022-06-06 ENCOUNTER — TELEPHONE (OUTPATIENT)
Dept: FAMILY MEDICINE CLINIC | Facility: CLINIC | Age: 67
End: 2022-06-06

## 2022-06-06 ENCOUNTER — HOSPITAL ENCOUNTER (OUTPATIENT)
Dept: RADIOLOGY | Facility: HOSPITAL | Age: 67
Discharge: HOME | End: 2022-06-06
Payer: MEDICARE

## 2022-06-06 DIAGNOSIS — R22.1 NECK MASS: ICD-10-CM

## 2022-06-06 PROCEDURE — 76536 US EXAM OF HEAD AND NECK: CPT

## 2022-06-06 NOTE — TELEPHONE ENCOUNTER
Patient called regarding surgical clearance form she says she gave to Dr Mayra Biswas 6/1 during appt to clear her for eye surgery  [facility] just called her to tell her they have not received anything  Visit notes do say patient is good for surgery  Asked for fax# so I could fax at least the notes while we try to find the form  Fax# [phone]  Ph# [phone]    Dr Sarah Santoyo- do you have this form in your possession and completed?

## 2022-06-13 ENCOUNTER — APPOINTMENT (OUTPATIENT)
Dept: LAB | Facility: CLINIC | Age: 67
End: 2022-06-13
Payer: MEDICARE

## 2022-06-13 DIAGNOSIS — D49.0 NEOPLASM OF AMPULLA OF VATER: ICD-10-CM

## 2022-06-13 DIAGNOSIS — C24.1 MALIGNANT NEOPLASM OF AMPULLA OF VATER (HCC): ICD-10-CM

## 2022-06-13 DIAGNOSIS — R74.8 ELEVATED ALKALINE PHOSPHATASE LEVEL: ICD-10-CM

## 2022-06-13 LAB
ALBUMIN SERPL BCP-MCNC: 3.7 G/DL (ref 3.5–5)
ALP SERPL-CCNC: 163 U/L (ref 46–116)
ALT SERPL W P-5'-P-CCNC: 51 U/L (ref 12–78)
ANION GAP SERPL CALCULATED.3IONS-SCNC: 3 MMOL/L (ref 4–13)
AST SERPL W P-5'-P-CCNC: 31 U/L (ref 5–45)
BASOPHILS # BLD AUTO: 0.05 THOUSANDS/ΜL (ref 0–0.1)
BASOPHILS NFR BLD AUTO: 1 % (ref 0–1)
BILIRUB SERPL-MCNC: 0.59 MG/DL (ref 0.2–1)
BUN SERPL-MCNC: 24 MG/DL (ref 5–25)
CALCIUM SERPL-MCNC: 9.5 MG/DL (ref 8.3–10.1)
CHLORIDE SERPL-SCNC: 109 MMOL/L (ref 100–108)
CO2 SERPL-SCNC: 28 MMOL/L (ref 21–32)
CREAT SERPL-MCNC: 0.77 MG/DL (ref 0.6–1.3)
EOSINOPHIL # BLD AUTO: 0.11 THOUSAND/ΜL (ref 0–0.61)
EOSINOPHIL NFR BLD AUTO: 1 % (ref 0–6)
ERYTHROCYTE [DISTWIDTH] IN BLOOD BY AUTOMATED COUNT: 13.3 % (ref 11.6–15.1)
GFR SERPL CREATININE-BSD FRML MDRD: 80 ML/MIN/1.73SQ M
GGT SERPL-CCNC: 127 U/L (ref 5–85)
GLUCOSE P FAST SERPL-MCNC: 102 MG/DL (ref 65–99)
HCT VFR BLD AUTO: 43.6 % (ref 34.8–46.1)
HGB BLD-MCNC: 13.8 G/DL (ref 11.5–15.4)
IMM GRANULOCYTES # BLD AUTO: 0.02 THOUSAND/UL (ref 0–0.2)
IMM GRANULOCYTES NFR BLD AUTO: 0 % (ref 0–2)
LYMPHOCYTES # BLD AUTO: 0.92 THOUSANDS/ΜL (ref 0.6–4.47)
LYMPHOCYTES NFR BLD AUTO: 11 % (ref 14–44)
MCH RBC QN AUTO: 30.7 PG (ref 26.8–34.3)
MCHC RBC AUTO-ENTMCNC: 31.7 G/DL (ref 31.4–37.4)
MCV RBC AUTO: 97 FL (ref 82–98)
MONOCYTES # BLD AUTO: 0.41 THOUSAND/ΜL (ref 0.17–1.22)
MONOCYTES NFR BLD AUTO: 5 % (ref 4–12)
NEUTROPHILS # BLD AUTO: 7.12 THOUSANDS/ΜL (ref 1.85–7.62)
NEUTS SEG NFR BLD AUTO: 82 % (ref 43–75)
NRBC BLD AUTO-RTO: 0 /100 WBCS
PLATELET # BLD AUTO: 325 THOUSANDS/UL (ref 149–390)
PMV BLD AUTO: 10.1 FL (ref 8.9–12.7)
POTASSIUM SERPL-SCNC: 5 MMOL/L (ref 3.5–5.3)
PROT SERPL-MCNC: 7.9 G/DL (ref 6.4–8.2)
RBC # BLD AUTO: 4.5 MILLION/UL (ref 3.81–5.12)
SODIUM SERPL-SCNC: 140 MMOL/L (ref 136–145)
WBC # BLD AUTO: 8.63 THOUSAND/UL (ref 4.31–10.16)

## 2022-06-13 PROCEDURE — 36415 COLL VENOUS BLD VENIPUNCTURE: CPT

## 2022-06-13 PROCEDURE — 80053 COMPREHEN METABOLIC PANEL: CPT

## 2022-06-13 PROCEDURE — 86301 IMMUNOASSAY TUMOR CA 19-9: CPT

## 2022-06-13 PROCEDURE — 85025 COMPLETE CBC W/AUTO DIFF WBC: CPT

## 2022-06-13 PROCEDURE — 82977 ASSAY OF GGT: CPT

## 2022-06-14 ENCOUNTER — TELEPHONE (OUTPATIENT)
Dept: FAMILY MEDICINE CLINIC | Facility: CLINIC | Age: 67
End: 2022-06-14

## 2022-06-14 LAB — CANCER AG19-9 SERPL-ACNC: <2 U/ML (ref 0–35)

## 2022-06-14 NOTE — TELEPHONE ENCOUNTER
Ellen Blanchard is calling from [place] [id number] Radiology Dept with significant findings for Thyroid US done on 6/6/2022   TT was sent ordering provider (Dr Favian oByd)

## 2022-06-15 ENCOUNTER — TELEPHONE (OUTPATIENT)
Dept: SURGICAL ONCOLOGY | Facility: CLINIC | Age: 67
End: 2022-06-15

## 2022-06-15 ENCOUNTER — TELEPHONE (OUTPATIENT)
Dept: CARDIAC SURGERY | Facility: CLINIC | Age: 67
End: 2022-06-15

## 2022-06-15 DIAGNOSIS — E04.2 MULTIPLE THYROID NODULES: Primary | ICD-10-CM

## 2022-06-15 NOTE — TELEPHONE ENCOUNTER
----- Message from Zakiya Cheema RN sent at 6/15/2022 10:36 AM EDT -----  Please schedule thyroid biopsy for patient    Thanks!!

## 2022-06-15 NOTE — TELEPHONE ENCOUNTER
After communicating with pt's PCP, left message for pt to call me back to discuss thyroid US results and next steps  Provided direct # for her to reach me today

## 2022-06-15 NOTE — TELEPHONE ENCOUNTER
Called and spoke with patient regarding her US results  Explained that she should have 2 of the 4 nodules biopsied, and that it can take 3 weeks to get all of the biposy results back  I will cancel her appointment to see me on 7/21 for ampullary cancer follow-up, and we will schedule her to see Dr Maria Luisa Morel instead when all testing results are in  Patient acknowledged understanding

## 2022-06-16 ENCOUNTER — OFFICE VISIT (OUTPATIENT)
Dept: HEMATOLOGY ONCOLOGY | Facility: MEDICAL CENTER | Age: 67
End: 2022-06-16
Payer: MEDICARE

## 2022-06-16 VITALS
TEMPERATURE: 97.8 F | SYSTOLIC BLOOD PRESSURE: 144 MMHG | HEART RATE: 57 BPM | RESPIRATION RATE: 18 BRPM | DIASTOLIC BLOOD PRESSURE: 72 MMHG | WEIGHT: 141 LBS | OXYGEN SATURATION: 97 % | HEIGHT: 62 IN | BODY MASS INDEX: 25.95 KG/M2

## 2022-06-16 DIAGNOSIS — Z15.09 BRCA POSITIVE: ICD-10-CM

## 2022-06-16 DIAGNOSIS — D49.0 NEOPLASM OF AMPULLA OF VATER: Primary | ICD-10-CM

## 2022-06-16 DIAGNOSIS — E04.1 THYROID NODULE: ICD-10-CM

## 2022-06-16 DIAGNOSIS — Z92.21 HISTORY OF CHEMOTHERAPY: ICD-10-CM

## 2022-06-16 DIAGNOSIS — C24.8 MALIGNANT NEOPLASM OF OVERLAPPING SITES OF BILIARY TRACT (HCC): ICD-10-CM

## 2022-06-16 DIAGNOSIS — R74.8 ELEVATED ALKALINE PHOSPHATASE LEVEL: ICD-10-CM

## 2022-06-16 DIAGNOSIS — Z95.828 PORT-A-CATH IN PLACE: ICD-10-CM

## 2022-06-16 DIAGNOSIS — Z15.01 BRCA POSITIVE: ICD-10-CM

## 2022-06-16 PROCEDURE — 99215 OFFICE O/P EST HI 40 MIN: CPT | Performed by: PHYSICIAN ASSISTANT

## 2022-06-16 NOTE — PROGRESS NOTES
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 ([address] ); 6  History of chemotherapy  Stage IIIA Adenocarcinoma with BRCA 2 mutation, S/P Whipple in April 2021, followed by Adjuvant chemotherapy for 8 cycles of 5FU based chemotherapy followed by concurrent chemoradiation  Follow up imaging is scheduled for July 2022 with surgical oncology follow up directly after  Patient continues to follow with Radiation Oncology next appointment is in February of 2023  Overall patient is doing well  Other issues have arisen see below  At present, patient is happy with quality of life no long-term sequela of five FU chemotherapy  I advised continued monitoring at three month intervals with blood work  Patient was advised on survivorship:  Eating healthy, exercising, limiting alcohol consumption and following up with health related screenings and appointments  - CBC and differential; Future  - Comprehensive metabolic panel; Future  - Gamma GT; Future    3  Elevated alkaline phosphatase level  Alkaline Phosphatase   Date Value Ref Range Status   06/13/2022 163 (H) 46 - 116 U/L Final   04/06/2022 185 (H) 46 - 116 U/L Final   03/14/2022 198 (H) 46 - 116 U/L Final   12/13/2021 171 (H) 46 - 116 U/L Final     GGT = elevated  Patient's alk-phos levels are slowly decreasing  Patient is due for imaging as above  Continued observation and follow-up GGT was recommended  4  Thyroid nodule  Patient recently had a thyroid ultrasound that discovered a suspicious thyroid nodule  Patient is following up with IR fine-needle aspiration  5  Port-A-Cath in place  Patient continues to get her port flushed every eight weeks    Next flashes do at the time of her CT scan where her port will be accessed  This is in July  I have advised that the patient have her blood work done at the interval port/prior to her appointment  7  BRCA positive  History of hysterectomy with oophorectomy  She continues to follow with Gynecology, Dr Valentina Ghosh last appointment 1/3/22  Patient continues with routine screening breasts including follow-up MRI from mammography  The patient is scheduled for follow-up in approximately 3 months with Dr Carlos Siegel  Patient voiced agreement and understanding to the above  Patient knows to call the Hematology/Oncology office with any questions and concerns regarding the above  Goals and Barriers:    Current Goal:   Cure from Cancer  Barriers: None  Patient's Capacity to Self Care:  Patient able to self care  Lelo Belle PA-C  Medical Oncology/Hematology  [address]  ______________________________________________________________________________________________________________    Subjective     Chief Complaint   Patient presents with    Follow-up       History of present illness/Cancer History:   Oncology History   Neoplasm of ampulla of Vater   3/15/2021 Initial Diagnosis    Neoplasm of ampulla of Vater     4/26/2021 Surgery    B  Celiac lymph node:     - Single lymph node; negative for malignancy (0/1)  C   Whipple resection:     - Invasive poorly differentiated mucinous adenocarcinoma  - Tumor arises in the background of an adenoma with high grade dysplasia  - Lymphatic and venous channel invasion by tumor present  - Metastatic carcinoma present in one of twenty lymph nodes (1/20)  - All margins are negative for tumor       6/1/2021 -  Cancer Staged    Staging form: Ampulla of Vater, AJCC 8th Edition  - Pathologic: Stage IIIA (pT3a, pN1, cM0) - Signed by Clarice Howell MD on 6/1/2021  Histologic grade (G): G3  Histologic grading system: 3 grade system  Residual tumor (R): R0 - None 6/9/2021 - 10/1/2021 Chemotherapy    Cycles 1-6  6/2021 through 8/20/21:  FOLFOX    Cycles 7 -8:  9/15/2021- 10/12/2021  Leucovorin 400mg/m2, IV, Day 1  5-Fu 400 mg/m2, IV , Day 1   5-Fu 1200 mg/m2, IV, CI x 46 hours  Cycle length = 2 weeks    palonosetron (ALOXI), 0 25 mg, Intravenous, Once, 5 of 5 cycles  Administration: 0 25 mg (7/21/2021), 0 25 mg (8/4/2021), 0 25 mg (8/18/2021), 0 25 mg (9/15/2021), 0 25 mg (9/29/2021)  fluorouracil (ADRUCIL), 400 mg/m2 = 605 mg, Intravenous, Once, 8 of 8 cycles  Dose modification: 340 mg/m2 (85 % of original dose 400 mg/m2, Cycle 6, Reason: Dose Not Tolerated)  Administration: 605 mg (6/9/2021), 605 mg (6/23/2021), 605 mg (7/7/2021), 605 mg (7/21/2021), 605 mg (8/4/2021), 515 mg (8/18/2021), 610 mg (9/15/2021), 610 mg (9/29/2021)  fosaprepitant (EMEND) IVPB, 150 mg, Intravenous, Once, 6 of 6 cycles  Administration: 150 mg (7/7/2021), 150 mg (7/21/2021), 150 mg (8/4/2021), 150 mg (8/18/2021), 150 mg (9/15/2021), 150 mg (9/29/2021)  leucovorin calcium IVPB, 604 mg, Intravenous, Once, 8 of 8 cycles  Dose modification: 340 mg/m2 (85 % of original dose 400 mg/m2, Cycle 6, Reason: Dose Not Tolerated)  Administration: 600 mg (6/9/2021), 600 mg (6/23/2021), 600 mg (7/7/2021), 600 mg (7/21/2021), 600 mg (8/4/2021), 517 mg (8/18/2021), 600 mg (9/15/2021), 600 mg (9/29/2021)  oxaliplatin (ELOXATIN) chemo infusion, 85 mg/m2 = 128 35 mg, Intravenous, Once, 6 of 6 cycles  Dose modification: 72 25 mg/m2 (85 % of original dose 85 mg/m2, Cycle 6, Reason: Dose Not Tolerated)  Administration: 128 35 mg (6/9/2021), 128 35 mg (6/23/2021), 128 35 mg (7/7/2021), 128 35 mg (7/21/2021), 128 35 mg (8/4/2021), 109 82 mg (8/18/2021)  fluorouracil (ADRUCIL) ambulatory infusion Soln, 1,200 mg/m2/day = 3,625 mg, Intravenous, Over 46 hours, 8 of 8 cycles     10/1/2021 Genetic Testing    Results revealed patient has the following mutation(s):  Positive for a BRCA2 pathogenic variant      10/26/2021 - Radiation         10/26/2021 -  Chemotherapy    Xeloda 825 mg/m2 BID  BSA = 1 59  Calculated to 1300 mg BID with rounding  10/28/2021 - 12/2/2021 Radiation    Treatments:  Course: C1    Plan ID Energy Fractions Dose per Fraction (cGy) Dose Correction (cGy) Total Dose Delivered (cGy) Elapsed Days   Abdomen 6X 25 / 25 195 0 4,875 35      Treatment Dates:  10/28/2021 - 12/2/2021  Lab Results   Component Value Date    WBC 8 63 06/13/2022    HGB 13 8 06/13/2022    HCT 43 6 06/13/2022    MCV 97 06/13/2022     06/13/2022     Lab Results   Component Value Date    SODIUM 140 06/13/2022    K 5 0 06/13/2022     (H) 06/13/2022    CO2 28 06/13/2022    AGAP 3 (L) 06/13/2022    BUN 24 06/13/2022    CREATININE 0 77 06/13/2022    GLUC 87 04/06/2022    GLUF 102 (H) 06/13/2022    CALCIUM 9 5 06/13/2022    AST 31 06/13/2022    ALT 51 06/13/2022    ALKPHOS 163 (H) 06/13/2022    TP 7 9 06/13/2022    TBILI 0 59 06/13/2022    EGFR 80 06/13/2022     Interval history:  Feeling well  No complaints -- Radonc appt went well - follow up in 9 [facility] onc appt pushed out due to contrast and imaging issues     Review of Systems   Constitutional: Negative for appetite change, fatigue, fever and unexpected weight change  HENT: Negative for nosebleeds  Respiratory: Negative for cough, choking and shortness of breath  Negative hemoptysis  Cardiovascular: Negative for chest pain, palpitations and leg swelling  Gastrointestinal: Negative  Negative for abdominal distention, abdominal pain, anal bleeding, blood in stool, constipation, diarrhea, nausea and vomiting  Endocrine: Negative  Negative for cold intolerance  Genitourinary: Negative  Negative for hematuria, menstrual problem, vaginal bleeding, vaginal discharge and vaginal pain  Musculoskeletal: Negative  Negative for arthralgias, myalgias, neck pain and neck stiffness  Skin: Negative  Negative for color change, pallor and rash  Allergic/Immunologic: Negative  Negative for immunocompromised state  Neurological: Negative  Negative for weakness and headaches  Hematological: Negative for adenopathy  Does not bruise/bleed easily  All other systems reviewed and are negative  Current Outpatient Medications:     acetaminophen (TYLENOL) 500 mg tablet, Take 1,000 mg by mouth, Disp: , Rfl:     lidocaine-prilocaine (EMLA) cream, Apply topically as needed for mild pain, Disp: 30 g, Rfl: 0    Multiple Vitamin (multivitamin) tablet, Take 1 tablet by mouth daily, Disp: , Rfl:     Medical ID Plate MISC, Use once for 1 dose Severely and permanently limited in the ability to walk because of an arthritic, neurological, or orthopedic condition: or cannot walk two hundred feet without stopping to rest and meets requirements for disability license plate, Disp: 1 each, Rfl: 0  Allergies   Allergen Reactions    Penicillins Rash    Tetracycline Rash       Objective   /72 (BP Location: Right arm, Patient Position: Sitting, Cuff Size: Adult)   Pulse 57   Temp 97 8 °F (36 6 °C) (Temporal)   Resp 18   Ht 5' 2" (1 575 m)   Wt 64 kg (141 lb)   SpO2 97%   BMI 25 79 kg/m²   Wt Readings from Last 6 Encounters:   06/16/22 64 kg (141 lb)   06/02/22 63 kg (138 lb 12 8 oz)   06/01/22 62 6 kg (138 lb)   05/23/22 59 kg (130 lb)   04/15/22 59 kg (130 lb)   03/15/22 60 5 kg (133 lb 6 4 oz)     Physical Exam  Constitutional:       General: She is not in acute distress  Appearance: She is well-developed  HENT:      Head: Normocephalic and atraumatic  Mouth/Throat:      Pharynx: No oropharyngeal exudate  Eyes:      General: No scleral icterus  Pupils: Pupils are equal, round, and reactive to light  Cardiovascular:      Rate and Rhythm: Normal rate and regular rhythm  Heart sounds: No murmur heard  Pulmonary:      Effort: Pulmonary effort is normal  No respiratory distress  Breath sounds: Normal breath sounds     Abdominal: General: Bowel sounds are normal  There is no distension  Palpations: Abdomen is soft  Tenderness: There is no abdominal tenderness  Musculoskeletal:         General: Normal range of motion  Cervical back: Normal range of motion  Lymphadenopathy:      Cervical: No cervical adenopathy  Skin:     General: Skin is warm  Coloration: Skin is not pale  Findings: No rash  Neurological:      Mental Status: She is alert and oriented to person, place, and time  Cranial Nerves: No cranial nerve deficit  Psychiatric:         Thought Content: Thought content normal          Pertinent Laboratory Results and Imaging Review:  Appointment on 06/13/2022   Component Date Value Ref Range Status    CA 19-9 06/13/2022 <2  0 - 35 U/mL Final    Roche [employer] Electrochemiluminescence Immunoassay (ECLIA)  Values obtained with different assay methods or kits cannot be  used interchangeably  Results cannot be interpreted as absolute  evidence of the presence or absence of malignant disease      WBC 06/13/2022 8 63  4 31 - 10 16 Thousand/uL Final    RBC 06/13/2022 4 50  3 81 - 5 12 Million/uL Final    Hemoglobin 06/13/2022 13 8  11 5 - 15 4 g/dL Final    Hematocrit 06/13/2022 43 6  34 8 - 46 1 % Final    MCV 06/13/2022 97  82 - 98 fL Final    MCH 06/13/2022 30 7  26 8 - 34 3 pg Final    MCHC 06/13/2022 31 7  31 4 - 37 4 g/dL Final    RDW 06/13/2022 13 3  11 6 - 15 1 % Final    MPV 06/13/2022 10 1  8 9 - 12 7 fL Final    Platelets 28/16/9948 325  149 - 390 Thousands/uL Final    nRBC 06/13/2022 0  /100 WBCs Final    Neutrophils Relative 06/13/2022 82 (A) 43 - 75 % Final    Immat GRANS % 06/13/2022 0  0 - 2 % Final    Lymphocytes Relative 06/13/2022 11 (A) 14 - 44 % Final    Monocytes Relative 06/13/2022 5  4 - 12 % Final    Eosinophils Relative 06/13/2022 1  0 - 6 % Final    Basophils Relative 06/13/2022 1  0 - 1 % Final    Neutrophils Absolute 06/13/2022 7 12  1 85 - 7 62 Thousands/µL Final    Immature Grans Absolute 06/13/2022 0 02  0 00 - 0 20 Thousand/uL Final    Lymphocytes Absolute 06/13/2022 0 92  0 60 - 4 47 Thousands/µL Final    Monocytes Absolute 06/13/2022 0 41  0 17 - 1 22 Thousand/µL Final    Eosinophils Absolute 06/13/2022 0 11  0 00 - 0 61 Thousand/µL Final    Basophils Absolute 06/13/2022 0 05  0 00 - 0 10 Thousands/µL Final    Sodium 06/13/2022 140  136 - 145 mmol/L Final    Potassium 06/13/2022 5 0  3 5 - 5 3 mmol/L Final    Chloride 06/13/2022 109 (A) 100 - 108 mmol/L Final    CO2 06/13/2022 28  21 - 32 mmol/L Final    ANION GAP 06/13/2022 3 (A) 4 - 13 mmol/L Final    BUN 06/13/2022 24  5 - 25 mg/dL Final    Creatinine 06/13/2022 0 77  0 60 - 1 30 mg/dL Final    Standardized to IDMS reference method    Glucose, Fasting 06/13/2022 102 (A) 65 - 99 mg/dL Final    Specimen collection should occur prior to Sulfasalazine administration due to the potential for falsely depressed results  Specimen collection should occur prior to Sulfapyridine administration due to the potential for falsely elevated results   Calcium 06/13/2022 9 5  8 3 - 10 1 mg/dL Final    AST 06/13/2022 31  5 - 45 U/L Final    Specimen collection should occur prior to Sulfasalazine administration due to the potential for falsely depressed results   ALT 06/13/2022 51  12 - 78 U/L Final    Specimen collection should occur prior to Sulfasalazine and/or Sulfapyridine administration due to the potential for falsely depressed results   Alkaline Phosphatase 06/13/2022 163 (A) 46 - 116 U/L Final    Total Protein 06/13/2022 7 9  6 4 - 8 2 g/dL Final    Albumin 06/13/2022 3 7  3 5 - 5 0 g/dL Final    Total Bilirubin 06/13/2022 0 59  0 20 - 1 00 mg/dL Final    Use of this assay is not recommended for patients undergoing treatment with eltrombopag due to the potential for falsely elevated results      eGFR 06/13/2022 80  ml/min/1 73sq m Final    GGT 06/13/2022 127 (A) 5 - 85 U/L Final The following historical data was reviewed:  Past Medical History:   Diagnosis Date    BRCA1 positive     BRCA2 positive     Cancer ([facility] [address] )     pancreatic    History of chemotherapy     History of radiation therapy     Pancreatic carcinoma (HCC)      Past Surgical History:   Procedure Laterality Date    ABLATION SOFT TISSUE N/A 2021    Procedure: INTRAOPERATIVE ULTRASOUND, ABLATION,SOFT TISSUE PANCREAS;  Surgeon: Connie Vazquez MD;  [facility];  Service: Surgical Oncology    CHOLECYSTECTOMY N/A 2021    Procedure: CHOLECYSTECTOMY;  Surgeon: Connie Vazquez MD;  [facility];  Service: Surgical Oncology    FL GUIDED CENTRAL VENOUS ACCESS DEVICE INSERTION  2021    HYSTERECTOMY      LAPAROTOMY N/A 2021    Procedure: LAPAROTOMY EXPLORATORY;  Surgeon: Connie Vazquez MD;  [facility];  Service: Surgical Oncology    OOPHORECTOMY      SINUS SURGERY      TUNNELED VENOUS PORT PLACEMENT Left 2021    Procedure: INSERTION VENOUS PORT (PORT-A-CATH); Surgeon: Connie Vazquez MD;  [facility];  Service: Surgical Oncology    WHIPPLE PROCEDURE/PANCREATICO-DUODENECTOMY N/A 2021    Procedure: WHIPPLE PROCEDURE/PANCREATICO-DUODENECTOMY; PYLORIC PRESERVING;  Surgeon: Connie Vazquez MD;  [facility];  Service: Surgical Oncology     Social History     Socioeconomic History    Marital status:      Spouse name: None    Number of children: 3    Years of education: None    Highest education level: None   Occupational History    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   Tobacco Use    Smoking status: Current Some Day Smoker     Packs/day: 0 50     Start date: 65     Last attempt to quit: 2021     Years since quittin 2    Smokeless tobacco: Never Used    Tobacco comment: recently stop smoking , pt stated she smoke occ  1-2 cigg some days 2022     Vaping Use    Vaping Use: Never used   Substance and Sexual Activity    Alcohol use: Never    Drug use: Never    Sexual activity: Not Currently   Other Topics Concern    None   Social History Narrative    None     Social Determinants of Health     Financial Resource Strain: Low Risk     Difficulty of Paying Living Expenses: Not hard at all   Food Insecurity: No Food Insecurity    Worried About Running Out of Food in the Last Year: Never true    Gema of Food in the Last Year: Never true   Transportation Needs: No Transportation Needs    Lack of Transportation (Medical): No    Lack of Transportation (Non-Medical): No   Physical Activity: Insufficiently Active    Days of Exercise per Week: 4 days    Minutes of Exercise per Session: 20 min   Stress: No Stress Concern Present    Feeling of Stress : Not at all   Social Connections: Socially Isolated    Frequency of Communication with Friends and Family: More than three times a week    Frequency of Social Gatherings with Friends and Family: More than three times a week    Attends Scientologist Services: Never    Active Member of Clubs or Organizations: No    Attends Club or Organization Meetings: Never    Marital Status:    Intimate Partner Violence: Not At Risk    Fear of Current or Ex-Partner: No    Emotionally Abused: No    Physically Abused: No    Sexually Abused: No   Housing Stability: Unknown    Unable to Pay for Housing in the Last Year: No    Number of [place] in the Last Year: Not on file    Unstable Housing in the Last Year: No     Family History   Problem Relation Age of Onset    Ulcerative colitis Mother     Prostate cancer Father     Melanoma Sister     Heart disease Brother     Prostate cancer Brother        Please note: This report has been generated by a voice recognition software system  Therefore there may be syntax, spelling, and/or grammatical errors  Please call if you have any questions

## 2022-07-12 ENCOUNTER — OFFICE VISIT (OUTPATIENT)
Dept: FAMILY MEDICINE CLINIC | Facility: CLINIC | Age: 67
End: 2022-07-12
Payer: MEDICARE

## 2022-07-12 ENCOUNTER — HOSPITAL ENCOUNTER (OUTPATIENT)
Dept: RADIOLOGY | Facility: HOSPITAL | Age: 67
Discharge: HOME | End: 2022-07-12
Payer: MEDICARE

## 2022-07-12 ENCOUNTER — HOSPITAL ENCOUNTER (OUTPATIENT)
Dept: INFUSION CENTER | Facility: HOSPITAL | Age: 67
Discharge: HOME | End: 2022-07-12
Payer: MEDICARE

## 2022-07-12 VITALS
OXYGEN SATURATION: 94 % | HEIGHT: 62 IN | RESPIRATION RATE: 18 BRPM | TEMPERATURE: 98.1 F | HEART RATE: 78 BPM | SYSTOLIC BLOOD PRESSURE: 160 MMHG | BODY MASS INDEX: 26.07 KG/M2 | WEIGHT: 141.7 LBS | DIASTOLIC BLOOD PRESSURE: 82 MMHG

## 2022-07-12 DIAGNOSIS — J01.01 ACUTE RECURRENT MAXILLARY SINUSITIS: Primary | ICD-10-CM

## 2022-07-12 DIAGNOSIS — D49.0 NEOPLASM OF AMPULLA OF VATER: ICD-10-CM

## 2022-07-12 DIAGNOSIS — R09.81 NASAL CONGESTION: ICD-10-CM

## 2022-07-12 DIAGNOSIS — D49.0 NEOPLASM OF AMPULLA OF VATER: Primary | ICD-10-CM

## 2022-07-12 PROCEDURE — 96523 IRRIG DRUG DELIVERY DEVICE: CPT

## 2022-07-12 PROCEDURE — 74177 CT ABD & PELVIS W/CONTRAST: CPT

## 2022-07-12 PROCEDURE — 71260 CT THORAX DX C+: CPT

## 2022-07-12 PROCEDURE — 99213 OFFICE O/P EST LOW 20 MIN: CPT | Performed by: FAMILY MEDICINE

## 2022-07-12 PROCEDURE — G1004 CDSM NDSC: HCPCS

## 2022-07-12 RX ORDER — FLUTICASONE PROPIONATE 50 MCG
1 SPRAY, SUSPENSION (ML) NASAL DAILY
Qty: 9.9 ML | Refills: 4 | Status: SHIPPED | OUTPATIENT
Start: 2022-07-12 | End: 2022-10-13

## 2022-07-12 RX ADMIN — IOHEXOL 65 ML: 350 INJECTION, SOLUTION INTRAVENOUS at 09:16

## 2022-07-12 NOTE — PROGRESS NOTES
[id number] [facility] Note  Sammy Peña MD, 22     Samanta Melvin MRN: [id number] : 1955 Age: 77 y o  Assessment/Plan       Diagnoses and all orders for this visit:    Acute recurrent maxillary sinusitis  -     fluticasone (FLONASE) 50 mcg/act nasal spray; 1 spray into each nostril daily    Nasal congestion  -     fluticasone (FLONASE) 50 mcg/act nasal spray; 1 spray into each nostril daily    Patient states she suffers from recurrent sinus infections  Will order flonase and follow up  No follow-ups on file  Samanta Melvin acknowledged understanding of treatment plan, all questions answered  Subjective      Samanta Melvin is a 77 y o  female  No chief complaint on file  Patient states her head is congested and she is producing green sputum  She notes her ears itch  Slight throat pain  Patient has taken sudafed but does not help  Allegra she takes everyday and helps slightly  Patient had sinus surgery a few years ago  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  She has a thyroid biopsy scheduled for a nodule tomorrow     HPI      The following portions of the patient's history were reviewed and updated as appropriate: allergies, current medications, past family history, past medical history, past social history, past surgical history and problem list      Past Medical History:   Diagnosis Date    BRCA1 positive     BRCA2 positive     Cancer ([facility] [address] )     pancreatic    History of chemotherapy     History of radiation therapy     Pancreatic carcinoma (HCC)        Allergies   Allergen Reactions    Penicillins Rash    Tetracycline Rash       Past Surgical History:   Procedure Laterality Date    ABLATION SOFT TISSUE N/A 2021    Procedure: INTRAOPERATIVE ULTRASOUND, ABLATION,SOFT TISSUE PANCREAS;  Surgeon: Remi Saleem MD;  [facility];  Service: Surgical Oncology    CHOLECYSTECTOMY N/A 2021    Procedure: CHOLECYSTECTOMY;  Surgeon: Ed Luz MD;  [facility];  Service: Surgical Oncology    FL GUIDED CENTRAL VENOUS ACCESS DEVICE INSERTION  2021    HYSTERECTOMY      LAPAROTOMY N/A 2021    Procedure: LAPAROTOMY EXPLORATORY;  Surgeon: Ed Luz MD;  [facility];  Service: Surgical Oncology    OOPHORECTOMY      SINUS SURGERY      TUNNELED VENOUS PORT PLACEMENT Left 2021    Procedure: INSERTION VENOUS PORT (PORT-A-CATH); Surgeon: Ed Luz MD;  [facility];  Service: Surgical Oncology    WHIPPLE PROCEDURE/PANCREATICO-DUODENECTOMY N/A 2021    Procedure: WHIPPLE PROCEDURE/PANCREATICO-DUODENECTOMY; PYLORIC PRESERVING;  Surgeon: Ed Luz MD;  [facility];  Service: Surgical Oncology       Family History   Problem Relation Age of Onset    Ulcerative colitis Mother     Prostate cancer Father     Melanoma Sister     Heart disease Brother     Prostate cancer Brother        Social History     Socioeconomic History    Marital status:      Spouse name: None    Number of children: 3    Years of education: None    Highest education level: None   Occupational History    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   Tobacco Use    Smoking status: Current Some Day Smoker     Packs/day: 0 50     Start date: 65     Last attempt to quit: 2021     Years since quittin 2    Smokeless tobacco: Never Used    Tobacco comment: recently stop smoking , pt stated she smoke occ  1-2 cigg some days 2022     Vaping Use    Vaping Use: Never used   Substance and Sexual Activity    Alcohol use: Never    Drug use: Never    Sexual activity: Not Currently   Other Topics Concern    None   Social History Narrative    None     Social Determinants of Health     Financial Resource Strain: Low Risk     Difficulty of Paying Living Expenses: Not hard at all   Food Insecurity: No Food Insecurity    Worried About Running Out of Food in the Last Year: Never true    [address] in the Last Year: Never true   Transportation Needs: No Transportation Needs    Lack of Transportation (Medical): No    Lack of Transportation (Non-Medical): No   Physical Activity: Insufficiently Active    Days of Exercise per Week: 4 days    Minutes of Exercise per Session: 20 min   Stress: No Stress Concern Present    Feeling of Stress : Not at all   Social Connections: Socially Isolated    Frequency of Communication with Friends and Family: More than three times a week    Frequency of Social Gatherings with Friends and Family: More than three times a week    Attends Zoroastrian Services: Never    Active Member of Clubs or Organizations: No    Attends Club or Organization Meetings: Never    Marital Status:    Intimate Partner Violence: Not At Risk    Fear of Current or Ex-Partner: No    Emotionally Abused: No    Physically Abused: No    Sexually Abused: No   Housing Stability: Unknown    Unable to Pay for Housing in the Last Year: No    Number of [place] in the Last Year: Not on file    Unstable Housing in the Last Year: No       Current Outpatient Medications   Medication Sig Dispense Refill    acetaminophen (TYLENOL) 500 mg tablet Take 1,000 mg by mouth      lidocaine-prilocaine (EMLA) cream Apply topically as needed for mild pain 30 g 0    Multiple Vitamin (multivitamin) tablet Take 1 tablet by mouth daily      Medical ID Plate MISC Use once for 1 dose Severely and permanently limited in the ability to walk because of an arthritic, neurological, or orthopedic condition: or cannot walk two hundred feet without stopping to rest and meets requirements for disability license plate 1 each 0     No current facility-administered medications for this visit  Review of Systems   Constitutional: Negative for activity change, appetite change and fatigue     HENT: Positive for postnasal drip, rhinorrhea, sinus pressure, sinus pain and sore throat  Negative for sneezing, tinnitus, trouble swallowing and voice change  Respiratory: Negative for cough, shortness of breath and wheezing  Gastrointestinal: Negative for abdominal pain, constipation, diarrhea, nausea and vomiting  Neurological: Negative for weakness, light-headedness and headaches  And as noted in HPI    Objective      /82 (BP Location: Left arm, Patient Position: Sitting, Cuff Size: Adult)   Pulse 78   Temp 98 1 °F (36 7 °C) (Tympanic)   Resp 18   Ht 5' 2" (1 575 m)   Wt 64 3 kg (141 lb 11 2 oz)   SpO2 94%   BMI 25 92 kg/m²     Physical Exam  Constitutional:       Appearance: Normal appearance  HENT:      Head: Normocephalic and atraumatic  Right Ear: Hearing, tympanic membrane, ear canal and external ear normal  No decreased hearing noted  No drainage, swelling or tenderness  No middle ear effusion  There is impacted cerumen  No mastoid tenderness  Tympanic membrane is not erythematous  Left Ear: Hearing, tympanic membrane, ear canal and external ear normal  No decreased hearing noted  No drainage, swelling or tenderness  No middle ear effusion  There is impacted cerumen  No mastoid tenderness  Tympanic membrane is not erythematous  Nose: No nasal tenderness, congestion or rhinorrhea  Right Sinus: Maxillary sinus tenderness present  No frontal sinus tenderness  Left Sinus: Maxillary sinus tenderness present  No frontal sinus tenderness  Mouth/Throat:      Mouth: Mucous membranes are moist       Dentition: Abnormal dentition  Pharynx: Oropharynx is clear  Uvula midline  No pharyngeal swelling, oropharyngeal exudate or posterior oropharyngeal erythema  Comments: Lip deformity  Neurological:      Mental Status: She is alert  Some portions of this record may have been generated with voice recognition software  There may be translation, syntax, or grammatical errors   Occasional wrong word or "sound-a-like" substitutions may have occurred due to the inherent limitations of the voice recognition software  Read the chart carefully and recognize, using context, where substations may have occurred  If you have any questions, please contact the dictating provider for clarification or correction, as needed

## 2022-07-13 ENCOUNTER — HOSPITAL ENCOUNTER (OUTPATIENT)
Dept: RADIOLOGY | Facility: HOSPITAL | Age: 67
Discharge: HOME | End: 2022-07-13
Admitting: INTERNAL MEDICINE
Payer: MEDICARE

## 2022-07-13 DIAGNOSIS — E04.2 MULTIPLE THYROID NODULES: ICD-10-CM

## 2022-07-13 PROCEDURE — 88173 CYTOPATH EVAL FNA REPORT: CPT | Performed by: PATHOLOGY

## 2022-07-13 PROCEDURE — 10005 FNA BX W/US GDN 1ST LES: CPT | Performed by: INTERNAL MEDICINE

## 2022-07-13 PROCEDURE — 10006 FNA BX W/US GDN EA ADDL: CPT

## 2022-07-13 PROCEDURE — 88172 CYTP DX EVAL FNA 1ST EA SITE: CPT | Performed by: PATHOLOGY

## 2022-07-13 PROCEDURE — 10005 FNA BX W/US GDN 1ST LES: CPT

## 2022-07-13 RX ORDER — LIDOCAINE HYDROCHLORIDE 10 MG/ML
5 INJECTION, SOLUTION EPIDURAL; INFILTRATION; INTRACAUDAL; PERINEURAL ONCE
Status: COMPLETED | OUTPATIENT
Start: 2022-07-13 | End: 2022-07-13

## 2022-07-13 RX ADMIN — LIDOCAINE HYDROCHLORIDE 5 ML: 10 INJECTION, SOLUTION EPIDURAL; INFILTRATION; INTRACAUDAL; PERINEURAL at 09:07

## 2022-07-13 RX ADMIN — LIDOCAINE HYDROCHLORIDE 5 ML: 10 INJECTION, SOLUTION EPIDURAL; INFILTRATION; INTRACAUDAL; PERINEURAL at 09:43

## 2022-07-14 ENCOUNTER — TELEPHONE (OUTPATIENT)
Dept: SURGICAL ONCOLOGY | Facility: CLINIC | Age: 67
End: 2022-07-14

## 2022-07-14 NOTE — TELEPHONE ENCOUNTER
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

## 2022-08-02 ENCOUNTER — OFFICE VISIT (OUTPATIENT)
Dept: SURGICAL ONCOLOGY | Facility: CLINIC | Age: 67
End: 2022-08-02
Payer: MEDICARE

## 2022-08-02 VITALS
HEIGHT: 62 IN | WEIGHT: 144 LBS | RESPIRATION RATE: 18 BRPM | OXYGEN SATURATION: 97 % | HEART RATE: 94 BPM | BODY MASS INDEX: 26.5 KG/M2 | SYSTOLIC BLOOD PRESSURE: 158 MMHG | TEMPERATURE: 97.6 F | DIASTOLIC BLOOD PRESSURE: 80 MMHG

## 2022-08-02 DIAGNOSIS — D49.0 NEOPLASM OF AMPULLA OF VATER: Primary | ICD-10-CM

## 2022-08-02 DIAGNOSIS — E04.2 MULTIPLE THYROID NODULES: ICD-10-CM

## 2022-08-02 DIAGNOSIS — C24.1 MALIGNANT NEOPLASM OF AMPULLA OF VATER (HCC): ICD-10-CM

## 2022-08-02 PROCEDURE — 99215 OFFICE O/P EST HI 40 MIN: CPT | Performed by: SURGERY

## 2022-08-02 NOTE — PROGRESS NOTES
Surgical Oncology Follow Up       [address]  [facility] [place]  [address] [facility] [address]  [facility] [address]    Ebony Bravo  1955  [id number]  [address] [facility]  [facility] [place]  [address] [facility] [address]  [place] [address]    Diagnoses and all orders for this visit:    Neoplasm of ampulla of Vater  -     CT chest abdomen pelvis w contrast; Future  -     BUN; Future  -     Creatinine, serum; Future  -     Cancer antigen 19-9; Future    Multiple thyroid nodules    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)   -     Cancer antigen 19-9; Future        Chief Complaint   Patient presents with    Follow-up       Return in about 6 months (around 2/2/2023) for Office Visit, Imaging - See orders, Labs - See Treatment Plan, with Arcelia  Oncology History   Neoplasm of ampulla of Vater   3/15/2021 Initial Diagnosis    Neoplasm of ampulla of Vater     4/26/2021 Surgery    B  Celiac lymph node:     - Single lymph node; negative for malignancy (0/1)  C   Whipple resection:     - Invasive poorly differentiated mucinous adenocarcinoma  - Tumor arises in the background of an adenoma with high grade dysplasia  - Lymphatic and venous channel invasion by tumor present  - Metastatic carcinoma present in one of twenty lymph nodes (1/20)  - All margins are negative for tumor       6/1/2021 -  Cancer Staged    Staging form: Ampulla of Vater, AJCC 8th Edition  - Pathologic: Stage IIIA (pT3a, pN1, cM0) - Signed by Vinh Lynne MD on 6/1/2021  Histologic grade (G): G3  Histologic grading system: 3 grade system  Residual tumor (R): R0 - None       6/9/2021 - 10/1/2021 Chemotherapy    Cycles 1-6  6/2021 through 8/20/21:  FOLFOX    Cycles 7 -8:  9/15/2021- 10/12/2021  Leucovorin 400mg/m2, IV, Day 1  5-Fu 400 mg/m2, IV , Day 1   5-Fu 1200 mg/m2, IV, CI x 46 hours  Cycle length = 2 weeks    palonosetron (ALOXI), 0 25 mg, Intravenous, Once, 5 of 5 cycles  Administration: 0 25 mg (7/21/2021), 0 25 mg (8/4/2021), 0 25 mg (8/18/2021), 0 25 mg (9/15/2021), 0 25 mg (9/29/2021)  fluorouracil (ADRUCIL), 400 mg/m2 = 605 mg, Intravenous, Once, 8 of 8 cycles  Dose modification: 340 mg/m2 (85 % of original dose 400 mg/m2, Cycle 6, Reason: Dose Not Tolerated)  Administration: 605 mg (6/9/2021), 605 mg (6/23/2021), 605 mg (7/7/2021), 605 mg (7/21/2021), 605 mg (8/4/2021), 515 mg (8/18/2021), 610 mg (9/15/2021), 610 mg (9/29/2021)  fosaprepitant (EMEND) IVPB, 150 mg, Intravenous, Once, 6 of 6 cycles  Administration: 150 mg (7/7/2021), 150 mg (7/21/2021), 150 mg (8/4/2021), 150 mg (8/18/2021), 150 mg (9/15/2021), 150 mg (9/29/2021)  leucovorin calcium IVPB, 604 mg, Intravenous, Once, 8 of 8 cycles  Dose modification: 340 mg/m2 (85 % of original dose 400 mg/m2, Cycle 6, Reason: Dose Not Tolerated)  Administration: 600 mg (6/9/2021), 600 mg (6/23/2021), 600 mg (7/7/2021), 600 mg (7/21/2021), 600 mg (8/4/2021), 517 mg (8/18/2021), 600 mg (9/15/2021), 600 mg (9/29/2021)  oxaliplatin (ELOXATIN) chemo infusion, 85 mg/m2 = 128 35 mg, Intravenous, Once, 6 of 6 cycles  Dose modification: 72 25 mg/m2 (85 % of original dose 85 mg/m2, Cycle 6, Reason: Dose Not Tolerated)  Administration: 128 35 mg (6/9/2021), 128 35 mg (6/23/2021), 128 35 mg (7/7/2021), 128 35 mg (7/21/2021), 128 35 mg (8/4/2021), 109 82 mg (8/18/2021)  fluorouracil (ADRUCIL) ambulatory infusion Soln, 1,200 mg/m2/day = 3,625 mg, Intravenous, Over 46 hours, 8 of 8 cycles     10/1/2021 Genetic Testing    Results revealed patient has the following mutation(s):  Positive for a BRCA2 pathogenic variant      10/26/2021 -  Radiation         10/26/2021 -  Chemotherapy    Xeloda 825 mg/m2 BID  BSA = 1 59  Calculated to 1300 mg BID with rounding          10/28/2021 - 12/2/2021 Radiation    Treatments:  Course: C1    Plan ID Energy Fractions Dose per Fraction (cGy) Dose Correction (cGy) Total Dose Delivered (cGy) Elapsed Days   Abdomen 6X 25 / 25 195 0 4,875 35      Treatment Dates:  10/28/2021 - 12/2/2021  Staging: [id number] periampullary carcinoma, April 2021  BRCA 2 mutation  10 year TC is 24 10%  Lifetime TC is 37%  Treatment history:  Pancreaticoduodenectomy, April 2021  Adjuvant FOLFOX  Chemo radiation with Xeloda  Right upper pole thyroid biopsy, July 2022: AUS, Talcott 3, Afirma benign  Left mid pole thyroid biopsy, July 22: Talcott 2  Current treatment:  Observation  Disease status: SEDA    History of Present Illness:  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  She is doing well  She denies any abdominal pain, nausea or vomiting  She continues to have early satiety  She drinks 2-3 nutritional supplements a day and is maintaining her weight  CT from July 12, 2022 revealed no evidence of recurrence  I personally reviewed the films  Thyroid ultrasound from June 6, 2022 revealed 2 nodules that met criteria for biopsy  The right upper pole lesion was Talcott 3, but Afirma was benign  The left mid pole lesion was benign could  I personally reviewed her films  CA 19-9 is less than 2  Review of Systems  Complete ROS Surg Onc:   Complete ROS Surg Onc:   Constitutional: The patient denies new or recent history of general fatigue, no recent weight loss, no change in appetite  Eyes: No complaints of visual problems, no scleral icterus  ENT: no complaints of ear pain, no hoarseness, no difficulty swallowing,  no tinnitus and no new masses in head, oral cavity, or neck  Cardiovascular: No complaints of chest pain, no palpitations, no ankle edema  Respiratory: No complaints of shortness of breath, no cough  Gastrointestinal: No complaints of jaundice, no bloody stools, no pale stools  Genitourinary: No complaints of dysuria, no hematuria, no nocturia, no frequent urination, no urethral discharge     Musculoskeletal: No complaints of weakness, paralysis, joint stiffness or arthralgias  Integumentary: No complaints of rash, no new lesions  Neurological: No complaints of convulsions, no seizures, no dizziness  Hematologic/Lymphatic: No complaints of easy bruising  Endocrine:  No hot or cold intolerance  No polydipsia, polyphagia, or polyuria  Allergy/immunology:  No environmental allergies  No food allergies  Not immunocompromised  Skin:  No pallor or rash  No wound  Patient Active Problem List   Diagnosis    Elevated LFTs    Dilated bile duct    Neoplasm of ampulla of Vater    Mild protein-calorie malnutrition ([place] [id number] )    Breast mass    Multiple thyroid nodules     Past Medical History:   Diagnosis Date    BRCA1 positive     BRCA2 positive     Cancer ([place] [id number] )     pancreatic    History of chemotherapy     History of radiation therapy     Pancreatic carcinoma ([place] [id number] )      Past Surgical History:   Procedure Laterality Date    ABLATION SOFT TISSUE N/A 4/26/2021    Procedure: INTRAOPERATIVE ULTRASOUND, ABLATION,SOFT TISSUE PANCREAS;  Surgeon: Seth Bales MD;  [facility];  Service: Surgical Oncology    CHOLECYSTECTOMY N/A 4/26/2021    Procedure: CHOLECYSTECTOMY;  Surgeon: Seth Bales MD;  [facility];  Service: Surgical Oncology    FL GUIDED CENTRAL VENOUS ACCESS DEVICE INSERTION  6/2/2021    HYSTERECTOMY      LAPAROTOMY N/A 4/26/2021    Procedure: LAPAROTOMY EXPLORATORY;  Surgeon: Seth Bales MD;  [facility];  Service: Surgical Oncology    OOPHORECTOMY      SINUS SURGERY      TUNNELED VENOUS PORT PLACEMENT Left 6/2/2021    Procedure: INSERTION VENOUS PORT (PORT-A-CATH); Surgeon: Seth Bales MD;  [facility];  Service: Surgical Oncology    US GUIDED THYROID BIOPSY  7/13/2022    WHIPPLE PROCEDURE/PANCREATICO-DUODENECTOMY N/A 4/26/2021    Procedure:  WHIPPLE PROCEDURE/PANCREATICO-DUODENECTOMY; PYLORIC PRESERVING;  Surgeon: Seth Bales MD;  [facility];  Service: Surgical Oncology     Family History   Problem Relation Age of Onset    Ulcerative colitis Mother     Prostate cancer Father     Melanoma Sister     Heart disease Brother     Prostate cancer Brother      Social History     Socioeconomic History    Marital status:      Spouse name: Not on file    Number of children: 3    Years of education: Not on file    Highest education level: Not on file   Occupational History    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   Tobacco Use    Smoking status: Current Some Day Smoker     Packs/day: 0 50     Start date: 65     Last attempt to quit: 2021     Years since quittin 3    Smokeless tobacco: Never Used    Tobacco comment: recently stop smoking , pt stated she smoke occ  1-2 cigg some days 2022  Vaping Use    Vaping Use: Never used   Substance and Sexual Activity    Alcohol use: Never    Drug use: Never    Sexual activity: Not Currently   Other Topics Concern    Not on file   Social History Narrative    Not on file     Social Determinants of Health     Financial Resource Strain: Low Risk     Difficulty of Paying Living Expenses: Not hard at all   Food Insecurity: No Food Insecurity    Worried About Running Out of Food in the Last Year: Never true    [address] in the Last Year: Never true   Transportation Needs: No Transportation Needs    Lack of Transportation (Medical): No    Lack of Transportation (Non-Medical):  No   Physical Activity: Insufficiently Active    Days of Exercise per Week: 4 days    Minutes of Exercise per Session: 20 min   Stress: No Stress Concern Present    Feeling of Stress : Not at all   Social Connections: Socially Isolated    Frequency of Communication with Friends and Family: More than three times a week    Frequency of Social Gatherings with Friends and Family: More than three times a week    Attends Mu-ism Services: Never    Active Member of Clubs or Organizations: No    Attends Club or Organization Meetings: Never    Marital Status:    Intimate Partner Violence: Not At Risk    Fear of Current or Ex-Partner: No    Emotionally Abused: No    Physically Abused: No    Sexually Abused: No   Housing Stability: Unknown    Unable to Pay for Housing in the Last Year: No    Number of Places Lived in the Last Year: Not on file    Unstable Housing in the Last Year: No       Current Outpatient Medications:     acetaminophen (TYLENOL) 500 mg tablet, Take 1,000 mg by mouth, Disp: , Rfl:     fluticasone (FLONASE) 50 mcg/act nasal spray, 1 spray into each nostril daily, Disp: 9 9 mL, Rfl: 4    Multiple Vitamin (multivitamin) tablet, Take 1 tablet by mouth daily, Disp: , Rfl:     lidocaine-prilocaine (EMLA) cream, Apply topically as needed for mild pain (Patient not taking: Reported on 8/2/2022), Disp: 30 g, Rfl: 0    Medical ID Plate MISC, Use once for 1 dose Severely and permanently limited in the ability to walk because of an arthritic, neurological, or orthopedic condition: or cannot walk two hundred feet without stopping to rest and meets requirements for disability license plate, Disp: 1 each, Rfl: 0  Allergies   Allergen Reactions    Penicillins Rash    Tetracycline Rash     Vitals:    08/02/22 0811   BP: 158/80   Pulse: 94   Resp: 18   Temp: 97 6 °F (36 4 °C)   SpO2: 97%       Physical Exam  Constitutional: General appearance: The Patient is well-developed and well-nourished who appears the stated age in no acute distress  Patient is pleasant and talkative  HEENT:  Normocephalic  Sclerae are anicteric  Mucous membranes are moist  Neck is supple without adenopathy  No JVD  Chest: The lungs are clear to auscultation  Cardiac: Heart is regular rate  Abdomen: Abdomen is soft, non-tender, non-distended and without masses  Extremities: There is no clubbing or cyanosis  There is no edema  Symmetric    Neuro: Grossly nonfocal  Gait is normal      Lymphatic: No evidence of cervical adenopathy bilaterally  No evidence of axillary adenopathy bilaterally  No evidence of inguinal adenopathy bilaterally  Skin: Warm, anicteric  Psych:  Patient is pleasant and talkative  Breasts:        Pathology:  [unfilled]    Labs:      Imaging  CT chest abdomen pelvis w contrast    Result Date: 7/15/2022  Narrative: CT CHEST, ABDOMEN AND PELVIS WITH IV CONTRAST INDICATION:   D49 0: Neoplasm of unspecified behavior of digestive system  COMPARISON:  10/15/2021  TECHNIQUE: CT examination of the chest, abdomen and pelvis was performed  Axial, sagittal, and coronal 2D reformatted images were created from the source data and submitted for interpretation  Radiation dose length product (DLP) for this visit:  465 mGy-cm   This examination, like all CT scans performed in the [facility], was performed utilizing techniques to minimize radiation dose exposure, including the use of iterative reconstruction and automated exposure control  IV Contrast:  65 mL of iohexol (OMNIPAQUE) Enteric Contrast: Enteric contrast was administered  FINDINGS: CHEST LUNGS:  Centrilobular emphysematous changes  Lingular atelectasis     There is no tracheal or endobronchial lesion  PLEURA:  Unremarkable  HEART/GREAT VESSELS: Heart is unremarkable for patient's age  No thoracic aortic aneurysm  MEDIASTINUM AND WYATT:  Unremarkable  CHEST WALL AND LOWER NECK:  1 9 cm right thyroid lobe hypodensity which was recently biopsied    ABDOMEN LIVER/BILIARY TREE:  One or more subcentimeter sharply circumscribed low-density hepatic lesion(s) are noted, too small to accurately characterize, but statistically most likely to represent subcentimeter hepatic cysts  No suspicious solid hepatic lesion is identified  Hepatic contours are normal   No biliary dilatation  Pneumobilia  GALLBLADDER:  Gallbladder is surgically absent  SPLEEN:  Unremarkable  PANCREAS:  Residual pancreatic tissue appears unremarkable   ADRENAL GLANDS: Unremarkable  KIDNEYS/URETERS:  No hydronephrosis or urinary tract calculus  One or more sharply circumscribed subcentimeter renal hypodensities are present, too small to accurately characterize, and statistically most likely benign findings  According to recent literature (Radiology 2019) no further workup of these findings is recommended  STOMACH AND BOWEL:  Expected postoperative changes following pancreaticoduodenectomy with normal gastrojejunostomy anastomosis  APPENDIX:  No findings to suggest appendicitis  ABDOMINOPELVIC CAVITY:  No ascites  No pneumoperitoneum  No lymphadenopathy  VESSELS:  Unremarkable for patient's age  PELVIS REPRODUCTIVE ORGANS:  Surgical changes of prior hysterectomy  URINARY BLADDER:  Unremarkable  ABDOMINAL WALL/INGUINAL REGIONS:  Unremarkable  OSSEOUS STRUCTURES:  No acute fracture or destructive osseous lesion  Impression: Suspected postsurgical changes  No CT findings recurrent or metastatic tumor in the chest, abdomen or pelvis  Workstation performed: [id number]     US guided thyroid biopsy with [email or web address]    Result Date: 7/14/2022  Narrative: ULTRASOUND-GUIDED THYROID BIOPSY HISTORY: 77year-old with history of right upper pole and left mid pole thyroid nodules    COMPARISON: Thyroid ultrasound dated 6/6/2022 FINDINGS: Prior ultrasound images were reviewed  The prior thyroid ultrasound study showed a right upper pole nodule measuring up to 2 9 cm, TR 3, and a left mid pole nodule measuring 1 5 cm, TR 5  The procedure was explained to the patient, including risks of hemorrhage, infection and local injury  Informed consent was freely obtained  The patient verbalized expressed understanding of the above risks and wished to proceed with the procedure  Final standard "time-out" procedure performed  PROCEDURE: The neck was prepped and draped in normal sterile fashion   Under real-time ultrasound guidance and local anesthesia 5 passes with a 27 gauge needle were made through the right upper pole nodule  Under real-time ultrasound guidance and local anesthesia 6 passes with a 27 gauge needle were made through the left midpole nodule  Cytopathology was present and deemed the specimens adequate for evaluation  The patient tolerated the procedure well  Postprocedure instructions were provided for the patient  I asked the patient to call us with any questions, concerns, or acute problems  The patient expressed understanding of the above  Impression: Ultrasound-guided thyroid biopsy of a right upper pole and left mid pole nodules  Workstation performed: [id number]     I reviewed the above laboratory and imaging data  Discussion/Summary: 77 year female status post pancreaticoduodenectomy for a [id number] periampullary/ampullary carcinoma  There is no evidence of recurrence by physical exam, imaging or symptomatology  She is clinically SEDA at 1 year 3 months  I will plan on repeating her CT in 6 months  We will obtain a CA 19-9 at that time  In regard to her elevated lifetime risk of developing breast cancer and her being BRCA 2 positive, I would recommend every 6 month imaging  She is having her mammogram ordered by her family physician  At her next visit we will order her repeat MRI  In regard to the thyroid nodules, the left-side was benign and the right side was benign by Afirma  I have recommended observation since there is a 4% false negative risk  We will plan on repeating her thyroid ultrasound in a year  Lizz Garcia is agreeable to this   All her questions were answered

## 2022-08-02 NOTE — LETTER
August 2, 2022     Cary Jasso, [address] [place] [address]    Patient: Michel Lima   YOB: 1955   Date of Visit: 8/2/2022       Dear Dr Vida Sam: Thank you for referring Michel Lima to me for evaluation  Below are my notes for this consultation  If you have questions, please do not hesitate to call me  I look forward to following your patient along with you  Sincerely,        John Parker MD        CC: MD Erica Gonzalez MD Dasie Novak, MD  8/2/2022  8:42 AM  Incomplete               Surgical Oncology Follow Up       [address]  [facility] [address] [address]    Michel Lima  1955  [id number]  [address] 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)   -     Cancer antigen 19-9; Future        Chief Complaint   Patient presents with    Follow-up       Return in about 6 months (around 2/2/2023) for Office Visit, Imaging - See orders, Labs - See Treatment Plan, with Arcelia  Oncology History   Neoplasm of ampulla of Vater   3/15/2021 Initial Diagnosis    Neoplasm of ampulla of Vater     4/26/2021 Surgery    B  Celiac lymph node:     - Single lymph node; negative for malignancy (0/1)  C   Whipple resection:     - Invasive poorly differentiated mucinous adenocarcinoma  - Tumor arises in the background of an adenoma with high grade dysplasia  - Lymphatic and venous channel invasion by tumor present  - Metastatic carcinoma present in one of twenty lymph nodes (1/20)       - All margins are negative for tumor       6/1/2021 -  Cancer Staged    Staging form: Ampulla of Vater, AJCC 8th Edition  - Pathologic: Stage IIIA (pT3a, pN1, cM0) - Signed by Arhcie Arevalo MD on 6/1/2021  Histologic grade (G): G3  Histologic grading system: 3 grade system  Residual tumor (R): R0 - None       6/9/2021 - 10/1/2021 Chemotherapy    Cycles 1-6  6/2021 through 8/20/21:  FOLFOX    Cycles 7 -8:  9/15/2021- 10/12/2021  Leucovorin 400mg/m2, IV, Day 1  5-Fu 400 mg/m2, IV , Day 1   5-Fu 1200 mg/m2, IV, CI x 46 hours  Cycle length = 2 weeks    palonosetron (ALOXI), 0 25 mg, Intravenous, Once, 5 of 5 cycles  Administration: 0 25 mg (7/21/2021), 0 25 mg (8/4/2021), 0 25 mg (8/18/2021), 0 25 mg (9/15/2021), 0 25 mg (9/29/2021)  fluorouracil (ADRUCIL), 400 mg/m2 = 605 mg, Intravenous, Once, 8 of 8 cycles  Dose modification: 340 mg/m2 (85 % of original dose 400 mg/m2, Cycle 6, Reason: Dose Not Tolerated)  Administration: 605 mg (6/9/2021), 605 mg (6/23/2021), 605 mg (7/7/2021), 605 mg (7/21/2021), 605 mg (8/4/2021), 515 mg (8/18/2021), 610 mg (9/15/2021), 610 mg (9/29/2021)  fosaprepitant (EMEND) IVPB, 150 mg, Intravenous, Once, 6 of 6 cycles  Administration: 150 mg (7/7/2021), 150 mg (7/21/2021), 150 mg (8/4/2021), 150 mg (8/18/2021), 150 mg (9/15/2021), 150 mg (9/29/2021)  leucovorin calcium IVPB, 604 mg, Intravenous, Once, 8 of 8 cycles  Dose modification: 340 mg/m2 (85 % of original dose 400 mg/m2, Cycle 6, Reason: Dose Not Tolerated)  Administration: 600 mg (6/9/2021), 600 mg (6/23/2021), 600 mg (7/7/2021), 600 mg (7/21/2021), 600 mg (8/4/2021), 517 mg (8/18/2021), 600 mg (9/15/2021), 600 mg (9/29/2021)  oxaliplatin (ELOXATIN) chemo infusion, 85 mg/m2 = 128 35 mg, Intravenous, Once, 6 of 6 cycles  Dose modification: 72 25 mg/m2 (85 % of original dose 85 mg/m2, Cycle 6, Reason: Dose Not Tolerated)  Administration: 128 35 mg (6/9/2021), 128 35 mg (6/23/2021), 128 35 mg (7/7/2021), 128 35 mg (7/21/2021), 128 35 mg (8/4/2021), 109 82 mg (8/18/2021)  fluorouracil (ADRUCIL) ambulatory infusion Soln, 1,200 mg/m2/day = 3,625 mg, Intravenous, Over 46 hours, 8 of 8 cycles     10/1/2021 Genetic Testing    Results revealed patient has the following mutation(s):  Positive for a BRCA2 pathogenic variant      10/26/2021 -  Radiation         10/26/2021 -  Chemotherapy    Xeloda 825 mg/m2 BID  BSA = 1 59  Calculated to 1300 mg BID with rounding  10/28/2021 - 12/2/2021 Radiation    Treatments:  Course: C1    Plan ID Energy Fractions Dose per Fraction (cGy) Dose Correction (cGy) Total Dose Delivered (cGy) Elapsed Days   Abdomen 6X 25 / 25 195 0 4,875 35      Treatment Dates:  10/28/2021 - 12/2/2021  Staging: [id number] periampullary carcinoma, April 2021  BRCA 2 mutation  10 year TC is 24 10%  Lifetime TC is 37%  Treatment history:  Pancreaticoduodenectomy, April 2021  Adjuvant FOLFOX  Chemo radiation with Xeloda  Right upper pole thyroid biopsy, July 2022: AUS, [place] 3, Afirma benign  Left mid pole thyroid biopsy, July 22: [place] 2  Current treatment:  Observation  Disease status: SEDA    History of Present Illness:  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  She is doing well  She denies any abdominal pain, nausea or vomiting  She continues to have early satiety  She drinks 2-3 nutritional supplements a day and is maintaining her weight  CT from July 12, 2022 revealed no evidence of recurrence  I personally reviewed the films  Thyroid ultrasound from June 6, 2022 revealed 2 nodules that met criteria for biopsy  The right upper pole lesion was [place] 3, but Afirma was benign  The left mid pole lesion was benign could  I personally reviewed her films  CA 19-9 is less than 2  Review of Systems  Complete ROS Surg Onc:   Complete ROS Surg Onc:   Constitutional: The patient denies new or recent history of general fatigue, no recent weight loss, no change in appetite     Eyes: No complaints of visual problems, no scleral icterus  ENT: no complaints of ear pain, no hoarseness, no difficulty swallowing,  no tinnitus and no new masses in head, oral cavity, or neck  Cardiovascular: No complaints of chest pain, no palpitations, no ankle edema  Respiratory: No complaints of shortness of breath, no cough  Gastrointestinal: No complaints of jaundice, no bloody stools, no pale stools  Genitourinary: No complaints of dysuria, no hematuria, no nocturia, no frequent urination, no urethral discharge  Musculoskeletal: No complaints of weakness, paralysis, joint stiffness or arthralgias  Integumentary: No complaints of rash, no new lesions  Neurological: No complaints of convulsions, no seizures, no dizziness  Hematologic/Lymphatic: No complaints of easy bruising  Endocrine:  No hot or cold intolerance  No polydipsia, polyphagia, or polyuria  Allergy/immunology:  No environmental allergies  No food allergies  Not immunocompromised  Skin:  No pallor or rash  No wound          Patient Active Problem List   Diagnosis    Elevated LFTs    Dilated bile duct    Neoplasm of ampulla of Vater    Mild protein-calorie malnutrition ([facility] )    Breast mass    Multiple thyroid nodules     Past Medical History:   Diagnosis Date    BRCA1 positive     BRCA2 positive     Cancer ([facility] )     pancreatic    History of chemotherapy     History of radiation therapy     Pancreatic carcinoma ([place] [id number] )      Past Surgical History:   Procedure Laterality Date    ABLATION SOFT TISSUE N/A 4/26/2021    Procedure: INTRAOPERATIVE ULTRASOUND, ABLATION,SOFT TISSUE PANCREAS;  Surgeon: Jennifer Lindsay MD;  [facility];  Service: Surgical Oncology    CHOLECYSTECTOMY N/A 4/26/2021    Procedure: CHOLECYSTECTOMY;  Surgeon: Jennifer Lindsay MD;  [facility];  Service: Surgical Oncology    FL GUIDED CENTRAL VENOUS ACCESS DEVICE INSERTION  6/2/2021    HYSTERECTOMY      LAPAROTOMY N/A 4/26/2021    Procedure: LAPAROTOMY EXPLORATORY;  Surgeon: Narendra López MD;  [facility];  Service: Surgical Oncology    OOPHORECTOMY      SINUS SURGERY      TUNNELED VENOUS PORT PLACEMENT Left 2021    Procedure: INSERTION VENOUS PORT (PORT-A-CATH); Surgeon: Narendra López MD;  [facility];  Service: Surgical Oncology    US GUIDED THYROID BIOPSY  2022    WHIPPLE PROCEDURE/PANCREATICO-DUODENECTOMY N/A 2021    Procedure: WHIPPLE PROCEDURE/PANCREATICO-DUODENECTOMY; PYLORIC PRESERVING;  Surgeon: Narendra López MD;  [facility];  Service: Surgical Oncology     Family History   Problem Relation Age of Onset    Ulcerative colitis Mother     Prostate cancer Father     Melanoma Sister     Heart disease Brother     Prostate cancer Brother      Social History     Socioeconomic History    Marital status:      Spouse name: Not on file    Number of children: 3    Years of education: Not on file    Highest education level: Not on file   Occupational History    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   Tobacco Use    Smoking status: Current Some Day Smoker     Packs/day: 0 50     Start date: 65     Last attempt to quit: 2021     Years since quittin 3    Smokeless tobacco: Never Used    Tobacco comment: recently stop smoking , pt stated she smoke occ  1-2 cigg some days 2022     Vaping Use    Vaping Use: Never used   Substance and Sexual Activity    Alcohol use: Never    Drug use: Never    Sexual activity: Not Currently   Other Topics Concern    Not on file   Social History Narrative    Not on file     Social Determinants of Health     Financial Resource Strain: Low Risk     Difficulty of Paying Living Expenses: Not hard at all   Food Insecurity: No Food Insecurity    Worried About Running Out of Food in the Last Year: Never true    Gema of Food in the Last Year: Never true   Transportation Needs: No Transportation Needs    Lack of Transportation (Medical): No    Lack of Transportation (Non-Medical):  No   Physical Activity: Insufficiently Active    Days of Exercise per Week: 4 days    Minutes of Exercise per Session: 20 min   Stress: No Stress Concern Present    Feeling of Stress : Not at all   Social Connections: Socially Isolated    Frequency of Communication with Friends and Family: More than three times a week    Frequency of Social Gatherings with Friends and Family: More than three times a week    Attends Faith Services: Never    Active Member of Clubs or Organizations: No    Attends Club or Organization Meetings: Never    Marital Status:    Intimate Partner Violence: Not At Risk    Fear of Current or Ex-Partner: No    Emotionally Abused: No    Physically Abused: No    Sexually Abused: No   Housing Stability: Unknown    Unable to Pay for Housing in the Last Year: No    Number of [place] in the Last Year: Not on file    Unstable Housing in the Last Year: No       Current Outpatient Medications:     acetaminophen (TYLENOL) 500 mg tablet, Take 1,000 mg by mouth, Disp: , Rfl:     fluticasone (FLONASE) 50 mcg/act nasal spray, 1 spray into each nostril daily, Disp: 9 9 mL, Rfl: 4    Multiple Vitamin (multivitamin) tablet, Take 1 tablet by mouth daily, Disp: , Rfl:     lidocaine-prilocaine (EMLA) cream, Apply topically as needed for mild pain (Patient not taking: Reported on 8/2/2022), Disp: 30 g, Rfl: 0    Medical ID Plate MISC, Use once for 1 dose Severely and permanently limited in the ability to walk because of an arthritic, neurological, or orthopedic condition: or cannot walk two hundred feet without stopping to rest and meets requirements for disability license plate, Disp: 1 each, Rfl: 0  Allergies   Allergen Reactions    Penicillins Rash    Tetracycline Rash     Vitals:    08/02/22 0811   BP: 158/80   Pulse: 94   Resp: 18   Temp: 97 6 °F (36 4 °C)   SpO2: 97%       Physical Exam  Constitutional: General appearance: The Patient is well-developed and well-nourished who appears the stated age in no acute distress  Patient is pleasant and talkative  HEENT:  Normocephalic  Sclerae are anicteric  Mucous membranes are moist  Neck is supple without adenopathy  No JVD  Chest: The lungs are clear to auscultation  Cardiac: Heart is regular rate  Abdomen: Abdomen is soft, non-tender, non-distended and without masses  Extremities: There is no clubbing or cyanosis  There is no edema  Symmetric  Neuro: Grossly nonfocal  Gait is normal      Lymphatic: No evidence of cervical adenopathy bilaterally  No evidence of axillary adenopathy bilaterally  No evidence of inguinal adenopathy bilaterally  Skin: Warm, anicteric  Psych:  Patient is pleasant and talkative  Breasts:        Pathology:  [unfilled]    Labs:      Imaging  CT chest abdomen pelvis w contrast    Result Date: 7/15/2022  Narrative: CT CHEST, ABDOMEN AND PELVIS WITH IV CONTRAST INDICATION:   D49 0: Neoplasm of unspecified behavior of digestive system  COMPARISON:  10/15/2021  TECHNIQUE: CT examination of the chest, abdomen and pelvis was performed  Axial, sagittal, and coronal 2D reformatted images were created from the source data and submitted for interpretation  Radiation dose length product (DLP) for this visit:  465 mGy-cm   This examination, like all CT scans performed in the [facility], was performed utilizing techniques to minimize radiation dose exposure, including the use of iterative reconstruction and automated exposure control  IV Contrast:  65 mL of iohexol (OMNIPAQUE) Enteric Contrast: Enteric contrast was administered  FINDINGS: CHEST LUNGS:  Centrilobular emphysematous changes  Lingular atelectasis     There is no tracheal or endobronchial lesion  PLEURA:  Unremarkable  HEART/GREAT VESSELS: Heart is unremarkable for patient's age  No thoracic aortic aneurysm  MEDIASTINUM AND WYATT:  Unremarkable   CHEST WALL AND LOWER NECK:  1 9 cm right thyroid lobe hypodensity which was recently biopsied    ABDOMEN LIVER/BILIARY TREE:  One or more subcentimeter sharply circumscribed low-density hepatic lesion(s) are noted, too small to accurately characterize, but statistically most likely to represent subcentimeter hepatic cysts  No suspicious solid hepatic lesion is identified  Hepatic contours are normal   No biliary dilatation  Pneumobilia  GALLBLADDER:  Gallbladder is surgically absent  SPLEEN:  Unremarkable  PANCREAS:  Residual pancreatic tissue appears unremarkable  ADRENAL GLANDS:  Unremarkable  KIDNEYS/URETERS:  No hydronephrosis or urinary tract calculus  One or more sharply circumscribed subcentimeter renal hypodensities are present, too small to accurately characterize, and statistically most likely benign findings  According to recent literature (Radiology 2019) no further workup of these findings is recommended  STOMACH AND BOWEL:  Expected postoperative changes following pancreaticoduodenectomy with normal gastrojejunostomy anastomosis  APPENDIX:  No findings to suggest appendicitis  ABDOMINOPELVIC CAVITY:  No ascites  No pneumoperitoneum  No lymphadenopathy  VESSELS:  Unremarkable for patient's age  PELVIS REPRODUCTIVE ORGANS:  Surgical changes of prior hysterectomy  URINARY BLADDER:  Unremarkable  ABDOMINAL WALL/INGUINAL REGIONS:  Unremarkable  OSSEOUS STRUCTURES:  No acute fracture or destructive osseous lesion  Impression: Suspected postsurgical changes  No CT findings recurrent or metastatic tumor in the chest, abdomen or pelvis  Workstation performed: [id number]     US guided thyroid biopsy with [place]    Result Date: 7/14/2022  Narrative: ULTRASOUND-GUIDED THYROID BIOPSY HISTORY: 77year-old with history of right upper pole and left mid pole thyroid nodules    COMPARISON: Thyroid ultrasound dated 6/6/2022 FINDINGS: Prior ultrasound images were reviewed   The prior thyroid ultrasound study showed a right upper pole nodule measuring up to 2 9 cm, TR 3, and a left mid pole nodule measuring 1 5 cm, TR 5  The procedure was explained to the patient, including risks of hemorrhage, infection and local injury  Informed consent was freely obtained  The patient verbalized expressed understanding of the above risks and wished to proceed with the procedure  Final standard "time-out" procedure performed  PROCEDURE: The neck was prepped and draped in normal sterile fashion  Under real-time ultrasound guidance and local anesthesia 5 passes with a 27 gauge needle were made through the right upper pole nodule  Under real-time ultrasound guidance and local anesthesia 6 passes with a 27 gauge needle were made through the left midpole nodule  Cytopathology was present and deemed the specimens adequate for evaluation  The patient tolerated the procedure well  Postprocedure instructions were provided for the patient  I asked the patient to call us with any questions, concerns, or acute problems  The patient expressed understanding of the above  Impression: Ultrasound-guided thyroid biopsy of a right upper pole and left mid pole nodules  Workstation performed: [id number]     I reviewed the above laboratory and imaging data  Discussion/Summary: 77 year female status post pancreaticoduodenectomy for a [id number] periampullary/ampullary carcinoma  There is no evidence of recurrence by physical exam, imaging or symptomatology  She is clinically SEDA at 1 year 3 months  I will plan on repeating her CT in 6 months  We will obtain a CA 19-9 at that time  In regard to her elevated lifetime risk of developing breast cancer and her being BRCA 2 positive, I would recommend every 6 month imaging  She is having her mammogram ordered by her family physician  At her next visit we will order her repeat MRI  In regard to the thyroid nodules, the left-side was benign and the right side was benign by Afirmeduardo    I have recommended observation since there is a 4% false negative risk  We will plan on repeating her thyroid ultrasound in a year  Putnam Session is agreeable to this   All her questions were answered

## 2022-08-19 ENCOUNTER — TELEPHONE (OUTPATIENT)
Dept: FAMILY MEDICINE CLINIC | Facility: CLINIC | Age: 67
End: 2022-08-19

## 2022-08-23 ENCOUNTER — HOSPITAL ENCOUNTER (OUTPATIENT)
Dept: INFUSION CENTER | Facility: HOSPITAL | Age: 67
Discharge: HOME | End: 2022-08-23
Payer: MEDICARE

## 2022-08-23 VITALS — TEMPERATURE: 98.1 F

## 2022-08-23 DIAGNOSIS — D49.0 NEOPLASM OF AMPULLA OF VATER: Primary | ICD-10-CM

## 2022-08-23 PROCEDURE — 96523 IRRIG DRUG DELIVERY DEVICE: CPT

## 2022-09-12 ENCOUNTER — APPOINTMENT (OUTPATIENT)
Dept: LAB | Facility: CLINIC | Age: 67
End: 2022-09-12
Payer: MEDICARE

## 2022-09-12 DIAGNOSIS — D49.0 NEOPLASM OF AMPULLA OF VATER: ICD-10-CM

## 2022-09-12 DIAGNOSIS — Z92.21 HISTORY OF CHEMOTHERAPY: ICD-10-CM

## 2022-09-12 DIAGNOSIS — C24.8 MALIGNANT NEOPLASM OF OVERLAPPING SITES OF BILIARY TRACT (HCC): ICD-10-CM

## 2022-09-12 LAB
ALBUMIN SERPL BCP-MCNC: 3.4 G/DL (ref 3.5–5)
ALP SERPL-CCNC: 137 U/L (ref 46–116)
ALT SERPL W P-5'-P-CCNC: 54 U/L (ref 12–78)
ANION GAP SERPL CALCULATED.3IONS-SCNC: 4 MMOL/L (ref 4–13)
AST SERPL W P-5'-P-CCNC: 35 U/L (ref 5–45)
BASOPHILS # BLD AUTO: 0.05 THOUSANDS/ΜL (ref 0–0.1)
BASOPHILS NFR BLD AUTO: 1 % (ref 0–1)
BILIRUB SERPL-MCNC: 0.34 MG/DL (ref 0.2–1)
BUN SERPL-MCNC: 18 MG/DL (ref 5–25)
CALCIUM ALBUM COR SERPL-MCNC: 9.9 MG/DL (ref 8.3–10.1)
CALCIUM SERPL-MCNC: 9.4 MG/DL (ref 8.3–10.1)
CHLORIDE SERPL-SCNC: 106 MMOL/L (ref 96–108)
CO2 SERPL-SCNC: 27 MMOL/L (ref 21–32)
CREAT SERPL-MCNC: 0.72 MG/DL (ref 0.6–1.3)
EOSINOPHIL # BLD AUTO: 0.11 THOUSAND/ΜL (ref 0–0.61)
EOSINOPHIL NFR BLD AUTO: 2 % (ref 0–6)
ERYTHROCYTE [DISTWIDTH] IN BLOOD BY AUTOMATED COUNT: 12.7 % (ref 11.6–15.1)
GFR SERPL CREATININE-BSD FRML MDRD: 86 ML/MIN/1.73SQ M
GGT SERPL-CCNC: 72 U/L (ref 5–85)
GLUCOSE P FAST SERPL-MCNC: 101 MG/DL (ref 65–99)
HCT VFR BLD AUTO: 42.5 % (ref 34.8–46.1)
HGB BLD-MCNC: 13.4 G/DL (ref 11.5–15.4)
IMM GRANULOCYTES # BLD AUTO: 0.01 THOUSAND/UL (ref 0–0.2)
IMM GRANULOCYTES NFR BLD AUTO: 0 % (ref 0–2)
LYMPHOCYTES # BLD AUTO: 1.55 THOUSANDS/ΜL (ref 0.6–4.47)
LYMPHOCYTES NFR BLD AUTO: 25 % (ref 14–44)
MCH RBC QN AUTO: 30 PG (ref 26.8–34.3)
MCHC RBC AUTO-ENTMCNC: 31.5 G/DL (ref 31.4–37.4)
MCV RBC AUTO: 95 FL (ref 82–98)
MONOCYTES # BLD AUTO: 0.41 THOUSAND/ΜL (ref 0.17–1.22)
MONOCYTES NFR BLD AUTO: 7 % (ref 4–12)
NEUTROPHILS # BLD AUTO: 4.02 THOUSANDS/ΜL (ref 1.85–7.62)
NEUTS SEG NFR BLD AUTO: 65 % (ref 43–75)
NRBC BLD AUTO-RTO: 0 /100 WBCS
PLATELET # BLD AUTO: 316 THOUSANDS/UL (ref 149–390)
PMV BLD AUTO: 9.9 FL (ref 8.9–12.7)
POTASSIUM SERPL-SCNC: 4.6 MMOL/L (ref 3.5–5.3)
PROT SERPL-MCNC: 7.8 G/DL (ref 6.4–8.4)
RBC # BLD AUTO: 4.46 MILLION/UL (ref 3.81–5.12)
SODIUM SERPL-SCNC: 137 MMOL/L (ref 135–147)
WBC # BLD AUTO: 6.15 THOUSAND/UL (ref 4.31–10.16)

## 2022-09-12 PROCEDURE — 85025 COMPLETE CBC W/AUTO DIFF WBC: CPT

## 2022-09-12 PROCEDURE — 80053 COMPREHEN METABOLIC PANEL: CPT

## 2022-09-12 PROCEDURE — 36415 COLL VENOUS BLD VENIPUNCTURE: CPT

## 2022-09-12 PROCEDURE — 82977 ASSAY OF GGT: CPT

## 2022-09-22 ENCOUNTER — OFFICE VISIT (OUTPATIENT)
Dept: HEMATOLOGY ONCOLOGY | Facility: MEDICAL CENTER | Age: 67
End: 2022-09-22
Payer: MEDICARE

## 2022-09-22 VITALS
TEMPERATURE: 98.6 F | DIASTOLIC BLOOD PRESSURE: 82 MMHG | WEIGHT: 148 LBS | RESPIRATION RATE: 16 BRPM | BODY MASS INDEX: 27.23 KG/M2 | OXYGEN SATURATION: 96 % | SYSTOLIC BLOOD PRESSURE: 140 MMHG | HEIGHT: 62 IN | HEART RATE: 98 BPM

## 2022-09-22 DIAGNOSIS — Z15.09 BRCA POSITIVE: ICD-10-CM

## 2022-09-22 DIAGNOSIS — D49.0 NEOPLASM OF AMPULLA OF VATER: Primary | ICD-10-CM

## 2022-09-22 DIAGNOSIS — Z95.828 PORT-A-CATH IN PLACE: ICD-10-CM

## 2022-09-22 DIAGNOSIS — R74.8 ELEVATED ALKALINE PHOSPHATASE LEVEL: ICD-10-CM

## 2022-09-22 DIAGNOSIS — Z15.01 BRCA POSITIVE: ICD-10-CM

## 2022-09-22 DIAGNOSIS — Z12.31 SCREENING MAMMOGRAM FOR HIGH-RISK PATIENT: ICD-10-CM

## 2022-09-22 DIAGNOSIS — Z92.21 HISTORY OF CHEMOTHERAPY: ICD-10-CM

## 2022-09-22 PROCEDURE — 99215 OFFICE O/P EST HI 40 MIN: CPT | Performed by: PHYSICIAN ASSISTANT

## 2022-09-22 NOTE — PROGRESS NOTES
[address] HEMATOLOGY ONCOLOGY [place] [id number] 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, S/P Whipple in April 2021, followed by Adjuvant chemotherapy for 8 cycles of 5FU based chemotherapy followed by concurrent chemoradiation  This is a 59-year-old female with history of the above  Patient is approximately 18 months from surgery in approximately one year post adjuvant chemotherapy  Patient's radiation was completed in December of 2021  Patient follows with Surgical Oncology and has imaging completed through them  Patient's laboratory assessment is acceptable  Patient will continue with blood work observation every three months  Patient lives a healthy lifestyle  We did discuss weight loss techniques  Patient was encouraged to eat healthy, exercise regularly and limit alcohol consumption and follow-up with annual health related screenings  Due to patient's history of Whipple procedure, patient had some questions regarding nutritional habits and the amount of protein patient needs to take in  I will refer the patient to Oncology nutrition     - CBC and differential; Future  - Comprehensive metabolic panel; Future    3  BRCA positive; 4  Screening mammogram for high-risk patient  History of hysterectomy with oophorectomy  Patient continues to undergo screening mammography  - Mammo screening bilateral w 3d & cad; Future    5  Port-A-Cath in place  Patient's port causes her some pain  I have sent a message to surgeon regarding removal   I will follow up with the patient once I hear back  6  Elevated alkaline phosphatase level  Alkaline phosphatase was elevated previously with an elevated GGT  Imaging did not demonstrate any abnormalities    Subsequently, alk-phos is reducing almost back to normal range and GGT is within normal limits  We will continue to observe alkaline phosphatase     - CBC and differential; Future  - Comprehensive metabolic panel; Future      The patient is scheduled for follow-up in approximately 3 months  Patient voiced agreement and understanding to the above  Patient knows to call the Hematology/Oncology office with any questions and concerns regarding the above  Goals and Barriers:    Current Goal:   Prolong Survival from Cancer  Barriers: None  Patient's Capacity to Self Care:  Patient able to self care  Lelo Belle PA-C  Medical Oncology/Hematology  [address] [place]  ______________________________________________________________________________________________________________    Subjective     Chief Complaint   Patient presents with    Follow-up     Neoplasm of ampulla of Vater       History of present illness/Cancer History:   Oncology History   Neoplasm of ampulla of Vater   3/15/2021 Initial Diagnosis    Neoplasm of ampulla of Vater     4/26/2021 Surgery    B  Celiac lymph node:     - Single lymph node; negative for malignancy (0/1)  C   Whipple resection:     - Invasive poorly differentiated mucinous adenocarcinoma  - Tumor arises in the background of an adenoma with high grade dysplasia  - Lymphatic and venous channel invasion by tumor present  - Metastatic carcinoma present in one of twenty lymph nodes (1/20)  - All margins are negative for tumor       6/1/2021 -  Cancer Staged    Staging form: Ampulla of Vater, AJCC 8th Edition  - Pathologic: Stage IIIA (pT3a, pN1, cM0) - Signed by Archie Arevalo MD on 6/1/2021  Histologic grade (G): G3  Histologic grading system: 3 grade system  Residual tumor (R): R0 - None       6/9/2021 - 10/1/2021 Chemotherapy    Cycles 1-6  6/2021 through 8/20/21:  FOLFOX    Cycles 7 -8:  9/15/2021- 10/12/2021  Leucovorin 400mg/m2, IV, Day 1  5-Fu 400 mg/m2, IV , Day 1   5-Fu 1200 mg/m2, IV, CI x 46 hours  Cycle length = 2 weeks    palonosetron (ALOXI), 0 25 mg, Intravenous, Once, 5 of 5 cycles  Administration: 0 25 mg (7/21/2021), 0 25 mg (8/4/2021), 0 25 mg (8/18/2021), 0 25 mg (9/15/2021), 0 25 mg (9/29/2021)  fluorouracil (ADRUCIL), 400 mg/m2 = 605 mg, Intravenous, Once, 8 of 8 cycles  Dose modification: 340 mg/m2 (85 % of original dose 400 mg/m2, Cycle 6, Reason: Dose Not Tolerated)  Administration: 605 mg (6/9/2021), 605 mg (6/23/2021), 605 mg (7/7/2021), 605 mg (7/21/2021), 605 mg (8/4/2021), 515 mg (8/18/2021), 610 mg (9/15/2021), 610 mg (9/29/2021)  fosaprepitant (EMEND) IVPB, 150 mg, Intravenous, Once, 6 of 6 cycles  Administration: 150 mg (7/7/2021), 150 mg (7/21/2021), 150 mg (8/4/2021), 150 mg (8/18/2021), 150 mg (9/15/2021), 150 mg (9/29/2021)  leucovorin calcium IVPB, 604 mg, Intravenous, Once, 8 of 8 cycles  Dose modification: 340 mg/m2 (85 % of original dose 400 mg/m2, Cycle 6, Reason: Dose Not Tolerated)  Administration: 600 mg (6/9/2021), 600 mg (6/23/2021), 600 mg (7/7/2021), 600 mg (7/21/2021), 600 mg (8/4/2021), 517 mg (8/18/2021), 600 mg (9/15/2021), 600 mg (9/29/2021)  oxaliplatin (ELOXATIN) chemo infusion, 85 mg/m2 = 128 35 mg, Intravenous, Once, 6 of 6 cycles  Dose modification: 72 25 mg/m2 (85 % of original dose 85 mg/m2, Cycle 6, Reason: Dose Not Tolerated)  Administration: 128 35 mg (6/9/2021), 128 35 mg (6/23/2021), 128 35 mg (7/7/2021), 128 35 mg (7/21/2021), 128 35 mg (8/4/2021), 109 82 mg (8/18/2021)  fluorouracil (ADRUCIL) ambulatory infusion Soln, 1,200 mg/m2/day = 3,625 mg, Intravenous, Over 46 hours, 8 of 8 cycles     10/1/2021 Genetic Testing    Results revealed patient has the following mutation(s):  Positive for a BRCA2 pathogenic variant      10/26/2021 -  Radiation         10/26/2021 -  Chemotherapy    Xeloda 825 mg/m2 BID  BSA = 1 59  Calculated to 1300 mg BID with rounding          10/28/2021 - 12/2/2021 Radiation    Treatments:  Course: C1    Plan ID Energy Fractions Dose per Fraction (cGy) Dose Correction (cGy) Total Dose Delivered (cGy) Elapsed Days   Abdomen 6X 25 / 25 195 0 4,875 35      Treatment Dates:  10/28/2021 - 12/2/2021  Lab Results   Component Value Date    WBC 6 15 09/12/2022    HGB 13 4 09/12/2022    HCT 42 5 09/12/2022    MCV 95 09/12/2022     09/12/2022     Lab Results   Component Value Date    SODIUM 137 09/12/2022    K 4 6 09/12/2022     09/12/2022    CO2 27 09/12/2022    AGAP 4 09/12/2022    BUN 18 09/12/2022    CREATININE 0 72 09/12/2022    GLUC 87 04/06/2022    GLUF 101 (H) 09/12/2022    CALCIUM 9 4 09/12/2022    AST 35 09/12/2022    ALT 54 09/12/2022    ALKPHOS 137 (H) 09/12/2022    TP 7 8 09/12/2022    TBILI 0 34 09/12/2022    EGFR 86 09/12/2022     Interval history:  Feeling well  Review of Systems   Constitutional: Positive for unexpected weight change (gain)  Musculoskeletal: Positive for arthralgias         Current Outpatient Medications:     acetaminophen (TYLENOL) 500 mg tablet, Take 1,000 mg by mouth, Disp: , Rfl:     fluticasone (FLONASE) 50 mcg/act nasal spray, 1 spray into each nostril daily, Disp: 9 9 mL, Rfl: 4    lidocaine-prilocaine (EMLA) cream, Apply topically as needed for mild pain, Disp: 30 g, Rfl: 0    Multiple Vitamin (multivitamin) tablet, Take 1 tablet by mouth daily, Disp: , Rfl:     Medical ID Plate MISC, Use once for 1 dose Severely and permanently limited in the ability to walk because of an arthritic, neurological, or orthopedic condition: or cannot walk two hundred feet without stopping to rest and meets requirements for disability license plate, Disp: 1 each, Rfl: 0  Allergies   Allergen Reactions    Penicillins Rash    Tetracycline Rash     Objective   /82 (BP Location: Left arm, Patient Position: Sitting, Cuff Size: Standard)   Pulse 98   Temp 98 6 °F (37 °C) (Temporal)   Resp 16   Ht 5' 2" (1 575 m)   Wt 67 1 kg (148 lb)   SpO2 96%   BMI 27 07 kg/m²   Wt Readings from Last 6 Encounters:   09/22/22 67 1 kg (148 lb)   08/02/22 65 3 kg (144 lb)   07/12/22 64 3 kg (141 lb 11 2 oz)   06/16/22 64 kg (141 lb)   06/02/22 63 kg (138 lb 12 8 oz)   06/01/22 62 6 kg (138 lb)     Physical Exam  Constitutional:       General: She is not in acute distress  Appearance: She is well-developed  HENT:      Head: Normocephalic and atraumatic  Mouth/Throat:      Pharynx: No oropharyngeal exudate  Eyes:      General: No scleral icterus  Pupils: Pupils are equal, round, and reactive to light  Cardiovascular:      Rate and Rhythm: Normal rate and regular rhythm  Heart sounds: No murmur heard  Pulmonary:      Effort: Pulmonary effort is normal  No respiratory distress  Breath sounds: Normal breath sounds  Abdominal:      General: Bowel sounds are normal  There is no distension  Palpations: Abdomen is soft  Tenderness: There is no abdominal tenderness  Musculoskeletal:         General: Normal range of motion  Cervical back: Normal range of motion  Lymphadenopathy:      Cervical: No cervical adenopathy  Skin:     General: Skin is warm  Coloration: Skin is not pale  Findings: No rash  Neurological:      Mental Status: She is alert and oriented to person, place, and time  Cranial Nerves: No cranial nerve deficit  Psychiatric:         Thought Content:  Thought content normal          Pertinent Laboratory Results and Imaging Review:  Appointment on 09/12/2022   Component Date Value Ref Range Status    WBC 09/12/2022 6 15  4 31 - 10 16 Thousand/uL Final    RBC 09/12/2022 4 46  3 81 - 5 12 Million/uL Final    Hemoglobin 09/12/2022 13 4  11 5 - 15 4 g/dL Final    Hematocrit 09/12/2022 42 5  34 8 - 46 1 % Final    MCV 09/12/2022 95  82 - 98 fL Final    MCH 09/12/2022 30 0  26 8 - 34 3 pg Final    MCHC 09/12/2022 31 5  31 4 - 37 4 g/dL Final    RDW 09/12/2022 12 7  11 6 - 15 1 % Final    MPV 09/12/2022 9 9  8 9 - 12 7 fL Final    Platelets 78/25/7657 316  149 - 390 Thousands/uL Final    nRBC 09/12/2022 0  /100 WBCs Final    Neutrophils Relative 09/12/2022 65  43 - 75 % Final    Immat GRANS % 09/12/2022 0  0 - 2 % Final    Lymphocytes Relative 09/12/2022 25  14 - 44 % Final    Monocytes Relative 09/12/2022 7  4 - 12 % Final    Eosinophils Relative 09/12/2022 2  0 - 6 % Final    Basophils Relative 09/12/2022 1  0 - 1 % Final    Neutrophils Absolute 09/12/2022 4 02  1 85 - 7 62 Thousands/µL Final    Immature Grans Absolute 09/12/2022 0 01  0 00 - 0 20 Thousand/uL Final    Lymphocytes Absolute 09/12/2022 1 55  0 60 - 4 47 Thousands/µL Final    Monocytes Absolute 09/12/2022 0 41  0 17 - 1 22 Thousand/µL Final    Eosinophils Absolute 09/12/2022 0 11  0 00 - 0 61 Thousand/µL Final    Basophils Absolute 09/12/2022 0 05  0 00 - 0 10 Thousands/µL Final    Sodium 09/12/2022 137  135 - 147 mmol/L Final    Potassium 09/12/2022 4 6  3 5 - 5 3 mmol/L Final    Chloride 09/12/2022 106  96 - 108 mmol/L Final    CO2 09/12/2022 27  21 - 32 mmol/L Final    ANION GAP 09/12/2022 4  4 - 13 mmol/L Final    BUN 09/12/2022 18  5 - 25 mg/dL Final    Creatinine 09/12/2022 0 72  0 60 - 1 30 mg/dL Final    Standardized to IDMS reference method    Glucose, Fasting 09/12/2022 101 (A) 65 - 99 mg/dL Final    Specimen collection should occur prior to Sulfasalazine administration due to the potential for falsely depressed results  Specimen collection should occur prior to Sulfapyridine administration due to the potential for falsely elevated results   Calcium 09/12/2022 9 4  8 3 - 10 1 mg/dL Final    Corrected Calcium 09/12/2022 9 9  8 3 - 10 1 mg/dL Final    AST 09/12/2022 35  5 - 45 U/L Final    Specimen collection should occur prior to Sulfasalazine administration due to the potential for falsely depressed results       ALT 09/12/2022 54  12 - 78 U/L Final    Specimen collection should occur prior to Sulfasalazine and/or Sulfapyridine administration due to the potential for falsely depressed results   Alkaline Phosphatase 09/12/2022 137 (A) 46 - 116 U/L Final    Total Protein 09/12/2022 7 8  6 4 - 8 4 g/dL Final    Albumin 09/12/2022 3 4 (A) 3 5 - 5 0 g/dL Final    Total Bilirubin 09/12/2022 0 34  0 20 - 1 00 mg/dL Final    Use of this assay is not recommended for patients undergoing treatment with eltrombopag due to the potential for falsely elevated results   eGFR 09/12/2022 86  ml/min/1 73sq m Final    GGT 09/12/2022 72  5 - 85 U/L Final   Hospital Outpatient Visit on 07/13/2022   Component Date Value Ref Range Status    Case Report 07/13/2022    Final                    Value:Non-gynecologic Cytology                          Case: [id number]                                  Authorizing Provider:  ANDRES Camejo      Collected:           07/13/2022 [id number]              Ordering Location:     [address] [place] [address] Received:            07/13/2022 1132                                     Ultrasound                                                                   Pathologist:           Bia Ozuna MD                                                    Specimens:   A) - Thyroid, Right, Upper Pole                                                                     B) - Thyroid, Right, Upper Pole                                                                     C) - Thyroid, Left, Mid Pole                                                                        D) - Thyroid, Left, Mid Pole                                                               Final Diagnosis 07/13/2022    Final                    Value: This result contains rich text formatting which cannot be displayed here   Note 07/13/2022    Final                    Value: This result contains rich text formatting which cannot be displayed here      Intraoperative Consultation 07/13/2022    Final Value:This result contains rich text formatting which cannot be displayed here  Madelyn Vasquez Description 07/13/2022    Final                    Value: This result contains rich text formatting which cannot be displayed here   Clinical Information 07/13/2022    Final                    Value:Thyroid, Right Upper Pole  Size: 2 9 x 1 6 x 2 2 cm   Margins: Smooth/Irregular   Echogenicity: Solid/Cystic   Microcalcs: Absent   Flow: Intranodular/Peripheral   Size change: N/A   Suspicion level: N/A   Hx of Hashimoto's Thyroiditis: N/A    Additional Information 07/13/2022    Final                    Value: This result contains rich text formatting which cannot be displayed here  Appointment on 06/13/2022   Component Date Value Ref Range Status    CA 19-9 06/13/2022 <2  0 - 35 U/mL Final    [employer] Electrochemiluminescence Immunoassay (ECLIA)  Values obtained with different assay methods or kits cannot be  used interchangeably  Results cannot be interpreted as absolute  evidence of the presence or absence of malignant disease      WBC 06/13/2022 8 63  4 31 - 10 16 Thousand/uL Final    RBC 06/13/2022 4 50  3 81 - 5 12 Million/uL Final    Hemoglobin 06/13/2022 13 8  11 5 - 15 4 g/dL Final    Hematocrit 06/13/2022 43 6  34 8 - 46 1 % Final    MCV 06/13/2022 97  82 - 98 fL Final    MCH 06/13/2022 30 7  26 8 - 34 3 pg Final    MCHC 06/13/2022 31 7  31 4 - 37 4 g/dL Final    RDW 06/13/2022 13 3  11 6 - 15 1 % Final    MPV 06/13/2022 10 1  8 9 - 12 7 fL Final    Platelets 40/23/9833 325  149 - 390 Thousands/uL Final    nRBC 06/13/2022 0  /100 WBCs Final    Neutrophils Relative 06/13/2022 82 (A) 43 - 75 % Final    Immat GRANS % 06/13/2022 0  0 - 2 % Final    Lymphocytes Relative 06/13/2022 11 (A) 14 - 44 % Final    Monocytes Relative 06/13/2022 5  4 - 12 % Final    Eosinophils Relative 06/13/2022 1  0 - 6 % Final    Basophils Relative 06/13/2022 1  0 - 1 % Final    Neutrophils Absolute 06/13/2022 7 12 1 85 - 7 62 Thousands/µL Final    Immature Grans Absolute 06/13/2022 0 02  0 00 - 0 20 Thousand/uL Final    Lymphocytes Absolute 06/13/2022 0 92  0 60 - 4 47 Thousands/µL Final    Monocytes Absolute 06/13/2022 0 41  0 17 - 1 22 Thousand/µL Final    Eosinophils Absolute 06/13/2022 0 11  0 00 - 0 61 Thousand/µL Final    Basophils Absolute 06/13/2022 0 05  0 00 - 0 10 Thousands/µL Final    Sodium 06/13/2022 140  136 - 145 mmol/L Final    Potassium 06/13/2022 5 0  3 5 - 5 3 mmol/L Final    Chloride 06/13/2022 109 (A) 100 - 108 mmol/L Final    CO2 06/13/2022 28  21 - 32 mmol/L Final    ANION GAP 06/13/2022 3 (A) 4 - 13 mmol/L Final    BUN 06/13/2022 24  5 - 25 mg/dL Final    Creatinine 06/13/2022 0 77  0 60 - 1 30 mg/dL Final    Standardized to IDMS reference method    Glucose, Fasting 06/13/2022 102 (A) 65 - 99 mg/dL Final    Specimen collection should occur prior to Sulfasalazine administration due to the potential for falsely depressed results  Specimen collection should occur prior to Sulfapyridine administration due to the potential for falsely elevated results   Calcium 06/13/2022 9 5  8 3 - 10 1 mg/dL Final    AST 06/13/2022 31  5 - 45 U/L Final    Specimen collection should occur prior to Sulfasalazine administration due to the potential for falsely depressed results   ALT 06/13/2022 51  12 - 78 U/L Final    Specimen collection should occur prior to Sulfasalazine and/or Sulfapyridine administration due to the potential for falsely depressed results   Alkaline Phosphatase 06/13/2022 163 (A) 46 - 116 U/L Final    Total Protein 06/13/2022 7 9  6 4 - 8 2 g/dL Final    Albumin 06/13/2022 3 7  3 5 - 5 0 g/dL Final    Total Bilirubin 06/13/2022 0 59  0 20 - 1 00 mg/dL Final    Use of this assay is not recommended for patients undergoing treatment with eltrombopag due to the potential for falsely elevated results      eGFR 06/13/2022 80  ml/min/1 73sq m Final    GGT 06/13/2022 127 (U) 5 - 57 U/L Final   Office Visit on 04/15/2022   Component Date Value Ref Range Status    SARS-CoV-2 04/15/2022 Negative  Negative Final    INFLUENZA A PCR 04/15/2022 Negative  Negative Final    INFLUENZA B PCR 04/15/2022 Negative  Negative Final   Hospital Outpatient Visit on 04/06/2022   Component Date Value Ref Range Status    KYLE 04/06/2022 Negative  Negative Final    CA 19-9 04/06/2022 <2  0 - 35 U/mL Final    [employer] Electrochemiluminescence Immunoassay (ECLIA)  Values obtained with different assay methods or kits cannot be  used interchangeably  Results cannot be interpreted as absolute  evidence of the presence or absence of malignant disease      WBC 04/06/2022 6 82  4 31 - 10 16 Thousand/uL Final    RBC 04/06/2022 4 28  3 81 - 5 12 Million/uL Final    Hemoglobin 04/06/2022 12 6  11 5 - 15 4 g/dL Final    Hematocrit 04/06/2022 39 7  34 8 - 46 1 % Final    MCV 04/06/2022 93  82 - 98 fL Final    MCH 04/06/2022 29 4  26 8 - 34 3 pg Final    MCHC 04/06/2022 31 7  31 4 - 37 4 g/dL Final    RDW 04/06/2022 13 8  11 6 - 15 1 % Final    MPV 04/06/2022 9 7  8 9 - 12 7 fL Final    Platelets 67/74/9611 288  149 - 390 Thousands/uL Final    nRBC 04/06/2022 0  /100 WBCs Final    Neutrophils Relative 04/06/2022 68  43 - 75 % Final    Immat GRANS % 04/06/2022 0  0 - 2 % Final    Lymphocytes Relative 04/06/2022 23  14 - 44 % Final    Monocytes Relative 04/06/2022 6  4 - 12 % Final    Eosinophils Relative 04/06/2022 2  0 - 6 % Final    Basophils Relative 04/06/2022 1  0 - 1 % Final    Neutrophils Absolute 04/06/2022 4 70  1 85 - 7 62 Thousands/µL Final    Immature Grans Absolute 04/06/2022 0 01  0 00 - 0 20 Thousand/uL Final    Lymphocytes Absolute 04/06/2022 1 54  0 60 - 4 47 Thousands/µL Final    Monocytes Absolute 04/06/2022 0 41  0 17 - 1 22 Thousand/µL Final    Eosinophils Absolute 04/06/2022 0 11  0 00 - 0 61 Thousand/µL Final    Basophils Absolute 04/06/2022 0 05  0 00 - 0 10 Thousands/µL Final    Sodium 04/06/2022 139  136 - 145 mmol/L Final    Potassium 04/06/2022 3 8  3 5 - 5 3 mmol/L Final    Chloride 04/06/2022 103  100 - 108 mmol/L Final    CO2 04/06/2022 26  21 - 32 mmol/L Final    ANION GAP 04/06/2022 10  4 - 13 mmol/L Final    BUN 04/06/2022 22  5 - 25 mg/dL Final    Creatinine 04/06/2022 0 65  0 60 - 1 30 mg/dL Final    Standardized to IDMS reference method    Glucose 04/06/2022 87  65 - 140 mg/dL Final    If the patient is fasting, the ADA then defines impaired fasting glucose as > 100 mg/dL and diabetes as > or equal to 123 mg/dL  Specimen collection should occur prior to Sulfasalazine administration due to the potential for falsely depressed results  Specimen collection should occur prior to Sulfapyridine administration due to the potential for falsely elevated results   Calcium 04/06/2022 8 6  8 3 - 10 1 mg/dL Final    AST 04/06/2022 41  5 - 45 U/L Final    Specimen collection should occur prior to Sulfasalazine administration due to the potential for falsely depressed results   ALT 04/06/2022 57  12 - 78 U/L Final    Specimen collection should occur prior to Sulfasalazine administration due to the potential for falsely depressed results   Alkaline Phosphatase 04/06/2022 185 (A) 46 - 116 U/L Final    Total Protein 04/06/2022 7 4  6 4 - 8 2 g/dL Final    Albumin 04/06/2022 3 6  3 5 - 5 0 g/dL Final    Total Bilirubin 04/06/2022 0 28  0 20 - 1 00 mg/dL Final    Use of this assay is not recommended for patients undergoing treatment with eltrombopag due to the potential for falsely elevated results      eGFR 04/06/2022 92  ml/min/1 73sq m Final    CRP 04/06/2022 <0 5  <3 0 mg/L Final    Rheumatoid Factor 04/06/2022 Negative  Negative Final    Sed Rate 04/06/2022 27  0 - 29 mm/hour Final   Appointment on 03/14/2022   Component Date Value Ref Range Status    WBC 03/14/2022 5 70  4 31 - 10 16 Thousand/uL Final    RBC 03/14/2022 4 42  3 81 - 5 12 Million/uL Final  Hemoglobin 03/14/2022 13 4  11 5 - 15 4 g/dL Final    Hematocrit 03/14/2022 42 4  34 8 - 46 1 % Final    MCV 03/14/2022 96  82 - 98 fL Final    MCH 03/14/2022 30 3  26 8 - 34 3 pg Final    MCHC 03/14/2022 31 6  31 4 - 37 4 g/dL Final    RDW 03/14/2022 13 7  11 6 - 15 1 % Final    MPV 03/14/2022 10 4  8 9 - 12 7 fL Final    Platelets 15/87/6977 322  149 - 390 Thousands/uL Final    nRBC 03/14/2022 0  /100 WBCs Final    Neutrophils Relative 03/14/2022 73  43 - 75 % Final    Immat GRANS % 03/14/2022 0  0 - 2 % Final    Lymphocytes Relative 03/14/2022 18  14 - 44 % Final    Monocytes Relative 03/14/2022 7  4 - 12 % Final    Eosinophils Relative 03/14/2022 1  0 - 6 % Final    Basophils Relative 03/14/2022 1  0 - 1 % Final    Neutrophils Absolute 03/14/2022 4 12  1 85 - 7 62 Thousands/µL Final    Immature Grans Absolute 03/14/2022 0 02  0 00 - 0 20 Thousand/uL Final    Lymphocytes Absolute 03/14/2022 1 04  0 60 - 4 47 Thousands/µL Final    Monocytes Absolute 03/14/2022 0 38  0 17 - 1 22 Thousand/µL Final    Eosinophils Absolute 03/14/2022 0 08  0 00 - 0 61 Thousand/µL Final    Basophils Absolute 03/14/2022 0 06  0 00 - 0 10 Thousands/µL Final    Sodium 03/14/2022 138  136 - 145 mmol/L Final    Potassium 03/14/2022 5 0  3 5 - 5 3 mmol/L Final    Chloride 03/14/2022 107  100 - 108 mmol/L Final    CO2 03/14/2022 28  21 - 32 mmol/L Final    ANION GAP 03/14/2022 3 (A) 4 - 13 mmol/L Final    BUN 03/14/2022 22  5 - 25 mg/dL Final    Creatinine 03/14/2022 0 74  0 60 - 1 30 mg/dL Final    Standardized to IDMS reference method    Glucose, Fasting 03/14/2022 107 (A) 65 - 99 mg/dL Final    Specimen collection should occur prior to Sulfasalazine administration due to the potential for falsely depressed results  Specimen collection should occur prior to Sulfapyridine administration due to the potential for falsely elevated results      Calcium 03/14/2022 9 8  8 3 - 10 1 mg/dL Final    AST 03/14/2022 34 5 - 45 U/L Final    Specimen collection should occur prior to Sulfasalazine administration due to the potential for falsely depressed results   ALT 03/14/2022 52  12 - 78 U/L Final    Specimen collection should occur prior to Sulfasalazine and/or Sulfapyridine administration due to the potential for falsely depressed results   Alkaline Phosphatase 03/14/2022 198 (A) 46 - 116 U/L Final    Total Protein 03/14/2022 8 0  6 4 - 8 2 g/dL Final    Albumin 03/14/2022 4 0  3 5 - 5 0 g/dL Final    Total Bilirubin 03/14/2022 0 44  0 20 - 1 00 mg/dL Final    Use of this assay is not recommended for patients undergoing treatment with eltrombopag due to the potential for falsely elevated results   eGFR 03/14/2022 84  ml/min/1 73sq m Final       The following historical data was reviewed:  Past Medical History:   Diagnosis Date    BRCA1 positive     BRCA2 positive     Cancer ([facility] [address] )     pancreatic    History of chemotherapy     History of radiation therapy     Pancreatic carcinoma (HCC)      Past Surgical History:   Procedure Laterality Date    ABLATION SOFT TISSUE N/A 4/26/2021    Procedure: INTRAOPERATIVE ULTRASOUND, ABLATION,SOFT TISSUE PANCREAS;  Surgeon: Doni De Los Santos MD;  [facility];  Service: Surgical Oncology    CHOLECYSTECTOMY N/A 4/26/2021    Procedure: CHOLECYSTECTOMY;  Surgeon: Doni De Los Santos MD;  [facility];  Service: Surgical Oncology    FL GUIDED CENTRAL VENOUS ACCESS DEVICE INSERTION  6/2/2021    HYSTERECTOMY      LAPAROTOMY N/A 4/26/2021    Procedure: LAPAROTOMY EXPLORATORY;  Surgeon: Doni De Los Santos MD;  [facility];  Service: Surgical Oncology    OOPHORECTOMY      SINUS SURGERY      TUNNELED VENOUS PORT PLACEMENT Left 6/2/2021    Procedure: INSERTION VENOUS PORT (PORT-A-CATH);   Surgeon: Doni De Los Santos MD;  [facility];  Service: Surgical Oncology    US GUIDED THYROID BIOPSY  7/13/2022    WHIPPLE PROCEDURE/PANCREATICO-DUODENECTOMY N/A 4/26/2021 Procedure: WHIPPLE PROCEDURE/PANCREATICO-DUODENECTOMY; PYLORIC PRESERVING;  Surgeon: Ivett Lindsay MD;  [facility];  Service: Surgical Oncology     Social History     Socioeconomic History    Marital status:      Spouse name: None    Number of children: 3    Years of education: None    Highest education level: None   Occupational History    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   Tobacco Use    Smoking status: Current Some Day Smoker     Packs/day: 0 50     Start date: 65     Last attempt to quit: 2021     Years since quittin 4    Smokeless tobacco: Never Used    Tobacco comment: recently stop smoking , pt stated she smoke occ  1-2 cigg some days 2022  Vaping Use    Vaping Use: Never used   Substance and Sexual Activity    Alcohol use: Never    Drug use: Never    Sexual activity: Not Currently   Other Topics Concern    None   Social History Narrative    None     Social Determinants of Health     Financial Resource Strain: Low Risk     Difficulty of Paying Living Expenses: Not hard at all   Food Insecurity: No Food Insecurity    Worried About Running Out of Food in the Last Year: Never true    [address] in the Last Year: Never true   Transportation Needs: No Transportation Needs    Lack of Transportation (Medical): No    Lack of Transportation (Non-Medical):  No   Physical Activity: Insufficiently Active    Days of Exercise per Week: 4 days    Minutes of Exercise per Session: 20 min   Stress: No Stress Concern Present    Feeling of Stress : Not at all   Social Connections: Socially Isolated    Frequency of Communication with Friends and Family: More than three times a week    Frequency of Social Gatherings with Friends and Family: More than three times a week    Attends [employer] Services: Never    Active Member of Clubs or Organizations: No    Attends Club or Organization Meetings: Never    Marital Status:  Intimate Partner Violence: Not At Risk    Fear of Current or Ex-Partner: No    Emotionally Abused: No    Physically Abused: No    Sexually Abused: No   Housing Stability: Unknown    Unable to Pay for Housing in the Last Year: No    Number of Places Lived in the Last Year: Not on file    Unstable Housing in the Last Year: No     Family History   Problem Relation Age of Onset    Ulcerative colitis Mother     Prostate cancer Father     Melanoma Sister     Heart disease Brother     Prostate cancer Brother        Please note: This report has been generated by a voice recognition software system  Therefore there may be syntax, spelling, and/or grammatical errors  Please call if you have any questions

## 2022-09-23 ENCOUNTER — TELEPHONE (OUTPATIENT)
Dept: NUTRITION | Facility: CLINIC | Age: 67
End: 2022-09-23

## 2022-09-23 NOTE — TELEPHONE ENCOUNTER
Received notification by Denisha Palacio PA-C, on 9/23/22 that pt has triggered for oncology nutrition care (reason for referral: Patient had a Whipple procedure and had questions regarding the amount of protein she should be taking in   Patient is less than one year out from adjuvant treatments including concurrent chemoradiation  )  Contacted Ruy Savage and introduced self and explained the reason for today's call  Spoke with Ruy Savage today who reports she is at work today and is requesting a call back on Monday    Will call back on Monday per her request

## 2022-09-26 NOTE — TELEPHONE ENCOUNTER
Call placed to pt today per her request   Ashok Reis not available to talk per her mom who answered the phone  Left a message with pts mom with my name and number requesting a call back

## 2022-09-27 PROBLEM — Z95.828 PORT-A-CATH IN PLACE: Status: ACTIVE | Noted: 2022-09-27

## 2022-10-07 ENCOUNTER — PROCEDURE VISIT (OUTPATIENT)
Dept: SURGICAL ONCOLOGY | Facility: CLINIC | Age: 67
End: 2022-10-07
Payer: MEDICARE

## 2022-10-07 VITALS
HEIGHT: 62 IN | OXYGEN SATURATION: 98 % | SYSTOLIC BLOOD PRESSURE: 158 MMHG | DIASTOLIC BLOOD PRESSURE: 98 MMHG | TEMPERATURE: 97.5 F | BODY MASS INDEX: 27.49 KG/M2 | WEIGHT: 149.4 LBS | HEART RATE: 73 BPM

## 2022-10-07 DIAGNOSIS — D49.0 NEOPLASM OF AMPULLA OF VATER: ICD-10-CM

## 2022-10-07 DIAGNOSIS — Z45.2 ENCOUNTER FOR REMOVAL OF TUNNELED CENTRAL VENOUS CATHETER (CVC) WITH PORT: Primary | ICD-10-CM

## 2022-10-07 PROCEDURE — 36590 REMOVAL TUNNELED CV CATH: CPT | Performed by: SURGERY

## 2022-10-07 NOTE — PROGRESS NOTES
Surgical Oncology Follow Up       [facility] CANCER [facility] [address] [address] [phone]    Feliz Turner  1955  [id number]  [facility] [place] [address] [place] [id number] [phone]    Diagnoses and all orders for this visit:    Encounter for removal of tunneled central venous catheter (CVC) with port    Neoplasm of ampulla of Vater        Chief Complaint   Patient presents with    Follow-up       No follow-ups on file  Oncology History   Neoplasm of ampulla of Vater   3/15/2021 Initial Diagnosis    Neoplasm of ampulla of Vater     4/26/2021 Surgery    B  Celiac lymph node:     - Single lymph node; negative for malignancy (0/1)  C   Whipple resection:     - Invasive poorly differentiated mucinous adenocarcinoma  - Tumor arises in the background of an adenoma with high grade dysplasia  - Lymphatic and venous channel invasion by tumor present  - Metastatic carcinoma present in one of twenty lymph nodes (1/20)  - All margins are negative for tumor       6/1/2021 -  Cancer Staged    Staging form: Ampulla of Vater, AJCC 8th Edition  - Pathologic: Stage IIIA (pT3a, pN1, cM0) - Signed by Sarahi Wright MD on 6/1/2021  Histologic grade (G): G3  Histologic grading system: 3 grade system  Residual tumor (R): R0 - None       6/9/2021 - 10/1/2021 Chemotherapy    Cycles 1-6  6/2021 through 8/20/21:  FOLFOX    Cycles 7 -8:  9/15/2021- 10/12/2021  Leucovorin 400mg/m2, IV, Day 1  5-Fu 400 mg/m2, IV , Day 1   5-Fu 1200 mg/m2, IV, CI x 46 hours  Cycle length = 2 weeks    palonosetron (ALOXI), 0 25 mg, Intravenous, Once, 5 of 5 cycles  Administration: 0 25 mg (7/21/2021), 0 25 mg (8/4/2021), 0 25 mg (8/18/2021), 0 25 mg (9/15/2021), 0 25 mg (9/29/2021)  fluorouracil (ADRUCIL), 400 mg/m2 = 605 mg, Intravenous, Once, 8 of 8 cycles  Dose modification: 340 mg/m2 (85 % of original dose 400 mg/m2, Cycle 6, Reason: Dose Not Tolerated)  Administration: 605 mg (6/9/2021), 605 mg (6/23/2021), 605 mg (7/7/2021), 605 mg (7/21/2021), 605 mg (8/4/2021), 515 mg (8/18/2021), 610 mg (9/15/2021), 610 mg (9/29/2021)  fosaprepitant (EMEND) IVPB, 150 mg, Intravenous, Once, 6 of 6 cycles  Administration: 150 mg (7/7/2021), 150 mg (7/21/2021), 150 mg (8/4/2021), 150 mg (8/18/2021), 150 mg (9/15/2021), 150 mg (9/29/2021)  leucovorin calcium IVPB, 604 mg, Intravenous, Once, 8 of 8 cycles  Dose modification: 340 mg/m2 (85 % of original dose 400 mg/m2, Cycle 6, Reason: Dose Not Tolerated)  Administration: 600 mg (6/9/2021), 600 mg (6/23/2021), 600 mg (7/7/2021), 600 mg (7/21/2021), 600 mg (8/4/2021), 517 mg (8/18/2021), 600 mg (9/15/2021), 600 mg (9/29/2021)  oxaliplatin (ELOXATIN) chemo infusion, 85 mg/m2 = 128 35 mg, Intravenous, Once, 6 of 6 cycles  Dose modification: 72 25 mg/m2 (85 % of original dose 85 mg/m2, Cycle 6, Reason: Dose Not Tolerated)  Administration: 128 35 mg (6/9/2021), 128 35 mg (6/23/2021), 128 35 mg (7/7/2021), 128 35 mg (7/21/2021), 128 35 mg (8/4/2021), 109 82 mg (8/18/2021)  fluorouracil (ADRUCIL) ambulatory infusion Soln, 1,200 mg/m2/day = 3,625 mg, Intravenous, Over 46 hours, 8 of 8 cycles     10/1/2021 Genetic Testing    Results revealed patient has the following mutation(s):  Positive for a BRCA2 pathogenic variant      10/26/2021 -  Radiation         10/26/2021 -  Chemotherapy    Xeloda 825 mg/m2 BID  BSA = 1 59  Calculated to 1300 mg BID with rounding  10/28/2021 - 12/2/2021 Radiation    Treatments:  Course: C1    Plan ID Energy Fractions Dose per Fraction (cGy) Dose Correction (cGy) Total Dose Delivered (cGy) Elapsed Days   Abdomen 6X 25 / 25 195 0 4,875 35      Treatment Dates:  10/28/2021 - 12/2/2021                Staging: [id number] periampullary carcinoma, April 2021  BRCA 2 mutation  10 year TC is 24 10%  Lifetime TC is 37%  Treatment history:  Pancreaticoduodenectomy, April 2021  Adjuvant FOLFOX  Chemo radiation with Xeloda  Right upper pole thyroid biopsy, July 2022: AUS, [place] 3, Afirma benign  Left mid pole thyroid biopsy, July 22: [place] 2  Current treatment:  Observation  Disease status: SEDA    History of Present Illness:  Patient returns for port removal     Review of Systems  Complete ROS Surg Onc:   Complete ROS Surg Onc:   Constitutional: The patient denies new or recent history of general fatigue, no recent weight loss, no change in appetite  Eyes: No complaints of visual problems, no scleral icterus  ENT: no complaints of ear pain, no hoarseness, no difficulty swallowing,  no tinnitus and no new masses in head, oral cavity, or neck  Cardiovascular: No complaints of chest pain, no palpitations, no ankle edema  Respiratory: No complaints of shortness of breath, no cough  Gastrointestinal: No complaints of jaundice, no bloody stools, no pale stools  Genitourinary: No complaints of dysuria, no hematuria, no nocturia, no frequent urination, no urethral discharge  Musculoskeletal: No complaints of weakness, paralysis, joint stiffness or arthralgias  Integumentary: No complaints of rash, no new lesions  Neurological: No complaints of convulsions, no seizures, no dizziness  Hematologic/Lymphatic: No complaints of easy bruising  Endocrine:  No hot or cold intolerance  No polydipsia, polyphagia, or polyuria  Allergy/immunology:  No environmental allergies  No food allergies  Not immunocompromised  Skin:  No pallor or rash  No wound          Patient Active Problem List   Diagnosis    Elevated LFTs    Dilated bile duct    Neoplasm of ampulla of Vater    Mild protein-calorie malnutrition (HCC)    Breast mass    Multiple thyroid nodules    Port-A-Cath in place     Past Medical History:   Diagnosis Date    BRCA1 positive     BRCA2 positive     Cancer ([facility] [address] )     pancreatic    History of chemotherapy     History of radiation therapy     Pancreatic carcinoma [facility])      Past Surgical History:   Procedure Laterality Date    ABLATION SOFT TISSUE N/A 2021    Procedure: INTRAOPERATIVE ULTRASOUND, ABLATION,SOFT TISSUE PANCREAS;  Surgeon: Gagandeep Casillas MD;  [facility];  Service: Surgical Oncology    CHOLECYSTECTOMY N/A 2021    Procedure: CHOLECYSTECTOMY;  Surgeon: Gagandeep Casillas MD;  [facility];  Service: Surgical Oncology    FL GUIDED CENTRAL VENOUS ACCESS DEVICE INSERTION  2021    HYSTERECTOMY      LAPAROTOMY N/A 2021    Procedure: LAPAROTOMY EXPLORATORY;  Surgeon: Gagandeep Casillas MD;  [facility];  Service: Surgical Oncology    OOPHORECTOMY      SINUS SURGERY      TUNNELED VENOUS PORT PLACEMENT Left 2021    Procedure: INSERTION VENOUS PORT (PORT-A-CATH); Surgeon: Gagandeep Casillas MD;  [facility];  Service: Surgical Oncology    US GUIDED THYROID BIOPSY  2022    WHIPPLE PROCEDURE/PANCREATICO-DUODENECTOMY N/A 2021    Procedure: WHIPPLE PROCEDURE/PANCREATICO-DUODENECTOMY; PYLORIC PRESERVING;  Surgeon: Gagandeep Casillas MD;  [facility];  Service: Surgical Oncology     Family History   Problem Relation Age of Onset    Ulcerative colitis Mother     Prostate cancer Father     Melanoma Sister     Heart disease Brother     Prostate cancer Brother      Social History     Socioeconomic History    Marital status:      Spouse name: Not on file    Number of children: 3    Years of education: Not on file    Highest education level: Not on file   Occupational History    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   Tobacco Use    Smoking status: Current Some Day Smoker     Packs/day: 0 50     Start date: 65     Last attempt to quit: 2021     Years since quittin 5    Smokeless tobacco: Never Used    Tobacco comment: recently stop smoking , pt stated she smoke occ   1-2 cigg some days 06/02/2022  Vaping Use    Vaping Use: Never used   Substance and Sexual Activity    Alcohol use: Never    Drug use: Never    Sexual activity: Not Currently   Other Topics Concern    Not on file   Social History Narrative    Not on file     Social Determinants of Health     Financial Resource Strain: Low Risk     Difficulty of Paying Living Expenses: Not hard at all   Food Insecurity: No Food Insecurity    Worried About Running Out of Food in the Last Year: Never true    [address] in the Last Year: Never true   Transportation Needs: No Transportation Needs    Lack of Transportation (Medical): No    Lack of Transportation (Non-Medical):  No   Physical Activity: Insufficiently Active    Days of Exercise per Week: 4 days    Minutes of Exercise per Session: 20 min   Stress: No Stress Concern Present    Feeling of Stress : Not at all   Social Connections: Socially Isolated    Frequency of Communication with Friends and Family: More than three times a week    Frequency of Social Gatherings with Friends and Family: More than three times a week    Attends [employer] Services: Never    Active Member of Clubs or Organizations: No    Attends Club or Organization Meetings: Never    Marital Status:    Intimate Partner Violence: Not At Risk    Fear of Current or Ex-Partner: No    Emotionally Abused: No    Physically Abused: No    Sexually Abused: No   Housing Stability: Unknown    Unable to Pay for Housing in the Last Year: No    Number of [place] in the Last Year: Not on file    Unstable Housing in the Last Year: No       Current Outpatient Medications:     acetaminophen (TYLENOL) 500 mg tablet, Take 1,000 mg by mouth, Disp: , Rfl:     fluticasone (FLONASE) 50 mcg/act nasal spray, 1 spray into each nostril daily, Disp: 9 9 mL, Rfl: 4    lidocaine-prilocaine (EMLA) cream, Apply topically as needed for mild pain, Disp: 30 g, Rfl: 0    Multiple Vitamin (multivitamin) tablet, Take 1 tablet by mouth daily, Disp: , Rfl:     Medical ID Plate MISC, Use once for 1 dose Severely and permanently limited in the ability to walk because of an arthritic, neurological, or orthopedic condition: or cannot walk two hundred feet without stopping to rest and meets requirements for disability license plate, Disp: 1 each, Rfl: 0  Allergies   Allergen Reactions    Penicillins Rash    Tetracycline Rash     Vitals:    10/07/22 0757   BP: 158/98   Pulse: 73   Temp: 97 5 °F (36 4 °C)   SpO2: 98%       Physical Exam  Constitutional: General appearance: The Patient is well-developed and well-nourished who appears the stated age in no acute distress  Patient is pleasant and talkative  HEENT:  Normocephalic  Sclerae are anicteric  Mucous membranes are moist    Extremities: There is no clubbing or cyanosis  There is no edema  Symmetric  Neuro: Grossly nonfocal  Gait is normal      Skin: Warm, anicteric  Psych:  Patient is pleasant and talkative  Breasts:        Pathology:  [unfilled]    Labs:      Imaging  No results found  I reviewed the above laboratory and imaging data  Procedure:  Under sterile conditions and local anesthesia, the previous port pocket was opened  The capsule was incised  The port, catheter and sutures were excised in total   Wound was copiously irrigated  There was excellent hemostasis  Incision was approximated with interrupted 3 0 Vicryl suture subcutaneously and a running 4 Monocryl suture in a subcuticular fashion  Steri-Strips and sterile dressings were applied  She tolerated this well  Discussion/Summary: 79 year female status post pancreaticoduodenectomy for a [id number] periampullary/ampullary carcinoma   She comes in for port removal   The risks were explained including bleeding, infection and need for further surgery  Informed consent was obtained  She tolerated this well  She was instructed on signs and symptoms of infection    I will see her again at her regular visit  Jose E Del Valle is agreeable to this   All her questions were answered

## 2022-10-13 ENCOUNTER — OFFICE VISIT (OUTPATIENT)
Dept: FAMILY MEDICINE CLINIC | Facility: CLINIC | Age: 67
End: 2022-10-13

## 2022-10-13 VITALS
RESPIRATION RATE: 18 BRPM | SYSTOLIC BLOOD PRESSURE: 130 MMHG | HEART RATE: 92 BPM | TEMPERATURE: 97.8 F | BODY MASS INDEX: 27.33 KG/M2 | DIASTOLIC BLOOD PRESSURE: 72 MMHG | WEIGHT: 149.4 LBS | OXYGEN SATURATION: 96 %

## 2022-10-13 DIAGNOSIS — Z00.00 MEDICARE ANNUAL WELLNESS VISIT, INITIAL: Primary | ICD-10-CM

## 2022-10-13 NOTE — PROGRESS NOTES
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  BMI Counseling: Body mass index is 27 33 kg/m²  The BMI is above normal  Nutrition recommendations include encouraging healthy choices of fruits and vegetables, limiting drinks that contain sugar, moderation in carbohydrate intake and increasing intake of lean protein  Exercise recommendations include moderate physical activity 150 minutes/week  Rationale for BMI follow-up plan is due to patient being overweight or obese  Depression Screening and Follow-up Plan: Patient was screened for depression during today's encounter  They screened negative with a PHQ-2 score of 0  Tobacco Cessation Counseling: Tobacco cessation counseling was provided  The patient is sincerely urged to quit consumption of tobacco  She is not ready to quit tobacco      Preventive health issues were discussed with patient, and age appropriate screening tests were ordered as noted in patient's After Visit Summary  Personalized health advice and appropriate referrals for health education or preventive services given if needed, as noted in patient's After Visit Summary  History of Present Illness:     Patient presents for a Medicare Wellness Visit  Is feeling well and has no acute complaints  Has been cutting down smoking and is down to 1 cigarette per day  Patient Care Team:  Ashley Murray MD as PCP - General (Family Medicine)  Maya Chu MD as Surgeon (Surgical Oncology)  Freddy Bradshaw MD (Radiation Oncology)  Geeta Boggs RD (Nutrition)     Review of Systems:     Review of Systems   Constitutional: Negative for chills and fever  HENT: Negative for ear pain and sore throat  Eyes: Negative for pain and visual disturbance  Respiratory: Positive for cough (chronic)  Negative for shortness of breath  Cardiovascular: Negative for chest pain and palpitations  Gastrointestinal: Negative for abdominal pain and vomiting  Genitourinary: Negative for dysuria and hematuria  Musculoskeletal: Negative for arthralgias and back pain  Skin: Negative for color change and rash  Neurological: Negative for seizures and syncope  Problem List:     Patient Active Problem List   Diagnosis   • Elevated LFTs   • Dilated bile duct   • Neoplasm of ampulla of Vater   • Mild protein-calorie malnutrition ([facility] [address] )   • Breast mass   • Multiple thyroid nodules   • Port-A-Cath in place      Past Medical and Surgical History:     Past Medical History:   Diagnosis Date   • BRCA1 positive    • BRCA2 positive    • Cancer ([facility] )     pancreatic   • History of chemotherapy    • History of radiation therapy    • Pancreatic carcinoma ([facility] )      Past Surgical History:   Procedure Laterality Date   • ABLATION SOFT TISSUE N/A 4/26/2021    Procedure: INTRAOPERATIVE ULTRASOUND, ABLATION,SOFT TISSUE PANCREAS;  Surgeon: Jono Lee MD;  [facility];  Service: Surgical Oncology   • CHOLECYSTECTOMY N/A 4/26/2021    Procedure: CHOLECYSTECTOMY;  Surgeon: Jono Lee MD;  [facility];  Service: Surgical Oncology   • FL GUIDED CENTRAL VENOUS ACCESS DEVICE INSERTION  6/2/2021   • HYSTERECTOMY     • LAPAROTOMY N/A 4/26/2021    Procedure: LAPAROTOMY EXPLORATORY;  Surgeon: Jono Lee MD;  [facility];  Service: Surgical Oncology   • OOPHORECTOMY     • SINUS SURGERY     • TUNNELED VENOUS PORT PLACEMENT Left 6/2/2021    Procedure: INSERTION VENOUS PORT (PORT-A-CATH); Surgeon: Jono Lee MD;  [facility];  Service: Surgical Oncology   • US GUIDED THYROID BIOPSY  7/13/2022   • WHIPPLE PROCEDURE/PANCREATICO-DUODENECTOMY N/A 4/26/2021    Procedure:  WHIPPLE PROCEDURE/PANCREATICO-DUODENECTOMY; PYLORIC PRESERVING;  Surgeon: Jono Lee MD;  [facility];  Service: Surgical Oncology      Family History:     Family History   Problem Relation Age of Onset   • Ulcerative colitis Mother    • Prostate cancer Father    • Melanoma Sister    • Heart disease Brother    • Prostate cancer Brother       Social History:     Social History     Socioeconomic History   • Marital status:      Spouse name: None   • Number of children: 3   • Years of education: None   • Highest education level: None   Occupational History   • 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   Tobacco Use   • Smoking status: Current Some Day Smoker     Packs/day: 0 50     Start date: 65     Last attempt to quit: 2021     Years since quittin 5   • Smokeless tobacco: Never Used   • Tobacco comment: recently stop smoking , pt stated she smoke occ  1-2 cigg some days 2022  Vaping Use   • Vaping Use: Never used   Substance and Sexual Activity   • Alcohol use: Never   • Drug use: Never   • Sexual activity: Not Currently   Other Topics Concern   • None   Social History Narrative   • None     Social Determinants of Health     Financial Resource Strain: Medium Risk   • Difficulty of Paying Living Expenses: Somewhat hard   Food Insecurity: No Food Insecurity   • Worried About Running Out of Food in the Last Year: Never true   • Ran Out of Food in the Last Year: Never true   Transportation Needs: No Transportation Needs   • Lack of Transportation (Medical): No   • Lack of Transportation (Non-Medical):  No   Physical Activity: Insufficiently Active   • Days of Exercise per Week: 4 days   • Minutes of Exercise per Session: 20 min   Stress: No Stress Concern Present   • Feeling of Stress : Not at all   Social Connections: Socially Isolated   • Frequency of Communication with Friends and Family: More than three times a week   • Frequency of Social Gatherings with Friends and Family: More than three times a week   • Attends [employer] Services: Never • Active Member of Clubs or Organizations: No   • Attends Club or Organization Meetings: Never   • Marital Status:    Intimate Partner Violence: Not At Risk   • Fear of Current or Ex-Partner: No   • Emotionally Abused: No   • Physically Abused: No   • Sexually Abused: No   Housing Stability: Unknown   • Unable to Pay for Housing in the Last Year: No   • Number of Places Lived in the Last Year: Not on file   • Unstable Housing in the Last Year: No      Medications and Allergies:     Current Outpatient Medications   Medication Sig Dispense Refill   • acetaminophen (TYLENOL) 500 mg tablet Take 1,000 mg by mouth     • Multiple Vitamin (multivitamin) tablet Take 1 tablet by mouth daily     • Medical ID Plate MISC Use once for 1 dose Severely and permanently limited in the ability to walk because of an arthritic, neurological, or orthopedic condition: or cannot walk two hundred feet without stopping to rest and meets requirements for disability license plate 1 each 0     No current facility-administered medications for this visit  Allergies   Allergen Reactions   • Penicillins Rash   • Tetracycline Rash      Immunizations:     Immunization History   Administered Date(s) Administered   • COVID-19 MODERNA VACC 0 5 ML IM 05/14/2021, 06/16/2021      Health Maintenance:         Topic Date Due   • Hepatitis C Screening  Never done   • Colorectal Cancer Screening  Never done   • Breast Cancer Screening: Mammogram  11/18/2022         Topic Date Due   • COVID-19 Vaccine (3 - Moderna risk series) 07/14/2021      Medicare Screening Tests and Risk Assessments:         Health Risk Assessment:   Patient rates overall health as good  Patient feels that their physical health rating is same  Patient is very satisfied with their life  Eyesight was rated as same  Hearing was rated as same  Patient feels that their emotional and mental health rating is same  Patients states they are sometimes angry   Patient states they are never, rarely unusually tired/fatigued  Pain experienced in the last 7 days has been none  Patient states that she has experienced no weight loss or gain in last 6 months  Depression Screening:   PHQ-2 Score: 0  PHQ-9 Score: 0      Fall Risk Screening: In the past year, patient has experienced: no history of falling in past year      Urinary Incontinence Screening:   Patient has not leaked urine accidently in the last six months  Home Safety:  Patient does not have trouble with stairs inside or outside of their home  Patient has working smoke alarms and has working carbon monoxide detector  Nutrition:   Current diet is Regular and No Added Salt  Medications:   Patient is not currently taking any over-the-counter supplements  Patient is able to manage medications  Activities of Daily Living (ADLs)/Instrumental Activities of Daily Living (IADLs):   Walk and transfer into and out of bed and chair?: Yes  Dress and groom yourself?: Yes    Bathe or shower yourself?: Yes    Feed yourself?  Yes  Do your laundry/housekeeping?: Yes  Manage your money, pay your bills and track your expenses?: Yes  Make your own meals?: Yes    Do your own shopping?: Yes    Previous Hospitalizations:   Any hospitalizations or ED visits within the last 12 months?: No      PREVENTIVE SCREENINGS        Diabetes Screening:     General: Screening Current      Colorectal Cancer Screening:     General: Patient Declines      Breast Cancer Screening:     General: Screening Current      Cervical Cancer Screening:    General: Screening Not Indicated      Lung Cancer Screening:     General: Screening Not Indicated      Hepatitis C Screening:    General: Patient Declines    Screening, Brief Intervention, and Referral to Treatment (SBIRT)    Screening      Single Item Drug Screening:  How often have you used an illegal drug (including marijuana) or a prescription medication for non-medical reasons in the past year? never    Single Item Drug Screen Score: 0  Interpretation: Negative screen for possible drug use disorder       Physical Exam:     /72 (BP Location: Left arm, Patient Position: Sitting, Cuff Size: Adult)   Pulse 92   Temp 97 8 °F (36 6 °C) (Tympanic)   Resp 18   Wt 67 8 kg (149 lb 6 4 oz)   SpO2 96%   BMI 27 33 kg/m²     Physical Exam  Cardiovascular:      Rate and Rhythm: Normal rate and regular rhythm  Heart sounds: No murmur heard  Pulmonary:      Effort: Pulmonary effort is normal  No respiratory distress  Breath sounds: Normal breath sounds  Abdominal:      Palpations: Abdomen is soft  Tenderness: There is no abdominal tenderness          Nell Cosby MD

## 2022-11-02 NOTE — TELEPHONE ENCOUNTER
I have miguel bone calling and she said she left the ER after waiting 4 hrs ago go to blood work up and chest xray  She says if it gets worse she will head over to [place]  Yes

## 2022-11-14 ENCOUNTER — HOSPITAL ENCOUNTER (OUTPATIENT)
Dept: RADIOLOGY | Facility: HOSPITAL | Age: 67
Discharge: HOME | End: 2022-11-14

## 2022-11-14 VITALS — HEIGHT: 62 IN | WEIGHT: 138 LBS | BODY MASS INDEX: 25.4 KG/M2

## 2022-11-14 DIAGNOSIS — Z15.01 BRCA POSITIVE: ICD-10-CM

## 2022-11-14 DIAGNOSIS — Z12.31 SCREENING MAMMOGRAM FOR HIGH-RISK PATIENT: ICD-10-CM

## 2022-11-14 DIAGNOSIS — Z15.09 BRCA POSITIVE: ICD-10-CM

## 2022-11-21 ENCOUNTER — HOSPITAL ENCOUNTER (OUTPATIENT)
Dept: RADIOLOGY | Facility: HOSPITAL | Age: 67
Discharge: HOME | End: 2022-11-21

## 2022-12-12 ENCOUNTER — APPOINTMENT (OUTPATIENT)
Dept: LAB | Facility: CLINIC | Age: 67
End: 2022-12-12

## 2022-12-12 DIAGNOSIS — Z92.21 HISTORY OF CHEMOTHERAPY: ICD-10-CM

## 2022-12-12 DIAGNOSIS — D49.0 NEOPLASM OF AMPULLA OF VATER: ICD-10-CM

## 2022-12-12 DIAGNOSIS — R74.8 ELEVATED ALKALINE PHOSPHATASE LEVEL: ICD-10-CM

## 2022-12-12 LAB
ALBUMIN SERPL BCP-MCNC: 3.8 G/DL (ref 3.5–5)
ALP SERPL-CCNC: 125 U/L (ref 46–116)
ALT SERPL W P-5'-P-CCNC: 36 U/L (ref 12–78)
ANION GAP SERPL CALCULATED.3IONS-SCNC: 5 MMOL/L (ref 4–13)
AST SERPL W P-5'-P-CCNC: 24 U/L (ref 5–45)
BASOPHILS # BLD AUTO: 0.05 THOUSANDS/ÂΜL (ref 0–0.1)
BASOPHILS NFR BLD AUTO: 1 % (ref 0–1)
BILIRUB SERPL-MCNC: 0.3 MG/DL (ref 0.2–1)
BUN SERPL-MCNC: 27 MG/DL (ref 5–25)
CALCIUM SERPL-MCNC: 9.7 MG/DL (ref 8.3–10.1)
CHLORIDE SERPL-SCNC: 106 MMOL/L (ref 96–108)
CO2 SERPL-SCNC: 26 MMOL/L (ref 21–32)
CREAT SERPL-MCNC: 0.78 MG/DL (ref 0.6–1.3)
EOSINOPHIL # BLD AUTO: 0.04 THOUSAND/ÂΜL (ref 0–0.61)
EOSINOPHIL NFR BLD AUTO: 1 % (ref 0–6)
ERYTHROCYTE [DISTWIDTH] IN BLOOD BY AUTOMATED COUNT: 13.2 % (ref 11.6–15.1)
GFR SERPL CREATININE-BSD FRML MDRD: 78 ML/MIN/1.73SQ M
GLUCOSE P FAST SERPL-MCNC: 107 MG/DL (ref 65–99)
HCT VFR BLD AUTO: 42.2 % (ref 34.8–46.1)
HGB BLD-MCNC: 13.6 G/DL (ref 11.5–15.4)
IMM GRANULOCYTES # BLD AUTO: 0.04 THOUSAND/UL (ref 0–0.2)
IMM GRANULOCYTES NFR BLD AUTO: 1 % (ref 0–2)
LYMPHOCYTES # BLD AUTO: 1.54 THOUSANDS/ÂΜL (ref 0.6–4.47)
LYMPHOCYTES NFR BLD AUTO: 24 % (ref 14–44)
MCH RBC QN AUTO: 30.4 PG (ref 26.8–34.3)
MCHC RBC AUTO-ENTMCNC: 32.2 G/DL (ref 31.4–37.4)
MCV RBC AUTO: 94 FL (ref 82–98)
MONOCYTES # BLD AUTO: 0.31 THOUSAND/ÂΜL (ref 0.17–1.22)
MONOCYTES NFR BLD AUTO: 5 % (ref 4–12)
NEUTROPHILS # BLD AUTO: 4.33 THOUSANDS/ÂΜL (ref 1.85–7.62)
NEUTS SEG NFR BLD AUTO: 68 % (ref 43–75)
NRBC BLD AUTO-RTO: 0 /100 WBCS
PLATELET # BLD AUTO: 331 THOUSANDS/UL (ref 149–390)
PMV BLD AUTO: 10.2 FL (ref 8.9–12.7)
POTASSIUM SERPL-SCNC: 4.4 MMOL/L (ref 3.5–5.3)
PROT SERPL-MCNC: 7.7 G/DL (ref 6.4–8.4)
RBC # BLD AUTO: 4.48 MILLION/UL (ref 3.81–5.12)
SODIUM SERPL-SCNC: 137 MMOL/L (ref 135–147)
WBC # BLD AUTO: 6.31 THOUSAND/UL (ref 4.31–10.16)

## 2022-12-20 ENCOUNTER — OFFICE VISIT (OUTPATIENT)
Dept: FAMILY MEDICINE CLINIC | Facility: CLINIC | Age: 67
End: 2022-12-20

## 2022-12-20 VITALS
TEMPERATURE: 96.9 F | RESPIRATION RATE: 18 BRPM | HEIGHT: 62 IN | HEART RATE: 87 BPM | SYSTOLIC BLOOD PRESSURE: 130 MMHG | BODY MASS INDEX: 28.29 KG/M2 | DIASTOLIC BLOOD PRESSURE: 82 MMHG | OXYGEN SATURATION: 96 % | WEIGHT: 153.7 LBS

## 2022-12-20 DIAGNOSIS — R05.9 COUGH IN ADULT: ICD-10-CM

## 2022-12-20 DIAGNOSIS — J06.9 ACUTE UPPER RESPIRATORY INFECTION: Primary | ICD-10-CM

## 2022-12-20 RX ORDER — BENZONATATE 100 MG/1
100 CAPSULE ORAL 3 TIMES DAILY PRN
Qty: 20 CAPSULE | Refills: 0 | Status: SHIPPED | OUTPATIENT
Start: 2022-12-20

## 2022-12-20 NOTE — PROGRESS NOTES
[facility] Office visit    Assessment/Plan:     1  Acute upper respiratory infection    2  Cough in adult  -     benzonatate (TESSALON PERLES) 100 mg capsule; Take 1 capsule (100 mg total) by mouth 3 (three) times a day as needed for cough    Likely viral resp tract infection in nature  Patient is not vaccinated for flu, and declined vaccination  Recommended symptomatic treatments with tylenol and nasal rinse  RTO if symptoms persist or worsen in 2 wks  Return if symptoms worsen or fail to improve  Subjective:   HPI  Tyrone Bradford is a 79 y o  female presented with fatigue, congestion, sinus pressure, and sore throat  Symptoms started Friday night  Covid home test was negative yesterday  She is taking Tylenol 500 mg q4h to relieve symptoms  No sick contacts  Patient has history of neoplasm of ampulla of Vater and underwent surgery as well as chemo  She is doing well now and f/u with Oncology for surveillance  Denies any hx of COPD/Asthma  Review of Systems   Constitutional: Positive for chills and fatigue  Negative for fever  HENT: Positive for congestion, ear pain (R >L), sinus pressure, sinus pain and sore throat  Eyes: Negative for pain and visual disturbance  Respiratory: Positive for cough and shortness of breath  Cardiovascular: Negative for chest pain and palpitations  Gastrointestinal: Negative for abdominal pain, diarrhea, nausea and vomiting  Genitourinary: Negative for dysuria and hematuria  Musculoskeletal: Positive for myalgias  Negative for arthralgias and back pain  Skin: Negative for color change and rash  Neurological: Positive for headaches  Negative for seizures and syncope  All other systems reviewed and are negative         Objective:     /82 (BP Location: Right arm, Patient Position: Sitting)   Pulse 87   Temp (!) 96 9 °F (36 1 °C) (Tympanic)   Resp 18   Ht 5' 2" (1 575 m)   Wt 69 7 kg (153 lb 11 2 oz)   SpO2 96%   BMI 28 11 kg/m²      130/82    Physical Exam  Constitutional:       General: She is not in acute distress  Appearance: Normal appearance  HENT:      Head: Normocephalic and atraumatic  Right Ear: External ear normal  There is no impacted cerumen  Left Ear: Tympanic membrane, ear canal and external ear normal  There is no impacted cerumen  Ears:      Comments: Mild erythema of the R ear canal and TM      Nose: Congestion present  Mouth/Throat:      Mouth: Mucous membranes are moist       Pharynx: Posterior oropharyngeal erythema present  No oropharyngeal exudate  Eyes:      Pupils: Pupils are equal, round, and reactive to light  Neck:      Comments: Tyroid appears to be enlarged  Cardiovascular:      Rate and Rhythm: Normal rate and regular rhythm  Pulses: Normal pulses  Heart sounds: Normal heart sounds  No murmur heard  Pulmonary:      Effort: Pulmonary effort is normal  No respiratory distress  Breath sounds: Normal breath sounds  No wheezing  Abdominal:      General: Bowel sounds are normal       Palpations: Abdomen is soft  Tenderness: There is no abdominal tenderness  Lymphadenopathy:      Cervical: No cervical adenopathy  Skin:     General: Skin is warm and dry  Neurological:      General: No focal deficit present  Mental Status: She is alert and oriented to person, place, and time     Psychiatric:         Mood and Affect: Mood normal          Behavior: Behavior normal           ** Please Note: This note has been constructed using a voice recognition system **     [address] Brittney Henriquez MD  12/20/22  2:52 PM

## 2022-12-21 ENCOUNTER — TELEPHONE (OUTPATIENT)
Dept: FAMILY MEDICINE CLINIC | Facility: CLINIC | Age: 67
End: 2022-12-21

## 2022-12-21 DIAGNOSIS — J01.10 ACUTE NON-RECURRENT FRONTAL SINUSITIS: Primary | ICD-10-CM

## 2022-12-21 RX ORDER — AZITHROMYCIN 250 MG/1
TABLET, FILM COATED ORAL
Qty: 6 TABLET | Refills: 0 | Status: SHIPPED | OUTPATIENT
Start: 2022-12-21 | End: 2022-12-26

## 2022-12-21 NOTE — TELEPHONE ENCOUNTER
Spoke with the patient  Sending Flor Garcia and recommended using afrin for the first 3 days, then switching to saline nasal spray  Recommended continued use of flonase and humidifier

## 2022-12-21 NOTE — TELEPHONE ENCOUNTER
Patient called  She was in office yesterday morning to see Dr Mari Dumont and was diagnosed with URI  She is asking for an antibiotic since symptoms are not improving  Did inform tesslon pearls were sent to pharmacy- she confirmed she received them but says they are not helping  She would like an antibiotic  Dr Mari Dumont- can you please review and send rx for antibiotic if you feel appropriate?

## 2022-12-22 ENCOUNTER — TELEPHONE (OUTPATIENT)
Dept: HEMATOLOGY ONCOLOGY | Facility: CLINIC | Age: 67
End: 2022-12-22

## 2022-12-22 NOTE — TELEPHONE ENCOUNTER
Appointment Cancellation Or Reschedule     Person calling in Patient    If other than patient calling, are they listed on the communication consent form? Provider [facility]   Office Visit Date and Time 12/22 at 10:00am    Office Visit Location [facility]   Did patient want to reschedule their office appointment? If so, when was it scheduled to? Yes, 02/06 at 10:30am   Did you have STAR scheduled for this appointment? no   Do you need STAR set up for your new appointment? If yes, please send to "PATIENT RIDESHARE" pool for STAR rescheduling no   If you are cancelling appointment, can we notify STAR to cancel ride? If yes, please send to "PATIENT RIDESHARE" pool for STAR to cancel service no   Is this patient calling to reschedule an infusion appointment? no   When is their next infusion appointment? n/a   Is this patient a Chemo patient? no   Reason for Cancellation or Reschedule Patient is sick        If the patient is a treatment patient, please route this to the office nurse  If the patient is not on treatment, please route to the office MA  If the patient is a surgical oncology patient, please route to surg/onc clinical pool

## 2023-01-24 ENCOUNTER — TELEPHONE (OUTPATIENT)
Dept: SURGICAL ONCOLOGY | Facility: CLINIC | Age: 68
End: 2023-01-24

## 2023-01-24 NOTE — TELEPHONE ENCOUNTER
Spoke to patient about rescheduling Dr Carly Solis 2/7 due to him being in or in am, pt ok with the new date of her appointment 2/10

## 2023-02-02 ENCOUNTER — HOSPITAL ENCOUNTER (OUTPATIENT)
Dept: RADIOLOGY | Facility: HOSPITAL | Age: 68
Discharge: HOME | End: 2023-02-02

## 2023-02-02 DIAGNOSIS — D49.0 NEOPLASM OF AMPULLA OF VATER: ICD-10-CM

## 2023-02-02 RX ADMIN — IOHEXOL 100 ML: 350 INJECTION, SOLUTION INTRAVENOUS at 08:01

## 2023-02-03 ENCOUNTER — TELEPHONE (OUTPATIENT)
Dept: SURGICAL ONCOLOGY | Facility: CLINIC | Age: 68
End: 2023-02-03

## 2023-02-03 NOTE — TELEPHONE ENCOUNTER
Called and spoke with patient to reschedule appointment on 2/10/23 at 10:45am with Dr Luevano due to him being in the OR  Rescheduled to same day at [place] [id number] with [facility]  She was agreeable

## 2023-02-06 ENCOUNTER — OFFICE VISIT (OUTPATIENT)
Dept: HEMATOLOGY ONCOLOGY | Facility: MEDICAL CENTER | Age: 68
End: 2023-02-06

## 2023-02-06 VITALS
RESPIRATION RATE: 16 BRPM | WEIGHT: 163.4 LBS | BODY MASS INDEX: 30.07 KG/M2 | HEART RATE: 79 BPM | OXYGEN SATURATION: 96 % | TEMPERATURE: 98.5 F | HEIGHT: 62 IN | SYSTOLIC BLOOD PRESSURE: 134 MMHG | DIASTOLIC BLOOD PRESSURE: 72 MMHG

## 2023-02-06 DIAGNOSIS — D49.0 NEOPLASM OF AMPULLA OF VATER: Primary | ICD-10-CM

## 2023-02-06 DIAGNOSIS — Z15.09 BRCA POSITIVE: ICD-10-CM

## 2023-02-06 DIAGNOSIS — Z92.21 HISTORY OF CHEMOTHERAPY: ICD-10-CM

## 2023-02-06 DIAGNOSIS — Z12.31 SCREENING MAMMOGRAM FOR HIGH-RISK PATIENT: ICD-10-CM

## 2023-02-06 DIAGNOSIS — Z15.01 BRCA POSITIVE: ICD-10-CM

## 2023-02-06 NOTE — PROGRESS NOTES
[facility]Y [facility]OGY [address] [address]  Oncology Progress Note  Madi Vogt, 1955, [id number]  2/6/2023      Assessment/Plan:   1  Neoplasm of ampulla of Vater; 2  History of chemotherapy  Stage IIIA Adenocarcinoma with BRCA 2 mutation, S/P Whipple in April 2021, followed by Adjuvant chemotherapy for 8 cycles of 5FU based chemotherapy followed by concurrent chemoradiation  Patient completed chemoradiation in December 2021  Port removed in October 2022  Patient continues to follow with medical oncology, radiation oncology and surgical oncology  Most recent CT scan is pending  However blood work that was completed in December demonstrated adequate values  Patient is without specific complaints today  She will continue under observation with the above specialist     Long-term sequela of chemotherapy is assessed on blood testing  We will see her back in approximately 3 months with blood work prior       - CBC and differential; Future  - Comprehensive metabolic panel; Future      3  BRCA positive; 4  Screening mammogram for high-risk patient  History of hysterectomy with oophorectomy   Patient continues to undergo screening mammography  - Mammo screening bilateral w 3d & cad; Future  - CBC and differential; Future      The patient is scheduled for follow-up in approximately 3 months  Patient voiced agreement and understanding to the above  Patient knows to call the Hematology/Oncology office with any questions and concerns regarding the above  Goals and Barriers:    Current Goal:   Prolong Survival from Cancer  Barriers: None  Patient's Capacity to Self Care:  Patient able to self care  Lelo Belle PA-C  Medical Oncology/Hematology  [address] [facility],[address] [facility]  ______________________________________________________________________________________________________________    Subjective     Chief Complaint   Patient presents with   • Follow-up     Neoplasm of ampulla of Vater        History of present illness/Cancer History:   Oncology History   Neoplasm of ampulla of Vater   3/15/2021 Initial Diagnosis    Neoplasm of ampulla of Vater     4/26/2021 Surgery    B  Celiac lymph node:     - Single lymph node; negative for malignancy (0/1)  C   Whipple resection:     - Invasive poorly differentiated mucinous adenocarcinoma  - Tumor arises in the background of an adenoma with high grade dysplasia  - Lymphatic and venous channel invasion by tumor present  - Metastatic carcinoma present in one of twenty lymph nodes (1/20)  - All margins are negative for tumor       6/1/2021 -  Cancer Staged    Staging form: Ampulla of Vater, AJCC 8th Edition  - Pathologic: Stage IIIA (pT3a, pN1, cM0) - Signed by Shilpa Ramey MD on 6/1/2021  Histologic grade (G): G3  Histologic grading system: 3 grade system  Residual tumor (R): R0 - None       6/9/2021 - 10/1/2021 Chemotherapy    Cycles 1-6  6/2021 through 8/20/21:  FOLFOX    Cycles 7 -8:  9/15/2021- 10/12/2021  Leucovorin 400mg/m2, IV, Day 1  5-Fu 400 mg/m2, IV , Day 1   5-Fu 1200 mg/m2, IV, CI x 46 hours  Cycle length = 2 weeks    palonosetron (ALOXI), 0 25 mg, Intravenous, Once, 5 of 5 cycles  Administration: 0 25 mg (7/21/2021), 0 25 mg (8/4/2021), 0 25 mg (8/18/2021), 0 25 mg (9/15/2021), 0 25 mg (9/29/2021)  fluorouracil (ADRUCIL), 400 mg/m2 = 605 mg, Intravenous, Once, 8 of 8 cycles  Dose modification: 340 mg/m2 (85 % of original dose 400 mg/m2, Cycle 6, Reason: Dose Not Tolerated)  Administration: 605 mg (6/9/2021), 605 mg (6/23/2021), 605 mg (7/7/2021), 605 mg (7/21/2021), 605 mg (8/4/2021), 515 mg (8/18/2021), 610 mg (9/15/2021), 610 mg (9/29/2021)  fosaprepitant (EMEND) IVPB, 150 mg, Intravenous, Once, 6 of 6 cycles  Administration: 150 mg (7/7/2021), 150 mg (7/21/2021), 150 mg (8/4/2021), 150 mg (8/18/2021), 150 mg (9/15/2021), 150 mg (9/29/2021)  leucovorin calcium IVPB, 604 mg, Intravenous, Once, 8 of 8 cycles  Dose modification: 340 mg/m2 (85 % of original dose 400 mg/m2, Cycle 6, Reason: Dose Not Tolerated)  Administration: 600 mg (6/9/2021), 600 mg (6/23/2021), 600 mg (7/7/2021), 600 mg (7/21/2021), 600 mg (8/4/2021), 517 mg (8/18/2021), 600 mg (9/15/2021), 600 mg (9/29/2021)  oxaliplatin (ELOXATIN) chemo infusion, 85 mg/m2 = 128 35 mg, Intravenous, Once, 6 of 6 cycles  Dose modification: 72 25 mg/m2 (85 % of original dose 85 mg/m2, Cycle 6, Reason: Dose Not Tolerated)  Administration: 128 35 mg (6/9/2021), 128 35 mg (6/23/2021), 128 35 mg (7/7/2021), 128 35 mg (7/21/2021), 128 35 mg (8/4/2021), 109 82 mg (8/18/2021)  fluorouracil (ADRUCIL) ambulatory infusion Soln, 1,200 mg/m2/day = 3,625 mg, Intravenous, Over 46 hours, 8 of 8 cycles     10/1/2021 Genetic Testing    Results revealed patient has the following mutation(s):  Positive for a BRCA2 pathogenic variant      10/26/2021 -  Radiation         10/26/2021 -  Chemotherapy    Xeloda 825 mg/m2 BID  BSA = 1 59  Calculated to 1300 mg BID with rounding  10/28/2021 - 12/2/2021 Radiation    Treatments:  Course: C1    Plan ID Energy Fractions Dose per Fraction (cGy) Dose Correction (cGy) Total Dose Delivered (cGy) Elapsed Days   Abdomen 6X 25 / 25 195 0 4,875 35      Treatment Dates:  10/28/2021 - 12/2/2021                 Lab Results   Component Value Date    WBC 6 31 12/12/2022    HGB 13 6 12/12/2022    HCT 42 2 12/12/2022    MCV 94 12/12/2022     12/12/2022     Lab Results   Component Value Date    SODIUM 137 12/12/2022    K 4 4 12/12/2022     12/12/2022    CO2 26 12/12/2022    AGAP 5 12/12/2022    BUN 27 (H) 12/12/2022    CREATININE 0 78 12/12/2022    GLUC 87 04/06/2022    GLUF 107 (H) 12/12/2022    CALCIUM 9 7 12/12/2022    AST 24 12/12/2022 ALT 36 12/12/2022    ALKPHOS 125 (H) 12/12/2022    TP 7 7 12/12/2022    TBILI 0 30 12/12/2022    EGFR 78 12/12/2022       Interval history: This is a 71-year-old female  Patient is doing well  Port-A-Cath was removed in October  Otherwise, patient has no other hospitalizations or procedures to report back today  Review of Systems   Constitutional: Negative for appetite change, fatigue, fever and unexpected weight change  HENT: Negative for nosebleeds  Respiratory: Negative for cough, choking and shortness of breath  Negative hemoptysis  Cardiovascular: Negative for chest pain, palpitations and leg swelling  Gastrointestinal: Negative  Negative for abdominal distention, abdominal pain, anal bleeding, blood in stool, constipation, diarrhea, nausea and vomiting  Endocrine: Negative  Negative for cold intolerance  Genitourinary: Negative  Negative for hematuria, menstrual problem, vaginal bleeding, vaginal discharge and vaginal pain  Musculoskeletal: Negative  Negative for arthralgias, myalgias, neck pain and neck stiffness  Skin: Negative  Negative for color change, pallor and rash  Allergic/Immunologic: Negative  Negative for immunocompromised state  Neurological: Negative  Negative for weakness and headaches  Hematological: Negative for adenopathy  Does not bruise/bleed easily  All other systems reviewed and are negative        Current Outpatient Medications:   •  acetaminophen (TYLENOL) 500 mg tablet, Take 1,000 mg by mouth, Disp: , Rfl:   •  Medical ID Plate MISC, Use once for 1 dose Severely and permanently limited in the ability to walk because of an arthritic, neurological, or orthopedic condition: or cannot walk two hundred feet without stopping to rest and meets requirements for disability license plate, Disp: 1 each, Rfl: 0  •  Multiple Vitamin (multivitamin) tablet, Take 1 tablet by mouth daily, Disp: , Rfl:   •  benzonatate (TESSALON PERLES) 100 mg capsule, Take 1 capsule (100 mg total) by mouth 3 (three) times a day as needed for cough (Patient not taking: Reported on 2/6/2023), Disp: 20 capsule, Rfl: 0  Allergies   Allergen Reactions   • Penicillins Rash   • Tetracycline Rash       Advance Directive and Living Will:            Objective   /72 (BP Location: Right arm, Patient Position: Sitting, Cuff Size: Adult)   Pulse 79   Temp 98 5 °F (36 9 °C) (Tympanic)   Resp 16   Ht 5' 2" (1 575 m)   Wt 74 1 kg (163 lb 6 4 oz)   SpO2 96%   BMI 29 89 kg/m²   Wt Readings from Last 6 Encounters:   02/06/23 74 1 kg (163 lb 6 4 oz)   12/20/22 69 7 kg (153 lb 11 2 oz)   11/14/22 62 6 kg (138 lb)   10/13/22 67 8 kg (149 lb 6 4 oz)   10/07/22 67 8 kg (149 lb 6 4 oz)   09/22/22 67 1 kg (148 lb)       Physical Exam  Constitutional:       General: She is not in acute distress  Appearance: She is well-developed  HENT:      Head: Normocephalic and atraumatic  Mouth/Throat:      Pharynx: No oropharyngeal exudate  Eyes:      General: No scleral icterus  Pupils: Pupils are equal, round, and reactive to light  Cardiovascular:      Rate and Rhythm: Normal rate and regular rhythm  Heart sounds: No murmur heard  Pulmonary:      Effort: Pulmonary effort is normal  No respiratory distress  Breath sounds: Normal breath sounds  Abdominal:      General: Bowel sounds are normal  There is no distension  Palpations: Abdomen is soft  Tenderness: There is no abdominal tenderness  Musculoskeletal:         General: Normal range of motion  Cervical back: Normal range of motion  Lymphadenopathy:      Cervical: No cervical adenopathy  Skin:     General: Skin is warm  Coloration: Skin is not pale  Findings: No rash  Neurological:      Mental Status: She is alert and oriented to person, place, and time  Cranial Nerves: No cranial nerve deficit  Psychiatric:         Thought Content:  Thought content normal          Pertinent Laboratory Results and Imaging Review:  No visits with results within 3 Week(s) from this visit     Latest known visit with results is:   Appointment on 12/12/2022   Component Date Value Ref Range Status   • WBC 12/12/2022 6 31  4 31 - 10 16 Thousand/uL Final   • RBC 12/12/2022 4 48  3 81 - 5 12 Million/uL Final   • Hemoglobin 12/12/2022 13 6  11 5 - 15 4 g/dL Final   • Hematocrit 12/12/2022 42 2  34 8 - 46 1 % Final   • MCV 12/12/2022 94  82 - 98 fL Final   • MCH 12/12/2022 30 4  26 8 - 34 3 pg Final   • MCHC 12/12/2022 32 2  31 4 - 37 4 g/dL Final   • RDW 12/12/2022 13 2  11 6 - 15 1 % Final   • MPV 12/12/2022 10 2  8 9 - 12 7 fL Final   • Platelets 45/68/6295 331  149 - 390 Thousands/uL Final   • nRBC 12/12/2022 0  /100 WBCs Final   • Neutrophils Relative 12/12/2022 68  43 - 75 % Final   • Immat GRANS % 12/12/2022 1  0 - 2 % Final   • Lymphocytes Relative 12/12/2022 24  14 - 44 % Final   • Monocytes Relative 12/12/2022 5  4 - 12 % Final   • Eosinophils Relative 12/12/2022 1  0 - 6 % Final   • Basophils Relative 12/12/2022 1  0 - 1 % Final   • Neutrophils Absolute 12/12/2022 4 33  1 85 - 7 62 Thousands/µL Final   • Immature Grans Absolute 12/12/2022 0 04  0 00 - 0 20 Thousand/uL Final   • Lymphocytes Absolute 12/12/2022 1 54  0 60 - 4 47 Thousands/µL Final   • Monocytes Absolute 12/12/2022 0 31  0 17 - 1 22 Thousand/µL Final   • Eosinophils Absolute 12/12/2022 0 04  0 00 - 0 61 Thousand/µL Final   • Basophils Absolute 12/12/2022 0 05  0 00 - 0 10 Thousands/µL Final   • Sodium 12/12/2022 137  135 - 147 mmol/L Final   • Potassium 12/12/2022 4 4  3 5 - 5 3 mmol/L Final   • Chloride 12/12/2022 106  96 - 108 mmol/L Final   • CO2 12/12/2022 26  21 - 32 mmol/L Final   • ANION GAP 12/12/2022 5  4 - 13 mmol/L Final   • BUN 12/12/2022 27 (H)  5 - 25 mg/dL Final   • Creatinine 12/12/2022 0 78  0 60 - 1 30 mg/dL Final    Standardized to IDMS reference method   • Glucose, Fasting 12/12/2022 107 (H)  65 - 99 mg/dL Final    Specimen collection should occur prior to Sulfasalazine administration due to the potential for falsely depressed results  Specimen collection should occur prior to Sulfapyridine administration due to the potential for falsely elevated results  • Calcium 12/12/2022 9 7  8 3 - 10 1 mg/dL Final   • AST 12/12/2022 24  5 - 45 U/L Final    Specimen collection should occur prior to Sulfasalazine administration due to the potential for falsely depressed results  • ALT 12/12/2022 36  12 - 78 U/L Final    Specimen collection should occur prior to Sulfasalazine and/or Sulfapyridine administration due to the potential for falsely depressed results  • Alkaline Phosphatase 12/12/2022 125 (H)  46 - 116 U/L Final   • Total Protein 12/12/2022 7 7  6 4 - 8 4 g/dL Final   • Albumin 12/12/2022 3 8  3 5 - 5 0 g/dL Final   • Total Bilirubin 12/12/2022 0 30  0 20 - 1 00 mg/dL Final    Use of this assay is not recommended for patients undergoing treatment with eltrombopag due to the potential for falsely elevated results     • eGFR 12/12/2022 78  ml/min/1 73sq m Final       The following historical data was reviewed:  Past Medical History:   Diagnosis Date   • BRCA1 positive    • BRCA2 positive    • Cancer ([facility] [address] )     pancreatic   • History of chemotherapy     pancreatic cancer   • History of radiation therapy    • [facility]      Past Surgical History:   Procedure Laterality Date   • ABLATION SOFT TISSUE N/A 4/26/2021    Procedure: INTRAOPERATIVE ULTRASOUND, ABLATION,SOFT TISSUE PANCREAS;  Surgeon: Lori Benitez MD;  [facility];  Service: Surgical Oncology   • CHOLECYSTECTOMY N/A 4/26/2021    Procedure: CHOLECYSTECTOMY;  Surgeon: Lori Benitez MD;  [facility];  Service: Surgical Oncology   • FL GUIDED CENTRAL VENOUS ACCESS DEVICE INSERTION  6/2/2021   • HYSTERECTOMY     • LAPAROTOMY N/A 4/26/2021    Procedure: LAPAROTOMY EXPLORATORY;  Surgeon: Lori Benitez MD;  [facility];  Service: Surgical Oncology   • OOPHORECTOMY     • SINUS SURGERY     • TUNNELED VENOUS PORT PLACEMENT Left 2021    Procedure: INSERTION VENOUS PORT (PORT-A-CATH); Surgeon: Louis Brandon MD;  [facility];  Service: Surgical Oncology   • US GUIDED THYROID BIOPSY  2022   • WHIPPLE PROCEDURE/PANCREATICO-DUODENECTOMY N/A 2021    Procedure: WHIPPLE PROCEDURE/PANCREATICO-DUODENECTOMY; PYLORIC PRESERVING;  Surgeon: Louis Brandon MD;  [facility];  Service: Surgical Oncology     Social History     Socioeconomic History   • Marital status:      Spouse name: None   • Number of children: 3   • Years of education: None   • Highest education level: None   Occupational History   • 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   Tobacco Use   • Smoking status: Some Days     Packs/day: 0 50     Types: Cigarettes     Start date: 65     Last attempt to quit: 2021     Years since quittin 8   • Smokeless tobacco: Never   • Tobacco comments:     recently stop smoking , pt stated she smoke occ  1-2 cigg some days 2022  Vaping Use   • Vaping Use: Never used   Substance and Sexual Activity   • Alcohol use: Never   • Drug use: Never   • Sexual activity: Not Currently   Other Topics Concern   • None   Social History Narrative   • None     Social Determinants of Health     Financial Resource Strain: Medium Risk   • Difficulty of Paying Living Expenses: Somewhat hard   Food Insecurity: No Food Insecurity   • Worried About Running Out of Food in the Last Year: Never true   • Ran Out of Food in the Last Year: Never true   Transportation Needs: No Transportation Needs   • Lack of Transportation (Medical): No   • Lack of Transportation (Non-Medical):  No   Physical Activity: Insufficiently Active   • Days of Exercise per Week: 4 days   • Minutes of Exercise per Session: 20 min   Stress: No Stress Concern Present   • Feeling of Stress : Not at all   Social Connections: Socially Isolated   • Frequency of Communication with Friends and Family: More than three times a week   • Frequency of Social Gatherings with Friends and Family: More than three times a week   • Attends Taoist Services: Never   • Active Member of Clubs or Organizations: No   • Attends Club or Organization Meetings: Never   • Marital Status:    Intimate Partner Violence: Not At Risk   • Fear of Current or Ex-Partner: No   • Emotionally Abused: No   • Physically Abused: No   • Sexually Abused: No   Housing Stability: Unknown   • Unable to Pay for Housing in the Last Year: No   • Number of Places Lived in the Last Year: Not on file   • Unstable Housing in the Last Year: No     Family History   Problem Relation Age of Onset   • Ulcerative colitis Mother    • Prostate cancer Father    • Melanoma Sister    • Heart disease Brother    • Prostate cancer Brother    • No Known Problems Brother    • No Known Problems Maternal Uncle    • No Known Problems Paternal Uncle        Please note: This report has been generated by a voice recognition software system  Therefore there may be syntax, spelling, and/or grammatical errors  Please call if you have any questions

## 2023-02-10 ENCOUNTER — OFFICE VISIT (OUTPATIENT)
Dept: SURGICAL ONCOLOGY | Facility: CLINIC | Age: 68
End: 2023-02-10

## 2023-02-10 ENCOUNTER — APPOINTMENT (OUTPATIENT)
Dept: LAB | Facility: CLINIC | Age: 68
End: 2023-02-10

## 2023-02-10 VITALS
HEART RATE: 83 BPM | SYSTOLIC BLOOD PRESSURE: 156 MMHG | BODY MASS INDEX: 30.36 KG/M2 | OXYGEN SATURATION: 97 % | HEIGHT: 62 IN | TEMPERATURE: 97.5 F | DIASTOLIC BLOOD PRESSURE: 92 MMHG | WEIGHT: 165 LBS

## 2023-02-10 DIAGNOSIS — Z15.01 BRCA2 POSITIVE: ICD-10-CM

## 2023-02-10 DIAGNOSIS — Z08 ENCOUNTER FOR FOLLOW-UP SURVEILLANCE OF AMPULLA OF VATER CANCER: Primary | ICD-10-CM

## 2023-02-10 DIAGNOSIS — Z91.89 INCREASED RISK OF BREAST CANCER: ICD-10-CM

## 2023-02-10 DIAGNOSIS — E04.2 MULTIPLE THYROID NODULES: ICD-10-CM

## 2023-02-10 DIAGNOSIS — Z85.09 HISTORY OF MALIGNANT NEOPLASM OF AMPULLA OF VATER: ICD-10-CM

## 2023-02-10 DIAGNOSIS — Z15.09 BRCA2 POSITIVE: ICD-10-CM

## 2023-02-10 DIAGNOSIS — Z85.09 ENCOUNTER FOR FOLLOW-UP SURVEILLANCE OF AMPULLA OF VATER CANCER: Primary | ICD-10-CM

## 2023-02-10 PROBLEM — C24.1 PRIMARY ADENOCARCINOMA OF AMPULLA OF VATER (HCC): Status: ACTIVE | Noted: 2021-03-15

## 2023-02-10 NOTE — PROGRESS NOTES
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    History of malignant neoplasm of ampulla of Vater  -     Cancer antigen 19-9; Future  -     Cancer antigen 19-9; Future  -     BUN; Future  -     Creatinine, serum; Future  -     CT chest abdomen pelvis w contrast; Future    BRCA2 positive  -     MRI breast bilateral w and wo contrast w cad; Future    Multiple thyroid nodules  -     US thyroid; Future    Increased risk of breast cancer  -     MRI breast bilateral w and wo contrast w cad; Future        Chief Complaint   Patient presents with   • Follow-up       Return in about 6 months (around 8/10/2023) for Office Visit, Labs - See Treatment Plan, Imaging - See orders  Oncology History   History of malignant neoplasm of ampulla of Vater   3/15/2021 Initial Diagnosis    Neoplasm of ampulla of Vater     4/26/2021 Surgery    B  Celiac lymph node:     - Single lymph node; negative for malignancy (0/1)  C   Whipple resection:     - Invasive poorly differentiated mucinous adenocarcinoma  - Tumor arises in the background of an adenoma with high grade dysplasia  - Lymphatic and venous channel invasion by tumor present  - Metastatic carcinoma present in one of twenty lymph nodes (1/20)  - All margins are negative for tumor       6/1/2021 -  Cancer Staged    Staging form: Ampulla of Vater, AJCC 8th Edition  - Pathologic: Stage IIIA (pT3a, pN1, cM0) - Signed by Rodrigo Brody MD on 6/1/2021  Histologic grade (G): G3  Histologic grading system: 3 grade system  Residual tumor (R): R0 - None       6/9/2021 - 10/1/2021 Chemotherapy    Cycles 1-6  6/2021 through 8/20/21:  FOLFOX    Cycles 7 -8:  9/15/2021- 10/12/2021  Leucovorin 400mg/m2, IV, Day 1  5-Fu 400 mg/m2, IV , Day 1   5-Fu 1200 mg/m2, IV, CI x 46 hours  Cycle length = 2 weeks    palonosetron (ALOXI), 0 25 mg, Intravenous, Once, 5 of 5 cycles  Administration: 0 25 mg (7/21/2021), 0 25 mg (8/4/2021), 0 25 mg (8/18/2021), 0 25 mg (9/15/2021), 0 25 mg (9/29/2021)  fluorouracil (ADRUCIL), 400 mg/m2 = 605 mg, Intravenous, Once, 8 of 8 cycles  Dose modification: 340 mg/m2 (85 % of original dose 400 mg/m2, Cycle 6, Reason: Dose Not Tolerated)  Administration: 605 mg (6/9/2021), 605 mg (6/23/2021), 605 mg (7/7/2021), 605 mg (7/21/2021), 605 mg (8/4/2021), 515 mg (8/18/2021), 610 mg (9/15/2021), 610 mg (9/29/2021)  fosaprepitant (EMEND) IVPB, 150 mg, Intravenous, Once, 6 of 6 cycles  Administration: 150 mg (7/7/2021), 150 mg (7/21/2021), 150 mg (8/4/2021), 150 mg (8/18/2021), 150 mg (9/15/2021), 150 mg (9/29/2021)  leucovorin calcium IVPB, 604 mg, Intravenous, Once, 8 of 8 cycles  Dose modification: 340 mg/m2 (85 % of original dose 400 mg/m2, Cycle 6, Reason: Dose Not Tolerated)  Administration: 600 mg (6/9/2021), 600 mg (6/23/2021), 600 mg (7/7/2021), 600 mg (7/21/2021), 600 mg (8/4/2021), 517 mg (8/18/2021), 600 mg (9/15/2021), 600 mg (9/29/2021)  oxaliplatin (ELOXATIN) chemo infusion, 85 mg/m2 = 128 35 mg, Intravenous, Once, 6 of 6 cycles  Dose modification: 72 25 mg/m2 (85 % of original dose 85 mg/m2, Cycle 6, Reason: Dose Not Tolerated)  Administration: 128 35 mg (6/9/2021), 128 35 mg (6/23/2021), 128 35 mg (7/7/2021), 128 35 mg (7/21/2021), 128 35 mg (8/4/2021), 109 82 mg (8/18/2021)  fluorouracil (ADRUCIL) ambulatory infusion Soln, 1,200 mg/m2/day = 3,625 mg, Intravenous, Over 46 hours, 8 of 8 cycles     10/1/2021 Genetic Testing    Results revealed patient has the following mutation(s):  Positive for a BRCA2 pathogenic variant      10/26/2021 -  Radiation         10/26/2021 -  Chemotherapy    Xeloda 825 mg/m2 BID  BSA = 1 59  Calculated to 1300 mg BID with rounding  10/28/2021 - 12/2/2021 Radiation    Treatments:  Course: C1    Plan ID Energy Fractions Dose per Fraction (cGy) Dose Correction (cGy) Total Dose Delivered (cGy) Elapsed Days   Abdomen 6X 25 / 25 195 0 4,875 35      Treatment Dates:  10/28/2021 - 12/2/2021  Staging: [id number] periampullary carcinoma, April 2021  BRCA 2 mutation  10 year TC is 24 10%  Lifetime TC is 37%  Treatment history:  Pancreaticoduodenectomy, April 2021  Adjuvant FOLFOX  Chemo radiation with Xeloda  Right upper pole thyroid biopsy, July 2022: AUS, Spencer 3, Afirma benign  Left mid pole thyroid biopsy, July 22: Spencer 2  Current treatment:  Observation  Disease status: SEDA    History of Present Illness: The patient returns to the office today in follow-up for her history of ampullary cancer  She is currently SEDA at almost 2 years  She is also being followed closely for increased risk of breast cancer due to a BRCA2 mutation as well as thyroid nodules  She denies any new SOB, cough, abdominal pain, weight loss or appetite changes  She does notice more body aches since undergoing chemo and radiation, but denies any new focal pain  She denies any changes on self-breast exam or new lumps in her neck or underarms  She continues to see her gynecologist yearly  CT of the chest, abdomen and pelvis was performed on February 2, 2023  Screening mammogram was performed 3 months ago, and she will be due for breast MRI in May  Also, she will be due for thyroid US this summer  I have reviewed all of her results and discussed them with her today  Review of Systems   Constitutional: Negative for activity change, appetite change, fatigue and unexpected weight change  HENT: Negative  Respiratory: Negative  Negative for cough and shortness of breath  Cardiovascular: Negative  Gastrointestinal: Negative    Negative for abdominal distention, abdominal pain, diarrhea, nausea and vomiting  Musculoskeletal: Positive for arthralgias  Right groin hernia   Skin: Negative  Negative for color change  Neurological: Negative  Negative for dizziness, light-headedness and headaches  Hematological: Negative  Negative for adenopathy  Psychiatric/Behavioral: Negative  Patient Active Problem List   Diagnosis   • Elevated LFTs   • Dilated bile duct   • History of malignant neoplasm of ampulla of Vater   • Mild protein-calorie malnutrition ([facility] [address] )   • Breast mass   • Multiple thyroid nodules   • Port-A-Cath in place   • BRCA2 positive   • 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   • Increased risk of breast cancer     Past Medical History:   Diagnosis Date   • BRCA1 positive    • BRCA2 positive    • Cancer ([facility] )     pancreatic   • History of chemotherapy     pancreatic cancer   • History of radiation therapy    • Pancreatic carcinoma ([facility] )      Past Surgical History:   Procedure Laterality Date   • ABLATION SOFT TISSUE N/A 4/26/2021    Procedure: INTRAOPERATIVE ULTRASOUND, ABLATION,SOFT TISSUE PANCREAS;  Surgeon: Nova Cuello MD;  [facility];  Service: Surgical Oncology   • CHOLECYSTECTOMY N/A 4/26/2021    Procedure: CHOLECYSTECTOMY;  Surgeon: Nova Cuello MD;  [facility];  Service: Surgical Oncology   • FL GUIDED CENTRAL VENOUS ACCESS DEVICE INSERTION  6/2/2021   • HYSTERECTOMY     • LAPAROTOMY N/A 4/26/2021    Procedure: LAPAROTOMY EXPLORATORY;  Surgeon: Nova Cuello MD;  [facility];  Service: Surgical Oncology   • OOPHORECTOMY     • SINUS SURGERY     • TUNNELED VENOUS PORT PLACEMENT Left 6/2/2021    Procedure: INSERTION VENOUS PORT (PORT-A-CATH); Surgeon: Nova Cuello MD;  [facility];  Service: Surgical Oncology   • US GUIDED THYROID BIOPSY  7/13/2022   • WHIPPLE PROCEDURE/PANCREATICO-DUODENECTOMY N/A 4/26/2021    Procedure:  WHIPPLE PROCEDURE/PANCREATICO-DUODENECTOMY; PYLORIC PRESERVING;  Surgeon: Sherry Carver MD;  [facility];  Service: Surgical Oncology     Family History   Problem Relation Age of Onset   • Ulcerative colitis Mother    • Prostate cancer Father    • Melanoma Sister    • Heart disease Brother    • Prostate cancer Brother    • No Known Problems Brother    • No Known Problems Maternal Uncle    • No Known Problems Paternal Uncle      Social History     Socioeconomic History   • Marital status:      Spouse name: Not on file   • Number of children: 3   • Years of education: Not on file   • Highest education level: Not on file   Occupational History   • 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   Tobacco Use   • Smoking status: Some Days     Packs/day: 0 50     Types: Cigarettes     Start date: 65     Last attempt to quit: 2021     Years since quittin 8   • Smokeless tobacco: Never   • Tobacco comments:     recently stop smoking , pt stated she smoke occ  1-2 cigg some days 2022  Vaping Use   • Vaping Use: Never used   Substance and Sexual Activity   • Alcohol use: Never   • Drug use: Never   • Sexual activity: Not Currently   Other Topics Concern   • Not on file   Social History Narrative   • Not on file     Social Determinants of Health     Financial Resource Strain: Medium Risk   • Difficulty of Paying Living Expenses: Somewhat hard   Food Insecurity: No Food Insecurity   • Worried About Running Out of Food in the Last Year: Never true   • Ran Out of Food in the Last Year: Never true   Transportation Needs: No Transportation Needs   • Lack of Transportation (Medical): No   • Lack of Transportation (Non-Medical):  No   Physical Activity: Insufficiently Active   • Days of Exercise per Week: 4 days   • Minutes of Exercise per Session: 20 min   Stress: No Stress Concern Present   • Feeling of Stress : Not at all   Social Connections: Socially Isolated   • Frequency of Communication with Friends and Family: More than three times a week   • Frequency of Social Gatherings with Friends and Family: More than three times a week   • Attends Jew Services: Never   • Active Member of Clubs or Organizations: No   • Attends Club or Organization Meetings: Never   • Marital Status:    Intimate Partner Violence: Not At Risk   • Fear of Current or Ex-Partner: No   • Emotionally Abused: No   • Physically Abused: No   • Sexually Abused: No   Housing Stability: Unknown   • Unable to Pay for Housing in the Last Year: No   • Number of Places Lived in the Last Year: Not on file   • Unstable Housing in the Last Year: No       Current Outpatient Medications:   •  acetaminophen (TYLENOL) 500 mg tablet, Take 1,000 mg by mouth, Disp: , Rfl:   •  Multiple Vitamin (multivitamin) tablet, Take 1 tablet by mouth daily, Disp: , Rfl:   •  benzonatate (TESSALON PERLES) 100 mg capsule, Take 1 capsule (100 mg total) by mouth 3 (three) times a day as needed for cough (Patient not taking: Reported on 2/6/2023), Disp: 20 capsule, Rfl: 0  •  Medical ID Plate MISC, Use once for 1 dose Severely and permanently limited in the ability to walk because of an arthritic, neurological, or orthopedic condition: or cannot walk two hundred feet without stopping to rest and meets requirements for disability license plate, Disp: 1 each, Rfl: 0  Allergies   Allergen Reactions   • Penicillins Rash   • Tetracycline Rash     Vitals:    02/10/23 0951   BP: 156/92   Pulse: 83   Temp: 97 5 °F (36 4 °C)   SpO2: 97%       Physical Exam  Vitals reviewed  Constitutional:       General: She is not in acute distress  Appearance: Normal appearance  She is normal weight  She is not ill-appearing or toxic-appearing  HENT:      Head: Normocephalic and atraumatic  Nose: Nose normal       Mouth/Throat:      Mouth: Mucous membranes are moist    Eyes:      General: No scleral icterus       Extraocular Movements: Extraocular movements intact  Conjunctiva/sclera: Conjunctivae normal       Pupils: Pupils are equal, round, and reactive to light  Neck:      Thyroid: No thyroid mass or thyroid tenderness  Comments: Thyroid is firm and mobile with palpable nodularity, right side larger than left  Cardiovascular:      Rate and Rhythm: Normal rate and regular rhythm  Pulmonary:      Effort: Pulmonary effort is normal       Breath sounds: Normal breath sounds  Abdominal:      General: Abdomen is flat  A surgical scar is present  There is no distension  Palpations: Abdomen is soft  There is no mass  Tenderness: There is no abdominal tenderness  There is no guarding  Musculoskeletal:         General: Normal range of motion  Cervical back: Normal range of motion and neck supple  Lymphadenopathy:      Head:      Right side of head: No submandibular or occipital adenopathy  Left side of head: No submandibular or occipital adenopathy  Cervical: No cervical adenopathy  Upper Body:      Right upper body: No supraclavicular, axillary or pectoral adenopathy  Left upper body: No supraclavicular, axillary or pectoral adenopathy  Skin:     General: Skin is warm and dry  Coloration: Skin is not jaundiced  Neurological:      General: No focal deficit present  Mental Status: She is alert and oriented to person, place, and time  Psychiatric:         Mood and Affect: Mood normal          Behavior: Behavior normal          Thought Content: Thought content normal          Judgment: Judgment normal            Imaging  CT chest abdomen pelvis w contrast    Result Date: 2/6/2023  Narrative: CT CHEST, ABDOMEN AND PELVIS WITH IV CONTRAST INDICATION:   D49 0: Neoplasm of unspecified behavior of digestive system  COMPARISON:  CT chest/abdomen/pelvis dated 7/12/2022  TECHNIQUE: CT examination of the chest, abdomen and pelvis was performed   Axial, sagittal, and coronal 2D reformatted images were created from the source data and submitted for interpretation  Radiation dose length product (DLP) for this visit:  1227 mGy-cm   This examination, like all CT scans performed in the [facility], was performed utilizing techniques to minimize radiation dose exposure, including the use of iterative reconstruction and automated exposure control  IV Contrast:  100 mL of iohexol (OMNIPAQUE) Enteric Contrast: Enteric contrast was administered  FINDINGS: CHEST LUNGS:  Mild centrilobular emphysematous changes  No new or enlarging pulmonary nodules identified  There is no tracheal or endobronchial lesion  PLEURA:  Unremarkable  HEART/GREAT VESSELS: Heart is unremarkable for patient's age  No thoracic aortic aneurysm  MEDIASTINUM AND WYATT:  Unremarkable  CHEST WALL AND LOWER NECK:  Stable 2 cm right thyroid lobe nodule status post prior biopsy  ABDOMEN LIVER/BILIARY TREE:  One or more subcentimeter sharply circumscribed low-density hepatic lesion(s) are noted, too small to accurately characterize, but statistically most likely to represent subcentimeter hepatic cysts  No suspicious solid hepatic lesion is identified  Hepatic contours are within normal limits  No biliary dilatation  Small pneumobilia redemonstrated  GALLBLADDER:  Gallbladder is surgically absent  SPLEEN:  Unremarkable  PANCREAS:  Stable appearance of the pancreas status post resection of the pancreatic head, uncinate process, and neck  Remaining pancreas is unremarkable  ADRENAL GLANDS:  Unremarkable  KIDNEYS/URETERS:  Stable 1 6 cm left renal cyst  One or more sharply circumscribed subcentimeter renal hypodensities are noted  These lesions are too small to accurately characterize, but are statistically most likely to represent benign cortical renal cyst(s)  According to the guidelines published in the [facility] Paper of the ACR Incidental Findings Committee (Radiology 2010), no further workup of these lesions is recommended  No hydronephrosis  STOMACH AND BOWEL:  No evidence for bowel obstruction  Stable postsurgical changes after pancreaticoduodenectomy  and gastrojejunostomy  Colonic diverticulosis without evidence for acute diverticulitis  APPENDIX:  No findings to suggest appendicitis  ABDOMINOPELVIC CAVITY:  No ascites  No pneumoperitoneum  No lymphadenopathy  VESSELS:  Unremarkable for patient's age  PELVIS REPRODUCTIVE ORGANS:  Surgical changes of prior hysterectomy  URINARY BLADDER:  Unremarkable  ABDOMINAL WALL/INGUINAL REGIONS:  Unremarkable  OSSEOUS STRUCTURES:  No acute fracture or destructive osseous lesion  Multilevel degenerative disc disease of the lumbar spine  Impression: No evidence for recurrent or metastatic disease in the chest, abdomen, or pelvis  Uncomplicated colonic diverticulosis  Workstation performed: [id number]     Mammo screening bilateral w 3d & cad  11/14/2022  Narrative & Impression   DIAGNOSIS: BRCA positive; Screening mammogram for high-risk patient      TECHNIQUE:  Digital screening mammography was performed  Computer Aided Detection (CAD) analyzed all applicable images  COMPARISONS: Prior breast imaging dated: 05/20/2022, 11/18/2021, 11/18/2021, and 10/29/2021     RELEVANT HISTORY:   Family Breast Cancer History: No known family history of breast cancer  Family Medical History: No known relevant family medical history  Personal History: No known relevant hormone history  Surgical history includes hysterectomy and oophorectomy  Medical history includes BRCA 1 positive, BRCA 2 positive, and history of chemotherapy      The patient is scheduled in a reminder system for screening mammography      8-10% of cancers will be missed on mammography  Management of a palpable abnormality must be based on clinical grounds  Patients will be notified of their results via letter from our facility   Accredited by [employer] of Radiology and FDA      RISK ASSESSMENT:   5 Year José Miguel: 6 95 %  10 Year Tyrer-Cuzick: 11 8 %  Lifetime Tyrer-Cuzick: 18 57 %     TISSUE DENSITY:   There are scattered areas of fibroglandular density  INDICATION: Cyndee Hughes is a 79 y o  female presenting for screening mammography      FINDINGS:   Bilateral  There are no suspicious masses, grouped microcalcifications or areas of unexplained architectural distortion  The skin and nipple areolar complex are unremarkable  Bilateral ductal ectasia unchanged      IMPRESSION:  No mammographic evidence of malignancy      ASSESSMENT/BI-RADS CATEGORY:  Left: 2 - Benign  Right: 2 - Benign  Overall: 2 - Benign     RECOMMENDATION:       - Routine screening mammogram in 1 year for both breasts  I reviewed the above imaging data  Discussion/Summary: This is a very pleasant 80 y/o female who presents today for ampullary cancer surveillance, as well as management of thyroid nodules and high-risk breast cancer screening  At this time, there is no evidence of disease on imaging  She has not had a recent Ca 19-9 level drawn, so I have asked her to do this at her earliest convenience  Assuming that is normal, we will see her again in 6 months with repeat CT and labs  I will also order her breast MRI and thyroid US to be performed within the next 2-4 months  I will call her if any of these studies are abnormal     She declined breast exam at today's visit  I have asked her to call if she notices any changes on self-exam   She is agreeable to the plan, all questions have been answered

## 2023-02-11 LAB — CANCER AG19-9 SERPL-ACNC: <2 U/ML (ref 0–35)

## 2023-02-17 ENCOUNTER — CLINICAL SUPPORT (OUTPATIENT)
Dept: RADIATION ONCOLOGY | Facility: HOSPITAL | Age: 68
End: 2023-02-17
Attending: STUDENT IN AN ORGANIZED HEALTH CARE EDUCATION/TRAINING PROGRAM

## 2023-02-17 DIAGNOSIS — Z85.09 HISTORY OF MALIGNANT NEOPLASM OF AMPULLA OF VATER: Primary | ICD-10-CM

## 2023-04-07 ENCOUNTER — OFFICE VISIT (OUTPATIENT)
Dept: FAMILY MEDICINE CLINIC | Facility: CLINIC | Age: 68
End: 2023-04-07

## 2023-04-07 VITALS
DIASTOLIC BLOOD PRESSURE: 80 MMHG | SYSTOLIC BLOOD PRESSURE: 140 MMHG | WEIGHT: 167 LBS | OXYGEN SATURATION: 96 % | HEART RATE: 85 BPM | BODY MASS INDEX: 30.73 KG/M2 | RESPIRATION RATE: 18 BRPM | TEMPERATURE: 96.1 F | HEIGHT: 62 IN

## 2023-04-07 DIAGNOSIS — L58.9 RADIATION DERMATITIS: Primary | ICD-10-CM

## 2023-04-07 NOTE — PROGRESS NOTES
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  · Avoid use of hydrogen peroxide given this can worsen skin rashes  · Advised to continue cleaning with soap and water  · Will prescribe triamcinolone to be applied twice a day for 7 days, patient advised to avoid prolonged continuous use over 10 days since this can lead to skin atrophy  · RTO if no improvement or worsening   - triamcinolone (KENALOG) 0 1 % ointment; Apply topically 2 (two) times a day for 7 days Avoid prolonged continuous use (>10 days)  Dispense: 80 g; Refill: 0         Future Appointments   Date Time Provider Yudith Ware   4/7/2023 11:40 AM Ida Sosa MD [facility]   5/22/2023  9:00 AM [place] [address]   6/8/2023  9:30 AM Ariana Crockett PA-C [facility]   8/3/2023  9:00 AM [place] CT 2 [facility]   8/3/2023  9:30 AM [place] US 1 [place] [address]   8/8/2023 11:00 AM Axel Walsh PA-C [facility]   8/10/2023  9:00 AM Arcelia ANDRES Hernández SURG ONC [facility]   11/15/2023  3:00 PM [place] MAMMO 2 [facility]  CC: Rash (Rash on her belly for 4 wks it is on / off also itchy some time not changing in size  No new food or drug allergies  )      HPI:  Johana Licea is a 79 y o  female who presents for    Finished chemotherapy for ampulla of vater cancer  Also had radiation therapy in the same area back in December  Rash  This is a new problem  Episode onset: a month ago  The problem is unchanged (resolved for a week then returned  no changes in size)  The affected locations include the abdomen  The rash is characterized by redness (no pain or itching)  She was exposed to nothing  Pertinent negatives include no fever, itching or shortness of breath   (No chest pain) Treatments tried: hydrogen peroxide, hydrocortisone  The treatment provided no relief  Review of Systems   Constitutional: Negative for fever  Respiratory: Negative for shortness of breath  Skin: Positive for rash  Negative for itching  Historical Information   The patient history was reviewed as follows:  Past Medical History:   Diagnosis Date   • BRCA1 positive    • BRCA2 positive    • Cancer ([facility] )     pancreatic   • History of chemotherapy     pancreatic cancer   • History of radiation therapy    • Pancreatic carcinoma ([facility] )          Past Surgical History:   Procedure Laterality Date   • ABLATION SOFT TISSUE N/A 4/26/2021    Procedure: INTRAOPERATIVE ULTRASOUND, ABLATION,SOFT TISSUE PANCREAS;  Surgeon: Louise Cox MD;  [facility];  Service: Surgical Oncology   • CHOLECYSTECTOMY N/A 4/26/2021    Procedure: CHOLECYSTECTOMY;  Surgeon: Louise Cox MD;  [facility];  Service: Surgical Oncology   • FL GUIDED CENTRAL VENOUS ACCESS DEVICE INSERTION  6/2/2021   • HYSTERECTOMY     • LAPAROTOMY N/A 4/26/2021    Procedure: LAPAROTOMY EXPLORATORY;  Surgeon: Louise Cox MD;  [facility];  Service: Surgical Oncology   • OOPHORECTOMY     • SINUS SURGERY     • TUNNELED VENOUS PORT PLACEMENT Left 6/2/2021    Procedure: INSERTION VENOUS PORT (PORT-A-CATH); Surgeon: Louise Cox MD;  [facility];  Service: Surgical Oncology   • US GUIDED THYROID BIOPSY  7/13/2022   • WHIPPLE PROCEDURE/PANCREATICO-DUODENECTOMY N/A 4/26/2021    Procedure:  WHIPPLE PROCEDURE/PANCREATICO-DUODENECTOMY; PYLORIC PRESERVING;  Surgeon: Louise Cox MD;  [facility];  Service: Surgical Oncology     Family History   Problem Relation Age of Onset   • Ulcerative colitis Mother    • Prostate cancer Father    • Melanoma Sister    • Heart disease Brother    • Prostate cancer Brother    • No Known Problems Brother    • No Known Problems Maternal Uncle    • No Known Problems Paternal Uncle "     Social History   Social History     Substance and Sexual Activity   Alcohol Use Never     Social History     Substance and Sexual Activity   Drug Use Never     Social History     Tobacco Use   Smoking Status Some Days   • Packs/day: 0 50   • Types: Cigarettes   • Start date:    • Last attempt to quit: 2021   • Years since quittin 0   Smokeless Tobacco Never   Tobacco Comments    recently stop smoking , pt stated she smoke occ  1-2 cigg some days 2022  Medications:     Current Outpatient Medications:   •  Multiple Vitamin (multivitamin) tablet, Take 1 tablet by mouth daily, Disp: , Rfl:   •  acetaminophen (TYLENOL) 500 mg tablet, Take 1,000 mg by mouth (Patient not taking: Reported on 2023), Disp: , Rfl:   •  benzonatate (TESSALON PERLES) 100 mg capsule, Take 1 capsule (100 mg total) by mouth 3 (three) times a day as needed for cough (Patient not taking: Reported on 2023), Disp: 20 capsule, Rfl: 0  •  Medical ID Plate MISC, Use once for 1 dose Severely and permanently limited in the ability to walk because of an arthritic, neurological, or orthopedic condition: or cannot walk two hundred feet without stopping to rest and meets requirements for disability license plate, Disp: 1 each, Rfl: 0    Allergies   Allergen Reactions   • Penicillins Rash   • Tetracycline Rash       OBJECTIVE  Vitals:   Vitals:    23 1134   BP: 140/80   BP Location: Right arm   Patient Position: Sitting   Cuff Size: Standard   Pulse: 85   Resp: 18   Temp: (!) 96 1 °F (35 6 °C)   TempSrc: Tympanic Core   SpO2: 96%   Weight: 75 8 kg (167 lb)   Height: 5' 2\" (1 575 m)         Physical Exam  Vitals reviewed  Constitutional:       General: She is awake  She is not in acute distress  Pulmonary:      Effort: No respiratory distress  Musculoskeletal:      Cervical back: Neck supple  Skin:     Comments: Erythematous hyperpigmented patch on abdomen with some papular \"satelite\" lesions  See attached photo   " Neurological:      Mental Status: She is alert and easily aroused  Psychiatric:         Behavior: Behavior is cooperative                  Hayden Paz, [facility]   4/7/2023

## 2023-05-05 ENCOUNTER — APPOINTMENT (OUTPATIENT)
Dept: LAB | Facility: CLINIC | Age: 68
End: 2023-05-05

## 2023-05-05 DIAGNOSIS — Z92.21 HISTORY OF CHEMOTHERAPY: ICD-10-CM

## 2023-05-05 DIAGNOSIS — Z15.09 BRCA POSITIVE: ICD-10-CM

## 2023-05-05 DIAGNOSIS — Z15.01 BRCA POSITIVE: ICD-10-CM

## 2023-05-05 DIAGNOSIS — D49.0 NEOPLASM OF AMPULLA OF VATER: ICD-10-CM

## 2023-05-05 LAB
BASOPHILS # BLD AUTO: 0.06 THOUSANDS/ÂΜL (ref 0–0.1)
BASOPHILS NFR BLD AUTO: 1 % (ref 0–1)
EOSINOPHIL # BLD AUTO: 0.12 THOUSAND/ÂΜL (ref 0–0.61)
EOSINOPHIL NFR BLD AUTO: 2 % (ref 0–6)
ERYTHROCYTE [DISTWIDTH] IN BLOOD BY AUTOMATED COUNT: 12.9 % (ref 11.6–15.1)
HCT VFR BLD AUTO: 43.9 % (ref 34.8–46.1)
HGB BLD-MCNC: 14 G/DL (ref 11.5–15.4)
IMM GRANULOCYTES # BLD AUTO: 0.03 THOUSAND/UL (ref 0–0.2)
IMM GRANULOCYTES NFR BLD AUTO: 0 % (ref 0–2)
LYMPHOCYTES # BLD AUTO: 1.73 THOUSANDS/ÂΜL (ref 0.6–4.47)
LYMPHOCYTES NFR BLD AUTO: 22 % (ref 14–44)
MCH RBC QN AUTO: 30 PG (ref 26.8–34.3)
MCHC RBC AUTO-ENTMCNC: 31.9 G/DL (ref 31.4–37.4)
MCV RBC AUTO: 94 FL (ref 82–98)
MONOCYTES # BLD AUTO: 0.55 THOUSAND/ÂΜL (ref 0.17–1.22)
MONOCYTES NFR BLD AUTO: 7 % (ref 4–12)
NEUTROPHILS # BLD AUTO: 5.23 THOUSANDS/ÂΜL (ref 1.85–7.62)
NEUTS SEG NFR BLD AUTO: 68 % (ref 43–75)
NRBC BLD AUTO-RTO: 0 /100 WBCS
PLATELET # BLD AUTO: 341 THOUSANDS/UL (ref 149–390)
PMV BLD AUTO: 10.1 FL (ref 8.9–12.7)
RBC # BLD AUTO: 4.66 MILLION/UL (ref 3.81–5.12)
WBC # BLD AUTO: 7.72 THOUSAND/UL (ref 4.31–10.16)

## 2023-05-06 LAB
ALBUMIN SERPL BCP-MCNC: 3.7 G/DL (ref 3.5–5)
ALP SERPL-CCNC: 127 U/L (ref 46–116)
ALT SERPL W P-5'-P-CCNC: 36 U/L (ref 12–78)
ANION GAP SERPL CALCULATED.3IONS-SCNC: 3 MMOL/L (ref 4–13)
AST SERPL W P-5'-P-CCNC: 26 U/L (ref 5–45)
BILIRUB SERPL-MCNC: 0.36 MG/DL (ref 0.2–1)
BUN SERPL-MCNC: 23 MG/DL (ref 5–25)
CALCIUM SERPL-MCNC: 9.9 MG/DL (ref 8.3–10.1)
CHLORIDE SERPL-SCNC: 103 MMOL/L (ref 96–108)
CO2 SERPL-SCNC: 27 MMOL/L (ref 21–32)
CREAT SERPL-MCNC: 0.67 MG/DL (ref 0.6–1.3)
GFR SERPL CREATININE-BSD FRML MDRD: 91 ML/MIN/1.73SQ M
GLUCOSE P FAST SERPL-MCNC: 87 MG/DL (ref 65–99)
POTASSIUM SERPL-SCNC: 5 MMOL/L (ref 3.5–5.3)
PROT SERPL-MCNC: 7.6 G/DL (ref 6.4–8.4)
SODIUM SERPL-SCNC: 133 MMOL/L (ref 135–147)

## 2023-05-22 ENCOUNTER — HOSPITAL ENCOUNTER (OUTPATIENT)
Dept: RADIOLOGY | Facility: HOSPITAL | Age: 68
Discharge: HOME | End: 2023-05-22

## 2023-05-22 ENCOUNTER — TELEPHONE (OUTPATIENT)
Dept: SURGICAL ONCOLOGY | Facility: CLINIC | Age: 68
End: 2023-05-22

## 2023-05-22 DIAGNOSIS — Z15.09 BRCA2 POSITIVE: ICD-10-CM

## 2023-05-22 DIAGNOSIS — Z91.89 INCREASED RISK OF BREAST CANCER: ICD-10-CM

## 2023-05-22 DIAGNOSIS — Z15.01 BRCA2 POSITIVE: ICD-10-CM

## 2023-05-22 RX ADMIN — GADOBUTROL 7.5 ML: 604.72 INJECTION INTRAVENOUS at 09:06

## 2023-05-22 NOTE — TELEPHONE ENCOUNTER
Called patient with results of breast MRI  Confirmed appts for office exam in August and mammogram in November  Pt thankful for the call

## 2023-06-08 ENCOUNTER — ANNUAL EXAM (OUTPATIENT)
Dept: OBGYN CLINIC | Facility: CLINIC | Age: 68
End: 2023-06-08
Payer: MEDICARE

## 2023-06-08 VITALS — DIASTOLIC BLOOD PRESSURE: 92 MMHG | WEIGHT: 174 LBS | SYSTOLIC BLOOD PRESSURE: 140 MMHG | BODY MASS INDEX: 31.83 KG/M2

## 2023-06-08 DIAGNOSIS — Z01.419 WOMEN'S ANNUAL ROUTINE GYNECOLOGICAL EXAMINATION: Primary | ICD-10-CM

## 2023-06-08 PROCEDURE — G0101 CA SCREEN;PELVIC/BREAST EXAM: HCPCS | Performed by: NURSE PRACTITIONER

## 2023-06-08 NOTE — PROGRESS NOTES
Subjective    HPI:     Malick Barbosa is a 79 y o  female  She is a  3 Para 3, with  x 3  History of hysterectomy with oophorectomy for management of AUB  She is a carrier of BRCA 1 and 2  She is in remission from Pancreatic cancer  Breast MRI 23 - negative  CT of abdomen in February - no evidence of recurrent disease  She is not sexually active  She denies /GI and Gyn complaints  She feels safe at home  She denies depression/anxiety  Medical, surgical and family history reviewed  Her dental care is up-to-date  She eats a healthy diet  She is happy with her weight  Visit Vitals  /92 (BP Location: Right arm, Patient Position: Sitting, Cuff Size: Large)   Wt 78 9 kg (174 lb)   BMI 31 83 kg/m²   OB Status Hysterectomy   Smoking Status Some Days   BSA 1 8 m²       Gynecologic History    No LMP recorded  Patient has had a hysterectomy  History of abnormal pap smears: No  Last mammogram: 22  Results were: normal  Colonoscopy: has not a colonoscopy       Obstetric and Medical History    OB History    Para Term  AB Living   3 3 3     3   SAB IAB Ectopic Multiple Live Births           3      # Outcome Date GA Lbr Juni/2nd Weight Sex Delivery Anes PTL Lv   3 Term      Vag-Spont   DIMA   2 Term      Vag-Spont   DIMA   1 Term      Vag-Spont   DIMA       Past Medical History:   Diagnosis Date   • BRCA1 positive    • BRCA2 positive    • Cancer (HCC)     pancreatic   • History of chemotherapy     pancreatic cancer   • History of radiation therapy    • Pancreatic carcinoma (HCC)        Past Surgical History:   Procedure Laterality Date   • ABLATION SOFT TISSUE N/A 2021    Procedure: INTRAOPERATIVE ULTRASOUND, ABLATION,SOFT TISSUE PANCREAS;  Surgeon: Bettina Bosworth, MD;  [facility];  Service: Surgical Oncology   • CHOLECYSTECTOMY N/A 2021    Procedure: CHOLECYSTECTOMY;  Surgeon: Bettina Bosworth, MD;  [facility];  Service: Surgical Oncology   • FL GUIDED CENTRAL VENOUS ACCESS DEVICE INSERTION  6/2/2021   • HYSTERECTOMY     • LAPAROTOMY N/A 4/26/2021    Procedure: LAPAROTOMY EXPLORATORY;  Surgeon: Bettina Bosworth, MD;  [facility];  Service: Surgical Oncology   • OOPHORECTOMY     • SINUS SURGERY     • TUNNELED VENOUS PORT PLACEMENT Left 6/2/2021    Procedure: INSERTION VENOUS PORT (PORT-A-CATH); Surgeon: Bettina Bosworth, MD;  [facility];  Service: Surgical Oncology   • US GUIDED THYROID BIOPSY  7/13/2022   • WHIPPLE PROCEDURE/PANCREATICO-DUODENECTOMY N/A 4/26/2021    Procedure: WHIPPLE PROCEDURE/PANCREATICO-DUODENECTOMY; PYLORIC PRESERVING;  Surgeon: Bettina Bosworth, MD;  [facility];  Service: Surgical Oncology       The following portions of the patient's history were reviewed and updated as appropriate: allergies, current medications, past family history, past medical history, past social history, past surgical history and problem list     Review of Systems    Pertinent items are noted in HPI  Objective    Physical Exam  Constitutional:       Appearance: Normal appearance  She is well-developed  Genitourinary:      Vulva, bladder and urethral meatus normal       No lesions in the vagina  Right Labia: No rash, tenderness, lesions, skin changes or Bartholin's cyst      Left Labia: No tenderness, lesions, skin changes, Bartholin's cyst or rash  No labial fusion noted  No inguinal adenopathy present in the right or left side  No vaginal discharge, erythema, tenderness, bleeding or granulation tissue  No vaginal prolapse present  Moderate vaginal atrophy present  Right Adnexa: absent  Left Adnexa: absent  Cervix is absent  Uterus is absent  Pelvic exam was performed with patient in the lithotomy position  Breasts:     Breasts are symmetrical       Right: No inverted nipple, mass, nipple discharge, skin change or tenderness        Left: No inverted nipple, mass, nipple discharge, skin change or tenderness  HENT:      Head: Normocephalic and atraumatic  Neck:      Thyroid: No thyromegaly  Cardiovascular:      Rate and Rhythm: Normal rate and regular rhythm  Heart sounds: Normal heart sounds, S1 normal and S2 normal    Pulmonary:      Effort: Pulmonary effort is normal       Breath sounds: Normal breath sounds  Abdominal:      General: Bowel sounds are normal  There is no distension  Palpations: Abdomen is soft  There is no mass  Tenderness: There is no abdominal tenderness  There is no guarding  Hernia: There is no hernia in the left inguinal area or right inguinal area  Musculoskeletal:      Cervical back: Neck supple  Lymphadenopathy:      Cervical: No cervical adenopathy  Upper Body:      Right upper body: No supraclavicular or axillary adenopathy  Left upper body: No supraclavicular or axillary adenopathy  Lower Body: No right inguinal adenopathy  No left inguinal adenopathy  Neurological:      Mental Status: She is alert  Skin:     General: Skin is warm and dry  Findings: No rash  Psychiatric:         Attention and Perception: Attention and perception normal          Mood and Affect: Mood and affect normal          Speech: Speech normal          Behavior: Behavior is cooperative  Thought Content: Thought content normal          Cognition and Memory: Cognition and memory normal          Judgment: Judgment normal    Vitals and nursing note reviewed  Assessment and Plan    Diagnoses and all orders for this visit:    Women's annual routine gynecological examination      Patient informed of a Stable GYN exam  A pap smear was not performed  I have discussed the importance of exercise and healthy diet as well as adequate intake of calcium and vitamin D  The current ASCCP guidelines were reviewed   The low risk patient will receive pap smear screening every 3 years until the age of 34 and then every 3 to 5 years with HPV co-testing from the ages of 33-67  I emphasized the importance of an annual pelvic and breast exam  Her yearly mammogram current  Results will be released to [facility], if abnormal will call to review and discuss treatment plan  All questions have been answered to her satisfaction  Follow up in: 2 years for annual or sooner if needed

## 2023-06-19 ENCOUNTER — TELEPHONE (OUTPATIENT)
Dept: HEMATOLOGY ONCOLOGY | Facility: CLINIC | Age: 68
End: 2023-06-19

## 2023-06-19 NOTE — TELEPHONE ENCOUNTER
Patient Call    Who are you speaking with? Patient    If it is not the patient, are they listed on an active communication consent form? N/A   What is the reason for this call? Patient calling in wanting to know if she can receive a note for jury duty so that she will be excused  Does this require a call back? Yes   If a call back is required, please list [facility] call back number [phone]   If a call back is required, advise that a message will be forwarded to their care team and someone will return their call as soon as possible  Did you relay this information to the patient?  Yes

## 2023-06-23 ENCOUNTER — TELEPHONE (OUTPATIENT)
Dept: HEMATOLOGY ONCOLOGY | Facility: CLINIC | Age: 68
End: 2023-06-23

## 2023-06-27 ENCOUNTER — TELEPHONE (OUTPATIENT)
Dept: HEMATOLOGY ONCOLOGY | Facility: CLINIC | Age: 68
End: 2023-06-27

## 2023-06-27 NOTE — TELEPHONE ENCOUNTER
Spoke to patient  Per chart:  6/23/23 Mailed Cecilia Dodge duty letter to pts address as per request  [address] Assistant  Telephone Encounter      Signed  Encounter Date:  6/23/2023

## 2023-06-27 NOTE — TELEPHONE ENCOUNTER
Patient Call    Who are you speaking with? Patient    If it is not the patient, are they listed on an active communication consent form? N/A   What is the reason for this call? The patient states she was supposed to receive a letter excusing her from jury duty and she has yet to receive it  The patient would like a call back from the office to discuss what is going on with this letter  Does this require a call back? Yes   If a call back is required, please list best call back number [phone]   If a call back is required, advise that a message will be forwarded to their care team and someone will return their call as soon as possible  Did you relay this information to the patient?  Yes

## 2023-07-21 NOTE — PROGRESS NOTES
Time out performed with Mae Ordoñez RN  Pt's FOLFOX will be dose reduced to 85% for tomorrow due to elevated LFT's  5FU CADD dose will remain the same (100%)  2 seconds or less

## 2023-07-24 ENCOUNTER — APPOINTMENT (EMERGENCY)
Dept: RADIOLOGY | Facility: HOSPITAL | Age: 68
End: 2023-07-24
Payer: MEDICARE

## 2023-07-24 ENCOUNTER — HOSPITAL ENCOUNTER (EMERGENCY)
Facility: HOSPITAL | Age: 68
Discharge: HOME | End: 2023-07-24
Attending: EMERGENCY MEDICINE | Admitting: EMERGENCY MEDICINE
Payer: MEDICARE

## 2023-07-24 VITALS
OXYGEN SATURATION: 97 % | RESPIRATION RATE: 20 BRPM | SYSTOLIC BLOOD PRESSURE: 195 MMHG | DIASTOLIC BLOOD PRESSURE: 87 MMHG | HEART RATE: 86 BPM | TEMPERATURE: 98 F

## 2023-07-24 DIAGNOSIS — R07.9 CHEST PAIN: Primary | ICD-10-CM

## 2023-07-24 LAB
2HR DELTA HS TROPONIN: 5 NG/L
4HR DELTA HS TROPONIN: 8 NG/L
ALBUMIN SERPL BCP-MCNC: 4.2 G/DL (ref 3.5–5)
ALP SERPL-CCNC: 117 U/L (ref 34–104)
ALT SERPL W P-5'-P-CCNC: 22 U/L (ref 7–52)
ANION GAP SERPL CALCULATED.3IONS-SCNC: 7 MMOL/L
APTT PPP: 23 SECONDS (ref 23–37)
AST SERPL W P-5'-P-CCNC: 21 U/L (ref 13–39)
ATRIAL RATE: 71 BPM
ATRIAL RATE: 79 BPM
BASOPHILS # BLD AUTO: 0.06 THOUSANDS/ÂΜL (ref 0–0.1)
BASOPHILS NFR BLD AUTO: 1 % (ref 0–1)
BILIRUB SERPL-MCNC: 0.32 MG/DL (ref 0.2–1)
BUN SERPL-MCNC: 24 MG/DL (ref 5–25)
CALCIUM SERPL-MCNC: 9.7 MG/DL (ref 8.4–10.2)
CARDIAC TROPONIN I PNL SERPL HS: 11 NG/L
CARDIAC TROPONIN I PNL SERPL HS: 14 NG/L
CARDIAC TROPONIN I PNL SERPL HS: 6 NG/L
CHLORIDE SERPL-SCNC: 100 MMOL/L (ref 96–108)
CO2 SERPL-SCNC: 29 MMOL/L (ref 21–32)
CREAT SERPL-MCNC: 0.64 MG/DL (ref 0.6–1.3)
EOSINOPHIL # BLD AUTO: 0.07 THOUSAND/ÂΜL (ref 0–0.61)
EOSINOPHIL NFR BLD AUTO: 1 % (ref 0–6)
ERYTHROCYTE [DISTWIDTH] IN BLOOD BY AUTOMATED COUNT: 12.7 % (ref 11.6–15.1)
GFR SERPL CREATININE-BSD FRML MDRD: 92 ML/MIN/1.73SQ M
GLUCOSE SERPL-MCNC: 103 MG/DL (ref 65–140)
HCT VFR BLD AUTO: 42.6 % (ref 34.8–46.1)
HGB BLD-MCNC: 14.2 G/DL (ref 11.5–15.4)
IMM GRANULOCYTES # BLD AUTO: 0.02 THOUSAND/UL (ref 0–0.2)
IMM GRANULOCYTES NFR BLD AUTO: 0 % (ref 0–2)
INR PPP: 0.95 (ref 0.84–1.19)
LYMPHOCYTES # BLD AUTO: 1.44 THOUSANDS/ÂΜL (ref 0.6–4.47)
LYMPHOCYTES NFR BLD AUTO: 19 % (ref 14–44)
MCH RBC QN AUTO: 31.4 PG (ref 26.8–34.3)
MCHC RBC AUTO-ENTMCNC: 33.3 G/DL (ref 31.4–37.4)
MCV RBC AUTO: 94 FL (ref 82–98)
MONOCYTES # BLD AUTO: 0.41 THOUSAND/ÂΜL (ref 0.17–1.22)
MONOCYTES NFR BLD AUTO: 5 % (ref 4–12)
NEUTROPHILS # BLD AUTO: 5.76 THOUSANDS/ÂΜL (ref 1.85–7.62)
NEUTS SEG NFR BLD AUTO: 74 % (ref 43–75)
NRBC BLD AUTO-RTO: 0 /100 WBCS
P AXIS: 71 DEGREES
P AXIS: 78 DEGREES
PLATELET # BLD AUTO: 300 THOUSANDS/UL (ref 149–390)
PMV BLD AUTO: 9.3 FL (ref 8.9–12.7)
POTASSIUM SERPL-SCNC: 4.4 MMOL/L (ref 3.5–5.3)
PR INTERVAL: 180 MS
PR INTERVAL: 184 MS
PROT SERPL-MCNC: 7.4 G/DL (ref 6.4–8.4)
PROTHROMBIN TIME: 12.8 SECONDS (ref 11.6–14.5)
QRS AXIS: -67 DEGREES
QRS AXIS: -72 DEGREES
QRSD INTERVAL: 136 MS
QRSD INTERVAL: 136 MS
QT INTERVAL: 400 MS
QT INTERVAL: 428 MS
QTC INTERVAL: 458 MS
QTC INTERVAL: 465 MS
RBC # BLD AUTO: 4.52 MILLION/UL (ref 3.81–5.12)
SODIUM SERPL-SCNC: 136 MMOL/L (ref 135–147)
T WAVE AXIS: 58 DEGREES
T WAVE AXIS: 76 DEGREES
VENTRICULAR RATE: 71 BPM
VENTRICULAR RATE: 79 BPM
WBC # BLD AUTO: 7.76 THOUSAND/UL (ref 4.31–10.16)

## 2023-07-24 PROCEDURE — 93005 ELECTROCARDIOGRAM TRACING: CPT

## 2023-07-24 PROCEDURE — 93010 ELECTROCARDIOGRAM REPORT: CPT | Performed by: INTERNAL MEDICINE

## 2023-07-24 PROCEDURE — G1004 CDSM NDSC: HCPCS

## 2023-07-24 PROCEDURE — 71045 X-RAY EXAM CHEST 1 VIEW: CPT

## 2023-07-24 PROCEDURE — 36415 COLL VENOUS BLD VENIPUNCTURE: CPT | Performed by: PHYSICIAN ASSISTANT

## 2023-07-24 PROCEDURE — 85610 PROTHROMBIN TIME: CPT | Performed by: PHYSICIAN ASSISTANT

## 2023-07-24 PROCEDURE — 71275 CT ANGIOGRAPHY CHEST: CPT

## 2023-07-24 PROCEDURE — 84484 ASSAY OF TROPONIN QUANT: CPT | Performed by: PHYSICIAN ASSISTANT

## 2023-07-24 PROCEDURE — 80053 COMPREHEN METABOLIC PANEL: CPT | Performed by: PHYSICIAN ASSISTANT

## 2023-07-24 PROCEDURE — 85025 COMPLETE CBC W/AUTO DIFF WBC: CPT | Performed by: PHYSICIAN ASSISTANT

## 2023-07-24 PROCEDURE — 99284 EMERGENCY DEPT VISIT MOD MDM: CPT | Performed by: INTERNAL MEDICINE

## 2023-07-24 PROCEDURE — 85730 THROMBOPLASTIN TIME PARTIAL: CPT | Performed by: PHYSICIAN ASSISTANT

## 2023-07-24 RX ORDER — KETOROLAC TROMETHAMINE 30 MG/ML
15 INJECTION, SOLUTION INTRAMUSCULAR; INTRAVENOUS ONCE
Status: COMPLETED | OUTPATIENT
Start: 2023-07-24 | End: 2023-07-24

## 2023-07-24 RX ADMIN — SODIUM CHLORIDE 1000 ML: 0.9 INJECTION, SOLUTION INTRAVENOUS at 09:35

## 2023-07-24 RX ADMIN — KETOROLAC TROMETHAMINE 15 MG: 30 INJECTION, SOLUTION INTRAMUSCULAR at 11:34

## 2023-07-24 RX ADMIN — IOHEXOL 90 ML: 350 INJECTION, SOLUTION INTRAVENOUS at 10:19

## 2023-07-24 NOTE — CONSULTS
Consultation - Cardiology   [facility]     Lulu Duenas 79 y.o. female MRN: [id number]  : 1955  Unit/Bed#: ED 12 Encounter: [id number]      Assessment & Plan   1. Chest pain. - Presented on 23 for evaluation for right-sided chest pain and right shoulder pain. - Patient reports she started to feel right-sided chest pain and right shoulder pain after being pulled by her dog while walking the dog this morning. She states that her dog is quite large and that the dog pulled suddenly on her right arm and she felt chest pain. She states that chest pain radiated to her right shoulder and right shoulder pain was exacerbated with movement. She currently denies experiencing any chest pain. States that her pain has resolved since presentation. She denies chest pain was associated with shortness of breath, palpitations, lightheadedness, dizziness, nausea, vomiting or diaphoresis. - 23 hs troponin: 6 (0hrs), 11 (2hrs), 14 (4hrs). - 23 EKG: Normal sinus rhythm, rate 79 bpm. Possible Left atrial enlargement. Left axis deviation. Left bundle branch block. When compared with ECG of 2021 08:39, Left bundle branch block is now Present. - 21 EKG: Normal sinus rhythm, 69 bpm.  Left anterior fascicular block. Nonspecific ST abnormality.  - Low clinical suspicion chest pain secondary to cardiac etiology. Pain likely caused by muscle sprain due to being pulled by her dog.  - No further Cardiology work-up at this time. 2. Pancreatic cancer.    - s/p Whipple and s/p chemoradiation.  - Follows outpatient with Dr. Kimberly Castelan of Hematology/Oncology [facility]. Summary of Recommendations: Thank you for your consultation. Physician Requesting Consult: Mandeep Huertas MD    Reason for Consult / Principal Problem: Chest pain, right shoulder pain.     Consults    HPI: Lulu Duenas is a 79y.o. year old female with PMHx of pancreatic cancer s/p Lili and s/p chemoradiation who presented on 7/24/23 for evaluation for right-sided chest pain and right shoulder pain. Patient reports she started to feel right-sided chest pain and right shoulder pain after being pulled by her dog while walking the dog this morning. She states that her dog is quite large and that the dog pulled suddenly on her right arm and she felt chest pain. She states that chest pain radiated to her right shoulder and right shoulder pain was exacerbated with movement. She currently denies experiencing any chest pain. States that her pain has resolved since presentation. She denies chest pain was associated with shortness of breath, palpitations, lightheadedness, dizziness, nausea, vomiting or diaphoresis. 7/24/23 hs troponin: 6 (0hrs), 11 (2hrs), 14 (4hrs). 7/24/23 EKG: Normal sinus rhythm, rate 79 bpm; possible Left atrial enlargement; left axis deviation; left bundle branch block; when compared with ECG of 18-FEB-2021 08:39, left bundle branch block is now present. 2/18/21 EKG: Normal sinus rhythm, 69 bpm; left anterior fascicular block; nonspecific ST abnormality. Review of Systems   Constitutional: Negative for activity change, chills, diaphoresis, fatigue and unexpected weight change. Respiratory: Positive for chest tightness. Negative for cough, shortness of breath and wheezing. Cardiovascular: Positive for chest pain. Gastrointestinal: Negative for abdominal distention, abdominal pain, constipation, diarrhea, nausea and vomiting. Musculoskeletal: Positive for myalgias. Right shoulder pain. Skin: Negative. Neurological: Negative for dizziness, syncope, weakness, light-headedness, numbness and headaches.        Historical Information   Past Medical History:   Diagnosis Date   • BRCA1 positive    • BRCA2 positive    • Cancer ([address])     pancreatic   • History of chemotherapy     pancreatic cancer   • History of radiation therapy    • Pancreatic carcinoma [facility])      Past Surgical History:   Procedure Laterality Date   • ABLATION SOFT TISSUE N/A 2021    Procedure: INTRAOPERATIVE ULTRASOUND, ABLATION,SOFT TISSUE PANCREAS;  Surgeon: Luz Pérez MD;  Location: [facility];  Service: Surgical Oncology   • CHOLECYSTECTOMY N/A 2021    Procedure: CHOLECYSTECTOMY;  Surgeon: Luz Pérez MD;  Location: [facility];  Service: Surgical Oncology   • FL GUIDED CENTRAL VENOUS ACCESS DEVICE INSERTION  2021   • HYSTERECTOMY     • LAPAROTOMY N/A 2021    Procedure: LAPAROTOMY EXPLORATORY;  Surgeon: Luz Pérez MD;  Location: [facility];  Service: Surgical Oncology   • OOPHORECTOMY     • SINUS SURGERY     • TUNNELED VENOUS PORT PLACEMENT Left 2021    Procedure: INSERTION VENOUS PORT (PORT-A-CATH); Surgeon: Luz Pérez MD;  Location: [facility];  Service: Surgical Oncology   • US GUIDED THYROID BIOPSY  2022   • WHIPPLE PROCEDURE/PANCREATICO-DUODENECTOMY N/A 2021    Procedure: WHIPPLE PROCEDURE/PANCREATICO-DUODENECTOMY; PYLORIC PRESERVING;  Surgeon: Luz Pérez MD;  Location: [facility];  Service: Surgical Oncology     Social History     Substance and Sexual Activity   Alcohol Use Never     Social History     Substance and Sexual Activity   Drug Use Never     Social History     Tobacco Use   Smoking Status Some Days   • Packs/day: 0.50   • Types: Cigarettes   • Start date:    • Last attempt to quit: 2021   • Years since quittin.3   Smokeless Tobacco Never   Tobacco Comments    recently stop smoking , pt stated she smoke occ. 1-2 cigg some days 2022.      Family History:   Family History   Problem Relation Age of Onset   • Ulcerative colitis Mother    • Prostate cancer Father    • Melanoma Sister    • Heart disease Brother    • Prostate cancer Brother    • No Known Problems Brother    • No Known Problems Maternal Uncle    • No Known Problems Paternal Uncle        Meds/Allergies    PTA meds:  (Not in a hospital admission) Allergies   Allergen Reactions   • Penicillins Rash   • Tetracycline Rash     No current facility-administered medications for this encounter. Current Outpatient Medications:   •  acetaminophen (TYLENOL) 500 mg tablet, Take 1,000 mg by mouth (Patient not taking: Reported on 2/17/2023), Disp: , Rfl:   •  benzonatate (TESSALON PERLES) 100 mg capsule, Take 1 capsule (100 mg total) by mouth 3 (three) times a day as needed for cough (Patient not taking: Reported on 2/6/2023), Disp: 20 capsule, Rfl: 0  •  Medical ID Plate MISC, Use once for 1 dose Severely and permanently limited in the ability to walk because of an arthritic, neurological, or orthopedic condition: or cannot walk two hundred feet without stopping to rest and meets requirements for disability license plate, Disp: 1 each, Rfl: 0  •  Multiple Vitamin (multivitamin) tablet, Take 1 tablet by mouth daily, Disp: , Rfl:   •  triamcinolone (KENALOG) 0.1 % ointment, Apply topically 2 (two) times a day for 7 days Avoid prolonged continuous use (>10 days), Disp: 80 g, Rfl: 0    VTE Pharmacologic Prophylaxis: none. Objective:   Vitals: Blood pressure (!) 175/81, pulse 81, temperature 98 °F (36.7 °C), temperature source Oral, resp. rate (!) 24, SpO2 93 %. There is no height or weight on file to calculate BMI.   Wt Readings from Last 3 Encounters:   06/08/23 78.9 kg (174 lb)   04/07/23 75.8 kg (167 lb)   02/17/23 73.5 kg (162 lb)     BP Readings from Last 3 Encounters:   07/24/23 (!) 175/81   06/08/23 140/92   04/07/23 140/80     Orthostatic Blood Pressures    Flowsheet Row Most Recent Value   Blood Pressure 175/81 filed at 07/24/2023 1000   Patient Position - Orthostatic VS Sitting filed at 07/24/2023 4264          Intake/Output Summary (Last 24 hours) at 7/24/2023 1406  Last data filed at 7/24/2023 1035  Gross per 24 hour   Intake 1000 ml   Output --   Net 1000 ml       Invasive Devices     Central Venous Catheter Line  Duration           Port A Cath 06/02/21 Left Chest 782 days          Peripheral Intravenous Line  Duration           Peripheral IV 07/24/23 Left Antecubital <1 day          Drain  Duration           Closed/Suction Drain Right Abdomen Bulb 19 Fr. 818 days                Physical Exam:   Physical Exam  Vitals reviewed. Constitutional:       General: She is not in acute distress. Cardiovascular:      Rate and Rhythm: Normal rate and regular rhythm. Pulses: Normal pulses. Heart sounds: Murmur heard. Pulmonary:      Effort: Pulmonary effort is normal. No respiratory distress. Abdominal:      General: Abdomen is flat. There is no distension. Palpations: Abdomen is soft. Tenderness: There is no abdominal tenderness. Musculoskeletal:      Right lower leg: No edema. Left lower leg: No edema. Skin:     General: Skin is warm and dry. Neurological:      Mental Status: She is alert and oriented to person, place, and time.        Labs:   Troponins:  Results from last 7 days   Lab Units 07/24/23  1314 07/24/23  1138   HSTNI D2 ng/L  --  5   HSTNI D4 ng/L 8  --        CBC with diff:   Results from last 7 days   Lab Units 07/24/23  0933   WBC Thousand/uL 7.76   HEMOGLOBIN g/dL 14.2   HEMATOCRIT % 42.6   MCV fL 94   PLATELETS Thousands/uL 300   RBC Million/uL 4.52   MCH pg 31.4   MCHC g/dL 33.3   RDW % 12.7   MPV fL 9.3   NRBC AUTO /100 WBCs 0       CMP:   Results from last 7 days   Lab Units 07/24/23  0933   SODIUM mmol/L 136   POTASSIUM mmol/L 4.4   CHLORIDE mmol/L 100   CO2 mmol/L 29   ANION GAP mmol/L 7   BUN mg/dL 24   CREATININE mg/dL 0.64   CALCIUM mg/dL 9.7   AST U/L 21   ALT U/L 22   ALK PHOS U/L 117*   TOTAL PROTEIN g/dL 7.4   ALBUMIN g/dL 4.2   TOTAL BILIRUBIN mg/dL 0.32   EGFR ml/min/1.73sq m 92   GLUCOSE RANDOM mg/dL 103       Magnesium:     Coags:   Results from last 7 days   Lab Units 07/24/23  0933   PTT seconds 23   INR  0.95     Imaging & Testing     Cardiac testing:   No results found for this or any previous visit.      Imaging: I have personally reviewed pertinent reports. XR chest 1 view portable    Result Date: 7/24/2023    Impression: No acute cardiopulmonary disease. CTA ED chest PE Study    Result Date: 7/24/2023     Impression: No pulmonary embolus. No acute pulmonary disease. EKG/ Monitor: Personally reviewed. 7/24/23 EKG: Normal sinus rhythm, rate 79 bpm. Possible Left atrial enlargement. Left axis deviation. Left bundle branch block. When compared with ECG of 18-FEB-2021 08:39, Left bundle branch block is now Present. 2/18/21 EKG: Normal sinus rhythm, 69 bpm.  Left anterior fascicular block. Nonspecific ST abnormality.          Code Status: Prior  Advance Directive and Living Will:      POLST:        Anthony Ang PA-C

## 2023-07-24 NOTE — ED PROVIDER NOTES
History  Chief Complaint   Patient presents with   • Breast Pain     Patient reporting pain over right breast going into right shoulder started this morning. Paint worse when using the arm and to the touch. 15-year-old female, history of pancreatic cancer in remission, presenting today with right upper chest discomfort and breast pain that began this morning upon waking up. States that it is tender in this and area worsens with right arm movement and when leaning forward. Does not have any pain with deep inhalation. No shortness of breath or diaphoresis. No recent illnesses. Has not had any falls or trauma. Has had a recent MRI of the breast on 5/22/2023 which was unremarkable. Patient relays that pain is significant. Seems to be out of proportion to physical exam.  Patient mentions that she could have possibly been pulled by her dog however did not have any type of immediate pain after such potential injury, she states that she woke up with the pain prior to walking her dog- will occasionally change history. Patient minimizing certain complaints throughout stay stating that she would like to be discharged however she was complaining of severe pain initially upon arrival. Denies nausea, vomiting, weight loss, night sweats, abdominal pain, flank pain, shortness of breath, wheezing, calf pain or swelling, fevers. Differential includes but is not limited to muscle strain, mass, PE, ACS. Prior to Admission Medications   Prescriptions Last Dose Informant Patient Reported? Taking?    Medical ID Plate MISC  Self No No   Sig: Use once for 1 dose Severely and permanently limited in the ability to walk because of an arthritic, neurological, or orthopedic condition: or cannot walk two hundred feet without stopping to rest and meets requirements for disability license plate   Multiple Vitamin (multivitamin) tablet  Self Yes No   Sig: Take 1 tablet by mouth daily   acetaminophen (TYLENOL) 500 mg tablet  Self Yes No   Sig: Take 1,000 mg by mouth   Patient not taking: Reported on 2/17/2023   benzonatate (TESSALON PERLES) 100 mg capsule  Self No No   Sig: Take 1 capsule (100 mg total) by mouth 3 (three) times a day as needed for cough   Patient not taking: Reported on 2/6/2023   triamcinolone (KENALOG) 0.1 % ointment   No No   Sig: Apply topically 2 (two) times a day for 7 days Avoid prolonged continuous use (>10 days)      Facility-Administered Medications: None       Past Medical History:   Diagnosis Date   • BRCA1 positive    • BRCA2 positive    • Cancer ([address])     pancreatic   • History of chemotherapy     pancreatic cancer   • History of radiation therapy    • Pancreatic carcinoma ([address])        Past Surgical History:   Procedure Laterality Date   • ABLATION SOFT TISSUE N/A 4/26/2021    Procedure: INTRAOPERATIVE ULTRASOUND, ABLATION,SOFT TISSUE PANCREAS;  Surgeon: Lizet Kumar MD;  [facility];  Service: Surgical Oncology   • CHOLECYSTECTOMY N/A 4/26/2021    Procedure: CHOLECYSTECTOMY;  Surgeon: Lizet Kumar MD;  [facility];  Service: Surgical Oncology   • FL GUIDED CENTRAL VENOUS ACCESS DEVICE INSERTION  6/2/2021   • HYSTERECTOMY     • LAPAROTOMY N/A 4/26/2021    Procedure: LAPAROTOMY EXPLORATORY;  Surgeon: Lizet Kumar MD;  [facility];  Service: Surgical Oncology   • OOPHORECTOMY     • SINUS SURGERY     • TUNNELED VENOUS PORT PLACEMENT Left 6/2/2021    Procedure: INSERTION VENOUS PORT (PORT-A-CATH); Surgeon: Lizet Kumar MD;  [facility];  Service: Surgical Oncology   • US GUIDED THYROID BIOPSY  7/13/2022   • WHIPPLE PROCEDURE/PANCREATICO-DUODENECTOMY N/A 4/26/2021    Procedure:  WHIPPLE PROCEDURE/PANCREATICO-DUODENECTOMY; PYLORIC PRESERVING;  Surgeon: Lizet Kumar MD;  [facility];  Service: Surgical Oncology       Family History   Problem Relation Age of Onset   • Ulcerative colitis Mother    • Prostate cancer Father    • Melanoma Sister    • Heart disease Brother • Prostate cancer Brother    • No Known Problems Brother    • No Known Problems Maternal Uncle    • No Known Problems Paternal Uncle      I have reviewed and agree with the history as documented. E-Cigarette/Vaping   • E-Cigarette Use Never User      E-Cigarette/Vaping Substances   • Nicotine No    • THC No    • CBD No    • Flavoring No    • Other No    • Unknown No      Social History     Tobacco Use   • Smoking status: Some Days     Packs/day: 0.50     Types: Cigarettes     Start date: 65     Last attempt to quit: 2021     Years since quittin.3   • Smokeless tobacco: Never   • Tobacco comments:     recently stop smoking , pt stated she smoke occ. 1-2 cigg some days 2022. Vaping Use   • Vaping Use: Never used   Substance Use Topics   • Alcohol use: Never   • Drug use: Never       Review of Systems   Constitutional: Negative. Negative for chills, fatigue and fever. HENT: Negative. Negative for congestion, postnasal drip, rhinorrhea and sore throat. Eyes: Negative. Respiratory: Negative. Negative for cough, shortness of breath and wheezing. Cardiovascular: Positive for chest pain. Negative for palpitations and leg swelling. Gastrointestinal: Negative. Negative for abdominal pain, diarrhea, nausea and vomiting. Endocrine: Negative. Genitourinary: Negative. Musculoskeletal: Negative. Skin: Negative. Neurological: Negative. Hematological: Negative. Psychiatric/Behavioral: Negative. All other systems reviewed and are negative. Physical Exam  Physical Exam  Vitals and nursing note reviewed. Constitutional:       General: She is not in acute distress. Appearance: She is well-developed. She is not diaphoretic. HENT:      Head: Normocephalic and atraumatic. Right Ear: External ear normal.      Left Ear: External ear normal.      Nose: Nose normal.      Mouth/Throat:      Pharynx: No oropharyngeal exudate.    Eyes:      General: No scleral icterus. Right eye: No discharge. Left eye: No discharge. Conjunctiva/sclera: Conjunctivae normal.      Pupils: Pupils are equal, round, and reactive to light. Cardiovascular:      Rate and Rhythm: Normal rate and regular rhythm. Pulses: Normal pulses. Heart sounds: Normal heart sounds. No murmur heard. No friction rub. No gallop. Pulmonary:      Effort: Pulmonary effort is normal. No respiratory distress. Breath sounds: Normal breath sounds. No stridor. No wheezing, rhonchi or rales. Chest:      Chest wall: Tenderness present. Abdominal:      General: Bowel sounds are normal. There is no distension. Palpations: Abdomen is soft. There is no mass. Tenderness: There is no abdominal tenderness. There is no guarding or rebound. Hernia: No hernia is present. Musculoskeletal:        Arms:       Cervical back: Normal range of motion and neck supple. Lymphadenopathy:      Cervical: No cervical adenopathy. Skin:     General: Skin is warm and dry. Capillary Refill: Capillary refill takes less than 2 seconds. Coloration: Skin is not pale. Findings: No erythema or rash. Neurological:      General: No focal deficit present. Mental Status: She is alert and oriented to person, place, and time. Mental status is at baseline.    Psychiatric:      Comments: anxious         Vital Signs  ED Triage Vitals [07/24/23 0838]   Temperature Pulse Respirations Blood Pressure SpO2   98 °F (36.7 °C) 98 20 (!) 179/99 97 %      Temp Source Heart Rate Source Patient Position - Orthostatic VS BP Location FiO2 (%)   Oral Monitor Sitting Left arm --      Pain Score       8           Vitals:    07/24/23 0845 07/24/23 1000 07/24/23 1400 07/24/23 1438   BP: (!) 179/99 (!) 175/81 (!) 200/91 (!) 195/87   Pulse:  81 72 86   Patient Position - Orthostatic VS:    Lying         Visual Acuity      ED Medications  Medications   sodium chloride 0.9 % bolus 1,000 mL (0 mL Intravenous Stopped 7/24/23 1035)   iohexol (OMNIPAQUE) 350 MG/ML injection (SINGLE-DOSE) 90 mL (90 mL Intravenous Given 7/24/23 1019)   ketorolac (TORADOL) injection 15 mg (15 mg Intravenous Given 7/24/23 1134)       Diagnostic Studies  Results Reviewed     Procedure Component Value Units Date/Time    HS Troponin I 4hr [[id number]]  (Normal) Collected: 07/24/23 1314    Lab Status: Final result Specimen: Blood from Line, Venous Updated: 07/24/23 1346     hs TnI 4hr 14 ng/L      Delta 4hr hsTnI 8 ng/L     HS Troponin I 2hr [[id number]]  (Normal) Collected: 07/24/23 1138    Lab Status: Final result Specimen: Blood from Line, Venous Updated: 07/24/23 1301     hs TnI 2hr 11 ng/L      Delta 2hr hsTnI 5 ng/L     HS Troponin 0hr (reflex protocol) [[id number]]  (Normal) Collected: 07/24/23 0933    Lab Status: Final result Specimen: Blood from Arm, Left Updated: 07/24/23 1011     hs TnI 0hr 6 ng/L     Comprehensive metabolic panel [[id number]]  (Abnormal) Collected: 07/24/23 0933    Lab Status: Final result Specimen: Blood from Arm, Left Updated: 07/24/23 1003     Sodium 136 mmol/L      Potassium 4.4 mmol/L      Chloride 100 mmol/L      CO2 29 mmol/L      ANION GAP 7 mmol/L      BUN 24 mg/dL      Creatinine 0.64 mg/dL      Glucose 103 mg/dL      Calcium 9.7 mg/dL      AST 21 U/L      ALT 22 U/L      Alkaline Phosphatase 117 U/L      Total Protein 7.4 g/dL      Albumin 4.2 g/dL      Total Bilirubin 0.32 mg/dL      eGFR 92 ml/min/1.73sq m     Narrative:      [place] guidelines for Chronic Kidney Disease (CKD):   •  Stage 1 with normal or high GFR (GFR > 90 mL/min/1.73 square meters)  •  Stage 2 Mild CKD (GFR = 60-89 mL/min/1.73 square meters)  •  Stage 3A Moderate CKD (GFR = 45-59 mL/min/1.73 square meters)  •  Stage 3B Moderate CKD (GFR = 30-44 mL/min/1.73 square meters)  •  Stage 4 Severe CKD (GFR = 15-29 mL/min/1.73 square meters)  •  Stage 5 End Stage CKD (GFR <15 mL/min/1.73 square meters)  Note: GFR calculation is accurate only with a steady state creatinine    Protime-INR [[id number]]  (Normal) Collected: 07/24/23 0933    Lab Status: Final result Specimen: Blood from Arm, Left Updated: 07/24/23 0959     Protime 12.8 seconds      INR 0.95    APTT [[id number]]  (Normal) Collected: 07/24/23 0933    Lab Status: Final result Specimen: Blood from Arm, Left Updated: 07/24/23 0959     PTT 23 seconds     CBC and differential [[id number]] Collected: 07/24/23 0933    Lab Status: Final result Specimen: Blood from Arm, Left Updated: 07/24/23 0945     WBC 7.76 Thousand/uL      RBC 4.52 Million/uL      Hemoglobin 14.2 g/dL      Hematocrit 42.6 %      MCV 94 fL      MCH 31.4 pg      MCHC 33.3 g/dL      RDW 12.7 %      MPV 9.3 fL      Platelets 710 Thousands/uL      nRBC 0 /100 WBCs      Neutrophils Relative 74 %      Immat GRANS % 0 %      Lymphocytes Relative 19 %      Monocytes Relative 5 %      Eosinophils Relative 1 %      Basophils Relative 1 %      Neutrophils Absolute 5.76 Thousands/µL      Immature Grans Absolute 0.02 Thousand/uL      Lymphocytes Absolute 1.44 Thousands/µL      Monocytes Absolute 0.41 Thousand/µL      Eosinophils Absolute 0.07 Thousand/µL      Basophils Absolute 0.06 Thousands/µL                  CTA ED chest PE Study   Final Result by Kanwal Mueller MD (07/24 1056)      No pulmonary embolus. No acute pulmonary disease. Workstation performed: [id number]         XR chest 1 view portable   Final Result by Kanwal Mueller MD (07/24 1759)      No acute cardiopulmonary disease.                Workstation performed: [id number]                    Procedures  ECG 12 Lead Documentation Only    Date/Time: 7/24/2023 10:05 AM    Performed by: Maricruz Suarez PA-C  Authorized by: Maricruz Suarez PA-C    Indications / Diagnosis:  Right sided chest pain   ECG reviewed by me, the ED Provider: no    Patient location:  ED  Previous ECG:     Previous ECG:  Compared to current Comparison ECG info:  LBBB    Similarity:  Changes noted  Interpretation:     Interpretation: normal    Rate:     ECG rate:  79    ECG rate assessment: normal    Rhythm:     Rhythm: sinus rhythm    Ectopy:     Ectopy: none    QRS:     QRS axis:  Normal    QRS intervals:  Normal  Conduction:     Conduction: abnormal      Abnormal conduction: complete LBBB    ST segments:     ST segments:  Normal  T waves:     T waves: normal               ED Course  ED Course as of 07/24/23 1700   Mon Jul 24, 2023   1058 hs TnI 0hr: 6  Repeat due at  Patient updated. [facility] lab for repeat troponin - lab running it now    1303 She does not want to stay in the hospital discussed delta troponin. She is agreeable to 4-hour troponin. [id number] Cardiology coming to see patient. Consult placed. 1432 Discussed case with Dr. Genoveva Rodriguez and [facility] who state no cardiac intervention at this point in time. Discussed will discharge and have patient follow up. HEART Risk Score    Flowsheet Row Most Recent Value   Heart Score Risk Calculator    History 0 Filed at: 07/24/2023 1356   ECG 0 Filed at: 07/24/2023 1356   Age 2 Filed at: 07/24/2023 1356   Risk Factors 1 Filed at: 07/24/2023 1356   Troponin 0 Filed at: 07/24/2023 1356   HEART Score 3 Filed at: 07/24/2023 1356                PERC Rule for PE    Flowsheet Row Most Recent Value   PERC Rule for PE    Age >=50 1 Filed at: 07/24/2023 0900   HR >=100 0 Filed at: 07/24/2023 0900   O2 Sat on room air < 95% 0 Filed at: 07/24/2023 0900   History of PE or DVT 0 Filed at: 07/24/2023 0900   Recent trauma or surgery 0 Filed at: 07/24/2023 0900   Hemoptysis 0 Filed at: 07/24/2023 0900   Exogenous estrogen 0 Filed at: 07/24/2023 0900   Unilateral leg swelling 0 Filed at: 07/24/2023 0900   PERC Rule for PE Results 1 Filed at: 07/24/2023 0900              SBIRT 22yo+    Flowsheet Row Most Recent Value   Initial Alcohol Screen: US AUDIT-C     1.  How often do you have a drink containing alcohol? 0 Filed at: 07/24/2023 0840   Audit-C Score 0 Filed at: 07/24/2023 0840                    Medical Decision Making  Patient has new left bundle branch block since her last EKG which was back in 2021, her initial delta troponin was >5 and then on repeat was 14. As such cardiology was consulted. Patient was seen by cardiology and cleared by them- stating this is not cardiac in origin. Patient's pain is reproducible on exam.  Cardiology aware patient will be then discharged and will follow-up outpatient. Very eager to be discharged at this point in time. Patient improved with Toradol. Patient is informed to return to the emergency department for worsening of symptoms and was given proper education regarding their diagnosis and symptoms. Otherwise the patient is informed to follow up with their primary care doctor for re-evaluation. The patient verbalizes understanding and agrees with above assessment and plan. All questions were answered. Chest pain: acute illness or injury  Amount and/or Complexity of Data Reviewed  Labs: ordered. Decision-making details documented in ED Course. Radiology: ordered. Risk  Prescription drug management.           Disposition  Final diagnoses:   Chest pain     Time reflects when diagnosis was documented in both MDM as applicable and the Disposition within this note     Time User Action Codes Description Comment    7/24/2023  2:04 PM Katia [place] Add [R07.89] Constricting chest pain often radiating down right upper extremity     7/24/2023  2:04 PM Katia [place] Add [R07.9] Chest pain     7/24/2023  2:34 PM Katia [place] Modify [R07.9] Chest pain     7/24/2023  2:34 PM Katia [place] Remove [R07.89] Constricting chest pain often radiating down right upper extremity       ED Disposition     ED Disposition   Discharge    Condition   Stable    Date/Time   Mon Jul 24, 2023  2:34 PM    Comment   Yuki Muhammad discharge to home/self care.                Follow-up Information     Follow up With Specialties Details Why Contact Info Additional [address] [facility] Emergency Department Emergency Medicine Go to  If symptoms worsen such as severe pain, difficulty breathing etc, otherwise please follow up with your family doctor and cardiology [address] [address]  [address] [facility], [address], [place] [address]          Discharge Medication List as of 7/24/2023  2:35 PM      CONTINUE these medications which have NOT CHANGED    Details   acetaminophen (TYLENOL) 500 mg tablet Take 1,000 mg by mouth, Starting Wed 5/5/2021, Historical Med      benzonatate (TESSALON PERLES) 100 mg capsule Take 1 capsule (100 mg total) by mouth 3 (three) times a day as needed for cough, Starting Tue 12/20/2022, Normal      Medical ID Plate MISC Use once for 1 dose Severely and permanently limited in the ability to walk because of an arthritic, neurological, or orthopedic condition: or cannot walk two hundred feet without stopping to rest and meets requirements for disability license plate, Star ting u 9/9/2021, Print      Multiple Vitamin (multivitamin) tablet Take 1 tablet by mouth daily, Historical Med      triamcinolone (KENALOG) 0.1 % ointment Apply topically 2 (two) times a day for 7 days Avoid prolonged continuous use (>10 days), Starting Fri 4/7/2023, Until Fri 4/14/2023, Normal                 PDMP Review       Value Time User    PDMP Reviewed  Yes 5/3/2021 10:12 AM Daryn Pierce PA-C          ED Provider  Electronically Signed by           Anival Navarro PA-C  07/24/23 9011

## 2023-08-01 ENCOUNTER — APPOINTMENT (OUTPATIENT)
Dept: LAB | Facility: CLINIC | Age: 68
End: 2023-08-01
Payer: MEDICARE

## 2023-08-01 DIAGNOSIS — Z85.09 HISTORY OF MALIGNANT NEOPLASM OF AMPULLA OF VATER: ICD-10-CM

## 2023-08-01 LAB
BUN SERPL-MCNC: 21 MG/DL (ref 5–25)
CREAT SERPL-MCNC: 0.74 MG/DL (ref 0.6–1.3)
GFR SERPL CREATININE-BSD FRML MDRD: 84 ML/MIN/1.73SQ M

## 2023-08-01 PROCEDURE — 84520 ASSAY OF UREA NITROGEN: CPT

## 2023-08-01 PROCEDURE — 82565 ASSAY OF CREATININE: CPT

## 2023-08-01 PROCEDURE — 36415 COLL VENOUS BLD VENIPUNCTURE: CPT

## 2023-08-01 PROCEDURE — 86301 IMMUNOASSAY TUMOR CA 19-9: CPT

## 2023-08-02 ENCOUNTER — OFFICE VISIT (OUTPATIENT)
Dept: FAMILY MEDICINE CLINIC | Facility: CLINIC | Age: 68
End: 2023-08-02
Payer: MEDICARE

## 2023-08-02 VITALS
RESPIRATION RATE: 18 BRPM | BODY MASS INDEX: 32.13 KG/M2 | HEART RATE: 100 BPM | HEIGHT: 62 IN | OXYGEN SATURATION: 94 % | SYSTOLIC BLOOD PRESSURE: 150 MMHG | WEIGHT: 174.6 LBS | DIASTOLIC BLOOD PRESSURE: 102 MMHG

## 2023-08-02 DIAGNOSIS — R03.0 ELEVATED BLOOD PRESSURE READING: ICD-10-CM

## 2023-08-02 DIAGNOSIS — I44.7 LEFT BUNDLE BRANCH BLOCK: ICD-10-CM

## 2023-08-02 DIAGNOSIS — S29.011A PECTORALIS MUSCLE STRAIN, INITIAL ENCOUNTER: Primary | ICD-10-CM

## 2023-08-02 LAB — CANCER AG19-9 SERPL-ACNC: <2 U/ML (ref 0–35)

## 2023-08-02 PROCEDURE — 99213 OFFICE O/P EST LOW 20 MIN: CPT | Performed by: FAMILY MEDICINE

## 2023-08-02 NOTE — PROGRESS NOTES
[facility] Office visit    Assessment/Plan:     1. Pectoralis muscle strain, initial encounter  Slight pain activated by flexing right pectoral muscle, no tenderness or mass on palpation. Assessment of rotator cuff muscles was negative. - Continue conservative treatment with NSAIDs, ice and rest  - Offered to provide PT prescription if symptoms do not continue to improve        2. Elevated blood pressure reading  /102 today, BP in /87 (was in acute pain), prior BPs mostly within normal range, reports white coat hypertension. Concern for HTN. Has her son's BP cuff at home she can use    - log BPs at home, bring log to upcoming cardio appointment or here. Bring home cuff for validation  - start meds if HTN persists     3. Left bundle branch block  Noted on EKG in ED on 7/24/2023, new from prior EKG. Denies substernal chest pain or shortness of breath. Cardiology appointment scheduled for end of month    -Follow-up with cardiology, already scheduled          No follow-ups on file. Subjective:   MAISHA Lee is a 79 y.o. female presents for evaluation of right upper chest pain which started about 9 days ago on 7/24/2023. On that day she was evaluated in the emergency room, x-rays done then were negative, troponins were negative and EKG incidentally showed a new left bundle branch block. She reports some minor tenderness to her right upper outer chest with muscle straining with certain movements. She denies any known history of injuries to the area or the shoulder, but suspects something could have happened while walking her large dog. She has been treating herself with NSAIDs and ice, and symptoms are improving but have not completely resolved. Review of Systems   Constitutional: Negative for chills and fever. Respiratory: Negative for chest tightness and shortness of breath. Cardiovascular: Positive for chest pain. Negative for palpitations. Gastrointestinal: Negative for constipation, nausea and vomiting. Musculoskeletal: Negative for arthralgias. Neurological: Negative for dizziness, weakness and headaches. Objective:     BP (!) 150/102 (BP Location: Left arm, Patient Position: Sitting, Cuff Size: Large)   Pulse 100   Resp 18   Ht 5' 2" (1.575 m)   Wt 79.2 kg (174 lb 9.6 oz)   SpO2 94%   BMI 31.93 kg/m²      Physical Exam  Constitutional:       General: She is not in acute distress. Appearance: She is not ill-appearing, toxic-appearing or diaphoretic. HENT:      Head: Normocephalic. Cardiovascular:      Rate and Rhythm: Normal rate and regular rhythm. Heart sounds: Normal heart sounds. No murmur heard. Pulmonary:      Effort: Pulmonary effort is normal. No respiratory distress. Breath sounds: Normal breath sounds. Musculoskeletal:      Comments: No tenderness or palpable mass around affected area of right upper outer chest.  Reproduction of muscle strain with pressing hands together and flexing chest.  Negative shoulder tests (abduction, internal/external rotation, lift-off, empty can)   Neurological:      Mental Status: She is alert and oriented to person, place, and time.    Psychiatric:         Mood and Affect: Mood normal.         Behavior: Behavior normal.          ** Please Note: This note has been constructed using a voice recognition system **     Shellie Rsoas MD  08/02/23  9:26 AM

## 2023-08-03 ENCOUNTER — HOSPITAL ENCOUNTER (OUTPATIENT)
Dept: RADIOLOGY | Facility: HOSPITAL | Age: 68
Discharge: HOME | End: 2023-08-03
Payer: MEDICARE

## 2023-08-03 DIAGNOSIS — Z85.09 HISTORY OF MALIGNANT NEOPLASM OF AMPULLA OF VATER: ICD-10-CM

## 2023-08-03 DIAGNOSIS — E04.2 MULTIPLE THYROID NODULES: ICD-10-CM

## 2023-08-03 PROCEDURE — G1004 CDSM NDSC: HCPCS

## 2023-08-03 PROCEDURE — 71260 CT THORAX DX C+: CPT

## 2023-08-03 PROCEDURE — 76536 US EXAM OF HEAD AND NECK: CPT

## 2023-08-03 PROCEDURE — 74177 CT ABD & PELVIS W/CONTRAST: CPT

## 2023-08-03 RX ADMIN — IOHEXOL 100 ML: 350 INJECTION, SOLUTION INTRAVENOUS at 09:19

## 2023-08-07 ENCOUNTER — TELEPHONE (OUTPATIENT)
Dept: HEMATOLOGY ONCOLOGY | Facility: CLINIC | Age: 68
End: 2023-08-07

## 2023-08-08 ENCOUNTER — TELEPHONE (OUTPATIENT)
Dept: FAMILY MEDICINE CLINIC | Facility: CLINIC | Age: 68
End: 2023-08-08

## 2023-08-08 ENCOUNTER — OFFICE VISIT (OUTPATIENT)
Dept: HEMATOLOGY ONCOLOGY | Facility: CLINIC | Age: 68
End: 2023-08-08
Payer: MEDICARE

## 2023-08-08 VITALS
BODY MASS INDEX: 32.11 KG/M2 | OXYGEN SATURATION: 96 % | WEIGHT: 174.5 LBS | HEIGHT: 62 IN | DIASTOLIC BLOOD PRESSURE: 90 MMHG | TEMPERATURE: 98 F | SYSTOLIC BLOOD PRESSURE: 154 MMHG | RESPIRATION RATE: 17 BRPM | HEART RATE: 96 BPM

## 2023-08-08 DIAGNOSIS — D49.0 NEOPLASM OF AMPULLA OF VATER: Primary | ICD-10-CM

## 2023-08-08 DIAGNOSIS — Z15.09 BRCA POSITIVE: ICD-10-CM

## 2023-08-08 DIAGNOSIS — C24.1 MALIGNANT NEOPLASM OF AMPULLA OF VATER (HCC): ICD-10-CM

## 2023-08-08 DIAGNOSIS — Z15.01 BRCA POSITIVE: ICD-10-CM

## 2023-08-08 DIAGNOSIS — Z92.21 HISTORY OF CHEMOTHERAPY: ICD-10-CM

## 2023-08-08 PROCEDURE — 99215 OFFICE O/P EST HI 40 MIN: CPT | Performed by: PHYSICIAN ASSISTANT

## 2023-08-08 NOTE — PATIENT INSTRUCTIONS
If you experience any side effects of treatment please contact Hematology/Oncology nurse Supriya Briceno at [phone]. Please contact Erna Alevs after making an appointment with [facility]. I will send a note to them regarding concerns around anxiety.

## 2023-08-08 NOTE — TELEPHONE ENCOUNTER
Pt called in and was asking if she could be put on blood pressure medications before her cardio appt. She was seen in office last week and blood pressure was discussed. She stated that her BP at home is 150/80 and 170/80. She went to an appt today and her BP was 170/80.

## 2023-08-10 ENCOUNTER — OFFICE VISIT (OUTPATIENT)
Dept: SURGICAL ONCOLOGY | Facility: CLINIC | Age: 68
End: 2023-08-10
Payer: MEDICARE

## 2023-08-10 VITALS
WEIGHT: 174 LBS | TEMPERATURE: 97.8 F | BODY MASS INDEX: 32.02 KG/M2 | SYSTOLIC BLOOD PRESSURE: 160 MMHG | RESPIRATION RATE: 17 BRPM | HEART RATE: 94 BPM | DIASTOLIC BLOOD PRESSURE: 94 MMHG | HEIGHT: 62 IN | OXYGEN SATURATION: 97 %

## 2023-08-10 DIAGNOSIS — Z85.09 HISTORY OF MALIGNANT NEOPLASM OF AMPULLA OF VATER: ICD-10-CM

## 2023-08-10 DIAGNOSIS — C24.1 MALIGNANT NEOPLASM OF AMPULLA OF VATER (HCC): ICD-10-CM

## 2023-08-10 DIAGNOSIS — Z91.89 INCREASED RISK OF BREAST CANCER: ICD-10-CM

## 2023-08-10 DIAGNOSIS — E04.2 MULTIPLE THYROID NODULES: ICD-10-CM

## 2023-08-10 DIAGNOSIS — Z85.09 ENCOUNTER FOR FOLLOW-UP SURVEILLANCE OF AMPULLA OF VATER CANCER: Primary | ICD-10-CM

## 2023-08-10 DIAGNOSIS — Z15.09 BRCA2 POSITIVE: ICD-10-CM

## 2023-08-10 DIAGNOSIS — Z15.01 BRCA2 POSITIVE: ICD-10-CM

## 2023-08-10 DIAGNOSIS — Z08 ENCOUNTER FOR FOLLOW-UP SURVEILLANCE OF AMPULLA OF VATER CANCER: Primary | ICD-10-CM

## 2023-08-10 PROCEDURE — 99214 OFFICE O/P EST MOD 30 MIN: CPT

## 2023-08-10 NOTE — TELEPHONE ENCOUNTER
Patient is requesting a call back to as she would like to know if be prescribed medication to treat her HTN. States she has upcoming appointment with Cardiology but it will be at the end of the month. Patient reports her BP being elevated during an appointment w/ Oncology today. Reports BP being 180/80  And after sitting retaken and was 140/50.  Scheduled patient to be seen tomorrow at 1:20 w/Nettie

## 2023-08-10 NOTE — PROGRESS NOTES
Surgical Oncology Follow Up       [address] [facility]  [facility] [place]  [address]  [place] [address]    Nick Walsh  1955  [id number]  [facility] [place]  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 Oncology History   History of malignant neoplasm of ampulla of Vater   3/15/2021 Initial Diagnosis    Neoplasm of ampulla of Vater     4/26/2021 Surgery    B. Celiac lymph node:     - Single lymph node; negative for malignancy (0/1). C.  Whipple resection:     - Invasive poorly differentiated mucinous adenocarcinoma. - Tumor arises in the background of an adenoma with high grade dysplasia. - Lymphatic and venous channel invasion by tumor present. - Metastatic carcinoma present in one of twenty lymph nodes (1/20). - All margins are negative for tumor.      4/26/2021 -  Cancer Staged    Staging form: Ampulla of Vater, AJCC 8th Edition  - Pathologic stage from 4/26/2021: Stage IIIA (pT3b, pN1, cM0) - Signed by ANDRES Parsons on 6/9/2023  Total positive nodes: 1  Total nodes examined: 22  Histologic grade (G): G3  Histologic grading system: 3 grade system  Residual tumor (R): R0 - None       6/9/2021 - 10/1/2021 Chemotherapy    Cycles 1-6  6/2021 through 8/20/21:  FOLFOX    Cycles 7 -8:  9/15/2021- 10/12/2021  Leucovorin 400mg/m2, IV, Day 1  5-Fu 400 mg/m2, IV , Day 1   5-Fu 1200 mg/m2, IV, CI x 46 hours  Cycle length = 2 weeks    palonosetron (ALOXI), 0.25 mg, Intravenous, Once, 5 of 5 cycles  Administration: 0.25 mg (7/21/2021), 0.25 mg (8/4/2021), 0.25 mg (8/18/2021), 0.25 mg (9/15/2021), 0.25 mg (9/29/2021)  fluorouracil (ADRUCIL), 400 mg/m2 = 605 mg, Intravenous, Once, 8 of 8 cycles  Dose modification: 340 mg/m2 (85 % of original dose 400 mg/m2, Cycle 6, Reason: Dose Not Tolerated)  Administration: 605 mg (6/9/2021), 605 mg (6/23/2021), 605 mg (7/7/2021), 605 mg (7/21/2021), 605 mg (8/4/2021), 515 mg (8/18/2021), 610 mg (9/15/2021), 610 mg (9/29/2021)  fosaprepitant (EMEND) IVPB, 150 mg, Intravenous, Once, 6 of 6 cycles  Administration: 150 mg (7/7/2021), 150 mg (7/21/2021), 150 mg (8/4/2021), 150 mg (8/18/2021), 150 mg (9/15/2021), 150 mg (9/29/2021)  leucovorin calcium IVPB, 604 mg, Intravenous, Once, 8 of 8 cycles  Dose modification: 340 mg/m2 (85 % of original dose 400 mg/m2, Cycle 6, Reason: Dose Not Tolerated)  Administration: 600 mg (6/9/2021), 600 mg (6/23/2021), 600 mg (7/7/2021), 600 mg (7/21/2021), 600 mg (8/4/2021), 517 mg (8/18/2021), 600 mg (9/15/2021), 600 mg (9/29/2021)  oxaliplatin (ELOXATIN) chemo infusion, 85 mg/m2 = 128.35 mg, Intravenous, Once, 6 of 6 cycles  Dose modification: 72.25 mg/m2 (85 % of original dose 85 mg/m2, Cycle 6, Reason: Dose Not Tolerated)  Administration: 128.35 mg (6/9/2021), 128.35 mg (6/23/2021), 128.35 mg (7/7/2021), 128.35 mg (7/21/2021), 128.35 mg (8/4/2021), 109.82 mg (8/18/2021)  fluorouracil (ADRUCIL) ambulatory infusion Soln, 1,200 mg/m2/day = 3,625 mg, Intravenous, Over 46 hours, 8 of 8 cycles     10/1/2021 Genetic Testing    Results revealed patient has the following mutation(s):  Positive for a BRCA2 pathogenic variant      10/26/2021 -  Radiation         10/26/2021 -  Chemotherapy    Xeloda 825 mg/m2 BID  BSA = 1.59  Calculated to 1300 mg BID with rounding. 10/28/2021 - 12/2/2021 Radiation    Treatments:  Course: C1    Plan ID Energy Fractions Dose per Fraction (cGy) Dose Correction (cGy) Total Dose Delivered (cGy) Elapsed Days   Abdomen 6X 25 / 25 195 0 4,875 35      Treatment Dates:  10/28/2021 - 12/2/2021. Staging: [id number] periampullary carcinoma, April 2021  BRCA 2 mutation  10 year TC is 24.10%  Lifetime TC is 37%  Treatment history:  Pancreaticoduodenectomy, April 2021  Adjuvant FOLFOX  Chemo radiation with Xeloda  Right upper pole thyroid biopsy, July 2022: AUS, [place] 3, Afirma benign  Left mid pole thyroid biopsy, July 22: [place] 2  Current treatment:  Observation  Disease status: SEDA      History of Present Illness: The patient returns to the office today for her history of ampullary cancer, as well as multiple thyroid nodules and a high risk for breast cancer due to her BRCA2 mutation. She is currently SEDA at over 2 years from the [address] procedure,and reports she is feeling well. She denies any abdominal pain, weight loss, changes in appetite, shortness of breath or cough. Her only complaint at this time is some right chest pain that started approximately 2 weeks ago. She was recently seen by her medical oncology team, and she has undergone extensive testing was head showed no cause for concern. She does state that she does a lot of heavy lifting and she may have strained a muscle. She denies any changes in voice, difficulty swallowing, or new masses in her neck. She has not had any thyroid function studies performed recently, however she has no complaints referable to thyroid dysfunction. She denies any changes on self breast exam, and is deferring clinical exam at this time. CT of the chest abdomen pelvis as well as ultrasound of the thyroid were performed on August 3. Breast MRI was performed on May 22.   In addition she had a CTA of the chest and a chest x-ray performed in the ER on July 24. I have reviewed all of these results myself and discussed them with the patient today. Review of Systems   Constitutional: Negative for activity change, appetite change, fatigue and unexpected weight change. HENT: Negative. Negative for trouble swallowing and voice change. Respiratory: Negative. Negative for cough and shortness of breath. Cardiovascular: Positive for chest pain (Right chest wall pain, tenderness). Negative for palpitations. Gastrointestinal: Negative for abdominal distention, abdominal pain, constipation, diarrhea, nausea and vomiting. Musculoskeletal: Negative. Skin: Negative. Negative for color change and wound. Neurological: Negative. Negative for dizziness and headaches. Hematological: Negative. Negative for adenopathy. Psychiatric/Behavioral: Negative.               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   • Increased risk of breast cancer     Past Medical History:   Diagnosis Date   • BRCA1 positive    • BRCA2 positive    • Cancer (HCC)     pancreatic   • History of chemotherapy     pancreatic cancer   • History of radiation therapy    • Pancreatic carcinoma ([address])      Past Surgical History:   Procedure Laterality Date   • ABLATION SOFT TISSUE N/A 4/26/2021    Procedure: INTRAOPERATIVE ULTRASOUND, ABLATION,SOFT TISSUE PANCREAS;  Surgeon: Giulia Ledezma MD;  Location: [facility];  Service: Surgical Oncology   • CHOLECYSTECTOMY N/A 4/26/2021    Procedure: CHOLECYSTECTOMY;  Surgeon: Giulia Ledezma MD;  Location[facility];  Service: Surgical Oncology   • FL [address]  6/2/2021   • HYSTERECTOMY     • LAPAROTOMY N/A 2021    Procedure: LAPAROTOMY EXPLORATORY;  Surgeon: Nisreen Worthy MD;  Location: [facility];  Service: Surgical Oncology   • OOPHORECTOMY     • SINUS SURGERY     • TUNNELED VENOUS PORT PLACEMENT Left 2021    Procedure: INSERTION VENOUS PORT (PORT-A-CATH); Surgeon: Nisreen Worthy MD;  Location: [facility];  Service: Surgical Oncology   • US GUIDED THYROID BIOPSY  2022   • WHIPPLE PROCEDURE/PANCREATICO-DUODENECTOMY N/A 2021    Procedure: WHIPPLE PROCEDURE/PANCREATICO-DUODENECTOMY; PYLORIC PRESERVING;  Surgeon: Nisreen Worthy MD;  Location: [facility];  Service: Surgical Oncology     Family History   Problem Relation Age of Onset   • Ulcerative colitis Mother    • Prostate cancer Father    • Melanoma Sister    • Heart disease Brother    • Prostate cancer Brother    • No Known Problems Brother    • No Known Problems Maternal Uncle    • No Known Problems Paternal Uncle      Social History     Socioeconomic History   • Marital status:      Spouse name: Not on file   • Number of children: 3   • Years of education: Not on file   • Highest education level: Not on file   Occupational History   • 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 1-2 cigg some days 2022.    Vaping Use   • Vaping Use: Never used   Substance and Sexual Activity   • Alcohol use: Never   • Drug use: Never   • Sexual activity: Not Currently   Other Topics Concern   • Not on file   Social History Narrative   • Not on file     Social Determinants of Health     Financial Resource Strain: Medium Risk (10/13/2022)    Overall Financial Resource Strain (CARDIA)    • Difficulty of Paying Living Expenses: Somewhat hard   Food Insecurity: No Food Insecurity (6/1/2022)    Hunger Vital Sign    • Worried About Running Out of Food in the Last Year: Never true    • Ran Out of Food in the Last Year: Never true   Transportation Needs: No Transportation Needs (10/13/2022)    PRAPARE - Transportation    • Lack of Transportation (Medical): No    • Lack of Transportation (Non-Medical):  No   Physical Activity: Insufficiently Active (6/1/2022)    Exercise Vital Sign    • Days of Exercise per Week: 4 days    • Minutes of Exercise per Session: 20 min   Stress: No Stress Concern Present (6/1/2022)    [facility]    • Feeling of Stress : Not at all   Social Connections: Socially Isolated (6/1/2022)    Social Connection and Isolation Panel [NHANES]    • Frequency of Communication with Friends and Family: More than three times a week    • Frequency of Social Gatherings with Friends and Family: More than three times a week    • Attends Nondenominational Services: Never    • Active Member of Clubs or Organizations: No    • Attends Club or Organization Meetings: Never    • Marital Status:    Intimate Partner Violence: Not At Risk (6/1/2022)    Humiliation, Afraid, Rape, and Kick questionnaire    • Fear of Current or Ex-Partner: No    • Emotionally Abused: No    • Physically Abused: No    • Sexually Abused: No   Housing Stability: Unknown (6/1/2022)    Housing Stability Vital Sign    • Unable to Pay for Housing in the Last Year: No    • Number of Places Lived in the Last Year: Not on file    • Unstable Housing in the Last Year: No       Current Outpatient Medications:   •  acetaminophen (TYLENOL) 500 mg tablet, Take 1,000 mg by mouth, Disp: , Rfl:   •  Multiple Vitamin (multivitamin) tablet, Take 1 tablet by mouth daily, Disp: , Rfl:   •  Medical ID Plate MISC, Use once for 1 dose Severely and permanently limited in the ability to walk because of an arthritic, neurological, or orthopedic condition: or cannot walk two hundred feet without stopping to rest and meets requirements for disability license plate, Disp: 1 each, Rfl: 0  Allergies   Allergen Reactions   • Penicillins Rash   • Tetracycline Rash     Vitals:    08/10/23 0900   BP: 160/94   Pulse: 94   Resp: 17   Temp: 97.8 °F (36.6 °C)   SpO2: 97%       Physical Exam  Vitals reviewed. Constitutional:       General: She is not in acute distress. Appearance: Normal appearance. She is not ill-appearing or toxic-appearing. HENT:      Head: Normocephalic and atraumatic. Nose: Nose normal.      Mouth/Throat:      Mouth: Mucous membranes are moist.   Eyes:      General: No scleral icterus. Conjunctiva/sclera: Conjunctivae normal.   Neck:      Thyroid: Thyromegaly present. No thyroid mass. Comments: Right thyroid lobe is enlarged compared to left, visible goiter. Cardiovascular:      Rate and Rhythm: Normal rate. Chest:      Chest wall: Tenderness present. No mass, swelling or edema. Abdominal:      General: Abdomen is flat. There is no distension. Palpations: Abdomen is soft. There is no mass. Tenderness: There is no abdominal tenderness. Musculoskeletal:         General: Normal range of motion. Cervical back: Normal range of motion and neck supple. Lymphadenopathy:      Cervical: No cervical adenopathy. Upper Body:      Right upper body: No supraclavicular, axillary or pectoral adenopathy. Left upper body: No supraclavicular, axillary or pectoral adenopathy. Skin:     General: Skin is warm and dry. Coloration: Skin is not jaundiced. Neurological:      General: No focal deficit present. Mental Status: She is alert and oriented to person, place, and time. Psychiatric:         Mood and Affect: Mood normal.         Thought Content:  Thought content normal.         Judgment: Judgment normal.             Labs:   Collected 8/1/2023  8:37 AM          Component Ref Range & Units 9 d ago   CA 19-9 0 - 35 U/mL <2             Imaging  US thyroid    Result Date: 8/7/2023  Narrative: THYROID ULTRASOUND INDICATION:    E04.2: Nontoxic multinodular goiter. COMPARISON: Thyroid ultrasound 6/6/2022 TECHNIQUE:   Ultrasound of the thyroid was performed with a high frequency linear transducer in transverse and sagittal planes including volumetric imaging sweeps as well as traditional still imaging technique. FINDINGS:  Thyroid parenchyma is diffusely heterogeneous in echotexture. Right lobe: 4.7 x 1.8 x 2.5 cm. Volume 10.2 mL Left lobe:  3.2 x 1.3 x 1.3 cm. Volume 2.6 mL Isthmus: 0.4  cm. Nodule #1. Image 16. RIGHT upper pole nodule measuring 2.9 x 1.6 x 2.2 cm. Unchanged from prior. COMPOSITION:  2 points, solid or almost completely solid . ECHOGENICITY:  1 point, hyperechoic or isoechoic. SHAPE:  0 points, wider-than-tall. MARGIN: 0 points, smooth. ECHOGENIC FOCI:  0 points, none or large comet-tail artifacts. TI-RADS Classification: TR 3 (3 points), FNA if >2.5 cm. Follow if >1.5 cm. This nodule has had a previous nonmalignant biopsy. It was [place] category 3 and Afirma negative. As it is stable, no further sampling recommended at this time. Further surveillance at 2 year intervals could be considered to confirm continued stability for a period of greater than 5 years. Nodule #2. Image 53. LEFT midgland nodule measuring 1.6 x 1.1 x 0.9 cm. Given differences in measuring technique, no significant change from prior. COMPOSITION: 2 points, solid or almost completely solid. ECHOGENICITY: 2 points, hypoechoic. SHAPE: 3 points, taller-than-wide. MARGIN: 0 points, smooth. ECHOGENIC FOCI: 0 points, none or large comet-tail artifacts. TI-RADS Classification: TR 5 (7 or > points), Highly suspicious. FNA if > 1 cm. Follow if > 0.5 cm. This nodule has had a previous benign biopsy. As it is stable, no further sampling recommended at this time.   Further surveillance at 2 year intervals  could be considered to confirm continued stability for a period of greater than 5 years. There are additional nodules of lesser size and/or TI-RADS score. These do not necessitate additional evaluation based on ACR criteria. Impression: Stable thyroid nodules as above. Recommend 2-year follow-up ultrasound. Reference: ACR Thyroid Imaging, Reporting and Data System (TI-RADS): White Paper of the [employer]. J AM Jesus Radiol 5248;13:064-549. (additional recommendations based on American Thyroid Association 2015 guidelines.) This examination demonstrates a finding requiring imaging follow-up and was logged as such in [employer]. Workstation performed: [id number]     CT chest abdomen pelvis w contrast    Result Date: 8/7/2023  Narrative: CT CHEST, ABDOMEN AND PELVIS WITH IV CONTRAST INDICATION:   Z85.09: Personal history of malignant neoplasm of other digestive organs. 26-year-old female with history of cancer of the ampulla of Vater. Status post Whipple procedure. Follow-up. COMPARISON: CTs of the chest, abdomen and pelvis from October 15, 2021, July 12, 2022 and February 2, 2023. CTA of the chest from July 24, 2023. TECHNIQUE: CT examination of the chest, abdomen and pelvis was performed. Scanning through the abdomen was performed in arterial, venous and delayed phases according a protocol specifically designed to evaluate upper abdominal viscera. Multiplanar 2D reformatted images were created from the source data. This examination, like all CT scans performed in the [facility], was performed utilizing techniques to minimize radiation dose exposure, including the use of iterative reconstruction and automated exposure control. Radiation dose length product (DLP) for this visit:  1350.95 mGy-cm IV Contrast:  100 mL of iohexol (OMNIPAQUE) Enteric Contrast: Enteric contrast was administered. FINDINGS: CHEST LUNGS: Subsegmental atelectasis in the bases of both lower lobes. Lungs otherwise clear.  PLEURA: Unremarkable. HEART/GREAT VESSELS: Heart is unremarkable for patient's age. No thoracic aortic aneurysm. MEDIASTINUM AND WYATT: No lymphadenopathy or mass. Esophagus unremarkable. Trachea and main stem bronchi normal. CHEST WALL AND LOWER NECK: No lymphadenopathy or mass. Stable nodule in the right thyroid lobe, previously investigated with sonography and FNA. ABDOMEN LIVER/BILIARY TREE: Postoperative pneumobilia. Otherwise unremarkable. GALLBLADDER: Post cholecystectomy. SPLEEN:  Unremarkable. PANCREAS: Post Whipple procedure. No evidence of local tumor recurrence. ADRENAL GLANDS:  Unremarkable. KIDNEYS/URETERS: Small bilateral renal cysts, unchanged. No calculus, hydronephrosis or suspicious mass. 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 Patent gastrojejunostomy. Mild sigmoid diverticulosis without evidence of diverticulitis. Colon otherwise unremarkable. APPENDIX: Normal. ABDOMINOPELVIC CAVITY: No lymphadenopathy. No ascites or discrete fluid collection. No extraluminal gas. VESSELS:  Unremarkable for patient's age. PELVIS REPRODUCTIVE ORGANS: Post hysterectomy. URINARY BLADDER:  Unremarkable. ABDOMINAL WALL/INGUINAL REGIONS: Small supraumbilical hernia containing a short segment of transverse colon without evidence of incarceration or obstruction OSSEOUS STRUCTURES: Degenerative disc disease in the lumbar and thoracic spine. No evidence of fracture or destructive lesion. Impression: Status post Whipple procedure. No evidence of tumor recurrence in the chest, abdomen, pelvis or included portions of the skeleton. Workstation performed: [id number]     XR chest 1 view portable    Result Date: 7/24/2023  Narrative: CHEST INDICATION:   right upper chest wall pain, h/o CA. COMPARISON: Chest CT 7/24/2023. EXAM PERFORMED/VIEWS:  XR CHEST PORTABLE. FINDINGS: Cardiomediastinal silhouette normal. Lungs clear. No effusion or pneumothorax.  Upper abdomen normal. Bones normal for age. Cervicothoracic fusion. Impression: No acute cardiopulmonary disease. Workstation performed: [id number]     CTA ED chest PE Study    Result Date: 7/24/2023  Narrative: CTA - CHEST WITH IV CONTRAST - PULMONARY ANGIOGRAM INDICATION:   h/o cancer, right upper chest pain. Per my review of the medical record, patient had adenocarcinoma of ampulla of Vater status post Whipple in April 2021. Now with pain over right breast going into right shoulder starting this morning, worse when using the arm and painful to the touch. COMPARISON: CXR from today, chest CT 2/2/2023. TECHNIQUE: CT angiogram timed for optimal opacification of the pulmonary arteries. Axial, sagittal, and coronal 2D reformats created from source data. Coronal 3D MIP postprocessing on the acquisition scanner. Radiation dose length product (DLP):  416.65 mGy-cm . Radiation dose exposure minimized using iterative reconstruction and automated exposure control. IV Contrast:  90 mL of iohexol (OMNIPAQUE) FINDINGS: PULMONARY ARTERIES:  No pulmonary embolus. LUNGS: No acute disease. Mild dependent atelectasis in the lower lobes. AIRWAYS: No significant filling defects. PLEURA:  Unremarkable. HEART/GREAT VESSELS: Normal heart size. Mild coronary artery calcification indicating atherosclerotic heart disease. MEDIASTINUM AND WYATT:  Unremarkable. CHEST WALL AND LOWER NECK: Right thyroid nodules, status post bilateral thyroid nodule biopsy in July 2022. UPPER ABDOMEN: Whipple procedure with mild pneumobilia. Left renal cysts. Bilateral fat-containing Bochdalek hernias. OSSEOUS STRUCTURES: Mild degenerative disease in the spine. Impression: No pulmonary embolus. No acute pulmonary disease. Workstation performed: [id number]       MRI breast bilateral w and wo contrast w cad  05/22/2023  Narrative & Impression   DIAGNOSIS: BRCA2 positive;  Increased risk of breast cancer      TECHNIQUE:  MRI of both breasts was performed in axial planes utilizing a combination of localizer, axial T2 STIR, Axial T1 FSPGR in phase, and axial dynamic 3D fat suppressed gradient-echo T1 sequences (VIBRANT) before and after contrast administration at multiple time points.     Subtraction images were generated.     This exam was read in conjunction with [employer] software.     MEDICATIONS ADMINISTERED:  Gadobutrol injection (SINGLE-DOSE) SOLN 7.5 mL, Total Given: 7.5 mL (1 dose)  Intravenous contrast was administered at 2cc/sec, without documented contrast reaction.     COMPARISONS: Breast MR 5/20/2022. Prior mammograms were reviewed.     RELEVANT HISTORY:   Family Breast Cancer History: No known family history of breast cancer. Family Medical History: No known relevant family medical history. Personal History: No known relevant hormone history. Surgical history includes hysterectomy and oophorectomy. Medical history includes BRCA 1 positive, BRCA 2 positive, and history of chemotherapy. TISSUE DENSITY:  FGT: The breasts have heterogeneous fibroglandular tissue. BPE: The background parenchymal enhancement is mild.     INDICATION: Duglas Kinney is a 79 y.o. female presenting for High risk screening breast.  Known BRCA2 mutation.      FINDINGS:   Bilateral  There are no suspicious enhancing masses or suspicious areas of non-mass enhancement. Intrinsically T1 hyperintense material within the right retroareolar ducts is benign. There is no axillary or internal mammary adenopathy.      IMPRESSION:   1. No MR evidence of malignancy in either breast.  2.  Patient will be due for annual screening mammography after 11/14/2023.  3.  Annual high risk screening breast MRI is recommended.     ASSESSMENT/BI-RADS CATEGORY:  Left: 1 - Negative  Right: 2 - Benign  Overall: 2 - Benign     RECOMMENDATION:       - Continue annual screening mammography for both breasts.        - Breast MRI Screening in 1 year for both breasts.          I personally reviewed and interpreted the above laboratory and imaging data. Discussion/Summary: This is a 51-year-old female who presents to the office today for ampullary cancer surveillance, as well as continued thyroid nodule and high risk breast surveillance. At this time there is no evidence of disease recurrence on imaging or physical exam, and her tumor marker is in normal range. We will plan to repeat CT scan and blood work in 6 months, and see her in the office for another visit at that time. With regard to her thyroid nodules, these are stable on imaging, and we will plan to repeat ultrasound in 2 years. She will be due for mammogram in November. At her next visit, we will order her upcoming breast MRI and her neck screening mammogram.  With regard to her right chest wall discomfort, this appears to be musculoskeletal.  I have advised the patient to continue with Advil and heat or ice as needed for symptom management. If this pain does not resolve within the next 2 to 4 weeks, I have instructed her to call the office. She is agreeable to the plan, all questions have been answered today.

## 2023-08-10 NOTE — LETTER
August 10, 2023     Kareen Sequeira MD  [address] [facility]    Patient: Shane Lu   YOB: 1955   Date of Visit: 8/10/2023       Dear Dr. Teo Shin:    Thank you for referring Shane Lu to me for evaluation. Below are my notes for this consultation. If you have questions, please do not hesitate to call me. I look forward to following your patient along with you. 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 Oncology History   History of malignant neoplasm of ampulla of Vater   3/15/2021 Initial Diagnosis    Neoplasm of ampulla of Vater     4/26/2021 Surgery    B. Celiac lymph node:     - Single lymph node; negative for malignancy (0/1). C.  Whipple resection:     - Invasive poorly differentiated mucinous adenocarcinoma. - Tumor arises in the background of an adenoma with high grade dysplasia.      - Lymphatic and venous channel invasion by tumor present. - Metastatic carcinoma present in one of twenty lymph nodes (1/20). - All margins are negative for tumor.      4/26/2021 -  Cancer Staged    Staging form: Ampulla of Vater, AJCC 8th Edition  - Pathologic stage from 4/26/2021: Stage IIIA (pT3b, pN1, cM0) - Signed by ANDRES Floyd on 6/9/2023  Total positive nodes: 1  Total nodes examined: 22  Histologic grade (G): G3  Histologic grading system: 3 grade system  Residual tumor (R): R0 - None       6/9/2021 - 10/1/2021 Chemotherapy    Cycles 1-6  6/2021 through 8/20/21:  FOLFOX    Cycles 7 -8:  9/15/2021- 10/12/2021  Leucovorin 400mg/m2, IV, Day 1  5-Fu 400 mg/m2, IV , Day 1   5-Fu 1200 mg/m2, IV, CI x 46 hours  Cycle length = 2 weeks    palonosetron (ALOXI), 0.25 mg, Intravenous, Once, 5 of 5 cycles  Administration: 0.25 mg (7/21/2021), 0.25 mg (8/4/2021), 0.25 mg (8/18/2021), 0.25 mg (9/15/2021), 0.25 mg (9/29/2021)  fluorouracil (ADRUCIL), 400 mg/m2 = 605 mg, Intravenous, Once, 8 of 8 cycles  Dose modification: 340 mg/m2 (85 % of original dose 400 mg/m2, Cycle 6, Reason: Dose Not Tolerated)  Administration: 605 mg (6/9/2021), 605 mg (6/23/2021), 605 mg (7/7/2021), 605 mg (7/21/2021), 605 mg (8/4/2021), 515 mg (8/18/2021), 610 mg (9/15/2021), 610 mg (9/29/2021)  fosaprepitant (EMEND) IVPB, 150 mg, Intravenous, Once, 6 of 6 cycles  Administration: 150 mg (7/7/2021), 150 mg (7/21/2021), 150 mg (8/4/2021), 150 mg (8/18/2021), 150 mg (9/15/2021), 150 mg (9/29/2021)  leucovorin calcium IVPB, 604 mg, Intravenous, Once, 8 of 8 cycles  Dose modification: 340 mg/m2 (85 % of original dose 400 mg/m2, Cycle 6, Reason: Dose Not Tolerated)  Administration: 600 mg (6/9/2021), 600 mg (6/23/2021), 600 mg (7/7/2021), 600 mg (7/21/2021), 600 mg (8/4/2021), 517 mg (8/18/2021), 600 mg (9/15/2021), 600 mg (9/29/2021)  oxaliplatin (ELOXATIN) chemo infusion, 85 mg/m2 = 128.35 mg, Intravenous, Once, 6 of 6 cycles  Dose modification: 72.25 mg/m2 (85 % of original dose 85 mg/m2, Cycle 6, Reason: Dose Not Tolerated)  Administration: 128.35 mg (6/9/2021), 128.35 mg (6/23/2021), 128.35 mg (7/7/2021), 128.35 mg (7/21/2021), 128.35 mg (8/4/2021), 109.82 mg (8/18/2021)  fluorouracil (ADRUCIL) ambulatory infusion Soln, 1,200 mg/m2/day = 3,625 mg, Intravenous, Over 46 hours, 8 of 8 cycles     10/1/2021 Genetic Testing    Results revealed patient has the following mutation(s):  Positive for a BRCA2 pathogenic variant      10/26/2021 -  Radiation         10/26/2021 -  Chemotherapy    Xeloda 825 mg/m2 BID  BSA = 1.59  Calculated to 1300 mg BID with rounding. 10/28/2021 - 12/2/2021 Radiation    Treatments:  Course: C1    Plan ID Energy Fractions Dose per Fraction (cGy) Dose Correction (cGy) Total Dose Delivered (cGy) Elapsed Days   Abdomen 6X 25 / 25 195 0 4,875 35      Treatment Dates:  10/28/2021 - 12/2/2021. Staging: [id number] periampullary carcinoma, April 2021  BRCA 2 mutation  10 year TC is 24.10%  Lifetime TC is 37%  Treatment history:  Pancreaticoduodenectomy, April 2021  Adjuvant FOLFOX  Chemo radiation with Xeloda  Right upper pole thyroid biopsy, July 2022: [facility], [place], Afirma benign  Left mid pole thyroid biopsy, July 22: [place]  Current treatment:  Observation  Disease status: SEDA      History of Present Illness: The patient returns to the office today for her history of ampullary cancer, as well as multiple thyroid nodules and a high risk for breast cancer due to her BRCA2 mutation. She is currently SEDA at over 2 years from the [address] procedure,and reports she is feeling well. She denies any abdominal pain, weight loss, changes in appetite, shortness of breath or cough. Her only complaint at this time is some right chest pain that started approximately 2 weeks ago. She was recently seen by her medical oncology team, and she has undergone extensive testing was head showed no cause for concern.   She does state that she does a lot of heavy lifting and she may have strained a muscle. She denies any changes in voice, difficulty swallowing, or new masses in her neck. She has not had any thyroid function studies performed recently, however she has no complaints referable to thyroid dysfunction. She denies any changes on self breast exam, and is deferring clinical exam at this time. CT of the chest abdomen pelvis as well as ultrasound of the thyroid were performed on August 3. Breast MRI was performed on May 22. In addition she had a CTA of the chest and a chest x-ray performed in the ER on July 24. I have reviewed all of these results myself and discussed them with the patient today. Review of Systems   Constitutional: Negative for activity change, appetite change, fatigue and unexpected weight change. HENT: Negative. Negative for trouble swallowing and voice change. Respiratory: Negative. Negative for cough and shortness of breath. Cardiovascular: Positive for chest pain (Right chest wall pain, tenderness). Negative for palpitations. Gastrointestinal: Negative for abdominal distention, abdominal pain, constipation, diarrhea, nausea and vomiting. Musculoskeletal: Negative. Skin: Negative. Negative for color change and wound. Neurological: Negative. Negative for dizziness and headaches. Hematological: Negative. Negative for adenopathy. Psychiatric/Behavioral: Negative.               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   • Increased risk of breast cancer     Past Medical History:   Diagnosis Date   • BRCA1 positive    • BRCA2 positive    • Cancer ([address])     pancreatic   • History of chemotherapy pancreatic cancer   • History of radiation therapy    • Pancreatic carcinoma [facility])      Past Surgical History:   Procedure Laterality Date   • ABLATION SOFT TISSUE N/A 2021    Procedure: INTRAOPERATIVE ULTRASOUND, ABLATION,SOFT TISSUE PANCREAS;  Surgeon: Gar Kocher, MD;  Location[facility];  Service: Surgical Oncology   • CHOLECYSTECTOMY N/A 2021    Procedure: CHOLECYSTECTOMY;  Surgeon: Gar Kocher, MD;  Location[facility];  Service: Surgical Oncology   • FL GUIDED CENTRAL VENOUS ACCESS DEVICE INSERTION  2021   • HYSTERECTOMY     • LAPAROTOMY N/A 2021    Procedure: LAPAROTOMY EXPLORATORY;  Surgeon: Gar Kocher, MD;  Location: [facility];  Service: Surgical Oncology   • OOPHORECTOMY     • SINUS SURGERY     • TUNNELED VENOUS PORT PLACEMENT Left 2021    Procedure: INSERTION VENOUS PORT (PORT-A-CATH); Surgeon: Gar Kocher, MD;  Location[facility];  Service: Surgical Oncology   • US GUIDED THYROID BIOPSY  2022   • WHIPPLE PROCEDURE/PANCREATICO-DUODENECTOMY N/A 2021    Procedure:  WHIPPLE PROCEDURE/PANCREATICO-DUODENECTOMY; PYLORIC PRESERVING;  Surgeon: Gar Kocher, MD;  Location[facility];  Service: Surgical Oncology     Family History   Problem Relation Age of Onset   • Ulcerative colitis Mother    • Prostate cancer Father    • Melanoma Sister    • Heart disease Brother    • Prostate cancer Brother    • No Known Problems Brother    • No Known Problems Maternal Uncle    • No Known Problems Paternal Uncle      Social History     Socioeconomic History   • Marital status:      Spouse name: Not on file   • Number of children: 3   • Years of education: Not on file   • Highest education level: Not on file   Occupational History   • 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 1-2 cigg some days 06/02/2022. Vaping Use   • Vaping Use: Never used   Substance and Sexual Activity   • Alcohol use: Never   • Drug use: Never   • Sexual activity: Not Currently   Other Topics Concern   • Not on file   Social History Narrative   • Not on file     Social Determinants of Health     Financial Resource Strain: Medium Risk (10/13/2022)    Overall Financial Resource Strain (CARDIA)    • Difficulty of Paying Living Expenses: Somewhat hard   Food Insecurity: No Food Insecurity (6/1/2022)    Hunger Vital Sign    • Worried About Running Out of Food in the Last Year: Never true    • Ran Out of Food in the Last Year: Never true   Transportation Needs: No Transportation Needs (10/13/2022)    PRAPARE - Transportation    • Lack of Transportation (Medical): No    • Lack of Transportation (Non-Medical):  No   Physical Activity: Insufficiently Active (6/1/2022)    Exercise Vital Sign    • Days of Exercise per Week: 4 days    • Minutes of Exercise per Session: 20 min   Stress: No Stress Concern Present (6/1/2022)    [address] [place] [address]    • Feeling of Stress : Not at all   Social Connections: Socially Isolated (6/1/2022)    Social Connection and Isolation Panel [NHANES]    • Frequency of Communication with Friends and Family: More than three times a week    • Frequency of Social Gatherings with Friends and Family: More than three times a week    • Attends Muslim Services: Never    • Active Member of Clubs or Organizations: No    • Attends Club or Organization Meetings: Never    • Marital Status:    Intimate Partner Violence: Not At Risk (6/1/2022)    Humiliation, Afraid, Rape, and Kick questionnaire    • Fear of Current or Ex-Partner: No    • Emotionally Abused: No    • Physically Abused: No    • Sexually Abused: No   Housing Stability: Unknown (6/1/2022)    Housing Stability Vital Sign    • Unable to Pay for Housing in the Last Year: No    • Number of Places Lived in the Last Year: Not on file    • Unstable Housing in the Last Year: No       Current Outpatient Medications:   •  acetaminophen (TYLENOL) 500 mg tablet, Take 1,000 mg by mouth, Disp: , Rfl:   •  Multiple Vitamin (multivitamin) tablet, Take 1 tablet by mouth daily, Disp: , Rfl:   •  Medical ID Plate MISC, Use once for 1 dose Severely and permanently limited in the ability to walk because of an arthritic, neurological, or orthopedic condition: or cannot walk two hundred feet without stopping to rest and meets requirements for disability license plate, Disp: 1 each, Rfl: 0  Allergies   Allergen Reactions   • Penicillins Rash   • Tetracycline Rash     Vitals:    08/10/23 0900   BP: 160/94   Pulse: 94   Resp: 17   Temp: 97.8 °F (36.6 °C)   SpO2: 97%       Physical Exam  Vitals reviewed. Constitutional:       General: She is not in acute distress. Appearance: Normal appearance. She is not ill-appearing or toxic-appearing. HENT:      Head: Normocephalic and atraumatic. Nose: Nose normal.      Mouth/Throat:      Mouth: Mucous membranes are moist.   Eyes:      General: No scleral icterus. Conjunctiva/sclera: Conjunctivae normal.   Neck:      Thyroid: Thyromegaly present. No thyroid mass. Comments: Right thyroid lobe is enlarged compared to left, visible goiter. Cardiovascular:      Rate and Rhythm: Normal rate. Chest:      Chest wall: Tenderness present. No mass, swelling or edema. Abdominal:      General: Abdomen is flat. There is no distension. Palpations: Abdomen is soft. There is no mass. Tenderness: There is no abdominal tenderness. Musculoskeletal:         General: Normal range of motion. Cervical back: Normal range of motion and neck supple. Lymphadenopathy:      Cervical: No cervical adenopathy.       Upper Body:      Right upper body: No supraclavicular, axillary or pectoral adenopathy. Left upper body: No supraclavicular, axillary or pectoral adenopathy. Skin:     General: Skin is warm and dry. Coloration: Skin is not jaundiced. Neurological:      General: No focal deficit present. Mental Status: She is alert and oriented to person, place, and time. Psychiatric:         Mood and Affect: Mood normal.         Thought Content: Thought content normal.         Judgment: Judgment normal.             Labs:   Collected 8/1/2023  8:37 AM          Component Ref Range & Units 9 d ago   CA 19-9 0 - 35 U/mL <2             Imaging  US thyroid    Result Date: 8/7/2023  Narrative: THYROID ULTRASOUND INDICATION:    E04.2: Nontoxic multinodular goiter. COMPARISON: Thyroid ultrasound 6/6/2022 TECHNIQUE:   Ultrasound of the thyroid was performed with a high frequency linear transducer in transverse and sagittal planes including volumetric imaging sweeps as well as traditional still imaging technique. FINDINGS:  Thyroid parenchyma is diffusely heterogeneous in echotexture. Right lobe: 4.7 x 1.8 x 2.5 cm. Volume 10.2 mL Left lobe:  3.2 x 1.3 x 1.3 cm. Volume 2.6 mL Isthmus: 0.4  cm. Nodule #1. Image 16. RIGHT upper pole nodule measuring 2.9 x 1.6 x 2.2 cm. Unchanged from prior. COMPOSITION:  2 points, solid or almost completely solid . ECHOGENICITY:  1 point, hyperechoic or isoechoic. SHAPE:  0 points, wider-than-tall. MARGIN: 0 points, smooth. ECHOGENIC FOCI:  0 points, none or large comet-tail artifacts. TI-RADS Classification: TR 3 (3 points), FNA if >2.5 cm. Follow if >1.5 cm. This nodule has had a previous nonmalignant biopsy. It was [place] category 3 and Afirma negative. As it is stable, no further sampling recommended at this time. Further surveillance at 2 year intervals could be considered to confirm continued stability for a period of greater than 5 years. Nodule #2. Image 53. LEFT midgland nodule measuring 1.6 x 1.1 x 0.9 cm. Given differences in measuring technique, no significant change from prior. COMPOSITION: 2 points, solid or almost completely solid. ECHOGENICITY: 2 points, hypoechoic. SHAPE: 3 points, taller-than-wide. MARGIN: 0 points, smooth. ECHOGENIC FOCI: 0 points, none or large comet-tail artifacts. TI-RADS Classification: TR 5 (7 or > points), Highly suspicious. FNA if > 1 cm. Follow if > 0.5 cm. This nodule has had a previous benign biopsy. As it is stable, no further sampling recommended at this time. Further surveillance at 2 year intervals  could be considered to confirm continued stability for a period of greater than 5 years. There are additional nodules of lesser size and/or TI-RADS score. These do not necessitate additional evaluation based on ACR criteria. Impression: Stable thyroid nodules as above. Recommend 2-year follow-up ultrasound. Reference: ACR Thyroid Imaging, Reporting and Data System (TI-RADS): White Paper of the [employer]. J AM Jesus Radiol 5838;60:316-410. (additional recommendations based on American Thyroid Association 2015 guidelines.) This examination demonstrates a finding requiring imaging follow-up and was logged as such in [facility]. Workstation performed: [id number]     CT chest abdomen pelvis w contrast    Result Date: 8/7/2023  Narrative: CT CHEST, ABDOMEN AND PELVIS WITH IV CONTRAST INDICATION:   Z85.09: Personal history of malignant neoplasm of other digestive organs. 15-year-old female with history of cancer of the ampulla of Vater. Status post Whipple procedure. Follow-up. COMPARISON: CTs of the chest, abdomen and pelvis from October 15, 2021, July 12, 2022 and February 2, 2023. CTA of the chest from July 24, 2023. TECHNIQUE: CT examination of the chest, abdomen and pelvis was performed. Scanning through the abdomen was performed in arterial, venous and delayed phases according a protocol specifically designed to evaluate upper abdominal viscera.  Multiplanar 2D reformatted images were created from the source data. This examination, like all CT scans performed in the [facility], was performed utilizing techniques to minimize radiation dose exposure, including the use of iterative reconstruction and automated exposure control. Radiation dose length product (DLP) for this visit:  1350.95 mGy-cm IV Contrast:  100 mL of iohexol (OMNIPAQUE) Enteric Contrast: Enteric contrast was administered. FINDINGS: CHEST LUNGS: Subsegmental atelectasis in the bases of both lower lobes. Lungs otherwise clear. PLEURA:  Unremarkable. HEART/GREAT VESSELS: Heart is unremarkable for patient's age. No thoracic aortic aneurysm. MEDIASTINUM AND WYATT: No lymphadenopathy or mass. Esophagus unremarkable. Trachea and main stem bronchi normal. CHEST WALL AND LOWER NECK: No lymphadenopathy or mass. Stable nodule in the right thyroid lobe, previously investigated with sonography and FNA. ABDOMEN LIVER/BILIARY TREE: Postoperative pneumobilia. Otherwise unremarkable. GALLBLADDER: Post cholecystectomy. SPLEEN:  Unremarkable. PANCREAS: Post Whipple procedure. No evidence of local tumor recurrence. ADRENAL GLANDS:  Unremarkable. KIDNEYS/URETERS: Small bilateral renal cysts, unchanged. No calculus, hydronephrosis or suspicious mass. 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 Patent gastrojejunostomy. Mild sigmoid diverticulosis without evidence of diverticulitis. Colon otherwise unremarkable. APPENDIX: Normal. ABDOMINOPELVIC CAVITY: No lymphadenopathy. No ascites or discrete fluid collection. No extraluminal gas. VESSELS:  Unremarkable for patient's age. PELVIS REPRODUCTIVE ORGANS: Post hysterectomy. URINARY BLADDER:  Unremarkable.  ABDOMINAL WALL/INGUINAL REGIONS: Small supraumbilical hernia containing a short segment of transverse colon without evidence of incarceration or obstruction OSSEOUS STRUCTURES: Degenerative disc disease in the lumbar and thoracic spine. No evidence of fracture or destructive lesion. Impression: Status post Whipple procedure. No evidence of tumor recurrence in the chest, abdomen, pelvis or included portions of the skeleton. Workstation performed: [id number]     XR chest 1 view portable    Result Date: 7/24/2023  Narrative: CHEST INDICATION:   right upper chest wall pain, h/o CA. COMPARISON: Chest CT 7/24/2023. EXAM PERFORMED/VIEWS:  XR CHEST PORTABLE. FINDINGS: Cardiomediastinal silhouette normal. Lungs clear. No effusion or pneumothorax. Upper abdomen normal. Bones normal for age. Cervicothoracic fusion. Impression: No acute cardiopulmonary disease. Workstation performed: [id number]     CTA ED chest PE Study    Result Date: 7/24/2023  Narrative: CTA - CHEST WITH IV CONTRAST - PULMONARY ANGIOGRAM INDICATION:   h/o cancer, right upper chest pain. Per my review of the medical record, patient had adenocarcinoma of ampulla of Vater status post Whipple in April 2021. Now with pain over right breast going into right shoulder starting this morning, worse when using the arm and painful to the touch. COMPARISON: CXR from today, chest CT 2/2/2023. TECHNIQUE: CT angiogram timed for optimal opacification of the pulmonary arteries. Axial, sagittal, and coronal 2D reformats created from source data. Coronal 3D MIP postprocessing on the acquisition scanner. Radiation dose length product (DLP):  416.65 mGy-cm . Radiation dose exposure minimized using iterative reconstruction and automated exposure control. IV Contrast:  90 mL of iohexol (OMNIPAQUE) FINDINGS: PULMONARY ARTERIES:  No pulmonary embolus. LUNGS: No acute disease. Mild dependent atelectasis in the lower lobes. AIRWAYS: No significant filling defects. PLEURA:  Unremarkable. HEART/GREAT VESSELS: Normal heart size. Mild coronary artery calcification indicating atherosclerotic heart disease. MEDIASTINUM AND WYATT:  Unremarkable.  CHEST WALL AND LOWER NECK: Right thyroid nodules, status post bilateral thyroid nodule biopsy in July 2022. UPPER ABDOMEN: Whipple procedure with mild pneumobilia. Left renal cysts. Bilateral fat-containing Bochdalek hernias. OSSEOUS STRUCTURES: Mild degenerative disease in the spine. Impression: No pulmonary embolus. No acute pulmonary disease. Workstation performed: [id number]       MRI breast bilateral w and wo contrast w cad  05/22/2023  Narrative & Impression   DIAGNOSIS: BRCA2 positive; Increased risk of breast cancer      TECHNIQUE:  MRI of both breasts was performed in axial planes utilizing a combination of localizer, axial T2 STIR, Axial T1 FSPGR in phase, and axial dynamic 3D fat suppressed gradient-echo T1 sequences (VIBRANT) before and after contrast administration at multiple time points. Subtraction images were generated. This exam was read in conjunction with [employer] software. MEDICATIONS ADMINISTERED:  Gadobutrol injection (SINGLE-DOSE) SOLN 7.5 mL, Total Given: 7.5 mL (1 dose)  Intravenous contrast was administered at 2cc/sec, without documented contrast reaction. COMPARISONS: Breast MR 5/20/2022. Prior mammograms were reviewed. RELEVANT HISTORY:   Family Breast Cancer History: No known family history of breast cancer. Family Medical History: No known relevant family medical history. Personal History: No known relevant hormone history. Surgical history includes hysterectomy and oophorectomy. Medical history includes BRCA 1 positive, BRCA 2 positive, and history of chemotherapy. TISSUE DENSITY:  FGT: The breasts have heterogeneous fibroglandular tissue. BPE: The background parenchymal enhancement is mild. INDICATION: Romel Villalobos is a 79 y.o. female presenting for High risk screening breast.  Known BRCA2 mutation. FINDINGS:   Bilateral  There are no suspicious enhancing masses or suspicious areas of non-mass enhancement.   Intrinsically T1 hyperintense material within the right retroareolar ducts is benign. There is no axillary or internal mammary adenopathy. IMPRESSION:   1. No MR evidence of malignancy in either breast.  2.  Patient will be due for annual screening mammography after 11/14/2023.  3.  Annual high risk screening breast MRI is recommended. ASSESSMENT/BI-RADS CATEGORY:  Left: 1 - Negative  Right: 2 - Benign  Overall: 2 - Benign     RECOMMENDATION:       - Continue annual screening mammography for both breasts. - Breast MRI Screening in 1 year for both breasts. I personally reviewed and interpreted the above laboratory and imaging data. Discussion/Summary: This is a 58-year-old female who presents to the office today for ampullary cancer surveillance, as well as continued thyroid nodule and high risk breast surveillance. At this time there is no evidence of disease recurrence on imaging or physical exam, and her tumor marker is in normal range. We will plan to repeat CT scan and blood work in 6 months, and see her in the office for another visit at that time. With regard to her thyroid nodules, these are stable on imaging, and we will plan to repeat ultrasound in 2 years. She will be due for mammogram in November. At her next visit, we will order her upcoming breast MRI and her neck screening mammogram.  With regard to her right chest wall discomfort, this appears to be musculoskeletal.  I have advised the patient to continue with Advil and heat or ice as needed for symptom management. If this pain does not resolve within the next 2 to 4 weeks, I have instructed her to call the office. She is agreeable to the plan, all questions have been answered today.

## 2023-08-11 ENCOUNTER — OFFICE VISIT (OUTPATIENT)
Dept: FAMILY MEDICINE CLINIC | Facility: CLINIC | Age: 68
End: 2023-08-11
Payer: MEDICARE

## 2023-08-11 VITALS
DIASTOLIC BLOOD PRESSURE: 100 MMHG | SYSTOLIC BLOOD PRESSURE: 160 MMHG | HEART RATE: 86 BPM | OXYGEN SATURATION: 97 % | WEIGHT: 174 LBS | BODY MASS INDEX: 31.83 KG/M2 | RESPIRATION RATE: 16 BRPM

## 2023-08-11 DIAGNOSIS — I10 PRIMARY HYPERTENSION: Primary | ICD-10-CM

## 2023-08-11 PROCEDURE — 99213 OFFICE O/P EST LOW 20 MIN: CPT | Performed by: FAMILY MEDICINE

## 2023-08-11 RX ORDER — LOSARTAN POTASSIUM AND HYDROCHLOROTHIAZIDE 12.5; 5 MG/1; MG/1
1 TABLET ORAL DAILY
Qty: 30 TABLET | Refills: 2 | Status: SHIPPED | OUTPATIENT
Start: 2023-08-11

## 2023-08-11 NOTE — PROGRESS NOTES
[facility] Office visit    Assessment/Plan:     1. Primary hypertension  Assessment & Plan:  Recent multiple elevated blood pressure readings   BP has been increasing over time   In office today 170/94, repeat 160/100  Family hx of HTN in brother and son   Patient has increased stress/anxiety due to her pmh of gallbladder cancer and high risk of breast cancer due to BRCA gene positive   · Start Losartan Hctz 50-12.5   · Continue monitoring BP at home and keep a log. Bring log with you to upcoming cardiology appt and next PCP follow up visit   · Urinalysis and albumin/cr ratio for baseline testing   · Follow up in 8 weeks     Orders:  -     losartan-hydrochlorothiazide (HYZAAR) 50-12.5 mg per tablet; Take 1 tablet by mouth daily  -     Albumin / creatinine urine ratio; Future  -     Urinalysis with microscopic        Return in about 8 weeks (around 10/6/2023) for BP follow up - bring BP logs . Subjective:   MAISHA Morales is a 76 y.o. female who is here today to follow up on her blood pressure. She has an appointment with cardiologist on 8/31/23, scheduled after ED visit where she had an abnormal EKG. She has never been on HTN medications before and is asking to start something today due to repeated elevated BP readings. She checks her BP at home but forgot to bring the log today. She states the average at home is 170/80. She denies any sleep issues at this visit, including snoring, gasping for air, unrefreshing sleep. Her son was also recently diagnosed with HTN and started on Metoprolol 50 mg and Lisinopril 40 mg daily which has normalized his BP. Review of Systems   Constitutional: Negative for chills and fever. HENT: Negative for ear pain and sore throat. Eyes: Negative for pain and visual disturbance. Respiratory: Negative for cough, chest tightness and shortness of breath. Cardiovascular: Negative for chest pain, palpitations and leg swelling.    Gastrointestinal: Negative for abdominal pain, constipation, diarrhea, nausea and vomiting. Genitourinary: Negative for difficulty urinating, dysuria and hematuria. Musculoskeletal: Negative for arthralgias and back pain. Skin: Negative for color change and rash. Neurological: Negative for dizziness, light-headedness and headaches. Psychiatric/Behavioral: The patient is nervous/anxious. All other systems reviewed and are negative. Objective:     /100   Pulse 86   Resp 16   Wt 78.9 kg (174 lb)   SpO2 97%   BMI 31.83 kg/m²      Physical Exam  Constitutional:       General: She is not in acute distress. Appearance: She is obese. HENT:      Head: Normocephalic and atraumatic. Mouth/Throat:      Mouth: Mucous membranes are moist.   Eyes:      General: No scleral icterus. Extraocular Movements: Extraocular movements intact. Conjunctiva/sclera: Conjunctivae normal.   Cardiovascular:      Rate and Rhythm: Normal rate and regular rhythm. Heart sounds: Normal heart sounds. No murmur heard. Pulmonary:      Effort: Pulmonary effort is normal. No respiratory distress. Breath sounds: Normal breath sounds. No wheezing, rhonchi or rales. Abdominal:      General: Bowel sounds are normal.      Palpations: Abdomen is soft. There is no mass. Tenderness: There is no abdominal tenderness. Musculoskeletal:      Cervical back: Normal range of motion. Right lower leg: No edema. Left lower leg: No edema. Skin:     General: Skin is warm. Neurological:      Mental Status: She is alert and oriented to person, place, and time.    Psychiatric:         Mood and Affect: Mood normal.         Behavior: Behavior normal.          ** Please Note: This note has been constructed using a voice recognition system Brea Hanley DO  08/11/23  2:31 PM

## 2023-08-11 NOTE — ASSESSMENT & PLAN NOTE
Recent multiple elevated blood pressure readings   BP has been increasing over time   In office today 170/94, repeat 160/100  Family hx of HTN in brother and son   Patient has increased stress/anxiety due to her pmh of gallbladder cancer and high risk of breast cancer due to BRCA gene positive   · Start Losartan Hctz 50-12.5   · Continue monitoring BP at home and keep a log.  Bring log with you to upcoming cardiology appt and next PCP follow up visit   · Urinalysis and albumin/cr ratio for baseline testing   · Follow up in 8 weeks

## 2023-08-12 ENCOUNTER — APPOINTMENT (EMERGENCY)
Dept: RADIOLOGY | Facility: HOSPITAL | Age: 68
End: 2023-08-12
Payer: MEDICARE

## 2023-08-12 ENCOUNTER — HOSPITAL ENCOUNTER (EMERGENCY)
Facility: HOSPITAL | Age: 68
Discharge: HOME | End: 2023-08-12
Attending: STUDENT IN AN ORGANIZED HEALTH CARE EDUCATION/TRAINING PROGRAM
Payer: MEDICARE

## 2023-08-12 VITALS
DIASTOLIC BLOOD PRESSURE: 70 MMHG | SYSTOLIC BLOOD PRESSURE: 141 MMHG | RESPIRATION RATE: 18 BRPM | TEMPERATURE: 98.2 F | HEART RATE: 78 BPM | OXYGEN SATURATION: 93 %

## 2023-08-12 DIAGNOSIS — R07.89 ATYPICAL CHEST PAIN: Primary | ICD-10-CM

## 2023-08-12 DIAGNOSIS — F41.9 ANXIETY: ICD-10-CM

## 2023-08-12 LAB
2HR DELTA HS TROPONIN: 0 NG/L
ALBUMIN SERPL BCP-MCNC: 4 G/DL (ref 3.5–5)
ALP SERPL-CCNC: 114 U/L (ref 34–104)
ALT SERPL W P-5'-P-CCNC: 30 U/L (ref 7–52)
ANION GAP SERPL CALCULATED.3IONS-SCNC: 6 MMOL/L
APTT PPP: 23 SECONDS (ref 23–37)
AST SERPL W P-5'-P-CCNC: 23 U/L (ref 13–39)
BASOPHILS # BLD AUTO: 0.04 THOUSANDS/ÂΜL (ref 0–0.1)
BASOPHILS NFR BLD AUTO: 1 % (ref 0–1)
BILIRUB SERPL-MCNC: 0.27 MG/DL (ref 0.2–1)
BNP SERPL-MCNC: 29 PG/ML (ref 0–100)
BUN SERPL-MCNC: 22 MG/DL (ref 5–25)
CALCIUM SERPL-MCNC: 9.6 MG/DL (ref 8.4–10.2)
CARDIAC TROPONIN I PNL SERPL HS: 5 NG/L
CARDIAC TROPONIN I PNL SERPL HS: 5 NG/L
CHLORIDE SERPL-SCNC: 105 MMOL/L (ref 96–108)
CO2 SERPL-SCNC: 27 MMOL/L (ref 21–32)
CREAT SERPL-MCNC: 0.66 MG/DL (ref 0.6–1.3)
D DIMER PPP FEU-MCNC: 1.56 UG/ML FEU
EOSINOPHIL # BLD AUTO: 0.09 THOUSAND/ÂΜL (ref 0–0.61)
EOSINOPHIL NFR BLD AUTO: 1 % (ref 0–6)
ERYTHROCYTE [DISTWIDTH] IN BLOOD BY AUTOMATED COUNT: 12.9 % (ref 11.6–15.1)
GFR SERPL CREATININE-BSD FRML MDRD: 91 ML/MIN/1.73SQ M
GLUCOSE SERPL-MCNC: 107 MG/DL (ref 65–140)
HCT VFR BLD AUTO: 42.8 % (ref 34.8–46.1)
HGB BLD-MCNC: 14 G/DL (ref 11.5–15.4)
IMM GRANULOCYTES # BLD AUTO: 0.01 THOUSAND/UL (ref 0–0.2)
IMM GRANULOCYTES NFR BLD AUTO: 0 % (ref 0–2)
INR PPP: 0.91 (ref 0.84–1.19)
LYMPHOCYTES # BLD AUTO: 1.21 THOUSANDS/ÂΜL (ref 0.6–4.47)
LYMPHOCYTES NFR BLD AUTO: 18 % (ref 14–44)
MAGNESIUM SERPL-MCNC: 2.1 MG/DL (ref 1.9–2.7)
MCH RBC QN AUTO: 30.8 PG (ref 26.8–34.3)
MCHC RBC AUTO-ENTMCNC: 32.7 G/DL (ref 31.4–37.4)
MCV RBC AUTO: 94 FL (ref 82–98)
MONOCYTES # BLD AUTO: 0.34 THOUSAND/ÂΜL (ref 0.17–1.22)
MONOCYTES NFR BLD AUTO: 5 % (ref 4–12)
NEUTROPHILS # BLD AUTO: 5.22 THOUSANDS/ÂΜL (ref 1.85–7.62)
NEUTS SEG NFR BLD AUTO: 75 % (ref 43–75)
NRBC BLD AUTO-RTO: 0 /100 WBCS
PLATELET # BLD AUTO: 312 THOUSANDS/UL (ref 149–390)
PMV BLD AUTO: 9.2 FL (ref 8.9–12.7)
POTASSIUM SERPL-SCNC: 4.4 MMOL/L (ref 3.5–5.3)
PROT SERPL-MCNC: 7.2 G/DL (ref 6.4–8.4)
PROTHROMBIN TIME: 12.4 SECONDS (ref 11.6–14.5)
RBC # BLD AUTO: 4.54 MILLION/UL (ref 3.81–5.12)
SODIUM SERPL-SCNC: 138 MMOL/L (ref 135–147)
WBC # BLD AUTO: 6.91 THOUSAND/UL (ref 4.31–10.16)

## 2023-08-12 PROCEDURE — 71045 X-RAY EXAM CHEST 1 VIEW: CPT

## 2023-08-12 PROCEDURE — 99285 EMERGENCY DEPT VISIT HI MDM: CPT

## 2023-08-12 PROCEDURE — 80053 COMPREHEN METABOLIC PANEL: CPT | Performed by: PHYSICIAN ASSISTANT

## 2023-08-12 PROCEDURE — 93005 ELECTROCARDIOGRAM TRACING: CPT

## 2023-08-12 PROCEDURE — 85025 COMPLETE CBC W/AUTO DIFF WBC: CPT | Performed by: PHYSICIAN ASSISTANT

## 2023-08-12 PROCEDURE — 83735 ASSAY OF MAGNESIUM: CPT | Performed by: PHYSICIAN ASSISTANT

## 2023-08-12 PROCEDURE — 83880 ASSAY OF NATRIURETIC PEPTIDE: CPT | Performed by: PHYSICIAN ASSISTANT

## 2023-08-12 PROCEDURE — 85610 PROTHROMBIN TIME: CPT | Performed by: PHYSICIAN ASSISTANT

## 2023-08-12 PROCEDURE — 85730 THROMBOPLASTIN TIME PARTIAL: CPT | Performed by: PHYSICIAN ASSISTANT

## 2023-08-12 PROCEDURE — 36415 COLL VENOUS BLD VENIPUNCTURE: CPT | Performed by: PHYSICIAN ASSISTANT

## 2023-08-12 PROCEDURE — 99285 EMERGENCY DEPT VISIT HI MDM: CPT | Performed by: PHYSICIAN ASSISTANT

## 2023-08-12 PROCEDURE — 84484 ASSAY OF TROPONIN QUANT: CPT | Performed by: PHYSICIAN ASSISTANT

## 2023-08-12 PROCEDURE — 85379 FIBRIN DEGRADATION QUANT: CPT | Performed by: PHYSICIAN ASSISTANT

## 2023-08-12 PROCEDURE — 71275 CT ANGIOGRAPHY CHEST: CPT

## 2023-08-12 RX ORDER — ALPRAZOLAM 0.25 MG/1
0.25 TABLET ORAL 3 TIMES DAILY PRN
Qty: 9 TABLET | Refills: 0 | Status: SHIPPED | OUTPATIENT
Start: 2023-08-12 | End: 2023-08-15

## 2023-08-12 RX ORDER — SODIUM CHLORIDE 9 MG/ML
3 INJECTION INTRAVENOUS
Status: DISCONTINUED | OUTPATIENT
Start: 2023-08-12 | End: 2023-08-12 | Stop reason: HOSPADM

## 2023-08-12 RX ORDER — LOSARTAN POTASSIUM 50 MG/1
50 TABLET ORAL DAILY
Status: DISCONTINUED | OUTPATIENT
Start: 2023-08-12 | End: 2023-08-12 | Stop reason: HOSPADM

## 2023-08-12 RX ORDER — LABETALOL HYDROCHLORIDE 5 MG/ML
10 INJECTION, SOLUTION INTRAVENOUS ONCE
Status: DISCONTINUED | OUTPATIENT
Start: 2023-08-12 | End: 2023-08-12

## 2023-08-12 RX ORDER — ALPRAZOLAM 0.25 MG/1
0.5 TABLET ORAL 3 TIMES DAILY PRN
Qty: 18 TABLET | Refills: 0 | Status: SHIPPED | OUTPATIENT
Start: 2023-08-12 | End: 2023-08-12 | Stop reason: SDUPTHER

## 2023-08-12 RX ORDER — ASPIRIN 81 MG/1
324 TABLET, CHEWABLE ORAL ONCE
Status: COMPLETED | OUTPATIENT
Start: 2023-08-12 | End: 2023-08-12

## 2023-08-12 RX ORDER — HYDROCHLOROTHIAZIDE 12.5 MG/1
12.5 TABLET ORAL DAILY
Status: DISCONTINUED | OUTPATIENT
Start: 2023-08-12 | End: 2023-08-12 | Stop reason: HOSPADM

## 2023-08-12 RX ADMIN — IOHEXOL 100 ML: 350 INJECTION, SOLUTION INTRAVENOUS at 10:06

## 2023-08-12 RX ADMIN — LOSARTAN POTASSIUM 50 MG: 50 TABLET, FILM COATED ORAL at 09:39

## 2023-08-12 RX ADMIN — HYDROCHLOROTHIAZIDE 12.5 MG: 12.5 TABLET ORAL at 09:40

## 2023-08-12 RX ADMIN — ASPIRIN 81 MG CHEWABLE TABLET 324 MG: 81 TABLET CHEWABLE at 08:51

## 2023-08-12 NOTE — ED PROVIDER NOTES
History  Chief Complaint   Patient presents with   • Chest Pain     Pt c/o chest pain since last night. 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 The patient's right-sided chest pain has been ongoing for a number of weeks. She states "I just feel really anxious". Pt describes the chest pain as dull/aching, located at the right midclavicular line at approximately T5-6. The pain does not radiate. She states the pain is usually moderate and does improve when she applies ice to the area. The pain is unchanged since it started. She denies worsening pain with breathing. The pain does not change with movement, raising her arm. She denies trauma or recent injury. She specifically denies abdominal pain, cough, fever, chills, nausea, vomiting, dizziness, palpitations, orthopnea, PND, shortness of breath, syncope, generalized weakness or diaphoresis. She was evaluated by her PCP for this complaint earlier last week, she has an appointment next week with Dr. Sang Ram of cardiology. DDx includes pneumonia, PE, CAD, musculoskeletal pain, fracture, pathologic lesion, metastatic lesion      Chest Pain  Associated symptoms: no abdominal pain, no back pain, no cough, no fever, no palpitations, no shortness of breath and not vomiting        Prior to Admission Medications   Prescriptions Last Dose Informant Patient Reported? Taking?    Medical ID Plate MISC  Self No No   Sig: Use once for 1 dose Severely and permanently limited in the ability to walk because of an arthritic, neurological, or orthopedic condition: or cannot walk two hundred feet without stopping to rest and meets requirements for disability license plate   Multiple Vitamin (multivitamin) tablet  Self Yes No   Sig: Take 1 tablet by mouth daily   acetaminophen (TYLENOL) 500 mg tablet  Self Yes No   Sig: Take 1,000 mg by mouth   losartan-hydrochlorothiazide (HYZAAR) 50-12.5 mg per tablet   No No   Sig: Take 1 tablet by mouth daily      Facility-Administered Medications: None       Past Medical History:   Diagnosis Date   • BRCA1 positive    • BRCA2 positive    • Cancer ([address])     pancreatic   • History of chemotherapy     pancreatic cancer   • History of radiation therapy    • Pancreatic carcinoma ([address])        Past Surgical History:   Procedure Laterality Date   • ABLATION SOFT TISSUE N/A 4/26/2021    Procedure: INTRAOPERATIVE ULTRASOUND, ABLATION,SOFT TISSUE PANCREAS;  Surgeon: Rach Bain MD;  [facility];  Service: Surgical Oncology   • CHOLECYSTECTOMY N/A 4/26/2021    Procedure: CHOLECYSTECTOMY;  Surgeon: Rach Bain MD;  [facility];  Service: Surgical Oncology   • FL GUIDED CENTRAL VENOUS ACCESS DEVICE INSERTION  6/2/2021   • HYSTERECTOMY     • LAPAROTOMY N/A 4/26/2021    Procedure: LAPAROTOMY EXPLORATORY;  Surgeon: Rach Bain MD;  [facility];  Service: Surgical Oncology   • OOPHORECTOMY     • SINUS SURGERY     • TUNNELED VENOUS PORT PLACEMENT Left 6/2/2021    Procedure: INSERTION VENOUS PORT (PORT-A-CATH); Surgeon: Rach Bain MD;  [facility]: [facility];  Service: Surgical Oncology   • US GUIDED THYROID BIOPSY  7/13/2022   • WHIPPLE PROCEDURE/PANCREATICO-DUODENECTOMY N/A 4/26/2021    Procedure:  WHIPPLE PROCEDURE/PANCREATICO-DUODENECTOMY; PYLORIC PRESERVING;  Surgeon: Rach Bain MD;  [facility]: [facility];  Service: Surgical Oncology       Family History   Problem Relation Age of Onset   • Ulcerative colitis Mother    • Prostate cancer Father    • Melanoma Sister    • Heart disease Brother    • Prostate cancer Brother    • No Known Problems Brother    • No Known Problems Maternal Uncle    • No Known Problems Paternal Uncle      I have reviewed and agree with the history as documented. E-Cigarette/Vaping   • E-Cigarette Use Never User      E-Cigarette/Vaping Substances   • Nicotine No    • THC No    • CBD No    • Flavoring No    • Other No    • Unknown No      Social History     Tobacco Use   • Smoking status: Some Days     Packs/day: 0.50     Types: Cigarettes     Start date: 65     Last attempt to quit: 2021     Years since quittin.3   • Smokeless tobacco: Never   • Tobacco comments:     recently stop smoking , pt stated she smoke occ. 1-2 cigg some days 2022. Vaping Use   • Vaping Use: Never used   Substance Use Topics   • Alcohol use: Never   • Drug use: Never       Review of Systems   Constitutional: Negative for chills and fever. HENT: Negative for ear pain and sore throat. Eyes: Negative for pain and visual disturbance. Respiratory: Negative for cough and shortness of breath. Cardiovascular: Positive for chest pain. Negative for palpitations. Gastrointestinal: Negative for abdominal pain and vomiting. Endocrine: Negative. Genitourinary: Negative for dysuria and hematuria. Musculoskeletal: Negative for arthralgias and back pain. Skin: Negative for color change and rash. Allergic/Immunologic: Negative. Neurological: Negative. Negative for seizures and syncope. Hematological: Negative. Psychiatric/Behavioral: Negative. Anxiety   All other systems reviewed and are negative. Physical Exam  Physical Exam  Vitals and nursing note reviewed. Constitutional:       General: She is not in acute distress. Appearance: She is well-developed. She is not ill-appearing, toxic-appearing or diaphoretic. HENT:      Head: Normocephalic and atraumatic. Eyes:      Conjunctiva/sclera: Conjunctivae normal.      Pupils: Pupils are equal, round, and reactive to light. Neck:      Thyroid: No thyromegaly. Vascular: No hepatojugular reflux or JVD. Cardiovascular:      Rate and Rhythm: Normal rate and regular rhythm. Pulses:           Carotid pulses are 2+ on the right side and 2+ on the left side. Radial pulses are 2+ on the right side and 2+ on the left side. Dorsalis pedis pulses are 2+ on the right side and 2+ on the left side. Posterior tibial pulses are 2+ on the right side and 2+ on the left side. Heart sounds: Normal heart sounds. No murmur heard. Pulmonary:      Effort: Pulmonary effort is normal. No respiratory distress. Breath sounds: Normal breath sounds. Chest:      Chest wall: Tenderness present. No deformity, crepitus or edema. There is no dullness to percussion. Abdominal:      General: Bowel sounds are normal.      Palpations: Abdomen is soft. Tenderness: There is no abdominal tenderness. Musculoskeletal:         General: No swelling. Normal range of motion. Cervical back: Normal range of motion and neck supple. Right lower leg: No tenderness. No edema. Left lower leg: No tenderness. No edema. Lymphadenopathy:      Cervical: No cervical adenopathy. Skin:     General: Skin is warm and dry. Capillary Refill: Capillary refill takes less than 2 seconds. Neurological:      General: No focal deficit present. Mental Status: She is alert and oriented to person, place, and time. Psychiatric:         Mood and Affect: Mood is anxious.          Vital Signs  ED Triage Vitals [08/12/23 0817]   Temperature Pulse Respirations Blood Pressure SpO2   98.2 °F (36.8 °C) (!) 110 20 (!) 193/102 94 %      Temp Source Heart Rate Source Patient Position - Orthostatic VS BP Location FiO2 (%)   Oral Monitor Sitting Right arm --      Pain Score       --           Vitals:    08/12/23 0923 08/12/23 1000 08/12/23 1041 08/12/23 1115   BP: 146/76 146/89 149/73 141/70   Pulse: 79 78 83 78   Patient Position - Orthostatic VS: Sitting  Lying          Visual Acuity      ED Medications  Medications   aspirin chewable tablet 324 mg (324 mg Oral Given 8/12/23 0851)   iohexol (OMNIPAQUE) 350 MG/ML injection (SINGLE-DOSE) 100 mL (100 mL Intravenous Given 8/12/23 1006)       Diagnostic Studies  Results Reviewed     Procedure Component Value Units Date/Time    HS Troponin I 2hr [[id number]]  (Normal) Collected: 08/12/23 1041    Lab Status: Final result Specimen: Blood from Arm, Left Updated: 08/12/23 1111     hs TnI 2hr 5 ng/L      Delta 2hr hsTnI 0 ng/L     HS Troponin 0hr (reflex protocol) [[id number]]  (Normal) Collected: 08/12/23 0851    Lab Status: Final result Specimen: Blood from Arm, Left Updated: 08/12/23 0925     hs TnI 0hr 5 ng/L     B-Type Natriuretic Peptide(BNP) [[id number]]  (Normal) Collected: 08/12/23 0851    Lab Status: Final result Specimen: Blood from Arm, Left Updated: 08/12/23 0924     BNP 29 pg/mL     Comprehensive metabolic panel [[id number]]  (Abnormal) Collected: 08/12/23 0851    Lab Status: Final result Specimen: Blood from Arm, Left Updated: 08/12/23 0918     Sodium 138 mmol/L      Potassium 4.4 mmol/L      Chloride 105 mmol/L      CO2 27 mmol/L      ANION GAP 6 mmol/L      BUN 22 mg/dL      Creatinine 0.66 mg/dL      Glucose 107 mg/dL      Calcium 9.6 mg/dL      AST 23 U/L      ALT 30 U/L      Alkaline Phosphatase 114 U/L      Total Protein 7.2 g/dL      Albumin 4.0 g/dL      Total Bilirubin 0.27 mg/dL      eGFR 91 ml/min/1.73sq m     Narrative:      [place] guidelines for Chronic Kidney Disease (CKD):   •  Stage 1 with normal or high GFR (GFR > 90 mL/min/1.73 square meters)  •  Stage 2 Mild CKD (GFR = 60-89 mL/min/1.73 square meters)  •  Stage 3A Moderate CKD (GFR = 45-59 mL/min/1.73 square meters)  •  Stage 3B Moderate CKD (GFR = 30-44 mL/min/1.73 square meters)  •  Stage 4 Severe CKD (GFR = 15-29 mL/min/1.73 square meters)  •  Stage 5 End Stage CKD (GFR <15 mL/min/1.73 square meters)  Note: GFR calculation is accurate only with a steady state creatinine    Magnesium [[id number]]  (Normal) Collected: 08/12/23 0851    Lab Status: Final result Specimen: Blood from Arm, Left Updated: 08/12/23 0918     Magnesium 2.1 mg/dL     D-dimer, quantitative [[id number]]  (Abnormal) Collected: 08/12/23 0851    Lab Status: Final result Specimen: Blood from Arm, Left Updated: 08/12/23 2838     D-Dimer, Quant 1.56 ug/ml FEU     Narrative: In the evaluation for possible pulmonary embolism, in the appropriate (Well's Score of 4 or less) patient, the age adjusted d-dimer cutoff for this patient can be calculated as:    Age x 0.01 (in ug/mL) for Age-adjusted D-dimer exclusion threshold for a patient over 50 years.     APTT [[id number]]  (Normal) Collected: 08/12/23 0851    Lab Status: Final result Specimen: Blood from Arm, Left Updated: 08/12/23 0914     PTT 23 seconds     Protime-INR [[id number]]  (Normal) Collected: 08/12/23 0851    Lab Status: Final result Specimen: Blood from Arm, Left Updated: 08/12/23 0914     Protime 12.4 seconds      INR 0.91    CBC and differential [[id number]] Collected: 08/12/23 0851    Lab Status: Final result Specimen: Blood from Arm, Left Updated: 08/12/23 0857     WBC 6.91 Thousand/uL      RBC 4.54 Million/uL      Hemoglobin 14.0 g/dL      Hematocrit 42.8 %      MCV 94 fL      MCH 30.8 pg      MCHC 32.7 g/dL      RDW 12.9 %      MPV 9.2 fL      Platelets 644 Thousands/uL      nRBC 0 /100 WBCs      Neutrophils Relative 75 %      Immat GRANS % 0 %      Lymphocytes Relative 18 %      Monocytes Relative 5 %      Eosinophils Relative 1 %      Basophils Relative 1 %      Neutrophils Absolute 5.22 Thousands/µL      Immature Grans Absolute 0.01 Thousand/uL      Lymphocytes Absolute 1.21 Thousands/µL      Monocytes Absolute 0.34 Thousand/µL      Eosinophils Absolute 0.09 Thousand/µL      Basophils Absolute 0.04 Thousands/µL                  CTA ED chest PE Study   ED Interpretation by Arlene Lara PA-C (08/12 1659)      No pulmonary embolus. No acute pulmonary disease. Workstation performed: [id number]         Final Result by Sherly Bass MD (08/12 1049)      No pulmonary embolus. No acute pulmonary disease.             Workstation performed: [id number]         X-ray chest 1 view portable    (Results Pending)              Procedures  ECG 12 Lead Documentation Only    Date/Time: 8/12/2023 8:18 AM    Performed by: Geena Chavez PA-C  Authorized by: Geena Chavez PA-C    Patient location:  ED  Previous ECG:     Previous ECG:  Compared to current    Comparison ECG info:  No change in morphology  Interpretation:     Interpretation: abnormal    Rate:     ECG rate:  106    ECG rate assessment: tachycardic    Rhythm:     Rhythm: sinus rhythm    Ectopy:     Ectopy: none    QRS:     QRS axis:  Normal  Conduction:     Conduction: abnormal      Abnormal conduction: complete LBBB    ST segments:     ST segments:  Normal  T waves:     T waves: normal    Other findings:     Other findings: LVH    ECG 12 Lead Documentation Only    Date/Time: 8/12/2023 11:07 AM    Performed by: Geena Chavez PA-C  Authorized by: Geena Chavez PA-C    ECG reviewed by me, the ED Provider: yes    Patient location:  ED  Previous ECG:     Previous ECG:  Compared to current    Similarity:  No change  Interpretation:     Interpretation: abnormal    Rate:     ECG rate:  68    ECG rate assessment: normal    Rhythm:     Rhythm: sinus rhythm    Ectopy:     Ectopy: none    Conduction:     Conduction: abnormal      Abnormal conduction: complete LBBB    ST segments:     ST segments:  Normal  Other findings:     Other findings: LVH               ED Course             HEART Risk Score    Flowsheet Row Most Recent Value   Heart Score Risk Calculator    History 0 Filed at: 08/12/2023 1338   ECG 1 Filed at: 08/12/2023 1338   Age 2 Filed at: 08/12/2023 1338   Risk Factors 1 Filed at: 08/12/2023 1338   Troponin 0 Filed at: 08/12/2023 1338   HEART Score 4 Filed at: 08/12/2023 1338                            Wells' Criteria for PE    Flowsheet Row Most Recent Value   Wells' Criteria for PE    Clinical signs and symptoms of DVT 0 Filed at: 08/12/2023 1048   PE is primary diagnosis or equally likely 3 Filed at: 08/12/2023 1048   HR >100 1.5 Filed at: 08/12/2023 1048   Immobilization at least 3 days or Surgery in the previous 4 weeks 0 Filed at: 08/12/2023 1048   Previous, objectively diagnosed PE or DVT 0 Filed at: 08/12/2023 1048   Hemoptysis 0 Filed at: 08/12/2023 1048   Malignancy with treatment within 6 months or palliative 1 Filed at: 08/12/2023 1048   Wells' Criteria Total 5.5 Filed at: 08/12/2023 1048                Medical Decision Making  Anxiety: acute illness or injury     Details: Patient with increased anxiety  Discharged with 3 days of Xanax  She will follow-up with her PCP  Atypical chest pain: acute illness or injury     Details: Pain is likely musculoskeletal in nature  EKG at baseline with some noted tachycardia on presentation  Tachycardia resolved while being observed in the ED  CTA without evidence of acute pathology  Troponins negative x2  Heart score is 5-patient already scheduled to follow-up with Dr. Artur Angelo of cardiology  Amount and/or Complexity of Data Reviewed  External Data Reviewed: labs and notes. Labs: ordered. Radiology: ordered. Decision-making details documented in ED Course. ECG/medicine tests: ordered and independent interpretation performed. Decision-making details documented in ED Course. Risk  OTC drugs. Prescription drug management.           Disposition  Final diagnoses:   Atypical chest pain   Anxiety     Time reflects when diagnosis was documented in both MDM as applicable and the Disposition within this note     Time User Action Codes Description Comment    8/12/2023 11:21 AM Sukhdev Knee Add [R07.89] Atypical chest pain     8/12/2023 11:21 AM Tee Baxter Add [F41.9] Anxiety       ED Disposition     ED Disposition   Discharge    Condition   Stable    Date/Time   Sat Aug 12, 2023 11:21 AM    Comment   Krystal Sprague discharge to home/self care.                Follow-up Information     Follow up With Specialties Details Why Contact Info Additional Information    Physical Therapy at [facility] Physical Therapy Schedule an appointment as soon as possible for a visit   Vipul Rodriguez  [address] [place] [address]  [phone] Physical Therapy at [facility], [place] [address], [address] [facility]    [address] Emergency Department Emergency Medicine Go to  As needed [address] [address]  [address] [facility] Emergency Department, [address], [place], [address] [facility] Medicine Call  As needed [address]  [phone]             Discharge Medication List as of 8/12/2023 11:33 AM      CONTINUE these medications which have CHANGED    Details   ALPRAZolam (XANAX) 0.25 mg tablet Take 1 tablet (0.25 mg total) by mouth 3 (three) times a day as needed for anxiety for up to 3 days, Starting Sat 8/12/2023, Until Tue 8/15/2023 at 2359, Normal         CONTINUE these medications which have NOT CHANGED    Details   acetaminophen (TYLENOL) 500 mg tablet Take 1,000 mg by mouth, Starting Wed 5/5/2021, Historical Med      losartan-hydrochlorothiazide (HYZAAR) 50-12.5 mg per tablet Take 1 tablet by mouth daily, Starting Fri 8/11/2023, Normal      Medical ID Plate MISC Use once for 1 dose Severely and permanently limited in the ability to walk because of an arthritic, neurological, or orthopedic condition: or cannot walk two hundred feet without stopping to rest and meets requirements for disability license plate, Star ting Thu 9/9/2021, Print      Multiple Vitamin (multivitamin) tablet Take 1 tablet by mouth daily, Historical Med             No discharge procedures on file.     PDMP Review       Value Time User    PDMP Reviewed  Yes 5/3/2021 10:12 AM Daryn Pierce PA-C          ED Provider  Electronically Signed by           Stella Lopez PA-C  08/12/23 [id number]

## 2023-08-14 ENCOUNTER — APPOINTMENT (OUTPATIENT)
Dept: LAB | Facility: CLINIC | Age: 68
End: 2023-08-14
Payer: MEDICARE

## 2023-08-14 DIAGNOSIS — I10 PRIMARY HYPERTENSION: ICD-10-CM

## 2023-08-14 LAB
ATRIAL RATE: 106 BPM
ATRIAL RATE: 68 BPM
BACTERIA UR QL AUTO: ABNORMAL /HPF
BILIRUB UR QL STRIP: NEGATIVE
CLARITY UR: ABNORMAL
COLOR UR: YELLOW
CREAT UR-MCNC: 54 MG/DL
GLUCOSE UR STRIP-MCNC: NEGATIVE MG/DL
HGB UR QL STRIP.AUTO: NEGATIVE
KETONES UR STRIP-MCNC: NEGATIVE MG/DL
LEUKOCYTE ESTERASE UR QL STRIP: ABNORMAL
MICROALBUMIN UR-MCNC: 9.2 MG/L (ref 0–20)
MICROALBUMIN/CREAT 24H UR: 17 MG/G CREATININE (ref 0–30)
MUCOUS THREADS UR QL AUTO: ABNORMAL
NITRITE UR QL STRIP: NEGATIVE
NON-SQ EPI CELLS URNS QL MICRO: ABNORMAL /HPF
P AXIS: 72 DEGREES
P AXIS: 75 DEGREES
PH UR STRIP.AUTO: 6.5 [PH]
PR INTERVAL: 170 MS
PR INTERVAL: 178 MS
PROT UR STRIP-MCNC: NEGATIVE MG/DL
QRS AXIS: -48 DEGREES
QRS AXIS: -70 DEGREES
QRSD INTERVAL: 120 MS
QRSD INTERVAL: 130 MS
QT INTERVAL: 358 MS
QT INTERVAL: 410 MS
QTC INTERVAL: 435 MS
QTC INTERVAL: 475 MS
RBC #/AREA URNS AUTO: ABNORMAL /HPF
SP GR UR STRIP.AUTO: 1.01 (ref 1–1.03)
T WAVE AXIS: 33 DEGREES
T WAVE AXIS: 80 DEGREES
UROBILINOGEN UR STRIP-ACNC: <2 MG/DL
VENTRICULAR RATE: 106 BPM
VENTRICULAR RATE: 68 BPM
WBC #/AREA URNS AUTO: ABNORMAL /HPF

## 2023-08-14 PROCEDURE — 93010 ELECTROCARDIOGRAM REPORT: CPT | Performed by: INTERNAL MEDICINE

## 2023-08-14 PROCEDURE — 82570 ASSAY OF URINE CREATININE: CPT

## 2023-08-14 PROCEDURE — 82043 UR ALBUMIN QUANTITATIVE: CPT

## 2023-08-14 PROCEDURE — 81001 URINALYSIS AUTO W/SCOPE: CPT

## 2023-08-16 ENCOUNTER — TELEPHONE (OUTPATIENT)
Dept: HEMATOLOGY ONCOLOGY | Facility: MEDICAL CENTER | Age: 68
End: 2023-08-16

## 2023-08-16 NOTE — TELEPHONE ENCOUNTER
Received TEAMs message from patient:  Chan Santos, this is Susi Sunita calling. My birthday is 65. I just want to let you know that I did make an appointment with [facility] and they did put me on blood pressure medicine for my for my blood pressure. You could please give me a call back at [phone]. Thank you and have a great day.     Patient will f/u with cardiology and PCP

## 2023-08-19 ENCOUNTER — HOSPITAL ENCOUNTER (EMERGENCY)
Facility: HOSPITAL | Age: 68
Discharge: HOME | End: 2023-08-19
Attending: EMERGENCY MEDICINE | Admitting: EMERGENCY MEDICINE
Payer: MEDICARE

## 2023-08-19 VITALS
RESPIRATION RATE: 20 BRPM | OXYGEN SATURATION: 95 % | TEMPERATURE: 97.8 F | DIASTOLIC BLOOD PRESSURE: 81 MMHG | SYSTOLIC BLOOD PRESSURE: 168 MMHG | HEART RATE: 89 BPM

## 2023-08-19 DIAGNOSIS — F41.9 ANXIETY: Primary | ICD-10-CM

## 2023-08-19 PROCEDURE — 99283 EMERGENCY DEPT VISIT LOW MDM: CPT

## 2023-08-19 PROCEDURE — 99284 EMERGENCY DEPT VISIT MOD MDM: CPT | Performed by: PHYSICIAN ASSISTANT

## 2023-08-19 NOTE — ED PROVIDER NOTES
History  Chief Complaint   Patient presents with   • Anxiety     Pt states she has been feeling very anxious. Was seen in ED recently and prescribed xanax but has since run out, pt states it was helping her     Patient is a 71-year-old white female with history of positive BRCA 1 and 2 and prior Whipple procedure for pancreatic cancer who presents ER with complaint of generalized anxiety for the last month. States she woke today and was thinking about finances and her son having to support her when she felt anxious and sweaty. Feels she gets emotional ever since she had chemo and radiation 2 years ago. She denies SI or HI. She smokes 2 to 3 cigarettes a day. She denies alcohol or illicit drug use. She was seen in this ED 7 days ago for similar complaints. She had a full work-up including blood work and CT imaging. She was advised to follow-up with her primary care provider [facility]) but did not. She denies chest pain or shortness of breath. Denies abdominal pain. Denies nausea vomiting or diarrhea. Prior to Admission Medications   Prescriptions Last Dose Informant Patient Reported? Taking?    ALPRAZolam (XANAX) 0.25 mg tablet   No No   Sig: Take 1 tablet (0.25 mg total) by mouth 3 (three) times a day as needed for anxiety for up to 3 days   Medical ID Plate MISC  Self No No   Sig: Use once for 1 dose Severely and permanently limited in the ability to walk because of an arthritic, neurological, or orthopedic condition: or cannot walk two hundred feet without stopping to rest and meets requirements for disability license plate   Multiple Vitamin (multivitamin) tablet  Self Yes No   Sig: Take 1 tablet by mouth daily   acetaminophen (TYLENOL) 500 mg tablet  Self Yes No   Sig: Take 1,000 mg by mouth   losartan-hydrochlorothiazide (HYZAAR) 50-12.5 mg per tablet   No No   Sig: Take 1 tablet by mouth daily      Facility-Administered Medications: None       Past Medical History:   Diagnosis Date   • BRCA1 positive    • BRCA2 positive    • Cancer (HCC)     pancreatic   • History of chemotherapy     pancreatic cancer   • History of radiation therapy    • Pancreatic carcinoma [facility])        Past Surgical History:   Procedure Laterality Date   • ABLATION SOFT TISSUE N/A 4/26/2021    Procedure: INTRAOPERATIVE ULTRASOUND, ABLATION,SOFT TISSUE PANCREAS;  Surgeon: Rudolph Souza MD;  [facility];  Service: Surgical Oncology   • CHOLECYSTECTOMY N/A 4/26/2021    Procedure: CHOLECYSTECTOMY;  Surgeon: Rudolph Souza MD;  [facility];  Service: Surgical Oncology   • FL GUIDED CENTRAL VENOUS ACCESS DEVICE INSERTION  6/2/2021   • HYSTERECTOMY     • LAPAROTOMY N/A 4/26/2021    Procedure: LAPAROTOMY EXPLORATORY;  Surgeon: Rudolph Sozua MD;  [facility];  Service: Surgical Oncology   • OOPHORECTOMY     • SINUS SURGERY     • TUNNELED VENOUS PORT PLACEMENT Left 6/2/2021    Procedure: INSERTION VENOUS PORT (PORT-A-CATH); Surgeon: Rudolph Souza MD;  [facility];  Service: Surgical Oncology   • US GUIDED THYROID BIOPSY  7/13/2022   • WHIPPLE PROCEDURE/PANCREATICO-DUODENECTOMY N/A 4/26/2021    Procedure: WHIPPLE PROCEDURE/PANCREATICO-DUODENECTOMY; PYLORIC PRESERVING;  Surgeon: Rudolph Souza MD;  [facility];  Service: Surgical Oncology       Family History   Problem Relation Age of Onset   • Ulcerative colitis Mother    • Prostate cancer Father    • Melanoma Sister    • Heart disease Brother    • Prostate cancer Brother    • No Known Problems Brother    • No Known Problems Maternal Uncle    • No Known Problems Paternal Uncle      I have reviewed and agree with the history as documented.     E-Cigarette/Vaping   • E-Cigarette Use Never User      E-Cigarette/Vaping Substances   • Nicotine No    • THC No    • CBD No    • Flavoring No    • Other No    • Unknown No      Social History     Tobacco Use   • Smoking status: Some Days     Packs/day: 0.50     Types: Cigarettes     Start date: 65 Last attempt to quit: 2021     Years since quittin.3   • Smokeless tobacco: Never   • Tobacco comments:     recently stop smoking , pt stated she smoke occ. 1-2 cigg some days 2022. Vaping Use   • Vaping Use: Never used   Substance Use Topics   • Alcohol use: Never   • Drug use: Never       Review of Systems   Constitutional: Negative for chills and fever. HENT: Negative for ear pain and sore throat. Eyes: Negative for pain. Respiratory: Negative for cough and shortness of breath. Cardiovascular: Negative for chest pain and palpitations. Gastrointestinal: Negative for abdominal pain and vomiting. Genitourinary: Negative for dysuria and hematuria. Musculoskeletal: Negative for arthralgias and back pain. Skin: Negative for color change and rash. Neurological: Negative for syncope and headaches. Psychiatric/Behavioral: Negative for suicidal ideas. The patient is nervous/anxious. All other systems reviewed and are negative. Physical Exam  Physical Exam  Vitals and nursing note reviewed. Constitutional:       Appearance: Normal appearance. She is not ill-appearing, toxic-appearing or diaphoretic. HENT:      Head: Normocephalic and atraumatic. Right Ear: Tympanic membrane, ear canal and external ear normal.      Left Ear: Tympanic membrane, ear canal and external ear normal.      Nose: Nose normal.      Mouth/Throat:      Mouth: Mucous membranes are moist.      Pharynx: Oropharynx is clear. Eyes:      Extraocular Movements: Extraocular movements intact. Conjunctiva/sclera: Conjunctivae normal.      Pupils: Pupils are equal, round, and reactive to light. Cardiovascular:      Rate and Rhythm: Normal rate and regular rhythm. Pulses: Normal pulses. Pulmonary:      Effort: Pulmonary effort is normal.      Breath sounds: Normal breath sounds. Abdominal:      General: Abdomen is flat. Bowel sounds are normal.      Palpations: Abdomen is soft. Musculoskeletal:         General: Normal range of motion. Cervical back: Normal range of motion and neck supple. Skin:     General: Skin is warm and dry. Capillary Refill: Capillary refill takes less than 2 seconds. Neurological:      General: No focal deficit present. Mental Status: She is alert and oriented to person, place, and time. Mental status is at baseline. Psychiatric:      Comments: Pleasant, anxious affect          Vital Signs  ED Triage Vitals [08/19/23 0910]   Temperature Pulse Respirations Blood Pressure SpO2   97.8 °F (36.6 °C) 89 20 168/81 95 %      Temp Source Heart Rate Source Patient Position - Orthostatic VS BP Location FiO2 (%)   Tympanic Monitor Sitting Left arm --      Pain Score       --           Vitals:    08/19/23 0910   BP: 168/81   Pulse: 89   Patient Position - Orthostatic VS: Sitting         Visual Acuity      ED Medications  Medications - No data to display    Diagnostic Studies  Results Reviewed     None                 No orders to display              Procedures  Procedures         ED Course                                             Medical Decision Making  69-year-old white female presenting with generalized anxiety for the last 1 month. This is second ED visit in the last 7 days. Vital signs are stable. Patient declined offer of ED behavioral health. States she will follow-up with her primary care provider-Abimael. I Tiger texted [facility] senior resident Dr. Krystyna Huerta who will message the office to contact patient Monday to schedule outpatient follow-up at [facility]. Return precautions given. Patient denies SI or HI.         Disposition  Final diagnoses:   Anxiety     Time reflects when diagnosis was documented in both MDM as applicable and the Disposition within this note     Time User Action Codes Description Comment    8/19/2023  9:47 AM Bro Angela Add [F41.9] Anxiety       ED Disposition     ED Disposition   Discharge    Condition Stable    Date/Time   Sat Aug 19, 2023  9:47 AM    Comment   Chiki Marsh discharge to home/self care. Follow-up Information     Follow up With Special[facility] Details [facility] [address] [id number]  [phone]            Patient's Medications   Discharge Prescriptions    No medications on file       No discharge procedures on file.     PDMP Review       Value Time User    PDMP Reviewed  Yes 5/3/2021 10:12 AM Emmie Amezcua PA-C          ED Provider  Electronically Signed by           Glenna Loyola PA-C  08/19/23 [id number]

## 2023-08-19 NOTE — DISCHARGE INSTRUCTIONS
Expect call from your primary care provider Monday to schedule follow-up at [facility] office.     If you have not heard from the, contact the office to make appointment    Return to ED for new or worsening symptoms

## 2023-08-21 ENCOUNTER — TELEPHONE (OUTPATIENT)
Dept: FAMILY MEDICINE CLINIC | Facility: CLINIC | Age: 68
End: 2023-08-21

## 2023-08-21 NOTE — TELEPHONE ENCOUNTER
VM on appt line:    Yes, this is Altagracia Dong calling. I need to make a follow up visit because I was in the emergency room on Saturday. Again from my anxiety. If you could please give me a call back at [phone]. My YOB: 1955. I appreciate it. Have a great day. Chevy    This message was taken care of - called left VM    Yes, this is Chilango Tamez calling. My YOB: 1955. I was in the emergency room on Saturday for anxiety and I need to make an appointment for a follow up visit with a physician. Please, if you could, please give me a call back at [phone]. Thank you so much and have a great day. This message was taken care of - pt called back scheduled appt.

## 2023-08-22 ENCOUNTER — TELEPHONE (OUTPATIENT)
Dept: FAMILY MEDICINE CLINIC | Facility: CLINIC | Age: 68
End: 2023-08-22

## 2023-08-22 NOTE — TELEPHONE ENCOUNTER
[facility]    Also, patient Obdulia Wilson 1955 was in the ED yesterday for behavioral health issue, she denied any further interventions however MD from ER asked me for us to follow-up closely with her, may be we can schedule her for an appointment sometime soon.        Scheduled appt

## 2023-08-31 ENCOUNTER — CONSULT (OUTPATIENT)
Dept: CARDIOLOGY CLINIC | Facility: CLINIC | Age: 68
End: 2023-08-31
Payer: MEDICARE

## 2023-08-31 ENCOUNTER — TELEMEDICINE (OUTPATIENT)
Dept: FAMILY MEDICINE CLINIC | Facility: CLINIC | Age: 68
End: 2023-08-31

## 2023-08-31 VITALS
HEART RATE: 73 BPM | WEIGHT: 176 LBS | OXYGEN SATURATION: 95 % | SYSTOLIC BLOOD PRESSURE: 132 MMHG | BODY MASS INDEX: 32.39 KG/M2 | HEIGHT: 62 IN | DIASTOLIC BLOOD PRESSURE: 80 MMHG

## 2023-08-31 DIAGNOSIS — F41.9 ANXIETY: Primary | ICD-10-CM

## 2023-08-31 DIAGNOSIS — R07.9 CHEST PAIN: Primary | ICD-10-CM

## 2023-08-31 DIAGNOSIS — I10 PRIMARY HYPERTENSION: ICD-10-CM

## 2023-08-31 PROCEDURE — 99214 OFFICE O/P EST MOD 30 MIN: CPT | Performed by: INTERNAL MEDICINE

## 2023-08-31 PROCEDURE — 93000 ELECTROCARDIOGRAM COMPLETE: CPT | Performed by: INTERNAL MEDICINE

## 2023-08-31 RX ORDER — BUPROPION HYDROCHLORIDE 100 MG/1
100 TABLET, EXTENDED RELEASE ORAL 2 TIMES DAILY
Qty: 60 TABLET | Refills: 0 | Status: SHIPPED | OUTPATIENT
Start: 2023-08-31

## 2023-08-31 RX ORDER — AMLODIPINE BESYLATE 2.5 MG/1
2.5 TABLET ORAL DAILY
Qty: 90 TABLET | Refills: 2 | Status: SHIPPED | OUTPATIENT
Start: 2023-08-31 | End: 2023-09-05

## 2023-08-31 RX ORDER — AMLODIPINE BESYLATE 2.5 MG/1
2.5 TABLET ORAL DAILY
Qty: 90 TABLET | Refills: 2 | Status: SHIPPED | OUTPATIENT
Start: 2023-08-31 | End: 2023-08-31 | Stop reason: SDUPTHER

## 2023-08-31 NOTE — PROGRESS NOTES
Progress Note - Cardiology Office  [facility]    Mimi Diez 76 y.o. female MRN: [id number]  : 1955  Encounter: [id number]           ASSESSMENT:  Chest pain  Was evaluated in the hospital and advised outpatient follow-up  EKG today shows LVH and left axis deviation  EKG shows LBBB which was not there in 2021, therefore duration unknown    Abnormal EKG  Today's EKG shows LVH, poor R wave progression     History of pancreatic cancer  S/p Whipple procedure and chemo+radiation    Essential hypertension  BP today is 132/80 with heart rate of 73/min  According to the patient her blood pressure at home was 137/83  Currently on losartan hydrochlorothiazide 50-12.5 mg    Obesity, BMI 32.19        RECOMMENDATIONS:  Add amlodipine 2.5 mg daily  Exercise stress test  Echocardiogram  Low-salt diet  Regular cardiovascular exercise  Weight loss  Advised patient on wearing compression stockings. Patient states that she has tried them previously and does not like to wear them. Please call [phone] if any questions. HPI :     Mimi Diez is a 76y.o. year old female who came for follow up. She was evaluated in the ED hospital with chest pain. She has also been diagnosed to have hypertension and is now on losartan hydrochlorothiazide. Blood pressure is still elevated. She has a history of pancreatic cancer, Whipple's procedure, chemotherapy and radiation  She has not had any recent cardiac functional study    REVIEW OF SYSTEMS:  Review of Systems   Cardiovascular: Positive for chest pain and leg swelling (Varicose veins). All other systems reviewed and are negative.         Historical Information   Past Medical History:   Diagnosis Date   • BRCA1 positive    • BRCA2 positive    • Cancer ([address])     pancreatic   • History of chemotherapy     pancreatic cancer   • History of radiation therapy    • Pancreatic carcinoma [facility])      Past Surgical History:   Procedure Laterality Date   • ABLATION SOFT TISSUE N/A 2021    Procedure: INTRAOPERATIVE ULTRASOUND, ABLATION,SOFT TISSUE PANCREAS;  Surgeon: Fish Christine MD;  [facility];  Service: Surgical Oncology   • CHOLECYSTECTOMY N/A 2021    Procedure: CHOLECYSTECTOMY;  Surgeon: Fish Christine MD;  [facility];  Service: Surgical Oncology   • FL GUIDED CENTRAL VENOUS ACCESS DEVICE INSERTION  2021   • HYSTERECTOMY     • LAPAROTOMY N/A 2021    Procedure: LAPAROTOMY EXPLORATORY;  Surgeon: Fish Christine MD;  [facility];  Service: Surgical Oncology   • OOPHORECTOMY     • SINUS SURGERY     • TUNNELED VENOUS PORT PLACEMENT Left 2021    Procedure: INSERTION VENOUS PORT (PORT-A-CATH); Surgeon: Fish Christine MD;  [facility];  Service: Surgical Oncology   • US GUIDED THYROID BIOPSY  2022   • WHIPPLE PROCEDURE/PANCREATICO-DUODENECTOMY N/A 2021    Procedure: WHIPPLE PROCEDURE/PANCREATICO-DUODENECTOMY; PYLORIC PRESERVING;  Surgeon: Fish Christine MD;  [facility];  Service: Surgical Oncology     Social History     Substance and Sexual Activity   Alcohol Use Never     Social History     Substance and Sexual Activity   Drug Use Never     Social History     Tobacco Use   Smoking Status Some Days   • Packs/day: 0.50   • Types: Cigarettes   • Start date:    • Last attempt to quit: 2021   • Years since quittin.4   Smokeless Tobacco Never   Tobacco Comments    recently stop smoking , pt stated she smoke occ. 1-2 cigg some days 2022.      Family History:   Family History   Problem Relation Age of Onset   • Ulcerative colitis Mother    • Prostate cancer Father    • Melanoma Sister    • Heart disease Brother    • Prostate cancer Brother    • No Known Problems Brother    • No Known Problems Maternal Uncle    • No Known Problems Paternal Uncle        Meds/Allergies     Allergies   Allergen Reactions   • Penicillins Rash   • Tetracycline Rash       Current Outpatient Medications:   •  acetaminophen (TYLENOL) 500 mg tablet, Take 1,000 mg by mouth, Disp: , Rfl:   •  ALPRAZolam (XANAX) 0.25 mg tablet, Take 1 tablet (0.25 mg total) by mouth 3 (three) times a day as needed for anxiety for up to 3 days, Disp: 9 tablet, Rfl: 0  •  losartan-hydrochlorothiazide (HYZAAR) 50-12.5 mg per tablet, Take 1 tablet by mouth daily, Disp: 30 tablet, Rfl: 2  •  Medical ID Plate MISC, Use once for 1 dose Severely and permanently limited in the ability to walk because of an arthritic, neurological, or orthopedic condition: or cannot walk two hundred feet without stopping to rest and meets requirements for disability license plate, Disp: 1 each, Rfl: 0  •  Multiple Vitamin (multivitamin) tablet, Take 1 tablet by mouth daily, Disp: , Rfl:     Vitals: There were no vitals taken for this visit. There is no height or weight on file to calculate BMI. There were no vitals filed for this visit. BP Readings from Last 3 Encounters:   08/19/23 168/81   08/12/23 141/70   08/11/23 160/100       Physical Exam:  Physical Exam    Neurologic:  Alert & oriented x 3, no new focal deficits, Not in any acute distress,  Constitutional: Obese  Eyes:  Pupil equal and reacting to light, conjunctiva normal,   HENT:  Atraumatic, oropharynx moist, Neck- normal range of motion, no tenderness,  Neck supple, No JVP, No LNP   Respiratory:  Bilateral air entry, mostly clear to auscultation  Cardiovascular: S1-S2 regular rhythm  GI:  Soft, nondistended, normal bowel sounds, nontender, no hepatosplenomegaly appreciated. Musculoskeletal: edema, no tenderness, no deformities.    Skin:  Well hydrated, no rash   Lymphatic:  No lymphadenopathy noted   Extremities: Trace lower extremity edema with varicose veins        Diagnostic Studies Review Cardio:      EKG: Normal sinus rhythm, heart rate 73/min, left axis deviation, LVH, poor R wave progression    Cardiac testing:   No results found for this or any previous visit.      Imaging:  Chest X-Ray:   No Chest XR results available for this patient. CT-scan of the chest:     No CTA results available for this patient.   Lab Review   Lab Results   Component Value Date    WBC 6.91 08/12/2023    HGB 14.0 08/12/2023    HCT 42.8 08/12/2023    MCV 94 08/12/2023    RDW 12.9 08/12/2023     08/12/2023     BMP:  Lab Results   Component Value Date    SODIUM 138 08/12/2023    K 4.4 08/12/2023     08/12/2023    CO2 27 08/12/2023    BUN 22 08/12/2023    CREATININE 0.66 08/12/2023    GLUC 107 08/12/2023    GLUF 87 05/05/2023    CALCIUM 9.6 08/12/2023    CORRECTEDCA 9.9 09/12/2022    EGFR 91 08/12/2023    MG 2.1 08/12/2023     LFT:  Lab Results   Component Value Date    AST 23 08/12/2023    ALT 30 08/12/2023    ALKPHOS 114 (H) 08/12/2023    TP 7.2 08/12/2023    ALB 4.0 08/12/2023      No components found for: "TSH3"  No results found for: "XTP2YELBNOQC"  Lab Results   Component Value Date    HGBA1C 5.8 (H) 04/09/2021     Lipid Profile:   No results found for: "CHOLESTEROL", "HDL", "LDLCALC", "TRIG"  No results found for: "CHOLESTEROL"  Lab Results   Component Value Date    TROPONINI <0.02 02/18/2021     No results found for: "NTBNP"   Recent Results (from the past 672 hour(s))   ECG 12 lead    Collection Time: 08/12/23  8:18 AM   Result Value Ref Range    Ventricular Rate 106 BPM    Atrial Rate 106 BPM    NH Interval 170 ms    QRSD Interval 130 ms    QT Interval 358 ms    QTC Interval 475 ms    P Angie 72 degrees    QRS Axis -70 degrees    T Wave Axis 80 degrees   CBC and differential    Collection Time: 08/12/23  8:51 AM   Result Value Ref Range    WBC 6.91 4.31 - 10.16 Thousand/uL    RBC 4.54 3.81 - 5.12 Million/uL    Hemoglobin 14.0 11.5 - 15.4 g/dL    Hematocrit 42.8 34.8 - 46.1 %    MCV 94 82 - 98 fL    MCH 30.8 26.8 - 34.3 pg    MCHC 32.7 31.4 - 37.4 g/dL    RDW 12.9 11.6 - 15.1 %    MPV 9.2 8.9 - 12.7 fL    Platelets 032 194 - 651 Thousands/uL    nRBC 0 /100 WBCs Neutrophils Relative 75 43 - 75 %    Immat GRANS % 0 0 - 2 %    Lymphocytes Relative 18 14 - 44 %    Monocytes Relative 5 4 - 12 %    Eosinophils Relative 1 0 - 6 %    Basophils Relative 1 0 - 1 %    Neutrophils Absolute 5.22 1.85 - 7.62 Thousands/µL    Immature Grans Absolute 0.01 0.00 - 0.20 Thousand/uL    Lymphocytes Absolute 1.21 0.60 - 4.47 Thousands/µL    Monocytes Absolute 0.34 0.17 - 1.22 Thousand/µL    Eosinophils Absolute 0.09 0.00 - 0.61 Thousand/µL    Basophils Absolute 0.04 0.00 - 0.10 Thousands/µL   HS Troponin 0hr (reflex protocol)    Collection Time: 08/12/23  8:51 AM   Result Value Ref Range    hs TnI 0hr 5 "Refer to ACS Flowchart"- see link ng/L   D-dimer, quantitative    Collection Time: 08/12/23  8:51 AM   Result Value Ref Range    D-Dimer, Quant 1.56 (H) <0.50 ug/ml FEU   Comprehensive metabolic panel    Collection Time: 08/12/23  8:51 AM   Result Value Ref Range    Sodium 138 135 - 147 mmol/L    Potassium 4.4 3.5 - 5.3 mmol/L    Chloride 105 96 - 108 mmol/L    CO2 27 21 - 32 mmol/L    ANION GAP 6 mmol/L    BUN 22 5 - 25 mg/dL    Creatinine 0.66 0.60 - 1.30 mg/dL    Glucose 107 65 - 140 mg/dL    Calcium 9.6 8.4 - 10.2 mg/dL    AST 23 13 - 39 U/L    ALT 30 7 - 52 U/L    Alkaline Phosphatase 114 (H) 34 - 104 U/L    Total Protein 7.2 6.4 - 8.4 g/dL    Albumin 4.0 3.5 - 5.0 g/dL    Total Bilirubin 0.27 0.20 - 1.00 mg/dL    eGFR 91 ml/min/1.73sq m   B-Type Natriuretic Peptide(BNP)    Collection Time: 08/12/23  8:51 AM   Result Value Ref Range    BNP 29 0 - 100 pg/mL   Magnesium    Collection Time: 08/12/23  8:51 AM   Result Value Ref Range    Magnesium 2.1 1.9 - 2.7 mg/dL   APTT    Collection Time: 08/12/23  8:51 AM   Result Value Ref Range    PTT 23 23 - 37 seconds   Protime-INR    Collection Time: 08/12/23  8:51 AM   Result Value Ref Range    Protime 12.4 11.6 - 14.5 seconds    INR 0.91 0.84 - 1.19   HS Troponin I 2hr    Collection Time: 08/12/23 10:41 AM   Result Value Ref Range    hs TnI 2hr 5 "Refer to ACS Flowchart"- see link ng/L    Delta 2hr hsTnI 0 <20 ng/L   ECG 12 lead    Collection Time: 08/12/23 11:07 AM   Result Value Ref Range    Ventricular Rate 68 BPM    Atrial Rate 68 BPM    MA Interval 178 ms    QRSD Interval 120 ms    QT Interval 410 ms    QTC Interval 435 ms    P Axis 75 degrees    QRS Axis -48 degrees    T Wave Axis 33 degrees   Urinalysis with microscopic    Collection Time: 08/14/23  7:41 AM   Result Value Ref Range    Color, UA Yellow     Clarity, UA Turbid     Specific Gravity, UA 1.013 1.003 - 1.030    pH, UA 6.5 4.5, 5.0, 5.5, 6.0, 6.5, 7.0, 7.5, 8.0    Leukocytes, UA Small (A) Negative    Nitrite, UA Negative Negative    Protein, UA Negative Negative mg/dl    Glucose, UA Negative Negative mg/dl    Ketones, UA Negative Negative mg/dl    Urobilinogen, UA <2.0 <2.0 mg/dl mg/dl    Bilirubin, UA Negative Negative    Occult Blood, UA Negative Negative    RBC, UA 2-4 (A) None Seen, 1-2 /hpf    WBC, UA 2-4 (A) None Seen, 1-2 /hpf    Epithelial Cells Occasional None Seen, Occasional /hpf    Bacteria, UA Occasional None Seen, Occasional /hpf    MUCUS THREADS Occasional (A) None Seen   Albumin / creatinine urine ratio    Collection Time: 08/14/23  7:41 AM   Result Value Ref Range    Creatinine, Ur 54.0 mg/dL    Albumin,U,Random 9.2 0.0 - 20.0 mg/L    Albumin Creat Ratio 17 0 - 30 mg/g creatinine             Dr. Hank Watts MD, [facility]      "This note has been constructed using a voice recognition system. Therefore there may be syntax, spelling, and/or grammatical errors.  Please call if you have any questions. "

## 2023-08-31 NOTE — PROGRESS NOTES
Name: Aisha Hernandez      : 1955      MRN: [id number]  Encounter Provider: DO Ray  Encounter Date: 2023   Encounter department: 1 [facility]     1. Anxiety  - Patient increasing anxiety after having held surgeries for cancer.   - Reviewed PRASHANT-7 and PHQ negative depression screen with mild anxiety. - Patient's neighbor takes Wellbutrin and she is a former smoker (quit approximately 3 to 4 years ago). -Agreed on Wellbutrin as a initial medication. Encouraged therapy although due to limited resources patient is more likely to follow-up with a Taoist figure. Patient also encouraged to have support groups and talk to other individuals who have gone through similar experiences. Patient very receptive for this advice and also appreciative of direction towards the 988 crisis line if she ever feels overwhelmed. Is explained to patient that it does not have to be what everyone would call crisis as long as that is what she calls a crisis it is appropriate for her to call the number if she is feeling overwhelmed. - Patient to follow-up in 1 month for reevaluation in office.  -     buPROPion (Wellbutrin SR) 100 mg 12 hr tablet; Take 1 tablet (100 mg total) by mouth 2 (two) times a day           Subjective      HPI   Patient increasing anxiety after having held surgeries for cancer. Reviewed PRASHANT-7 and PHQ negative depression screen with mild anxiety. Patient's neighbor takes Wellbutrin and she is a former smoker (quit approximately 3 to 4 years ago). Agreed on Wellbutrin as a initial medication. Encouraged therapy although due to limited resources patient is more likely to follow-up with a Taoist figure. Patient also encouraged to have support groups and talk to other individuals who have gone through similar experiences.   Patient very receptive for this advice and also appreciative of direction towards the 988 crisis line if she ever feels overwhelmed. Is explained to patient that it does not have to be what everyone would call crisis as long as that is what she calls a crisis it is appropriate for her to call the number if she is feeling overwhelmed. Patient to follow-up in 1 month for reevaluation in office. Review of Systems   Constitutional: Negative for chills, fatigue and fever. HENT: Negative for congestion, ear pain, hearing loss, rhinorrhea, sore throat and trouble swallowing. Eyes: Negative for pain and visual disturbance. Respiratory: Negative for cough and shortness of breath. Cardiovascular: Negative for chest pain. Gastrointestinal: Negative for constipation, diarrhea, nausea and vomiting. Endocrine: Negative for polyuria. Genitourinary: Negative for difficulty urinating and dysuria. Musculoskeletal: Negative for arthralgias and myalgias. Neurological: Negative for dizziness, light-headedness and headaches. Current Outpatient Medications on File Prior to Visit   Medication Sig   • acetaminophen (TYLENOL) 500 mg tablet Take 1,000 mg by mouth   • ALPRAZolam (XANAX) 0.25 mg tablet Take 1 tablet (0.25 mg total) by mouth 3 (three) times a day as needed for anxiety for up to 3 days   • amLODIPine (NORVASC) 2.5 mg tablet Take 1 tablet (2.5 mg total) by mouth daily   • losartan-hydrochlorothiazide (HYZAAR) 50-12.5 mg per tablet Take 1 tablet by mouth daily   • Medical ID Plate MISC Use once for 1 dose Severely and permanently limited in the ability to walk because of an arthritic, neurological, or orthopedic condition: or cannot walk two hundred feet without stopping to rest and meets requirements for disability license plate   • Multiple Vitamin (multivitamin) tablet Take 1 tablet by mouth daily       Objective     There were no vitals taken for this visit.     Physical Exam  Vitals reviewed: Telephone visit no physical exam.         Initially scheduled as a telehealth visit patient was unable to connect to "[employer] caroline Ferguson. Called patient on the cellular device and patient elected to complete the visit on telephone. Explained to patient this still is a billable service, but the phone call is being taken in a private room where only my side of the conversation may be overheard by passersby in the [place]. Patient agrees and consents to continue.            Gigi, DO

## 2023-09-03 ENCOUNTER — APPOINTMENT (EMERGENCY)
Dept: RADIOLOGY | Facility: HOSPITAL | Age: 68
DRG: 287 | End: 2023-09-03
Payer: MEDICARE

## 2023-09-03 ENCOUNTER — APPOINTMENT (INPATIENT)
Dept: RADIOLOGY | Facility: HOSPITAL | Age: 68
DRG: 287 | End: 2023-09-03
Payer: MEDICARE

## 2023-09-03 ENCOUNTER — HOSPITAL ENCOUNTER (INPATIENT)
Facility: HOSPITAL | Age: 68
LOS: 2 days | Discharge: HOME | DRG: 287 | End: 2023-09-05
Attending: EMERGENCY MEDICINE | Admitting: INTERNAL MEDICINE
Payer: MEDICARE

## 2023-09-03 DIAGNOSIS — I44.7 NEW ONSET LEFT BUNDLE BRANCH BLOCK (LBBB): ICD-10-CM

## 2023-09-03 DIAGNOSIS — I10 PRIMARY HYPERTENSION: ICD-10-CM

## 2023-09-03 DIAGNOSIS — E78.2 MIXED HYPERLIPIDEMIA: ICD-10-CM

## 2023-09-03 DIAGNOSIS — F17.200 SMOKING: ICD-10-CM

## 2023-09-03 DIAGNOSIS — R00.2 PALPITATION: Primary | ICD-10-CM

## 2023-09-03 DIAGNOSIS — R94.31 PROLONGED Q-T INTERVAL ON ECG: ICD-10-CM

## 2023-09-03 DIAGNOSIS — R94.31 QT PROLONGATION: ICD-10-CM

## 2023-09-03 DIAGNOSIS — I44.7 LBBB (LEFT BUNDLE BRANCH BLOCK): ICD-10-CM

## 2023-09-03 DIAGNOSIS — R06.02 SHORTNESS OF BREATH: ICD-10-CM

## 2023-09-03 PROBLEM — R65.10 SIRS (SYSTEMIC INFLAMMATORY RESPONSE SYNDROME) (HCC): Status: ACTIVE | Noted: 2023-09-03

## 2023-09-03 PROBLEM — E87.6 HYPOKALEMIA: Status: ACTIVE | Noted: 2023-09-03

## 2023-09-03 LAB
2HR DELTA HS TROPONIN: 1 NG/L
ALBUMIN SERPL BCP-MCNC: 4.1 G/DL (ref 3.5–5)
ALP SERPL-CCNC: 107 U/L (ref 34–104)
ALT SERPL W P-5'-P-CCNC: 24 U/L (ref 7–52)
ANION GAP SERPL CALCULATED.3IONS-SCNC: 10 MMOL/L
APTT PPP: 26 SECONDS (ref 23–37)
AST SERPL W P-5'-P-CCNC: 22 U/L (ref 13–39)
BASOPHILS # BLD AUTO: 0.03 THOUSANDS/ÂΜL (ref 0–0.1)
BASOPHILS NFR BLD AUTO: 0 % (ref 0–1)
BILIRUB SERPL-MCNC: 0.46 MG/DL (ref 0.2–1)
BILIRUB UR QL STRIP: NEGATIVE
BNP SERPL-MCNC: 30 PG/ML (ref 0–100)
BUN SERPL-MCNC: 19 MG/DL (ref 5–25)
CALCIUM SERPL-MCNC: 9.4 MG/DL (ref 8.4–10.2)
CARDIAC TROPONIN I PNL SERPL HS: 4 NG/L
CARDIAC TROPONIN I PNL SERPL HS: 5 NG/L
CHLORIDE SERPL-SCNC: 98 MMOL/L (ref 96–108)
CLARITY UR: CLEAR
CO2 SERPL-SCNC: 25 MMOL/L (ref 21–32)
COLOR UR: NORMAL
CREAT SERPL-MCNC: 0.65 MG/DL (ref 0.6–1.3)
D DIMER PPP FEU-MCNC: 1.2 UG/ML FEU
EOSINOPHIL # BLD AUTO: 0.05 THOUSAND/ÂΜL (ref 0–0.61)
EOSINOPHIL NFR BLD AUTO: 1 % (ref 0–6)
ERYTHROCYTE [DISTWIDTH] IN BLOOD BY AUTOMATED COUNT: 12.6 % (ref 11.6–15.1)
GFR SERPL CREATININE-BSD FRML MDRD: 91 ML/MIN/1.73SQ M
GLUCOSE SERPL-MCNC: 105 MG/DL (ref 65–140)
GLUCOSE UR STRIP-MCNC: NEGATIVE MG/DL
HCT VFR BLD AUTO: 41.7 % (ref 34.8–46.1)
HGB BLD-MCNC: 14.1 G/DL (ref 11.5–15.4)
HGB UR QL STRIP.AUTO: NEGATIVE
IMM GRANULOCYTES # BLD AUTO: 0.01 THOUSAND/UL (ref 0–0.2)
IMM GRANULOCYTES NFR BLD AUTO: 0 % (ref 0–2)
INR PPP: 0.94 (ref 0.84–1.19)
KETONES UR STRIP-MCNC: NEGATIVE MG/DL
LEUKOCYTE ESTERASE UR QL STRIP: NEGATIVE
LYMPHOCYTES # BLD AUTO: 1.71 THOUSANDS/ÂΜL (ref 0.6–4.47)
LYMPHOCYTES NFR BLD AUTO: 21 % (ref 14–44)
MAGNESIUM SERPL-MCNC: 2 MG/DL (ref 1.9–2.7)
MCH RBC QN AUTO: 30.7 PG (ref 26.8–34.3)
MCHC RBC AUTO-ENTMCNC: 33.8 G/DL (ref 31.4–37.4)
MCV RBC AUTO: 91 FL (ref 82–98)
MONOCYTES # BLD AUTO: 0.4 THOUSAND/ÂΜL (ref 0.17–1.22)
MONOCYTES NFR BLD AUTO: 5 % (ref 4–12)
NEUTROPHILS # BLD AUTO: 5.86 THOUSANDS/ÂΜL (ref 1.85–7.62)
NEUTS SEG NFR BLD AUTO: 73 % (ref 43–75)
NITRITE UR QL STRIP: NEGATIVE
NRBC BLD AUTO-RTO: 0 /100 WBCS
PH UR STRIP.AUTO: 5.5 [PH]
PHOSPHATE SERPL-MCNC: 2.9 MG/DL (ref 2.3–4.1)
PLATELET # BLD AUTO: 321 THOUSANDS/UL (ref 149–390)
PMV BLD AUTO: 9.3 FL (ref 8.9–12.7)
POTASSIUM SERPL-SCNC: 3.3 MMOL/L (ref 3.5–5.3)
PROT SERPL-MCNC: 7.2 G/DL (ref 6.4–8.4)
PROT UR STRIP-MCNC: NEGATIVE MG/DL
PROTHROMBIN TIME: 12.7 SECONDS (ref 11.6–14.5)
RBC # BLD AUTO: 4.59 MILLION/UL (ref 3.81–5.12)
SARS-COV-2 RNA RESP QL NAA+PROBE: NEGATIVE
SODIUM SERPL-SCNC: 133 MMOL/L (ref 135–147)
SP GR UR STRIP.AUTO: <=1.005 (ref 1–1.03)
UROBILINOGEN UR QL STRIP.AUTO: 0.2 E.U./DL
WBC # BLD AUTO: 8.06 THOUSAND/UL (ref 4.31–10.16)

## 2023-09-03 PROCEDURE — 85025 COMPLETE CBC W/AUTO DIFF WBC: CPT | Performed by: EMERGENCY MEDICINE

## 2023-09-03 PROCEDURE — 36415 COLL VENOUS BLD VENIPUNCTURE: CPT | Performed by: EMERGENCY MEDICINE

## 2023-09-03 PROCEDURE — 85610 PROTHROMBIN TIME: CPT | Performed by: EMERGENCY MEDICINE

## 2023-09-03 PROCEDURE — 85379 FIBRIN DEGRADATION QUANT: CPT | Performed by: EMERGENCY MEDICINE

## 2023-09-03 PROCEDURE — 83735 ASSAY OF MAGNESIUM: CPT | Performed by: EMERGENCY MEDICINE

## 2023-09-03 PROCEDURE — 71275 CT ANGIOGRAPHY CHEST: CPT

## 2023-09-03 PROCEDURE — 71045 X-RAY EXAM CHEST 1 VIEW: CPT

## 2023-09-03 PROCEDURE — 99285 EMERGENCY DEPT VISIT HI MDM: CPT | Performed by: EMERGENCY MEDICINE

## 2023-09-03 PROCEDURE — 80053 COMPREHEN METABOLIC PANEL: CPT | Performed by: EMERGENCY MEDICINE

## 2023-09-03 PROCEDURE — 85730 THROMBOPLASTIN TIME PARTIAL: CPT | Performed by: EMERGENCY MEDICINE

## 2023-09-03 PROCEDURE — 99223 1ST HOSP IP/OBS HIGH 75: CPT | Performed by: INTERNAL MEDICINE

## 2023-09-03 PROCEDURE — 84484 ASSAY OF TROPONIN QUANT: CPT | Performed by: EMERGENCY MEDICINE

## 2023-09-03 PROCEDURE — 99285 EMERGENCY DEPT VISIT HI MDM: CPT

## 2023-09-03 PROCEDURE — 84100 ASSAY OF PHOSPHORUS: CPT

## 2023-09-03 PROCEDURE — 87635 SARS-COV-2 COVID-19 AMP PRB: CPT | Performed by: EMERGENCY MEDICINE

## 2023-09-03 PROCEDURE — 93005 ELECTROCARDIOGRAM TRACING: CPT

## 2023-09-03 PROCEDURE — 81003 URINALYSIS AUTO W/O SCOPE: CPT | Performed by: EMERGENCY MEDICINE

## 2023-09-03 PROCEDURE — G1004 CDSM NDSC: HCPCS

## 2023-09-03 PROCEDURE — 83880 ASSAY OF NATRIURETIC PEPTIDE: CPT | Performed by: EMERGENCY MEDICINE

## 2023-09-03 RX ORDER — LOSARTAN POTASSIUM 50 MG/1
50 TABLET ORAL DAILY
Status: DISCONTINUED | OUTPATIENT
Start: 2023-09-04 | End: 2023-09-05 | Stop reason: HOSPADM

## 2023-09-03 RX ORDER — ENOXAPARIN SODIUM 100 MG/ML
40 INJECTION SUBCUTANEOUS DAILY
Status: DISCONTINUED | OUTPATIENT
Start: 2023-09-03 | End: 2023-09-05 | Stop reason: HOSPADM

## 2023-09-03 RX ORDER — ASPIRIN 81 MG/1
162 TABLET, CHEWABLE ORAL DAILY
Status: DISCONTINUED | OUTPATIENT
Start: 2023-09-04 | End: 2023-09-05 | Stop reason: HOSPADM

## 2023-09-03 RX ORDER — POTASSIUM CHLORIDE 20 MEQ/1
40 TABLET, EXTENDED RELEASE ORAL ONCE
Status: COMPLETED | OUTPATIENT
Start: 2023-09-03 | End: 2023-09-03

## 2023-09-03 RX ORDER — POTASSIUM CHLORIDE 14.9 MG/ML
20 INJECTION INTRAVENOUS ONCE
Status: DISCONTINUED | OUTPATIENT
Start: 2023-09-03 | End: 2023-09-03

## 2023-09-03 RX ORDER — METOPROLOL SUCCINATE 25 MG/1
25 TABLET, EXTENDED RELEASE ORAL DAILY
Status: DISCONTINUED | OUTPATIENT
Start: 2023-09-04 | End: 2023-09-03

## 2023-09-03 RX ORDER — POTASSIUM CHLORIDE 29.8 MG/ML
40 INJECTION INTRAVENOUS ONCE
Status: DISCONTINUED | OUTPATIENT
Start: 2023-09-03 | End: 2023-09-03

## 2023-09-03 RX ORDER — POLYETHYLENE GLYCOL 3350 17 G/17G
17 POWDER, FOR SOLUTION ORAL DAILY
Status: DISCONTINUED | OUTPATIENT
Start: 2023-09-03 | End: 2023-09-04

## 2023-09-03 RX ORDER — ENOXAPARIN SODIUM 100 MG/ML
40 INJECTION SUBCUTANEOUS DAILY
Status: DISCONTINUED | OUTPATIENT
Start: 2023-09-04 | End: 2023-09-03

## 2023-09-03 RX ORDER — BUPROPION HYDROCHLORIDE 150 MG/1
150 TABLET ORAL DAILY
Status: DISCONTINUED | OUTPATIENT
Start: 2023-09-04 | End: 2023-09-05 | Stop reason: HOSPADM

## 2023-09-03 RX ORDER — ASPIRIN 81 MG/1
81 TABLET, CHEWABLE ORAL ONCE
Status: COMPLETED | OUTPATIENT
Start: 2023-09-03 | End: 2023-09-03

## 2023-09-03 RX ORDER — ATORVASTATIN CALCIUM 80 MG/1
80 TABLET, FILM COATED ORAL EVERY EVENING
Status: DISCONTINUED | OUTPATIENT
Start: 2023-09-03 | End: 2023-09-05

## 2023-09-03 RX ORDER — HYDROCHLOROTHIAZIDE 12.5 MG/1
12.5 TABLET ORAL DAILY
Status: DISCONTINUED | OUTPATIENT
Start: 2023-09-04 | End: 2023-09-04

## 2023-09-03 RX ORDER — ACETAMINOPHEN 325 MG/1
650 TABLET ORAL EVERY 4 HOURS PRN
Status: DISCONTINUED | OUTPATIENT
Start: 2023-09-03 | End: 2023-09-05 | Stop reason: HOSPADM

## 2023-09-03 RX ORDER — DOCUSATE SODIUM 100 MG/1
100 CAPSULE, LIQUID FILLED ORAL 2 TIMES DAILY
Status: DISCONTINUED | OUTPATIENT
Start: 2023-09-03 | End: 2023-09-04

## 2023-09-03 RX ORDER — METOCLOPRAMIDE HYDROCHLORIDE 5 MG/ML
10 INJECTION INTRAMUSCULAR; INTRAVENOUS EVERY 6 HOURS PRN
Status: DISCONTINUED | OUTPATIENT
Start: 2023-09-03 | End: 2023-09-03

## 2023-09-03 RX ORDER — AMLODIPINE BESYLATE 2.5 MG/1
2.5 TABLET ORAL DAILY
Status: DISCONTINUED | OUTPATIENT
Start: 2023-09-04 | End: 2023-09-04

## 2023-09-03 RX ADMIN — POTASSIUM CHLORIDE 40 MEQ: 1500 TABLET, EXTENDED RELEASE ORAL at 18:48

## 2023-09-03 RX ADMIN — ATORVASTATIN CALCIUM 80 MG: 40 TABLET, FILM COATED ORAL at 18:48

## 2023-09-03 RX ADMIN — POTASSIUM CHLORIDE 40 MEQ: 1500 TABLET, EXTENDED RELEASE ORAL at 20:57

## 2023-09-03 RX ADMIN — ASPIRIN 81 MG CHEWABLE TABLET 81 MG: 81 TABLET CHEWABLE at 18:48

## 2023-09-03 RX ADMIN — DOCUSATE SODIUM 100 MG: 100 CAPSULE, LIQUID FILLED ORAL at 18:48

## 2023-09-03 RX ADMIN — IOHEXOL 85 ML: 350 INJECTION, SOLUTION INTRAVENOUS at 16:43

## 2023-09-03 RX ADMIN — METOPROLOL TARTRATE 25 MG: 25 TABLET, FILM COATED ORAL at 20:57

## 2023-09-03 RX ADMIN — ENOXAPARIN SODIUM 40 MG: 40 INJECTION SUBCUTANEOUS at 18:48

## 2023-09-03 NOTE — H&P
History and Physical - [facility] Medicine Residency     Patient Information: Obdulia Wilson 76 y.o. female MRN: [id number]  Unit/Bed#: ED 10 Encounter: [id number]  Admitting Physician: Rio Solares MD   PCP: Bhupendra Arreola DO  Date of Admission:  09/03/23     Assessment and Plan     * Left bundle branch block  Assessment & Plan  Pt presented to ED with diaphoresis, SOB, palpitations. EKG revealed NEW ONSET LBBB compared to EKG 8/12/2023. Initial troponin 4, BNP WNL, D-dimer 1.20. Pt has a hx of pancreatic cancer s/p chemo, radiation, whipple. Suspect elevated D-dimer secondary to history of malignancy. Plan:   -Cardiology consulted, appreciate recommendations  -Pending repeat troponin  -Catheterization planned for Tuesday  -echo planned for tuesday  -Metoprolol 25 mg daily, per cardiology  -Telemetry  -hold heparin per cardiology    SIRS (systemic inflammatory response syndrome) ([address])  Assessment & Plan  SIRS criteria on admission with tachycardia and tachypnea. Possibly secondary to new onset of left bundle branch block versus PE. Pt reports recently ill with COVID, noting productive cough. COVID negative  CTA: negative for PE, acute pathology. Positive for atelectasis    Plan  -Add incentive spirometer  -See left bundle branch block    Hypokalemia  Assessment & Plan  Potassium on admission 3.3. Plan:   -Replete as needed   -PM BMP  -Repeat BMP daily    Prolonged Q-T interval on ECG  Assessment & Plan  On admission, patient noted to have a QTc of 500. Plan  Avoid QTc prolonging medications    Primary hypertension  Assessment & Plan  Chronic. Home medications include Hyzaar 50-12.5 mg, Norvasc 2.5 mg once daily. Plan:  -Continue to monitor BP closely  -Cardiology consulted, appreciate recommendations       Fluids: not indicated  Electrolyte repletion: Replete potassium now, and as needed.   Nutrition:        Diet Orders   (From admission, onward) Start     Ordered    09/03/23 1725  Diet Cardiovascular; Cardiac Cath  Diet effective now        References:    Adult Nutrition Support Algorithm    RD Therapeutic Diet Order Protocol   Question Answer Comment   Diet Type Cardiovascular    Cardiac Cardiac Cath    RD to adjust diet per protocol? Yes        09/03/23 1724               VTE Prophylaxis: VTE Score: 7 High Risk (Score >/= 5) - Pharmacological DVT Prophylaxis Ordered: enoxaparin (Lovenox). Sequential Compression Devices Ordered. Code Status: Prior  Anticipated Length of Stay:  Patient will be admitted on an Inpatient basis with an anticipated length of stay of  greater than 2 midnights. Justification for Hospital Stay: Rule out cardiac pathology  Total Time for Visit, including Counseling / Coordination of Care: 45 mins. Greater than 50% of this total time spent on direct patient counseling and coordination of care. Patient Information Sharing: With the consent of Ladarius Crum, their loved ones were notified today by inpatient team of the patient’s condition and current plan. All questions answered. Chief Complaint:     Chief Complaint   Patient presents with   • Dizziness     Started with some dizziness and some shortness of breath this am. Scheduled for stress on  the 27th. No chest pain       Subjective      History of Present Illness:     Ladarius Crum is a 76 y.o. female who presents with palpitations, shortness of breath, diaphoresis, lightheadedness lasting least 2 days. Past medical history includes pancreatic cancer s/p radiation and Whipple procedure, hypertension, anxiety. Patient has been evaluated in the ED several times over the past month, evaluated by cardiologist day prior to presentation. EKG showed LVH with poor R wave progression, no LBBB. On evaluation in the ED, EKG revealed new onset left bundle branch block. Patient is denying any chest pain, current diaphoresis, weakness, dizziness, focal deficits.   She reports that it felt different than her typical panic attacks. She is noting that she still feels short of breath with a productive cough, reports that she "got over" COVID approximately 3 weeks ago. Patient has a baseline left-sided facial droop that is not new and has not worsened. Reports that she is feeling more comfortable during the interview, not noticing any increased discomfort. Review of Systems:  Review of Systems   Constitutional: Positive for chills and diaphoresis. Negative for activity change and fever. HENT: Negative. Respiratory: Positive for cough, chest tightness and shortness of breath. Negative for wheezing. Cardiovascular: Positive for palpitations and leg swelling. Negative for chest pain. Gastrointestinal: Negative for abdominal pain, diarrhea, nausea and vomiting. Musculoskeletal: Negative for back pain, neck pain and neck stiffness. Skin: Negative for color change, rash and wound. Neurological: Positive for facial asymmetry (at baseline). Negative for dizziness, syncope, speech difficulty, weakness, light-headedness, numbness and headaches.         Past Medical History:   Diagnosis Date   • BRCA1 positive    • BRCA2 positive    • Cancer ([address])     pancreatic   • History of chemotherapy     pancreatic cancer   • History of radiation therapy    • Pancreatic carcinoma [facility])      Past Surgical History:   Procedure Laterality Date   • ABLATION SOFT TISSUE N/A 4/26/2021    Procedure: INTRAOPERATIVE ULTRASOUND, ABLATION,SOFT TISSUE PANCREAS;  Surgeon: Altagracia Brasher MD;  Location: [facility];  Service: Surgical Oncology   • CHOLECYSTECTOMY N/A 4/26/2021    Procedure: CHOLECYSTECTOMY;  Surgeon: Altagracia Brasher MD;  Location: [facility];  Service: Surgical Oncology   • FL GUIDED CENTRAL VENOUS ACCESS DEVICE INSERTION  6/2/2021   • HYSTERECTOMY     • LAPAROTOMY N/A 4/26/2021    Procedure: LAPAROTOMY EXPLORATORY;  Surgeon: Altagracia Brasher MD;  Location: [facility];  Service: Surgical Oncology • OOPHORECTOMY     • SINUS SURGERY     • TUNNELED VENOUS PORT PLACEMENT Left 6/2/2021    Procedure: INSERTION VENOUS PORT (PORT-A-CATH); Surgeon: Wilder Serna MD;  Location: [facility];  Service: Surgical Oncology   • US GUIDED THYROID BIOPSY  7/13/2022   • WHIPPLE PROCEDURE/PANCREATICO-DUODENECTOMY N/A 4/26/2021    Procedure: WHIPPLE PROCEDURE/PANCREATICO-DUODENECTOMY; PYLORIC PRESERVING;  Surgeon: Wilder Serna MD;  Location: [facility];  Service: Surgical Oncology     Allergies   Allergen Reactions   • Penicillins Rash   • Tetracycline Rash     Prior to Admission Medications   Prescriptions Last Dose Informant Patient Reported? Taking?    ALPRAZolam (XANAX) 0.25 mg tablet   No No   Sig: Take 1 tablet (0.25 mg total) by mouth 3 (three) times a day as needed for anxiety for up to 3 days   Medical ID Plate MISC  Self No No   Sig: Use once for 1 dose Severely and permanently limited in the ability to walk because of an arthritic, neurological, or orthopedic condition: or cannot walk two hundred feet without stopping to rest and meets requirements for disability license plate   Multiple Vitamin (multivitamin) tablet  Self Yes No   Sig: Take 1 tablet by mouth daily   acetaminophen (TYLENOL) 500 mg tablet  Self Yes No   Sig: Take 1,000 mg by mouth   amLODIPine (NORVASC) 2.5 mg tablet   No No   Sig: Take 1 tablet (2.5 mg total) by mouth daily   buPROPion (Wellbutrin SR) 100 mg 12 hr tablet   No No   Sig: Take 1 tablet (100 mg total) by mouth 2 (two) times a day   losartan-hydrochlorothiazide (HYZAAR) 50-12.5 mg per tablet   No No   Sig: Take 1 tablet by mouth daily      Facility-Administered Medications: None     Social History     Socioeconomic History   • Marital status:      Spouse name: Not on file   • Number of children: 3   • Years of education: Not on file   • Highest education level: Not on file   Occupational History   • 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 1-2 cigg some days 2022. Vaping Use   • Vaping Use: Never used   Substance and Sexual Activity   • Alcohol use: Never   • Drug use: Never   • Sexual activity: Not Currently   Other Topics Concern   • Not on file   Social History Narrative   • Not on file     Social Determinants of Health     Financial Resource Strain: Medium Risk (10/13/2022)    Overall Financial Resource Strain (CARDIA)    • Difficulty of Paying Living Expenses: Somewhat hard   Food Insecurity: No Food Insecurity (2022)    Hunger Vital Sign    • Worried About Running Out of Food in the Last Year: Never true    • Ran Out of Food in the Last Year: Never true   Transportation Needs: No Transportation Needs (10/13/2022)    PRAPARE - Transportation    • Lack of Transportation (Medical): No    • Lack of Transportation (Non-Medical):  No   Physical Activity: Insufficiently Active (2022)    Exercise Vital Sign    • Days of Exercise per Week: 4 days    • Minutes of Exercise per Session: 20 min   Stress: No Stress Concern Present (2022)    [facility]    • Feeling of Stress : Not at all   Social Connections: Socially Isolated (2022)    Social Connection and Isolation Panel [NHANES]    • Frequency of Communication with Friends and Family: More than three times a week    • Frequency of Social Gatherings with Friends and Family: More than three times a week    • Attends Confucianism Services: Never    • Active Member of Clubs or Organizations: No    • Attends Club or Organization Meetings: Never    • Marital Status:    Intimate Partner Violence: Not At Risk (2022)    Humiliation, Afraid, Rape, and Kick questionnaire    • Fear of Current or Ex-Partner: No    • Emotionally Abused: No    • Physically Abused: No    • Sexually Abused: No   Housing Stability: Unknown (6/1/2022)    Housing Stability Vital Sign    • Unable to Pay for Housing in the Last Year: No    • Number of Places Lived in the Last Year: Not on file    • Unstable Housing in the Last Year: No     Family History   Problem Relation Age of Onset   • Ulcerative colitis Mother    • Prostate cancer Father    • Melanoma Sister    • Heart disease Brother    • Prostate cancer Brother    • No Known Problems Brother    • No Known Problems Maternal Uncle    • No Known Problems Paternal Uncle         Patient Pre-hospital Living Situation: at home with son  Patient Pre-hospital Level of Mobility: no mobility aids  Patient Pre-hospital Diet Restrictions: none          Objective     Physical Exam:   Vitals:   Blood Pressure: 131/81 (09/03/23 1730)  Pulse: 84 (09/03/23 1730)  Temperature: 99 °F (37.2 °C) (09/03/23 1343)  Temp Source: Tympanic (09/03/23 1343)  Respirations: 22 (09/03/23 1730)  SpO2: 94 % (09/03/23 1730)     Physical Exam  Vitals reviewed. Constitutional:       General: She is not in acute distress. Appearance: Normal appearance. She is not ill-appearing or toxic-appearing. HENT:      Head: Normocephalic and atraumatic. Right Ear: External ear normal.      Left Ear: External ear normal.      Mouth/Throat:      Mouth: Mucous membranes are moist.      Pharynx: Oropharynx is clear. Eyes:      Extraocular Movements: Extraocular movements intact. Conjunctiva/sclera: Conjunctivae normal.   Cardiovascular:      Rate and Rhythm: Normal rate and regular rhythm. Heart sounds: Normal heart sounds. No murmur heard. No friction rub. Pulmonary:      Effort: Respiratory distress present. Chest:      Chest wall: No tenderness. Abdominal:      General: Bowel sounds are normal. There is no distension. Palpations: Abdomen is soft. Tenderness: There is no abdominal tenderness. There is no guarding. Musculoskeletal:         General: Normal range of motion. Cervical back: Normal range of motion and neck supple. Right lower leg: No edema. Left lower leg: No edema. Comments: Trace edema   Skin:     General: Skin is warm and dry. Neurological:      Mental Status: She is oriented to person, place, and time. Mental status is at baseline. Lab Results: I have personally reviewed pertinent reports. Results from last 7 days   Lab Units 09/03/23  1419   WBC Thousand/uL 8.06   HEMOGLOBIN g/dL 14.1   HEMATOCRIT % 41.7   PLATELETS Thousands/uL 321   NEUTROS PCT % 73   LYMPHS PCT % 21   MONOS PCT % 5   EOS PCT % 1     Results from last 7 days   Lab Units 09/03/23  1419   POTASSIUM mmol/L 3.3*   CHLORIDE mmol/L 98   CO2 mmol/L 25   BUN mg/dL 19   CREATININE mg/dL 0.65   CALCIUM mg/dL 9.4   ALK PHOS U/L 107*   ALT U/L 24   AST U/L 22   EGFR ml/min/1.73sq m 91   MAGNESIUM mg/dL 2.0   PHOSPHORUS mg/dL 2.9     Results from last 7 days   Lab Units 09/03/23  1419   INR  0.94         Results from last 7 days   Lab Units 09/03/23  1419   D-DIMER QUANTITATIVE ug/ml FEU 1.20*          Results from last 7 days   Lab Units 09/03/23  1653   COLOR UA  Light Yellow   CLARITY UA  Clear   SPEC GRAV UA  <=1.005   PH UA  5.5   LEUKOCYTES UA  Negative   NITRITE UA  Negative   GLUCOSE UA mg/dl Negative   KETONES UA mg/dl Negative   BILIRUBIN UA  Negative   BLOOD UA  Negative           Results from last 7 days   Lab Units 09/03/23  1653 09/03/23  1419   HS TNI 0HR ng/L  --  4   HS TNI 2HR ng/L 5  --              Imaging: I have personally reviewed pertinent reports. CTA ED chest PE study:  IMPRESSION:     No acute pathology. No pulmonary embolus.        EKG, Pathology, and Other Studies Reviewed on Admission:   EKG  Result Date: 09/03/23  Impression:    Sinus rhythm, rate 94, left axis deviation, QTc at 500 ms, wide QRS   pattern noted suggesting left bundle branch block that does not meet   Sgarbossa criteria for STEMI, LBBB is new compared to previous EKG 4 days   ago.         Entire H&P was discussed with Dr. Isael Ivan who agreed to what is noted above     ** Please Note: This note has been constructed using a voice recognition system **     Nataly Goss DO  09/03/23  5:52 PM

## 2023-09-03 NOTE — ASSESSMENT & PLAN NOTE
Chronic. Home medications include Hyzaar 50-12.5 mg, Norvasc 2.5 mg once daily.      Plan:  -Discontinue norvasc 2.5 mg   -Continue metoprolol 12.5 mg BID

## 2023-09-03 NOTE — ASSESSMENT & PLAN NOTE
SIRS criteria on admission with tachycardia and tachypnea. Possibly secondary to new onset of left bundle branch block versus PE. Pt reports recently ill with COVID, noting productive cough. COVID negative  CTA: negative for PE, acute pathology.  Positive for atelectasis    Plan  -Add incentive spirometer  -See left bundle branch block

## 2023-09-03 NOTE — H&P
H&P Exam - Yari Martin 76 y.o. female MRN: [id number]    Unit/Bed#: ED 10 Encounter: [id number]      Assessment/Plan     SIRS (systemic inflammatory response syndrome) (HCC)  Assessment & Plan  SIRS criteria on admission with tachycardia and tachypnea. Likely secondary to new onset of left bundle branch block. Prolonged Q-T interval on ECG  Assessment & Plan  On admission, patient noted to have a QTc of 500. Plan  Avoid QTc prolonging medications    Primary hypertension  Assessment & Plan  Chronic, well controlled on losartan hydrochlorothiazide and amlodipine. * Left bundle branch block  Assessment & Plan  New left bundle branch block present when compared to EKG from August 12, 2023 with left axis deviation. Plan          History of Present Illness     HPI:  Yari Martin is a 76 y.o. female who presents with ***. PCP: Sylvia Gatica DO    Review of Systems    Historical Information   Past Medical History:   Diagnosis Date   • BRCA1 positive    • BRCA2 positive    • Cancer ([address])     pancreatic   • History of chemotherapy     pancreatic cancer   • History of radiation therapy    • Pancreatic carcinoma [facility])      Past Surgical History:   Procedure Laterality Date   • ABLATION SOFT TISSUE N/A 4/26/2021    Procedure: INTRAOPERATIVE ULTRASOUND, ABLATION,SOFT TISSUE PANCREAS;  Surgeon: Danny Alva MD;  Location: [facility];  Service: Surgical Oncology   • CHOLECYSTECTOMY N/A 4/26/2021    Procedure: CHOLECYSTECTOMY;  Surgeon: Danny Alva MD;  [facility]: [facility];  Service: Surgical Oncology   • FL GUIDED CENTRAL VENOUS ACCESS DEVICE INSERTION  6/2/2021   • HYSTERECTOMY     • LAPAROTOMY N/A 4/26/2021    Procedure: LAPAROTOMY EXPLORATORY;  Surgeon: Danny Alva MD;  Location: [facility];  Service: Surgical Oncology   • OOPHORECTOMY     • SINUS SURGERY     • TUNNELED VENOUS PORT PLACEMENT Left 6/2/2021    Procedure: INSERTION VENOUS PORT (PORT-A-CATH);   Surgeon: Danny Alva MD;  Location[facility];  Service: Surgical Oncology   • US GUIDED THYROID BIOPSY  2022   • WHIPPLE PROCEDURE/PANCREATICO-DUODENECTOMY N/A 2021    Procedure: WHIPPLE PROCEDURE/PANCREATICO-DUODENECTOMY; PYLORIC PRESERVING;  Surgeon: Cheri Khalil MD;  Location: [facility];  Service: Surgical Oncology     Social History   Social History     Substance and Sexual Activity   Alcohol Use Never     Social History     Substance and Sexual Activity   Drug Use Never     Social History     Tobacco Use   Smoking Status Some Days   • Packs/day: 0.50   • Types: Cigarettes   • Start date:    • Last attempt to quit: 2021   • Years since quittin.4   Smokeless Tobacco Never   Tobacco Comments    recently stop smoking , pt stated she smoke occ. 1-2 cigg some days 2022. E-Cigarette/Vaping   • E-Cigarette Use Never User       E-Cigarette/Vaping Substances   • Nicotine No    • THC No    • CBD No    • Flavoring No    • Other No    • Unknown No      Family History:   Family History   Problem Relation Age of Onset   • Ulcerative colitis Mother    • Prostate cancer Father    • Melanoma Sister    • Heart disease Brother    • Prostate cancer Brother    • No Known Problems Brother    • No Known Problems Maternal Uncle    • No Known Problems Paternal Uncle        Meds/Allergies   PTA meds:   Prior to Admission Medications   Prescriptions Last Dose Informant Patient Reported? Taking?    ALPRAZolam (XANAX) 0.25 mg tablet   No No   Sig: Take 1 tablet (0.25 mg total) by mouth 3 (three) times a day as needed for anxiety for up to 3 days   Medical ID Plate MISC  Self No No   Sig: Use once for 1 dose Severely and permanently limited in the ability to walk because of an arthritic, neurological, or orthopedic condition: or cannot walk two hundred feet without stopping to rest and meets requirements for disability license plate   Multiple Vitamin (multivitamin) tablet  Self Yes No   Sig: Take 1 tablet by mouth daily   acetaminophen (TYLENOL) 500 mg tablet  Self Yes No   Sig: Take 1,000 mg by mouth   amLODIPine (NORVASC) 2.5 mg tablet   No No   Sig: Take 1 tablet (2.5 mg total) by mouth daily   buPROPion (Wellbutrin SR) 100 mg 12 hr tablet   No No   Sig: Take 1 tablet (100 mg total) by mouth 2 (two) times a day   losartan-hydrochlorothiazide (HYZAAR) 50-12.5 mg per tablet   No No   Sig: Take 1 tablet by mouth daily      Facility-Administered Medications: None     Allergies   Allergen Reactions   • Penicillins Rash   • Tetracycline Rash       Objective   Vitals: Blood pressure 141/71, pulse 92, temperature 99 °F (37.2 °C), temperature source Tympanic, resp. rate 18, SpO2 92 %. No intake or output data in the 24 hours ending 09/03/23 1557    Invasive Devices     Central Venous Catheter Line  Duration           Port A Cath 06/02/21 Left Chest 823 days          Peripheral Intravenous Line  Duration           Peripheral IV 09/03/23 Left Antecubital <1 day          Drain  Duration           Closed/Suction Drain Right Abdomen Bulb 19 Fr. 860 days                Physical Exam    Lab Results: I have personally reviewed pertinent reports. Imaging: I have personally reviewed pertinent reports. EKG, Pathology, and Other Studies: I have personally reviewed pertinent reports. Code Status: Prior  Advance Directive and Living Will:      Power of :    POLST:      Counseling / Coordination of Care  Total floor / unit time spent today 45 minutes. Greater than 50% of total time was spent with the patient and / or family counseling and / or coordination of care. A description of the counseling / coordination of care: with Cardiology.

## 2023-09-03 NOTE — ED PROVIDER NOTES
History  Chief Complaint   Patient presents with   • Dizziness     Started with some dizziness and some shortness of breath this am. Scheduled for stress on  the 27th. No chest pain     71-year-old female with past history pancreatic cancer status post radiation therapy, hypertension, presents to the ED for evaluation of increasing palpitation, intermittent shortness of breath as well as intermittent lightheadedness over the past week. Patient was seen by her cardiologist on 8/31/2023 and had EKG done that did not show any changes. Patient was scheduled for stress test in the near future. Patient continues to have palpitation along with lightheadedness this morning. Patient came to the ED for further evaluation. Patient denies any chest pain acute. Patient denies any fevers or chills. Patient denies any headaches, weakness, dizziness, or any focal deficits. Patient no longer feels lightheaded during my interview. Patient still feels some palpitations. 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 Patient denies any new focal neurodeficits. History provided by:  Patient   used: No    Dizziness  Associated symptoms: palpitations and shortness of breath    Associated symptoms: no chest pain and no vomiting        Prior to Admission Medications   Prescriptions Last Dose Informant Patient Reported? Taking?    ALPRAZolam (XANAX) 0.25 mg tablet   No No   Sig: Take 1 tablet (0.25 mg total) by mouth 3 (three) times a day as needed for anxiety for up to 3 days   Medical ID Plate MISC  Self No No   Sig: Use once for 1 dose Severely and permanently limited in the ability to walk because of an arthritic, neurological, or orthopedic condition: or cannot walk two hundred feet without stopping to rest and meets requirements for disability license plate   Multiple Vitamin (multivitamin) tablet  Self Yes No   Sig: Take 1 tablet by mouth daily   acetaminophen (TYLENOL) 500 mg tablet Past Week Self Yes Yes   Sig: Take 1,000 mg by mouth   amLODIPine (NORVASC) 2.5 mg tablet   No No   Sig: Take 1 tablet (2.5 mg total) by mouth daily   buPROPion (Wellbutrin SR) 100 mg 12 hr tablet   No No   Sig: Take 1 tablet (100 mg total) by mouth 2 (two) times a day   losartan-hydrochlorothiazide (HYZAAR) 50-12.5 mg per tablet   No No   Sig: Take 1 tablet by mouth daily      Facility-Administered Medications: None       Past Medical History:   Diagnosis Date   • BRCA1 positive    • BRCA2 positive    • Cancer ([address])     pancreatic   • History of chemotherapy     pancreatic cancer   • History of radiation therapy    • Pancreatic carcinoma ([address])        Past Surgical History:   Procedure Laterality Date   • ABLATION SOFT TISSUE N/A 4/26/2021    Procedure: INTRAOPERATIVE ULTRASOUND, ABLATION,SOFT TISSUE PANCREAS;  Surgeon: Roger Rico MD;  [facility];  Service: Surgical Oncology   • CHOLECYSTECTOMY N/A 4/26/2021    Procedure: CHOLECYSTECTOMY;  Surgeon: Roger Rico MD;  [facility];  Service: Surgical Oncology   • FL GUIDED CENTRAL VENOUS ACCESS DEVICE INSERTION  6/2/2021   • HYSTERECTOMY     • LAPAROTOMY N/A 4/26/2021    Procedure: LAPAROTOMY EXPLORATORY;  Surgeon: Roger Rico MD;  [facility];  Service: Surgical Oncology   • OOPHORECTOMY     • SINUS SURGERY     • TUNNELED VENOUS PORT PLACEMENT Left 6/2/2021    Procedure: INSERTION VENOUS PORT (PORT-A-CATH); Surgeon: Roger Rico MD;  [facility];  Service: Surgical Oncology   • US GUIDED THYROID BIOPSY  7/13/2022   • WHIPPLE PROCEDURE/PANCREATICO-DUODENECTOMY N/A 4/26/2021    Procedure:  WHIPPLE PROCEDURE/PANCREATICO-DUODENECTOMY; PYLORIC PRESERVING;  Surgeon: Roger Rico MD;  [facility];  Service: Surgical Oncology       Family History   Problem Relation Age of Onset   • Ulcerative colitis Mother    • Prostate cancer Father    • Melanoma Sister • Heart disease Brother    • Prostate cancer Brother    • No Known Problems Brother    • No Known Problems Maternal Uncle    • No Known Problems Paternal Uncle      I have reviewed and agree with the history as documented. E-Cigarette/Vaping   • E-Cigarette Use Never User      E-Cigarette/Vaping Substances   • Nicotine No    • THC No    • CBD No    • Flavoring No    • Other No    • Unknown No      Social History     Tobacco Use   • Smoking status: Some Days     Packs/day: 0.50     Types: Cigarettes     Start date: 65     Last attempt to quit: 2021     Years since quittin.4   • Smokeless tobacco: Never   • Tobacco comments:     recently stop smoking , pt stated she smoke occ. 1-2 cigg some days 2022. Vaping Use   • Vaping Use: Never used   Substance Use Topics   • Alcohol use: Never   • Drug use: Never       Review of Systems   Constitutional: Negative for chills and fever. HENT: Negative for ear pain and sore throat. Eyes: Negative for pain and visual disturbance. Respiratory: Positive for shortness of breath. Negative for cough. Cardiovascular: Positive for palpitations. Negative for chest pain. Gastrointestinal: Negative for abdominal pain and vomiting. Genitourinary: Negative for dysuria and hematuria. Musculoskeletal: Negative for arthralgias and back pain. Skin: Negative for color change and rash. Neurological: Positive for dizziness and light-headedness. Negative for seizures and syncope. All other systems reviewed and are negative. Physical Exam  Physical Exam  Vitals and nursing note reviewed. Constitutional:       General: She is not in acute distress. Appearance: She is well-developed. HENT:      Head: Normocephalic and atraumatic. Eyes:      Conjunctiva/sclera: Conjunctivae normal.   Cardiovascular:      Rate and Rhythm: Normal rate and regular rhythm. Heart sounds: No murmur heard.   Pulmonary:      Effort: Pulmonary effort is normal. No respiratory distress. Breath sounds: Normal breath sounds. Abdominal:      Palpations: Abdomen is soft. Tenderness: There is no abdominal tenderness. Musculoskeletal:         General: No swelling. Cervical back: Neck supple. Skin:     General: Skin is warm and dry. Capillary Refill: Capillary refill takes less than 2 seconds. Neurological:      General: No focal deficit present. Mental Status: She is alert and oriented to person, place, and time.    Psychiatric:         Mood and Affect: Mood normal.         Vital Signs  ED Triage Vitals [09/03/23 1343]   Temperature Pulse Respirations Blood Pressure SpO2   99 °F (37.2 °C) 103 22 137/83 93 %      Temp Source Heart Rate Source Patient Position - Orthostatic VS BP Location FiO2 (%)   Tympanic Monitor Sitting Right arm --      Pain Score       No Pain           Vitals:    09/03/23 1730 09/03/23 1841 09/03/23 2250 09/03/23 2250   BP: 131/81 146/98 (!) 129/105 (!) 129/105   Pulse: 84 89 61 (!) 48   Patient Position - Orthostatic VS: Lying            Visual Acuity  Visual Acuity    Flowsheet Row Most Recent Value   L Pupil Size (mm) 3   R Pupil Size (mm) 3          ED Medications  Medications   amLODIPine (NORVASC) tablet 2.5 mg (has no administration in time range)   buPROPion (WELLBUTRIN XL) 24 hr tablet 150 mg (has no administration in time range)   losartan (COZAAR) tablet 50 mg (has no administration in time range)     And   hydrochlorothiazide (HYDRODIURIL) tablet 12.5 mg (has no administration in time range)   docusate sodium (COLACE) capsule 100 mg (100 mg Oral Given 9/3/23 1848)   polyethylene glycol (MIRALAX) packet 17 g (17 g Oral Not Given 9/3/23 2157)   metoclopramide (REGLAN) injection 10 mg (has no administration in time range)   atorvastatin (LIPITOR) tablet 80 mg (80 mg Oral Given 9/3/23 1848)   acetaminophen (TYLENOL) tablet 650 mg (has no administration in time range)   aspirin chewable tablet 162 mg (has no administration in time range)   nicotine (NICODERM CQ) 7 mg/24hr TD 24 hr patch 1 patch (has no administration in time range)   metoprolol tartrate (LOPRESSOR) tablet 25 mg (25 mg Oral Given 9/3/23 2057)   enoxaparin (LOVENOX) subcutaneous injection 40 mg (40 mg Subcutaneous Given 9/3/23 1848)   iohexol (OMNIPAQUE) 350 MG/ML injection (SINGLE-DOSE) 85 mL (85 mL Intravenous Given 9/3/23 1643)   potassium chloride (K-DUR,KLOR-CON) CR tablet 40 mEq (40 mEq Oral Given 9/3/23 1848)   aspirin chewable tablet 81 mg (81 mg Oral Given 9/3/23 1848)   potassium chloride (K-DUR,KLOR-CON) CR tablet 40 mEq (40 mEq Oral Given 9/3/23 2057)       Diagnostic Studies  Results Reviewed     Procedure Component Value Units Date/Time    Basic metabolic panel [[id number]]     Lab Status: No result Specimen: Blood     Platelet count [[id number]]     Lab Status: No result Specimen: Blood     HS Troponin I 2hr [[id number]]  (Normal) Collected: 09/03/23 1653    Lab Status: Final result Specimen: Blood from Hand, Right Updated: 09/03/23 1725     hs TnI 2hr 5 ng/L      Delta 2hr hsTnI 1 ng/L     Phosphorus [[id number]]  (Normal) Collected: 09/03/23 1419    Lab Status: Final result Specimen: Blood from Arm, Left Updated: 09/03/23 1723     Phosphorus 2.9 mg/dL     UA w Reflex to Microscopic w Reflex to Culture [[id number]] Collected: 09/03/23 1653    Lab Status: Final result Specimen: Urine, Clean Catch Updated: 09/03/23 1706     Color, UA Light Yellow     Clarity, UA Clear     Specific Gravity, UA <=1.005     pH, UA 5.5     Leukocytes, UA Negative     Nitrite, UA Negative     Protein, UA Negative mg/dl      Glucose, UA Negative mg/dl      Ketones, UA Negative mg/dl      Urobilinogen, UA 0.2 E.U./dl      Bilirubin, UA Negative     Occult Blood, UA Negative    COVID only [[id number]]  (Normal) Collected: 09/03/23 1606    Lab Status: Final result Specimen: Nares from Nose Updated: 09/03/23 1644     SARS-CoV-2 Negative    Narrative:      FOR PEDIATRIC PATIENTS - copy/paste COVID Guidelines URL to browser: [email or web address]    SARS-CoV-2 assay is a Nucleic Acid Amplification assay intended for the  qualitative detection of nucleic acid from SARS-CoV-2 in nasopharyngeal  swabs. Results are for the presumptive identification of SARS-CoV-2 RNA. Positive results are indicative of infection with SARS-CoV-2, the virus  causing COVID-19, but do not rule out bacterial infection or co-infection  with other viruses. Laboratories within the [facility] and its  territories are required to report all positive results to the appropriate  public health authorities. Negative results do not preclude SARS-CoV-2  infection and should not be used as the sole basis for treatment or other  patient management decisions. Negative results must be combined with  clinical observations, patient history, and epidemiological information. This test has not been FDA cleared or approved. This test has been authorized by FDA under an Emergency Use Authorization  (EUA). This test is only authorized for the duration of time the  declaration that circumstances exist justifying the authorization of the  emergency use of an in vitro diagnostic tests for detection of SARS-CoV-2  virus and/or diagnosis of COVID-19 infection under section 564(b)(1) of  the Act, 21 U. S.C. 673YEG-2(S)(2), unless the authorization is terminated  or revoked sooner. The test has been validated but independent review by FDA  and CLIA is pending. Test performed using [employer] GeneXpert: This RT-PCR assay targets N2,  a region unique to SARS-CoV-2. A conserved region in the E-gene was chosen  for pan-Sarbecovirus detection which includes SARS-CoV-2. According to CMS-2020-01-R, this platform meets the definition of high-throughput technology.     Comprehensive metabolic panel [[id number]]  (Abnormal) Collected: 09/03/23 1419    Lab Status: Final result Specimen: Blood from Arm, Left Updated: 09/03/23 1542     Sodium 133 mmol/L      Potassium 3.3 mmol/L      Chloride 98 mmol/L      CO2 25 mmol/L      ANION GAP 10 mmol/L      BUN 19 mg/dL      Creatinine 0.65 mg/dL      Glucose 105 mg/dL      Calcium 9.4 mg/dL      AST 22 U/L      ALT 24 U/L      Alkaline Phosphatase 107 U/L      Total Protein 7.2 g/dL      Albumin 4.1 g/dL      Total Bilirubin 0.46 mg/dL      eGFR 91 ml/min/1.73sq m     Narrative:      [place] guidelines for Chronic Kidney Disease (CKD):   •  Stage 1 with normal or high GFR (GFR > 90 mL/min/1.73 square meters)  •  Stage 2 Mild CKD (GFR = 60-89 mL/min/1.73 square meters)  •  Stage 3A Moderate CKD (GFR = 45-59 mL/min/1.73 square meters)  •  Stage 3B Moderate CKD (GFR = 30-44 mL/min/1.73 square meters)  •  Stage 4 Severe CKD (GFR = 15-29 mL/min/1.73 square meters)  •  Stage 5 End Stage CKD (GFR <15 mL/min/1.73 square meters)  Note: GFR calculation is accurate only with a steady state creatinine    HS Troponin 0hr (reflex protocol) [[id number]]  (Normal) Collected: 09/03/23 1419    Lab Status: Final result Specimen: Blood from Arm, Left Updated: 09/03/23 1453     hs TnI 0hr 4 ng/L     B-Type Natriuretic Peptide(BNP) [[id number]]  (Normal) Collected: 09/03/23 1419    Lab Status: Final result Specimen: Blood from Arm, Left Updated: 09/03/23 1451     BNP 30 pg/mL     D-Dimer [[id number]]  (Abnormal) Collected: 09/03/23 1419    Lab Status: Final result Specimen: Blood from Arm, Left Updated: 09/03/23 1446     D-Dimer, Quant 1.20 ug/ml FEU     Narrative: In the evaluation for possible pulmonary embolism, in the appropriate (Well's Score of 4 or less) patient, the age adjusted d-dimer cutoff for this patient can be calculated as:    Age x 0.01 (in ug/mL) for Age-adjusted D-dimer exclusion threshold for a patient over 50 years.     Magnesium [[id number]]  (Normal) Collected: 09/03/23 1419    Lab Status: Final result Specimen: Blood from Arm, Left Updated: 09/03/23 1445     Magnesium 2.0 mg/dL     Protime-INR [[id number]]  (Normal) Collected: 09/03/23 1419    Lab Status: Final result Specimen: Blood from Arm, Left Updated: 09/03/23 1442     Protime 12.7 seconds      INR 0.94    APTT [[id number]]  (Normal) Collected: 09/03/23 1419    Lab Status: Final result Specimen: Blood from Arm, Left Updated: 09/03/23 1442     PTT 26 seconds     CBC and differential [[id number]] Collected: 09/03/23 1419    Lab Status: Final result Specimen: Blood from Arm, Left Updated: 09/03/23 1430     WBC 8.06 Thousand/uL      RBC 4.59 Million/uL      Hemoglobin 14.1 g/dL      Hematocrit 41.7 %      MCV 91 fL      MCH 30.7 pg      MCHC 33.8 g/dL      RDW 12.6 %      MPV 9.3 fL      Platelets 277 Thousands/uL      nRBC 0 /100 WBCs      Neutrophils Relative 73 %      Immat GRANS % 0 %      Lymphocytes Relative 21 %      Monocytes Relative 5 %      Eosinophils Relative 1 %      Basophils Relative 0 %      Neutrophils Absolute 5.86 Thousands/µL      Immature Grans Absolute 0.01 Thousand/uL      Lymphocytes Absolute 1.71 Thousands/µL      Monocytes Absolute 0.40 Thousand/µL      Eosinophils Absolute 0.05 Thousand/µL      Basophils Absolute 0.03 Thousands/µL                  CTA ED chest PE Study   Final Result by Shelley Barrios MD (09/03 1742)      No acute pathology. No pulmonary embolus. Workstation performed: [id number]         XR chest 1 view portable   Final Result by Artemio Gonsalves MD (09/03 1758)      No acute cardiopulmonary disease.                   Workstation performed: [id number]                    Procedures  ECG 12 Lead Documentation Only    Date/Time: 9/3/2023 1:51 PM    Performed by: Naina Schroeder DO  Authorized by: Naina Schroeder DO    Indications / Diagnosis:  Palpitations  ECG reviewed by me, the ED Provider: yes    Patient location:  ED  Previous ECG:     Previous ECG:  Compared to current    Similarity:  Changes noted    Comparison to cardiac monitor: Yes    Interpretation:     Interpretation: abnormal    Comments:      Sinus rhythm, rate 94, left axis deviation, QTc at 500 ms, wide QRS pattern noted suggesting left bundle branch block that does not meet Sgarbossa criteria for STEMI, LBBB is new compared to previous EKG 4 days ago. ED Course  ED Course as of 09/03/23 2314   Gisselle Manuel Sep 03, 2023   7849 Spoke with cardiologist on-call, Dr. Ariana Castro who reviewed EKG today compared to EKG on 8/31/2023. Patient has a new left bundle branch block on today's EKG compared to EKG 4 days ago. EKG does not meet Sgarbossa criteria for STEMI and patient is currently not having chest pain. 1 previous EKG did show left bundle branch block in the past.  At this time, if troponin is within normal range then patient can be admitted for cardiac catheterization on 9/5/2023. Medical Decision Making  Obtain blood work, EKG, orthostatic vital signs  Consult cardiology    Patient found to have new onset left bundle branch block pattern on EKG as well as QT prolongation. Troponin was within normal range. Patient did not have chest pain. At this time Case was discussed with cardiology who recommended admit for cardiac catheterization. Patient is D-dimer was elevated. Subsequently CTA lung PE study was ordered that did not show any acute pulmonary embolism. At this time patient agrees with admission plans. Amount and/or Complexity of Data Reviewed  Labs: ordered. Radiology: ordered. ECG/medicine tests: ordered and independent interpretation performed. Decision-making details documented in ED Course. Risk  Prescription drug management. Decision regarding hospitalization.           Disposition  Final diagnoses:   Palpitation   New onset left bundle branch block (LBBB)   QT prolongation     Time reflects when diagnosis was documented in both MDM as applicable and the Disposition within this note     Time User Action Codes Description Comment    9/3/2023  3:51 PM Reji Kramer Add [R00.2] Palpitation     9/3/2023  3:51 PM Era Marie Add [I44.7] New onset left bundle branch block (LBBB)     9/3/2023  3:52 PM Rodger Marieira Add [R94.31] QT prolongation     9/3/2023  4:03 PM Didenko Chaparro Kennedy Add [R94.31] Prolonged Q-T interval on ECG     9/3/2023  4:03 PM Didenko Chaparro Kennedy Add [I10] Primary hypertension       ED Disposition     ED Disposition   Admit    Condition   Stable    Date/Time   Sun Sep 3, 2023  3:51 PM    Comment   Case was discussed with Dr. Aide Buck and the patient's admission status was agreed to be Admission Status: inpatient status to the service of Dr. Aide Buck.            Follow-up Information    None         Current Discharge Medication List      CONTINUE these medications which have NOT CHANGED    Details   acetaminophen (TYLENOL) 500 mg tablet Take 1,000 mg by mouth      ALPRAZolam (XANAX) 0.25 mg tablet Take 1 tablet (0.25 mg total) by mouth 3 (three) times a day as needed for anxiety for up to 3 days  Qty: 9 tablet, Refills: 0    Associated Diagnoses: Anxiety      amLODIPine (NORVASC) 2.5 mg tablet Take 1 tablet (2.5 mg total) by mouth daily  Qty: 90 tablet, Refills: 2    Associated Diagnoses: Primary hypertension      buPROPion (Wellbutrin SR) 100 mg 12 hr tablet Take 1 tablet (100 mg total) by mouth 2 (two) times a day  Qty: 60 tablet, Refills: 0    Associated Diagnoses: Anxiety      losartan-hydrochlorothiazide (HYZAAR) 50-12.5 mg per tablet Take 1 tablet by mouth daily  Qty: 30 tablet, Refills: 2    Associated Diagnoses: Primary hypertension      Medical ID Plate MISC Use once for 1 dose Severely and permanently limited in the ability to walk because of an arthritic, neurological, or orthopedic condition: or cannot walk two hundred feet without stopping to rest and meets requirements for disability license plate  Qty: 1 each, Refills: 0    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     PDMP Review       Value Time User    PDMP Reviewed  Yes 5/3/2021 10:12 AM Marta Taylor PA-C          ED Provider  Electronically Signed by           Jono Olson DO  09/03/23 0448

## 2023-09-03 NOTE — PLAN OF CARE
Problem: PAIN - ADULT  Goal: Verbalizes/displays adequate comfort level or baseline comfort level  Description: Interventions:  - Encourage patient to monitor pain and request assistance  - Assess pain using appropriate pain scale  - Administer analgesics based on type and severity of pain and evaluate response  - Implement non-pharmacological measures as appropriate and evaluate response  - Consider cultural and social influences on pain and pain management  - Notify physician/advanced practitioner if interventions unsuccessful or patient reports new pain  Outcome: Progressing     Problem: INFECTION - ADULT  Goal: Absence or prevention of progression during hospitalization  Description: INTERVENTIONS:  - Assess and monitor for signs and symptoms of infection  - Monitor lab/diagnostic results  - Monitor all insertion sites, i.e. indwelling lines, tubes, and drains  - Monitor endotracheal if appropriate and nasal secretions for changes in amount and color  - [place] appropriate cooling/warming therapies per order  - Administer medications as ordered  - Instruct and encourage patient and family to use good hand hygiene technique  - Identify and instruct in appropriate isolation precautions for identified infection/condition  Outcome: Progressing  Goal: Absence of fever/infection during neutropenic period  Description: INTERVENTIONS:  - Monitor WBC    Outcome: Progressing     Problem: SAFETY ADULT  Goal: Patient will remain free of falls  Description: INTERVENTIONS:  - Educate patient/family on patient safety including physical limitations  - Instruct patient to call for assistance with activity   - Consult OT/PT to assist with strengthening/mobility   - Keep Call bell within reach  - Keep bed low and locked with side rails adjusted as appropriate  - Keep care items and personal belongings within reach  - Initiate and maintain comfort rounds  - Make Fall Risk Sign visible to staff  - Offer Toileting every 2 Hours, in advance of need  - Initiate/Maintain bed alarm  - Obtain necessary fall risk management equipment: yellow socks   - Apply yellow socks and bracelet for high fall risk patients  - Consider moving patient to room near nurses station  Outcome: Progressing  Goal: Maintain or return to baseline ADL function  Description: INTERVENTIONS:  -  Assess patient's ability to carry out ADLs; assess patient's baseline for ADL function and identify physical deficits which impact ability to perform ADLs (bathing, care of mouth/teeth, toileting, grooming, dressing, etc.)  - Assess/evaluate cause of self-care deficits   - Assess range of motion  - Assess patient's mobility; develop plan if impaired  - Assess patient's need for assistive devices and provide as appropriate  - Encourage maximum independence but intervene and supervise when necessary  - Involve family in performance of ADLs  - Assess for home care needs following discharge   - Consider OT consult to assist with ADL evaluation and planning for discharge  - Provide patient education as appropriate  Outcome: Progressing  Goal: Maintains/Returns to pre admission functional level  Description: INTERVENTIONS:  - Perform BMAT or MOVE assessment daily.   - Set and communicate daily mobility goal to care team and patient/family/caregiver. - Collaborate with rehabilitation services on mobility goals if consulted  - Perform Range of Motion 3 times a day. - Reposition patient every 3 hours.   - Dangle patient 3 times a day  - Stand patient 3 times a day  - Ambulate patient 3 times a day  - Out of bed to chair 3 times a day   - Out of bed for meals 3 times a day  - Out of bed for toileting  - Record patient progress and toleration of activity level   Outcome: Progressing

## 2023-09-04 ENCOUNTER — PREP FOR PROCEDURE (OUTPATIENT)
Dept: CARDIOLOGY CLINIC | Facility: CLINIC | Age: 68
End: 2023-09-04

## 2023-09-04 DIAGNOSIS — I44.7 LBBB (LEFT BUNDLE BRANCH BLOCK): ICD-10-CM

## 2023-09-04 DIAGNOSIS — R06.02 SHORTNESS OF BREATH: Primary | ICD-10-CM

## 2023-09-04 PROBLEM — R65.10 SIRS (SYSTEMIC INFLAMMATORY RESPONSE SYNDROME) (HCC): Status: RESOLVED | Noted: 2023-09-03 | Resolved: 2023-09-04

## 2023-09-04 PROBLEM — E78.2 MIXED HYPERLIPIDEMIA: Status: ACTIVE | Noted: 2023-09-04

## 2023-09-04 PROBLEM — R19.5 LOOSE BOWEL MOVEMENTS: Status: ACTIVE | Noted: 2023-09-04

## 2023-09-04 LAB
ANION GAP SERPL CALCULATED.3IONS-SCNC: 6 MMOL/L
ATRIAL RATE: 94 BPM
BASOPHILS # BLD AUTO: 0.04 THOUSANDS/ÂΜL (ref 0–0.1)
BASOPHILS NFR BLD AUTO: 1 % (ref 0–1)
BUN SERPL-MCNC: 17 MG/DL (ref 5–25)
CALCIUM SERPL-MCNC: 9.5 MG/DL (ref 8.4–10.2)
CHLORIDE SERPL-SCNC: 103 MMOL/L (ref 96–108)
CHOLEST SERPL-MCNC: 303 MG/DL
CO2 SERPL-SCNC: 28 MMOL/L (ref 21–32)
CREAT SERPL-MCNC: 0.65 MG/DL (ref 0.6–1.3)
EOSINOPHIL # BLD AUTO: 0.13 THOUSAND/ÂΜL (ref 0–0.61)
EOSINOPHIL NFR BLD AUTO: 2 % (ref 0–6)
ERYTHROCYTE [DISTWIDTH] IN BLOOD BY AUTOMATED COUNT: 12.7 % (ref 11.6–15.1)
GFR SERPL CREATININE-BSD FRML MDRD: 91 ML/MIN/1.73SQ M
GLUCOSE SERPL-MCNC: 114 MG/DL (ref 65–140)
HCT VFR BLD AUTO: 40.5 % (ref 34.8–46.1)
HDLC SERPL-MCNC: 65 MG/DL
HGB BLD-MCNC: 13.6 G/DL (ref 11.5–15.4)
IMM GRANULOCYTES # BLD AUTO: 0.02 THOUSAND/UL (ref 0–0.2)
IMM GRANULOCYTES NFR BLD AUTO: 0 % (ref 0–2)
LDLC SERPL CALC-MCNC: 197 MG/DL (ref 0–100)
LYMPHOCYTES # BLD AUTO: 1.58 THOUSANDS/ÂΜL (ref 0.6–4.47)
LYMPHOCYTES NFR BLD AUTO: 26 % (ref 14–44)
MAGNESIUM SERPL-MCNC: 2.2 MG/DL (ref 1.9–2.7)
MCH RBC QN AUTO: 31.1 PG (ref 26.8–34.3)
MCHC RBC AUTO-ENTMCNC: 33.6 G/DL (ref 31.4–37.4)
MCV RBC AUTO: 93 FL (ref 82–98)
MONOCYTES # BLD AUTO: 0.5 THOUSAND/ÂΜL (ref 0.17–1.22)
MONOCYTES NFR BLD AUTO: 8 % (ref 4–12)
NEUTROPHILS # BLD AUTO: 3.85 THOUSANDS/ÂΜL (ref 1.85–7.62)
NEUTS SEG NFR BLD AUTO: 63 % (ref 43–75)
NRBC BLD AUTO-RTO: 0 /100 WBCS
P AXIS: 71 DEGREES
PLATELET # BLD AUTO: 300 THOUSANDS/UL (ref 149–390)
PMV BLD AUTO: 9.2 FL (ref 8.9–12.7)
POTASSIUM SERPL-SCNC: 4.8 MMOL/L (ref 3.5–5.3)
PR INTERVAL: 198 MS
QRS AXIS: -64 DEGREES
QRSD INTERVAL: 150 MS
QT INTERVAL: 400 MS
QTC INTERVAL: 500 MS
RBC # BLD AUTO: 4.37 MILLION/UL (ref 3.81–5.12)
SODIUM SERPL-SCNC: 137 MMOL/L (ref 135–147)
T WAVE AXIS: 76 DEGREES
TRIGL SERPL-MCNC: 203 MG/DL
VENTRICULAR RATE: 94 BPM
WBC # BLD AUTO: 6.12 THOUSAND/UL (ref 4.31–10.16)

## 2023-09-04 PROCEDURE — 93010 ELECTROCARDIOGRAM REPORT: CPT | Performed by: INTERNAL MEDICINE

## 2023-09-04 PROCEDURE — 97162 PT EVAL MOD COMPLEX 30 MIN: CPT | Performed by: PHYSICAL THERAPIST

## 2023-09-04 PROCEDURE — 99232 SBSQ HOSP IP/OBS MODERATE 35: CPT | Performed by: INTERNAL MEDICINE

## 2023-09-04 PROCEDURE — 80061 LIPID PANEL: CPT

## 2023-09-04 PROCEDURE — 99222 1ST HOSP IP/OBS MODERATE 55: CPT | Performed by: INTERNAL MEDICINE

## 2023-09-04 PROCEDURE — 83735 ASSAY OF MAGNESIUM: CPT

## 2023-09-04 PROCEDURE — 85025 COMPLETE CBC W/AUTO DIFF WBC: CPT

## 2023-09-04 PROCEDURE — 80048 BASIC METABOLIC PNL TOTAL CA: CPT

## 2023-09-04 RX ADMIN — LOSARTAN POTASSIUM 50 MG: 50 TABLET, FILM COATED ORAL at 09:12

## 2023-09-04 RX ADMIN — ENOXAPARIN SODIUM 40 MG: 40 INJECTION SUBCUTANEOUS at 17:30

## 2023-09-04 RX ADMIN — ATORVASTATIN CALCIUM 80 MG: 40 TABLET, FILM COATED ORAL at 17:22

## 2023-09-04 RX ADMIN — ASPIRIN 81 MG CHEWABLE TABLET 162 MG: 81 TABLET CHEWABLE at 09:12

## 2023-09-04 RX ADMIN — AMLODIPINE BESYLATE 2.5 MG: 2.5 TABLET ORAL at 09:12

## 2023-09-04 RX ADMIN — METOPROLOL TARTRATE 25 MG: 25 TABLET, FILM COATED ORAL at 09:12

## 2023-09-04 RX ADMIN — HYDROCHLOROTHIAZIDE 12.5 MG: 12.5 TABLET ORAL at 09:12

## 2023-09-04 RX ADMIN — BUPROPION HYDROCHLORIDE 150 MG: 150 TABLET, EXTENDED RELEASE ORAL at 09:12

## 2023-09-04 NOTE — PHYSICAL THERAPY NOTE
PT EVALUATION     Time In: 1050  Time Out: 1100       09/04/23 1050   PT Last Visit   PT Visit Date 09/04/23   Note Type   Note type Evaluation   Pain Assessment   Pain Assessment Tool 0-10   Pain Score No Pain   Home Living   Type of Home House   Home Layout Multi-level   Bathroom Shower/Tub Walk-in shower   Bathroom Toilet Standard   Bathroom Accessibility Accessible   Additional Comments Pt reports she lives on 2nd floor, about 6 steps to get to 2nd floor   Prior Function   Level of [place] Independent with ADLs; Independent with functional mobility; Independent with IADLS   Lives With Son   [address] in the last 6 months 0   Vocational Retired   General   Additional Pertinent History Pt admitted on 9/3/23 for palpitations, SOB, diaphoresis, lightheadedness persistent for about 2 days. EKG revealed new onset left bundle branch block   Family/Caregiver Present No   Cognition   Overall Cognitive Status WFL   Arousal/Participation Alert   Orientation Level Oriented X4   Memory Within functional limits   Following Commands Follows all commands and directions without difficulty   Subjective   Subjective "feel fine"   RLE Assessment   RLE Assessment WFL   LLE Assessment   LLE Assessment WFL   Bed Mobility   Supine to Sit 7  Independent   Sit to Supine 7  Independent   Transfers   Sit to Stand 7  Independent   Stand to Sit 7  Independent   Ambulation/Elevation   Gait pattern WNL   Gait Assistance 6  Modified independent   Distance 25 feet within room with change in direction   Stair Management Assistance Not tested   Balance   Static Sitting Good   Dynamic Sitting Good   Static Standing Good   Dynamic Standing Good   Ambulatory Good   Activity Tolerance   Nurse Made Aware SHAY Vieira   Assessment   Problem List Decreased strength;Decreased endurance; Impaired balance   Assessment Patient seen for Physical Therapy evaluation. Patient admitted with Left bundle branch block.   Comorbidities affecting patient's physical performance include: hypertension. Personal factors affecting patient at time of initial evaluation include: inability to navigate community distances and inability to perform physical activity. Prior to admission, patient was independent with ADLS, independent with IADLS, ambulating household distance and ambulating community distances. Please find objective findings from Physical Therapy assessment regarding body systems outlined above with impairments and limitations including weakness, decreased endurance and decreased functional mobility tolerance. The Barthel Index was used as a functional outcome tool presenting with a score of Barthel Index Score: 75 today indicating minimal limitations of functional mobility and ADLS. Patient's clinical presentation is currently evolving as seen in patient's presentation of new onset of impairment of functional mobility and decreased endurance. Pt would benefit from continued Physical Therapy treatment to address deficits as defined above and maximize level of functional mobility. As demonstrated by objective findings, the assigned level of complexity for this evaluation is moderate. The patient's -Located within [facility] Basic Mobility Inpatient Short Form Raw Score is 23. A Raw score of greater than 16 suggests the patient may benefit from discharge to home. Please also refer to the recommendation of the Physical Therapist for safe discharge planning.    Goals   STG Expiration Date 09/11/23   Short Term Goal #1 Short-term goals: Pt will improve strength by 1/2 grade in bilateral LE;  Pt will ambulate 100 feet independently;   LTG Expiration Date 09/18/23   Long Term Goal #1 Long-term goals: Patient will be able to ambulate with least restrictive AD > 200'x1 independently in order to demonstrate ability to negotiate in home environment; Patient will negotiate stairs using most appropriate technique and S in order to be able to negotiate safely in home environment; Patient will improve AMPAC score to 23/24 or better to demonstrate improved functional independence. Plan   Treatment/Interventions LE strengthening/ROM; Therapeutic exercise; Endurance training;Gait training;Spoke to nursing   PT Frequency 3-5x/wk   Recommendation   PT Discharge Recommendation No rehabilitation needs   Additional Comments Pt may benefit from cardiac rehabilitation in the future   AM-PAC Basic Mobility Inpatient   Turning in Flat Bed Without Bedrails 4   Lying on Back to Sitting on Edge of Flat Bed Without Bedrails 4   Moving Bed to Chair 4   Standing Up From Chair Using Arms 4   Walk in Room 4   Climb 3-5 Stairs With Railing 3   Basic Mobility Inpatient Raw Score 23   Basic Mobility Standardized Score 50.88   Highest Level Of Mobility   JH-HLM Goal 7: Walk 25 feet or more   JH-HLM Achieved 7: Walk 25 feet or more   Barthel Index   Feeding 10   Bathing 5   Grooming Score 5   Dressing Score 10   Bladder Score 10   Bowels Score 10   Toilet Use Score 10   Transfers (Bed/Chair) Score 15   Mobility (Level Surface) Score 0   Stairs Score 0   Barthel Index Score 75   End of Consult   Patient Position at End of Consult Supine; All needs within reach   [address] Number  Kandice Johnson PT, DPT [id number]

## 2023-09-04 NOTE — PLAN OF CARE
Problem: PHYSICAL THERAPY ADULT  Goal: Performs mobility at highest level of function for planned discharge setting. See evaluation for individualized goals. Description: Treatment/Interventions: LE strengthening/ROM, Therapeutic exercise, Endurance training, Gait training, Spoke to nursing          See flowsheet documentation for full assessment, interventions and recommendations. Note:    Problem List: Decreased strength, Decreased endurance, Impaired balance  Assessment: Patient seen for Physical Therapy evaluation. Patient admitted with Left bundle branch block. Comorbidities affecting patient's physical performance include: hypertension. Personal factors affecting patient at time of initial evaluation include: inability to navigate community distances and inability to perform physical activity. Prior to admission, patient was independent with ADLS, independent with IADLS, ambulating household distance and ambulating community distances. Please find objective findings from Physical Therapy assessment regarding body systems outlined above with impairments and limitations including weakness, decreased endurance and decreased functional mobility tolerance. The Barthel Index was used as a functional outcome tool presenting with a score of Barthel Index Score: 75 today indicating minimal limitations of functional mobility and ADLS. Patient's clinical presentation is currently evolving as seen in patient's presentation of new onset of impairment of functional mobility and decreased endurance. Pt would benefit from continued Physical Therapy treatment to address deficits as defined above and maximize level of functional mobility. As demonstrated by objective findings, the assigned level of complexity for this evaluation is moderate. The patient's -[facility] Basic Mobility Inpatient Short Form Raw Score is 23. A Raw score of greater than 16 suggests the patient may benefit from discharge to home.  Please also refer to the recommendation of the Physical Therapist for safe discharge planning. PT Discharge Recommendation: No rehabilitation needs    See flowsheet documentation for full assessment.

## 2023-09-04 NOTE — PROGRESS NOTES
[facility] Practice Progress Note - Joanne Velasquez 76 y.o. female MRN: [id number]    Unit/Bed#: [address] [place] [address] Encounter: [id number]      Assessment/Plan:  * Left bundle branch block  Assessment & Plan  Acute     Pt presented to ED with diaphoresis, SOB, palpitations. EKG revealed NEW ONSET LBBB compared to EKG 8/12/2023. Troponin 4>5, BNP WNL, D-dimer 1.20. Pt has a hx of pancreatic cancer s/p chemo, radiation, whipple. Suspect elevated D-dimer secondary to history of malignancy. Plan:   -Cardiology recommendations: Lipitor 80 mg daily, losartan 50 mg daily, 12.5 mg metoprolol twice daily,  mg daily   -Catheterization and Echocardiogram planned for 9/4  -Telemetry  -Held heparin per cardiology; patient currently on Lovenox 40 mg daily    Primary hypertension  Assessment & Plan  Chronic. Home medications include Hyzaar 50-12.5 mg, Norvasc 2.5 mg once daily. Plan:  -Cardiology recommendations: Lipitor 80 mg daily, losartan 50 mg daily, 12.5 mg metoprolol twice daily  -Decrease metoprolol from 25 mg to 12.5 mg twice daily due to bradycardia  -Held home medications  -Continue to monitor BP closely      Loose bowel movements  Assessment & Plan  Patient initially on Colace and MiraLAX for constipation. On the morning of 9/4, patient reported 3 loose bowel movements and refused Colace and MiraLAX. · Discontinue Colace and MiraLAX  · Monitor bowel movements    Mixed hyperlipidemia  Assessment & Plan  Lipid panel showed total cholesterol of 303, triglyceride 203, HDL 65, . Patient never aware of hyperlipidemia. · Continue Lipitor 80 mg daily    Hypokalemia  Assessment & Plan  Potassium on admission 3.3. Plan:   -Replete as needed   -PM BMP  -Repeat BMP daily    Prolonged Q-T interval on ECG  Assessment & Plan  On admission, patient noted to have a QTc of 500.     Plan  Avoid QTc prolonging medications    SIRS (systemic inflammatory response syndrome) (HCC)-resolved as of 9/4/2023  Assessment & Plan  SIRS criteria on admission with tachycardia and tachypnea. Possibly secondary to new onset of left bundle branch block versus PE. Pt reports recently ill with COVID, noting productive cough. COVID negative  CTA: negative for PE, acute pathology. Positive for atelectasis    Plan  -Add incentive spirometer  -See left bundle branch block    Subjective:   Patient seen and examined at bedside. Patient is anxious about receiving catheter procedure, but still agreeable. Patient states that she had 3 loose bowel movements this morning after receiving Colace and MiraLAX yesterday. Patient refused Colace and MiraLAX this morning. Denies chest pain, shortness of breath, lightheadedness or any other acute complaints at this time. Objective:     Vitals: Blood pressure 104/60, pulse (!) 46, temperature 98.3 °F (36.8 °C), resp. rate 17, height 5' 2" (1.575 m), weight 76.3 kg (168 lb 3.2 oz), SpO2 94 %. ,Body mass index is 30.76 kg/m². Wt Readings from Last 3 Encounters:   09/04/23 76.3 kg (168 lb 3.2 oz)   08/31/23 79.8 kg (176 lb)   08/11/23 78.9 kg (174 lb)       Intake/Output Summary (Last 24 hours) at 9/4/2023 1714  Last data filed at 9/4/2023 1300  Gross per 24 hour   Intake 720 ml   Output --   Net 720 ml       Physical Exam  Constitutional:       Appearance: Normal appearance. HENT:      Head: Normocephalic and atraumatic. Mouth/Throat:      Mouth: Mucous membranes are moist.   Eyes:      General:         Right eye: No discharge. Left eye: No discharge. Conjunctiva/sclera: Conjunctivae normal.   Cardiovascular:      Rate and Rhythm: Normal rate and regular rhythm. Pulses: Normal pulses. Heart sounds: Normal heart sounds. Pulmonary:      Effort: Pulmonary effort is normal. No respiratory distress. Breath sounds: Normal breath sounds. Abdominal:      General: Bowel sounds are normal.      Palpations: Abdomen is soft. Tenderness:  There is no abdominal tenderness. Musculoskeletal:      Cervical back: Neck supple. Right lower leg: No edema. Left lower leg: No edema. Skin:     General: Skin is warm and dry. Capillary Refill: Capillary refill takes less than 2 seconds. Neurological:      Mental Status: She is alert.          Recent Results (from the past 24 hour(s))   Basic metabolic panel    Collection Time: 09/04/23  6:07 AM   Result Value Ref Range    Sodium 137 135 - 147 mmol/L    Potassium 4.8 3.5 - 5.3 mmol/L    Chloride 103 96 - 108 mmol/L    CO2 28 21 - 32 mmol/L    ANION GAP 6 mmol/L    BUN 17 5 - 25 mg/dL    Creatinine 0.65 0.60 - 1.30 mg/dL    Glucose 114 65 - 140 mg/dL    Calcium 9.5 8.4 - 10.2 mg/dL    eGFR 91 ml/min/1.73sq m   CBC and differential    Collection Time: 09/04/23  6:07 AM   Result Value Ref Range    WBC 6.12 4.31 - 10.16 Thousand/uL    RBC 4.37 3.81 - 5.12 Million/uL    Hemoglobin 13.6 11.5 - 15.4 g/dL    Hematocrit 40.5 34.8 - 46.1 %    MCV 93 82 - 98 fL    MCH 31.1 26.8 - 34.3 pg    MCHC 33.6 31.4 - 37.4 g/dL    RDW 12.7 11.6 - 15.1 %    MPV 9.2 8.9 - 12.7 fL    Platelets 541 523 - 899 Thousands/uL    nRBC 0 /100 WBCs    Neutrophils Relative 63 43 - 75 %    Immat GRANS % 0 0 - 2 %    Lymphocytes Relative 26 14 - 44 %    Monocytes Relative 8 4 - 12 %    Eosinophils Relative 2 0 - 6 %    Basophils Relative 1 0 - 1 %    Neutrophils Absolute 3.85 1.85 - 7.62 Thousands/µL    Immature Grans Absolute 0.02 0.00 - 0.20 Thousand/uL    Lymphocytes Absolute 1.58 0.60 - 4.47 Thousands/µL    Monocytes Absolute 0.50 0.17 - 1.22 Thousand/µL    Eosinophils Absolute 0.13 0.00 - 0.61 Thousand/µL    Basophils Absolute 0.04 0.00 - 0.10 Thousands/µL   Magnesium    Collection Time: 09/04/23  6:07 AM   Result Value Ref Range    Magnesium 2.2 1.9 - 2.7 mg/dL   Lipid Panel with Direct LDL reflex    Collection Time: 09/04/23  6:07 AM   Result Value Ref Range    Cholesterol 303 (H) See Comment mg/dL    Triglycerides 203 (H) See Comment mg/dL    HDL, Direct 65 >=50 mg/dL    LDL Calculated 197 (H) 0 - 100 mg/dL       Current Facility-Administered Medications   Medication Dose Route Frequency Provider Last Rate Last Admin   • acetaminophen (TYLENOL) tablet 650 mg  650 mg Oral Q4H PRN Geoffrey Stanley MD       • aspirin chewable tablet 162 mg  162 mg Oral Daily Connor Kovacs MD   162 mg at 09/04/23 0912   • atorvastatin (LIPITOR) tablet 80 mg  80 mg Oral QPM Jeannette Kaur MD   80 mg at 09/03/23 1848   • buPROPion (WELLBUTRIN XL) 24 hr tablet 150 mg  150 mg Oral Daily Geoffrey Stanley MD   150 mg at 09/04/23 0912   • enoxaparin (LOVENOX) subcutaneous injection 40 mg  40 mg Subcutaneous Daily Waqar Chambers MD   40 mg at 09/03/23 1848   • losartan (COZAAR) tablet 50 mg  50 mg Oral Daily Geoffrey Stanley MD   50 mg at 09/04/23 0912   • metoprolol tartrate (LOPRESSOR) partial tablet 12.5 mg  12.5 mg Oral Q12H [facility] Damaso Conner MD       • nicotine (NICODERM CQ) 7 mg/24hr TD 24 hr patch 1 patch  1 patch Transdermal Daily PRN Geoffrey Stanley MD       • trimethobenzamide (TIGAN) IM injection 200 mg  200 mg Intramuscular Q6H PRN June Hercules MD           Invasive Devices     Central Venous Catheter Line  Duration           Port A Cath 06/02/21 Left Chest 824 days          Peripheral Intravenous Line  Duration           Peripheral IV 09/03/23 Left Antecubital 1 day          Drain  Duration           Closed/Suction Drain Right Abdomen Bulb 19 Fr. 861 days                Lab, Imaging and other studies: I have personally reviewed pertinent reports.     VTE Pharmacologic Prophylaxis: Enoxaparin (Lovenox)  VTE Mechanical Prophylaxis: sequential compression device    Damaso Conner MD

## 2023-09-04 NOTE — ASSESSMENT & PLAN NOTE
Lipid panel showed total cholesterol of 303, triglyceride 203, HDL 65, . Patient never aware of hyperlipidemia. Recheck lipid panel in 3 months.     · Continue Lipitor 20 mg daily

## 2023-09-04 NOTE — ASSESSMENT & PLAN NOTE
Patient initially on Colace and MiraLAX for constipation. On the morning of 9/4, patient reported 3 loose bowel movements and refused Colace and MiraLAX.

## 2023-09-04 NOTE — CONSULTS
Consultation - Cardiology   Yari Martin 76 y.o. female MRN: [id number]  Unit/Bed#: 4 [address] Encounter: [id number]  09/04/23  9:04 AM          Physician Requesting Consult: Penny Gordon, *  Reason for Consult / Principal Problem: Palpitations      Assessment:  1. Shortness of breath -electrocardiogram shows a left bundle branch block which is intermittently present. Bundle branch block is present primarily when she has symptoms.  -Discussed with her in detail. Requires further evaluation of coronary arteries with stress test or cardiac catheterization. Discussed both with her. We will plan for cardiac catheterization tomorrow. -2D echocardiogram also ordered.  -Troponin has been negative. May continue to hold heparin. -Aspirin 81 mg daily. -Metoprolol 12.5 mg twice daily. She was on a higher dose but has developed bradycardia. 2. Hypertension  - BP stable  - Continue losartan and metoprolol    3. Obesity    4. History of pancreatic cancer which was treated with Whipple procedure and chemotherapy plus radiation. Plan:  As above  Discussed with hospitalist      History of Present Illness   HPI: Yari Martin is a 76y.o. year old female who presents with palpitations and lightheadedness. Patient has been having symptoms intermittently for the past few months. Yesterday, she developed shortness of breath along with palpitations and came to the emergency room for further evaluation. She denies any chest pain. In the emergency room, she was noted to have a left bundle branch block. Review of ECGs revealed left bundle branch block has been intermittently present for the past few months. During her last visit with Dr. Akua Moore, she had sinus rhythm with LVH without a bundle branch block. Review of Systems:    Review of Systems   Constitutional: Positive for fatigue. Respiratory: Positive for shortness of breath. Cardiovascular: Positive for palpitations.  Negative for chest pain and leg swelling. Neurological: Positive for light-headedness. All other systems reviewed and are negative. Historical Information   Past Medical History:   Diagnosis Date   • BRCA1 positive    • BRCA2 positive    • Cancer ([address])     pancreatic   • History of chemotherapy     pancreatic cancer   • History of radiation therapy    • Pancreatic carcinoma [facility])      Past Surgical History:   Procedure Laterality Date   • ABLATION SOFT TISSUE N/A 2021    Procedure: INTRAOPERATIVE ULTRASOUND, ABLATION,SOFT TISSUE PANCREAS;  Surgeon: Wanda Pepper MD;  [facility];  Service: Surgical Oncology   • CHOLECYSTECTOMY N/A 2021    Procedure: CHOLECYSTECTOMY;  Surgeon: Wanda Pepper MD;  [facility];  Service: Surgical Oncology   • FL GUIDED CENTRAL VENOUS ACCESS DEVICE INSERTION  2021   • HYSTERECTOMY     • LAPAROTOMY N/A 2021    Procedure: LAPAROTOMY EXPLORATORY;  Surgeon: Wanda Pepper MD;  [facility];  Service: Surgical Oncology   • OOPHORECTOMY     • SINUS SURGERY     • TUNNELED VENOUS PORT PLACEMENT Left 2021    Procedure: INSERTION VENOUS PORT (PORT-A-CATH); Surgeon: Wanda Pepper MD;  [facility];  Service: Surgical Oncology   • US GUIDED THYROID BIOPSY  2022   • WHIPPLE PROCEDURE/PANCREATICO-DUODENECTOMY N/A 2021    Procedure: WHIPPLE PROCEDURE/PANCREATICO-DUODENECTOMY; PYLORIC PRESERVING;  Surgeon: Wanda Pepper MD;  [facility];  Service: Surgical Oncology     Social History     Substance and Sexual Activity   Alcohol Use Never     Social History     Substance and Sexual Activity   Drug Use Never     Social History     Tobacco Use   Smoking Status Some Days   • Packs/day: 0.50   • Types: Cigarettes   • Start date:    • Last attempt to quit: 2021   • Years since quittin.4   Smokeless Tobacco Never   Tobacco Comments    recently stop smoking , pt stated she smoke occ. 1-2 cigg some days 2022.        Family History:   Family History   Problem Relation Age of Onset   • Ulcerative colitis Mother    • Prostate cancer Father    • Melanoma Sister    • Heart disease Brother    • Prostate cancer Brother    • No Known Problems Brother    • No Known Problems Maternal Uncle    • No Known Problems Paternal Uncle        Meds/Allergies   current meds:   Current Facility-Administered Medications   Medication Dose Route Frequency   • acetaminophen (TYLENOL) tablet 650 mg  650 mg Oral Q4H PRN   • amLODIPine (NORVASC) tablet 2.5 mg  2.5 mg Oral Daily   • aspirin chewable tablet 162 mg  162 mg Oral Daily   • atorvastatin (LIPITOR) tablet 80 mg  80 mg Oral QPM   • buPROPion (WELLBUTRIN XL) 24 hr tablet 150 mg  150 mg Oral Daily   • enoxaparin (LOVENOX) subcutaneous injection 40 mg  40 mg Subcutaneous Daily   • losartan (COZAAR) tablet 50 mg  50 mg Oral Daily   • metoprolol tartrate (LOPRESSOR) partial tablet 12.5 mg  12.5 mg Oral Q12H TORIBIO   • nicotine (NICODERM CQ) 7 mg/24hr TD 24 hr patch 1 patch  1 patch Transdermal Daily PRN   • trimethobenzamide (TIGAN) IM injection 200 mg  200 mg Intramuscular Q6H PRN     Allergies   Allergen Reactions   • Penicillins Rash   • Tetracycline Rash       Objective   Vitals: Blood pressure 126/80, pulse 65, temperature 97.6 °F (36.4 °C), temperature source Oral, resp. rate 17, height 5' 2" (1.575 m), weight 76.3 kg (168 lb 3.2 oz), SpO2 93 %. , Body mass index is 30.76 kg/m². Physical Exam   Constitutional: She appears healthy. No distress. Eyes: Pupils are equal, round, and reactive to light. Conjunctivae are normal.   Neck: No JVD present. Cardiovascular: Normal rate, regular rhythm and normal heart sounds. Exam reveals no gallop and no friction rub. No murmur heard. Pulmonary/Chest: Effort normal and breath sounds normal. She has no wheezes. She has no rales. Musculoskeletal:         General: No tenderness, deformity or edema. Cervical back: Normal range of motion and neck supple. Neurological: She is alert and oriented to person, place, and time. Skin: Skin is warm and dry. Lab Results:     Troponins:       CBC with diff:   Results from last 7 days   Lab Units 09/04/23  0607 09/03/23  1419   WBC Thousand/uL 6.12 8.06   HEMOGLOBIN g/dL 13.6 14.1   HEMATOCRIT % 40.5 41.7   MCV fL 93 91   PLATELETS Thousands/uL 300 321   RBC Million/uL 4.37 4.59   MCH pg 31.1 30.7   MCHC g/dL 33.6 33.8   RDW % 12.7 12.6   MPV fL 9.2 9.3   NRBC AUTO /100 WBCs 0 0       CMP:   Results from last 7 days   Lab Units 09/04/23  0607 09/03/23  1419   POTASSIUM mmol/L 4.8 3.3*   CHLORIDE mmol/L 103 98   CO2 mmol/L 28 25   BUN mg/dL 17 19   CREATININE mg/dL 0.65 0.65   CALCIUM mg/dL 9.5 9.4   AST U/L  --  22   ALT U/L  --  24   ALK PHOS U/L  --  107*   EGFR ml/min/1.73sq m 91 91       Magnesium:   Results from last 7 days   Lab Units 09/04/23  0607 09/03/23  1419   MAGNESIUM mg/dL 2.2 2.0       Coags:   Results from last 7 days   Lab Units 09/03/23  1419   PTT seconds 26   INR  0.94       Lipid Profile:   Results from last 7 days   Lab Units 09/04/23  0607   TRIGLYCERIDES mg/dL 203*   HDL mg/dL 65   LDL CALC mg/dL 197*         Cardiac testing: All previous testing has been reviewed. See HPI for summary. EKG: Personally reviewed: NSR with LBBB.   Currently on telemetry she is sinus bradycardia with LVH    Imaging: I have personally reviewed pertinent films in PACS

## 2023-09-05 ENCOUNTER — TELEPHONE (OUTPATIENT)
Dept: CARDIOLOGY CLINIC | Facility: CLINIC | Age: 68
End: 2023-09-05

## 2023-09-05 ENCOUNTER — APPOINTMENT (INPATIENT)
Dept: NON INVASIVE DIAGNOSTICS | Facility: HOSPITAL | Age: 68
DRG: 287 | End: 2023-09-05
Payer: MEDICARE

## 2023-09-05 VITALS
TEMPERATURE: 98 F | DIASTOLIC BLOOD PRESSURE: 70 MMHG | HEIGHT: 62 IN | HEART RATE: 51 BPM | WEIGHT: 170 LBS | SYSTOLIC BLOOD PRESSURE: 115 MMHG | RESPIRATION RATE: 18 BRPM | OXYGEN SATURATION: 92 % | BODY MASS INDEX: 31.28 KG/M2

## 2023-09-05 DIAGNOSIS — F41.9 ANXIETY: ICD-10-CM

## 2023-09-05 PROBLEM — F17.200 SMOKING: Status: ACTIVE | Noted: 2023-09-05

## 2023-09-05 PROBLEM — IMO0001 SMOKING: Status: ACTIVE | Noted: 2023-09-05

## 2023-09-05 LAB
ANION GAP SERPL CALCULATED.3IONS-SCNC: 7 MMOL/L
AORTIC ROOT: 2.9 CM
APICAL FOUR CHAMBER EJECTION FRACTION: 59 %
BASOPHILS # BLD AUTO: 0.05 THOUSANDS/ÂΜL (ref 0–0.1)
BASOPHILS NFR BLD AUTO: 1 % (ref 0–1)
BUN SERPL-MCNC: 22 MG/DL (ref 5–25)
CALCIUM SERPL-MCNC: 9.5 MG/DL (ref 8.4–10.2)
CATH EF QUANTITATIVE: 50 %
CHLORIDE SERPL-SCNC: 101 MMOL/L (ref 96–108)
CO2 SERPL-SCNC: 27 MMOL/L (ref 21–32)
CREAT SERPL-MCNC: 0.71 MG/DL (ref 0.6–1.3)
E WAVE DECELERATION TIME: 250 MS
EOSINOPHIL # BLD AUTO: 0.17 THOUSAND/ÂΜL (ref 0–0.61)
EOSINOPHIL NFR BLD AUTO: 2 % (ref 0–6)
ERYTHROCYTE [DISTWIDTH] IN BLOOD BY AUTOMATED COUNT: 12.7 % (ref 11.6–15.1)
FRACTIONAL SHORTENING: 23 (ref 28–44)
GFR SERPL CREATININE-BSD FRML MDRD: 87 ML/MIN/1.73SQ M
GLUCOSE SERPL-MCNC: 108 MG/DL (ref 65–140)
HCT VFR BLD AUTO: 40 % (ref 34.8–46.1)
HGB BLD-MCNC: 13.4 G/DL (ref 11.5–15.4)
IMM GRANULOCYTES # BLD AUTO: 0.01 THOUSAND/UL (ref 0–0.2)
IMM GRANULOCYTES NFR BLD AUTO: 0 % (ref 0–2)
INTERVENTRICULAR SEPTUM IN DIASTOLE (PARASTERNAL SHORT AXIS VIEW): 1 CM
INTERVENTRICULAR SEPTUM: 1 CM (ref 0.6–1.1)
LAAS-AP2: 20.2 CM2
LAAS-AP4: 10.8 CM2
LEFT ATRIUM SIZE: 3.4 CM
LEFT ATRIUM VOLUME (MOD BIPLANE): 36 ML
LEFT INTERNAL DIMENSION IN SYSTOLE: 3.4 CM (ref 2.1–4)
LEFT VENTRICULAR INTERNAL DIMENSION IN DIASTOLE: 4.4 CM (ref 3.5–6)
LEFT VENTRICULAR POSTERIOR WALL IN END DIASTOLE: 1 CM
LEFT VENTRICULAR STROKE VOLUME: 42 ML
LVSV (TEICH): 42 ML
LYMPHOCYTES # BLD AUTO: 2.16 THOUSANDS/ÂΜL (ref 0.6–4.47)
LYMPHOCYTES NFR BLD AUTO: 28 % (ref 14–44)
MAGNESIUM SERPL-MCNC: 2.2 MG/DL (ref 1.9–2.7)
MCH RBC QN AUTO: 31.2 PG (ref 26.8–34.3)
MCHC RBC AUTO-ENTMCNC: 33.5 G/DL (ref 31.4–37.4)
MCV RBC AUTO: 93 FL (ref 82–98)
MONOCYTES # BLD AUTO: 0.51 THOUSAND/ÂΜL (ref 0.17–1.22)
MONOCYTES NFR BLD AUTO: 7 % (ref 4–12)
MV E'TISSUE VEL-LAT: 12 CM/S
MV E'TISSUE VEL-SEP: 8 CM/S
MV PEAK A VEL: 0.85 M/S
MV PEAK E VEL: 79 CM/S
MV STENOSIS PRESSURE HALF TIME: 73 MS
MV VALVE AREA P 1/2 METHOD: 3.01
NEUTROPHILS # BLD AUTO: 4.7 THOUSANDS/ÂΜL (ref 1.85–7.62)
NEUTS SEG NFR BLD AUTO: 62 % (ref 43–75)
NRBC BLD AUTO-RTO: 0 /100 WBCS
PLATELET # BLD AUTO: 292 THOUSANDS/UL (ref 149–390)
PMV BLD AUTO: 9.2 FL (ref 8.9–12.7)
POTASSIUM SERPL-SCNC: 5.1 MMOL/L (ref 3.5–5.3)
RBC # BLD AUTO: 4.29 MILLION/UL (ref 3.81–5.12)
RIGHT ATRIUM AREA SYSTOLE A4C: 13.9 CM2
RIGHT VENTRICLE ID DIMENSION: 3.1 CM
SL CV LEFT ATRIUM LENGTH A2C: 5.1 CM
SL CV PED ECHO LEFT VENTRICLE DIASTOLIC VOLUME (MOD BIPLANE) 2D: 88 ML
SL CV PED ECHO LEFT VENTRICLE SYSTOLIC VOLUME (MOD BIPLANE) 2D: 46 ML
SODIUM SERPL-SCNC: 135 MMOL/L (ref 135–147)
TR MAX PG: 29 MMHG
TR PEAK VELOCITY: 2.7 M/S
TRICUSPID ANNULAR PLANE SYSTOLIC EXCURSION: 2.1 CM
TRICUSPID VALVE PEAK REGURGITATION VELOCITY: 2.68 M/S
WBC # BLD AUTO: 7.6 THOUSAND/UL (ref 4.31–10.16)

## 2023-09-05 PROCEDURE — 4A023N7 MEASUREMENT OF CARDIAC SAMPLING AND PRESSURE, LEFT HEART, PERCUTANEOUS APPROACH: ICD-10-PCS | Performed by: FAMILY MEDICINE

## 2023-09-05 PROCEDURE — C1894 INTRO/SHEATH, NON-LASER: HCPCS | Performed by: INTERNAL MEDICINE

## 2023-09-05 PROCEDURE — C1769 GUIDE WIRE: HCPCS | Performed by: INTERNAL MEDICINE

## 2023-09-05 PROCEDURE — 93306 TTE W/DOPPLER COMPLETE: CPT

## 2023-09-05 PROCEDURE — 99239 HOSP IP/OBS DSCHRG MGMT >30: CPT | Performed by: INTERNAL MEDICINE

## 2023-09-05 PROCEDURE — 93458 L HRT ARTERY/VENTRICLE ANGIO: CPT | Performed by: INTERNAL MEDICINE

## 2023-09-05 PROCEDURE — 99232 SBSQ HOSP IP/OBS MODERATE 35: CPT | Performed by: INTERNAL MEDICINE

## 2023-09-05 PROCEDURE — 99152 MOD SED SAME PHYS/QHP 5/>YRS: CPT | Performed by: INTERNAL MEDICINE

## 2023-09-05 PROCEDURE — B2111ZZ FLUOROSCOPY OF MULTIPLE CORONARY ARTERIES USING LOW OSMOLAR CONTRAST: ICD-10-PCS | Performed by: FAMILY MEDICINE

## 2023-09-05 PROCEDURE — 93306 TTE W/DOPPLER COMPLETE: CPT | Performed by: INTERNAL MEDICINE

## 2023-09-05 PROCEDURE — 85025 COMPLETE CBC W/AUTO DIFF WBC: CPT

## 2023-09-05 PROCEDURE — 83735 ASSAY OF MAGNESIUM: CPT

## 2023-09-05 PROCEDURE — B2151ZZ FLUOROSCOPY OF LEFT HEART USING LOW OSMOLAR CONTRAST: ICD-10-PCS | Performed by: FAMILY MEDICINE

## 2023-09-05 PROCEDURE — 80048 BASIC METABOLIC PNL TOTAL CA: CPT

## 2023-09-05 RX ORDER — METOPROLOL SUCCINATE 25 MG/1
25 TABLET, EXTENDED RELEASE ORAL
Status: DISCONTINUED | OUTPATIENT
Start: 2023-09-05 | End: 2023-09-05

## 2023-09-05 RX ORDER — NITROGLYCERIN 20 MG/100ML
INJECTION INTRAVENOUS CODE/TRAUMA/SEDATION MEDICATION
Status: DISCONTINUED | OUTPATIENT
Start: 2023-09-05 | End: 2023-09-05 | Stop reason: HOSPADM

## 2023-09-05 RX ORDER — HEPARIN SODIUM 1000 [USP'U]/ML
INJECTION, SOLUTION INTRAVENOUS; SUBCUTANEOUS CODE/TRAUMA/SEDATION MEDICATION
Status: DISCONTINUED | OUTPATIENT
Start: 2023-09-05 | End: 2023-09-05 | Stop reason: HOSPADM

## 2023-09-05 RX ORDER — ATORVASTATIN CALCIUM 20 MG/1
20 TABLET, FILM COATED ORAL EVERY EVENING
Status: DISCONTINUED | OUTPATIENT
Start: 2023-09-05 | End: 2023-09-05 | Stop reason: HOSPADM

## 2023-09-05 RX ORDER — MIDAZOLAM HYDROCHLORIDE 2 MG/2ML
INJECTION, SOLUTION INTRAMUSCULAR; INTRAVENOUS CODE/TRAUMA/SEDATION MEDICATION
Status: DISCONTINUED | OUTPATIENT
Start: 2023-09-05 | End: 2023-09-05 | Stop reason: HOSPADM

## 2023-09-05 RX ORDER — SODIUM CHLORIDE 9 MG/ML
50 INJECTION, SOLUTION INTRAVENOUS CONTINUOUS
Status: DISCONTINUED | OUTPATIENT
Start: 2023-09-05 | End: 2023-09-05 | Stop reason: HOSPADM

## 2023-09-05 RX ORDER — BUPROPION HYDROCHLORIDE 100 MG/1
100 TABLET, EXTENDED RELEASE ORAL 2 TIMES DAILY
Qty: 60 TABLET | Refills: 0 | OUTPATIENT
Start: 2023-09-05

## 2023-09-05 RX ORDER — LIDOCAINE WITH 8.4% SOD BICARB 0.9%(10ML)
SYRINGE (ML) INJECTION CODE/TRAUMA/SEDATION MEDICATION
Status: DISCONTINUED | OUTPATIENT
Start: 2023-09-05 | End: 2023-09-05 | Stop reason: HOSPADM

## 2023-09-05 RX ORDER — VERAPAMIL HYDROCHLORIDE 2.5 MG/ML
INJECTION, SOLUTION INTRAVENOUS CODE/TRAUMA/SEDATION MEDICATION
Status: DISCONTINUED | OUTPATIENT
Start: 2023-09-05 | End: 2023-09-05 | Stop reason: HOSPADM

## 2023-09-05 RX ORDER — FENTANYL CITRATE 50 UG/ML
INJECTION, SOLUTION INTRAMUSCULAR; INTRAVENOUS CODE/TRAUMA/SEDATION MEDICATION
Status: DISCONTINUED | OUTPATIENT
Start: 2023-09-05 | End: 2023-09-05 | Stop reason: HOSPADM

## 2023-09-05 RX ORDER — ATORVASTATIN CALCIUM 20 MG/1
20 TABLET, FILM COATED ORAL EVERY EVENING
Qty: 30 TABLET | Refills: 0 | Status: SHIPPED | OUTPATIENT
Start: 2023-09-05 | End: 2023-10-05

## 2023-09-05 RX ORDER — ATORVASTATIN CALCIUM 40 MG/1
40 TABLET, FILM COATED ORAL DAILY
Qty: 30 TABLET | Refills: 0 | Status: CANCELLED | OUTPATIENT
Start: 2023-09-05 | End: 2023-10-05

## 2023-09-05 RX ORDER — ASPIRIN 81 MG/1
81 TABLET, CHEWABLE ORAL DAILY
Qty: 30 TABLET | Refills: 0 | Status: SHIPPED | OUTPATIENT
Start: 2023-09-05 | End: 2023-10-05

## 2023-09-05 RX ADMIN — SODIUM CHLORIDE 50 ML/HR: 0.9 INJECTION, SOLUTION INTRAVENOUS at 11:19

## 2023-09-05 RX ADMIN — Medication 12.5 MG: at 11:05

## 2023-09-05 RX ADMIN — LOSARTAN POTASSIUM 50 MG: 50 TABLET, FILM COATED ORAL at 11:09

## 2023-09-05 RX ADMIN — ASPIRIN 81 MG CHEWABLE TABLET 162 MG: 81 TABLET CHEWABLE at 11:05

## 2023-09-05 RX ADMIN — BUPROPION HYDROCHLORIDE 150 MG: 150 TABLET, EXTENDED RELEASE ORAL at 11:08

## 2023-09-05 NOTE — PROGRESS NOTES
Daily Progress Note - [facility] SPECIALTY [facility] Medicine Residency  Nano Rodriguez 76 y.o. female MRN: [id number]  Unit/Bed#: 913 [facility] 419-01 Encounter: [id number]  Admitting Physician: Abhishek Reyes DO  PCP: Augustine Membreno DO  Date of Admission:  9/3/2023  1:53 PM    Summary:     IVFs:    Diet: Diet NPO; Sips of clear liquids  VTE:  Enoxaparin (Lovenox) . Mechanical prophylaxis in place. Patient Centered Rounds: I have performed bedside rounds with nursing staff today. Specialists/Other Care Team Provider:   cardiology    LOS: 2 day(s)  Status: Inpatient   Disposition: The patient will continue to require additional inpatient hospital stay due to cardiac cath  Code Status: Level 1 - Full Code      Assessment and Plan    * Left bundle branch block  Assessment & Plan  Acute     Pt presented to ED with diaphoresis, SOB, palpitations. EKG revealed NEW ONSET LBBB compared to EKG 8/12/2023. Troponin 4>5, BNP WNL, D-dimer 1.20. Pt has a hx of pancreatic cancer s/p chemo, radiation, whipple. Suspect elevated D-dimer secondary to history of malignancy. Plan:   -Cardiology recommendations: Lipitor 80 mg daily, losartan 50 mg daily, 12.5 mg metoprolol twice daily,  mg daily   -Catheterization and Echocardiogram planned for 9/4  -Telemetry  -Held heparin per cardiology; patient currently on Lovenox 40 mg daily    SIRS (systemic inflammatory response syndrome) (HCC)-resolved as of 9/4/2023  Assessment & Plan  SIRS criteria on admission with tachycardia and tachypnea. Possibly secondary to new onset of left bundle branch block versus PE. Pt reports recently ill with COVID, noting productive cough. COVID negative  CTA: negative for PE, acute pathology. Positive for atelectasis    Plan  -Add incentive spirometer  -See left bundle branch block    Loose bowel movements  Assessment & Plan  Patient initially on Colace and MiraLAX for constipation.   On the morning of 9/4, patient reported 3 loose bowel Quality 137: Melanoma: Continuity Of Care - Recall System: Patient information entered into a recall system that includes: target date for the next exam specified AND a process to follow up with patients regarding missed or unscheduled appointments Detail Level: Detailed movements and refused Colace and MiraLAX. · Discontinue Colace and MiraLAX  · Monitor bowel movements    Mixed hyperlipidemia  Assessment & Plan  Lipid panel showed total cholesterol of 303, triglyceride 203, HDL 65, . Patient never aware of hyperlipidemia. · Continue Lipitor 80 mg daily    Hypokalemia  Assessment & Plan  Potassium on admission 3.3. Plan:   -Replete as needed   -PM BMP  -Repeat BMP daily    Prolonged Q-T interval on ECG  Assessment & Plan  On admission, patient noted to have a QTc of 500. Plan  Avoid QTc prolonging medications    Primary hypertension  Assessment & Plan  Chronic. Home medications include Hyzaar 50-12.5 mg, Norvasc 2.5 mg once daily. Plan:  -Cardiology recommendations: Lipitor 80 mg daily, losartan 50 mg daily, 12.5 mg metoprolol twice daily  -Decrease metoprolol from 25 mg to 12.5 mg twice daily due to bradycardia  -Held home medications  -Continue to monitor BP closely            Subjective:   ***    Objective     Objective:   Vitals:   Temp (24hrs), Av.1 °F (36.7 °C), Min:97.6 °F (36.4 °C), Max:98.3 °F (36.8 °C)    Temp:  [97.6 °F (36.4 °C)-98.3 °F (36.8 °C)] 97.9 °F (36.6 °C)  HR:  [44-72] 44  Resp:  [16-18] 18  BP: (103-126)/(56-80) 124/56  SpO2:  [93 %-97 %] 97 %  Body mass index is 31.09 kg/m². Input and Output Summary (last 24 hours):        Intake/Output Summary (Last 24 hours) at 2023 0648  Last data filed at 2023 1738  Gross per 24 hour   Intake 840 ml   Output --   Net 840 ml       Physical Exam:   Physical Exam  ( *** Be Sure to Include Physical Exam: Delete this entire line when you have entered your exam)    Additional Data:     Labs:  Results from last 7 days   Lab Units 23  0512   WBC Thousand/uL 7.60   HEMOGLOBIN g/dL 13.4   HEMATOCRIT % 40.0   PLATELETS Thousands/uL 292   NEUTROS PCT % 62   LYMPHS PCT % 28   MONOS PCT % 7   EOS PCT % 2     Results from last 7 days   Lab Units 23  0512 23  0607 23  1411 POTASSIUM mmol/L 5.1   < > 3.3*   CHLORIDE mmol/L 101   < > 98   CO2 mmol/L 27   < > 25   BUN mg/dL 22   < > 19   CREATININE mg/dL 0.71   < > 0.65   CALCIUM mg/dL 9.5   < > 9.4   ALK PHOS U/L  --   --  107*   ALT U/L  --   --  24   AST U/L  --   --  22    < > = values in this interval not displayed. Results from last 7 days   Lab Units 09/03/23  1419   INR  0.94               * I Have Reviewed All Lab Data Listed Above. * Additional Pertinent Lab Tests Reviewed: All Labs Within Last 24 Hours Reviewed    Imaging:    Imaging Reports Reviewed Today Include: ***  Imaging Personally Reviewed by Myself Includes:  none    Recent Cultures (last 7 days):           Last 24 Hours Medication List:   Current Facility-Administered Medications   Medication Dose Route Frequency Provider Last Rate   • acetaminophen  650 mg Oral Q4H PRN Harvinder Rodney MD     • aspirin  162 mg Oral Daily Harvinder Rodney MD     • atorvastatin  80 mg Oral QPM Harvinder Rodney MD     • buPROPion  150 mg Oral Daily Harvinder Rodney MD     • enoxaparin  40 mg Subcutaneous Daily Guido Johnson MD     • losartan  50 mg Oral Daily Harvinder Rodney MD     • metoprolol tartrate  12.5 mg Oral Q12H [address] Vannesa Figueroa MD     • nicotine  1 patch Transdermal Daily PRN Harvinder Rodney MD     • trimethobenzamide  200 mg Intramuscular Q6H PRN Alysia Junior MD                 Patient Information Sharing: With the consent of Sherrell Goncalves , their loved ones were notified today by inpatient team of the patient’s condition and current plan. All questions answered. Time Spent for Care: 45 minutes. More than 50% of total time spent on counseling and coordination of care as described above. ** Please Note: Dictation voice to text software may have been used in the creation of this document.  **    Radhika Guajardo DO  09/05/23  6:48 AM When Should The Patient Follow-Up For Their Next Full-Body Skin Exam?: 6 Months English

## 2023-09-05 NOTE — PLAN OF CARE
Problem: PAIN - ADULT  Goal: Verbalizes/displays adequate comfort level or baseline comfort level  Description: Interventions:  - Encourage patient to monitor pain and request assistance  - Assess pain using appropriate pain scale  - Administer analgesics based on type and severity of pain and evaluate response  - Implement non-pharmacological measures as appropriate and evaluate response  - Consider cultural and social influences on pain and pain management  - Notify physician/advanced practitioner if interventions unsuccessful or patient reports new pain  Outcome: Progressing     Problem: INFECTION - ADULT  Goal: Absence or prevention of progression during hospitalization  Description: INTERVENTIONS:  - Assess and monitor for signs and symptoms of infection  - Monitor lab/diagnostic results  - Monitor all insertion sites, i.e. indwelling lines, tubes, and drains  - Monitor endotracheal if appropriate and nasal secretions for changes in amount and color  - [place] appropriate cooling/warming therapies per order  - Administer medications as ordered  - Instruct and encourage patient and family to use good hand hygiene technique  - Identify and instruct in appropriate isolation precautions for identified infection/condition  Outcome: Progressing     Problem: INFECTION - ADULT  Goal: Absence of fever/infection during neutropenic period  Description: INTERVENTIONS:  - Monitor WBC    Outcome: Progressing     Problem: SAFETY ADULT  Goal: Patient will remain free of falls  Description: INTERVENTIONS:  - Educate patient/family on patient safety including physical limitations  - Instruct patient to call for assistance with activity   - Consult OT/PT to assist with strengthening/mobility   - Keep Call bell within reach  - Keep bed low and locked with side rails adjusted as appropriate  - Keep care items and personal belongings within reach  - Initiate and maintain comfort rounds  - Make Fall Risk Sign visible to staff  - Offer Toileting every 2 Hours, in advance of need  - Initiate/Maintain bed alarm  - Obtain necessary fall risk management equipment:   Problem: CARDIOVASCULAR - ADULT  Goal: Maintains optimal cardiac output and hemodynamic stability  Description: INTERVENTIONS:  - Monitor I/O, vital signs and rhythm  - Monitor for S/S and trends of decreased cardiac output  - Administer and titrate ordered vasoactive medications to optimize hemodynamic stability  - Assess quality of pulses, skin color and temperature  - Assess for signs of decreased coronary artery perfusion  - Instruct patient to report change in severity of symptoms  Outcome: Progressing     - Apply yellow socks and bracelet for high fall risk patients  - Consider moving patient to room near nurses station  Outcome: Progressing

## 2023-09-05 NOTE — TELEPHONE ENCOUNTER
Yolanda Trinidad called would like to discuss the testing that was ordered, if she needs to continue     C/b [id number]

## 2023-09-05 NOTE — PLAN OF CARE
Problem: PAIN - ADULT  Goal: Verbalizes/displays adequate comfort level or baseline comfort level  Description: Interventions:  - Encourage patient to monitor pain and request assistance  - Assess pain using appropriate pain scale  - Administer analgesics based on type and severity of pain and evaluate response  - Implement non-pharmacological measures as appropriate and evaluate response  - Consider cultural and social influences on pain and pain management  - Notify physician/advanced practitioner if interventions unsuccessful or patient reports new pain  Outcome: Progressing     Problem: INFECTION - ADULT  Goal: Absence or prevention of progression during hospitalization  Description: INTERVENTIONS:  - Assess and monitor for signs and symptoms of infection  - Monitor lab/diagnostic results  - Monitor all insertion sites, i.e. indwelling lines, tubes, and drains  - Monitor endotracheal if appropriate and nasal secretions for changes in amount and color  - [place] appropriate cooling/warming therapies per order  - Administer medications as ordered  - Instruct and encourage patient and family to use good hand hygiene technique  - Identify and instruct in appropriate isolation precautions for identified infection/condition  Outcome: Progressing  Goal: Absence of fever/infection during neutropenic period  Description: INTERVENTIONS:  - Monitor WBC    Outcome: Progressing     Problem: SAFETY ADULT  Goal: Patient will remain free of falls  Description: INTERVENTIONS:  - Educate patient/family on patient safety including physical limitations  - Instruct patient to call for assistance with activity   - Consult OT/PT to assist with strengthening/mobility   - Keep Call bell within reach  - Keep bed low and locked with side rails adjusted as appropriate  - Keep care items and personal belongings within reach  - Initiate and maintain comfort rounds  - Make Fall Risk Sign visible to staff  - Offer Toileting every 2 Hours, in advance of need  - Initiate/Maintain  bed/chair alarm  - Obtain necessary fall risk management equipment: walker   - Apply yellow socks and bracelet for high fall risk patients  - Consider moving patient to room near nurses station  Outcome: Progressing  Goal: Maintain or return to baseline ADL function  Description: INTERVENTIONS:  -  Assess patient's ability to carry out ADLs; assess patient's baseline for ADL function and identify physical deficits which impact ability to perform ADLs (bathing, care of mouth/teeth, toileting, grooming, dressing, etc.)  - Assess/evaluate cause of self-care deficits   - Assess range of motion  - Assess patient's mobility; develop plan if impaired  - Assess patient's need for assistive devices and provide as appropriate  - Encourage maximum independence but intervene and supervise when necessary  - Involve family in performance of ADLs  - Assess for home care needs following discharge   - Consider OT consult to assist with ADL evaluation and planning for discharge  - Provide patient education as appropriate  Outcome: Progressing  Goal: Maintains/Returns to pre admission functional level  Description: INTERVENTIONS:  - Perform BMAT or MOVE assessment daily.   - Set and communicate daily mobility goal to care team and patient/family/caregiver. - Collaborate with rehabilitation services on mobility goals if consulted  - Perform Range of Motion 2 times a day. - Reposition patient every 2 hours.   - Dangle patient 2 times a day  - Stand patient 2  times a day  - Ambulate patient 2  times a day  - Out of bed to chair 2  times a day   - Out of bed for meals 2  times a day  - Out of bed for toileting  - Record patient progress and toleration of activity level   Outcome: Progressing     Problem: DISCHARGE PLANNING  Goal: Discharge to home or other facility with appropriate resources  Description: INTERVENTIONS:  - Identify barriers to discharge w/patient and caregiver  - Arrange for needed discharge resources and transportation as appropriate  - Identify discharge learning needs (meds, wound care, etc.)  - Arrange for interpretive services to assist at discharge as needed  - Refer to Case Management Department for coordinating discharge planning if the patient needs post-hospital services based on physician/advanced practitioner order or complex needs related to functional status, cognitive ability, or social support system  Outcome: Progressing     Problem: Knowledge Deficit  Goal: Patient/family/caregiver demonstrates understanding of disease process, treatment plan, medications, and discharge instructions  Description: Complete learning assessment and assess knowledge base.   Interventions:  - Provide teaching at level of understanding  - Provide teaching via preferred learning methods  Outcome: Progressing     Problem: CARDIOVASCULAR - ADULT  Goal: Maintains optimal cardiac output and hemodynamic stability  Description: INTERVENTIONS:  - Monitor I/O, vital signs and rhythm  - Monitor for S/S and trends of decreased cardiac output  - Administer and titrate ordered vasoactive medications to optimize hemodynamic stability  - Assess quality of pulses, skin color and temperature  - Assess for signs of decreased coronary artery perfusion  - Instruct patient to report change in severity of symptoms  Outcome: Progressing

## 2023-09-05 NOTE — DISCHARGE INSTRUCTIONS
Cardiac Catheterization     WHAT YOU NEED TO KNOW:   A cardiac catheterization is a procedure to look at your heart and its blood vessels. Healthcare providers can measure oxygen levels and pressures in your heart. They can also fix problems with your valves, blood vessels, or the walls of your heart. You may need this procedure if you have chest pain, heart disease, or your heart is not working properly. DISCHARGE INSTRUCTIONS:     No driving for 24 hours, because you were sedated. Sedation: Avoid making any legal/major decisions today, as the sedation may inhibit your decision making. Also avoid alcohol today, as the sedation may heighten the effects of the alcohol. Apply firm, steady pressure directly over the wound if bleeding occurs. A small amount of bleeding from your wound is possible. Apply pressure with a clean gauze or towel for 5 to 10 minutes. Call 911 if bleeding becomes heavy or does not stop. Do not attempt to drive yourself to the hospital.    Bathing: You will be able to shower the day after your procedure. Remove your bandage before you shower. Carefully wash the wound with soap and water. Pat the area dry and apply a clean band-aid. Do not take baths or go in hot tubs or pools for about one week. Care for your wound as directed: Keep your dressing clean and dry. Monitor your wound everyday for signs of infection such as redness, swelling, or pus. Mild bruising is normal and expected. Do not put powders, lotions, or creams on your wound for about one week. Do not lift anything heavier than 5 pounds for about 5 days. Heavy lifting can put stress on your wound and cause bleeding. If an artery in your wrist was used, do not push or pull with the arm that was used for the procedure - pretend like your wrist is broken. Do not do vigorous activity for at least 48 hours. Vigorous activity may cause bleeding from your wound. Rest and do quiet activities.  Short walks to the bathroom and around the house are okay. Ask your healthcare provider when you can return to your normal activities. Drink liquids to flush the contrast liquid from your body and prevent blood clots. Ask how much liquid to drink each day and which liquids are best for you. Call 911 for any of the following: You have any of the following signs of a heart attack:    Squeezing, pressure, or pain in your chest that lasts longer than 5 minutes or returns   Discomfort or pain in your back, neck, jaw, stomach, or arm    Trouble breathing   Nausea or vomiting   Lightheadedness or a sudden cold sweat, especially with chest pain or trouble breathing     You have any of the following signs of a stroke:    Numbness or drooping on one side of your face    Weakness in an arm or leg   Confusion or difficulty speaking   Dizziness, a severe headache, or vision loss     You feel lightheaded, short of breath, and have chest pain. You cough up blood. You have trouble breathing. You cannot stop the bleeding from your wound even after you hold firm pressure for 10 minutes. Seek care immediately if:   Blood soaks through your bandage. Your stitches come apart. Your arm or leg feels numb, cool, or looks pale. Your wound gets swollen quickly. Contact your healthcare provider if:   You have a fever or chills. Your wound is red, swollen, or draining pus. Your wound looks more bruised or there is new bruising on the side of your leg or arm. You have nausea or are vomiting. Your skin is itchy, swollen, or you have a rash. You have questions or concerns about your condition or care.

## 2023-09-05 NOTE — PROGRESS NOTES
Progress Note - Cardiology   [facility]     Brent Libman 76 y.o. female MRN: [id number]  : 1955  Unit/Bed#: [address] [place], [place] [id number] Encounter: [id number]    Assessment and Plan:   1. Shortness of breath: Patient with multiple admissions and emergency room visits for complaints of shortness of breath    -   EKGs reviewed and appears patient has transient left bundle branch block which has been noted during her complaints of shortness of breath    -   She notes that her brother has something similar    -   Cardiac catheterization today demonstrated mild nonobstructive disease    -   Consider ambulatory monitoring in the outpatient setting    2. Hypertension: Blood pressures still slightly elevated    -   Continue Cozaar 50 mg daily    -   Can reintroduce hydrochlorothiazide 12.5 mg on discharge    3. Dyslipidemia:  The 10-year ASCVD risk score (Thuan ARBOLEDA, et al., 2019) is: 18.1%    Values used to calculate the score:      Age: 76 years      Sex: Female      Is Non- : No      Diabetic: No      Tobacco smoker: Yes      Systolic Blood Pressure: 156 mmHg      Is BP treated: Yes      HDL Cholesterol: 65 mg/dL      Total Cholesterol: 303 mg/dL    -   Patient has never been on statin therapy but is agreeable to start    -   Notes everyone in her family has high cholesterol and    -   We will start Lipitor 20 mg once a day    -   Check fasting lipids and LFTs in 3 months    4. Obesity: BMI 31, encourage weight loss    5. History of pancreatic cancer: Status post Whipple and chemo plus radiation    Subjective / Objective:   Patient seen and examined. No complaints offered at this time. Reviewed lipid panel and cardiac catheterization with her. She notes everyone in her family has elevated cholesterol. Cardiac catheterization with mild nonobstructive disease. Vitals: Blood pressure 125/67, pulse 71, temperature 98.7 °F (37.1 °C), resp.  rate 18, height 5' 2" (1.575 m), weight 77.1 kg (170 lb), SpO2 94 %. Vitals:    09/05/23 0546 09/05/23 0726   Weight: 77.1 kg (170 lb) 77.1 kg (170 lb)     Body mass index is 31.09 kg/m². BP Readings from Last 3 Encounters:   09/05/23 125/67   08/31/23 132/80   08/19/23 168/81     Orthostatic Blood Pressures    Flowsheet Row Most Recent Value   Blood Pressure 125/67 filed at 09/05/2023 0818   Patient Position - Orthostatic VS Lying filed at 09/05/2023 0246        I/O       09/03 0701  09/04 0700 09/04 0701  09/05 0700 09/05 0701  09/06 0700    P. O.  840     Total Intake(mL/kg)  840 (10.9)     Net  +840                Invasive Devices     Central Venous Catheter Line  Duration           Port A Cath 06/02/21 Left Chest 825 days          Peripheral Intravenous Line  Duration           Peripheral IV 09/03/23 Left Antecubital 1 day          Line  Duration           Arterial Sheath 5 Fr.  Right Radial <1 day          Drain  Duration           Closed/Suction Drain Right Abdomen Bulb 19 Fr. 861 days                  Intake/Output Summary (Last 24 hours) at 9/5/2023 1105  Last data filed at 9/4/2023 1738  Gross per 24 hour   Intake 360 ml   Output --   Net 360 ml         Physical Exam:   Physical Exam       Medications/ Allergies:     Current Facility-Administered Medications   Medication Dose Route Frequency Provider Last Rate   • acetaminophen  650 mg Oral Q4H PRN Too Fortune MD     • aspirin  162 mg Oral Daily Too Fortune MD     • atorvastatin  80 mg Oral QPM Too Fortune MD     • buPROPion  150 mg Oral Daily Too Fortune MD     • enoxaparin  40 mg Subcutaneous Daily Guido Kelly MD     • losartan  50 mg Oral Daily Too Fortune MD     • metoprolol tartrate  12.5 mg Oral Q12H [address] Jacqui Pritchett MD     • nicotine  1 patch Transdermal Daily PRN Too Fortune MD     • sodium chloride  50 mL/hr Intravenous Continuous Alice Show, CRNP     • trimethobenzamide  200 mg Intramuscular Q6H PRN Don Newton Neymar Garrett MD       acetaminophen, 650 mg, Q4H PRN  nicotine, 1 patch, Daily PRN  trimethobenzamide, 200 mg, Q6H PRN      Allergies   Allergen Reactions   • Penicillins Rash   • Tetracycline Rash       VTE Pharmacologic Prophylaxis:   Sequential compression device (Venodyne)     Labs:   Troponins:  Results from last 7 days   Lab Units 09/03/23  1653   HSTNI D2 ng/L 1     CBC with diff:  Results from last 7 days   Lab Units 09/05/23 0512 09/04/23 0607 09/03/23  1419   WBC Thousand/uL 7.60 6.12 8.06   HEMOGLOBIN g/dL 13.4 13.6 14.1   HEMATOCRIT % 40.0 40.5 41.7   MCV fL 93 93 91   PLATELETS Thousands/uL 292 300 321   RBC Million/uL 4.29 4.37 4.59   MCH pg 31.2 31.1 30.7   MCHC g/dL 33.5 33.6 33.8   RDW % 12.7 12.7 12.6   MPV fL 9.2 9.2 9.3   NRBC AUTO /100 WBCs 0 0 0     CMP:  Results from last 7 days   Lab Units 09/05/23 0512 09/04/23  0607 09/03/23  1419   SODIUM mmol/L 135 137 133*   POTASSIUM mmol/L 5.1 4.8 3.3*   CHLORIDE mmol/L 101 103 98   CO2 mmol/L 27 28 25   ANION GAP mmol/L 7 6 10   BUN mg/dL 22 17 19   CREATININE mg/dL 0.71 0.65 0.65   CALCIUM mg/dL 9.5 9.5 9.4   AST U/L  --   --  22   ALT U/L  --   --  24   ALK PHOS U/L  --   --  107*   TOTAL PROTEIN g/dL  --   --  7.2   ALBUMIN g/dL  --   --  4.1   TOTAL BILIRUBIN mg/dL  --   --  0.46   EGFR ml/min/1.73sq m 87 91 91     Magnesium:  Results from last 7 days   Lab Units 09/05/23 0512 09/04/23  0607 09/03/23  1419   MAGNESIUM mg/dL 2.2 2.2 2.0     Coags:  Results from last 7 days   Lab Units 09/03/23  1419   PTT seconds 26   INR  0.94     Lipid Profile:  Results from last 7 days   Lab Units 09/04/23  0607   CHOLESTEROL mg/dL 303*   TRIGLYCERIDES mg/dL 203*   HDL mg/dL 65   LDL CALC mg/dL 197*       Imaging & Testing   I have personally reviewed pertinent reports. Echo complete    Result Date: 9/5/2023  Narrative: •  Left Ventricle: Left ventricular cavity size is normal. Wall thickness is increased. There is mild concentric hypertrophy.  Systolic function is low normal.  Estimated ejection fraction is 50-55%. Wall motion is normal except for paradoxical septal motion due to LBBB. Diastolic function is normal. •  IVS: There is abnormal septal motion consistent with left bundle branch block. •  Right Ventricle: Systolic function is normal. •  Mitral Valve: There is trace regurgitation. •  Tricuspid Valve: There is trace regurgitation. The right ventricular systolic pressure is normal.     XR chest 1 view portable    Result Date: 9/3/2023  Narrative: CHEST INDICATION:   pain. COMPARISON: CT scan chest from 8/12/2023. EXAM PERFORMED/VIEWS:  XR CHEST PORTABLE FINDINGS: Cardiomediastinal silhouette appears unremarkable. The lungs are clear. No pneumothorax or pleural effusion. Osseous structures appear within normal limits for patient age. Impression: No acute cardiopulmonary disease. Workstation performed: [id number]     CTA ED chest PE Study    Result Date: 9/3/2023  Narrative: CTA - CHEST WITH IV CONTRAST - PULMONARY ANGIOGRAM INDICATION:   sob, palpitation. Dizziness and shortness of breath this AM. COMPARISON: Chest CT 8/12/2023. TECHNIQUE: CTA examination of the chest was performed using angiographic technique according to a protocol specifically tailored to evaluate for pulmonary embolism. Multiplanar 2D reformatted images were created from the source data. In addition, coronal 3D MIP postprocessing was performed on the acquisition scanner. Radiation dose length product (DLP) for this visit:  432 mGy-cm . This examination, like all CT scans performed in the [facility], was performed utilizing techniques to minimize radiation dose exposure, including the use of iterative reconstruction and automated exposure control. IV Contrast:  85 mL of iohexol (OMNIPAQUE) FINDINGS: PULMONARY ARTERIAL TREE:  No pulmonary embolus is seen. LUNGS: Mild dependent atelectasis bilaterally. There is no tracheal or endobronchial lesion.  PLEURA: Unremarkable. HEART/GREAT VESSELS: Normal heart size. Mild coronary artery calcifications. No thoracic aortic aneurysm. MEDIASTINUM AND WYATT:  Unremarkable. CHEST WALL AND LOWER NECK:   Unremarkable. VISUALIZED STRUCTURES IN THE UPPER ABDOMEN: Unchanged mild pneumobilia consistent with history of Whipple procedure. Cholecystectomy. Small left renal cyst. OSSEOUS STRUCTURES:  No acute fracture or destructive osseous lesion. Impression: No acute pathology. No pulmonary embolus. Workstation performed: [id number]         EKG / Monitor: Personally reviewed. Sinus rhythm with intermittent left bundle branch block          [address] [place] [address]      "This note was completed in part utilizing "[employer]" direct voice recognition software. Grammatical errors, random word insertion, spelling mistakes, and incomplete sentences may be an occasional consequence of the system secondary to software limitations, ambient noise and hardware issues. Please read the chart carefully and recognize, using context, where substitutions have occurred.   If you have any questions or concerns about the context, text or information contained within the body of this dictation, please contact myself, the provider, for further clarification."

## 2023-09-05 NOTE — DISCHARGE SUMMARY
Discharge Summary - [address] [place] [address] Family Medicine Residency     Patient Information: Sharri Boyd 76 y.o. female MRN: [id number]  Unit/Bed#: [address] [place] [address] Encounter: [id number]     Admitting Physician: Rin Gabriel MD  Discharging Physician/Practitioner: Rin Gabriel, *   PCP: Vikas Naidu DO  Admission Date:   Admission Orders (From admission, onward)     Ordered        09/03/23 1552  INPATIENT ADMISSION  Once                      Discharge Date: 09/05/23      Reason for Admission: Dizziness [R42]  Palpitation [R00.2]  Primary hypertension [I10]  Prolonged Q-T interval on ECG [R94.31]  QT prolongation [R94.31]  New onset left bundle branch block (LBBB) [I44.7]     Discharge Diagnoses:      Principal Problem:    Left bundle branch block  Active Problems:    Primary hypertension    Prolonged Q-T interval on ECG    Hypokalemia    Mixed hyperlipidemia    Loose bowel movements    Smoking  Resolved Problems:    SIRS (systemic inflammatory response syndrome) ([address])       Consultations During Hospital Stay:  -cardiology     Procedures Performed:   -Cardiac catheterization     Significant Findings / Test Results:   -New left bundle branch block on EKG 9/3  -CXR: No acute cardiopulmonary disease. -CT PE: No acute pathology.  No pulmonary embolus  -Troponin WNL at 0-hour, 2-hour  -Cholesterol 303, , HDL 65,   -Echo: EF 50 to 55%, LBBB  -Cardiac catheterization: prox RCA 45% stenosed, mid RCA 35% stenosed, 1st MRG lesion 40%, proximal LAD 35% stenosed, EF 50%     Incidental Findings:   -None     Test Results Pending at Discharge (will require follow up):   -none     Outpatient Tests Requested:  -lipid panel in 3 months   -outpatient holter monitor     Outpatient follow-up Requested:  -cardiology    Complications:    -none    Things to address at first visit after hospitalization   -Address chest pain   -Reviewed results with patient  -Discuss anxiety and lifestyle modifications    Hospital Course:      Yari Martin is a 76 y.o. female patient who originally presented to the hospital on 9/3/2023 due to palpitations, shortness of breath, dizziness, diaphoresis on morning of admission. Past medical history includes pancreatic cancer status post Whipple procedure and radiation, hypertension, anxiety, hyperlipidemia. Patient placed in the ED several times for anxiety and evaluated by cardiology prior to admission. EKG prior to admission showed LVH with poor R wave progression but no LBBB. In the ED, EKG revealed new onset left bundle branch block, did NOT meet Sgarbossa criteria for acute MI.  CXR, CTA PE were negative for acute pathology, no PE. Patient was admitted for echo and cardiac catheterization. Patient denied any chest pain on admission or through hospitalization. Cardiac catheterization and echo showed an EF of 50 to 55%, consistent with left bundle branch block. Patient is stable for discharge, amenable to plan, should follow-up with cardiology on outpatient basis. * Left bundle branch block  Assessment & Plan  Acute     Pt presented to ED with diaphoresis, SOB, palpitations. EKG revealed NEW ONSET LBBB compared to EKG 8/12/2023. Troponin 4>5, BNP WNL, D-dimer 1.20. Pt has a hx of pancreatic cancer s/p chemo, radiation, whipple. Suspect elevated D-dimer secondary to history of malignancy. ECHO: EF 50-55%, LBBB  Cardiac Cath: prox RCA 45% stenosed, mid RCA 35% stenosed, 1st MRG lesion 40%, proximal LAD 35% stenosed, EF 50%  Plan:   -Cardiology recommendations: atorvastatin 20 mg daily, losartan 50 mg daily, 12.5 mg metoprolol twice daily, ASA 81 mg daily   -Follow up with cardiology outpatient, consider outpatient cardiac monitoring    SIRS (systemic inflammatory response syndrome) (HCC)-resolved as of 9/4/2023  Assessment & Plan  SIRS criteria on admission with tachycardia and tachypnea.   Possibly secondary to new onset of left bundle branch block versus PE. Pt reports recently ill with COVID, noting productive cough. COVID negative  CTA: negative for PE, acute pathology. Positive for atelectasis    Plan  -Add incentive spirometer  -See left bundle branch block    Smoking  Assessment & Plan  Counseled on smoking cessation, provided with nicotine patch, follow up with PCP. Loose bowel movements  Assessment & Plan  Patient initially on Colace and MiraLAX for constipation. On the morning of 9/4, patient reported 3 loose bowel movements and refused Colace and MiraLAX. Mixed hyperlipidemia  Assessment & Plan  Lipid panel showed total cholesterol of 303, triglyceride 203, HDL 65, . Patient never aware of hyperlipidemia. Recheck lipid panel in 3 months. · Continue Lipitor 20 mg daily    Hypokalemia  Assessment & Plan  Potassium on admission 3.3. Normalized over the admission. Prolonged Q-T interval on ECG  Assessment & Plan  On admission, patient noted to have a QTc of 500. Primary hypertension  Assessment & Plan  Chronic. Home medications include Hyzaar 50-12.5 mg, Norvasc 2.5 mg once daily. Plan:  -Discontinue norvasc 2.5 mg   -Continue metoprolol 12.5 mg BID            Condition at Discharge: stable      Discharge Day Visit / Exam:      Vitals: Blood Pressure: 115/70 (09/05/23 1340)  Pulse: (!) 51 (09/05/23 1340)  Temperature: 98 °F (36.7 °C) (09/05/23 1340)  Temp Source: Oral (09/05/23 0818)  Respirations: 18 (09/05/23 1150)  Height: 5' 2" (157.5 cm) (09/05/23 0726)  Weight - Scale: 77.1 kg (170 lb) (09/05/23 0726)  SpO2: 92 % (09/05/23 1340)  Exam:   Physical Exam  Vitals reviewed. Constitutional:       General: She is not in acute distress. Appearance: Normal appearance. HENT:      Head: Normocephalic and atraumatic.       Right Ear: External ear normal.      Left Ear: External ear normal.      Mouth/Throat:      Mouth: Mucous membranes are moist.   Eyes:      Conjunctiva/sclera: Conjunctivae normal. Cardiovascular:      Rate and Rhythm: Normal rate and regular rhythm. Pulses: Normal pulses. Heart sounds: Normal heart sounds. No friction rub. Pulmonary:      Effort: Pulmonary effort is normal. No respiratory distress. Abdominal:      General: Abdomen is flat. There is no distension. Palpations: Abdomen is soft. Tenderness: There is no abdominal tenderness. Musculoskeletal:         General: No swelling or tenderness. Normal range of motion. Cervical back: Normal range of motion. No rigidity. Skin:     General: Skin is warm and dry. Capillary Refill: Capillary refill takes less than 2 seconds. Neurological:      General: No focal deficit present. Mental Status: She is alert and oriented to person, place, and time. Mental status is at baseline. Patient Information Sharing: With the patient      Discharge instructions/Information to patient and family:   See after visit summary for information provided to patient and family. Discharge Medications:     Medication List      START taking these medications    • aspirin 81 mg chewable tablet; Chew 1 tablet (81 mg total) daily  • atorvastatin 20 mg tablet; Commonly known as: LIPITOR; Take 1 tablet (20   mg total) by mouth every evening  • nicotine 7 mg/24hr TD 24 hr patch; Commonly known as: NICODERM CQ; Place   1 patch on the skin over 24 hours daily Apply a new patch every 24 hours   to a clean, dry, hairless site on the upper arm or hip. CONTINUE taking these medications    • acetaminophen 500 mg tablet; Commonly known as: TYLENOL  • ALPRAZolam 0.25 mg tablet; Commonly known as: XANAX; Take 1 tablet (0.25   mg total) by mouth 3 (three) times a day as needed for anxiety for up to 3   days  • buPROPion 100 mg 12 hr tablet; Commonly known as: Wellbutrin SR;  Take 1   tablet (100 mg total) by mouth 2 (two) times a day  • losartan-hydrochlorothiazide 50-12.5 mg per tablet; Commonly known as:   HYZAAR; Take 1 tablet by mouth daily  • Medical ID Plate Misc; Use once for 1 dose Severely and permanently   limited in the ability to walk because of an arthritic, neurological, or   orthopedic condition: or cannot walk two hundred feet without stopping to   rest and meets requirements for disability license plate  • multivitamin tablet     STOP taking these medications    • amLODIPine 2.5 mg tablet; Commonly known as: NORVASC        Disposition:   Home     For Discharges to [facility]:   ·       Not Applicable to this Patient - Not Applicable to this Patient     Discharge Statement:  I spent 45 minutes discharging the patient. This time was spent on the day of discharge. I had direct contact with the patient on the day of discharge. Greater than 50% of the total time was spent examining patient, answering all patient questions, arranging and discussing plan of care with patient as well as directly providing post-discharge instructions. Additional time then spent on discharge activities.      ** Please Note: This note has been constructed using a voice recognition system **     Love Gordon DO   09/05/23  4:53 PM

## 2023-09-06 ENCOUNTER — TRANSITIONAL CARE MANAGEMENT (OUTPATIENT)
Dept: FAMILY MEDICINE CLINIC | Facility: CLINIC | Age: 68
End: 2023-09-06

## 2023-09-06 DIAGNOSIS — I10 PRIMARY HYPERTENSION: ICD-10-CM

## 2023-09-06 RX ORDER — LOSARTAN POTASSIUM AND HYDROCHLOROTHIAZIDE 12.5; 5 MG/1; MG/1
1 TABLET ORAL DAILY
Qty: 30 TABLET | Refills: 2 | Status: SHIPPED | OUTPATIENT
Start: 2023-09-06

## 2023-09-07 ENCOUNTER — APPOINTMENT (EMERGENCY)
Dept: RADIOLOGY | Facility: HOSPITAL | Age: 68
End: 2023-09-07
Payer: MEDICARE

## 2023-09-07 ENCOUNTER — TELEPHONE (OUTPATIENT)
Dept: CARDIOLOGY CLINIC | Facility: CLINIC | Age: 68
End: 2023-09-07

## 2023-09-07 ENCOUNTER — HOSPITAL ENCOUNTER (EMERGENCY)
Facility: HOSPITAL | Age: 68
Discharge: HOME | End: 2023-09-07
Attending: EMERGENCY MEDICINE
Payer: MEDICARE

## 2023-09-07 VITALS
BODY MASS INDEX: 31.39 KG/M2 | RESPIRATION RATE: 16 BRPM | HEART RATE: 114 BPM | WEIGHT: 170.6 LBS | TEMPERATURE: 98.1 F | HEIGHT: 62 IN | OXYGEN SATURATION: 93 % | DIASTOLIC BLOOD PRESSURE: 80 MMHG | SYSTOLIC BLOOD PRESSURE: 147 MMHG

## 2023-09-07 DIAGNOSIS — R06.02 SOB (SHORTNESS OF BREATH): ICD-10-CM

## 2023-09-07 DIAGNOSIS — R00.2 PALPITATIONS: Primary | ICD-10-CM

## 2023-09-07 LAB
2HR DELTA HS TROPONIN: 0 NG/L
ALBUMIN SERPL BCP-MCNC: 4.3 G/DL (ref 3.5–5)
ALP SERPL-CCNC: 118 U/L (ref 34–104)
ALT SERPL W P-5'-P-CCNC: 29 U/L (ref 7–52)
ANION GAP SERPL CALCULATED.3IONS-SCNC: 10 MMOL/L
APTT PPP: 23 SECONDS (ref 23–37)
AST SERPL W P-5'-P-CCNC: 27 U/L (ref 13–39)
BASOPHILS # BLD AUTO: 0.04 THOUSANDS/ÂΜL (ref 0–0.1)
BASOPHILS NFR BLD AUTO: 1 % (ref 0–1)
BILIRUB SERPL-MCNC: 0.62 MG/DL (ref 0.2–1)
BNP SERPL-MCNC: 34 PG/ML (ref 0–100)
BUN SERPL-MCNC: 15 MG/DL (ref 5–25)
CALCIUM SERPL-MCNC: 9.7 MG/DL (ref 8.4–10.2)
CARDIAC TROPONIN I PNL SERPL HS: 7 NG/L
CARDIAC TROPONIN I PNL SERPL HS: 7 NG/L
CHLORIDE SERPL-SCNC: 97 MMOL/L (ref 96–108)
CO2 SERPL-SCNC: 26 MMOL/L (ref 21–32)
CREAT SERPL-MCNC: 0.69 MG/DL (ref 0.6–1.3)
D DIMER PPP FEU-MCNC: 1.18 UG/ML FEU
EOSINOPHIL # BLD AUTO: 0.07 THOUSAND/ÂΜL (ref 0–0.61)
EOSINOPHIL NFR BLD AUTO: 1 % (ref 0–6)
ERYTHROCYTE [DISTWIDTH] IN BLOOD BY AUTOMATED COUNT: 12.6 % (ref 11.6–15.1)
GFR SERPL CREATININE-BSD FRML MDRD: 89 ML/MIN/1.73SQ M
GLUCOSE SERPL-MCNC: 112 MG/DL (ref 65–140)
HCT VFR BLD AUTO: 42.8 % (ref 34.8–46.1)
HGB BLD-MCNC: 14.7 G/DL (ref 11.5–15.4)
IMM GRANULOCYTES # BLD AUTO: 0.02 THOUSAND/UL (ref 0–0.2)
IMM GRANULOCYTES NFR BLD AUTO: 0 % (ref 0–2)
INR PPP: 0.93 (ref 0.84–1.19)
LYMPHOCYTES # BLD AUTO: 1.42 THOUSANDS/ÂΜL (ref 0.6–4.47)
LYMPHOCYTES NFR BLD AUTO: 17 % (ref 14–44)
MAGNESIUM SERPL-MCNC: 1.9 MG/DL (ref 1.9–2.7)
MCH RBC QN AUTO: 31.5 PG (ref 26.8–34.3)
MCHC RBC AUTO-ENTMCNC: 34.3 G/DL (ref 31.4–37.4)
MCV RBC AUTO: 92 FL (ref 82–98)
MONOCYTES # BLD AUTO: 0.44 THOUSAND/ÂΜL (ref 0.17–1.22)
MONOCYTES NFR BLD AUTO: 5 % (ref 4–12)
NEUTROPHILS # BLD AUTO: 6.21 THOUSANDS/ÂΜL (ref 1.85–7.62)
NEUTS SEG NFR BLD AUTO: 76 % (ref 43–75)
NRBC BLD AUTO-RTO: 0 /100 WBCS
PLATELET # BLD AUTO: 318 THOUSANDS/UL (ref 149–390)
PMV BLD AUTO: 9.4 FL (ref 8.9–12.7)
POTASSIUM SERPL-SCNC: 3.7 MMOL/L (ref 3.5–5.3)
PROT SERPL-MCNC: 7.3 G/DL (ref 6.4–8.4)
PROTHROMBIN TIME: 12.5 SECONDS (ref 11.6–14.5)
RBC # BLD AUTO: 4.66 MILLION/UL (ref 3.81–5.12)
SODIUM SERPL-SCNC: 133 MMOL/L (ref 135–147)
WBC # BLD AUTO: 8.2 THOUSAND/UL (ref 4.31–10.16)

## 2023-09-07 PROCEDURE — 85730 THROMBOPLASTIN TIME PARTIAL: CPT | Performed by: EMERGENCY MEDICINE

## 2023-09-07 PROCEDURE — 83735 ASSAY OF MAGNESIUM: CPT | Performed by: EMERGENCY MEDICINE

## 2023-09-07 PROCEDURE — 36415 COLL VENOUS BLD VENIPUNCTURE: CPT | Performed by: EMERGENCY MEDICINE

## 2023-09-07 PROCEDURE — 96374 THER/PROPH/DIAG INJ IV PUSH: CPT

## 2023-09-07 PROCEDURE — G1004 CDSM NDSC: HCPCS

## 2023-09-07 PROCEDURE — 85025 COMPLETE CBC W/AUTO DIFF WBC: CPT | Performed by: EMERGENCY MEDICINE

## 2023-09-07 PROCEDURE — 84484 ASSAY OF TROPONIN QUANT: CPT | Performed by: EMERGENCY MEDICINE

## 2023-09-07 PROCEDURE — 80053 COMPREHEN METABOLIC PANEL: CPT | Performed by: EMERGENCY MEDICINE

## 2023-09-07 PROCEDURE — 83880 ASSAY OF NATRIURETIC PEPTIDE: CPT | Performed by: EMERGENCY MEDICINE

## 2023-09-07 PROCEDURE — 94640 AIRWAY INHALATION TREATMENT: CPT

## 2023-09-07 PROCEDURE — 85379 FIBRIN DEGRADATION QUANT: CPT | Performed by: EMERGENCY MEDICINE

## 2023-09-07 PROCEDURE — 71275 CT ANGIOGRAPHY CHEST: CPT

## 2023-09-07 PROCEDURE — 99285 EMERGENCY DEPT VISIT HI MDM: CPT | Performed by: EMERGENCY MEDICINE

## 2023-09-07 PROCEDURE — 93005 ELECTROCARDIOGRAM TRACING: CPT

## 2023-09-07 PROCEDURE — 99285 EMERGENCY DEPT VISIT HI MDM: CPT

## 2023-09-07 PROCEDURE — 85610 PROTHROMBIN TIME: CPT | Performed by: EMERGENCY MEDICINE

## 2023-09-07 RX ORDER — IPRATROPIUM BROMIDE AND ALBUTEROL SULFATE 2.5; .5 MG/3ML; MG/3ML
3 SOLUTION RESPIRATORY (INHALATION) ONCE
Status: COMPLETED | OUTPATIENT
Start: 2023-09-07 | End: 2023-09-07

## 2023-09-07 RX ORDER — LORAZEPAM 2 MG/ML
0.5 INJECTION INTRAMUSCULAR ONCE
Status: COMPLETED | OUTPATIENT
Start: 2023-09-07 | End: 2023-09-07

## 2023-09-07 RX ORDER — ALBUTEROL SULFATE 90 UG/1
2 AEROSOL, METERED RESPIRATORY (INHALATION) EVERY 6 HOURS PRN
Qty: 8.5 G | Refills: 0 | Status: SHIPPED | OUTPATIENT
Start: 2023-09-07

## 2023-09-07 RX ORDER — ALPRAZOLAM 0.25 MG/1
0.25 TABLET ORAL 3 TIMES DAILY PRN
Qty: 15 TABLET | Refills: 0 | Status: SHIPPED | OUTPATIENT
Start: 2023-09-07 | End: 2023-09-17

## 2023-09-07 RX ADMIN — LORAZEPAM 0.5 MG: 2 INJECTION INTRAMUSCULAR; INTRAVENOUS at 12:23

## 2023-09-07 RX ADMIN — IPRATROPIUM BROMIDE AND ALBUTEROL SULFATE 3 ML: .5; 3 SOLUTION RESPIRATORY (INHALATION) at 12:22

## 2023-09-07 RX ADMIN — IOHEXOL 85 ML: 350 INJECTION, SOLUTION INTRAVENOUS at 11:47

## 2023-09-07 NOTE — ED NOTES
This RN received pt from [facility]. Pt respirations even, unlabored with no sign of distress. Pt ambulated to bathroom with success. Pt now resting with call bell within reach.       Delfina Irizarry RN  09/07/23 [id number]

## 2023-09-07 NOTE — ED PROVIDER NOTES
History  Chief Complaint   Patient presents with   • Shortness of Breath     Pt  feeling well upon waking this am, took morning meds went for a walk without difficulty, however pt then attempted to vacuum, pt became acutely short of breath, "broke out into a sweat and felt lightheaded" denies any episodes of chest pain. Called cardiology referred to ed. Pt reports sob continues, lightheadedness resolved. No CP however goes admit to some palpitations. Skin warm pink and dry resp easy and unlabored. Recently admitted 9/3 with new left BBB. Radial pulse irregular Ekg done in triage     75 yo female c/o sudden onset sob while vacuuming this morning.  + associated lightheadedness. No chest pain but feels palpitations. She had taken her dog for a long walk this morning without difficulty. She was recently hospitalized for new onset LBBB, discharged 2 days ago. During admission, she had negative CTA chest, cardiac cath which showed mild non-obstructive CAD and unremarkable echo. She is about 3 weeks s/p covid. + smoker. She called cardiologist who told her to go to ER. Son came in to ER and reported that her breathing was very distressed at home and she couldn't catch her breath. History provided by:  Patient   used: No    Shortness of Breath  Associated symptoms: no abdominal pain, no chest pain, no cough, no fever, no headaches, no rash and no vomiting        Prior to Admission Medications   Prescriptions Last Dose Informant Patient Reported? Taking?    Medical ID Plate MISC  Self No No   Sig: Use once for 1 dose Severely and permanently limited in the ability to walk because of an arthritic, neurological, or orthopedic condition: or cannot walk two hundred feet without stopping to rest and meets requirements for disability license plate   Multiple Vitamin (multivitamin) tablet 9/7/2023 Self Yes Yes   Sig: Take 1 tablet by mouth daily   acetaminophen (TYLENOL) 500 mg tablet Unknown Self Yes No   Sig: Take 1,000 mg by mouth   aspirin 81 mg chewable tablet 9/6/2023  No Yes   Sig: Chew 1 tablet (81 mg total) daily   atorvastatin (LIPITOR) 20 mg tablet 9/6/2023  No Yes   Sig: Take 1 tablet (20 mg total) by mouth every evening   buPROPion (Wellbutrin SR) 100 mg 12 hr tablet 9/7/2023  No Yes   Sig: Take 1 tablet (100 mg total) by mouth 2 (two) times a day   losartan-hydrochlorothiazide (HYZAAR) 50-12.5 mg per tablet 9/7/2023  No Yes   Sig: Take 1 tablet by mouth daily   nicotine (NICODERM CQ) 7 mg/24hr TD 24 hr patch Not Taking  No No   Sig: Place 1 patch on the skin over 24 hours daily Apply a new patch every 24 hours to a clean, dry, hairless site on the upper arm or hip. Patient not taking: Reported on 9/7/2023      Facility-Administered Medications: None       Past Medical History:   Diagnosis Date   • BRCA1 positive    • BRCA2 positive    • Bundle branch block, left    • Cancer (HCC)     pancreatic   • Facial droop     r/t neck surgery   • History of chemotherapy     pancreatic cancer   • History of radiation therapy    • Hypercholesteremia    • Pancreatic carcinoma [facility])        Past Surgical History:   Procedure Laterality Date   • ABLATION SOFT TISSUE N/A 4/26/2021    Procedure: INTRAOPERATIVE ULTRASOUND, ABLATION,SOFT TISSUE PANCREAS;  Surgeon: Jayashree Molina MD;  [facility];  Service: Surgical Oncology   • CARDIAC CATHETERIZATION Left 9/5/2023    Procedure: Cardiac catheterization;  Surgeon: Meghan Urbina MD;  Location: [address] [place] [address] CATH LAB;   Service: Cardiology   • CHOLECYSTECTOMY N/A 4/26/2021    Procedure: CHOLECYSTECTOMY;  Surgeon: Jayashree Molina MD;  [facility];  Service: Surgical Oncology   • FL GUIDED CENTRAL VENOUS ACCESS DEVICE INSERTION  6/2/2021   • HYSTERECTOMY     • LAPAROTOMY N/A 4/26/2021    Procedure: LAPAROTOMY EXPLORATORY;  Surgeon: Jayashree Molina MD;  [facility];  Service: Surgical Oncology   • OOPHORECTOMY     • SINUS SURGERY     • TUNNELED VENOUS PORT PLACEMENT Left 2021    Procedure: INSERTION VENOUS PORT (PORT-A-CATH); Surgeon: Roger Rico MD;  [facility];  Service: Surgical Oncology   • US GUIDED THYROID BIOPSY  2022   • WHIPPLE PROCEDURE/PANCREATICO-DUODENECTOMY N/A 2021    Procedure: WHIPPLE PROCEDURE/PANCREATICO-DUODENECTOMY; PYLORIC PRESERVING;  Surgeon: Roger Rico MD;  [facility];  Service: Surgical Oncology       Family History   Problem Relation Age of Onset   • Ulcerative colitis Mother    • Prostate cancer Father    • Melanoma Sister    • Heart disease Brother    • Prostate cancer Brother    • No Known Problems Brother    • No Known Problems Maternal Uncle    • No Known Problems Paternal Uncle      I have reviewed and agree with the history as documented. E-Cigarette/Vaping   • E-Cigarette Use Never User      E-Cigarette/Vaping Substances   • Nicotine No    • THC No    • CBD No    • Flavoring No    • Other No    • Unknown No      Social History     Tobacco Use   • Smoking status: Some Days     Packs/day: 0.50     Types: Cigarettes     Start date: 65     Last attempt to quit: 2021     Years since quittin.4   • Smokeless tobacco: Never   • Tobacco comments:     recently stop smoking , pt stated she smoke occ. 1-2 cigg some days 2022. Vaping Use   • Vaping Use: Never used   Substance Use Topics   • Alcohol use: Never   • Drug use: Never       Review of Systems   Constitutional: Negative. Negative for fever. HENT: Negative. Eyes: Negative. Respiratory: Positive for shortness of breath. Negative for cough. Cardiovascular: Negative. Negative for chest pain. Gastrointestinal: Negative. Negative for abdominal pain, diarrhea, nausea and vomiting. Genitourinary: Negative. Negative for dysuria and flank pain. Musculoskeletal: Negative. Negative for back pain and myalgias. Skin: Negative. Negative for rash. Neurological: Positive for light-headedness.  Negative for dizziness, syncope and headaches. Hematological: Does not bruise/bleed easily. Psychiatric/Behavioral: Negative. All other systems reviewed and are negative. Physical Exam  Physical Exam  Vitals and nursing note reviewed. Constitutional:       General: She is not in acute distress. Appearance: She is well-developed. She is not ill-appearing or diaphoretic. HENT:      Head: Normocephalic and atraumatic. Eyes:      Conjunctiva/sclera: Conjunctivae normal.   Cardiovascular:      Rate and Rhythm: Tachycardia present. Rhythm irregular. Heart sounds: Normal heart sounds. No murmur heard. Pulmonary:      Effort: Pulmonary effort is normal. No respiratory distress. Breath sounds: Normal breath sounds. Abdominal:      General: Bowel sounds are normal. There is no distension. Palpations: Abdomen is soft. Tenderness: There is no abdominal tenderness. Musculoskeletal:         General: No tenderness or deformity. Normal range of motion. Cervical back: Normal range of motion and neck supple. Right lower leg: No edema. Left lower leg: No edema. Skin:     General: Skin is warm and dry. Coloration: Skin is not pale. Findings: No rash. Neurological:      General: No focal deficit present. Mental Status: She is alert and oriented to person, place, and time. Cranial Nerves: No cranial nerve deficit.    Psychiatric:         Behavior: Behavior normal.         Vital Signs  ED Triage Vitals [09/07/23 1019]   Temperature Pulse Respirations Blood Pressure SpO2   99.2 °F (37.3 °C) 105 16 134/91 97 %      Temp Source Heart Rate Source Patient Position - Orthostatic VS BP Location FiO2 (%)   Tympanic Monitor Sitting Right arm --      Pain Score       No Pain           Vitals:    09/07/23 1115 09/07/23 1130 09/07/23 1230 09/07/23 1324   BP:  143/87 142/88 147/80   Pulse: (!) 106 102 102 (!) 114   Patient Position - Orthostatic VS:  Sitting Sitting Sitting Visual Acuity      ED Medications  Medications   iohexol (OMNIPAQUE) 350 MG/ML injection (SINGLE-DOSE) 85 mL (85 mL Intravenous Given 9/7/23 1147)   LORazepam (ATIVAN) injection 0.5 mg (0.5 mg Intravenous Given 9/7/23 1223)   ipratropium-albuterol (DUO-NEB) 0.5-2.5 mg/3 mL inhalation solution 3 mL (3 mL Nebulization Given 9/7/23 1222)       Diagnostic Studies  Results Reviewed     Procedure Component Value Units Date/Time    HS Troponin I 2hr [[id number]]  (Normal) Collected: 09/07/23 1221    Lab Status: Final result Specimen: Blood from Line, Venous Updated: 09/07/23 1257     hs TnI 2hr 7 ng/L      Delta 2hr hsTnI 0 ng/L     HS Troponin 0hr (reflex protocol) [[id number]]  (Normal) Collected: 09/07/23 1106    Lab Status: Final result Specimen: Blood from Line, Venous Updated: 09/07/23 1148     hs TnI 0hr 7 ng/L     D-Dimer [[id number]]  (Abnormal) Collected: 09/07/23 1038    Lab Status: Final result Specimen: Blood from Arm, Left Updated: 09/07/23 1129     D-Dimer, Quant 1.18 ug/ml FEU     Narrative: In the evaluation for possible pulmonary embolism, in the appropriate (Well's Score of 4 or less) patient, the age adjusted d-dimer cutoff for this patient can be calculated as:    Age x 0.01 (in ug/mL) for Age-adjusted D-dimer exclusion threshold for a patient over 50 years.     Comprehensive metabolic panel [[id number]]  (Abnormal) Collected: 09/07/23 1038    Lab Status: Final result Specimen: Blood from Arm, Left Updated: 09/07/23 1127     Sodium 133 mmol/L      Potassium 3.7 mmol/L      Chloride 97 mmol/L      CO2 26 mmol/L      ANION GAP 10 mmol/L      BUN 15 mg/dL      Creatinine 0.69 mg/dL      Glucose 112 mg/dL      Calcium 9.7 mg/dL      AST 27 U/L      ALT 29 U/L      Alkaline Phosphatase 118 U/L      Total Protein 7.3 g/dL      Albumin 4.3 g/dL      Total Bilirubin 0.62 mg/dL      eGFR 89 ml/min/1.73sq m     Narrative:      [place] guidelines for Chronic Kidney Disease (CKD):    •  Stage 1 with normal or high GFR (GFR > 90 mL/min/1.73 square meters)  •  Stage 2 Mild CKD (GFR = 60-89 mL/min/1.73 square meters)  •  Stage 3A Moderate CKD (GFR = 45-59 mL/min/1.73 square meters)  •  Stage 3B Moderate CKD (GFR = 30-44 mL/min/1.73 square meters)  •  Stage 4 Severe CKD (GFR = 15-29 mL/min/1.73 square meters)  •  Stage 5 End Stage CKD (GFR <15 mL/min/1.73 square meters)  Note: GFR calculation is accurate only with a steady state creatinine    Magnesium [[id number]]  (Normal) Collected: 09/07/23 1038    Lab Status: Final result Specimen: Blood from Arm, Left Updated: 09/07/23 1127     Magnesium 1.9 mg/dL     B-Type Natriuretic Peptide(BNP) [[id number]]  (Normal) Collected: 09/07/23 1038    Lab Status: Final result Specimen: Blood from Arm, Left Updated: 09/07/23 1107     BNP 34 pg/mL     Protime-INR [[id number]]  (Normal) Collected: 09/07/23 1038    Lab Status: Final result Specimen: Blood from Arm, Left Updated: 09/07/23 1105     Protime 12.5 seconds      INR 0.93    APTT [[id number]]  (Normal) Collected: 09/07/23 1038    Lab Status: Final result Specimen: Blood from Arm, Left Updated: 09/07/23 1105     PTT 23 seconds     CBC and differential [[id number]]  (Abnormal) Collected: 09/07/23 1038    Lab Status: Final result Specimen: Blood from Arm, Left Updated: 09/07/23 1047     WBC 8.20 Thousand/uL      RBC 4.66 Million/uL      Hemoglobin 14.7 g/dL      Hematocrit 42.8 %      MCV 92 fL      MCH 31.5 pg      MCHC 34.3 g/dL      RDW 12.6 %      MPV 9.4 fL      Platelets 792 Thousands/uL      nRBC 0 /100 WBCs      Neutrophils Relative 76 %      Immat GRANS % 0 %      Lymphocytes Relative 17 %      Monocytes Relative 5 %      Eosinophils Relative 1 %      Basophils Relative 1 %      Neutrophils Absolute 6.21 Thousands/µL      Immature Grans Absolute 0.02 Thousand/uL      Lymphocytes Absolute 1.42 Thousands/µL      Monocytes Absolute 0.44 Thousand/µL      Eosinophils Absolute 0.07 Thousand/µL Basophils Absolute 0.04 Thousands/µL                  CTA ED chest PE study   Final Result by Keon Coats MD (09/07 1208)      No pulmonary embolus. No acute pulmonary disease. Workstation performed: [id number]                    Procedures  ECG 12 Lead Documentation Only    Date/Time: 9/7/2023 10:28 AM    Performed by: Rose Marie Vargas MD  Authorized by: Rose Marie Vargas MD    Indications / Diagnosis:  Sob  ECG reviewed by me, the ED Provider: yes    Previous ECG:     Previous ECG:  Compared to current    Similarity:  Changes noted  Interpretation:     Interpretation: abnormal    Rate:     ECG rate:  109  Rhythm:     Rhythm: sinus rhythm    Ectopy:     Ectopy: PVCs    Conduction:     Conduction: abnormal      Abnormal conduction: complete LBBB               ED Course                               SBIRT 20yo+    Flowsheet Row Most Recent Value   Initial Alcohol Screen: US AUDIT-C     1. How often do you have a drink containing alcohol? 1 Filed at: 09/07/2023 1322   2. How many drinks containing alcohol do you have on a typical day you are drinking? 0 Filed at: 09/07/2023 1322   3a. Male UNDER 65: How often do you have five or more drinks on one occasion? 0 Filed at: 09/07/2023 1322   3b. FEMALE Any Age, or MALE 65+: How often do you have 4 or more drinks on one occassion? 0 Filed at: 09/07/2023 1322   Audit-C Score 1 Filed at: 09/07/2023 1322   YANE: How many times in the past year have you. .. Used an illegal drug or used a prescription medication for non-medical reasons? Never Filed at: 09/07/2023 1322                    Medical Decision Making  Differential includes but is not limited to PE, infection, ACS, COPD, anxiety. 1200- Ddimer elevated but stable compared to previous. Initial trop 7. Pt. Is persistently mildly tachycardic. CTA done and is normal.  Will try neb and ativan and will do 2 hour trop. 1325 - delta trop zero. Will discharge, likely anxiety related.   Will try Xanax and inhaler outpt. And advised follow up outpt. Amount and/or Complexity of Data Reviewed  Labs: ordered. Radiology: ordered. Risk  Prescription drug management. Disposition  Final diagnoses:   Palpitations   SOB (shortness of breath)     Time reflects when diagnosis was documented in both MDM as applicable and the Disposition within this note     Time User Action Codes Description Comment    0/6/6853 32:57 PM James LANCASTER Add [A16.1] Palpitations     3/2/7424 68:07 PM James LANCASTER Add [Y85.78] SOB (shortness of breath)       ED Disposition     ED Disposition   Discharge    Condition   Stable    Date/Time   Thu Sep 7, 2023  1:23 PM    Comment   Abelardo Cuellar discharge to home/self care. Follow-up Information     Follow up With Specialties Details Why Contact Info    your doctor              Patient's Medications   Discharge Prescriptions    ALBUTEROL (PROAIR HFA) 90 MCG/ACT INHALER    Inhale 2 puffs every 6 (six) hours as needed for wheezing       Start Date: 9/7/2023  End Date: --       Order Dose: 2 puffs       Quantity: 8.5 g    Refills: 0    ALPRAZOLAM (XANAX) 0.25 MG TABLET    Take 1 tablet (0.25 mg total) by mouth 3 (three) times a day as needed for anxiety for up to 10 days       Start Date: 9/7/2023  End Date: 9/17/2023       Order Dose: 0.25 mg       Quantity: 15 tablet    Refills: 0       No discharge procedures on file.     PDMP Review       Value Time User    PDMP Reviewed  Yes 5/3/2021 10:12 AM Bentley Duane, PA-C          ED Provider  Electronically Signed by           James Denson MD  75/80/27 [id number]

## 2023-09-07 NOTE — DISCHARGE INSTRUCTIONS
Your tests are normal at this time. Rest as needed. Try the Xanax and the inhaler as prescribed. Follow up with your doctor.

## 2023-09-07 NOTE — TELEPHONE ENCOUNTER
Pt called with continued shortness of breath, she had cath and echo 9/5. She has scheduled apt with emilie 9/20. Any recommendations?

## 2023-09-08 LAB
ATRIAL RATE: 109 BPM
P AXIS: 68 DEGREES
PR INTERVAL: 182 MS
QRS AXIS: -70 DEGREES
QRSD INTERVAL: 140 MS
QT INTERVAL: 358 MS
QTC INTERVAL: 482 MS
T WAVE AXIS: 88 DEGREES
VENTRICULAR RATE: 109 BPM

## 2023-09-08 PROCEDURE — 93010 ELECTROCARDIOGRAM REPORT: CPT | Performed by: INTERNAL MEDICINE

## 2023-09-13 ENCOUNTER — TELEPHONE (OUTPATIENT)
Dept: PSYCHIATRY | Facility: CLINIC | Age: 68
End: 2023-09-13

## 2023-09-13 NOTE — TELEPHONE ENCOUNTER
Patient has been added to the Medication Management and Talk Therapy wait list without a referral.    Insurance: Medicare A&B  Insurance Type:    Commercial []   Medicaid []   [place] (if applicable)   Medicare [x]  Location Preference: [place]  Provider Preference: Female  Virtual: Yes [] No [x]    Advised the patient to contact her PCP for a referral.

## 2023-09-14 ENCOUNTER — APPOINTMENT (EMERGENCY)
Dept: RADIOLOGY | Facility: HOSPITAL | Age: 68
End: 2023-09-14
Payer: MEDICARE

## 2023-09-14 ENCOUNTER — HOSPITAL ENCOUNTER (EMERGENCY)
Facility: HOSPITAL | Age: 68
Discharge: HOME | End: 2023-09-14
Attending: EMERGENCY MEDICINE
Payer: MEDICARE

## 2023-09-14 VITALS
TEMPERATURE: 99.3 F | WEIGHT: 169.75 LBS | RESPIRATION RATE: 15 BRPM | DIASTOLIC BLOOD PRESSURE: 80 MMHG | SYSTOLIC BLOOD PRESSURE: 169 MMHG | HEART RATE: 64 BPM | BODY MASS INDEX: 31.05 KG/M2 | OXYGEN SATURATION: 97 %

## 2023-09-14 DIAGNOSIS — J18.9 PNEUMONIA: Primary | ICD-10-CM

## 2023-09-14 LAB
2HR DELTA HS TROPONIN: 20 NG/L
ALBUMIN SERPL BCP-MCNC: 4.1 G/DL (ref 3.5–5)
ALP SERPL-CCNC: 104 U/L (ref 34–104)
ALT SERPL W P-5'-P-CCNC: 24 U/L (ref 7–52)
ANION GAP SERPL CALCULATED.3IONS-SCNC: 7 MMOL/L
APTT PPP: 24 SECONDS (ref 23–37)
AST SERPL W P-5'-P-CCNC: 21 U/L (ref 13–39)
BASOPHILS # BLD AUTO: 0.04 THOUSANDS/ÂΜL (ref 0–0.1)
BASOPHILS NFR BLD AUTO: 0 % (ref 0–1)
BILIRUB SERPL-MCNC: 0.36 MG/DL (ref 0.2–1)
BUN SERPL-MCNC: 14 MG/DL (ref 5–25)
CALCIUM SERPL-MCNC: 10.2 MG/DL (ref 8.4–10.2)
CARDIAC TROPONIN I PNL SERPL HS: 27 NG/L
CARDIAC TROPONIN I PNL SERPL HS: 7 NG/L
CHLORIDE SERPL-SCNC: 94 MMOL/L (ref 96–108)
CO2 SERPL-SCNC: 29 MMOL/L (ref 21–32)
CREAT SERPL-MCNC: 0.82 MG/DL (ref 0.6–1.3)
D DIMER PPP FEU-MCNC: 0.96 UG/ML FEU
EOSINOPHIL # BLD AUTO: 0.12 THOUSAND/ÂΜL (ref 0–0.61)
EOSINOPHIL NFR BLD AUTO: 1 % (ref 0–6)
ERYTHROCYTE [DISTWIDTH] IN BLOOD BY AUTOMATED COUNT: 12.5 % (ref 11.6–15.1)
FLUAV RNA RESP QL NAA+PROBE: NEGATIVE
FLUBV RNA RESP QL NAA+PROBE: NEGATIVE
GFR SERPL CREATININE-BSD FRML MDRD: 73 ML/MIN/1.73SQ M
GLUCOSE SERPL-MCNC: 95 MG/DL (ref 65–140)
HCT VFR BLD AUTO: 40.1 % (ref 34.8–46.1)
HGB BLD-MCNC: 13.7 G/DL (ref 11.5–15.4)
IMM GRANULOCYTES # BLD AUTO: 0.02 THOUSAND/UL (ref 0–0.2)
IMM GRANULOCYTES NFR BLD AUTO: 0 % (ref 0–2)
INR PPP: 0.93 (ref 0.84–1.19)
LYMPHOCYTES # BLD AUTO: 1.87 THOUSANDS/ÂΜL (ref 0.6–4.47)
LYMPHOCYTES NFR BLD AUTO: 19 % (ref 14–44)
MCH RBC QN AUTO: 31.4 PG (ref 26.8–34.3)
MCHC RBC AUTO-ENTMCNC: 34.2 G/DL (ref 31.4–37.4)
MCV RBC AUTO: 92 FL (ref 82–98)
MONOCYTES # BLD AUTO: 0.59 THOUSAND/ÂΜL (ref 0.17–1.22)
MONOCYTES NFR BLD AUTO: 6 % (ref 4–12)
NEUTROPHILS # BLD AUTO: 7.02 THOUSANDS/ÂΜL (ref 1.85–7.62)
NEUTS SEG NFR BLD AUTO: 74 % (ref 43–75)
NRBC BLD AUTO-RTO: 0 /100 WBCS
PLATELET # BLD AUTO: 305 THOUSANDS/UL (ref 149–390)
PMV BLD AUTO: 9.3 FL (ref 8.9–12.7)
POTASSIUM SERPL-SCNC: 4.1 MMOL/L (ref 3.5–5.3)
PROT SERPL-MCNC: 7 G/DL (ref 6.4–8.4)
PROTHROMBIN TIME: 12.6 SECONDS (ref 11.6–14.5)
RBC # BLD AUTO: 4.37 MILLION/UL (ref 3.81–5.12)
RSV RNA RESP QL NAA+PROBE: NEGATIVE
SARS-COV-2 RNA RESP QL NAA+PROBE: NEGATIVE
SODIUM SERPL-SCNC: 130 MMOL/L (ref 135–147)
WBC # BLD AUTO: 9.66 THOUSAND/UL (ref 4.31–10.16)

## 2023-09-14 PROCEDURE — 36415 COLL VENOUS BLD VENIPUNCTURE: CPT | Performed by: EMERGENCY MEDICINE

## 2023-09-14 PROCEDURE — 96366 THER/PROPH/DIAG IV INF ADDON: CPT

## 2023-09-14 PROCEDURE — 99285 EMERGENCY DEPT VISIT HI MDM: CPT

## 2023-09-14 PROCEDURE — 96361 HYDRATE IV INFUSION ADD-ON: CPT

## 2023-09-14 PROCEDURE — 96368 THER/DIAG CONCURRENT INF: CPT

## 2023-09-14 PROCEDURE — 99285 EMERGENCY DEPT VISIT HI MDM: CPT | Performed by: EMERGENCY MEDICINE

## 2023-09-14 PROCEDURE — 96365 THER/PROPH/DIAG IV INF INIT: CPT

## 2023-09-14 PROCEDURE — 85379 FIBRIN DEGRADATION QUANT: CPT | Performed by: EMERGENCY MEDICINE

## 2023-09-14 PROCEDURE — 85025 COMPLETE CBC W/AUTO DIFF WBC: CPT | Performed by: EMERGENCY MEDICINE

## 2023-09-14 PROCEDURE — 93005 ELECTROCARDIOGRAM TRACING: CPT

## 2023-09-14 PROCEDURE — 71275 CT ANGIOGRAPHY CHEST: CPT

## 2023-09-14 PROCEDURE — 80053 COMPREHEN METABOLIC PANEL: CPT | Performed by: EMERGENCY MEDICINE

## 2023-09-14 PROCEDURE — 0241U HB NFCT DS VIR RESP RNA 4 TRGT: CPT | Performed by: EMERGENCY MEDICINE

## 2023-09-14 PROCEDURE — 85730 THROMBOPLASTIN TIME PARTIAL: CPT | Performed by: EMERGENCY MEDICINE

## 2023-09-14 PROCEDURE — 71045 X-RAY EXAM CHEST 1 VIEW: CPT

## 2023-09-14 PROCEDURE — 85610 PROTHROMBIN TIME: CPT | Performed by: EMERGENCY MEDICINE

## 2023-09-14 PROCEDURE — 84484 ASSAY OF TROPONIN QUANT: CPT | Performed by: EMERGENCY MEDICINE

## 2023-09-14 RX ORDER — CEFTRIAXONE 1 G/50ML
1000 INJECTION, SOLUTION INTRAVENOUS ONCE
Status: COMPLETED | OUTPATIENT
Start: 2023-09-14 | End: 2023-09-14

## 2023-09-14 RX ORDER — AZITHROMYCIN 250 MG/1
250 TABLET, FILM COATED ORAL EVERY 24 HOURS
Qty: 4 TABLET | Refills: 0 | Status: SHIPPED | OUTPATIENT
Start: 2023-09-15 | End: 2023-09-19

## 2023-09-14 RX ORDER — CEFUROXIME AXETIL 500 MG/1
500 TABLET ORAL EVERY 12 HOURS SCHEDULED
Qty: 14 TABLET | Refills: 0 | Status: SHIPPED | OUTPATIENT
Start: 2023-09-14 | End: 2023-09-21

## 2023-09-14 RX ADMIN — CEFTRIAXONE 1000 MG: 1 INJECTION, SOLUTION INTRAVENOUS at 17:06

## 2023-09-14 RX ADMIN — IOHEXOL 85 ML: 350 INJECTION, SOLUTION INTRAVENOUS at 16:07

## 2023-09-14 RX ADMIN — SODIUM CHLORIDE 1000 ML: 0.9 INJECTION, SOLUTION INTRAVENOUS at 15:31

## 2023-09-14 RX ADMIN — AZITHROMYCIN 500 MG: 500 INJECTION, POWDER, LYOPHILIZED, FOR SOLUTION INTRAVENOUS at 17:50

## 2023-09-14 NOTE — ED PROVIDER NOTES
History  Chief Complaint   Patient presents with   • Shortness of Breath     Sob since Monday. Also has a headache. And had chills on Tuesday. Patient presents for evaluation of shortness of breath since Monday. Patient also reports a headache and had chills on Tuesday. No reported fever. Denies any chest pain. Has an upcoming appointment with pulmonology. History provided by:  Patient   used: No        Prior to Admission Medications   Prescriptions Last Dose Informant Patient Reported? Taking? ALPRAZolam (XANAX) 0.25 mg tablet   No No   Sig: Take 1 tablet (0.25 mg total) by mouth 3 (three) times a day as needed for anxiety for up to 10 days   Medical ID Plate MISC  Self No No   Sig: Use once for 1 dose Severely and permanently limited in the ability to walk because of an arthritic, neurological, or orthopedic condition: or cannot walk two hundred feet without stopping to rest and meets requirements for disability license plate   Multiple Vitamin (multivitamin) tablet  Self Yes No   Sig: Take 1 tablet by mouth daily   acetaminophen (TYLENOL) 500 mg tablet  Self Yes No   Sig: Take 1,000 mg by mouth   albuterol (ProAir HFA) 90 mcg/act inhaler   No No   Sig: Inhale 2 puffs every 6 (six) hours as needed for wheezing   aspirin 81 mg chewable tablet   No No   Sig: Chew 1 tablet (81 mg total) daily   atorvastatin (LIPITOR) 20 mg tablet   No No   Sig: Take 1 tablet (20 mg total) by mouth every evening   buPROPion (Wellbutrin SR) 100 mg 12 hr tablet   No No   Sig: Take 1 tablet (100 mg total) by mouth 2 (two) times a day   losartan-hydrochlorothiazide (HYZAAR) 50-12.5 mg per tablet   No No   Sig: Take 1 tablet by mouth daily   nicotine (NICODERM CQ) 7 mg/24hr TD 24 hr patch   No No   Sig: Place 1 patch on the skin over 24 hours daily Apply a new patch every 24 hours to a clean, dry, hairless site on the upper arm or hip.    Patient not taking: Reported on 9/7/2023      Facility-Administered Medications: None       Past Medical History:   Diagnosis Date   • BRCA1 positive    • BRCA2 positive    • Bundle branch block, left    • Cancer (HCC)     pancreatic   • Facial droop     r/t neck surgery   • History of chemotherapy     pancreatic cancer   • History of radiation therapy    • Hypercholesteremia    • Pancreatic carcinoma [facility])        Past Surgical History:   Procedure Laterality Date   • ABLATION SOFT TISSUE N/A 4/26/2021    Procedure: INTRAOPERATIVE ULTRASOUND, ABLATION,SOFT TISSUE PANCREAS;  Surgeon: Cheri Khalil MD;  [facility];  Service: Surgical Oncology   • CARDIAC CATHETERIZATION Left 9/5/2023    Procedure: Cardiac catheterization;  Surgeon: Sofie Rodgers MD;  Location: [address] [place] [address] CATH LAB; Service: Cardiology   • CHOLECYSTECTOMY N/A 4/26/2021    Procedure: CHOLECYSTECTOMY;  Surgeon: Cheri Khalil MD;  [facility];  Service: Surgical Oncology   • FL GUIDED CENTRAL VENOUS ACCESS DEVICE INSERTION  6/2/2021   • HYSTERECTOMY     • LAPAROTOMY N/A 4/26/2021    Procedure: LAPAROTOMY EXPLORATORY;  Surgeon: Cheri Khalil MD;  [facility];  Service: Surgical Oncology   • OOPHORECTOMY     • SINUS SURGERY     • TUNNELED VENOUS PORT PLACEMENT Left 6/2/2021    Procedure: INSERTION VENOUS PORT (PORT-A-CATH); Surgeon: Cheri Khalil MD;  [facility];  Service: Surgical Oncology   • US GUIDED THYROID BIOPSY  7/13/2022   • WHIPPLE PROCEDURE/PANCREATICO-DUODENECTOMY N/A 4/26/2021    Procedure:  WHIPPLE PROCEDURE/PANCREATICO-DUODENECTOMY; PYLORIC PRESERVING;  Surgeon: Cheri Khalil MD;  [facility];  Service: Surgical Oncology       Family History   Problem Relation Age of Onset   • Ulcerative colitis Mother    • Prostate cancer Father    • Melanoma Sister    • Heart disease Brother    • Prostate cancer Brother    • No Known Problems Brother    • No Known Problems Maternal Uncle    • No Known Problems Paternal Uncle      I have reviewed and agree with the history as documented. E-Cigarette/Vaping   • E-Cigarette Use Never User      E-Cigarette/Vaping Substances   • Nicotine No    • THC No    • CBD No    • Flavoring No    • Other No    • Unknown No      Social History     Tobacco Use   • Smoking status: Some Days     Packs/day: 0.50     Types: Cigarettes     Start date: 65     Last attempt to quit: 2021     Years since quittin.4   • Smokeless tobacco: Never   • Tobacco comments:     recently stop smoking , pt stated she smoke occ. 1-2 cigg some days 2022. Vaping Use   • Vaping Use: Never used   Substance Use Topics   • Alcohol use: Never   • Drug use: Never       Review of Systems   Constitutional: Positive for chills. Respiratory: Positive for shortness of breath. Neurological: Positive for headaches. All other systems reviewed and are negative. Physical Exam  Physical Exam  Vitals and nursing note reviewed. Constitutional:       General: She is not in acute distress. Eyes:      General: No scleral icterus. Conjunctiva/sclera: Conjunctivae normal.   Cardiovascular:      Rate and Rhythm: Normal rate and regular rhythm. Pulmonary:      Effort: Pulmonary effort is normal. No respiratory distress. Breath sounds: Normal breath sounds. Skin:     Capillary Refill: Capillary refill takes less than 2 seconds. Findings: No rash. Neurological:      General: No focal deficit present. Mental Status: She is alert and oriented to person, place, and time.          Vital Signs  ED Triage Vitals   Temperature Pulse Respirations Blood Pressure SpO2   23 1341 23 1341 23 1341 23 1341 23 1341   99.3 °F (37.4 °C) 66 18 167/94 96 %      Temp src Heart Rate Source Patient Position - Orthostatic VS BP Location FiO2 (%)   -- 23 1500 23 1515 23 1515 --    Monitor Lying Right arm       Pain Score       23 1341       5           Vitals:    23 1341 23 1500 23 1515 23 1545 BP: 167/94  142/95 141/81   Pulse: 66 61 60 59   Patient Position - Orthostatic VS:   Lying Lying         Visual Acuity      ED Medications  Medications   azithromycin (ZITHROMAX) 500 mg in sodium chloride 0.9% 250mL IVPB 500 mg (500 mg Intravenous New Bag 9/14/23 1750)   sodium chloride 0.9 % bolus 1,000 mL (0 mL Intravenous Stopped 9/14/23 1643)   iohexol (OMNIPAQUE) 350 MG/ML injection (SINGLE-DOSE) 85 mL (85 mL Intravenous Given 9/14/23 1607)   cefTRIAXone (ROCEPHIN) IVPB (premix in dextrose) 1,000 mg 50 mL (1,000 mg Intravenous New Bag 9/14/23 1706)       Diagnostic Studies  Results Reviewed     Procedure Component Value Units Date/Time    HS Troponin I 2hr [[id number]]  (Abnormal) Collected: 09/14/23 1700    Lab Status: Final result Specimen: Blood from Arm, Right Updated: 09/14/23 1730     hs TnI 2hr 27 ng/L      Delta 2hr hsTnI 20 ng/L     HS Troponin I 4hr [[id number]]     Lab Status: No result Specimen: Blood     FLU/RSV/COVID - if FLU/RSV clinically relevant [[id number]]  (Normal) Collected: 09/14/23 1455    Lab Status: Final result Specimen: Nares from Nose Updated: 09/14/23 1539     SARS-CoV-2 Negative     INFLUENZA A PCR Negative     INFLUENZA B PCR Negative     RSV PCR Negative    Narrative:      FOR PEDIATRIC PATIENTS - copy/paste COVID Guidelines URL to browser: [email or web address]    SARS-CoV-2 assay is a Nucleic Acid Amplification assay intended for the  qualitative detection of nucleic acid from SARS-CoV-2 in nasopharyngeal  swabs. Results are for the presumptive identification of SARS-CoV-2 RNA. Positive results are indicative of infection with SARS-CoV-2, the virus  causing COVID-19, but do not rule out bacterial infection or co-infection  with other viruses. Laboratories within the [facility] and its  territories are required to report all positive results to the appropriate  public health authorities.  Negative results do not preclude SARS-CoV-2  infection and should not be used as the sole basis for treatment or other  patient management decisions. Negative results must be combined with  clinical observations, patient history, and epidemiological information. This test has not been FDA cleared or approved. This test has been authorized by FDA under an Emergency Use Authorization  (EUA). This test is only authorized for the duration of time the  declaration that circumstances exist justifying the authorization of the  emergency use of an in vitro diagnostic tests for detection of SARS-CoV-2  virus and/or diagnosis of COVID-19 infection under section 564(b)(1) of  the Act, 21 U. S.C. 739AVR-3(N)(0), unless the authorization is terminated  or revoked sooner. The test has been validated but independent review by FDA  and CLIA is pending. Test performed using PEERpert: This RT-PCR assay targets N2,  a region unique to SARS-CoV-2. A conserved region in the E-gene was chosen  for pan-Sarbecovirus detection which includes SARS-CoV-2. According to CMS-2020-01-R, this platform meets the definition of high-throughput technology.     HS Troponin 0hr (reflex protocol) [[id number]]  (Normal) Collected: 09/14/23 1455    Lab Status: Final result Specimen: Blood from Arm, Right Updated: 09/14/23 1525     hs TnI 0hr 7 ng/L     Comprehensive metabolic panel [[id number]]  (Abnormal) Collected: 09/14/23 1455    Lab Status: Final result Specimen: Blood from Arm, Right Updated: 09/14/23 1519     Sodium 130 mmol/L      Potassium 4.1 mmol/L      Chloride 94 mmol/L      CO2 29 mmol/L      ANION GAP 7 mmol/L      BUN 14 mg/dL      Creatinine 0.82 mg/dL      Glucose 95 mg/dL      Calcium 10.2 mg/dL      AST 21 U/L      ALT 24 U/L      Alkaline Phosphatase 104 U/L      Total Protein 7.0 g/dL      Albumin 4.1 g/dL      Total Bilirubin 0.36 mg/dL      eGFR 73 ml/min/1.73sq m     Narrative:      [place] guidelines for Chronic Kidney Disease (CKD):   •  Stage 1 with normal or high GFR (GFR > 90 mL/min/1.73 square meters)  •  Stage 2 Mild CKD (GFR = 60-89 mL/min/1.73 square meters)  •  Stage 3A Moderate CKD (GFR = 45-59 mL/min/1.73 square meters)  •  Stage 3B Moderate CKD (GFR = 30-44 mL/min/1.73 square meters)  •  Stage 4 Severe CKD (GFR = 15-29 mL/min/1.73 square meters)  •  Stage 5 End Stage CKD (GFR <15 mL/min/1.73 square meters)  Note: GFR calculation is accurate only with a steady state creatinine    D-Dimer [[id number]]  (Abnormal) Collected: 09/14/23 1455    Lab Status: Final result Specimen: Blood from Arm, Right Updated: 09/14/23 1518     D-Dimer, Quant 0.96 ug/ml FEU     Narrative: In the evaluation for possible pulmonary embolism, in the appropriate (Well's Score of 4 or less) patient, the age adjusted d-dimer cutoff for this patient can be calculated as:    Age x 0.01 (in ug/mL) for Age-adjusted D-dimer exclusion threshold for a patient over 50 years.     Protime-INR [[id number]]  (Normal) Collected: 09/14/23 1455    Lab Status: Final result Specimen: Blood from Arm, Right Updated: 09/14/23 1515     Protime 12.6 seconds      INR 0.93    APTT [[id number]]  (Normal) Collected: 09/14/23 1455    Lab Status: Final result Specimen: Blood from Arm, Right Updated: 09/14/23 1515     PTT 24 seconds     CBC and differential [[id number]] Collected: 09/14/23 1455    Lab Status: Final result Specimen: Blood from Arm, Right Updated: 09/14/23 1503     WBC 9.66 Thousand/uL      RBC 4.37 Million/uL      Hemoglobin 13.7 g/dL      Hematocrit 40.1 %      MCV 92 fL      MCH 31.4 pg      MCHC 34.2 g/dL      RDW 12.5 %      MPV 9.3 fL      Platelets 540 Thousands/uL      nRBC 0 /100 WBCs      Neutrophils Relative 74 %      Immat GRANS % 0 %      Lymphocytes Relative 19 %      Monocytes Relative 6 %      Eosinophils Relative 1 %      Basophils Relative 0 %      Neutrophils Absolute 7.02 Thousands/µL      Immature Grans Absolute 0.02 Thousand/uL Lymphocytes Absolute 1.87 Thousands/µL      Monocytes Absolute 0.59 Thousand/µL      Eosinophils Absolute 0.12 Thousand/µL      Basophils Absolute 0.04 Thousands/µL                  CTA ED chest PE study   Final Result by Shea Burns MD (09/14 1639)      No pulmonary embolism. Mild groundglass opacities in the peripheral bilateral lower lobes, likely infectious/inflammatory in etiology. Recommend short-term follow-up noncontrast chest CT in 3 months to assess resolution. The study was marked in [facility] for immediate notification. Workstation performed: [id number]         XR chest 1 view portable   Final Result by Elisa Coelho MD (09/14 1537)      No acute cardiopulmonary disease. Workstation performed: [id number]                    Procedures  ECG 12 Lead Documentation Only    Date/Time: 9/14/2023 2:25 PM    Performed by: Gorge Vicente DO  Authorized by: Gorge Vicente DO    ECG reviewed by me, the ED Provider: yes    Patient location:  ED  Previous ECG:     Previous ECG:  Compared to current    Similarity:  No change  Interpretation:     Interpretation: abnormal    Rate:     ECG rate:  66    ECG rate assessment: normal    Rhythm:     Rhythm: sinus rhythm    Ectopy:     Ectopy: none    QRS:     QRS axis:  Left  Conduction:     Conduction: abnormal      Abnormal conduction: complete LBBB               ED Course  ED Course as of 09/14/23 1832   Thu Sep 14, 2023   1755 Delta 2hr hsTnI(!): 20  Discussed with patient as per protocol she should be admitted for additional troponins to r/o ACS. Patient understood but states this has happened to her before and does not want to stay. SBIRT 22yo+    Flowsheet Row Most Recent Value   Initial Alcohol Screen: US AUDIT-C     1. How often do you have a drink containing alcohol? 0 Filed at: 09/14/2023 1344   3a. Male UNDER 65: How often do you have five or more drinks on one occasion?  0 Filed at: 09/14/2023 1344   3b. FEMALE Any Age, or MALE 65+: How often do you have 4 or more drinks on one occassion? 0 Filed at: 09/14/2023 1344   Audit-C Score 0 Filed at: 09/14/2023 1342   YANE: How many times in the past year have you. .. Used an illegal drug or used a prescription medication for non-medical reasons? Never Filed at: 09/14/2023 1344                    Medical Decision Making  Pulse ox 97% on room air indicating adequate oxygenation. CXR: NAD as read by me      Differential diagnosis include but not limited to arrhythmia, ACS, pulmonary embolism, pneumonia, COVID-19    Lab work and CT scan results discussed with patient at bedside. Findings of bilateral opacities of possible infectious versus inflammatory origin. Were not seen on previous CT scan. We will start antibiotics recommend follow-up with her doctor to resolution. Relayed that radiology recommended a 3-month follow-up to ensure resolution of the findings. Pneumonia: acute illness or injury  Amount and/or Complexity of Data Reviewed  Labs: ordered. Decision-making details documented in ED Course. Radiology: ordered and independent interpretation performed. ECG/medicine tests: ordered and independent interpretation performed. Risk  Prescription drug management. Disposition  Final diagnoses:   Pneumonia     Time reflects when diagnosis was documented in both MDM as applicable and the Disposition within this note     Time User Action Codes Description Comment    9/14/2023  4:52 PM Darby Ledbetter Add [J18.9] Pneumonia       ED Disposition     ED Disposition   Discharge    Condition   Stable    Date/Time   Thu Sep 14, 2023  4:44 PM    Comment   Lorna Philippe discharge to home/self care.                Follow-up Information     Follow up With Specialties Details Why Contact Info Additional Information    [address] Emergency Department Emergency Medicine  If symptoms worsen [address] [address]  [facility] Emergency Department, [address] [address], [facility], [address] [place] [address]  In 1 week [phone]             Patient's Medications   Discharge Prescriptions    AZITHROMYCIN (ZITHROMAX) 250 MG TABLET    Take 1 tablet (250 mg total) by mouth every 24 hours for 4 days Do not start before September 15, 2023. Start Date: 9/15/2023 End Date: 9/19/2023       Order Dose: 250 mg       Quantity: 4 tablet    Refills: 0    CEFUROXIME (CEFTIN) 500 MG TABLET    Take 1 tablet (500 mg total) by mouth every 12 (twelve) hours for 7 days       Start Date: 9/14/2023 End Date: 9/21/2023       Order Dose: 500 mg       Quantity: 14 tablet    Refills: 0       No discharge procedures on file.     PDMP Review       Value Time User    PDMP Reviewed  Yes 5/3/2021 10:12 AM Gavi Guerrero PA-C          ED Provider  Electronically Signed by           Vilma Salter DO  09/14/23 2609

## 2023-09-15 LAB
ATRIAL RATE: 66 BPM
P AXIS: 73 DEGREES
PR INTERVAL: 192 MS
QRS AXIS: -69 DEGREES
QRSD INTERVAL: 156 MS
QT INTERVAL: 430 MS
QTC INTERVAL: 450 MS
T WAVE AXIS: 67 DEGREES
VENTRICULAR RATE: 66 BPM

## 2023-09-15 PROCEDURE — 93010 ELECTROCARDIOGRAM REPORT: CPT | Performed by: INTERNAL MEDICINE

## 2023-09-30 ENCOUNTER — HOSPITAL ENCOUNTER (EMERGENCY)
Facility: HOSPITAL | Age: 68
Discharge: HOME | End: 2023-09-30
Attending: EMERGENCY MEDICINE
Payer: MEDICARE

## 2023-09-30 VITALS
OXYGEN SATURATION: 97 % | TEMPERATURE: 98.1 F | RESPIRATION RATE: 20 BRPM | HEART RATE: 72 BPM | DIASTOLIC BLOOD PRESSURE: 91 MMHG | SYSTOLIC BLOOD PRESSURE: 167 MMHG | BODY MASS INDEX: 31.1 KG/M2 | WEIGHT: 169 LBS | HEIGHT: 62 IN

## 2023-09-30 DIAGNOSIS — H65.193 ACUTE EFFUSION OF BOTH MIDDLE EARS: Primary | ICD-10-CM

## 2023-09-30 PROCEDURE — 99284 EMERGENCY DEPT VISIT MOD MDM: CPT | Performed by: PHYSICIAN ASSISTANT

## 2023-09-30 PROCEDURE — 99283 EMERGENCY DEPT VISIT LOW MDM: CPT

## 2023-09-30 RX ORDER — FLUTICASONE PROPIONATE 50 MCG
2 SPRAY, SUSPENSION (ML) NASAL DAILY
Qty: 16 G | Refills: 0 | Status: SHIPPED | OUTPATIENT
Start: 2023-09-30 | End: 2023-10-03

## 2023-09-30 RX ORDER — ALBUTEROL SULFATE 90 UG/1
2 AEROSOL, METERED RESPIRATORY (INHALATION) ONCE
Status: COMPLETED | OUTPATIENT
Start: 2023-09-30 | End: 2023-09-30

## 2023-09-30 RX ADMIN — ALBUTEROL SULFATE 2 PUFF: 90 AEROSOL, METERED RESPIRATORY (INHALATION) at 09:11

## 2023-09-30 NOTE — ED PROVIDER NOTES
History  Chief Complaint   Patient presents with   • Nasal Congestion     Had pneumonia about two weeks ago and now has a stuffy nose. Makes it hard for pt to catch her breath which causes her to sweat and become SOB. Upon arrival pt is diaphoretic. Pt states "it's all in my head." Was at her cardiologist yesterday. 70-year-old female presenting today with continued nasal congestion over the past week. States that she was recently diagnosed with pneumonia, finished her antibiotics as prescribed. States that she has a hard time breathing out of her face. States that she at times becomes sweaty which she has had before in the past.  She has not had any fevers at home. States that she has had many recent work-ups with CAT scans as well as had a recent catheterization with ICD placement. Patient was seen by Dr. Shruti Khanna of cardiology yesterday and has an appointment with pulmonology this upcoming week. She feels dizzy at times when she turns her head as well as some ear and facial pressure. Denies fevers, nausea, vomiting, chest or abdominal pain, palpitations. Differential includes but is not limited to sinusitis, viral illness, seasonal allergies etc.          Prior to Admission Medications   Prescriptions Last Dose Informant Patient Reported? Taking?    ALPRAZolam (XANAX) 0.25 mg tablet   No No   Sig: Take 1 tablet (0.25 mg total) by mouth 3 (three) times a day as needed for anxiety for up to 10 days   Medical ID Plate MISC  Self No No   Sig: Use once for 1 dose Severely and permanently limited in the ability to walk because of an arthritic, neurological, or orthopedic condition: or cannot walk two hundred feet without stopping to rest and meets requirements for disability license plate   Multiple Vitamin (multivitamin) tablet  Self Yes No   Sig: Take 1 tablet by mouth daily   acetaminophen (TYLENOL) 500 mg tablet  Self Yes No   Sig: Take 1,000 mg by mouth   albuterol (ProAir HFA) 90 mcg/act inhaler   No No   Sig: Inhale 2 puffs every 6 (six) hours as needed for wheezing   aspirin 81 mg chewable tablet   No No   Sig: Chew 1 tablet (81 mg total) daily   atorvastatin (LIPITOR) 20 mg tablet   No No   Sig: Take 1 tablet (20 mg total) by mouth every evening   buPROPion (Wellbutrin SR) 100 mg 12 hr tablet   No No   Sig: Take 1 tablet (100 mg total) by mouth 2 (two) times a day   losartan-hydrochlorothiazide (HYZAAR) 50-12.5 mg per tablet   No No   Sig: Take 1 tablet by mouth daily   nicotine (NICODERM CQ) 7 mg/24hr TD 24 hr patch   No No   Sig: Place 1 patch on the skin over 24 hours daily Apply a new patch every 24 hours to a clean, dry, hairless site on the upper arm or hip. Patient not taking: Reported on 9/7/2023      Facility-Administered Medications: None       Past Medical History:   Diagnosis Date   • BRCA1 positive    • BRCA2 positive    • Bundle branch block, left    • Cancer (HCC)     pancreatic   • Facial droop     r/t neck surgery   • History of chemotherapy     pancreatic cancer   • History of radiation therapy    • Hypercholesteremia    • Pancreatic carcinoma [facility])        Past Surgical History:   Procedure Laterality Date   • ABLATION SOFT TISSUE N/A 4/26/2021    Procedure: INTRAOPERATIVE ULTRASOUND, ABLATION,SOFT TISSUE PANCREAS;  Surgeon: Yao Guido MD;  [facility];  Service: Surgical Oncology   • CARDIAC CATHETERIZATION Left 9/5/2023    Procedure: Cardiac catheterization;  Surgeon: Andrew Cartagena MD;  Location: [address] [place] [address] CATH LAB;   Service: Cardiology   • CHOLECYSTECTOMY N/A 4/26/2021    Procedure: CHOLECYSTECTOMY;  Surgeon: Yao Guido MD;  [facility];  Service: Surgical Oncology   • FL GUIDED CENTRAL VENOUS ACCESS DEVICE INSERTION  6/2/2021   • HYSTERECTOMY     • LAPAROTOMY N/A 4/26/2021    Procedure: LAPAROTOMY EXPLORATORY;  Surgeon: Yao Guido MD;  [facility];  Service: Surgical Oncology   • OOPHORECTOMY     • SINUS SURGERY     • TUNNELED VENOUS PORT PLACEMENT Left 2021    Procedure: INSERTION VENOUS PORT (PORT-A-CATH); Surgeon: Kimberly Maradiaga MD;  [facility];  Service: Surgical Oncology   • US GUIDED THYROID BIOPSY  2022   • WHIPPLE PROCEDURE/PANCREATICO-DUODENECTOMY N/A 2021    Procedure: WHIPPLE PROCEDURE/PANCREATICO-DUODENECTOMY; PYLORIC PRESERVING;  Surgeon: Kimberly Maradiaga MD;  [facility];  Service: Surgical Oncology       Family History   Problem Relation Age of Onset   • Ulcerative colitis Mother    • Prostate cancer Father    • Melanoma Sister    • Heart disease Brother    • Prostate cancer Brother    • No Known Problems Brother    • No Known Problems Maternal Uncle    • No Known Problems Paternal Uncle      I have reviewed and agree with the history as documented. E-Cigarette/Vaping   • E-Cigarette Use Never User      E-Cigarette/Vaping Substances   • Nicotine No    • THC No    • CBD No    • Flavoring No    • Other No    • Unknown No      Social History     Tobacco Use   • Smoking status: Some Days     Packs/day: 0.25     Types: Cigarettes     Start date: 65     Last attempt to quit: 2021     Years since quittin.4   • Smokeless tobacco: Never   • Tobacco comments:     recently stop smoking , pt stated she smoke occ. 1-2 cigg some days 2022. Vaping Use   • Vaping Use: Never used   Substance Use Topics   • Alcohol use: Never   • Drug use: Never       Review of Systems   Constitutional: Positive for diaphoresis. Negative for activity change, appetite change, chills, fatigue, fever and unexpected weight change. HENT: Positive for congestion and sinus pressure. Negative for dental problem, drooling, ear discharge, ear pain, facial swelling, hearing loss, mouth sores, nosebleeds, postnasal drip, rhinorrhea, sinus pain, sneezing, sore throat, tinnitus, trouble swallowing and voice change. Eyes: Negative. Respiratory: Negative. Cardiovascular: Negative. Gastrointestinal: Negative.     Endocrine: Negative. Genitourinary: Negative. Musculoskeletal: Negative. Skin: Negative. Allergic/Immunologic: Negative. Neurological: Positive for dizziness. Negative for tremors, seizures, syncope, facial asymmetry, speech difficulty, weakness, light-headedness, numbness and headaches. Hematological: Negative. Psychiatric/Behavioral: Negative. All other systems reviewed and are negative. Physical Exam  Physical Exam  Vitals and nursing note reviewed. Constitutional:       General: She is not in acute distress. Appearance: She is well-developed. She is diaphoretic. Comments: Patient intermittently diaphoretic   HENT:      Head: Normocephalic and atraumatic. Right Ear: Ear canal and external ear normal.      Left Ear: Ear canal and external ear normal.      Ears:      Comments: Mild amount of fluid that is clear noted behind both TMs there is no erythema injection or bulging of the TM bilaterally     Nose: Nose normal.      Mouth/Throat:      Mouth: Mucous membranes are moist.      Pharynx: Oropharynx is clear. No oropharyngeal exudate. Eyes:      General: No scleral icterus. Right eye: No discharge. Left eye: No discharge. Conjunctiva/sclera: Conjunctivae normal.      Pupils: Pupils are equal, round, and reactive to light. Cardiovascular:      Rate and Rhythm: Normal rate and regular rhythm. Pulses: Normal pulses. Heart sounds: Normal heart sounds. No murmur heard. No friction rub. No gallop. Pulmonary:      Effort: Pulmonary effort is normal. No respiratory distress. Breath sounds: Normal breath sounds. No stridor. No wheezing, rhonchi or rales. Chest:      Chest wall: No tenderness. Abdominal:      General: Abdomen is flat. Bowel sounds are normal. There is no distension. Palpations: Abdomen is soft. There is no mass. Tenderness: There is no abdominal tenderness. There is no guarding or rebound.       Hernia: No hernia is present. Musculoskeletal:      Cervical back: Normal range of motion and neck supple. Right lower leg: No edema. Left lower leg: No edema. Lymphadenopathy:      Cervical: No cervical adenopathy. Skin:     General: Skin is warm. Capillary Refill: Capillary refill takes less than 2 seconds. Coloration: Skin is not pale. Findings: No erythema or rash. Neurological:      General: No focal deficit present. Mental Status: She is alert and oriented to person, place, and time. Mental status is at baseline. Psychiatric:         Mood and Affect: Mood normal.         Behavior: Behavior normal.         Thought Content: Thought content normal.         Judgment: Judgment normal.      Comments: Patient does not appear anxious         Vital Signs  ED Triage Vitals [09/30/23 0828]   Temperature Pulse Respirations Blood Pressure SpO2   98.1 °F (36.7 °C) 72 20 167/91 97 %      Temp Source Heart Rate Source Patient Position - Orthostatic VS BP Location FiO2 (%)   Oral Monitor Lying Right arm --      Pain Score       No Pain           Vitals:    09/30/23 0828   BP: 167/91   Pulse: 72   Patient Position - Orthostatic VS: Lying         Visual Acuity      ED Medications  Medications   albuterol (PROVENTIL HFA,VENTOLIN HFA) inhaler 2 puff (2 puffs Inhalation Given 9/30/23 0911)       Diagnostic Studies  Results Reviewed     None                 No orders to display              Procedures  Procedures         ED Course                               SBIRT 22yo+    Flowsheet Row Most Recent Value   Initial Alcohol Screen: US AUDIT-C     1. How often do you have a drink containing alcohol? 0 Filed at: 09/30/2023 0828   3a. Male UNDER 65: How often do you have five or more drinks on one occasion? 0 Filed at: 09/30/2023 0828   3b. FEMALE Any Age, or MALE 65+: How often do you have 4 or more drinks on one occassion?  0 Filed at: 09/30/2023 0828   Audit-C Score 0 Filed at: 09/30/2023 8349   YANE: How many times in the past year have you. .. Used an illegal drug or used a prescription medication for non-medical reasons? Never Filed at: 09/30/2023 7180                    Medical Decision Making  Patient states that she has had this before in the past and has felt better with albuterol. Patient has clear breath sounds in all lung fields. Reviewed previous lab work and imaging as well as cardiac catheterization that was done earlier this month. Patient taking her medications as prescribed. States that she has had issues with sweating intermittently since she had chemoradiation. Patient deferred any type of lab work or imaging studies at this point in time, she relays that she is sick of being tested however is agreeable to albuterol and Flonase. She does have upcoming appointments. Gave patient strict return precautions for worsening. Patient verbalized understanding and agrees with the above assessment and plan. Acute effusion of both middle ears: acute illness or injury  Risk  Prescription drug management. Disposition  Final diagnoses:   Acute effusion of both middle ears     Time reflects when diagnosis was documented in both MDM as applicable and the Disposition within this note     Time User Action Codes Description Comment    9/30/2023  8:58 AM Caro Dubon Add [I32.971] Acute effusion of both middle ears       ED Disposition     ED Disposition   Discharge    Condition   Stable    Date/Time   Sat Sep 30, 2023  8:58 AM    Comment   [place] discharge to home/self care.                Follow-up Information     Follow up With Specialties Details Why Contact Info Additional [address] [address] [facility] Emergency Department Emergency Medicine Go to  If symptoms worsen, otherwise please follow up with your family doctor, pulmonologist etc [address] [address]  [address] [facility], [address] [place] [address] [address], [place], [id number]          Patient's Medications   Discharge Prescriptions    FLUTICASONE (FLONASE) 50 MCG/ACT NASAL SPRAY    2 sprays into each nostril daily for 3 days       Start Date: 9/30/2023 End Date: 10/3/2023       Order Dose: 2 sprays       Quantity: 16 g    Refills: 0       No discharge procedures on file.     PDMP Review       Value Time User    PDMP Reviewed  Yes 5/3/2021 10:12 AM Sherrell Galvan PA-C          ED Provider  Electronically Signed by           Imani Carpenter PA-C  09/30/23 7398

## 2023-10-03 RX ORDER — CEFADROXIL 500 MG/1
CAPSULE ORAL
COMMUNITY
Start: 2023-09-08

## 2023-10-03 NOTE — PROGRESS NOTES
Pulmonary Outpatient Consultation Note   Kerry Nielson 76 y.o. female MRN: [id number]  10/4/2023    Referring provider:   Johny Brunner, Do  [address],  [address] [place] [address]     Reason for Consultation: Self-referred      No problem-specific Assessment & Plan notes found for this encounter. Assessment:    1. Shortness of breath usually during exertion, she states that going on for the past month and feels anxious when it happens. She complains that her shortness of breath usually occurs when she is vacuuming or doing household chores, her symptoms do not occur outside of the house. No nocturnal symptoms. She keeps going to the emergency room with a due CT with contrast and which have been unremarkable. This is most likely reactive airway disease versus small airway disease or asthma component, this is less likely related to her chemotherapy as he has been off treatment for 2 years, radiation also lower on the differential.  At this point this would prompt an asthma work-up including blood work, PFTs and starting empiric ICS/LABA  2. Active smoker, she was counseled extensively on smoking cessation given her history of cancers. She is now down to smoking 2 cigarettes a day, she is aware of the risk and benefits of smoking. 3. Nonobstructive CAD on aspirin Cozaar hydrochlorothiazide  4. History of stage IIIa adenocarcinoma of the pancreas status post Whipple followed by adjuvant chemotherapy and 8 cycles of 5-FU along with radiation  5. History of hysterectomy oophorectomy with BRCA2 positive mutation    Plan:    1. PFTs with bronchodilator challenge  2. IgE, RAST panel, CBC with differential  3. Empiric ICS/LABA, unsure with covered by her insurance but start with Advair 250  4. Advised to not smoke  5.  Follow-up in 3 months    History of Present Illness   HPI:    80-year-old female with a past medical history of active smoker, nonobstructive CAD on aspirin, Cozaar, hydrochlorothiazide, Lipitor, follows with cardiology, history of stage IIIA Adenocarcinoma with BRCA 2 mutation, S/P Jinipple in April 2021, followed by Adjuvant chemotherapy for 8 cycles of 5FU based chemotherapy followed by concurrent chemoradiation, history of hysterectomy and oophorectomy. Patient here complaining of shortness of breath for the past 1 month, she feels it is anxiety component to it. She has no family history of lung disease, her son does have asthma, she has a dog at home. She smokes since age of 21 up to 1 pack/day but now down to 2 cigarettes. She used to work at [employer] with [employer] animals and denies any significant chemical exposure. Regarding her shortness of breath usually occurs when she is vacuuming, or working with dust.  She does not hear herself wheeze, she primarily states that shortness of breath but no orthopnea, PND, lightheadedness or diaphoresis. She feels this is primarily anxiety driven. She is trying to get in with a local anxiety clinic but having difficulty due to scheduling    No PFT on file    Cardiac cath September 2023 with nonobstructive CAD    Echocardiogram September 2023 50 to 55%, paradoxical septal motion due to LBBB, TRV 2.7    CT chest with contrast September 2023 peripheral groundglass at the bases, no reticular pattern or dense infiltrates  Chest x-ray September 23 lower lobe infiltrate versus atelectasis  CT chest with contrast September 2023 pneumatocele versus cystic changes, trace evidence of groundglass in the lower lung zones, no mediastinal lymphadenopathy  CT chest September 23 trace groundglass in the lower lung zones  CT chest with contrast August 2023 emphysematous changes, diffuse, trace atelectasis versus groundglass at the base  CT chest August 25 3 emphysematous changes, trace bilateral lower lungs, no mediastinal lymphadenopathy    Labs  Peripheral eosinophilia 0.12    Review of Systems   Constitutional: Negative. HENT: Negative. Eyes: Negative. Respiratory: Positive for shortness of breath. Cardiovascular: Negative. Gastrointestinal: Negative. Endocrine: Negative. Genitourinary: Negative. Musculoskeletal: Negative. Skin: Negative. Neurological: Negative. Hematological: Negative. Psychiatric/Behavioral: Negative. Historical Information   Past Medical History:   Diagnosis Date   • BRCA1 positive    • BRCA2 positive    • Bundle branch block, left    • Cancer ([address])     pancreatic   • Facial droop     r/t neck surgery   • History of chemotherapy     pancreatic cancer   • History of radiation therapy    • Hypercholesteremia    • Pancreatic carcinoma [facility])      Past Surgical History:   Procedure Laterality Date   • ABLATION SOFT TISSUE N/A 4/26/2021    Procedure: INTRAOPERATIVE ULTRASOUND, ABLATION,SOFT TISSUE PANCREAS;  Surgeon: Clara Mtz MD;  [facility];  Service: Surgical Oncology   • CARDIAC CATHETERIZATION Left 9/5/2023    Procedure: Cardiac catheterization;  Surgeon: Lalita Lange MD;  Location: [address] [place] [address] CATH LAB; Service: Cardiology   • CHOLECYSTECTOMY N/A 4/26/2021    Procedure: CHOLECYSTECTOMY;  Surgeon: Clara Mtz MD;  [facility];  Service: Surgical Oncology   • FL GUIDED CENTRAL VENOUS ACCESS DEVICE INSERTION  6/2/2021   • HYSTERECTOMY     • LAPAROTOMY N/A 4/26/2021    Procedure: LAPAROTOMY EXPLORATORY;  Surgeon: Clara Mtz MD;  [facility];  Service: Surgical Oncology   • OOPHORECTOMY     • SINUS SURGERY     • TUNNELED VENOUS PORT PLACEMENT Left 6/2/2021    Procedure: INSERTION VENOUS PORT (PORT-A-CATH); Surgeon: Clara Mtz MD;  [facility];  Service: Surgical Oncology   • US GUIDED THYROID BIOPSY  7/13/2022   • WHIPPLE PROCEDURE/PANCREATICO-DUODENECTOMY N/A 4/26/2021    Procedure:  WHIPPLE PROCEDURE/PANCREATICO-DUODENECTOMY; PYLORIC PRESERVING;  Surgeon: Clara Mtz MD;  [facility];  Service: Surgical Oncology     Family History   Problem Relation Age of Onset   • Ulcerative colitis Mother    • Prostate cancer Father    • Melanoma Sister    • Heart disease Brother    • Prostate cancer Brother    • No Known Problems Brother    • No Known Problems Maternal Uncle    • No Known Problems Paternal Uncle        Occupational History: Retired, used to work at [employer] working with [employer] animals    Social History     Tobacco Use   Smoking Status Some Days   • Packs/day: 0.25   • Years: 10.00   • Total pack years: 2.50   • Types: Cigarettes   • Start date:    • Last attempt to quit: 2021   • Years since quittin.5   Smokeless Tobacco Never   Tobacco Comments    recently stop smoking , pt stated she smoke occ. 1-2 cigg some days 2022.        Meds/Allergies     Current Outpatient Medications:   •  acetaminophen (TYLENOL) 500 mg tablet, Take 1,000 mg by mouth, Disp: , Rfl:   •  albuterol (ProAir HFA) 90 mcg/act inhaler, Inhale 2 puffs every 6 (six) hours as needed for wheezing, Disp: 8.5 g, Rfl: 0  •  aspirin 81 mg chewable tablet, Chew 1 tablet (81 mg total) daily, Disp: 30 tablet, Rfl: 0  •  atorvastatin (LIPITOR) 20 mg tablet, Take 1 tablet (20 mg total) by mouth every evening, Disp: 30 tablet, Rfl: 0  •  buPROPion (Wellbutrin SR) 100 mg 12 hr tablet, Take 1 tablet (100 mg total) by mouth 2 (two) times a day, Disp: 60 tablet, Rfl: 0  •  losartan-hydrochlorothiazide (HYZAAR) 50-12.5 mg per tablet, Take 1 tablet by mouth daily, Disp: 30 tablet, Rfl: 2  •  metoprolol tartrate (LOPRESSOR) 25 mg tablet, Take 25 mg by mouth 2 (two) times a day, Disp: , Rfl:   •  Multiple Vitamin (multivitamin) tablet, Take 1 tablet by mouth daily, Disp: , Rfl:   •  ALPRAZolam (XANAX) 0.25 mg tablet, Take 1 tablet (0.25 mg total) by mouth 3 (three) times a day as needed for anxiety for up to 10 days, Disp: 15 tablet, Rfl: 0  •  cefadroxil (DURICEF) 500 mg capsule, TAKE 1 CAPSULE (500 MG) BY ORAL ROUTE 2 TIMES PER DAY FOR 10 DAYS (Patient not taking: Reported on 10/4/2023), Disp: , Rfl:   •  fluticasone (FLONASE) 50 mcg/act nasal spray, 2 sprays into each nostril daily for 3 days, Disp: 16 g, Rfl: 0  •  Medical ID Plate MISC, Use once for 1 dose Severely and permanently limited in the ability to walk because of an arthritic, neurological, or orthopedic condition: or cannot walk two hundred feet without stopping to rest and meets requirements for disability license plate, Disp: 1 each, Rfl: 0  •  nicotine (NICODERM CQ) 7 mg/24hr TD 24 hr patch, Place 1 patch on the skin over 24 hours daily Apply a new patch every 24 hours to a clean, dry, hairless site on the upper arm or hip. (Patient not taking: Reported on 9/7/2023), Disp: 28 patch, Rfl: 0  Allergies   Allergen Reactions   • Penicillins Rash   • Tetracycline Rash       Vitals: There were no vitals taken for this visit. , There is no height or weight on file to calculate BMI. Physical Exam:    GEN: alert and oriented x 3, pleasant and cooperative   HEENT:  Normocephalic, atraumatic, anicteric  NECK: No JVD or carotid bruits   HEART: Rate, normal S1 and S2  LUNGS: Clear to auscultation bilaterally; no wheezes, rales, or rhonchi; respiration nonlabored   ABDOMEN:  Normoactive bowel sounds, soft, no tenderness, no distention  EXTREMITIES: peripheral pulses palpable; no edema  NEURO: no gross focal findings; cranial nerves grossly intact   SKIN:  Dry, intact, warm to touch    Labs: I have personally reviewed pertinent lab results. No results found for: "IGE"    Imaging and other studies: I have personally reviewed pertinent films in PACS    Pulmonary function testing:  Personally interpreted by me    Other Studies: I have personally reviewed pertinent reports.       Saurav Saleem MD  Pulmonary and Critical Care Medicine

## 2023-10-04 ENCOUNTER — CONSULT (OUTPATIENT)
Dept: PULMONOLOGY | Facility: MEDICAL CENTER | Age: 68
End: 2023-10-04
Payer: MEDICARE

## 2023-10-04 DIAGNOSIS — J45.20 MILD INTERMITTENT ASTHMA, UNSPECIFIED WHETHER COMPLICATED: Primary | ICD-10-CM

## 2023-10-04 PROCEDURE — 99205 OFFICE O/P NEW HI 60 MIN: CPT | Performed by: STUDENT IN AN ORGANIZED HEALTH CARE EDUCATION/TRAINING PROGRAM

## 2023-10-04 RX ORDER — FLUTICASONE PROPIONATE AND SALMETEROL 250; 50 UG/1; UG/1
1 POWDER RESPIRATORY (INHALATION) 2 TIMES DAILY
Qty: 60 BLISTER | Refills: 2 | Status: SHIPPED | OUTPATIENT
Start: 2023-10-04 | End: 2024-01-02

## 2023-10-04 NOTE — PATIENT INSTRUCTIONS
It was a pleasure seeing you today!     Obtain PFT  Start Advair - call us if its too expensive   Obtain blood work whenever  See me 3-4 months     Riddhi Valencia MD  Pulmonary and [address]

## 2023-10-05 ENCOUNTER — OFFICE VISIT (OUTPATIENT)
Dept: FAMILY MEDICINE CLINIC | Facility: CLINIC | Age: 68
End: 2023-10-05
Payer: MEDICARE

## 2023-10-05 VITALS
SYSTOLIC BLOOD PRESSURE: 167 MMHG | OXYGEN SATURATION: 99 % | WEIGHT: 170.3 LBS | HEIGHT: 62 IN | BODY MASS INDEX: 31.34 KG/M2 | DIASTOLIC BLOOD PRESSURE: 79 MMHG | RESPIRATION RATE: 16 BRPM | HEART RATE: 56 BPM

## 2023-10-05 DIAGNOSIS — F41.9 ANXIETY: Chronic | ICD-10-CM

## 2023-10-05 DIAGNOSIS — J45.909 REACTIVE AIRWAY DISEASE WITHOUT COMPLICATION, UNSPECIFIED ASTHMA SEVERITY, UNSPECIFIED WHETHER PERSISTENT: Chronic | ICD-10-CM

## 2023-10-05 DIAGNOSIS — F17.200 SMOKING: ICD-10-CM

## 2023-10-05 DIAGNOSIS — R06.02 SOB (SHORTNESS OF BREATH): Primary | ICD-10-CM

## 2023-10-05 PROCEDURE — 99214 OFFICE O/P EST MOD 30 MIN: CPT | Performed by: FAMILY MEDICINE

## 2023-10-05 RX ORDER — IPRATROPIUM BROMIDE AND ALBUTEROL SULFATE 2.5; .5 MG/3ML; MG/3ML
3 SOLUTION RESPIRATORY (INHALATION) 4 TIMES DAILY
Qty: 60 ML | Refills: 1 | Status: SHIPPED | OUTPATIENT
Start: 2023-10-05

## 2023-10-05 RX ORDER — SERTRALINE HYDROCHLORIDE 25 MG/1
25 TABLET, FILM COATED ORAL DAILY
Qty: 30 TABLET | Refills: 5 | Status: SHIPPED | OUTPATIENT
Start: 2023-10-05 | End: 2024-04-02

## 2023-10-05 RX ORDER — BUPROPION HYDROCHLORIDE 150 MG/1
150 TABLET, EXTENDED RELEASE ORAL 2 TIMES DAILY
Qty: 60 TABLET | Refills: 1 | Status: SHIPPED | OUTPATIENT
Start: 2023-10-05 | End: 2023-11-04

## 2023-10-05 RX ORDER — ALBUTEROL SULFATE 90 UG/1
2 AEROSOL, METERED RESPIRATORY (INHALATION) EVERY 6 HOURS PRN
Qty: 8.5 G | Refills: 0 | Status: SHIPPED | OUTPATIENT
Start: 2023-10-05

## 2023-10-05 RX ORDER — ALBUTEROL SULFATE 2.5 MG/3ML
2.5 SOLUTION RESPIRATORY (INHALATION) EVERY 6 HOURS PRN
Qty: 30 ML | Refills: 1 | Status: SHIPPED | OUTPATIENT
Start: 2023-10-05

## 2023-10-05 NOTE — ASSESSMENT & PLAN NOTE
Chronic, suspect may be secondary to combination of underlying emphysema, reactive airway disease, allergies, anxiety. Patient already follows closely with cardiology and pulmonology. Plan:  -Pending PFT  -Continue Advair 2 puffs daily  -Continue albuterol as needed for wheezing and SOB  -Continue saline spray and Flonase  -START albuterol nebulizer as needed for wheezing and SOB  -START Duoneb nebulizer as needed for wheezing and SOB  -START OTC antihistamine such as Claritin.  Advised patient to avoid benadryl

## 2023-10-05 NOTE — ASSESSMENT & PLAN NOTE
Noted per pulmonology based on symptom profile.     Plan:  -Pending PFT  -Continue Advair 2 puffs daily  -Continue albuterol as needed for wheezing and SOB  -Continue saline spray and Flonase  -START albuterol nebulizer as needed for wheezing and SOB  -START Duoneb nebulizer as needed for wheezing and SOB

## 2023-10-05 NOTE — ASSESSMENT & PLAN NOTE
Counseled on smoking cessation, pt is willing to quit.      Plan:  -INCREASE Wellbutrin to 150 mg BID  -START nicotine patches   -Encouraged patient to join support groups and change lifestyle habits to support cessation  -Pt refused smoking cessation support via [facility]

## 2023-10-05 NOTE — ASSESSMENT & PLAN NOTE
Chronic, stable. Patient reports it started after chemo and radiation from pancreatic cancer. She reports she has previously tried SSRIs and Wellbutrin.     Plan:  -START sertraline 25 mg once daily  -INCREASE Wellbutrin to 150 mg twice daily  -Patient deferred therapy and counseling at this time

## 2023-10-05 NOTE — PROGRESS NOTES
Name: Cecy Abreu      : 1955      MRN: [id number]  Encounter Provider: Nadine Schuster DO  Encounter Date: 10/5/2023   Encounter department: [address] [place] [address]    Assessment & Plan     1. SOB (shortness of breath)  Assessment & Plan:  Chronic, suspect may be secondary to combination of underlying emphysema, reactive airway disease, allergies, anxiety. Patient already follows closely with cardiology and pulmonology. Plan:  -Pending PFT  -Continue Advair 2 puffs daily  -Continue albuterol as needed for wheezing and SOB  -Continue saline spray and Flonase  -START albuterol nebulizer as needed for wheezing and SOB  -START Duoneb nebulizer as needed for wheezing and SOB  -START OTC antihistamine such as Claritin. Advised patient to avoid benadryl    Orders:  -     albuterol (2.5 mg/3 mL) 0.083 % nebulizer solution; Take 3 mL (2.5 mg total) by nebulization every 6 (six) hours as needed for wheezing or shortness of breath  -     ipratropium-albuterol (DUO-NEB) 0.5-2.5 mg/3 mL nebulizer solution; Take 3 mL by nebulization 4 (four) times a day  -     albuterol (ProAir HFA) 90 mcg/act inhaler; Inhale 2 puffs every 6 (six) hours as needed for wheezing  -     Spacer Device for Inhaler    2. Reactive airway disease without complication, unspecified asthma severity, unspecified whether persistent  Assessment & Plan:  Noted per pulmonology based on symptom profile. Plan:  -Pending PFT  -Continue Advair 2 puffs daily  -Continue albuterol as needed for wheezing and SOB  -Continue saline spray and Flonase  -START albuterol nebulizer as needed for wheezing and SOB  -START Duoneb nebulizer as needed for wheezing and SOB      3. Anxiety  Assessment & Plan:  Chronic, stable. Patient reports it started after chemo and radiation from pancreatic cancer. She reports she has previously tried SSRIs and Wellbutrin.     Plan:  -START sertraline 25 mg once daily  -INCREASE Wellbutrin to 150 mg twice daily  -Patient deferred therapy and counseling at this time    Orders:  -     buPROPion (Wellbutrin SR) 150 mg 12 hr tablet; Take 1 tablet (150 mg total) by mouth 2 (two) times a day  -     Ambulatory Referral to Social Work Care Management Program; Future  -     sertraline (ZOLOFT) 25 mg tablet; Take 1 tablet (25 mg total) by mouth daily    4. Smoking  Assessment & Plan:  Counseled on smoking cessation, pt is willing to quit. Plan:  -INCREASE Wellbutrin to 150 mg BID  -START nicotine patches   -Encouraged patient to join support groups and change lifestyle habits to support cessation  -Pt refused smoking cessation support via [facility]'s    Orders:  -     nicotine (NICODERM CQ) 7 mg/24hr TD 24 hr patch; Place 1 patch on the skin over 24 hours daily Apply a new patch every 24 hours to a clean, dry, hairless site on the upper arm or hip.  -     buPROPion (Wellbutrin SR) 150 mg 12 hr tablet; Take 1 tablet (150 mg total) by mouth 2 (two) times a day         Subjective      Patient is a 69-year-old female presents to the office for follow-up of shortness of breath. Patient was recently seen and evaluated by pulmonology who believes that she may have reactive airway disease, patient was started on Advair inhaler, pending PFTs. Patient was seen and evaluated by cardiology, currently has implantable Arctic monitor that was inserted on 9/8. Patient was admitted to the hospital on 9/3 for right bundle branch block, on 9/5 patient had a cardiac cath that revealed mild nonobstructive CAD and EF of 55 to 60%. However, patient believes that it is secondary to her anxiety, she feels like she cannot catch her breath when she is nervous. She was placed on Wellbutrin 100 mg twice daily previously to help with anxiety and encourage smoking cessation. She notes that it is "not working", as she reports that she has still has worsening anxiety and urge to smoke. Patient still smokes 1 to 2 cigarettes/day, she is ready to quit. She has tried abstinence, would like to retry nicotine patches. She additionally notes that she has been having ear fullness, congestion, and other allergy symptoms. She is still using her Flonase and saline sprays. Has not tried over-the-counter antihistamines previously. Denies fevers, chills, headache, dizziness, weakness, chest pain. no other acute complaints. Review of Systems   Constitutional: Negative for chills and fever. HENT: Positive for congestion, ear pain and postnasal drip. Negative for sinus pressure and sore throat. Respiratory: Positive for shortness of breath. Negative for cough and chest tightness. Cardiovascular: Negative for chest pain, palpitations and leg swelling. Gastrointestinal: Negative for abdominal pain, constipation, diarrhea, nausea and vomiting. Musculoskeletal: Negative for arthralgias and back pain. Skin: Negative for color change and rash. Neurological: Negative for dizziness, light-headedness and headaches. Psychiatric/Behavioral: The patient is nervous/anxious. All other systems reviewed and are negative. Current Outpatient Medications on File Prior to Visit   Medication Sig   • acetaminophen (TYLENOL) 500 mg tablet Take 1,000 mg by mouth   • aspirin 81 mg chewable tablet Chew 1 tablet (81 mg total) daily   • atorvastatin (LIPITOR) 20 mg tablet Take 1 tablet (20 mg total) by mouth every evening   • Fluticasone-Salmeterol (Advair Diskus) 250-50 mcg/dose inhaler Inhale 1 puff 2 (two) times a day Rinse mouth after use.    • losartan-hydrochlorothiazide (HYZAAR) 50-12.5 mg per tablet Take 1 tablet by mouth daily   • metoprolol tartrate (LOPRESSOR) 25 mg tablet Take 25 mg by mouth 2 (two) times a day   • Multiple Vitamin (multivitamin) tablet Take 1 tablet by mouth daily   • [DISCONTINUED] albuterol (ProAir HFA) 90 mcg/act inhaler Inhale 2 puffs every 6 (six) hours as needed for wheezing   • [DISCONTINUED] buPROPion (Wellbutrin SR) 100 mg 12 hr tablet Take 1 tablet (100 mg total) by mouth 2 (two) times a day   • ALPRAZolam (XANAX) 0.25 mg tablet Take 1 tablet (0.25 mg total) by mouth 3 (three) times a day as needed for anxiety for up to 10 days   • cefadroxil (DURICEF) 500 mg capsule    • fluticasone (FLONASE) 50 mcg/act nasal spray 2 sprays into each nostril daily for 3 days   • Medical ID Plate MISC Use once for 1 dose Severely and permanently limited in the ability to walk because of an arthritic, neurological, or orthopedic condition: or cannot walk two hundred feet without stopping to rest and meets requirements for disability license plate   • [DISCONTINUED] nicotine (NICODERM CQ) 7 mg/24hr TD 24 hr patch Place 1 patch on the skin over 24 hours daily Apply a new patch every 24 hours to a clean, dry, hairless site on the upper arm or hip. (Patient not taking: Reported on 9/7/2023)       Objective     /79 (BP Location: Left arm, Patient Position: Sitting, Cuff Size: Large)   Pulse 56   Resp 16   Ht 5' 2" (1.575 m)   Wt 77.2 kg (170 lb 4.8 oz)   SpO2 99%   BMI 31.15 kg/m²     Physical Exam  Constitutional:       General: She is not in acute distress. HENT:      Head: Normocephalic and atraumatic. Mouth/Throat:      Mouth: Mucous membranes are moist.   Eyes:      General: No scleral icterus. Extraocular Movements: Extraocular movements intact. Conjunctiva/sclera: Conjunctivae normal.      Pupils: Pupils are equal, round, and reactive to light. Cardiovascular:      Rate and Rhythm: Normal rate and regular rhythm. Heart sounds: Normal heart sounds. No murmur heard. Pulmonary:      Effort: Pulmonary effort is normal. No respiratory distress. Breath sounds: Normal breath sounds. No wheezing, rhonchi or rales. Abdominal:      General: Bowel sounds are normal.      Palpations: Abdomen is soft. There is no mass. Tenderness: There is no abdominal tenderness.    Musculoskeletal:      Cervical back: Normal range of motion. Right lower leg: No edema. Left lower leg: No edema. Skin:     General: Skin is warm. Neurological:      Mental Status: She is alert and oriented to person, place, and time. Psychiatric:         Mood and Affect: Mood is anxious.          Behavior: Behavior normal.       Gary Rizo,

## 2023-10-06 ENCOUNTER — LAB (OUTPATIENT)
Dept: LAB | Facility: CLINIC | Age: 68
End: 2023-10-06
Payer: MEDICARE

## 2023-10-06 ENCOUNTER — PATIENT OUTREACH (OUTPATIENT)
Dept: FAMILY MEDICINE CLINIC | Facility: CLINIC | Age: 68
End: 2023-10-06

## 2023-10-06 DIAGNOSIS — J45.20 MILD INTERMITTENT ASTHMA, UNSPECIFIED WHETHER COMPLICATED: ICD-10-CM

## 2023-10-06 LAB
BASOPHILS # BLD AUTO: 0.05 THOUSANDS/ÂΜL (ref 0–0.1)
BASOPHILS NFR BLD AUTO: 1 % (ref 0–1)
DME PARACHUTE DELIVERY DATE ACTUAL: NORMAL
DME PARACHUTE DELIVERY DATE EXPECTED: NORMAL
DME PARACHUTE DELIVERY DATE REQUESTED: NORMAL
DME PARACHUTE ITEM DESCRIPTION: NORMAL
DME PARACHUTE ORDER STATUS: NORMAL
DME PARACHUTE SUPPLIER NAME: NORMAL
DME PARACHUTE SUPPLIER PHONE: NORMAL
EOSINOPHIL # BLD AUTO: 0.1 THOUSAND/ÂΜL (ref 0–0.61)
EOSINOPHIL NFR BLD AUTO: 1 % (ref 0–6)
ERYTHROCYTE [DISTWIDTH] IN BLOOD BY AUTOMATED COUNT: 12.6 % (ref 11.6–15.1)
HCT VFR BLD AUTO: 40.1 % (ref 34.8–46.1)
HGB BLD-MCNC: 13.5 G/DL (ref 11.5–15.4)
IMM GRANULOCYTES # BLD AUTO: 0.03 THOUSAND/UL (ref 0–0.2)
IMM GRANULOCYTES NFR BLD AUTO: 0 % (ref 0–2)
LYMPHOCYTES # BLD AUTO: 1.88 THOUSANDS/ÂΜL (ref 0.6–4.47)
LYMPHOCYTES NFR BLD AUTO: 21 % (ref 14–44)
MCH RBC QN AUTO: 31.3 PG (ref 26.8–34.3)
MCHC RBC AUTO-ENTMCNC: 33.7 G/DL (ref 31.4–37.4)
MCV RBC AUTO: 93 FL (ref 82–98)
MONOCYTES # BLD AUTO: 0.55 THOUSAND/ÂΜL (ref 0.17–1.22)
MONOCYTES NFR BLD AUTO: 6 % (ref 4–12)
NEUTROPHILS # BLD AUTO: 6.3 THOUSANDS/ÂΜL (ref 1.85–7.62)
NEUTS SEG NFR BLD AUTO: 71 % (ref 43–75)
NRBC BLD AUTO-RTO: 0 /100 WBCS
PLATELET # BLD AUTO: 300 THOUSANDS/UL (ref 149–390)
PMV BLD AUTO: 9.8 FL (ref 8.9–12.7)
RBC # BLD AUTO: 4.31 MILLION/UL (ref 3.81–5.12)
WBC # BLD AUTO: 8.91 THOUSAND/UL (ref 4.31–10.16)

## 2023-10-06 PROCEDURE — 36415 COLL VENOUS BLD VENIPUNCTURE: CPT

## 2023-10-06 PROCEDURE — 82785 ASSAY OF IGE: CPT

## 2023-10-06 PROCEDURE — 85025 COMPLETE CBC W/AUTO DIFF WBC: CPT

## 2023-10-06 PROCEDURE — 86003 ALLG SPEC IGE CRUDE XTRC EA: CPT

## 2023-10-06 NOTE — PROGRESS NOTES
[facility] had received a referral from Janae Ferrer DO r/t [id number] [facility]. [facility] had completed a chart review. Per chart, patient has dx of anxiety. Per chart, patient reported it started after chemo and radiation from pancreatic cancer. Per chart, started on Sertraline 25mg. Per chart, Wellbutrin increased to 150mg. Per chart, patient advised about counseling. [facility] had called the patient via phone. [facility] introduced herself and reason for consult. [facility] asked patient how she was doing. Patient reported she is doing well. Patient reported she is on waitlist for [facility] [employer] [phone]. Patient stated she changed her insurance plan to [facility] Advantage plan which will cover more. Patient denied any medication cost issues at this time. Patient stated her new plan will help with this. [facility] had discussed counseling services. Patient stated she only wants to do counseling. Patient denied any psychiatric services at this time. [facility] discussed the following based on insurance:    · [facility] [phone]  · Jalen Real [facility] [phone]  · Kay Renteria, [facility], [facility] [phone]    Patient took down contact information for the above places. Patient stated she would call and try to set up services or place herself on waitlist.    Patient stated she lives with son. Patient stated her daughter and son are her main support. Patient stated her income is SSI. Patient stated her son works as well. Patient stated son drives so has to set up appointments in the AM. [facility] discussed other transportation options available to her such as [employer] and volunteer transportation through the [facility]. Patient denied any caregiver services at this time. Patient denied any food insecurities at this time. Patient denied any housing concerns at this time. Patient stated she is on waitlist for [address]. Patient denied any other needs a this time.     [facility] provided her contact information. SW advised patient reach out as needed. Patient understood. Patient denied any continued SW outreach at this time. [facility] closed referral. Please reconsult.

## 2023-10-09 ENCOUNTER — TELEPHONE (OUTPATIENT)
Dept: FAMILY MEDICINE CLINIC | Facility: CLINIC | Age: 68
End: 2023-10-09

## 2023-10-09 LAB
A ALTERNATA IGE QN: 0.26 KUA/I
A FUMIGATUS IGE QN: <0.1 KUA/I
BERMUDA GRASS IGE QN: 0.4 KUA/I
BOXELDER IGE QN: 0.34 KUA/I
C HERBARUM IGE QN: <0.1 KUA/I
CAT DANDER IGE QN: 0.96 KUA/I
CMN PIGWEED IGE QN: 0.34 KUA/I
COMMON RAGWEED IGE QN: 0.58 KUA/I
COTTONWOOD IGE QN: 0.24 KUA/I
D FARINAE IGE QN: <0.1 KUA/I
D PTERONYSS IGE QN: <0.1 KUA/I
DOG DANDER IGE QN: 0.23 KUA/I
LONDON PLANE IGE QN: 0.2 KUA/I
MOUSE URINE PROT IGE QN: <0.1 KUA/I
MT JUNIPER IGE QN: <0.1 KUA/I
MUGWORT IGE QN: <0.1 KUA/I
P NOTATUM IGE QN: <0.1 KUA/I
ROACH IGE QN: <0.1 KUA/I
SHEEP SORREL IGE QN: 0.52 KUA/I
SILVER BIRCH IGE QN: 0.82 KUA/I
TIMOTHY IGE QN: 0.25 KUA/I
TOTAL IGE SMQN RAST: 56.1 KU/L (ref 0–113)
WALNUT IGE QN: 0.61 KUA/I
WHITE ASH IGE QN: 0.66 KUA/I
WHITE ELM IGE QN: 0.32 KUA/I
WHITE MULBERRY IGE QN: <0.1 KUA/I
WHITE OAK IGE QN: 0.63 KUA/I

## 2023-10-09 NOTE — TELEPHONE ENCOUNTER
VM on appt line:    Yes, Chen Chawla. I have an appointment this today with [facility] at 11:30. My birthday is 65. I'd like to reschedule please because I woke up this morning and I don't feel that great. Thank you. Have a great day, [phone]. Hi, yes, this is [facility] calling. My birthday is August 9th, 1955. I have an appointment at 11:30 with [facility], but I'm going to have to cancel for today. I don't feel that great If you could please give me a call back at [phone]. Thank you. I'd appreciate it. Spoke with pt - no sooner appt than the one she has scheduled for 11/20/2023. Canceled appt for today 10/09/2023.

## 2023-10-27 ENCOUNTER — OFFICE VISIT (OUTPATIENT)
Dept: FAMILY MEDICINE CLINIC | Facility: CLINIC | Age: 68
End: 2023-10-27
Payer: COMMERCIAL

## 2023-10-27 VITALS
OXYGEN SATURATION: 94 % | BODY MASS INDEX: 31.28 KG/M2 | HEART RATE: 82 BPM | DIASTOLIC BLOOD PRESSURE: 72 MMHG | SYSTOLIC BLOOD PRESSURE: 150 MMHG | WEIGHT: 170 LBS | HEIGHT: 62 IN

## 2023-10-27 DIAGNOSIS — F41.9 ANXIETY: Chronic | ICD-10-CM

## 2023-10-27 DIAGNOSIS — F17.200 SMOKING: ICD-10-CM

## 2023-10-27 DIAGNOSIS — R61 GENERALIZED HYPERHIDROSIS: Primary | ICD-10-CM

## 2023-10-27 DIAGNOSIS — E04.2 MULTIPLE THYROID NODULES: ICD-10-CM

## 2023-10-27 DIAGNOSIS — Z91.89 INCREASED RISK OF BREAST CANCER: ICD-10-CM

## 2023-10-27 PROCEDURE — 99214 OFFICE O/P EST MOD 30 MIN: CPT | Performed by: FAMILY MEDICINE

## 2023-10-27 RX ORDER — BUPROPION HYDROCHLORIDE 100 MG/1
100 TABLET ORAL 2 TIMES DAILY
Qty: 40 TABLET | Refills: 0 | Status: SHIPPED | OUTPATIENT
Start: 2023-10-27 | End: 2023-11-16

## 2023-10-27 NOTE — ASSESSMENT & PLAN NOTE
Pt has multiple thyroid nodules. She had ultrasound of the thyroid on 8/23, showing stable thyroid nodules. Pt reports recent increase in swearing, and generalized body warmth since last couple weeks.  Denies palpation, chest pain, shortness of breathe    Order TSH w/reflex to T4  Educated pt to monitor her heart rate  Avoid spicy food and avoid alcohol  Keep room temperature cool

## 2023-10-27 NOTE — ASSESSMENT & PLAN NOTE
Chronic, stable  Pt complains about increased sweating. She states her anxiety is well controlled and she does not feel that the increase in sweating is the cause of her anxiety. Pt's Wellbutrin was recently increased to 150mg on 10/4.      Continue sertraline 25 mg QD  Discontinue Wellbutrin 150mg BID and restart the previous dose (Wellbutrin 100mg BID)  Recommended pt to closely monitor her increase sweating and check if it comes on while she is anxious

## 2023-10-27 NOTE — PROGRESS NOTES
Name: Abelardo Cuellar      : 1955      MRN: [id number]  Encounter Provider: Drew Sapp MD  Encounter Date: 10/27/2023   Encounter department: [address] [employer]     1. Generalized hyperhidrosis  Assessment & Plan:  Presents w/increased sweating since last couple weeks. No change of stressors at home. She has increased sweat throughout her entire body, worse on her scalp and pelvic region. CRP, ESR, TSH, Chest Xray ordered  CBC, CMP ordered  Quantiferon TB gold assay    Orders:  -     Sedimentation rate, automated; Future  -     XR chest pa & lateral; Future; Expected date: 10/27/2023  -     CBC and differential; Future  -     Comprehensive metabolic panel; Future  -     C-reactive protein; Future  -     buPROPion (WELLBUTRIN) 100 mg tablet; Take 1 tablet (100 mg total) by mouth 2 (two) times a day for 20 days    2. Anxiety  Assessment & Plan:  Chronic, stable  Pt complains about increased sweating. She states her anxiety is well controlled and she does not feel that the increase in sweating is the cause of her anxiety. Pt's Wellbutrin was recently increased to 150mg on 10/4. Continue sertraline 25 mg QD  Discontinue Wellbutrin 150mg BID and restart the previous dose (Wellbutrin 100mg BID)  Recommended pt to closely monitor her increase sweating and check if it comes on while she is anxious    Orders:  -     buPROPion (WELLBUTRIN) 100 mg tablet; Take 1 tablet (100 mg total) by mouth 2 (two) times a day for 20 days    3. Increased risk of breast cancer  Assessment & Plan:  Pt is a BRCA 1/BRCA 2 positive, has a complete hysterectomy done many years ago. Today she has discomfort on right upper quadrant on the breast. No lumps, cyst. Nothing makes it better or worse. Chest X-ray ordered  CRP, ESR ordered  Mammogram scheduled in 2023      Orders:  -     Quantiferon TB Gold Plus Assay; Future    4.  Multiple thyroid nodules  Assessment & Plan:  Pt has multiple thyroid nodules. She had ultrasound of the thyroid on 8/23, showing stable thyroid nodules. Pt reports recent increase in swearing, and generalized body warmth since last couple weeks. Denies palpation, chest pain, shortness of breathe    Order TSH w/reflex to T4  Educated pt to monitor her heart rate  Avoid spicy food and avoid alcohol  Keep room temperature cool    Orders:  -     TSH, 3rd generation with Free T4 reflex; Future           Subjective      HPI  Pt is a 77 yo overweight F w/pmh of htn, left bundle branch block, Stage IIIa adenocarcinoma or pancreas w/adjuvant chemotherapy and 8 cycles of radiation, BRCA 2 + (hysterectomy  years ago) hyperlipidemia presents today for sweats since the last couple week. She was recently seen by the pulmonologist who thinks she has reactive airway disease and was seen in the clinic on 10/5 for SOB. Pt is more sweaty during the day then at night. She states that she wakes up in the morning and is drenched in sweat which is associated with mild body odor. She also feels like she is hot all the time. No changes of stressors at home and she is compliant with all of her medications. Also complaints about right upper quadrant breast pain that is ongoing since last couple days. No other acute complaints at this time. ED (9/30): nasal congestion and was dg w/pneumonia a wk prior. Review of Systems   Constitutional:  Negative for activity change, appetite change, fatigue, fever and unexpected weight change. Respiratory:  Negative for choking and shortness of breath. Cardiovascular:  Negative for chest pain and palpitations. Gastrointestinal:  Negative for diarrhea, nausea and vomiting. Endocrine: Negative for polyphagia. Genitourinary:  Negative for decreased urine volume, difficulty urinating and enuresis. Musculoskeletal:  Negative for back pain and joint swelling. Skin:  Negative for rash. Neurological:  Negative for syncope and light-headedness. Psychiatric/Behavioral:  Negative for agitation and confusion. The patient is not nervous/anxious. Current Outpatient Medications on File Prior to Visit   Medication Sig    acetaminophen (TYLENOL) 500 mg tablet Take 1,000 mg by mouth    albuterol (2.5 mg/3 mL) 0.083 % nebulizer solution Take 3 mL (2.5 mg total) by nebulization every 6 (six) hours as needed for wheezing or shortness of breath    albuterol (ProAir HFA) 90 mcg/act inhaler Inhale 2 puffs every 6 (six) hours as needed for wheezing    buPROPion (Wellbutrin SR) 150 mg 12 hr tablet Take 1 tablet (150 mg total) by mouth 2 (two) times a day    cefadroxil (DURICEF) 500 mg capsule     Fluticasone-Salmeterol (Advair Diskus) 250-50 mcg/dose inhaler Inhale 1 puff 2 (two) times a day Rinse mouth after use.     ipratropium-albuterol (DUO-NEB) 0.5-2.5 mg/3 mL nebulizer solution Take 3 mL by nebulization 4 (four) times a day    losartan-hydrochlorothiazide (HYZAAR) 50-12.5 mg per tablet Take 1 tablet by mouth daily    metoprolol tartrate (LOPRESSOR) 25 mg tablet Take 25 mg by mouth 2 (two) times a day    Multiple Vitamin (multivitamin) tablet Take 1 tablet by mouth daily    nicotine (NICODERM CQ) 7 mg/24hr TD 24 hr patch Place 1 patch on the skin over 24 hours daily Apply a new patch every 24 hours to a clean, dry, hairless site on the upper arm or hip.    sertraline (ZOLOFT) 25 mg tablet Take 1 tablet (25 mg total) by mouth daily    ALPRAZolam (XANAX) 0.25 mg tablet Take 1 tablet (0.25 mg total) by mouth 3 (three) times a day as needed for anxiety for up to 10 days    aspirin 81 mg chewable tablet Chew 1 tablet (81 mg total) daily    atorvastatin (LIPITOR) 20 mg tablet Take 1 tablet (20 mg total) by mouth every evening    fluticasone (FLONASE) 50 mcg/act nasal spray 2 sprays into each nostril daily for 3 days    Medical ID Plate MISC Use once for 1 dose Severely and permanently limited in the ability to walk because of an arthritic, neurological, or orthopedic condition: or cannot walk two hundred feet without stopping to rest and meets requirements for disability license plate       Objective     /72 (BP Location: Left arm, Patient Position: Sitting, Cuff Size: Standard)   Pulse 82   Ht 5' 2" (1.575 m)   Wt 77.1 kg (170 lb)   SpO2 94%   BMI 31.09 kg/m²     Physical Exam  Constitutional:       Appearance: She is normal weight. HENT:      Nose: Nose normal.      Mouth/Throat:      Mouth: Mucous membranes are moist.   Cardiovascular:      Rate and Rhythm: Normal rate and regular rhythm. Pulses: Normal pulses. Heart sounds: Normal heart sounds. Pulmonary:      Effort: Pulmonary effort is normal.      Breath sounds: Normal breath sounds. Abdominal:      General: Abdomen is flat. Palpations: Abdomen is soft. Musculoskeletal:         General: Normal range of motion. Skin:     General: Skin is warm. Capillary Refill: Capillary refill takes less than 2 seconds. Neurological:      General: No focal deficit present. Mental Status: She is alert and oriented to person, place, and time. Psychiatric:         Mood and Affect: Mood normal.         Behavior: Behavior normal.         Thought Content:  Thought content normal.         Judgment: Judgment normal.       Yadi Miguel MD

## 2023-10-27 NOTE — ASSESSMENT & PLAN NOTE
Presents w/increased sweating since last couple weeks. No change of stressors at home. She has increased sweat throughout her entire body, worse on her scalp and pelvic region.      CRP, ESR, TSH, Chest Xray ordered  CBC, CMP ordered  Quantiferon TB gold assay

## 2023-10-27 NOTE — ASSESSMENT & PLAN NOTE
Airway       Patient location during procedure: OR  Staff -        Anesthesiologist:  Tobias Olivier MD       Other Anesthesia Staff: Lashell Vega       Performed By: SRNA  Consent for Airway        Urgency: elective  Indications and Patient Condition       Indications for airway management: paul-procedural       Induction type:inhalational       Mask difficulty assessment: 1 - vent by mask    Final Airway Details       Final airway type: supraglottic airway    Supraglottic Airway Details        Type: LMA       Brand: Air-Q       LMA size: 4.5    Post intubation assessment        Placement verified by: capnometry, equal breath sounds and chest rise        Number of attempts at approach: 1       Number of other approaches attempted: 0       Secured with: silk tape       Ease of procedure: easy       Dentition: Intact and Unchanged       Pt is a BRCA 1/BRCA 2 positive, has a complete hysterectomy done many years ago. Today she has discomfort on right upper quadrant on the breast. No lumps, cyst. Nothing makes it better or worse.      Chest X-ray ordered  CRP, ESR ordered  Mammogram scheduled in Nov, 2023

## 2023-10-31 DIAGNOSIS — R06.02 SOB (SHORTNESS OF BREATH): ICD-10-CM

## 2023-11-03 ENCOUNTER — APPOINTMENT (OUTPATIENT)
Dept: LAB | Facility: CLINIC | Age: 68
End: 2023-11-03
Payer: COMMERCIAL

## 2023-11-03 ENCOUNTER — HOSPITAL ENCOUNTER (OUTPATIENT)
Dept: RADIOLOGY | Facility: HOSPITAL | Age: 68
Discharge: HOME | End: 2023-11-03
Payer: COMMERCIAL

## 2023-11-03 DIAGNOSIS — Z91.89 INCREASED RISK OF BREAST CANCER: ICD-10-CM

## 2023-11-03 DIAGNOSIS — E04.2 MULTIPLE THYROID NODULES: ICD-10-CM

## 2023-11-03 DIAGNOSIS — R61 GENERALIZED HYPERHIDROSIS: ICD-10-CM

## 2023-11-03 LAB
ALBUMIN SERPL BCP-MCNC: 4.4 G/DL (ref 3.5–5)
ALP SERPL-CCNC: 113 U/L (ref 34–104)
ALT SERPL W P-5'-P-CCNC: 39 U/L (ref 7–52)
ANION GAP SERPL CALCULATED.3IONS-SCNC: 8 MMOL/L
AST SERPL W P-5'-P-CCNC: 30 U/L (ref 13–39)
BASOPHILS # BLD AUTO: 0.04 THOUSANDS/ÂΜL (ref 0–0.1)
BASOPHILS NFR BLD AUTO: 1 % (ref 0–1)
BILIRUB SERPL-MCNC: 0.45 MG/DL (ref 0.2–1)
BUN SERPL-MCNC: 22 MG/DL (ref 5–25)
CALCIUM SERPL-MCNC: 9.6 MG/DL (ref 8.4–10.2)
CHLORIDE SERPL-SCNC: 99 MMOL/L (ref 96–108)
CO2 SERPL-SCNC: 28 MMOL/L (ref 21–32)
CREAT SERPL-MCNC: 0.79 MG/DL (ref 0.6–1.3)
CRP SERPL QL: 7.8 MG/L
EOSINOPHIL # BLD AUTO: 0.08 THOUSAND/ÂΜL (ref 0–0.61)
EOSINOPHIL NFR BLD AUTO: 1 % (ref 0–6)
ERYTHROCYTE [DISTWIDTH] IN BLOOD BY AUTOMATED COUNT: 13 % (ref 11.6–15.1)
ERYTHROCYTE [SEDIMENTATION RATE] IN BLOOD: 40 MM/HOUR (ref 0–29)
GFR SERPL CREATININE-BSD FRML MDRD: 77 ML/MIN/1.73SQ M
GLUCOSE P FAST SERPL-MCNC: 89 MG/DL (ref 65–99)
HCT VFR BLD AUTO: 43.7 % (ref 34.8–46.1)
HGB BLD-MCNC: 14.1 G/DL (ref 11.5–15.4)
IMM GRANULOCYTES # BLD AUTO: 0.02 THOUSAND/UL (ref 0–0.2)
IMM GRANULOCYTES NFR BLD AUTO: 0 % (ref 0–2)
LYMPHOCYTES # BLD AUTO: 1.27 THOUSANDS/ÂΜL (ref 0.6–4.47)
LYMPHOCYTES NFR BLD AUTO: 17 % (ref 14–44)
MCH RBC QN AUTO: 30.7 PG (ref 26.8–34.3)
MCHC RBC AUTO-ENTMCNC: 32.3 G/DL (ref 31.4–37.4)
MCV RBC AUTO: 95 FL (ref 82–98)
MONOCYTES # BLD AUTO: 0.49 THOUSAND/ÂΜL (ref 0.17–1.22)
MONOCYTES NFR BLD AUTO: 7 % (ref 4–12)
NEUTROPHILS # BLD AUTO: 5.49 THOUSANDS/ÂΜL (ref 1.85–7.62)
NEUTS SEG NFR BLD AUTO: 74 % (ref 43–75)
NRBC BLD AUTO-RTO: 0 /100 WBCS
PLATELET # BLD AUTO: 320 THOUSANDS/UL (ref 149–390)
PMV BLD AUTO: 9.6 FL (ref 8.9–12.7)
POTASSIUM SERPL-SCNC: 5.3 MMOL/L (ref 3.5–5.3)
PROT SERPL-MCNC: 7.6 G/DL (ref 6.4–8.4)
RBC # BLD AUTO: 4.6 MILLION/UL (ref 3.81–5.12)
SODIUM SERPL-SCNC: 135 MMOL/L (ref 135–147)
TSH SERPL DL<=0.05 MIU/L-ACNC: 1.04 UIU/ML (ref 0.45–4.5)
WBC # BLD AUTO: 7.39 THOUSAND/UL (ref 4.31–10.16)

## 2023-11-03 PROCEDURE — 86480 TB TEST CELL IMMUN MEASURE: CPT

## 2023-11-03 PROCEDURE — 84443 ASSAY THYROID STIM HORMONE: CPT

## 2023-11-03 PROCEDURE — 36415 COLL VENOUS BLD VENIPUNCTURE: CPT

## 2023-11-03 PROCEDURE — 80053 COMPREHEN METABOLIC PANEL: CPT

## 2023-11-03 PROCEDURE — 85025 COMPLETE CBC W/AUTO DIFF WBC: CPT

## 2023-11-03 PROCEDURE — 71046 X-RAY EXAM CHEST 2 VIEWS: CPT

## 2023-11-03 PROCEDURE — 86140 C-REACTIVE PROTEIN: CPT

## 2023-11-03 PROCEDURE — 85652 RBC SED RATE AUTOMATED: CPT

## 2023-11-06 LAB
GAMMA INTERFERON BACKGROUND BLD IA-ACNC: <0 IU/ML
M TB IFN-G BLD-IMP: NEGATIVE
M TB IFN-G CD4+ BCKGRND COR BLD-ACNC: 0 IU/ML
M TB IFN-G CD4+ BCKGRND COR BLD-ACNC: 0 IU/ML
MITOGEN IGNF BCKGRD COR BLD-ACNC: 1.42 IU/ML

## 2023-11-06 RX ORDER — ALBUTEROL SULFATE 90 UG/1
AEROSOL, METERED RESPIRATORY (INHALATION)
Qty: 6.7 G | Refills: 2 | Status: SHIPPED | OUTPATIENT
Start: 2023-11-06

## 2023-11-07 ENCOUNTER — TELEPHONE (OUTPATIENT)
Dept: OTHER | Facility: HOSPITAL | Age: 68
End: 2023-11-07

## 2023-11-08 NOTE — TELEPHONE ENCOUNTER
Called the patient to tell her the results of her recent blood work. Pt told me that she has a follow up appointment with [facility] Friday to discuss results.      Thanks,  Dr. Melva Gonzalez

## 2023-11-10 ENCOUNTER — OFFICE VISIT (OUTPATIENT)
Dept: FAMILY MEDICINE CLINIC | Facility: CLINIC | Age: 68
End: 2023-11-10
Payer: COMMERCIAL

## 2023-11-10 VITALS
HEIGHT: 62 IN | WEIGHT: 172.4 LBS | DIASTOLIC BLOOD PRESSURE: 70 MMHG | SYSTOLIC BLOOD PRESSURE: 132 MMHG | HEART RATE: 85 BPM | TEMPERATURE: 97.7 F | BODY MASS INDEX: 31.73 KG/M2 | RESPIRATION RATE: 17 BRPM | OXYGEN SATURATION: 97 %

## 2023-11-10 DIAGNOSIS — R79.82 ELEVATED C-REACTIVE PROTEIN (CRP): Primary | ICD-10-CM

## 2023-11-10 DIAGNOSIS — J45.909 REACTIVE AIRWAY DISEASE WITHOUT COMPLICATION, UNSPECIFIED ASTHMA SEVERITY, UNSPECIFIED WHETHER PERSISTENT: ICD-10-CM

## 2023-11-10 PROCEDURE — 99213 OFFICE O/P EST LOW 20 MIN: CPT | Performed by: FAMILY MEDICINE

## 2023-11-10 RX ORDER — IPRATROPIUM BROMIDE AND ALBUTEROL SULFATE 2.5; .5 MG/3ML; MG/3ML
3 SOLUTION RESPIRATORY (INHALATION) 4 TIMES DAILY
Qty: 60 ML | Refills: 1 | Status: SHIPPED | OUTPATIENT
Start: 2023-11-10

## 2023-11-10 NOTE — PROGRESS NOTES
Assessment/Plan:    Elevated C-reactive protein (CRP)  Elevated CRP & ESR noted on blood work  Pt noted to continue to have "sweats"  Patient noted to have a viral illness at the time of blood work  (Coughing up phlegm and earache)  Hx of cancer; s/p chemo    Plan  CT C/A/P scheduled  At [facility], reorder lab work  Has f/u scheduled with Heme/Onc     Diagnoses and all orders for this visit:    Elevated C-reactive protein (CRP)    Reactive airway disease without complication, unspecified asthma severity, unspecified whether persistent  Comments:  medication refill  Orders:  -     ipratropium-albuterol (DUO-NEB) 0.5-2.5 mg/3 mL nebulizer solution; Take 3 mL by nebulization 4 (four) times a day          Subjective:      Patient ID: Rayne Weston is a 76 y.o. female. Pleasant 71-year-old female presents to the clinic to go over her most recent lab work. She notes that she is CRP and ESR were elevated; however she notes that when she had this lab work done she had a viral illness. She notes that she was coughing up green phlegm and had an earache at that time. She notes that she felt like she had water sloshing around. At this time she is feeling better. She will be following up with her PCP to have her annual wellness visit. She additionally has follow-up appointments with radiology for a CT of her chest abdomen and pelvis in addition to scans requested by oncology. The following portions of the patient's history were reviewed and updated as appropriate: allergies, current medications, past family history, past medical history, past social history, past surgical history, and problem list.    Review of Systems   Constitutional:  Negative for activity change, appetite change, fatigue, fever and unexpected weight change. Sweating   Respiratory:  Negative for choking and shortness of breath. Cardiovascular:  Negative for chest pain and palpitations.    Gastrointestinal:  Negative for diarrhea, nausea and vomiting. Endocrine: Negative for polyphagia. Genitourinary:  Negative for decreased urine volume, difficulty urinating and enuresis. Musculoskeletal:  Negative for back pain and joint swelling. Skin:  Negative for rash. Neurological:  Negative for syncope and light-headedness. Psychiatric/Behavioral:  Negative for agitation and confusion. The patient is not nervous/anxious. Objective:      /70 (BP Location: Left arm, Patient Position: Sitting, Cuff Size: Large)   Pulse 85   Temp 97.7 °F (36.5 °C)   Resp 17   Ht 5' 2" (1.575 m)   Wt 78.2 kg (172 lb 6.4 oz)   SpO2 97%   BMI 31.53 kg/m²          Physical Exam  Constitutional:       Appearance: She is normal weight. HENT:      Nose: Nose normal.      Mouth/Throat:      Mouth: Mucous membranes are moist.   Cardiovascular:      Rate and Rhythm: Normal rate and regular rhythm. Pulses: Normal pulses. Heart sounds: Normal heart sounds. Pulmonary:      Effort: Pulmonary effort is normal.      Breath sounds: Normal breath sounds. Abdominal:      General: Abdomen is flat. Palpations: Abdomen is soft. Musculoskeletal:         General: Normal range of motion. Skin:     General: Skin is warm. Capillary Refill: Capillary refill takes less than 2 seconds. Neurological:      General: No focal deficit present. Mental Status: She is alert and oriented to person, place, and time. Psychiatric:         Mood and Affect: Mood normal.         Behavior: Behavior normal.         Thought Content:  Thought content normal.         Judgment: Judgment normal.

## 2023-11-10 NOTE — ASSESSMENT & PLAN NOTE
Elevated CRP & ESR noted on blood work  Pt noted to continue to have "sweats"  Patient noted to have a viral illness at the time of blood work  (Coughing up phlegm and earache)  Hx of cancer; s/p chemo    Plan  CT C/A/P scheduled  At [facility] reorder lab work  Has f/u scheduled with Heme/Onc

## 2023-11-15 ENCOUNTER — HOSPITAL ENCOUNTER (OUTPATIENT)
Dept: RADIOLOGY | Facility: HOSPITAL | Age: 68
Discharge: HOME | End: 2023-11-15
Payer: COMMERCIAL

## 2023-11-15 DIAGNOSIS — Z12.31 SCREENING MAMMOGRAM FOR HIGH-RISK PATIENT: ICD-10-CM

## 2023-11-15 DIAGNOSIS — Z15.01 BRCA POSITIVE: ICD-10-CM

## 2023-11-15 DIAGNOSIS — Z15.09 BRCA POSITIVE: ICD-10-CM

## 2023-11-15 PROCEDURE — 77067 SCR MAMMO BI INCL CAD: CPT

## 2023-11-15 PROCEDURE — 77063 BREAST TOMOSYNTHESIS BI: CPT

## 2023-11-16 ENCOUNTER — HOSPITAL ENCOUNTER (OUTPATIENT)
Dept: PULMONOLOGY | Facility: HOSPITAL | Age: 68
End: 2023-11-16
Attending: STUDENT IN AN ORGANIZED HEALTH CARE EDUCATION/TRAINING PROGRAM
Payer: COMMERCIAL

## 2023-11-16 ENCOUNTER — TELEPHONE (OUTPATIENT)
Dept: HEMATOLOGY ONCOLOGY | Facility: CLINIC | Age: 68
End: 2023-11-16

## 2023-11-16 DIAGNOSIS — J45.20 MILD INTERMITTENT ASTHMA, UNSPECIFIED WHETHER COMPLICATED: ICD-10-CM

## 2023-11-16 PROCEDURE — 94729 DIFFUSING CAPACITY: CPT | Performed by: STUDENT IN AN ORGANIZED HEALTH CARE EDUCATION/TRAINING PROGRAM

## 2023-11-16 PROCEDURE — 94729 DIFFUSING CAPACITY: CPT

## 2023-11-16 PROCEDURE — 94060 EVALUATION OF WHEEZING: CPT

## 2023-11-16 PROCEDURE — 94060 EVALUATION OF WHEEZING: CPT | Performed by: STUDENT IN AN ORGANIZED HEALTH CARE EDUCATION/TRAINING PROGRAM

## 2023-11-16 PROCEDURE — 94727 GAS DIL/WSHOT DETER LNG VOL: CPT | Performed by: STUDENT IN AN ORGANIZED HEALTH CARE EDUCATION/TRAINING PROGRAM

## 2023-11-16 PROCEDURE — 94760 N-INVAS EAR/PLS OXIMETRY 1: CPT

## 2023-11-16 RX ORDER — ALBUTEROL SULFATE 2.5 MG/3ML
2.5 SOLUTION RESPIRATORY (INHALATION) ONCE
Status: COMPLETED | OUTPATIENT
Start: 2023-11-16 | End: 2023-11-16

## 2023-11-16 RX ADMIN — ALBUTEROL SULFATE 2.5 MG: 2.5 SOLUTION RESPIRATORY (INHALATION) at 15:21

## 2023-11-16 NOTE — TELEPHONE ENCOUNTER
Called and LVM for patient stated that per [facility] Vegajacinto Trinh mentioned that her mammo looks good and that Marshall Rome had no concerns at this time. Mentioned that if patient had any questions and or concerns to please give the office a call back.

## 2023-11-16 NOTE — RESULT ENCOUNTER NOTE
Called and LVM for patient stated that per [facility] Oscar Lawrence mentioned that her mammo looks good and that Shaun Franki had no concerns at this time. Mentioned that if patient had any questions and or concerns to please give the office a call back.

## 2023-11-17 DIAGNOSIS — F17.200 SMOKING: ICD-10-CM

## 2023-11-17 DIAGNOSIS — F41.9 ANXIETY: Chronic | ICD-10-CM

## 2023-11-17 RX ORDER — BUPROPION HYDROCHLORIDE 150 MG/1
150 TABLET, EXTENDED RELEASE ORAL 2 TIMES DAILY
Qty: 180 TABLET | Refills: 0 | Status: SHIPPED | OUTPATIENT
Start: 2023-11-17 | End: 2023-11-20 | Stop reason: SDUPTHER

## 2023-11-17 RX ORDER — BUPROPION HYDROCHLORIDE 150 MG/1
150 TABLET, EXTENDED RELEASE ORAL 2 TIMES DAILY
Qty: 180 TABLET | Refills: 0 | Status: SHIPPED | OUTPATIENT
Start: 2023-11-17 | End: 2023-11-17 | Stop reason: SDUPTHER

## 2023-11-20 ENCOUNTER — OFFICE VISIT (OUTPATIENT)
Dept: FAMILY MEDICINE CLINIC | Facility: CLINIC | Age: 68
End: 2023-11-20
Payer: COMMERCIAL

## 2023-11-20 VITALS
BODY MASS INDEX: 31.98 KG/M2 | SYSTOLIC BLOOD PRESSURE: 132 MMHG | WEIGHT: 173.8 LBS | OXYGEN SATURATION: 95 % | HEIGHT: 62 IN | DIASTOLIC BLOOD PRESSURE: 86 MMHG | HEART RATE: 74 BPM | TEMPERATURE: 97 F

## 2023-11-20 DIAGNOSIS — F17.200 SMOKING: ICD-10-CM

## 2023-11-20 DIAGNOSIS — J45.20 MILD INTERMITTENT REACTIVE AIRWAY DISEASE WITHOUT COMPLICATION: Primary | Chronic | ICD-10-CM

## 2023-11-20 DIAGNOSIS — F41.9 ANXIETY: Chronic | ICD-10-CM

## 2023-11-20 PROCEDURE — 99213 OFFICE O/P EST LOW 20 MIN: CPT | Performed by: FAMILY MEDICINE

## 2023-11-20 RX ORDER — SERTRALINE HYDROCHLORIDE 25 MG/1
25 TABLET, FILM COATED ORAL DAILY
Qty: 30 TABLET | Refills: 5 | Status: SHIPPED | OUTPATIENT
Start: 2023-11-20 | End: 2024-05-18

## 2023-11-20 RX ORDER — BUPROPION HYDROCHLORIDE 150 MG/1
150 TABLET, EXTENDED RELEASE ORAL 2 TIMES DAILY
Qty: 180 TABLET | Refills: 0 | Status: SHIPPED | OUTPATIENT
Start: 2023-11-20 | End: 2024-02-18

## 2023-11-20 NOTE — PROGRESS NOTES
[facility] Office visit    Assessment/Plan:     1. Mild intermittent reactive airway disease without complication  Assessment & Plan:  Patient's most recent PFT showed normal spirometry and no significant response to bronchodilators. Mildly reduced DLCO. Patient reported prior to testing she did not hold the Advair. Possible association with anxiety. Patient's home regimen includes rescue inhaler, Advair, DuoNeb and nebulizer. Patient advised to continue home medication regimen however told possibly an appropriate treatment  Patient encouraged to follow-up with pulmonology  Patient advised to write symptom journal to see if shortness of breath/reactive airway disease is associated with times of stress or anxiety      2. Anxiety  Assessment & Plan:  Patient reports she has had resolving symptoms with the sertraline 25 mg daily as well as Wellbutrin 150 mg twice daily. Patient reports she still gets some tremors and anxious however her symptoms have become much more manageable since the Wellbutrin has been increased. Patient advised to continue home medication regimen  Patient educated most significant treatment is with talk therapy as well over mono therapy with pharmacology. Patient encouraged to follow-up with [facility]. Orders:  -     sertraline (ZOLOFT) 25 mg tablet; Take 1 tablet (25 mg total) by mouth daily  -     buPROPion (WELLBUTRIN SR) 150 mg 12 hr tablet; Take 1 tablet (150 mg total) by mouth 2 (two) times a day    3. Smoking  Assessment & Plan:  Reports long history of smoking. Patient had been previously counseled on smoking cessation. Since last office visit patient reported decreasing cigarette intake from 5 cigarettes daily to 1 cigarette daily.       Patient applauded for success and effort in smoking cessation  Patient encouraged to continue Wellbutrin 150 mg twice daily  Patient advised to continue nicotine patches  Patient encouraged patient to join support groups and change lifestyle habits to support cessation    Orders:  -     buPROPion (WELLBUTRIN SR) 150 mg 12 hr tablet; Take 1 tablet (150 mg total) by mouth 2 (two) times a day          No follow-ups on file. Subjective:   MAISHA Waddell is a 76 y.o. female presents to clinic for follow-up of anxiety, smoking cessation and asthma. Patient reports since last being seen patient had no panic attacks and no episodes of shortness of breath. Patient also reports she is currently down to 1 cigarette a day and prior to treatment she was on 5 cigarettes daily. Patient reports overall she is feeling well and reports her anxiety has been much more manageable. Patient describes some tremors with albuterol therapy however self resolve. Patient reported tremor on extension however that has been chronic issue. Patient is aware she has a Medicare annual wellness visit in January    Review of Systems   Constitutional:  Negative for appetite change, chills, fatigue and fever. HENT:  Negative for ear pain, sore throat and trouble swallowing. Eyes:  Negative for pain and visual disturbance. Respiratory:  Negative for cough, choking, chest tightness, shortness of breath and wheezing. Cardiovascular:  Negative for chest pain, palpitations and leg swelling. Gastrointestinal:  Negative for abdominal pain, blood in stool, constipation, diarrhea, nausea and vomiting. Endocrine: Negative for polydipsia and polyuria. Genitourinary:  Negative for dysuria and hematuria. Musculoskeletal:  Negative for arthralgias and back pain. Skin:  Negative for color change and rash. Neurological:  Positive for tremors. Negative for dizziness, seizures, syncope, light-headedness and headaches. Psychiatric/Behavioral:  Negative for agitation, decreased concentration and dysphoric mood. The patient is not nervous/anxious. All other systems reviewed and are negative.        Objective:     /86 (BP Location: Left arm, Patient Position: Sitting, Cuff Size: Standard)   Pulse 74   Temp (!) 97 °F (36.1 °C) (Tympanic)   Ht 5' 2" (1.575 m)   Wt 78.8 kg (173 lb 12.8 oz)   SpO2 95%   BMI 31.79 kg/m²      Physical Exam  Constitutional:       General: She is not in acute distress. Appearance: Normal appearance. She is not ill-appearing. HENT:      Head: Normocephalic and atraumatic. Nose: Nose normal.      Mouth/Throat:      Mouth: Mucous membranes are moist.   Eyes:      Extraocular Movements: Extraocular movements intact. Pupils: Pupils are equal, round, and reactive to light. Cardiovascular:      Rate and Rhythm: Normal rate and regular rhythm. Heart sounds: Normal heart sounds. No murmur heard. No friction rub. No gallop. Pulmonary:      Effort: Pulmonary effort is normal.      Breath sounds: Normal breath sounds. No wheezing or rhonchi. Abdominal:      General: Abdomen is flat. Bowel sounds are normal. There is no distension. Palpations: Abdomen is soft. Tenderness: There is no abdominal tenderness. There is no right CVA tenderness, left CVA tenderness, guarding or rebound. Musculoskeletal:         General: Normal range of motion. Cervical back: Normal range of motion. Right lower leg: No edema. Left lower leg: No edema. Skin:     General: Skin is warm. Capillary Refill: Capillary refill takes less than 2 seconds. Findings: No erythema or rash. Neurological:      Mental Status: She is alert and oriented to person, place, and time. Mental status is at baseline. Motor: No weakness. Gait: Gait normal.   Psychiatric:         Mood and Affect: Mood normal.         Behavior: Behavior normal.         Thought Content:  Thought content normal.          ** Please Note: This note has been constructed using a voice recognition system **     Miranda Galdamez MD  11/21/23  11:49 AM

## 2023-11-24 NOTE — ASSESSMENT & PLAN NOTE
Addended by: TYSON DEL ANGEL on: 3/5/2020 08:57 AM     Modules accepted: Orders     Patient reports she has had resolving symptoms with the sertraline 25 mg daily as well as Wellbutrin 150 mg twice daily. Patient reports she still gets some tremors and anxious however her symptoms have become much more manageable since the Wellbutrin has been increased. Patient advised to continue home medication regimen  Patient educated most significant treatment is with talk therapy as well over mono therapy with pharmacology. Patient encouraged to follow-up with [facility].

## 2023-11-24 NOTE — ASSESSMENT & PLAN NOTE
Patient's most recent PFT showed normal spirometry and no significant response to bronchodilators. Mildly reduced DLCO. Patient reported prior to testing she did not hold the Advair. Possible association with anxiety. Patient's home regimen includes rescue inhaler, Advair, DuoNeb and nebulizer.     Patient advised to continue home medication regimen however told possibly an appropriate treatment  Patient encouraged to follow-up with pulmonology  Patient advised to write symptom journal to see if shortness of breath/reactive airway disease is associated with times of stress or anxiety

## 2023-11-24 NOTE — ASSESSMENT & PLAN NOTE
Reports long history of smoking. Patient had been previously counseled on smoking cessation. Since last office visit patient reported decreasing cigarette intake from 5 cigarettes daily to 1 cigarette daily.       Patient applauded for success and effort in smoking cessation  Patient encouraged to continue Wellbutrin 150 mg twice daily  Patient advised to continue nicotine patches  Patient encouraged patient to join support groups and change lifestyle habits to support cessation

## 2023-12-05 ENCOUNTER — APPOINTMENT (OUTPATIENT)
Dept: LAB | Facility: CLINIC | Age: 68
End: 2023-12-05
Payer: COMMERCIAL

## 2023-12-05 DIAGNOSIS — E78.2 MIXED HYPERLIPIDEMIA: ICD-10-CM

## 2023-12-05 LAB
CHOLEST SERPL-MCNC: 264 MG/DL
HDLC SERPL-MCNC: 68 MG/DL
LDLC SERPL CALC-MCNC: 136 MG/DL (ref 0–100)
NONHDLC SERPL-MCNC: 196 MG/DL
TRIGL SERPL-MCNC: 299 MG/DL

## 2023-12-05 PROCEDURE — 80061 LIPID PANEL: CPT

## 2023-12-05 PROCEDURE — 36415 COLL VENOUS BLD VENIPUNCTURE: CPT

## 2023-12-11 DIAGNOSIS — F41.9 ANXIETY: Chronic | ICD-10-CM

## 2023-12-11 DIAGNOSIS — F17.200 SMOKING: ICD-10-CM

## 2023-12-11 RX ORDER — BUPROPION HYDROCHLORIDE 150 MG/1
150 TABLET, EXTENDED RELEASE ORAL 2 TIMES DAILY
Qty: 180 TABLET | Refills: 0 | Status: SHIPPED | OUTPATIENT
Start: 2023-12-11

## 2023-12-27 DIAGNOSIS — J45.20 MILD INTERMITTENT ASTHMA, UNSPECIFIED WHETHER COMPLICATED: ICD-10-CM

## 2023-12-27 RX ORDER — FLUTICASONE PROPIONATE AND SALMETEROL 250; 50 UG/1; UG/1
POWDER RESPIRATORY (INHALATION)
Qty: 60 BLISTER | Refills: 2 | Status: SHIPPED | OUTPATIENT
Start: 2023-12-27 | End: 2024-03-26

## 2024-01-01 ENCOUNTER — OFFICE VISIT (OUTPATIENT)
Dept: URGENT CARE | Facility: CLINIC | Age: 69
End: 2024-01-01

## 2024-01-01 VITALS
BODY MASS INDEX: 31.64 KG/M2 | RESPIRATION RATE: 14 BRPM | OXYGEN SATURATION: 94 % | HEART RATE: 93 BPM | WEIGHT: 173 LBS | TEMPERATURE: 96.8 F

## 2024-01-01 DIAGNOSIS — J39.8 CONGESTION OF UPPER RESPIRATORY TRACT: Primary | ICD-10-CM

## 2024-01-01 RX ORDER — AZITHROMYCIN 250 MG/1
TABLET, FILM COATED ORAL
Qty: 6 TABLET | Refills: 0 | Status: SHIPPED | OUTPATIENT
Start: 2024-01-01 | End: 2024-01-05

## 2024-01-01 RX ORDER — BENZONATATE 100 MG/1
100 CAPSULE ORAL 3 TIMES DAILY PRN
Qty: 30 CAPSULE | Refills: 0 | Status: SHIPPED | OUTPATIENT
Start: 2024-01-01

## 2024-01-01 NOTE — PROGRESS NOTES
[facility]        NAME: Ana Maria Murphy is a 68 y.o. female  : 1955    MRN: [id number]  DATE: 2024  TIME: 9:04 AM    Assessment and Plan   Congestion of upper respiratory tract [J39.8]  1. Congestion of upper respiratory tract  azithromycin (ZITHROMAX) 250 mg tablet    benzonatate (TESSALON PERLES) 100 mg capsule    CANCELED: Covid19 and INFLUENZA A/B PCR        Patient is allergic to penicillins and tetracycline.  Will do azithromycin which she has had before without you.    Patient Instructions     Patient Instructions   Tessalon for cough   Azithromycin as directed       Follow up with PCP in 3-5 days.  Proceed to  ER if symptoms worsen.    Chief Complaint     Chief Complaint   Patient presents with    Cold Like Symptoms     Pt presents with cough, head/chest congestion, ear discomfort bilateral; started on Monday         History of Present Illness       The pt is a 68-year-old female presenting today for 1 week of symptoms.  She reports sinus pain/pressure, nasal congestion and rhinorrhea, cough and ear pain.  She reports coughing up a thick green mucus.  Admits to having low amount of energy which is not normal for her.        Review of Systems   Review of Systems   Constitutional:  Positive for fatigue. Negative for activity change, appetite change, chills and fever.   HENT:  Positive for congestion, ear pain, rhinorrhea, sinus pressure, sinus pain and sore throat.    Eyes:  Negative for pain and visual disturbance.   Respiratory:  Positive for cough. Negative for chest tightness and shortness of breath.    Cardiovascular:  Negative for chest pain and palpitations.   Gastrointestinal:  Negative for abdominal pain, diarrhea, nausea and vomiting.   Genitourinary:  Negative for dysuria and hematuria.   Musculoskeletal:  Negative for arthralgias, back pain and myalgias.   Skin:  Negative for color change, pallor and rash.   Neurological:  Negative for seizures, syncope and  headaches.   All other systems reviewed and are negative.        Current Medications       Current Outpatient Medications:     acetaminophen (TYLENOL) 500 mg tablet, Take 1,000 mg by mouth, Disp: , Rfl:     albuterol (2.5 mg/3 mL) 0.083 % nebulizer solution, Take 3 mL (2.5 mg total) by nebulization every 6 (six) hours as needed for wheezing or shortness of breath, Disp: 30 mL, Rfl: 1    albuterol (PROVENTIL HFA,VENTOLIN HFA) 90 mcg/act inhaler, INHALE 2 PUFFS EVERY 6 HOURS AS NEEDED FOR WHEEZING, Disp: 6.7 g, Rfl: 2    aspirin 81 mg chewable tablet, Chew 1 tablet (81 mg total) daily, Disp: 30 tablet, Rfl: 0    atorvastatin (LIPITOR) 20 mg tablet, Take 1 tablet (20 mg total) by mouth every evening, Disp: 30 tablet, Rfl: 0    azithromycin (ZITHROMAX) 250 mg tablet, Take 2 tablets today then 1 tablet daily x 4 days, Disp: 6 tablet, Rfl: 0    benzonatate (TESSALON PERLES) 100 mg capsule, Take 1 capsule (100 mg total) by mouth 3 (three) times a day as needed for cough, Disp: 30 capsule, Rfl: 0    buPROPion (WELLBUTRIN SR) 150 mg 12 hr tablet, TAKE 1 TABLET BY MOUTH TWICE A DAY, Disp: 180 tablet, Rfl: 0    fluticasone (FLONASE) 50 mcg/act nasal spray, 2 sprays into each nostril daily for 3 days, Disp: 16 g, Rfl: 0    Fluticasone-Salmeterol (Wixela Inhub) 250-50 mcg/dose inhaler, INHALE 1 PUFF 2 TIMES A DAY RINSE MOUTH AFTER USE., Disp: 60 blister, Rfl: 2    ipratropium-albuterol (DUO-NEB) 0.5-2.5 mg/3 mL nebulizer solution, Take 3 mL by nebulization 4 (four) times a day, Disp: 60 mL, Rfl: 1    losartan-hydrochlorothiazide (HYZAAR) 50-12.5 mg per tablet, Take 1 tablet by mouth daily, Disp: 30 tablet, Rfl: 2    metoprolol tartrate (LOPRESSOR) 25 mg tablet, Take 25 mg by mouth 2 (two) times a day, Disp: , Rfl:     Multiple Vitamin (multivitamin) tablet, Take 1 tablet by mouth daily, Disp: , Rfl:     nicotine (NICODERM CQ) 7 mg/24hr TD 24 hr patch, Place 1 patch on the skin over 24 hours daily Apply a new patch every 24 hours  to a clean, dry, hairless site on the upper arm or hip., Disp: 28 patch, Rfl: 0    sertraline (ZOLOFT) 25 mg tablet, Take 1 tablet (25 mg total) by mouth daily, Disp: 30 tablet, Rfl: 5    Medical ID Plate MISC, Use once for 1 dose Severely and permanently limited in the ability to walk because of an arthritic, neurological, or orthopedic condition: or cannot walk two hundred feet without stopping to rest and meets requirements for disability license plate, Disp: 1 each, Rfl: 0    Current Allergies     Allergies as of 01/01/2024 - Reviewed 01/01/2024   Allergen Reaction Noted    Penicillins Rash 01/14/2020    Tetracycline Rash 01/14/2020            The following portions of the patient's history were reviewed and updated as appropriate: allergies, current medications, past family history, past medical history, past social history, past surgical history and problem list.     Past Medical History:   Diagnosis Date    BRCA1 positive     BRCA2 positive     Bundle branch block, left     Cancer (HCC)     pancreatic    Facial droop     r/t neck surgery    History of chemotherapy     pancreatic cancer    History of radiation therapy     Hypercholesteremia     Pancreatic carcinoma (HCC)        Past Surgical History:   Procedure Laterality Date    ABLATION SOFT TISSUE N/A 4/26/2021    Procedure: INTRAOPERATIVE ULTRASOUND, ABLATION,SOFT TISSUE PANCREAS;  Surgeon: Feliciano Luevano MD;  Location: [facility];  Service: Surgical Oncology    CARDIAC CATHETERIZATION Left 9/5/2023    Procedure: Cardiac catheterization;  Surgeon: Jack Gates MD;  Location: [facility];  Service: Cardiology    CHOLECYSTECTOMY N/A 4/26/2021    Procedure: CHOLECYSTECTOMY;  Surgeon: Feliciano Luevano MD;  Location: [facility];  Service: Surgical Oncology    FL GUIDED CENTRAL VENOUS ACCESS DEVICE INSERTION  6/2/2021    HYSTERECTOMY      LAPAROTOMY N/A 4/26/2021    Procedure: LAPAROTOMY EXPLORATORY;  Surgeon: Feliciano Luevano MD;  Location: [facility];   Service: Surgical Oncology    OOPHORECTOMY      SINUS SURGERY      TUNNELED VENOUS PORT PLACEMENT Left 6/2/2021    Procedure: INSERTION VENOUS PORT (PORT-A-CATH);  Surgeon: Feliciano Luevano MD;  Location: [facility];  Service: Surgical Oncology    US GUIDED THYROID BIOPSY  7/13/2022    WHIPPLE PROCEDURE/PANCREATICO-DUODENECTOMY N/A 4/26/2021    Procedure: WHIPPLE PROCEDURE/PANCREATICO-DUODENECTOMY; PYLORIC PRESERVING;  Surgeon: Feliciano Luevano MD;  Location: [facility];  Service: Surgical Oncology       Family History   Problem Relation Age of Onset    Ulcerative colitis Mother     Prostate cancer Father     Melanoma Sister     Heart disease Brother     Prostate cancer Brother     No Known Problems Brother     No Known Problems Maternal Uncle     No Known Problems Paternal Uncle          Medications have been verified.        Objective   Pulse 93   Temp (!) 96.8 °F (36 °C)   Resp 14   Wt 78.5 kg (173 lb)   SpO2 94%   BMI 31.64 kg/m²        Physical Exam     Physical Exam  Vitals and nursing note reviewed.   Constitutional:       General: She is not in acute distress.     Appearance: Normal appearance. She is well-developed and normal weight. She is not ill-appearing, toxic-appearing or diaphoretic.   HENT:      Head: Normocephalic and atraumatic.      Right Ear: Tympanic membrane, ear canal and external ear normal. No drainage, swelling or tenderness. No middle ear effusion. There is no impacted cerumen. Tympanic membrane is not erythematous.      Left Ear: Tympanic membrane, ear canal and external ear normal. No drainage, swelling or tenderness.  No middle ear effusion. There is no impacted cerumen. Tympanic membrane is not erythematous.      Nose: Nose normal. No congestion or rhinorrhea.      Mouth/Throat:      Mouth: Mucous membranes are moist. No oral lesions.      Pharynx: Oropharynx is clear. Uvula midline. Posterior oropharyngeal erythema present. No pharyngeal swelling, oropharyngeal exudate or uvula  swelling.      Tonsils: No tonsillar exudate or tonsillar abscesses. 0 on the right. 0 on the left.   Eyes:      Extraocular Movements:      Right eye: Normal extraocular motion.      Left eye: Normal extraocular motion.      Conjunctiva/sclera: Conjunctivae normal.      Pupils: Pupils are equal, round, and reactive to light.   Cardiovascular:      Rate and Rhythm: Normal rate and regular rhythm.      Heart sounds: Normal heart sounds. No murmur heard.     No friction rub. No gallop.   Pulmonary:      Effort: Pulmonary effort is normal. No respiratory distress.      Breath sounds: No stridor. Rhonchi (diffuse lower lungs) present. No wheezing or rales.   Chest:      Chest wall: No tenderness.   Musculoskeletal:         General: Normal range of motion.   Skin:     General: Skin is warm and dry.      Capillary Refill: Capillary refill takes less than 2 seconds.   Neurological:      Mental Status: She is alert.

## 2024-01-04 ENCOUNTER — HOSPITAL ENCOUNTER (EMERGENCY)
Facility: HOSPITAL | Age: 69
Discharge: HOME | End: 2024-01-04
Attending: EMERGENCY MEDICINE
Payer: COMMERCIAL

## 2024-01-04 VITALS
SYSTOLIC BLOOD PRESSURE: 147 MMHG | OXYGEN SATURATION: 96 % | DIASTOLIC BLOOD PRESSURE: 91 MMHG | HEART RATE: 107 BPM | TEMPERATURE: 97.3 F | RESPIRATION RATE: 18 BRPM | BODY MASS INDEX: 31.64 KG/M2 | WEIGHT: 173 LBS

## 2024-01-04 DIAGNOSIS — B34.9 VIRAL SYNDROME: Primary | ICD-10-CM

## 2024-01-04 LAB
FLUAV RNA RESP QL NAA+PROBE: NEGATIVE
FLUBV RNA RESP QL NAA+PROBE: NEGATIVE
RSV RNA RESP QL NAA+PROBE: NEGATIVE
SARS-COV-2 RNA RESP QL NAA+PROBE: NEGATIVE

## 2024-01-04 PROCEDURE — 0241U HB NFCT DS VIR RESP RNA 4 TRGT: CPT | Performed by: EMERGENCY MEDICINE

## 2024-01-04 PROCEDURE — 99283 EMERGENCY DEPT VISIT LOW MDM: CPT

## 2024-01-04 PROCEDURE — 99284 EMERGENCY DEPT VISIT MOD MDM: CPT | Performed by: EMERGENCY MEDICINE

## 2024-01-04 RX ORDER — ONDANSETRON 4 MG/1
4 TABLET, ORALLY DISINTEGRATING ORAL EVERY 6 HOURS PRN
Qty: 10 TABLET | Refills: 0 | Status: SHIPPED | OUTPATIENT
Start: 2024-01-04

## 2024-01-04 RX ORDER — ALBUTEROL SULFATE 90 UG/1
2 AEROSOL, METERED RESPIRATORY (INHALATION) EVERY 6 HOURS PRN
Qty: 8.5 G | Refills: 0 | Status: SHIPPED | OUTPATIENT
Start: 2024-01-04

## 2024-01-04 NOTE — ED PROVIDER NOTES
History  Chief Complaint   Patient presents with    Flu Symptoms    Nausea     Pt.  States nausea with   cough and body aches since Monday was antibiotic on Monday with no relief     Patient had a sick exposure to a COVID-positive person recently.  She developed symptoms of runny nose cough congestion starting on Friday of last week 6 days prior to arrival.  Patient was seen at an urgent care center and placed on Zithromax with no improvement.  Viral testing was done.  Patient presents with the same symptoms        Prior to Admission Medications   Prescriptions Last Dose Informant Patient Reported? Taking?   Fluticasone-Salmeterol (Wixela Inhub) 250-50 mcg/dose inhaler   No No   Sig: INHALE 1 PUFF 2 TIMES A DAY RINSE MOUTH AFTER USE.   Medical ID Plate MISC  Self No No   Sig: Use once for 1 dose Severely and permanently limited in the ability to walk because of an arthritic, neurological, or orthopedic condition: or cannot walk two hundred feet without stopping to rest and meets requirements for disability license plate   Multiple Vitamin (multivitamin) tablet  Self Yes No   Sig: Take 1 tablet by mouth daily   acetaminophen (TYLENOL) 500 mg tablet  Self Yes No   Sig: Take 1,000 mg by mouth   albuterol (2.5 mg/3 mL) 0.083 % nebulizer solution  Self No No   Sig: Take 3 mL (2.5 mg total) by nebulization every 6 (six) hours as needed for wheezing or shortness of breath   albuterol (PROVENTIL HFA,VENTOLIN HFA) 90 mcg/act inhaler  Self No No   Sig: INHALE 2 PUFFS EVERY 6 HOURS AS NEEDED FOR WHEEZING   aspirin 81 mg chewable tablet  Self No No   Sig: Chew 1 tablet (81 mg total) daily   atorvastatin (LIPITOR) 20 mg tablet  Self No No   Sig: Take 1 tablet (20 mg total) by mouth every evening   azithromycin (ZITHROMAX) 250 mg tablet   No No   Sig: Take 2 tablets today then 1 tablet daily x 4 days   benzonatate (TESSALON PERLES) 100 mg capsule   No No   Sig: Take 1 capsule (100 mg total) by mouth 3 (three) times a day as  needed for cough   buPROPion (WELLBUTRIN SR) 150 mg 12 hr tablet   No No   Sig: TAKE 1 TABLET BY MOUTH TWICE A DAY   fluticasone (FLONASE) 50 mcg/act nasal spray   No No   Si sprays into each nostril daily for 3 days   ipratropium-albuterol (DUO-NEB) 0.5-2.5 mg/3 mL nebulizer solution  Self No No   Sig: Take 3 mL by nebulization 4 (four) times a day   losartan-hydrochlorothiazide (HYZAAR) 50-12.5 mg per tablet  Self No No   Sig: Take 1 tablet by mouth daily   metoprolol tartrate (LOPRESSOR) 25 mg tablet  Self Yes No   Sig: Take 25 mg by mouth 2 (two) times a day   nicotine (NICODERM CQ) 7 mg/24hr TD 24 hr patch   No No   Sig: Place 1 patch on the skin over 24 hours daily Apply a new patch every 24 hours to a clean, dry, hairless site on the upper arm or hip.   sertraline (ZOLOFT) 25 mg tablet   No No   Sig: Take 1 tablet (25 mg total) by mouth daily      Facility-Administered Medications: None       Past Medical History:   Diagnosis Date    BRCA1 positive     BRCA2 positive     Bundle branch block, left     Cancer (HCC)     pancreatic    Facial droop     r/t neck surgery    History of chemotherapy     pancreatic cancer    History of radiation therapy     Hypercholesteremia     Pancreatic carcinoma (HCC)        Past Surgical History:   Procedure Laterality Date    ABLATION SOFT TISSUE N/A 2021    Procedure: INTRAOPERATIVE ULTRASOUND, ABLATION,SOFT TISSUE PANCREAS;  Surgeon: Feliciano Luevano MD;  Location: [facility];  Service: Surgical Oncology    CARDIAC CATHETERIZATION Left 2023    Procedure: Cardiac catheterization;  Surgeon: Jack Gates MD;  Location: [facility];  Service: Cardiology    CHOLECYSTECTOMY N/A 2021    Procedure: CHOLECYSTECTOMY;  Surgeon: Feliciano Luevano MD;  Location: [facility];  Service: Surgical Oncology    FL GUIDED CENTRAL VENOUS ACCESS DEVICE INSERTION  2021    HYSTERECTOMY      LAPAROTOMY N/A 2021    Procedure: LAPAROTOMY EXPLORATORY;  Surgeon: Feliciano  MD Chloé;  Location: [facility];  Service: Surgical Oncology    OOPHORECTOMY      SINUS SURGERY      TUNNELED VENOUS PORT PLACEMENT Left 2021    Procedure: INSERTION VENOUS PORT (PORT-A-CATH);  Surgeon: Feliciano Luevano MD;  Location: [facility];  Service: Surgical Oncology    US GUIDED THYROID BIOPSY  2022    WHIPPLE PROCEDURE/PANCREATICO-DUODENECTOMY N/A 2021    Procedure: WHIPPLE PROCEDURE/PANCREATICO-DUODENECTOMY; PYLORIC PRESERVING;  Surgeon: Feliciano Luevano MD;  Location: [facility];  Service: Surgical Oncology       Family History   Problem Relation Age of Onset    Ulcerative colitis Mother     Prostate cancer Father     Melanoma Sister     Heart disease Brother     Prostate cancer Brother     No Known Problems Brother     No Known Problems Maternal Uncle     No Known Problems Paternal Uncle      I have reviewed and agree with the history as documented.    E-Cigarette/Vaping    E-Cigarette Use Never User      E-Cigarette/Vaping Substances    Nicotine No     THC No     CBD No     Flavoring No     Other No     Unknown No      Social History     Tobacco Use    Smoking status: Every Day     Current packs/day: 0.00     Average packs/day: 0.3 packs/day for 46.2 years (11.6 ttl pk-yrs)     Types: Cigarettes     Start date:      Last attempt to quit: 2021     Years since quittin.7     Passive exposure: Past    Smokeless tobacco: Never    Tobacco comments:     recently stop smoking , pt stated she smoke occ. 1-2 cigg some days 2022.   Vaping Use    Vaping status: Never Used   Substance Use Topics    Alcohol use: Never    Drug use: Never       Review of Systems   Constitutional:  Positive for fatigue and fever. Negative for chills.   HENT:  Positive for congestion, postnasal drip, rhinorrhea, sinus pressure and sore throat.    Respiratory:  Positive for cough.    Cardiovascular:  Negative for chest pain and leg swelling.   Gastrointestinal:  Positive for diarrhea and nausea. Negative for  abdominal pain and vomiting.   Genitourinary:  Negative for dysuria.   Musculoskeletal:  Positive for arthralgias and myalgias.   Skin:  Negative for rash.   Neurological:  Positive for weakness. Negative for dizziness and headaches.   Hematological:  Does not bruise/bleed easily.   Psychiatric/Behavioral:  Negative for confusion.    All other systems reviewed and are negative.      Physical Exam  Physical Exam  Vitals and nursing note reviewed.   Constitutional:       Appearance: Normal appearance.   HENT:      Head: Normocephalic.      Right Ear: External ear normal.      Left Ear: External ear normal.      Nose: Congestion present.      Mouth/Throat:      Mouth: Mucous membranes are moist.   Eyes:      Conjunctiva/sclera: Conjunctivae normal.   Cardiovascular:      Rate and Rhythm: Regular rhythm. Tachycardia present.      Pulses: Normal pulses.   Pulmonary:      Effort: Pulmonary effort is normal.      Breath sounds: Rhonchi present.   Abdominal:      Palpations: Abdomen is soft.      Tenderness: There is no abdominal tenderness.   Musculoskeletal:         General: Normal range of motion.      Cervical back: Normal range of motion.   Skin:     General: Skin is warm and dry.      Capillary Refill: Capillary refill takes less than 2 seconds.   Neurological:      General: No focal deficit present.      Mental Status: She is alert and oriented to person, place, and time.   Psychiatric:         Mood and Affect: Mood normal.         Behavior: Behavior normal.         Vital Signs  ED Triage Vitals [01/04/24 0844]   Temperature Pulse Respirations Blood Pressure SpO2   (!) 97.3 °F (36.3 °C) (!) 107 18 147/91 96 %      Temp Source Heart Rate Source Patient Position - Orthostatic VS BP Location FiO2 (%)   Tympanic -- -- -- --      Pain Score       7           Vitals:    01/04/24 0844   BP: 147/91   Pulse: (!) 107         Visual Acuity      ED Medications  Medications - No data to display    Diagnostic Studies  Results  Reviewed       Procedure Component Value Units Date/Time    FLU/RSV/COVID - if FLU/RSV clinically relevant [[id number]] Collected: 01/04/24 0915    Lab Status: In process Specimen: Nares from Nose Updated: 01/04/24 0921                   No orders to display              Procedures  Procedures         ED Course                               SBIRT 22yo+      Flowsheet Row Most Recent Value   Initial Alcohol Screen: US AUDIT-C     1. How often do you have a drink containing alcohol? 0 Filed at: 01/04/2024 0846   2. How many drinks containing alcohol do you have on a typical day you are drinking?  0 Filed at: 01/04/2024 0846   3a. Male UNDER 65: How often do you have five or more drinks on one occasion? 0 Filed at: 01/04/2024 0846   3b. FEMALE Any Age, or MALE 65+: How often do you have 4 or more drinks on one occassion? 0 Filed at: 01/04/2024 0846   Audit-C Score 0 Filed at: 01/04/2024 0846   YANE: How many times in the past year have you...    Used an illegal drug or used a prescription medication for non-medical reasons? Never Filed at: 01/04/2024 0846                      Medical Decision Making  Patient has a viral symptom complex with no relief on antibiotics.  Viral testing done.  Patient has a nebulizer at home which I encouraged her to use for expectoration.             Disposition  Final diagnoses:   Viral syndrome     Time reflects when diagnosis was documented in both MDM as applicable and the Disposition within this note       Time User Action Codes Description Comment    1/4/2024  9:18 AM Saji Jansen Add [B34.9] Viral syndrome           ED Disposition       ED Disposition   Discharge    Condition   Stable    Date/Time   Thu Jan 4, 2024 0918    Comment   Ana Maria Murphy discharge to home/self care.                   Follow-up Information       Follow up With Specialties Details Why Contact Info    Kojo Jose MD Emergency Medicine, Family Medicine Schedule an appointment as soon as possible for a  visit   [address] [place] [id number]  [phone]              Patient's Medications   Discharge Prescriptions    ALBUTEROL (PROAIR HFA) 90 MCG/ACT INHALER    Inhale 2 puffs every 6 (six) hours as needed for wheezing       Start Date: 1/4/2024  End Date: --       Order Dose: 2 puffs       Quantity: 8.5 g    Refills: 0    ONDANSETRON (ZOFRAN ODT) 4 MG DISINTEGRATING TABLET    Take 1 tablet (4 mg total) by mouth every 6 (six) hours as needed for nausea or vomiting       Start Date: 1/4/2024  End Date: --       Order Dose: 4 mg       Quantity: 10 tablet    Refills: 0       No discharge procedures on file.    PDMP Review         Value Time User    PDMP Reviewed  Yes 5/3/2021 10:12 AM Abbey LOCKE&#39;KVNG [place]ivais            ED Provider  Electronically Signed by             Saji Jansen MD  01/04/24 0922

## 2024-01-17 ENCOUNTER — TELEPHONE (OUTPATIENT)
Dept: FAMILY MEDICINE CLINIC | Facility: CLINIC | Age: 69
End: 2024-01-17

## 2024-01-17 DIAGNOSIS — B34.9 VIRAL SYNDROME: ICD-10-CM

## 2024-01-17 DIAGNOSIS — R06.02 SOB (SHORTNESS OF BREATH): ICD-10-CM

## 2024-01-17 RX ORDER — ALBUTEROL SULFATE 90 UG/1
2 AEROSOL, METERED RESPIRATORY (INHALATION) EVERY 6 HOURS PRN
Qty: 25.5 G | Refills: 0 | Status: SHIPPED | OUTPATIENT
Start: 2024-01-17

## 2024-01-17 RX ORDER — ALBUTEROL SULFATE 90 UG/1
2 AEROSOL, METERED RESPIRATORY (INHALATION) EVERY 6 HOURS PRN
Qty: 20.1 G | Refills: 2 | Status: SHIPPED | OUTPATIENT
Start: 2024-01-17

## 2024-01-17 RX ORDER — ALBUTEROL SULFATE 2.5 MG/3ML
2.5 SOLUTION RESPIRATORY (INHALATION) EVERY 6 HOURS PRN
Qty: 30 ML | Refills: 1 | Status: SHIPPED | OUTPATIENT
Start: 2024-01-17

## 2024-01-17 NOTE — TELEPHONE ENCOUNTER
Dr. Dahl    Need refills for     albuterol 2.5mg nebulizer solution  Albuterol proair hfa  90 mcg  Albuterol proventil hfa inhaler    [facility] pharmacy

## 2024-01-23 NOTE — TELEPHONE ENCOUNTER
JOYCE left on clinical line:    Yes, this is Ana Maria hyatt. I need a refill for my I Pret albuterol, it's the Ipratropium bromide and albuterol sulfate does. My pharmacy number is [id number]. I have no more refills on it and I would like to have it refilled. Thank you.

## 2024-01-25 ENCOUNTER — TELEPHONE (OUTPATIENT)
Dept: FAMILY MEDICINE CLINIC | Facility: CLINIC | Age: 69
End: 2024-01-25

## 2024-01-25 DIAGNOSIS — J45.909 REACTIVE AIRWAY DISEASE WITHOUT COMPLICATION, UNSPECIFIED ASTHMA SEVERITY, UNSPECIFIED WHETHER PERSISTENT: ICD-10-CM

## 2024-01-25 RX ORDER — IPRATROPIUM BROMIDE AND ALBUTEROL SULFATE 2.5; .5 MG/3ML; MG/3ML
3 SOLUTION RESPIRATORY (INHALATION) 4 TIMES DAILY
Qty: 60 ML | Refills: 1 | Status: SHIPPED | OUTPATIENT
Start: 2024-01-25

## 2024-01-25 NOTE — TELEPHONE ENCOUNTER
JOYCE on appt line:    Hi, yes, my name is Ana Maria Rivera. My phone number is [phone]. I have an appointment tomorrow at 3:20 for the Medicare Wellness exam. I'm going to have to reschedule that. If you could please give me a call back at [phone] 1/4 and also I need a refill on my I've tried to them bromide and albuterol sulfate. Please give me a call back. Thank you.    Attempted to contact pt - left eduardo COOK.

## 2024-02-02 ENCOUNTER — APPOINTMENT (OUTPATIENT)
Dept: LAB | Facility: CLINIC | Age: 69
End: 2024-02-02
Payer: COMMERCIAL

## 2024-02-02 DIAGNOSIS — C24.1 MALIGNANT NEOPLASM OF AMPULLA OF VATER (HCC): ICD-10-CM

## 2024-02-02 LAB
BUN SERPL-MCNC: 19 MG/DL (ref 5–25)
CREAT SERPL-MCNC: 0.75 MG/DL (ref 0.6–1.3)
GFR SERPL CREATININE-BSD FRML MDRD: 82 ML/MIN/1.73SQ M

## 2024-02-02 PROCEDURE — 84520 ASSAY OF UREA NITROGEN: CPT

## 2024-02-02 PROCEDURE — 86301 IMMUNOASSAY TUMOR CA 19-9: CPT

## 2024-02-02 PROCEDURE — 82565 ASSAY OF CREATININE: CPT

## 2024-02-02 PROCEDURE — 36415 COLL VENOUS BLD VENIPUNCTURE: CPT

## 2024-02-03 LAB — CANCER AG19-9 SERPL-ACNC: <2 U/ML (ref 0–35)

## 2024-02-07 ENCOUNTER — APPOINTMENT (OUTPATIENT)
Dept: LAB | Facility: CLINIC | Age: 69
End: 2024-02-07
Payer: COMMERCIAL

## 2024-02-07 ENCOUNTER — HOSPITAL ENCOUNTER (OUTPATIENT)
Dept: RADIOLOGY | Facility: HOSPITAL | Age: 69
Discharge: HOME | End: 2024-02-07
Payer: COMMERCIAL

## 2024-02-07 DIAGNOSIS — C24.1 MALIGNANT NEOPLASM OF AMPULLA OF VATER (HCC): ICD-10-CM

## 2024-02-07 DIAGNOSIS — Z92.21 HISTORY OF CHEMOTHERAPY: ICD-10-CM

## 2024-02-07 DIAGNOSIS — Z15.09 BRCA POSITIVE: ICD-10-CM

## 2024-02-07 DIAGNOSIS — D49.0 NEOPLASM OF AMPULLA OF VATER: ICD-10-CM

## 2024-02-07 DIAGNOSIS — Z15.01 BRCA POSITIVE: ICD-10-CM

## 2024-02-07 LAB
ALBUMIN SERPL BCP-MCNC: 4.7 G/DL (ref 3.5–5)
ALP SERPL-CCNC: 106 U/L (ref 34–104)
ALT SERPL W P-5'-P-CCNC: 30 U/L (ref 7–52)
ANION GAP SERPL CALCULATED.3IONS-SCNC: 9 MMOL/L
AST SERPL W P-5'-P-CCNC: 30 U/L (ref 13–39)
BASOPHILS # BLD AUTO: 0.06 THOUSANDS/ÂΜL (ref 0–0.1)
BASOPHILS NFR BLD AUTO: 1 % (ref 0–1)
BILIRUB SERPL-MCNC: 0.47 MG/DL (ref 0.2–1)
BUN SERPL-MCNC: 13 MG/DL (ref 5–25)
CALCIUM SERPL-MCNC: 10 MG/DL (ref 8.4–10.2)
CHLORIDE SERPL-SCNC: 99 MMOL/L (ref 96–108)
CO2 SERPL-SCNC: 29 MMOL/L (ref 21–32)
CREAT SERPL-MCNC: 0.75 MG/DL (ref 0.6–1.3)
EOSINOPHIL # BLD AUTO: 0.14 THOUSAND/ÂΜL (ref 0–0.61)
EOSINOPHIL NFR BLD AUTO: 2 % (ref 0–6)
ERYTHROCYTE [DISTWIDTH] IN BLOOD BY AUTOMATED COUNT: 12.7 % (ref 11.6–15.1)
GFR SERPL CREATININE-BSD FRML MDRD: 82 ML/MIN/1.73SQ M
GLUCOSE P FAST SERPL-MCNC: 107 MG/DL (ref 65–99)
HCT VFR BLD AUTO: 42.7 % (ref 34.8–46.1)
HGB BLD-MCNC: 13.8 G/DL (ref 11.5–15.4)
IMM GRANULOCYTES # BLD AUTO: 0.01 THOUSAND/UL (ref 0–0.2)
IMM GRANULOCYTES NFR BLD AUTO: 0 % (ref 0–2)
LYMPHOCYTES # BLD AUTO: 1.71 THOUSANDS/ÂΜL (ref 0.6–4.47)
LYMPHOCYTES NFR BLD AUTO: 21 % (ref 14–44)
MCH RBC QN AUTO: 30.7 PG (ref 26.8–34.3)
MCHC RBC AUTO-ENTMCNC: 32.3 G/DL (ref 31.4–37.4)
MCV RBC AUTO: 95 FL (ref 82–98)
MONOCYTES # BLD AUTO: 0.51 THOUSAND/ÂΜL (ref 0.17–1.22)
MONOCYTES NFR BLD AUTO: 6 % (ref 4–12)
NEUTROPHILS # BLD AUTO: 5.9 THOUSANDS/ÂΜL (ref 1.85–7.62)
NEUTS SEG NFR BLD AUTO: 70 % (ref 43–75)
NRBC BLD AUTO-RTO: 0 /100 WBCS
PLATELET # BLD AUTO: 378 THOUSANDS/UL (ref 149–390)
PMV BLD AUTO: 9.7 FL (ref 8.9–12.7)
POTASSIUM SERPL-SCNC: 4.8 MMOL/L (ref 3.5–5.3)
PROT SERPL-MCNC: 7.5 G/DL (ref 6.4–8.4)
RBC # BLD AUTO: 4.5 MILLION/UL (ref 3.81–5.12)
SODIUM SERPL-SCNC: 137 MMOL/L (ref 135–147)
WBC # BLD AUTO: 8.33 THOUSAND/UL (ref 4.31–10.16)

## 2024-02-07 PROCEDURE — G1004 CDSM NDSC: HCPCS

## 2024-02-07 PROCEDURE — 85025 COMPLETE CBC W/AUTO DIFF WBC: CPT

## 2024-02-07 PROCEDURE — 36415 COLL VENOUS BLD VENIPUNCTURE: CPT

## 2024-02-07 PROCEDURE — 86301 IMMUNOASSAY TUMOR CA 19-9: CPT

## 2024-02-07 PROCEDURE — 74177 CT ABD & PELVIS W/CONTRAST: CPT

## 2024-02-07 PROCEDURE — 80053 COMPREHEN METABOLIC PANEL: CPT

## 2024-02-07 PROCEDURE — 71260 CT THORAX DX C+: CPT

## 2024-02-07 RX ADMIN — IOHEXOL 100 ML: 350 INJECTION, SOLUTION INTRAVENOUS at 08:37

## 2024-02-09 ENCOUNTER — OFFICE VISIT (OUTPATIENT)
Dept: HEMATOLOGY ONCOLOGY | Facility: CLINIC | Age: 69
End: 2024-02-09
Payer: COMMERCIAL

## 2024-02-09 VITALS
RESPIRATION RATE: 18 BRPM | BODY MASS INDEX: 31.47 KG/M2 | TEMPERATURE: 98 F | HEIGHT: 62 IN | WEIGHT: 171 LBS | SYSTOLIC BLOOD PRESSURE: 140 MMHG | DIASTOLIC BLOOD PRESSURE: 80 MMHG | HEART RATE: 82 BPM | OXYGEN SATURATION: 98 %

## 2024-02-09 DIAGNOSIS — C24.1 MALIGNANT NEOPLASM OF AMPULLA OF VATER (HCC): Primary | ICD-10-CM

## 2024-02-09 DIAGNOSIS — E04.1 THYROID NODULE: ICD-10-CM

## 2024-02-09 DIAGNOSIS — Z15.09 BRCA POSITIVE: ICD-10-CM

## 2024-02-09 DIAGNOSIS — Z92.21 HISTORY OF CHEMOTHERAPY: ICD-10-CM

## 2024-02-09 DIAGNOSIS — Z15.01 BRCA POSITIVE: ICD-10-CM

## 2024-02-09 LAB — CANCER AG19-9 SERPL-ACNC: <2 U/ML (ref 0–35)

## 2024-02-09 PROCEDURE — 99215 OFFICE O/P EST HI 40 MIN: CPT | Performed by: PHYSICIAN ASSISTANT

## 2024-02-09 NOTE — PATIENT INSTRUCTIONS
[facility] Medical Oncology and Hematology Team  [facility] - [phone]    Your Team Members:  Advanced Practitioner:  Lelo Belle PA-C  Oncology Nurse:   SHAY Pereira ([phone]) M-F 8am - 4:30pm  Oncology Nurse:   Susan Savage: [phone] M-F 8am - 4:30pm    Please answer Private and Unavailable Calls - this may be your team(s) contacting you.  If you have medical questions/concerns/issues - contact us either by (1) My Chart (2) Hope Line    Statement Selected

## 2024-02-11 PROBLEM — Z95.828 PORT-A-CATH IN PLACE: Status: RESOLVED | Noted: 2022-09-27 | Resolved: 2024-02-11

## 2024-02-11 NOTE — PROGRESS NOTES
[address] [facility] [place]  [address]  [place] [id number]  Oncology Progress Note  Ana Maria Murphy, 1955, [id number]  2/9/2024    Assessment/Plan:   1. Neoplasm of ampulla of Vater; 2. 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    This is a 67-year-old female who is presently undergoing survivorship observation after being treated for the above.  Patient is doing quite well.  No major complaints today.     CT scan completed on 2/7/24 demonstrated no signs of residual recurrent metastatic malignancy in the chest abdomen or pelvis.  Patient continues to be in remission.  Continue to follow-up with medical oncology every 6 months is recommended with blood work to assess for longstanding toxicities of previously completed chemotherapy.    - CBC and differential; Future  - Comprehensive metabolic panel; Future  - Cancer antigen 19-9; Future    3. BRCA positive  History of hysterectomy with oophorectomy.  Patient continues to undergo screening mammography.    - CBC and differential; Future  - Comprehensive metabolic panel; Future  - Cancer antigen 19-9; Future      4. Thyroid nodule  Patient was found today to have a more right-sided enlarged thyroid gland then previously recounted by this provider.  Patient does have history of thyromegaly with continued surveillance of thyroid gland.  Patient's last ultrasound was completed approximately 6 months ago however, with the right side looking more enlarged than before, repeat ultrasound was recommended.  Previous ultrasound demonstrated a right upper pole nodule measuring approximately 3 cm at its greatest length.    - US thyroid; Future    The patient is scheduled for follow-up in approximately 6 months with Rosa Elena Lopez PA-C at the [place]  [place].          Patient voiced agreement and understanding to the above.   Patient knows to call the Hematology/Oncology office with any questions and concerns regarding the above.    Goals and Barriers:    Current Goal:   Prolong Survival from Cancer.     Barriers: None.      Patient's Capacity to Self Care:  Patient able to self care.    Lelo Belle PA-C  Medical Oncology/Hematology  [facility]  ______________________________________________________________________________________________________________    Subjective     Chief Complaint   Patient presents with    Follow-up       History of present illness/Cancer History:   Oncology History   History of malignant neoplasm of ampulla of Vater   3/15/2021 Initial Diagnosis    Neoplasm of ampulla of Vater     4/26/2021 Surgery    B.  Celiac lymph node:     - Single lymph node; negative for malignancy (0/1).     C.  Whipple resection:     - Invasive poorly differentiated mucinous adenocarcinoma.     - Tumor arises in the background of an adenoma with high grade dysplasia.     - Lymphatic and venous channel invasion by tumor present.     - Metastatic carcinoma present in one of twenty lymph nodes (1/20).     - All margins are negative for tumor.     4/26/2021 -  Cancer Staged    Staging form: Ampulla of Vater, AJCC 8th Edition  - Pathologic stage from 4/26/2021: Stage IIIA (pT3b, pN1, cM0) - Signed by ANDRES Palma on 6/9/2023  Total positive nodes: 1  Total nodes examined: 22  Histologic grade (G): G3  Histologic grading system: 3 grade system  Residual tumor (R): R0 - None       6/9/2021 - 10/1/2021 Chemotherapy    Cycles 1-6  6/2021 through 8/20/21:  FOLFOX    Cycles 7 -8:  9/15/2021- 10/12/2021  Leucovorin 400mg/m2, IV, Day 1  5-Fu 400 mg/m2, IV , Day 1   5-Fu 1200 mg/m2, IV, CI x 46 hours  Cycle length = 2 weeks    palonosetron (ALOXI), 0.25 mg, Intravenous, Once, 5 of 5 cycles  Administration: 0.25 mg (7/21/2021), 0.25 mg  (8/4/2021), 0.25 mg (8/18/2021), 0.25 mg (9/15/2021), 0.25 mg (9/29/2021)  fluorouracil (ADRUCIL), 400 mg/m2 = 605 mg, Intravenous, Once, 8 of 8 cycles  Dose modification: 340 mg/m2 (85 % of original dose 400 mg/m2, Cycle 6, Reason: Dose Not Tolerated)  Administration: 605 mg (6/9/2021), 605 mg (6/23/2021), 605 mg (7/7/2021), 605 mg (7/21/2021), 605 mg (8/4/2021), 515 mg (8/18/2021), 610 mg (9/15/2021), 610 mg (9/29/2021)  fosaprepitant (EMEND) IVPB, 150 mg, Intravenous, Once, 6 of 6 cycles  Administration: 150 mg (7/7/2021), 150 mg (7/21/2021), 150 mg (8/4/2021), 150 mg (8/18/2021), 150 mg (9/15/2021), 150 mg (9/29/2021)  leucovorin calcium IVPB, 604 mg, Intravenous, Once, 8 of 8 cycles  Dose modification: 340 mg/m2 (85 % of original dose 400 mg/m2, Cycle 6, Reason: Dose Not Tolerated)  Administration: 600 mg (6/9/2021), 600 mg (6/23/2021), 600 mg (7/7/2021), 600 mg (7/21/2021), 600 mg (8/4/2021), 517 mg (8/18/2021), 600 mg (9/15/2021), 600 mg (9/29/2021)  oxaliplatin (ELOXATIN) chemo infusion, 85 mg/m2 = 128.35 mg, Intravenous, Once, 6 of 6 cycles  Dose modification: 72.25 mg/m2 (85 % of original dose 85 mg/m2, Cycle 6, Reason: Dose Not Tolerated)  Administration: 128.35 mg (6/9/2021), 128.35 mg (6/23/2021), 128.35 mg (7/7/2021), 128.35 mg (7/21/2021), 128.35 mg (8/4/2021), 109.82 mg (8/18/2021)  fluorouracil (ADRUCIL) ambulatory infusion Soln, 1,200 mg/m2/day = 3,625 mg, Intravenous, Over 46 hours, 8 of 8 cycles     10/1/2021 Genetic Testing    Results revealed patient has the following mutation(s):  Positive for a BRCA2 pathogenic variant      10/26/2021 -  Chemotherapy    Xeloda 825 mg/m2 BID  BSA = 1.59  Calculated to 1300 mg BID with rounding.        10/28/2021 - 12/2/2021 Radiation    Treatments:  Course: C1    Plan ID Energy Fractions Dose per Fraction (cGy) Dose Correction (cGy) Total Dose Delivered (cGy) Elapsed Days   Abdomen 6X 25 / 25 195 0 4,875 35      Treatment Dates:  10/28/2021 - 12/2/2021.                Lab Results   Component Value Date    WBC 8.33 02/07/2024    HGB 13.8 02/07/2024    HCT 42.7 02/07/2024    MCV 95 02/07/2024     02/07/2024     Lab Results   Component Value Date    SODIUM 137 02/07/2024    K 4.8 02/07/2024    CL 99 02/07/2024    CO2 29 02/07/2024    AGAP 9 02/07/2024    BUN 13 02/07/2024    CREATININE 0.75 02/07/2024    GLUC 95 09/14/2023    GLUF 107 (H) 02/07/2024    CALCIUM 10.0 02/07/2024    AST 30 02/07/2024    ALT 30 02/07/2024    ALKPHOS 106 (H) 02/07/2024    TP 7.5 02/07/2024    TBILI 0.47 02/07/2024    EGFR 82 02/07/2024       Interval history: Patient is feeling well.  Patient today presents with her sister.  Patient is in great spirits.  Very happy to hear the results of CT scan.  Blood work was also stable.  Patient no longer has Port-A-Cath, removed 10/2022      Review of Systems   Constitutional:  Negative for appetite change, fatigue, fever and unexpected weight change.   HENT:  Negative for nosebleeds.    Respiratory:  Negative for cough, choking and shortness of breath.         Negative hemoptysis.   Cardiovascular:  Negative for chest pain, palpitations and leg swelling.   Gastrointestinal: Negative.  Negative for abdominal distention, abdominal pain, anal bleeding, blood in stool, constipation, diarrhea, nausea and vomiting.   Endocrine: Negative.  Negative for cold intolerance.   Genitourinary: Negative.  Negative for hematuria, menstrual problem, vaginal bleeding, vaginal discharge and vaginal pain.   Musculoskeletal: Negative.  Negative for arthralgias, myalgias, neck pain and neck stiffness.   Skin: Negative.  Negative for color change, pallor and rash.   Allergic/Immunologic: Negative.  Negative for immunocompromised state.   Neurological: Negative.  Negative for weakness and headaches.   Hematological:  Negative for adenopathy. Does not bruise/bleed easily.   All other systems reviewed and are negative.      Current Outpatient Medications:     acetaminophen  (TYLENOL) 500 mg tablet, Take 1,000 mg by mouth, Disp: , Rfl:     albuterol (2.5 mg/3 mL) 0.083 % nebulizer solution, Take 3 mL (2.5 mg total) by nebulization every 6 (six) hours as needed for wheezing or shortness of breath, Disp: 30 mL, Rfl: 1    albuterol (ProAir HFA) 90 mcg/act inhaler, Inhale 2 puffs every 6 (six) hours as needed for wheezing, Disp: 25.5 g, Rfl: 0    albuterol (PROVENTIL HFA,VENTOLIN HFA) 90 mcg/act inhaler, Inhale 2 puffs every 6 (six) hours as needed for wheezing, Disp: 20.1 g, Rfl: 2    aspirin 81 mg chewable tablet, Chew 1 tablet (81 mg total) daily, Disp: 30 tablet, Rfl: 0    atorvastatin (LIPITOR) 20 mg tablet, Take 1 tablet (20 mg total) by mouth every evening, Disp: 30 tablet, Rfl: 0    benzonatate (TESSALON PERLES) 100 mg capsule, Take 1 capsule (100 mg total) by mouth 3 (three) times a day as needed for cough, Disp: 30 capsule, Rfl: 0    buPROPion (WELLBUTRIN SR) 150 mg 12 hr tablet, TAKE 1 TABLET BY MOUTH TWICE A DAY, Disp: 180 tablet, Rfl: 0    fluticasone (FLONASE) 50 mcg/act nasal spray, 2 sprays into each nostril daily for 3 days, Disp: 16 g, Rfl: 0    Fluticasone-Salmeterol (Wixela Inhub) 250-50 mcg/dose inhaler, INHALE 1 PUFF 2 TIMES A DAY RINSE MOUTH AFTER USE., Disp: 60 blister, Rfl: 2    ipratropium-albuterol (DUO-NEB) 0.5-2.5 mg/3 mL nebulizer solution, Take 3 mL by nebulization 4 (four) times a day, Disp: 60 mL, Rfl: 1    losartan-hydrochlorothiazide (HYZAAR) 50-12.5 mg per tablet, Take 1 tablet by mouth daily, Disp: 30 tablet, Rfl: 2    Medical ID Plate MISC, Use once for 1 dose Severely and permanently limited in the ability to walk because of an arthritic, neurological, or orthopedic condition: or cannot walk two hundred feet without stopping to rest and meets requirements for disability license plate, Disp: 1 each, Rfl: 0    metoprolol tartrate (LOPRESSOR) 25 mg tablet, Take 25 mg by mouth 2 (two) times a day, Disp: , Rfl:     Multiple Vitamin (multivitamin) tablet,  "Take 1 tablet by mouth daily, Disp: , Rfl:     nicotine (NICODERM CQ) 7 mg/24hr TD 24 hr patch, Place 1 patch on the skin over 24 hours daily Apply a new patch every 24 hours to a clean, dry, hairless site on the upper arm or hip., Disp: 28 patch, Rfl: 0    ondansetron (Zofran ODT) 4 mg disintegrating tablet, Take 1 tablet (4 mg total) by mouth every 6 (six) hours as needed for nausea or vomiting, Disp: 10 tablet, Rfl: 0    sertraline (ZOLOFT) 25 mg tablet, Take 1 tablet (25 mg total) by mouth daily, Disp: 30 tablet, Rfl: 5  Allergies   Allergen Reactions    Penicillins Rash    Tetracycline Rash     Objective   /80 (BP Location: Right arm, Patient Position: Sitting, Cuff Size: Adult)   Pulse 82   Temp 98 °F (36.7 °C)   Resp 18   Ht 5' 2\" (1.575 m)   Wt 77.6 kg (171 lb)   SpO2 98%   BMI 31.28 kg/m²   Wt Readings from Last 6 Encounters:   02/09/24 77.6 kg (171 lb)   01/04/24 78.5 kg (173 lb)   01/01/24 78.5 kg (173 lb)   11/20/23 78.8 kg (173 lb 12.8 oz)   11/10/23 78.2 kg (172 lb 6.4 oz)   10/27/23 77.1 kg (170 lb)       Physical Exam  Constitutional:       General: She is not in acute distress.     Appearance: She is well-developed.   HENT:      Head: Normocephalic and atraumatic.      Mouth/Throat:      Pharynx: No oropharyngeal exudate.   Eyes:      General: No scleral icterus.     Pupils: Pupils are equal, round, and reactive to light.   Cardiovascular:      Rate and Rhythm: Normal rate and regular rhythm.      Heart sounds: No murmur heard.  Pulmonary:      Effort: Pulmonary effort is normal. No respiratory distress.      Breath sounds: Normal breath sounds.   Abdominal:      General: Bowel sounds are normal. There is no distension.      Palpations: Abdomen is soft.      Tenderness: There is no abdominal tenderness.   Musculoskeletal:         General: Normal range of motion.      Cervical back: Normal range of motion.   Lymphadenopathy:      Cervical: No cervical adenopathy.   Skin:     General: " Skin is warm.      Coloration: Skin is not pale.      Findings: No rash.   Neurological:      Mental Status: She is alert and oriented to person, place, and time.      Cranial Nerves: No cranial nerve deficit.   Psychiatric:         Thought Content: Thought content normal.         Pertinent Laboratory Results and Imaging Review:  Appointment on 02/07/2024   Component Date Value Ref Range Status    WBC 02/07/2024 8.33  4.31 - 10.16 Thousand/uL Final    RBC 02/07/2024 4.50  3.81 - 5.12 Million/uL Final    Hemoglobin 02/07/2024 13.8  11.5 - 15.4 g/dL Final    Hematocrit 02/07/2024 42.7  34.8 - 46.1 % Final    MCV 02/07/2024 95  82 - 98 fL Final    MCH 02/07/2024 30.7  26.8 - 34.3 pg Final    MCHC 02/07/2024 32.3  31.4 - 37.4 g/dL Final    RDW 02/07/2024 12.7  11.6 - 15.1 % Final    MPV 02/07/2024 9.7  8.9 - 12.7 fL Final    Platelets 02/07/2024 378  149 - 390 Thousands/uL Final    nRBC 02/07/2024 0  /100 WBCs Final    Neutrophils Relative 02/07/2024 70  43 - 75 % Final    Immat GRANS % 02/07/2024 0  0 - 2 % Final    Lymphocytes Relative 02/07/2024 21  14 - 44 % Final    Monocytes Relative 02/07/2024 6  4 - 12 % Final    Eosinophils Relative 02/07/2024 2  0 - 6 % Final    Basophils Relative 02/07/2024 1  0 - 1 % Final    Neutrophils Absolute 02/07/2024 5.90  1.85 - 7.62 Thousands/µL Final    Immature Grans Absolute 02/07/2024 0.01  0.00 - 0.20 Thousand/uL Final    Lymphocytes Absolute 02/07/2024 1.71  0.60 - 4.47 Thousands/µL Final    Monocytes Absolute 02/07/2024 0.51  0.17 - 1.22 Thousand/µL Final    Eosinophils Absolute 02/07/2024 0.14  0.00 - 0.61 Thousand/µL Final    Basophils Absolute 02/07/2024 0.06  0.00 - 0.10 Thousands/µL Final    Sodium 02/07/2024 137  135 - 147 mmol/L Final    Potassium 02/07/2024 4.8  3.5 - 5.3 mmol/L Final    Chloride 02/07/2024 99  96 - 108 mmol/L Final    CO2 02/07/2024 29  21 - 32 mmol/L Final    ANION GAP 02/07/2024 9  mmol/L Final    BUN 02/07/2024 13  5 - 25 mg/dL Final    Creatinine  02/07/2024 0.75  0.60 - 1.30 mg/dL Final    Standardized to IDMS reference method    Glucose, Fasting 02/07/2024 107 (H)  65 - 99 mg/dL Final    Calcium 02/07/2024 10.0  8.4 - 10.2 mg/dL Final    AST 02/07/2024 30  13 - 39 U/L Final    ALT 02/07/2024 30  7 - 52 U/L Final    Specimen collection should occur prior to Sulfasalazine administration due to the potential for falsely depressed results.     Alkaline Phosphatase 02/07/2024 106 (H)  34 - 104 U/L Final    Total Protein 02/07/2024 7.5  6.4 - 8.4 g/dL Final    Albumin 02/07/2024 4.7  3.5 - 5.0 g/dL Final    Total Bilirubin 02/07/2024 0.47  0.20 - 1.00 mg/dL Final    Use of this assay is not recommended for patients undergoing treatment with eltrombopag due to the potential for falsely elevated results.  N-acetyl-p-benzoquinone imine (metabolite of Acetaminophen) will generate erroneously low results in samples for patients that have taken an overdose of Acetaminophen.    eGFR 02/07/2024 82  ml/min/1.73sq m Final    CA 19-9 02/07/2024 <2  0 - 35 U/mL Final    Roche [employer]ostics Electrochemiluminescence Immunoassay (ECLIA)  Values obtained with different assay methods or kits cannot be  used interchangeably.  Results cannot be interpreted as absolute  evidence of the presence or absence of malignant disease.   Appointment on 02/02/2024   Component Date Value Ref Range Status    CA 19-9 02/02/2024 <2  0 - 35 U/mL Final    Roche [employer]ostics Electrochemiluminescence Immunoassay (ECLIA)  Values obtained with different assay methods or kits cannot be  used interchangeably.  Results cannot be interpreted as absolute  evidence of the presence or absence of malignant disease.    BUN 02/02/2024 19  5 - 25 mg/dL Final    Creatinine 02/02/2024 0.75  0.60 - 1.30 mg/dL Final    Standardized to IDMS reference method    eGFR 02/02/2024 82  ml/min/1.73sq m Final     CT chest abdomen pelvis w contrast  Narrative: CT CHEST, ABDOMEN AND PELVIS WITH IV CONTRAST    INDICATION: C24.1:  Malignant neoplasm of ampulla of Vater.    COMPARISON: CTA chest PE study 9/14/2023. CT chest abdomen pelvis 8/3/2023.    TECHNIQUE: CT examination of the chest, abdomen and pelvis was performed. Multiplanar 2D reformatted images were created from the source data.    This examination, like all CT scans performed in the [facility], was performed utilizing techniques to minimize radiation dose exposure, including the use of iterative reconstruction and automated exposure control. Radiation dose length   product (DLP) for this visit: 843.63 mGy-cm    IV Contrast: 100 mL of iohexol (OMNIPAQUE)  Enteric Contrast: Not administered.    FINDINGS:    CHEST    LUNGS: Lungs are clear. No tracheal or endobronchial lesion.    PLEURA: Unchanged cardiac contours. Coronary artery calcifications.    HEART/GREAT VESSELS: Heart is unremarkable for patient's age. No thoracic aortic aneurysm.    MEDIASTINUM AND WYATT: Unremarkable.    CHEST WALL AND LOWER NECK: Reidentified thyroid nodules measuring up to 2.2 cm on the right. Left chest wall loop recorder.    ABDOMEN    LIVER/BILIARY TREE: Unremarkable.    GALLBLADDER: Cholecystectomy.    SPLEEN: Unremarkable.    PANCREAS: Status post Whipple. No local recurrence.    ADRENAL GLANDS: Unremarkable.    KIDNEYS/URETERS: Simple renal cyst(s). Subcentimeter renal hypodensities also likely cysts. Hydronephrosis.    STOMACH AND BOWEL: Status post Whipple. Colonic diverticulosis without diverticulitis.    APPENDIX: Noninflamed.    ABDOMINOPELVIC CAVITY: No ascites. No pneumoperitoneum. No lymphadenopathy.    VESSELS: Unremarkable for patient's age.    PELVIS    REPRODUCTIVE ORGANS: Post hysterectomy.    URINARY BLADDER: Unremarkable.    ABDOMINAL WALL/INGUINAL REGIONS: Small ventral wall hernia partially containing a short segment of transverse colon unchanged from the prior study. No obstruction or other complication.    BONES: No acute fracture or suspicious osseous lesion.  Spinal degenerative changes.  Impression: No signs of residual, recurrent or metastatic malignancy in the chest, abdomen or pelvis.    Workstation performed: [id number]      The following historical data was reviewed:  Past Medical History:   Diagnosis Date    BRCA1 positive     BRCA2 positive     Bundle branch block, left     Cancer (HCC)     pancreatic    Facial droop     r/t neck surgery    History of chemotherapy     pancreatic cancer    History of radiation therapy     Hypercholesteremia     Pancreatic carcinoma (HCC)      Past Surgical History:   Procedure Laterality Date    ABLATION SOFT TISSUE N/A 4/26/2021    Procedure: INTRAOPERATIVE ULTRASOUND, ABLATION,SOFT TISSUE PANCREAS;  Surgeon: Feliciano Luevano MD;  Location: [facility];  Service: Surgical Oncology    CARDIAC CATHETERIZATION Left 9/5/2023    Procedure: Cardiac catheterization;  Surgeon: Jack Gates MD;  Location: [facility];  Service: Cardiology    CHOLECYSTECTOMY N/A 4/26/2021    Procedure: CHOLECYSTECTOMY;  Surgeon: Feliciano Luevano MD;  Location: [facility];  Service: Surgical Oncology    FL GUIDED CENTRAL VENOUS ACCESS DEVICE INSERTION  6/2/2021    HYSTERECTOMY      LAPAROTOMY N/A 4/26/2021    Procedure: LAPAROTOMY EXPLORATORY;  Surgeon: Feliciano Luevano MD;  Location: [facility];  Service: Surgical Oncology    OOPHORECTOMY      SINUS SURGERY      TUNNELED VENOUS PORT PLACEMENT Left 6/2/2021    Procedure: INSERTION VENOUS PORT (PORT-A-CATH);  Surgeon: Feliciano Luevano MD;  Location: [facility];  Service: Surgical Oncology    US GUIDED THYROID BIOPSY  7/13/2022    WHIPPLE PROCEDURE/PANCREATICO-DUODENECTOMY N/A 4/26/2021    Procedure: WHIPPLE PROCEDURE/PANCREATICO-DUODENECTOMY; PYLORIC PRESERVING;  Surgeon: Feliciano Luevano MD;  Location: [facility];  Service: Surgical Oncology     Social History     Socioeconomic History    Marital status:      Spouse name: None    Number of children: 3    Years of education: None    Highest education  level: None   Occupational History    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 1-2 cigg some days 2022.   Vaping Use    Vaping status: Never Used   Substance and Sexual Activity    Alcohol use: Never    Drug use: Never    Sexual activity: Not Currently   Other Topics Concern    None   Social History Narrative    None     Social Determinants of Health     Financial Resource Strain: Low Risk  (10/6/2023)    Overall Financial Resource Strain (CARDIA)     Difficulty of Paying Living Expenses: Not very hard   Food Insecurity: No Food Insecurity (10/6/2023)    Hunger Vital Sign     Worried About Running Out of Food in the Last Year: Never true     Ran Out of Food in the Last Year: Never true   Transportation Needs: No Transportation Needs (10/6/2023)    PRAPARE - Transportation     Lack of Transportation (Medical): No     Lack of Transportation (Non-Medical): No   Physical Activity: Insufficiently Active (2022)    Exercise Vital Sign     Days of Exercise per Week: 4 days     Minutes of Exercise per Session: 20 min   Stress: No Stress Concern Present (10/6/2023)    [employer] - Occupational Stress Questionnaire     Feeling of Stress : Not at all   Social Connections: Socially Isolated (2022)    Social Connection and Isolation Panel [NHANES]     Frequency of Communication with Friends and Family: More than three times a week     Frequency of Social Gatherings with Friends and Family: More than three times a week     Attends Buddhism Services: Never     Active Member of Clubs or  Organizations: No     Attends Club or Organization Meetings: Never     Marital Status:    Intimate Partner Violence: Not At Risk (6/1/2022)    Humiliation, Afraid, Rape, and Kick questionnaire     Fear of Current or Ex-Partner: No     Emotionally Abused: No     Physically Abused: No     Sexually Abused: No   Housing Stability: Unknown (6/1/2022)    Housing Stability Vital Sign     Unable to Pay for Housing in the Last Year: No     Number of Places Lived in the Last Year: Not on file     Unstable Housing in the Last Year: No     Family History   Problem Relation Age of Onset    Ulcerative colitis Mother     Prostate cancer Father     Melanoma Sister     Heart disease Brother     Prostate cancer Brother     No Known Problems Brother     No Known Problems Maternal Uncle     No Known Problems Paternal Uncle        Please note:  This report has been generated by a voice recognition software system. Therefore there may be syntax, spelling, and/or grammatical errors. Please call if you have any questions.

## 2024-02-12 ENCOUNTER — HOSPITAL ENCOUNTER (OUTPATIENT)
Dept: RADIOLOGY | Facility: HOSPITAL | Age: 69
Discharge: HOME | End: 2024-02-12
Payer: COMMERCIAL

## 2024-02-12 DIAGNOSIS — E04.1 THYROID NODULE: ICD-10-CM

## 2024-02-12 PROCEDURE — 76536 US EXAM OF HEAD AND NECK: CPT

## 2024-02-14 ENCOUNTER — TELEPHONE (OUTPATIENT)
Dept: HEMATOLOGY ONCOLOGY | Facility: CLINIC | Age: 69
End: 2024-02-14

## 2024-02-14 ENCOUNTER — OFFICE VISIT (OUTPATIENT)
Dept: SURGICAL ONCOLOGY | Facility: CLINIC | Age: 69
End: 2024-02-14
Payer: COMMERCIAL

## 2024-02-14 VITALS
TEMPERATURE: 97.9 F | HEIGHT: 62 IN | OXYGEN SATURATION: 97 % | SYSTOLIC BLOOD PRESSURE: 138 MMHG | HEART RATE: 74 BPM | BODY MASS INDEX: 31.38 KG/M2 | WEIGHT: 170.5 LBS | DIASTOLIC BLOOD PRESSURE: 80 MMHG

## 2024-02-14 DIAGNOSIS — Z85.09 ENCOUNTER FOR FOLLOW-UP SURVEILLANCE OF AMPULLA OF VATER CANCER: Primary | ICD-10-CM

## 2024-02-14 DIAGNOSIS — Z85.09 HISTORY OF MALIGNANT NEOPLASM OF AMPULLA OF VATER: ICD-10-CM

## 2024-02-14 DIAGNOSIS — Z15.09 BRCA2 POSITIVE: ICD-10-CM

## 2024-02-14 DIAGNOSIS — Z08 ENCOUNTER FOR FOLLOW-UP SURVEILLANCE OF AMPULLA OF VATER CANCER: Primary | ICD-10-CM

## 2024-02-14 DIAGNOSIS — Z91.89 INCREASED RISK OF BREAST CANCER: ICD-10-CM

## 2024-02-14 DIAGNOSIS — Z15.01 BRCA2 POSITIVE: ICD-10-CM

## 2024-02-14 DIAGNOSIS — E04.2 MULTIPLE THYROID NODULES: ICD-10-CM

## 2024-02-14 DIAGNOSIS — Z12.39 BREAST CANCER SCREENING OTHER THAN MAMMOGRAM: ICD-10-CM

## 2024-02-14 PROCEDURE — 99214 OFFICE O/P EST MOD 30 MIN: CPT

## 2024-02-14 NOTE — LETTER
February 14, 2024     Feliciano Luevano MD  [address] [facility]  [address] [facility] [address]    Patient: Ana Maria Murphy   YOB: 1955   Date of Visit: 2/14/2024       Dear Dr. Luevano:    Thank you for referring Ana Maria Murphy to me for evaluation. Below are my notes for this consultation.    If you have questions, please do not hesitate to call me. I look forward to following your patient along with you.         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    History of malignant neoplasm of ampulla of Vater  -     CT chest abdomen pelvis w contrast; Future  -     BUN; Future  -     Creatinine, serum; Future  -     Cancer antigen 19-9; Future    Increased risk of breast cancer  -     MRI breast bilateral w and wo contrast w cad; Future    BRCA2 positive  -     MRI breast bilateral w and wo contrast w cad; Future    Breast cancer screening other than mammogram  -     MRI breast bilateral w and wo contrast w cad; Future  -     BUN; Future  -     Creatinine, serum; Future    Multiple thyroid nodules        Chief Complaint   Patient presents with   • Follow-up       Return in about 6 months (around 8/14/2024) for Office visit with Dr Luevano, Imaging - See orders, Labs - See Treatment Plan.      Oncology History   History of malignant neoplasm of ampulla of Vater   3/15/2021 Initial Diagnosis    Neoplasm of ampulla of Vater     4/26/2021 Surgery    B.  Celiac lymph node:     - Single  lymph node; negative for malignancy (0/1).     C.  Whipple resection:     - Invasive poorly differentiated mucinous adenocarcinoma.     - Tumor arises in the background of an adenoma with high grade dysplasia.     - Lymphatic and venous channel invasion by tumor present.     - Metastatic carcinoma present in one of twenty lymph nodes (1/20).     - All margins are negative for tumor.     4/26/2021 -  Cancer Staged    Staging form: Ampulla of Vater, AJCC 8th Edition  - Pathologic stage from 4/26/2021: Stage IIIA (pT3b, pN1, cM0) - Signed by ANDRES Palma on 6/9/2023  Total positive nodes: 1  Total nodes examined: 22  Histologic grade (G): G3  Histologic grading system: 3 grade system  Residual tumor (R): R0 - None       6/9/2021 - 10/1/2021 Chemotherapy    Cycles 1-6  6/2021 through 8/20/21:  FOLFOX    Cycles 7 -8:  9/15/2021- 10/12/2021  Leucovorin 400mg/m2, IV, Day 1  5-Fu 400 mg/m2, IV , Day 1   5-Fu 1200 mg/m2, IV, CI x 46 hours  Cycle length = 2 weeks    palonosetron (ALOXI), 0.25 mg, Intravenous, Once, 5 of 5 cycles  Administration: 0.25 mg (7/21/2021), 0.25 mg (8/4/2021), 0.25 mg (8/18/2021), 0.25 mg (9/15/2021), 0.25 mg (9/29/2021)  fluorouracil (ADRUCIL), 400 mg/m2 = 605 mg, Intravenous, Once, 8 of 8 cycles  Dose modification: 340 mg/m2 (85 % of original dose 400 mg/m2, Cycle 6, Reason: Dose Not Tolerated)  Administration: 605 mg (6/9/2021), 605 mg (6/23/2021), 605 mg (7/7/2021), 605 mg (7/21/2021), 605 mg (8/4/2021), 515 mg (8/18/2021), 610 mg (9/15/2021), 610 mg (9/29/2021)  fosaprepitant (EMEND) IVPB, 150 mg, Intravenous, Once, 6 of 6 cycles  Administration: 150 mg (7/7/2021), 150 mg (7/21/2021), 150 mg (8/4/2021), 150 mg (8/18/2021), 150 mg (9/15/2021), 150 mg (9/29/2021)  leucovorin calcium IVPB, 604 mg, Intravenous, Once, 8 of 8 cycles  Dose modification: 340 mg/m2 (85 % of original dose 400 mg/m2, Cycle 6, Reason: Dose Not Tolerated)  Administration: 600 mg (6/9/2021), 600 mg (6/23/2021), 600  mg (7/7/2021), 600 mg (7/21/2021), 600 mg (8/4/2021), 517 mg (8/18/2021), 600 mg (9/15/2021), 600 mg (9/29/2021)  oxaliplatin (ELOXATIN) chemo infusion, 85 mg/m2 = 128.35 mg, Intravenous, Once, 6 of 6 cycles  Dose modification: 72.25 mg/m2 (85 % of original dose 85 mg/m2, Cycle 6, Reason: Dose Not Tolerated)  Administration: 128.35 mg (6/9/2021), 128.35 mg (6/23/2021), 128.35 mg (7/7/2021), 128.35 mg (7/21/2021), 128.35 mg (8/4/2021), 109.82 mg (8/18/2021)  fluorouracil (ADRUCIL) ambulatory infusion Soln, 1,200 mg/m2/day = 3,625 mg, Intravenous, Over 46 hours, 8 of 8 cycles     10/1/2021 Genetic Testing    Results revealed patient has the following mutation(s):  Positive for a BRCA2 pathogenic variant      10/26/2021 -  Chemotherapy    Xeloda 825 mg/m2 BID  BSA = 1.59  Calculated to 1300 mg BID with rounding.        10/28/2021 - 12/2/2021 Radiation    Treatments:  Course: C1    Plan ID Energy Fractions Dose per Fraction (cGy) Dose Correction (cGy) Total Dose Delivered (cGy) Elapsed Days   Abdomen 6X 25 / 25 195 0 4,875 35      Treatment Dates:  10/28/2021 - 12/2/2021.              Staging: [id number] periampullary carcinoma, April 2021  BRCA 2 mutation  10 year TC is 24.10%  Lifetime TC is 37%  Treatment history:  Pancreaticoduodenectomy, April 2021  Adjuvant FOLFOX  Chemo radiation with Xeloda  Right upper pole thyroid biopsy, July 2022: AUS, [place] 3, Afirma benign  Left mid pole thyroid biopsy, July 22: [place] 2  Current treatment:  Observation  Disease status: SEDA    History of Present Illness: The patient returns to the office today in follow-up for her history of ampullary cancer, as well as a BRCA2 mutation and thyroid nodules.  She reports she is doing well and has no complaints.  She denies any changes in her voice, difficulty swallowing, or new or enlarging lumps in her neck.  She reports her appetite is good and weight is stable; denies and N/V/D or abdominal pain.  She denies any changes on self-breast  exam, such as lumps, skin changes or tenderness.  Denies any new headaches, dizziness or bone pain.  A Ca 19-9 level has been drawn, and CT of the chest, abdomen and pelvis was performed on February 7. Thyroid US was performed on February 12 but report is still pending.  Mammogram performed on November 15, BIRADS-2 overall, density category 2.  I have reviewed all available results with the patient today.      Review of Systems   Constitutional:  Negative for activity change, appetite change, fatigue and unexpected weight change.   HENT: Negative.  Negative for trouble swallowing and voice change.    Respiratory: Negative.  Negative for cough and shortness of breath.    Cardiovascular: Negative.    Gastrointestinal: Negative.  Negative for abdominal distention, abdominal pain, diarrhea, nausea and vomiting.   Musculoskeletal: Negative.    Skin: Negative.  Negative for color change.   Neurological: Negative.  Negative for dizziness, light-headedness and headaches.   Hematological: Negative.  Negative for adenopathy.   Psychiatric/Behavioral: Negative.               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   • Increased risk of breast cancer   • Primary hypertension   • Left bundle branch block   • Prolonged Q-T interval on ECG   • Hypokalemia   • Mixed hyperlipidemia   • Loose bowel movements   • Smoking   • SOB (shortness of breath)   • Reactive airway disease   • Anxiety   • Generalized hyperhidrosis   • Elevated C-reactive protein (CRP)   • Mild intermittent asthma     Past Medical History:   Diagnosis Date   • BRCA1 positive    • BRCA2 positive    • Bundle branch block, left    • Cancer (HCC)     pancreatic   • Facial droop     r/t neck surgery   •  History of chemotherapy     pancreatic cancer   • History of radiation therapy    • Hypercholesteremia    • Pancreatic carcinoma (HCC)      Past Surgical History:   Procedure Laterality Date   • ABLATION SOFT TISSUE N/A 4/26/2021    Procedure: INTRAOPERATIVE ULTRASOUND, ABLATION,SOFT TISSUE PANCREAS;  Surgeon: Feliciano Luevano MD;  Location: [facility];  Service: Surgical Oncology   • CARDIAC CATHETERIZATION Left 9/5/2023    Procedure: Cardiac catheterization;  Surgeon: Jack Gates MD;  Location: [facility];  Service: Cardiology   • CHOLECYSTECTOMY N/A 4/26/2021    Procedure: CHOLECYSTECTOMY;  Surgeon: Feliciano Luevano MD;  Location: [facility];  Service: Surgical Oncology   • FL GUIDED CENTRAL VENOUS ACCESS DEVICE INSERTION  6/2/2021   • HYSTERECTOMY     • LAPAROTOMY N/A 4/26/2021    Procedure: LAPAROTOMY EXPLORATORY;  Surgeon: Feliciano Luevano MD;  Location: [facility];  Service: Surgical Oncology   • OOPHORECTOMY     • SINUS SURGERY     • TUNNELED VENOUS PORT PLACEMENT Left 6/2/2021    Procedure: INSERTION VENOUS PORT (PORT-A-CATH);  Surgeon: Feliciano Luevano MD;  [facility];  Service: Surgical Oncology   • US GUIDED THYROID BIOPSY  7/13/2022   • WHIPPLE PROCEDURE/PANCREATICO-DUODENECTOMY N/A 4/26/2021    Procedure: WHIPPLE PROCEDURE/PANCREATICO-DUODENECTOMY; PYLORIC PRESERVING;  Surgeon: Feliciano Luevano MD;  [facility];  Service: Surgical Oncology     Family History   Problem Relation Age of Onset   • Ulcerative colitis Mother    • Prostate cancer Father    • Melanoma Sister    • Heart disease Brother    • Prostate cancer Brother    • No Known Problems Brother    • No Known Problems Maternal Uncle    • No Known Problems Paternal Uncle      Social History     Socioeconomic History   • Marital status:      Spouse name: Not on file   • Number of children: 3   • Years of education: Not on file   • Highest education level: Not on file   Occupational History   • 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 1-2 cigg some days 2022.   Vaping Use   • Vaping status: Never Used   Substance and Sexual Activity   • Alcohol use: Never   • Drug use: Never   • Sexual activity: Not Currently   Other Topics Concern   • Not on file   Social History Narrative   • Not on file     Social Determinants of Health     Financial Resource Strain: Low Risk  (10/6/2023)    Overall Financial Resource Strain (CARDIA)    • Difficulty of Paying Living Expenses: Not very hard   Food Insecurity: No Food Insecurity (10/6/2023)    Hunger Vital Sign    • Worried About Running Out of Food in the Last Year: Never true    • Ran Out of Food in the Last Year: Never true   Transportation Needs: No Transportation Needs (10/6/2023)    PRAPARE - Transportation    • Lack of Transportation (Medical): No    • Lack of Transportation (Non-Medical): No   Physical Activity: Insufficiently Active (2022)    Exercise Vital Sign    • Days of Exercise per Week: 4 days    • Minutes of Exercise per Session: 20 min   Stress: No Stress Concern Present (10/6/2023)    [employer] [place] [employer] - Occupational Stress Questionnaire    • Feeling of Stress : Not at all   Social Connections: Socially Isolated (2022)    Social Connection and Isolation Panel [NHANES]    • Frequency of Communication with Friends and Family: More than three times a week    • Frequency of Social Gatherings with Friends and Family: More than three times a week    • Attends Roman [employer] Services: Never    • Active Member of Clubs or Organizations: No    • Attends  Club or Organization Meetings: Never    • Marital Status:    Intimate Partner Violence: Not At Risk (6/1/2022)    Humiliation, Afraid, Rape, and Kick questionnaire    • Fear of Current or Ex-Partner: No    • Emotionally Abused: No    • Physically Abused: No    • Sexually Abused: No   Housing Stability: Unknown (6/1/2022)    Housing Stability Vital Sign    • Unable to Pay for Housing in the Last Year: No    • Number of Places Lived in the Last Year: Not on file    • Unstable Housing in the Last Year: No       Current Outpatient Medications:   •  acetaminophen (TYLENOL) 500 mg tablet, Take 1,000 mg by mouth, Disp: , Rfl:   •  albuterol (2.5 mg/3 mL) 0.083 % nebulizer solution, Take 3 mL (2.5 mg total) by nebulization every 6 (six) hours as needed for wheezing or shortness of breath, Disp: 30 mL, Rfl: 1  •  albuterol (ProAir HFA) 90 mcg/act inhaler, Inhale 2 puffs every 6 (six) hours as needed for wheezing, Disp: 25.5 g, Rfl: 0  •  albuterol (PROVENTIL HFA,VENTOLIN HFA) 90 mcg/act inhaler, Inhale 2 puffs every 6 (six) hours as needed for wheezing, Disp: 20.1 g, Rfl: 2  •  aspirin 81 mg chewable tablet, Chew 1 tablet (81 mg total) daily, Disp: 30 tablet, Rfl: 0  •  atorvastatin (LIPITOR) 20 mg tablet, Take 1 tablet (20 mg total) by mouth every evening, Disp: 30 tablet, Rfl: 0  •  benzonatate (TESSALON PERLES) 100 mg capsule, Take 1 capsule (100 mg total) by mouth 3 (three) times a day as needed for cough, Disp: 30 capsule, Rfl: 0  •  buPROPion (WELLBUTRIN SR) 150 mg 12 hr tablet, TAKE 1 TABLET BY MOUTH TWICE A DAY, Disp: 180 tablet, Rfl: 0  •  fluticasone (FLONASE) 50 mcg/act nasal spray, 2 sprays into each nostril daily for 3 days, Disp: 16 g, Rfl: 0  •  Fluticasone-Salmeterol (Wixela Inhub) 250-50 mcg/dose inhaler, INHALE 1 PUFF 2 TIMES A DAY RINSE MOUTH AFTER USE., Disp: 60 blister, Rfl: 2  •  ipratropium-albuterol (DUO-NEB) 0.5-2.5 mg/3 mL nebulizer solution, Take 3 mL by nebulization 4 (four) times a day,  Disp: 60 mL, Rfl: 1  •  losartan-hydrochlorothiazide (HYZAAR) 50-12.5 mg per tablet, Take 1 tablet by mouth daily, Disp: 30 tablet, Rfl: 2  •  metoprolol tartrate (LOPRESSOR) 25 mg tablet, Take 25 mg by mouth 2 (two) times a day, Disp: , Rfl:   •  Multiple Vitamin (multivitamin) tablet, Take 1 tablet by mouth daily, Disp: , Rfl:   •  nicotine (NICODERM CQ) 7 mg/24hr TD 24 hr patch, Place 1 patch on the skin over 24 hours daily Apply a new patch every 24 hours to a clean, dry, hairless site on the upper arm or hip., Disp: 28 patch, Rfl: 0  •  ondansetron (Zofran ODT) 4 mg disintegrating tablet, Take 1 tablet (4 mg total) by mouth every 6 (six) hours as needed for nausea or vomiting, Disp: 10 tablet, Rfl: 0  •  sertraline (ZOLOFT) 25 mg tablet, Take 1 tablet (25 mg total) by mouth daily, Disp: 30 tablet, Rfl: 5  •  Medical ID Plate MISC, Use once for 1 dose Severely and permanently limited in the ability to walk because of an arthritic, neurological, or orthopedic condition: or cannot walk two hundred feet without stopping to rest and meets requirements for disability license plate, Disp: 1 each, Rfl: 0  Allergies   Allergen Reactions   • Penicillins Rash   • Tetracycline Rash     Vitals:    02/14/24 0822   BP: 138/80   Pulse: 74   Temp: 97.9 °F (36.6 °C)   SpO2: 97%       Physical Exam  Vitals reviewed.   Constitutional:       General: She is not in acute distress.     Appearance: Normal appearance. She is normal weight. She is not ill-appearing or toxic-appearing.   HENT:      Head: Normocephalic and atraumatic.      Nose: Nose normal.      Mouth/Throat:      Mouth: Mucous membranes are moist.   Eyes:      General: No scleral icterus.     Conjunctiva/sclera: Conjunctivae normal.   Neck:      Thyroid: Thyromegaly present.      Comments: Right thyroid lobe is enlarged, visible   Cardiovascular:      Rate and Rhythm: Normal rate.   Pulmonary:      Effort: Pulmonary effort is normal.   Chest:      Comments: Patient  declined breast exam  Abdominal:      General: Abdomen is flat. There is no distension.      Palpations: Abdomen is soft. There is no mass.      Tenderness: There is no abdominal tenderness. There is no guarding.   Musculoskeletal:         General: Normal range of motion.      Cervical back: Normal range of motion and neck supple.   Lymphadenopathy:      Cervical: No cervical adenopathy.      Upper Body:      Right upper body: No supraclavicular, axillary or pectoral adenopathy.      Left upper body: No supraclavicular, axillary or pectoral adenopathy.   Skin:     General: Skin is warm and dry.      Coloration: Skin is not jaundiced.   Neurological:      General: No focal deficit present.      Mental Status: She is alert and oriented to person, place, and time.   Psychiatric:         Mood and Affect: Mood normal.         Behavior: Behavior normal.         Thought Content: Thought content normal.         Judgment: Judgment normal.               Labs:  Cancer antigen 19-9  Collected 2/7/2024        Component  Ref Range & Units 7 d ago   CA 19-9  0 - 35 U/mL           Imaging  CT chest abdomen pelvis w contrast    Result Date: 2/7/2024  Narrative: CT CHEST, ABDOMEN AND PELVIS WITH IV CONTRAST INDICATION: C24.1: Malignant neoplasm of ampulla of Vater. COMPARISON: CTA chest PE study 9/14/2023. CT chest abdomen pelvis 8/3/2023. TECHNIQUE: CT examination of the chest, abdomen and pelvis was performed. Multiplanar 2D reformatted images were created from the source data. This examination, like all CT scans performed in the [facility], was performed utilizing techniques to minimize radiation dose exposure, including the use of iterative reconstruction and automated exposure control. Radiation dose length product (DLP) for this visit: 843.63 mGy-cm IV Contrast: 100 mL of iohexol (OMNIPAQUE) Enteric Contrast: Not administered. FINDINGS: CHEST LUNGS: Lungs are clear. No tracheal or endobronchial lesion. PLEURA:  Unchanged cardiac contours. Coronary artery calcifications. HEART/GREAT VESSELS: Heart is unremarkable for patient's age. No thoracic aortic aneurysm. MEDIASTINUM AND WYATT: Unremarkable. CHEST WALL AND LOWER NECK: Reidentified thyroid nodules measuring up to 2.2 cm on the right. Left chest wall loop recorder. ABDOMEN LIVER/BILIARY TREE: Unremarkable. GALLBLADDER: Cholecystectomy. SPLEEN: Unremarkable. PANCREAS: Status post Whipple. No local recurrence. ADRENAL GLANDS: Unremarkable. KIDNEYS/URETERS: Simple renal cyst(s). Subcentimeter renal hypodensities also likely cysts. Hydronephrosis. STOMACH AND BOWEL: Status post Whipple. Colonic diverticulosis without diverticulitis. APPENDIX: Noninflamed. ABDOMINOPELVIC CAVITY: No ascites. No pneumoperitoneum. No lymphadenopathy. VESSELS: Unremarkable for patient's age. PELVIS REPRODUCTIVE ORGANS: Post hysterectomy. URINARY BLADDER: Unremarkable. ABDOMINAL WALL/INGUINAL REGIONS: Small ventral wall hernia partially containing a short segment of transverse colon unchanged from the prior study. No obstruction or other complication. BONES: No acute fracture or suspicious osseous lesion. Spinal degenerative changes.     Impression: No signs of residual, recurrent or metastatic malignancy in the chest, abdomen or pelvis. Workstation performed: [id number]     I personally reviewed and interpreted the above laboratory and imaging data.    Discussion/Summary: This is a 69 y/o female who presents today for continued ampullary cancer, BRCA2 mutation and thyroid nodule surveillance.  She is doing well overall, and there are no concerning findings no imaging.  Her tumor marker is low.  On exam, the right-sided thyroid goiter may be larger.  Should her thyroid US show enlargement and recommend biopsy, patient states she will not undergo any further biopsies, and would prefer to discuss surgery with Dr Luevano if necessary.  I will call her to discuss.  I will plan to see her again in 6  months with a repeat CT and Ca 19-9, and will plan for her to have breast MRI in May or June.  All questions were answered today, patient is agreeable to the plan.

## 2024-02-14 NOTE — PROGRESS NOTES
Surgical Oncology Follow Up       [facility] [place]  [address]  [place] [address]    Ana Maria Mendozaeliana  1955  [id number]  [facility] [place]  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    History of malignant neoplasm of ampulla of Vater  -     CT chest abdomen pelvis w contrast; Future  -     BUN; Future  -     Creatinine, serum; Future  -     Cancer antigen 19-9; Future    Increased risk of breast cancer  -     MRI breast bilateral w and wo contrast w cad; Future    BRCA2 positive  -     MRI breast bilateral w and wo contrast w cad; Future    Breast cancer screening other than mammogram  -     MRI breast bilateral w and wo contrast w cad; Future  -     BUN; Future  -     Creatinine, serum; Future    Multiple thyroid nodules        Chief Complaint   Patient presents with    Follow-up       Return in about 6 months (around 8/14/2024) for Office visit with Dr Luevano, Imaging - See orders, Labs - See Treatment Plan.      Oncology History   History of malignant neoplasm of ampulla of Vater   3/15/2021 Initial Diagnosis    Neoplasm of ampulla of Vater     4/26/2021 Surgery    B.  Celiac lymph node:     - Single lymph node; negative for malignancy (0/1).     C.  Whipple resection:     - Invasive poorly differentiated mucinous adenocarcinoma.     - Tumor arises in the background of an adenoma with high grade dysplasia.     - Lymphatic and venous channel invasion by tumor present.     - Metastatic carcinoma present in one of twenty lymph nodes (1/20).     - All margins are negative for tumor.     4/26/2021 -  Cancer Staged    Staging form: Ampulla of Vater, AJCC 8th Edition  - Pathologic stage from 4/26/2021: Stage IIIA (pT3b, pN1, cM0) - Signed by ANDRES Palma on  6/9/2023  Total positive nodes: 1  Total nodes examined: 22  Histologic grade (G): G3  Histologic grading system: 3 grade system  Residual tumor (R): R0 - None       6/9/2021 - 10/1/2021 Chemotherapy    Cycles 1-6  6/2021 through 8/20/21:  FOLFOX    Cycles 7 -8:  9/15/2021- 10/12/2021  Leucovorin 400mg/m2, IV, Day 1  5-Fu 400 mg/m2, IV , Day 1   5-Fu 1200 mg/m2, IV, CI x 46 hours  Cycle length = 2 weeks    palonosetron (ALOXI), 0.25 mg, Intravenous, Once, 5 of 5 cycles  Administration: 0.25 mg (7/21/2021), 0.25 mg (8/4/2021), 0.25 mg (8/18/2021), 0.25 mg (9/15/2021), 0.25 mg (9/29/2021)  fluorouracil (ADRUCIL), 400 mg/m2 = 605 mg, Intravenous, Once, 8 of 8 cycles  Dose modification: 340 mg/m2 (85 % of original dose 400 mg/m2, Cycle 6, Reason: Dose Not Tolerated)  Administration: 605 mg (6/9/2021), 605 mg (6/23/2021), 605 mg (7/7/2021), 605 mg (7/21/2021), 605 mg (8/4/2021), 515 mg (8/18/2021), 610 mg (9/15/2021), 610 mg (9/29/2021)  fosaprepitant (EMEND) IVPB, 150 mg, Intravenous, Once, 6 of 6 cycles  Administration: 150 mg (7/7/2021), 150 mg (7/21/2021), 150 mg (8/4/2021), 150 mg (8/18/2021), 150 mg (9/15/2021), 150 mg (9/29/2021)  leucovorin calcium IVPB, 604 mg, Intravenous, Once, 8 of 8 cycles  Dose modification: 340 mg/m2 (85 % of original dose 400 mg/m2, Cycle 6, Reason: Dose Not Tolerated)  Administration: 600 mg (6/9/2021), 600 mg (6/23/2021), 600 mg (7/7/2021), 600 mg (7/21/2021), 600 mg (8/4/2021), 517 mg (8/18/2021), 600 mg (9/15/2021), 600 mg (9/29/2021)  oxaliplatin (ELOXATIN) chemo infusion, 85 mg/m2 = 128.35 mg, Intravenous, Once, 6 of 6 cycles  Dose modification: 72.25 mg/m2 (85 % of original dose 85 mg/m2, Cycle 6, Reason: Dose Not Tolerated)  Administration: 128.35 mg (6/9/2021), 128.35 mg (6/23/2021), 128.35 mg (7/7/2021), 128.35 mg (7/21/2021), 128.35 mg (8/4/2021), 109.82 mg (8/18/2021)  fluorouracil (ADRUCIL) ambulatory infusion Soln, 1,200 mg/m2/day = 3,625 mg, Intravenous, Over 46 hours, 8 of  8 cycles     10/1/2021 Genetic Testing    Results revealed patient has the following mutation(s):  Positive for a BRCA2 pathogenic variant      10/26/2021 -  Chemotherapy    Xeloda 825 mg/m2 BID  BSA = 1.59  Calculated to 1300 mg BID with rounding.        10/28/2021 - 12/2/2021 Radiation    Treatments:  Course: C1    Plan ID Energy Fractions Dose per Fraction (cGy) Dose Correction (cGy) Total Dose Delivered (cGy) Elapsed Days   Abdomen 6X 25 / 25 195 0 4,875 35      Treatment Dates:  10/28/2021 - 12/2/2021.              Staging: [id number] periampullary carcinoma, April 2021  BRCA 2 mutation  10 year TC is 24.10%  Lifetime TC is 37%  Treatment history:  Pancreaticoduodenectomy, April 2021  Adjuvant FOLFOX  Chemo radiation with Xeloda  Right upper pole thyroid biopsy, July 2022: AUS, [place] 3, Afirma benign  Left mid pole thyroid biopsy, July 22: [place] 2  Current treatment:  Observation  Disease status: SEDA    History of Present Illness: The patient returns to the office today in follow-up for her history of ampullary cancer, as well as a BRCA2 mutation and thyroid nodules.  She reports she is doing well and has no complaints.  She denies any changes in her voice, difficulty swallowing, or new or enlarging lumps in her neck.  She reports her appetite is good and weight is stable; denies and N/V/D or abdominal pain.  She denies any changes on self-breast exam, such as lumps, skin changes or tenderness.  Denies any new headaches, dizziness or bone pain.  A Ca 19-9 level has been drawn, and CT of the chest, abdomen and pelvis was performed on February 7. Thyroid US was performed on February 12 but report is still pending.  Mammogram performed on November 15, BIRADS-2 overall, density category 2.  I have reviewed all available results with the patient today.      Review of Systems   Constitutional:  Negative for activity change, appetite change, fatigue and unexpected weight change.   HENT: Negative.  Negative for  trouble swallowing and voice change.    Respiratory: Negative.  Negative for cough and shortness of breath.    Cardiovascular: Negative.    Gastrointestinal: Negative.  Negative for abdominal distention, abdominal pain, diarrhea, nausea and vomiting.   Musculoskeletal: Negative.    Skin: Negative.  Negative for color change.   Neurological: Negative.  Negative for dizziness, light-headedness and headaches.   Hematological: Negative.  Negative for adenopathy.   Psychiatric/Behavioral: Negative.               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    Increased risk of breast cancer    Primary hypertension    Left bundle branch block    Prolonged Q-T interval on ECG    Hypokalemia    Mixed hyperlipidemia    Loose bowel movements    Smoking    SOB (shortness of breath)    Reactive airway disease    Anxiety    Generalized hyperhidrosis    Elevated C-reactive protein (CRP)    Mild intermittent asthma     Past Medical History:   Diagnosis Date    BRCA1 positive     BRCA2 positive     Bundle branch block, left     Cancer (HCC)     pancreatic    Facial droop     r/t neck surgery    History of chemotherapy     pancreatic cancer    History of radiation therapy     Hypercholesteremia     Pancreatic carcinoma (HCC)      Past Surgical History:   Procedure Laterality Date    ABLATION SOFT TISSUE N/A 4/26/2021    Procedure: INTRAOPERATIVE ULTRASOUND, ABLATION,SOFT TISSUE PANCREAS;  Surgeon: Feliciano Luevano MD;  Location: [facility];  Service: Surgical Oncology    CARDIAC CATHETERIZATION Left 9/5/2023    Procedure: Cardiac catheterization;  Surgeon: Jack Gates MD;  Location: [facility];  Service: Cardiology    CHOLECYSTECTOMY N/A 4/26/2021    Procedure:  CHOLECYSTECTOMY;  Surgeon: Feliciano Luevano MD;  Location: [facility];  Service: Surgical Oncology    FL GUIDED CENTRAL VENOUS ACCESS DEVICE INSERTION  2021    HYSTERECTOMY      LAPAROTOMY N/A 2021    Procedure: LAPAROTOMY EXPLORATORY;  Surgeon: Feliciano Luevano MD;  Location: [facility];  Service: Surgical Oncology    OOPHORECTOMY      SINUS SURGERY      TUNNELED VENOUS PORT PLACEMENT Left 2021    Procedure: INSERTION VENOUS PORT (PORT-A-CATH);  Surgeon: Feliciano Luevano MD;  Location: [facility];  Service: Surgical Oncology    US GUIDED THYROID BIOPSY  2022    WHIPPLE PROCEDURE/PANCREATICO-DUODENECTOMY N/A 2021    Procedure: WHIPPLE PROCEDURE/PANCREATICO-DUODENECTOMY; PYLORIC PRESERVING;  Surgeon: Feliciano Luevano MD;  Location: [facility];  Service: Surgical Oncology     Family History   Problem Relation Age of Onset    Ulcerative colitis Mother     Prostate cancer Father     Melanoma Sister     Heart disease Brother     Prostate cancer Brother     No Known Problems Brother     No Known Problems Maternal Uncle     No Known Problems Paternal Uncle      Social History     Socioeconomic History    Marital status:      Spouse name: Not on file    Number of children: 3    Years of education: Not on file    Highest education level: Not on file   Occupational History    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 1-2 cigg some days 2022.   Vaping Use    Vaping status: Never Used   Substance and Sexual Activity    Alcohol use: Never    Drug use: Never    Sexual activity: Not Currently   Other Topics  Concern    Not on file   Social History Narrative    Not on file     Social Determinants of Health     Financial Resource Strain: Low Risk  (10/6/2023)    Overall Financial Resource Strain (CARDIA)     Difficulty of Paying Living Expenses: Not very hard   Food Insecurity: No Food Insecurity (10/6/2023)    Hunger Vital Sign     Worried About Running Out of Food in the Last Year: Never true     Ran Out of Food in the Last Year: Never true   Transportation Needs: No Transportation Needs (10/6/2023)    PRAPARE - Transportation     Lack of Transportation (Medical): No     Lack of Transportation (Non-Medical): No   Physical Activity: Insufficiently Active (6/1/2022)    Exercise Vital Sign     Days of Exercise per Week: 4 days     Minutes of Exercise per Session: 20 min   Stress: No Stress Concern Present (10/6/2023)    [employer] [place] [employer] - Occupational Stress Questionnaire     Feeling of Stress : Not at all   Social Connections: Socially Isolated (6/1/2022)    Social Connection and Isolation Panel [NHANES]     Frequency of Communication with Friends and Family: More than three times a week     Frequency of Social Gatherings with Friends and Family: More than three times a week     Attends Jainism Services: Never     Active Member of Clubs or Organizations: No     Attends Club or Organization Meetings: Never     Marital Status:    Intimate Partner Violence: Not At Risk (6/1/2022)    Humiliation, Afraid, Rape, and Kick questionnaire     Fear of Current or Ex-Partner: No     Emotionally Abused: No     Physically Abused: No     Sexually Abused: No   Housing Stability: Unknown (6/1/2022)    Housing Stability Vital Sign     Unable to Pay for Housing in the Last Year: No     Number of Places Lived in the Last Year: Not on file     Unstable Housing in the Last Year: No       Current Outpatient Medications:     acetaminophen (TYLENOL) 500 mg tablet, Take 1,000 mg by mouth, Disp: , Rfl:     albuterol  (2.5 mg/3 mL) 0.083 % nebulizer solution, Take 3 mL (2.5 mg total) by nebulization every 6 (six) hours as needed for wheezing or shortness of breath, Disp: 30 mL, Rfl: 1    albuterol (ProAir HFA) 90 mcg/act inhaler, Inhale 2 puffs every 6 (six) hours as needed for wheezing, Disp: 25.5 g, Rfl: 0    albuterol (PROVENTIL HFA,VENTOLIN HFA) 90 mcg/act inhaler, Inhale 2 puffs every 6 (six) hours as needed for wheezing, Disp: 20.1 g, Rfl: 2    aspirin 81 mg chewable tablet, Chew 1 tablet (81 mg total) daily, Disp: 30 tablet, Rfl: 0    atorvastatin (LIPITOR) 20 mg tablet, Take 1 tablet (20 mg total) by mouth every evening, Disp: 30 tablet, Rfl: 0    benzonatate (TESSALON PERLES) 100 mg capsule, Take 1 capsule (100 mg total) by mouth 3 (three) times a day as needed for cough, Disp: 30 capsule, Rfl: 0    buPROPion (WELLBUTRIN SR) 150 mg 12 hr tablet, TAKE 1 TABLET BY MOUTH TWICE A DAY, Disp: 180 tablet, Rfl: 0    fluticasone (FLONASE) 50 mcg/act nasal spray, 2 sprays into each nostril daily for 3 days, Disp: 16 g, Rfl: 0    Fluticasone-Salmeterol (Wixela Inhub) 250-50 mcg/dose inhaler, INHALE 1 PUFF 2 TIMES A DAY RINSE MOUTH AFTER USE., Disp: 60 blister, Rfl: 2    ipratropium-albuterol (DUO-NEB) 0.5-2.5 mg/3 mL nebulizer solution, Take 3 mL by nebulization 4 (four) times a day, Disp: 60 mL, Rfl: 1    losartan-hydrochlorothiazide (HYZAAR) 50-12.5 mg per tablet, Take 1 tablet by mouth daily, Disp: 30 tablet, Rfl: 2    metoprolol tartrate (LOPRESSOR) 25 mg tablet, Take 25 mg by mouth 2 (two) times a day, Disp: , Rfl:     Multiple Vitamin (multivitamin) tablet, Take 1 tablet by mouth daily, Disp: , Rfl:     nicotine (NICODERM CQ) 7 mg/24hr TD 24 hr patch, Place 1 patch on the skin over 24 hours daily Apply a new patch every 24 hours to a clean, dry, hairless site on the upper arm or hip., Disp: 28 patch, Rfl: 0    ondansetron (Zofran ODT) 4 mg disintegrating tablet, Take 1 tablet (4 mg total) by mouth every 6 (six) hours as  needed for nausea or vomiting, Disp: 10 tablet, Rfl: 0    sertraline (ZOLOFT) 25 mg tablet, Take 1 tablet (25 mg total) by mouth daily, Disp: 30 tablet, Rfl: 5    Medical ID Plate MISC, Use once for 1 dose Severely and permanently limited in the ability to walk because of an arthritic, neurological, or orthopedic condition: or cannot walk two hundred feet without stopping to rest and meets requirements for disability license plate, Disp: 1 each, Rfl: 0  Allergies   Allergen Reactions    Penicillins Rash    Tetracycline Rash     Vitals:    02/14/24 0822   BP: 138/80   Pulse: 74   Temp: 97.9 °F (36.6 °C)   SpO2: 97%       Physical Exam  Vitals reviewed.   Constitutional:       General: She is not in acute distress.     Appearance: Normal appearance. She is normal weight. She is not ill-appearing or toxic-appearing.   HENT:      Head: Normocephalic and atraumatic.      Nose: Nose normal.      Mouth/Throat:      Mouth: Mucous membranes are moist.   Eyes:      General: No scleral icterus.     Conjunctiva/sclera: Conjunctivae normal.   Neck:      Thyroid: Thyromegaly present.      Comments: Right thyroid lobe is enlarged, visible   Cardiovascular:      Rate and Rhythm: Normal rate.   Pulmonary:      Effort: Pulmonary effort is normal.   Chest:      Comments: Patient declined breast exam  Abdominal:      General: Abdomen is flat. There is no distension.      Palpations: Abdomen is soft. There is no mass.      Tenderness: There is no abdominal tenderness. There is no guarding.   Musculoskeletal:         General: Normal range of motion.      Cervical back: Normal range of motion and neck supple.   Lymphadenopathy:      Cervical: No cervical adenopathy.      Upper Body:      Right upper body: No supraclavicular, axillary or pectoral adenopathy.      Left upper body: No supraclavicular, axillary or pectoral adenopathy.   Skin:     General: Skin is warm and dry.      Coloration: Skin is not jaundiced.   Neurological:       General: No focal deficit present.      Mental Status: She is alert and oriented to person, place, and time.   Psychiatric:         Mood and Affect: Mood normal.         Behavior: Behavior normal.         Thought Content: Thought content normal.         Judgment: Judgment normal.               Labs:  Cancer antigen 19-9  Collected 2/7/2024        Component  Ref Range & Units 7 d ago   CA 19-9  0 - 35 U/mL           Imaging  CT chest abdomen pelvis w contrast    Result Date: 2/7/2024  Narrative: CT CHEST, ABDOMEN AND PELVIS WITH IV CONTRAST INDICATION: C24.1: Malignant neoplasm of ampulla of Vater. COMPARISON: CTA chest PE study 9/14/2023. CT chest abdomen pelvis 8/3/2023. TECHNIQUE: CT examination of the chest, abdomen and pelvis was performed. Multiplanar 2D reformatted images were created from the source data. This examination, like all CT scans performed in the [facility], was performed utilizing techniques to minimize radiation dose exposure, including the use of iterative reconstruction and automated exposure control. Radiation dose length product (DLP) for this visit: 843.63 mGy-cm IV Contrast: 100 mL of iohexol (OMNIPAQUE) Enteric Contrast: Not administered. FINDINGS: CHEST LUNGS: Lungs are clear. No tracheal or endobronchial lesion. PLEURA: Unchanged cardiac contours. Coronary artery calcifications. HEART/GREAT VESSELS: Heart is unremarkable for patient's age. No thoracic aortic aneurysm. MEDIASTINUM AND WYATT: Unremarkable. CHEST WALL AND LOWER NECK: Reidentified thyroid nodules measuring up to 2.2 cm on the right. Left chest wall loop recorder. ABDOMEN LIVER/BILIARY TREE: Unremarkable. GALLBLADDER: Cholecystectomy. SPLEEN: Unremarkable. PANCREAS: Status post Whipple. No local recurrence. ADRENAL GLANDS: Unremarkable. KIDNEYS/URETERS: Simple renal cyst(s). Subcentimeter renal hypodensities also likely cysts. Hydronephrosis. STOMACH AND BOWEL: Status post Whipple. Colonic diverticulosis  without diverticulitis. APPENDIX: Noninflamed. ABDOMINOPELVIC CAVITY: No ascites. No pneumoperitoneum. No lymphadenopathy. VESSELS: Unremarkable for patient's age. PELVIS REPRODUCTIVE ORGANS: Post hysterectomy. URINARY BLADDER: Unremarkable. ABDOMINAL WALL/INGUINAL REGIONS: Small ventral wall hernia partially containing a short segment of transverse colon unchanged from the prior study. No obstruction or other complication. BONES: No acute fracture or suspicious osseous lesion. Spinal degenerative changes.     Impression: No signs of residual, recurrent or metastatic malignancy in the chest, abdomen or pelvis. Workstation performed: [id number]     I personally reviewed and interpreted the above laboratory and imaging data.    Discussion/Summary: This is a 69 y/o female who presents today for continued ampullary cancer, BRCA2 mutation and thyroid nodule surveillance.  She is doing well overall, and there are no concerning findings no imaging.  Her tumor marker is low.  On exam, the right-sided thyroid goiter may be larger.  Should her thyroid US show enlargement and recommend biopsy, patient states she will not undergo any further biopsies, and would prefer to discuss surgery with Dr Luevano if necessary.  I will call her to discuss.  I will plan to see her again in 6 months with a repeat CT and Ca 19-9, and will plan for her to have breast MRI in May or June.  All questions were answered today, patient is agreeable to the plan.

## 2024-02-14 NOTE — TELEPHONE ENCOUNTER
----- Message from Lelo Belle PA-C sent at 2/14/2024  3:12 PM EST -----  Please notify that thyroid is stable in size.  Thanks    Attempted to phone patient with above information.  Left voice message with direct call back number for questions

## 2024-02-22 ENCOUNTER — OFFICE VISIT (OUTPATIENT)
Dept: PULMONOLOGY | Facility: MEDICAL CENTER | Age: 69
End: 2024-02-22
Payer: COMMERCIAL

## 2024-02-22 VITALS
SYSTOLIC BLOOD PRESSURE: 122 MMHG | HEART RATE: 52 BPM | BODY MASS INDEX: 31.14 KG/M2 | HEIGHT: 62 IN | WEIGHT: 169.2 LBS | OXYGEN SATURATION: 97 % | RESPIRATION RATE: 12 BRPM | DIASTOLIC BLOOD PRESSURE: 84 MMHG

## 2024-02-22 DIAGNOSIS — R06.02 SOB (SHORTNESS OF BREATH): ICD-10-CM

## 2024-02-22 DIAGNOSIS — B34.9 VIRAL SYNDROME: ICD-10-CM

## 2024-02-22 DIAGNOSIS — J45.20 MILD INTERMITTENT ASTHMA, UNSPECIFIED WHETHER COMPLICATED: Primary | ICD-10-CM

## 2024-02-22 PROCEDURE — 99213 OFFICE O/P EST LOW 20 MIN: CPT | Performed by: STUDENT IN AN ORGANIZED HEALTH CARE EDUCATION/TRAINING PROGRAM

## 2024-02-22 RX ORDER — ALBUTEROL SULFATE 90 UG/1
2 AEROSOL, METERED RESPIRATORY (INHALATION) EVERY 6 HOURS PRN
Qty: 6.7 G | Refills: 3 | Status: SHIPPED | OUTPATIENT
Start: 2024-02-22

## 2024-02-22 NOTE — PROGRESS NOTES
Pulmonary Outpatient Progress Note   Ana Maria Murphy 68 y.o. female MRN: [id number]  2/22/2024      No problem-specific Assessment & Plan notes found for this encounter.      Assessment:    Reactive airway disease, with peripheral eosinophilia, significant allergens on her RAST panel, and her last appointment we placed her on ICS/LABA Wixela along with rescue albuterol and she has significant clinical improvement.  She dramatically cut down on emergency room visits and prednisone use.  Since then she has had clinical stability  Ex-smoker she says she recently quit smoking and has been able to abstain since then.  Nonobstructive CAD on aspirin Cozaar hydrochlorothiazide  History of stage IIIa adenocarcinoma the pancreas status post Whipple followed by adjuvant chemotherapy and 5-FU along with radiation follows with oncology service  History of hysterectomy oophorectomy with BRCA 2 positive mutation    Plan:    Continue ICS/LABA Wixela along with rescue albuterol inhaler and nebulizer  Refill inhalers  Continue to abstain from smoking  Follow-up in 6 months    History of Present Illness   HPI:    68-year-old female here for follow-up.  Has a past medical history of reactive airway disease, now an ex-smoker, nonobstructive CAD, stage IIIa adenocarcinoma with Whipple surgery in April 2021 followed by adjuvant chemotherapy and radiation, history of oophorectomy and hysterectomy.  Follows with oncology.    When last saw her we placed her on ICS LABA Wixela with rescue albuterol, she had a RAST panel that showed significant allergens.  Since the initiation of the medication she has had significant improvement and cut down on going to the emergency room visits for shortness of breath.  She now has clinical improvement and is stable from a respiratory status.      Review of Systems   Constitutional:  Negative for activity change and unexpected weight change.   HENT: Negative.     Eyes: Negative.    Respiratory:  Negative  for cough and shortness of breath.    Cardiovascular: Negative.    Gastrointestinal: Negative.  Negative for abdominal distention.   Endocrine: Negative.    Genitourinary: Negative.    Musculoskeletal: Negative.    Skin: Negative.    Allergic/Immunologic: Negative.    Neurological: Negative.    Hematological: Negative.    Psychiatric/Behavioral: Negative.          Historical Information   Past Medical History:   Diagnosis Date   • BRCA1 positive    • BRCA2 positive    • Bundle branch block, left    • Cancer (HCC)     pancreatic   • Facial droop     r/t neck surgery   • History of chemotherapy     pancreatic cancer   • History of radiation therapy    • Hypercholesteremia    • Pancreatic carcinoma (HCC)      Past Surgical History:   Procedure Laterality Date   • ABLATION SOFT TISSUE N/A 4/26/2021    Procedure: INTRAOPERATIVE ULTRASOUND, ABLATION,SOFT TISSUE PANCREAS;  Surgeon: Feliciano Luevano MD;  Location: [facility];  Service: Surgical Oncology   • CARDIAC CATHETERIZATION Left 9/5/2023    Procedure: Cardiac catheterization;  Surgeon: Jack Gates MD;  Location: [facility];  Service: Cardiology   • CHOLECYSTECTOMY N/A 4/26/2021    Procedure: CHOLECYSTECTOMY;  Surgeon: Feliciano Luevano MD;  Location: [facility];  Service: Surgical Oncology   • FL GUIDED CENTRAL VENOUS ACCESS DEVICE INSERTION  6/2/2021   • HYSTERECTOMY     • LAPAROTOMY N/A 4/26/2021    Procedure: LAPAROTOMY EXPLORATORY;  Surgeon: Feliciano Luevano MD;  Location: [facility];  Service: Surgical Oncology   • OOPHORECTOMY     • SINUS SURGERY     • TUNNELED VENOUS PORT PLACEMENT Left 6/2/2021    Procedure: INSERTION VENOUS PORT (PORT-A-CATH);  Surgeon: Feliciano Luevano MD;  Location: [facility];  Service: Surgical Oncology   • US GUIDED THYROID BIOPSY  7/13/2022   • WHIPPLE PROCEDURE/PANCREATICO-DUODENECTOMY N/A 4/26/2021    Procedure: WHIPPLE PROCEDURE/PANCREATICO-DUODENECTOMY; PYLORIC PRESERVING;  Surgeon: Feliciano Luevano MD;  Location: [facility];   Service: Surgical Oncology     Family History   Problem Relation Age of Onset   • Ulcerative colitis Mother    • Prostate cancer Father    • Melanoma Sister    • Heart disease Brother    • Prostate cancer Brother    • No Known Problems Brother    • No Known Problems Maternal Uncle    • No Known Problems Paternal Uncle      Social History     Tobacco Use   Smoking Status Every Day   • Current packs/day: 0.00   • Average packs/day: 0.3 packs/day for 46.2 years (11.6 ttl pk-yrs)   • Types: Cigarettes   • Start date:    • Last attempt to quit: 2021   • Years since quittin.8   • Passive exposure: Past   Smokeless Tobacco Never   Tobacco Comments    recently stop smoking , pt stated she smoke occ. 1-2 cigg some days 2022.       Occupational History: NA    Meds/Allergies     Current Outpatient Medications:   •  acetaminophen (TYLENOL) 500 mg tablet, Take 1,000 mg by mouth, Disp: , Rfl:   •  albuterol (2.5 mg/3 mL) 0.083 % nebulizer solution, Take 3 mL (2.5 mg total) by nebulization every 6 (six) hours as needed for wheezing or shortness of breath, Disp: 30 mL, Rfl: 1  •  albuterol (Ventolin HFA) 90 mcg/act inhaler, Inhale 2 puffs every 6 (six) hours as needed for wheezing, Disp: 6.7 g, Rfl: 3  •  buPROPion (WELLBUTRIN SR) 150 mg 12 hr tablet, TAKE 1 TABLET BY MOUTH TWICE A DAY, Disp: 180 tablet, Rfl: 0  •  Fluticasone-Salmeterol (Wixela Inhub) 250-50 mcg/dose inhaler, INHALE 1 PUFF 2 TIMES A DAY RINSE MOUTH AFTER USE., Disp: 60 blister, Rfl: 2  •  ipratropium-albuterol (DUO-NEB) 0.5-2.5 mg/3 mL nebulizer solution, Take 3 mL by nebulization 4 (four) times a day, Disp: 60 mL, Rfl: 1  •  losartan-hydrochlorothiazide (HYZAAR) 50-12.5 mg per tablet, Take 1 tablet by mouth daily, Disp: 30 tablet, Rfl: 2  •  metoprolol tartrate (LOPRESSOR) 25 mg tablet, Take 25 mg by mouth 2 (two) times a day, Disp: , Rfl:   •  Multiple Vitamin (multivitamin) tablet, Take 1 tablet by mouth daily, Disp: , Rfl:   •  nicotine  "(NICODERM CQ) 7 mg/24hr TD 24 hr patch, Place 1 patch on the skin over 24 hours daily Apply a new patch every 24 hours to a clean, dry, hairless site on the upper arm or hip., Disp: 28 patch, Rfl: 0  •  ondansetron (Zofran ODT) 4 mg disintegrating tablet, Take 1 tablet (4 mg total) by mouth every 6 (six) hours as needed for nausea or vomiting, Disp: 10 tablet, Rfl: 0  •  sertraline (ZOLOFT) 25 mg tablet, Take 1 tablet (25 mg total) by mouth daily, Disp: 30 tablet, Rfl: 5  •  aspirin 81 mg chewable tablet, Chew 1 tablet (81 mg total) daily, Disp: 30 tablet, Rfl: 0  •  atorvastatin (LIPITOR) 20 mg tablet, Take 1 tablet (20 mg total) by mouth every evening, Disp: 30 tablet, Rfl: 0  •  benzonatate (TESSALON PERLES) 100 mg capsule, Take 1 capsule (100 mg total) by mouth 3 (three) times a day as needed for cough (Patient not taking: Reported on 2/22/2024), Disp: 30 capsule, Rfl: 0  •  fluticasone (FLONASE) 50 mcg/act nasal spray, 2 sprays into each nostril daily for 3 days, Disp: 16 g, Rfl: 0  •  Medical ID Plate MISC, Use once for 1 dose Severely and permanently limited in the ability to walk because of an arthritic, neurological, or orthopedic condition: or cannot walk two hundred feet without stopping to rest and meets requirements for disability license plate, Disp: 1 each, Rfl: 0  Allergies   Allergen Reactions   • Penicillins Rash   • Tetracycline Rash       Vitals: Blood pressure 122/84, pulse (!) 52, resp. rate 12, height 5' 2\" (1.575 m), weight 76.7 kg (169 lb 3.2 oz), SpO2 97%., Body mass index is 30.95 kg/m². Oxygen Therapy  SpO2: 97 %    Physical Exam:    GEN: alert and oriented x 3, pleasant and cooperative   HEENT:  Normocephalic, atraumatic, anicteric  NECK: No JVD   HEART: Rate, normal S1 and S2  LUNGS: Clear, diminished bilaterally with no dullness to percussion   ABDOMEN:  Normoactive bowel sounds, soft, no tenderness, no distention  EXTREMITIES: peripheral pulses palpable; no edema  NEURO: no gross " focal findings; cranial nerves grossly intact   SKIN:  Dry, intact, warm to touch    Labs: I have personally reviewed pertinent lab results.  Lab Results   Component Value Date    IGE 56.1 10/06/2023       Imaging and other studies: I have personally reviewed pertinent films in PACS    Pulmonary function testing:  Personally interpreted by me    Other Studies: I have personally reviewed pertinent reports.      Makenna Hoyos MD  Pulmonary and Critical Care Medicine

## 2024-02-22 NOTE — PATIENT INSTRUCTIONS
It was a pleasure seeing you today!    Keep using Wixela   Use rescue as needed  CT by Oncology service   Follow up in 6 months     Makenna Hoyos MD  Pulmonary and Critical Care Medicine

## 2024-02-27 ENCOUNTER — TELEPHONE (OUTPATIENT)
Dept: FAMILY MEDICINE CLINIC | Facility: CLINIC | Age: 69
End: 2024-02-27

## 2024-02-27 NOTE — TELEPHONE ENCOUNTER
VM on appt line:    Yes. Hi. My name is Ana Maria Hinds. My YOB: 1955. I have an appointment at 1:00 this afternoon and I'd like to reschedule it. I'm not feeling good. Please give me a call back at [phone]. Thank you.    Yes, my name is Ana Maria Rivera. My birthday is August 9th, 1955. My phone number is [phone]. I have an appointment today at 1:00, but I'm going to have to reschedule it. I woke up with a really bad migraine. Please give me a call back. Thank you.    Yes, my name is Ana Maria Rivera. My birthday is August 9th, 1955. My phone number is [phone]. I have an appointment today at 1:00. I'm going to have to reschedule it. Thank you and please give me a call back.      Spoke with pt - rescheduled AWV

## 2024-03-12 DIAGNOSIS — F17.200 SMOKING: ICD-10-CM

## 2024-03-12 DIAGNOSIS — F41.9 ANXIETY: Chronic | ICD-10-CM

## 2024-03-12 RX ORDER — BUPROPION HYDROCHLORIDE 150 MG/1
150 TABLET, EXTENDED RELEASE ORAL 2 TIMES DAILY
Qty: 180 TABLET | Refills: 0 | Status: SHIPPED | OUTPATIENT
Start: 2024-03-12

## 2024-03-25 ENCOUNTER — TELEPHONE (OUTPATIENT)
Age: 69
End: 2024-03-25

## 2024-03-25 NOTE — TELEPHONE ENCOUNTER
Vm on appt line:     Yes, Ana Maria Hinds. My YOB: 1955. I haven't 11:00 appointment with [facility]. I won't be able to make it today. I have a very bad migraine. If you could please give me a call back at [phone]. Thank you so much    Called back & assisted in rescheduling.

## 2024-03-29 DIAGNOSIS — R06.02 SOB (SHORTNESS OF BREATH): ICD-10-CM

## 2024-03-29 DIAGNOSIS — J45.909 REACTIVE AIRWAY DISEASE WITHOUT COMPLICATION, UNSPECIFIED ASTHMA SEVERITY, UNSPECIFIED WHETHER PERSISTENT: ICD-10-CM

## 2024-03-29 RX ORDER — IPRATROPIUM BROMIDE AND ALBUTEROL SULFATE 2.5; .5 MG/3ML; MG/3ML
3 SOLUTION RESPIRATORY (INHALATION) 4 TIMES DAILY
Qty: 60 ML | Refills: 1 | Status: SHIPPED | OUTPATIENT
Start: 2024-03-29

## 2024-03-29 NOTE — TELEPHONE ENCOUNTER
Vm on refill line:     Yes, Ana Maria Ventura [place], My YOB: 1955. My phone is [phone]. I need a refill on the I Protium bromide and albuterol Sulfate Inhalation Solution for my nebulizer, my pharmacy, the [facility]. The telephone is [phone] and the quantity is 90. Thank you very much.

## 2024-04-09 NOTE — PROGRESS NOTES
*If you had blood work and/or x-rays done today, someone from our office will notify you if any of the results need immediate attention.  I would also like to remind you that in some cases the laboratory values may be outside of the normal limits but may not be of any significance and can be expected with your diagnosis/disease.  Otherwise your results will be discussed at your next follow up visit.   If you want to know your results before then, we recommend you sign up for the Patient Portal.      Return to clinic in 6 months.  Please call with any questions or concerns.    Thank you.     [place]

## 2024-04-12 ENCOUNTER — OFFICE VISIT (OUTPATIENT)
Age: 69
End: 2024-04-12

## 2024-04-12 VITALS
RESPIRATION RATE: 18 BRPM | OXYGEN SATURATION: 95 % | HEIGHT: 62 IN | BODY MASS INDEX: 31.41 KG/M2 | DIASTOLIC BLOOD PRESSURE: 66 MMHG | SYSTOLIC BLOOD PRESSURE: 108 MMHG | TEMPERATURE: 98.1 F | WEIGHT: 170.7 LBS | HEART RATE: 93 BPM

## 2024-04-12 DIAGNOSIS — Z00.00 MEDICARE ANNUAL WELLNESS VISIT, SUBSEQUENT: Primary | ICD-10-CM

## 2024-04-12 DIAGNOSIS — Z78.0 POSTMENOPAUSAL: ICD-10-CM

## 2024-04-12 DIAGNOSIS — Z13.820 ENCOUNTER FOR SCREENING FOR OSTEOPOROSIS: ICD-10-CM

## 2024-04-12 NOTE — PROGRESS NOTES
Assessment and Plan:     Problem List Items Addressed This Visit    None  Visit Diagnoses       Medicare annual wellness visit, subsequent    -  Primary    Encounter for screening for osteoporosis        Postmenopausal        Relevant Orders    DXA bone density spine hip and pelvis          BMI Counseling: Body mass index is 31.22 kg/m². The BMI is above normal. Nutrition recommendations include decreasing portion sizes and encouraging healthy choices of fruits and vegetables. Exercise recommendations include moderate physical activity 150 minutes/week. Rationale for BMI follow-up plan is due to patient being overweight or obese.     Depression Screening and Follow-up Plan: Patient was screened for depression during today's encounter. They screened negative with a PHQ-2 score of 0.      Preventive health issues were discussed with patient, and age appropriate screening tests were ordered as noted in patient's After Visit Summary.  Personalized health advice and appropriate referrals for health education or preventive services given if needed, as noted in patient's After Visit Summary.     History of Present Illness:     Patient presents for a Medicare Wellness Visit    HPI   Patient Care Team:  Dorita Dahl DO as PCP - General (Family Medicine)  Kojo Jose MD as PCP - [facility]CP-[facility] ([facility])  Feliciano Luevano MD as Surgeon (Surgical Oncology)  Carlos Will MD (Radiation Oncology)  Shannan Solo RD (Nutrition)  Naldo Callaway MD as Cardiologist (Cardiology)     Review of Systems:     Review of Systems   Constitutional:  Negative for chills, fatigue and fever.   HENT:  Negative for congestion, ear pain, hearing loss, rhinorrhea, sore throat and trouble swallowing.    Eyes:  Negative for pain and visual disturbance.   Respiratory:  Negative for cough and shortness of breath.    Cardiovascular:  Negative for chest pain.   Gastrointestinal:  Negative for constipation, diarrhea, nausea and  vomiting.   Endocrine: Negative for polyuria.   Genitourinary:  Negative for difficulty urinating and dysuria.   Musculoskeletal:  Negative for arthralgias and myalgias.   Neurological:  Negative for dizziness, light-headedness and headaches.        Problem List:     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    Increased risk of breast cancer    Primary hypertension    Left bundle branch block    Prolonged Q-T interval on ECG    Hypokalemia    Mixed hyperlipidemia    Loose bowel movements    Smoking    SOB (shortness of breath)    Reactive airway disease    Anxiety    Generalized hyperhidrosis    Elevated C-reactive protein (CRP)    Mild intermittent asthma      Past Medical and Surgical History:     Past Medical History:   Diagnosis Date    BRCA1 positive     BRCA2 positive     Bundle branch block, left     Cancer (HCC)     pancreatic    Facial droop     r/t neck surgery    History of chemotherapy     pancreatic cancer    History of radiation therapy     Hypercholesteremia     Pancreatic carcinoma (HCC)      Past Surgical History:   Procedure Laterality Date    ABLATION SOFT TISSUE N/A 4/26/2021    Procedure: INTRAOPERATIVE ULTRASOUND, ABLATION,SOFT TISSUE PANCREAS;  Surgeon: Feliciano Luevano MD;  Location: [facility];  Service: Surgical Oncology    CARDIAC CATHETERIZATION Left 9/5/2023    Procedure: Cardiac catheterization;  Surgeon: Jack Gates MD;  Location: [facility];  Service: Cardiology    CHOLECYSTECTOMY N/A 4/26/2021    Procedure: CHOLECYSTECTOMY;  Surgeon: Feliciano Luevano MD;  Location: [facility];  Service: Surgical Oncology    FL GUIDED CENTRAL VENOUS ACCESS DEVICE INSERTION  6/2/2021    HYSTERECTOMY      LAPAROTOMY N/A 4/26/2021     Procedure: LAPAROTOMY EXPLORATORY;  Surgeon: Feliciano Luevano MD;  Location: [facility];  Service: Surgical Oncology    OOPHORECTOMY      SINUS SURGERY      TUNNELED VENOUS PORT PLACEMENT Left 6/2/2021    Procedure: INSERTION VENOUS PORT (PORT-A-CATH);  Surgeon: Feliciano Luevano MD;  Location: [facility];  Service: Surgical Oncology    US GUIDED THYROID BIOPSY  7/13/2022    WHIPPLE PROCEDURE/PANCREATICO-DUODENECTOMY N/A 4/26/2021    Procedure: WHIPPLE PROCEDURE/PANCREATICO-DUODENECTOMY; PYLORIC PRESERVING;  Surgeon: Feliciano Luevano MD;  Location: [facility];  Service: Surgical Oncology      Family History:     Family History   Problem Relation Age of Onset    Ulcerative colitis Mother     Prostate cancer Father     Melanoma Sister     Heart disease Brother     Prostate cancer Brother     No Known Problems Brother     No Known Problems Maternal Uncle     No Known Problems Paternal Uncle       Social History:     Social History     Socioeconomic History    Marital status:      Spouse name: None    Number of children: 3    Years of education: None    Highest education level: None   Occupational History    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 1-2 cigg some days 06/02/2022.   Vaping Use    Vaping status: Never Used   Substance and Sexual Activity    Alcohol use: Never    Drug use: Never    Sexual activity: Not Currently   Other Topics Concern    None   Social History Narrative    None     Social Determinants of Health     Financial Resource Strain: Low Risk  (4/12/2024)    Overall Financial Resource Strain (CARDIA)      Difficulty of Paying Living Expenses: Not hard at all   Food Insecurity: No Food Insecurity (4/12/2024)    Hunger Vital Sign     Worried About Running Out of Food in the Last Year: Never true     Ran Out of Food in the Last Year: Never true   Transportation Needs: No Transportation Needs (4/12/2024)    PRAPARE - Transportation     Lack of Transportation (Medical): No     Lack of Transportation (Non-Medical): No   Physical Activity: Insufficiently Active (6/1/2022)    Exercise Vital Sign     Days of Exercise per Week: 4 days     Minutes of Exercise per Session: 20 min   Stress: No Stress Concern Present (10/6/2023)    [employer] - Occupational Stress Questionnaire     Feeling of Stress : Not at all   Social Connections: Socially Isolated (6/1/2022)    Social Connection and Isolation Panel [NHANES]     Frequency of Communication with Friends and Family: More than three times a week     Frequency of Social Gatherings with Friends and Family: More than three times a week     Attends Confucianism Services: Never     Active Member of Clubs or Organizations: No     Attends Club or Organization Meetings: Never     Marital Status:    Intimate Partner Violence: Not At Risk (6/1/2022)    Humiliation, Afraid, Rape, and Kick questionnaire     Fear of Current or Ex-Partner: No     Emotionally Abused: No     Physically Abused: No     Sexually Abused: No   Housing Stability: Low Risk  (4/12/2024)    Housing Stability Vital Sign     Unable to Pay for Housing in the Last Year: No     Number of Places Lived in the Last Year: 1     Unstable Housing in the Last Year: No      Medications and Allergies:     Current Outpatient Medications   Medication Sig Dispense Refill    acetaminophen (TYLENOL) 500 mg tablet Take 1,000 mg by mouth      albuterol (2.5 mg/3 mL) 0.083 % nebulizer solution Take 3 mL (2.5 mg total) by nebulization every 6 (six) hours as needed for wheezing or shortness of breath 30 mL 1     albuterol (Ventolin HFA) 90 mcg/act inhaler Inhale 2 puffs every 6 (six) hours as needed for wheezing 6.7 g 3    buPROPion (WELLBUTRIN SR) 150 mg 12 hr tablet TAKE 1 TABLET BY MOUTH TWICE A  tablet 0    ipratropium-albuterol (DUO-NEB) 0.5-2.5 mg/3 mL nebulizer solution Take 3 mL by nebulization 4 (four) times a day 60 mL 1    losartan-hydrochlorothiazide (HYZAAR) 50-12.5 mg per tablet Take 1 tablet by mouth daily 30 tablet 2    metoprolol tartrate (LOPRESSOR) 25 mg tablet Take 25 mg by mouth 2 (two) times a day      Multiple Vitamin (multivitamin) tablet Take 1 tablet by mouth daily      nicotine (NICODERM CQ) 7 mg/24hr TD 24 hr patch Place 1 patch on the skin over 24 hours daily Apply a new patch every 24 hours to a clean, dry, hairless site on the upper arm or hip. 28 patch 0    ondansetron (Zofran ODT) 4 mg disintegrating tablet Take 1 tablet (4 mg total) by mouth every 6 (six) hours as needed for nausea or vomiting 10 tablet 0    sertraline (ZOLOFT) 25 mg tablet Take 1 tablet (25 mg total) by mouth daily 30 tablet 5    aspirin 81 mg chewable tablet Chew 1 tablet (81 mg total) daily 30 tablet 0    atorvastatin (LIPITOR) 20 mg tablet Take 1 tablet (20 mg total) by mouth every evening 30 tablet 0    benzonatate (TESSALON PERLES) 100 mg capsule Take 1 capsule (100 mg total) by mouth 3 (three) times a day as needed for cough (Patient not taking: Reported on 2/22/2024) 30 capsule 0    fluticasone (FLONASE) 50 mcg/act nasal spray 2 sprays into each nostril daily for 3 days 16 g 0    Fluticasone-Salmeterol (Wixela Inhub) 250-50 mcg/dose inhaler INHALE 1 PUFF 2 TIMES A DAY RINSE MOUTH AFTER USE. 60 blister 2    Medical ID Plate MISC Use once for 1 dose Severely and permanently limited in the ability to walk because of an arthritic, neurological, or orthopedic condition: or cannot walk two hundred feet without stopping to rest and meets requirements for disability license plate 1 each 0     No current  facility-administered medications for this visit.     Allergies   Allergen Reactions    Penicillins Rash    Tetracycline Rash      Immunizations:     Immunization History   Administered Date(s) Administered    COVID-19 MODERNA VACC 0.5 ML IM 05/14/2021, 06/16/2021      Health Maintenance:         Topic Date Due    Colorectal Cancer Screening  Never done    Hepatitis C Screening  04/12/2025 (Originally 1955)    Breast Cancer Screening: Mammogram  11/15/2024         Topic Date Due    Pneumococcal Vaccine: 65+ Years (1 of 2 - PCV) Never done    COVID-19 Vaccine (3 - Moderna risk series) 07/14/2021    Influenza Vaccine (1) Never done      Medicare Screening Tests and Risk Assessments:     Ana Maria is here for her Subsequent Wellness visit. Last Medicare Wellness visit information reviewed, patient interviewed and updates made to the record today.      Health Risk Assessment:   Patient rates overall health as good. Patient feels that their physical health rating is much better. Patient is very satisfied with their life. Eyesight was rated as same. Hearing was rated as same. Patient feels that their emotional and mental health rating is much better. Patients states they are never, rarely angry. Patient states they are never, rarely unusually tired/fatigued. Pain experienced in the last 7 days has been none. Patient states that she has experienced no weight loss or gain in last 6 months.     Depression Screening:   PHQ-2 Score: 0      Fall Risk Screening:   In the past year, patient has experienced: no history of falling in past year      Urinary Incontinence Screening:   Patient has not leaked urine accidently in the last six months.     Home Safety:  Patient does not have trouble with stairs inside or outside of their home. Patient has working smoke alarms and has working carbon monoxide detector. Home safety hazards include: none.     Nutrition:   Current diet is Regular and No Added Salt.     Medications:   Patient  "is not currently taking any over-the-counter supplements. Patient is able to manage medications.     Activities of Daily Living (ADLs)/Instrumental Activities of Daily Living (IADLs):   Walk and transfer into and out of bed and chair?: Yes  Dress and groom yourself?: Yes    Bathe or shower yourself?: Yes    Feed yourself? Yes  Do your laundry/housekeeping?: Yes  Manage your money, pay your bills and track your expenses?: Yes  Make your own meals?: Yes    Do your own shopping?: Yes    Previous Hospitalizations:   Any hospitalizations or ED visits within the last 12 months?: No      Advance Care Planning:   Living will: No    Durable POA for healthcare: No    Advanced directive: No      PREVENTIVE SCREENINGS      Cardiovascular Screening:    General: Screening Not Indicated and History Lipid Disorder      Diabetes Screening:     General: Screening Current      Breast Cancer Screening:     General: Screening Current      Cervical Cancer Screening:    General: Screening Not Indicated      Lung Cancer Screening:     General: Screening Not Indicated    Screening, Brief Intervention, and Referral to Treatment (SBIRT)    Screening  Typical number of drinks in a day: 0  Typical number of drinks in a week: 0  Interpretation: Low risk drinking behavior.    Single Item Drug Screening:  How often have you used an illegal drug (including marijuana) or a prescription medication for non-medical reasons in the past year? never    Single Item Drug Screen Score: 0  Interpretation: Negative screen for possible drug use disorder    No results found.     Physical Exam:     /66 (BP Location: Right arm, Patient Position: Sitting, Cuff Size: Adult)   Pulse 93   Temp 98.1 °F (36.7 °C) (Tympanic)   Resp 18   Ht 5' 2\" (1.575 m)   Wt 77.4 kg (170 lb 11.2 oz)   SpO2 95%   BMI 31.22 kg/m²     Physical Exam  Vitals and nursing note reviewed.   Constitutional:       General: She is not in acute distress.     Appearance: She is " well-developed.   HENT:      Head: Normocephalic and atraumatic.   Eyes:      Conjunctiva/sclera: Conjunctivae normal.   Cardiovascular:      Rate and Rhythm: Normal rate and regular rhythm.      Heart sounds: No murmur heard.  Pulmonary:      Effort: Pulmonary effort is normal. No respiratory distress.      Breath sounds: Normal breath sounds.   Abdominal:      Palpations: Abdomen is soft.      Tenderness: There is no abdominal tenderness.   Musculoskeletal:         General: No swelling.      Cervical back: Neck supple.   Skin:     General: Skin is warm and dry.      Capillary Refill: Capillary refill takes less than 2 seconds.   Neurological:      Mental Status: She is alert.   Psychiatric:         Mood and Affect: Mood normal.          Dorita Dahl,

## 2024-04-12 NOTE — PATIENT INSTRUCTIONS
Medicare Preventive Visit Patient Instructions  Thank you for completing your Welcome to Medicare Visit or Medicare Annual Wellness Visit today. Your next wellness visit will be due in one year (4/13/2025).  The screening/preventive services that you may require over the next 5-10 years are detailed below. Some tests may not apply to you based off risk factors and/or age. Screening tests ordered at today's visit but not completed yet may show as past due. Also, please note that scanned in results may not display below.  Preventive Screenings:  Service Recommendations Previous Testing/Comments   Colorectal Cancer Screening  * Colonoscopy    * Fecal Occult Blood Test (FOBT)/Fecal Immunochemical Test (FIT)  * Fecal DNA/Cologuard Test  * Flexible Sigmoidoscopy Age: 45-75 years old   Colonoscopy: every 10 years (may be performed more frequently if at higher risk)  OR  FOBT/FIT: every 1 year  OR  Cologuard: every 3 years  OR  Sigmoidoscopy: every 5 years  Screening may be recommended earlier than age 45 if at higher risk for colorectal cancer. Also, an individualized decision between you and your healthcare provider will decide whether screening between the ages of 76-85 would be appropriate. Colonoscopy: Not on file  FOBT/FIT: Not on file  Cologuard: Not on file  Sigmoidoscopy: Not on file          Breast Cancer Screening Age: 40+ years old  Frequency: every 1-2 years  Not required if history of left and right mastectomy Mammogram: 11/15/2023    Screening Current   Cervical Cancer Screening Between the ages of 21-29, pap smear recommended once every 3 years.   Between the ages of 30-65, can perform pap smear with HPV co-testing every 5 years.   Recommendations may differ for women with a history of total hysterectomy, cervical cancer, or abnormal pap smears in past. Pap Smear: 06/08/2023    Screening Not Indicated   Hepatitis C Screening Once for adults born between 1945 and 1965  More frequently in patients at high risk  for Hepatitis C Hep C Antibody: Not on file        Diabetes Screening 1-2 times per year if you're at risk for diabetes or have pre-diabetes Fasting glucose: 107 mg/dL (2/7/2024)  A1C: 5.8 % (4/9/2021)  Screening Current   Cholesterol Screening Once every 5 years if you don't have a lipid disorder. May order more often based on risk factors. Lipid panel: 12/05/2023    Screening Not Indicated  History Lipid Disorder     Other Preventive Screenings Covered by Medicare:  Abdominal Aortic Aneurysm (AAA) Screening: covered once if your at risk. You're considered to be at risk if you have a family history of AAA.  Lung Cancer Screening: covers low dose CT scan once per year if you meet all of the following conditions: (1) Age 55-77; (2) No signs or symptoms of lung cancer; (3) Current smoker or have quit smoking within the last 15 years; (4) You have a tobacco smoking history of at least 20 pack years (packs per day multiplied by number of years you smoked); (5) You get a written order from a healthcare provider.  Glaucoma Screening: covered annually if you're considered high risk: (1) You have diabetes OR (2) Family history of glaucoma OR (3)  aged 50 and older OR (4)  American aged 65 and older  Osteoporosis Screening: covered every 2 years if you meet one of the following conditions: (1) You're estrogen deficient and at risk for osteoporosis based off medical history and other findings; (2) Have a vertebral abnormality; (3) On glucocorticoid therapy for more than 3 months; (4) Have primary hyperparathyroidism; (5) On osteoporosis medications and need to assess response to drug therapy.   Last bone density test (DXA Scan): Not on file.  HIV Screening: covered annually if you're between the age of 15-65. Also covered annually if you are younger than 15 and older than 65 with risk factors for HIV infection. For pregnant patients, it is covered up to 3 times per  pregnancy.    Immunizations:  Immunization Recommendations   Influenza Vaccine Annual influenza vaccination during flu season is recommended for all persons aged >= 6 months who do not have contraindications   Pneumococcal Vaccine   * Pneumococcal conjugate vaccine = PCV13 (Prevnar 13), PCV15 (Vaxneuvance), PCV20 (Prevnar 20)  * Pneumococcal polysaccharide vaccine = PPSV23 (Pneumovax) Adults 19-63 yo with certain risk factors or if 65+ yo  If never received any pneumonia vaccine: recommend Prevnar 20 (PCV20)  Give PCV20 if previously received 1 dose of PCV13 or PPSV23   Hepatitis B Vaccine 3 dose series if at intermediate or high risk (ex: diabetes, end stage renal disease, liver disease)   Respiratory syncytial virus (RSV) Vaccine - COVERED BY MEDICARE PART D  * RSVPreF3 (Arexvy) CDC recommends that adults 60 years of age and older may receive a single dose of RSV vaccine using shared clinical decision-making (SCDM)   Tetanus (Td) Vaccine - COST NOT COVERED BY MEDICARE PART B Following completion of primary series, a booster dose should be given every 10 years to maintain immunity against tetanus. Td may also be given as tetanus wound prophylaxis.   Tdap Vaccine - COST NOT COVERED BY MEDICARE PART B Recommended at least once for all adults. For pregnant patients, recommended with each pregnancy.   Shingles Vaccine (Shingrix) - COST NOT COVERED BY MEDICARE PART B  2 shot series recommended in those 19 years and older who have or will have weakened immune systems or those 50 years and older     Health Maintenance Due:      Topic Date Due   • Hepatitis C Screening  Never done   • Colorectal Cancer Screening  Never done   • Breast Cancer Screening: Mammogram  11/15/2024     Immunizations Due:      Topic Date Due   • Pneumococcal Vaccine: 65+ Years (1 of 2 - PCV) Never done   • COVID-19 Vaccine (3 - Moderna risk series) 07/14/2021   • Influenza Vaccine (1) Never done     Advance Directives   What are advance directives?   Advance directives are legal documents that state your wishes and plans for medical care. These plans are made ahead of time in case you lose your ability to make decisions for yourself. Advance directives can apply to any medical decision, such as the treatments you want, and if you want to donate organs.   What are the types of advance directives?  There are many types of advance directives, and each state has rules about how to use them. You may choose a combination of any of the following:  Living will:  This is a written record of the treatment you want. You can also choose which treatments you do not want, which to limit, and which to stop at a certain time. This includes surgery, medicine, IV fluid, and tube feedings.   Durable power of  for healthcare (DPAHC):  This is a written record that states who you want to make healthcare choices for you when you are unable to make them for yourself. This person, called a proxy, is usually a family member or a friend. You may choose more than 1 proxy.  Do not resuscitate (DNR) order:  A DNR order is used in case your heart stops beating or you stop breathing. It is a request not to have certain forms of treatment, such as CPR. A DNR order may be included in other types of advance directives.  Medical directive:  This covers the care that you want if you are in a coma, near death, or unable to make decisions for yourself. You can list the treatments you want for each condition. Treatment may include pain medicine, surgery, blood transfusions, dialysis, IV or tube feedings, and a ventilator (breathing machine).  Values history:  This document has questions about your views, beliefs, and how you feel and think about life. This information can help others choose the care that you would choose.  Why are advance directives important?  An advance directive helps you control your care. Although spoken wishes may be used, it is better to have your wishes written down.  Spoken wishes can be misunderstood, or not followed. Treatments may be given even if you do not want them. An advance directive may make it easier for your family to make difficult choices about your care.   Cigarette Smoking and Your Health   Risks to your health if you smoke:  Nicotine and other chemicals found in tobacco damage every cell in your body. Even if you are a light smoker, you have an increased risk for cancer, heart disease, and lung disease. If you are pregnant or have diabetes, smoking increases your risk for complications.   Benefits to your health if you stop smoking:   You decrease respiratory symptoms such as coughing, wheezing, and shortness of breath.   You reduce your risk for cancers of the lung, mouth, throat, kidney, bladder, pancreas, stomach, and cervix. If you already have cancer, you increase the benefits of chemotherapy. You also reduce your risk for cancer returning or a second cancer from developing.   You reduce your risk for heart disease, blood clots, heart attack, and stroke.   You reduce your risk for lung infections, and diseases such as pneumonia, asthma, chronic bronchitis, and emphysema.  Your circulation improves. More oxygen can be delivered to your body. If you have diabetes, you lower your risk for complications, such as kidney, artery, and eye diseases. You also lower your risk for nerve damage. Nerve damage can lead to amputations, poor vision, and blindness.  You improve your body's ability to heal and to fight infections.  For more information and support to stop smoking:   [employer]  Phone: 4- 903 - 538-7264  Web Address: www.[employer]  Weight Management   Why it is important to manage your weight:  Being overweight increases your risk of health conditions such as heart disease, high blood pressure, type 2 diabetes, and certain types of cancer. It can also increase your risk for osteoarthritis, sleep apnea, and other respiratory problems. Aim for a slow,  steady weight loss. Even a small amount of weight loss can lower your risk of health problems.  How to lose weight safely:  A safe and healthy way to lose weight is to eat fewer calories and get regular exercise. You can lose up about 1 pound a week by decreasing the number of calories you eat by 500 calories each day.   Healthy meal plan for weight management:  A healthy meal plan includes a variety of foods, contains fewer calories, and helps you stay healthy. A healthy meal plan includes the following:  Eat whole-grain foods more often.  A healthy meal plan should contain fiber. Fiber is the part of grains, fruits, and vegetables that is not broken down by your body. Whole-grain foods are healthy and provide extra fiber in your diet. Some examples of whole-grain foods are whole-wheat breads and pastas, oatmeal, brown rice, and bulgur.  Eat a variety of vegetables every day.  Include dark, leafy greens such as spinach, kale, sarah greens, and mustard greens. Eat yellow and orange vegetables such as carrots, sweet potatoes, and winter squash.   Eat a variety of fruits every day.  Choose fresh or canned fruit (canned in its own juice or light syrup) instead of juice. Fruit juice has very little or no fiber.  Eat low-fat dairy foods.  Drink fat-free (skim) milk or 1% milk. Eat fat-free yogurt and low-fat cottage cheese. Try low-fat cheeses such as mozzarella and other reduced-fat cheeses.  Choose meat and other protein foods that are low in fat.  Choose beans or other legumes such as split peas or lentils. Choose fish, skinless poultry (chicken or turkey), or lean cuts of red meat (beef or pork). Before you cook meat or poultry, cut off any visible fat.   Use less fat and oil.  Try baking foods instead of frying them. Add less fat, such as margarine, sour cream, regular salad dressing and mayonnaise to foods. Eat fewer high-fat foods. Some examples of high-fat foods include french fries, doughnuts, ice cream, and  cakes.  Eat fewer sweets.  Limit foods and drinks that are high in sugar. This includes candy, cookies, regular soda, and sweetened drinks.  Exercise:  Exercise at least 30 minutes per day on most days of the week. Some examples of exercise include walking, biking, dancing, and swimming. You can also fit in more physical activity by taking the stairs instead of the elevator or parking farther away from stores. Ask your healthcare provider about the best exercise plan for you.      © Copyright [employer] 2018 Information is for End User's use only and may not be sold, redistributed or otherwise used for commercial purposes. All illustrations and images included in CareNotes® are the copyrighted property of [employer] or [employer]

## 2024-04-23 DIAGNOSIS — H65.193 ACUTE EFFUSION OF BOTH MIDDLE EARS: ICD-10-CM

## 2024-04-23 DIAGNOSIS — J45.20 MILD INTERMITTENT ASTHMA, UNSPECIFIED WHETHER COMPLICATED: ICD-10-CM

## 2024-04-23 RX ORDER — FLUTICASONE PROPIONATE AND SALMETEROL 250; 50 UG/1; UG/1
POWDER RESPIRATORY (INHALATION)
Qty: 60 BLISTER | Refills: 5 | Status: SHIPPED | OUTPATIENT
Start: 2024-04-23

## 2024-04-23 NOTE — TELEPHONE ENCOUNTER
Reason for call:   [x] Refill   [] Prior Auth  [] Other:     Office:   [] PCP/Provider -   [x] Specialty/Provider -     Medication: Fluticasone-Salmeterol (Wixela Inhub) 250-50 mcg/dose inhaler     Dose/Frequency: INHALE 1 PUFF 2 TIMES A DAY RINSE MOUTH AFTER USE     Quantity: 60 + 5 refills    Pharmacy: [facility]/pharmacy #[id number] [address]     Does the patient have enough for 3 days?   [x] Yes   [] No - Send as HP to POD

## 2024-05-08 DIAGNOSIS — F41.9 ANXIETY: Chronic | ICD-10-CM

## 2024-05-08 DIAGNOSIS — R06.02 SOB (SHORTNESS OF BREATH): ICD-10-CM

## 2024-05-08 RX ORDER — ALBUTEROL SULFATE 90 UG/1
AEROSOL, METERED RESPIRATORY (INHALATION)
Qty: 6.7 G | Refills: 3 | Status: SHIPPED | OUTPATIENT
Start: 2024-05-08

## 2024-05-08 RX ORDER — SERTRALINE HYDROCHLORIDE 25 MG/1
25 TABLET, FILM COATED ORAL DAILY
Qty: 90 TABLET | Refills: 1 | Status: SHIPPED | OUTPATIENT
Start: 2024-05-08 | End: 2024-11-04

## 2024-05-15 DIAGNOSIS — J45.20 MILD INTERMITTENT ASTHMA, UNSPECIFIED WHETHER COMPLICATED: ICD-10-CM

## 2024-05-15 DIAGNOSIS — R06.02 SOB (SHORTNESS OF BREATH): ICD-10-CM

## 2024-05-16 RX ORDER — FLUTICASONE PROPIONATE AND SALMETEROL 250; 50 UG/1; UG/1
POWDER RESPIRATORY (INHALATION)
Qty: 60 BLISTER | Refills: 5 | OUTPATIENT
Start: 2024-05-16

## 2024-05-16 RX ORDER — ALBUTEROL SULFATE 90 UG/1
AEROSOL, METERED RESPIRATORY (INHALATION)
Qty: 6.7 G | Refills: 3 | OUTPATIENT
Start: 2024-05-16

## 2024-05-24 ENCOUNTER — APPOINTMENT (OUTPATIENT)
Dept: LAB | Facility: CLINIC | Age: 69
End: 2024-05-24
Payer: COMMERCIAL

## 2024-05-24 DIAGNOSIS — Z12.39 BREAST CANCER SCREENING OTHER THAN MAMMOGRAM: ICD-10-CM

## 2024-05-24 LAB
BUN SERPL-MCNC: 20 MG/DL (ref 5–25)
CREAT SERPL-MCNC: 0.77 MG/DL (ref 0.6–1.3)
GFR SERPL CREATININE-BSD FRML MDRD: 79 ML/MIN/1.73SQ M

## 2024-05-24 PROCEDURE — 84520 ASSAY OF UREA NITROGEN: CPT

## 2024-05-24 PROCEDURE — 82565 ASSAY OF CREATININE: CPT

## 2024-05-24 PROCEDURE — 36415 COLL VENOUS BLD VENIPUNCTURE: CPT

## 2024-05-31 DIAGNOSIS — F41.9 ANXIETY: Chronic | ICD-10-CM

## 2024-05-31 DIAGNOSIS — F17.200 SMOKING: ICD-10-CM

## 2024-05-31 RX ORDER — BUPROPION HYDROCHLORIDE 150 MG/1
150 TABLET, EXTENDED RELEASE ORAL 2 TIMES DAILY
Qty: 180 TABLET | Refills: 0 | Status: SHIPPED | OUTPATIENT
Start: 2024-05-31

## 2024-06-03 ENCOUNTER — HOSPITAL ENCOUNTER (OUTPATIENT)
Dept: RADIOLOGY | Facility: HOSPITAL | Age: 69
Discharge: HOME | End: 2024-06-03
Payer: COMMERCIAL

## 2024-06-03 VITALS — BODY MASS INDEX: 31.28 KG/M2 | WEIGHT: 170 LBS | HEIGHT: 62 IN

## 2024-06-03 DIAGNOSIS — Z12.39 BREAST CANCER SCREENING OTHER THAN MAMMOGRAM: ICD-10-CM

## 2024-06-03 DIAGNOSIS — Z15.01 BRCA2 POSITIVE: ICD-10-CM

## 2024-06-03 DIAGNOSIS — Z15.09 BRCA2 POSITIVE: ICD-10-CM

## 2024-06-03 DIAGNOSIS — Z91.89 INCREASED RISK OF BREAST CANCER: ICD-10-CM

## 2024-06-03 PROCEDURE — C8937 CAD BREAST MRI: HCPCS

## 2024-06-03 PROCEDURE — A9585 GADOBUTROL INJECTION: HCPCS

## 2024-06-03 PROCEDURE — C8908 MRI W/O FOL W/CONT, BREAST,: HCPCS

## 2024-06-03 RX ORDER — GADOBUTROL 604.72 MG/ML
8 INJECTION INTRAVENOUS
Status: COMPLETED | OUTPATIENT
Start: 2024-06-03 | End: 2024-06-03

## 2024-06-03 RX ADMIN — GADOBUTROL 8 ML: 604.72 INJECTION INTRAVENOUS at 14:04

## 2024-06-05 DIAGNOSIS — F41.9 ANXIETY: Chronic | ICD-10-CM

## 2024-06-05 RX ORDER — SERTRALINE HYDROCHLORIDE 25 MG/1
25 TABLET, FILM COATED ORAL DAILY
Qty: 90 TABLET | Refills: 1 | Status: SHIPPED | OUTPATIENT
Start: 2024-06-05 | End: 2024-12-02

## 2024-06-05 NOTE — TELEPHONE ENCOUNTER
VM left on RX line:    Yes, Ana Maria Elijah Lizet, My YOB: 1955. I need a refill on my sertraline 25 milligram tablets on the quantity is 90 and the phone number for [facility] is [phone] and the RX number is [id number]. Thank you for your help and your time and you have a great day.

## 2024-06-10 ENCOUNTER — TELEPHONE (OUTPATIENT)
Dept: OTHER | Facility: HOSPITAL | Age: 69
End: 2024-06-10

## 2024-06-10 NOTE — TELEPHONE ENCOUNTER
Patient called requesting Fluticasone-Salmeterol (Wixela Inhub) 250-50 mcg. Advised to call pharmacy as a prescription was sent over 4/2024 with 5 refills.

## 2024-06-18 ENCOUNTER — TELEPHONE (OUTPATIENT)
Dept: PULMONOLOGY | Facility: MEDICAL CENTER | Age: 69
End: 2024-06-18

## 2024-06-18 NOTE — TELEPHONE ENCOUNTER
MISSY for patient to call office back to schedule 6m f/u appointment with Dr. Hoyos for early Sept

## 2024-07-30 ENCOUNTER — APPOINTMENT (OUTPATIENT)
Dept: LAB | Facility: CLINIC | Age: 69
End: 2024-07-30
Payer: COMMERCIAL

## 2024-07-30 DIAGNOSIS — Z15.01 BRCA POSITIVE: ICD-10-CM

## 2024-07-30 DIAGNOSIS — Z85.09 HISTORY OF MALIGNANT NEOPLASM OF AMPULLA OF VATER: ICD-10-CM

## 2024-07-30 DIAGNOSIS — Z15.09 BRCA POSITIVE: ICD-10-CM

## 2024-07-30 DIAGNOSIS — C24.1 MALIGNANT NEOPLASM OF AMPULLA OF VATER (HCC): ICD-10-CM

## 2024-07-30 LAB
ALBUMIN SERPL BCG-MCNC: 4.1 G/DL (ref 3.5–5)
ALP SERPL-CCNC: 95 U/L (ref 34–104)
ALT SERPL W P-5'-P-CCNC: 21 U/L (ref 7–52)
ANION GAP SERPL CALCULATED.3IONS-SCNC: 10 MMOL/L (ref 4–13)
AST SERPL W P-5'-P-CCNC: 21 U/L (ref 13–39)
BASOPHILS # BLD AUTO: 0.04 THOUSANDS/ÂΜL (ref 0–0.1)
BASOPHILS NFR BLD AUTO: 1 % (ref 0–1)
BILIRUB SERPL-MCNC: 0.45 MG/DL (ref 0.2–1)
BUN SERPL-MCNC: 24 MG/DL (ref 5–25)
CALCIUM SERPL-MCNC: 9.8 MG/DL (ref 8.4–10.2)
CHLORIDE SERPL-SCNC: 101 MMOL/L (ref 96–108)
CO2 SERPL-SCNC: 29 MMOL/L (ref 21–32)
CREAT SERPL-MCNC: 0.9 MG/DL (ref 0.6–1.3)
EOSINOPHIL # BLD AUTO: 0.11 THOUSAND/ÂΜL (ref 0–0.61)
EOSINOPHIL NFR BLD AUTO: 2 % (ref 0–6)
ERYTHROCYTE [DISTWIDTH] IN BLOOD BY AUTOMATED COUNT: 12.5 % (ref 11.6–15.1)
GFR SERPL CREATININE-BSD FRML MDRD: 65 ML/MIN/1.73SQ M
GLUCOSE P FAST SERPL-MCNC: 110 MG/DL (ref 65–99)
HCT VFR BLD AUTO: 41.9 % (ref 34.8–46.1)
HGB BLD-MCNC: 13.7 G/DL (ref 11.5–15.4)
IMM GRANULOCYTES # BLD AUTO: 0.01 THOUSAND/UL (ref 0–0.2)
IMM GRANULOCYTES NFR BLD AUTO: 0 % (ref 0–2)
LYMPHOCYTES # BLD AUTO: 1.49 THOUSANDS/ÂΜL (ref 0.6–4.47)
LYMPHOCYTES NFR BLD AUTO: 25 % (ref 14–44)
MCH RBC QN AUTO: 30.4 PG (ref 26.8–34.3)
MCHC RBC AUTO-ENTMCNC: 32.7 G/DL (ref 31.4–37.4)
MCV RBC AUTO: 93 FL (ref 82–98)
MONOCYTES # BLD AUTO: 0.43 THOUSAND/ÂΜL (ref 0.17–1.22)
MONOCYTES NFR BLD AUTO: 7 % (ref 4–12)
NEUTROPHILS # BLD AUTO: 4.01 THOUSANDS/ÂΜL (ref 1.85–7.62)
NEUTS SEG NFR BLD AUTO: 65 % (ref 43–75)
NRBC BLD AUTO-RTO: 0 /100 WBCS
PLATELET # BLD AUTO: 311 THOUSANDS/UL (ref 149–390)
PMV BLD AUTO: 10 FL (ref 8.9–12.7)
POTASSIUM SERPL-SCNC: 4.2 MMOL/L (ref 3.5–5.3)
PROT SERPL-MCNC: 7.3 G/DL (ref 6.4–8.4)
RBC # BLD AUTO: 4.51 MILLION/UL (ref 3.81–5.12)
SODIUM SERPL-SCNC: 140 MMOL/L (ref 135–147)
WBC # BLD AUTO: 6.09 THOUSAND/UL (ref 4.31–10.16)

## 2024-07-30 PROCEDURE — 85025 COMPLETE CBC W/AUTO DIFF WBC: CPT

## 2024-07-30 PROCEDURE — 36415 COLL VENOUS BLD VENIPUNCTURE: CPT

## 2024-07-30 PROCEDURE — 80053 COMPREHEN METABOLIC PANEL: CPT

## 2024-07-30 PROCEDURE — 86301 IMMUNOASSAY TUMOR CA 19-9: CPT

## 2024-08-01 LAB — CANCER AG19-9 SERPL-ACNC: <2 U/ML (ref 0–35)

## 2024-08-08 ENCOUNTER — HOSPITAL ENCOUNTER (OUTPATIENT)
Dept: RADIOLOGY | Facility: HOSPITAL | Age: 69
Discharge: HOME | End: 2024-08-08
Payer: COMMERCIAL

## 2024-08-08 DIAGNOSIS — Z85.09 HISTORY OF MALIGNANT NEOPLASM OF AMPULLA OF VATER: ICD-10-CM

## 2024-08-08 PROCEDURE — 71260 CT THORAX DX C+: CPT

## 2024-08-08 PROCEDURE — 74177 CT ABD & PELVIS W/CONTRAST: CPT

## 2024-08-08 RX ADMIN — IOHEXOL 100 ML: 350 INJECTION, SOLUTION INTRAVENOUS at 10:00

## 2024-08-09 ENCOUNTER — OFFICE VISIT (OUTPATIENT)
Dept: HEMATOLOGY ONCOLOGY | Facility: MEDICAL CENTER | Age: 69
End: 2024-08-09
Payer: COMMERCIAL

## 2024-08-09 VITALS
HEIGHT: 62 IN | TEMPERATURE: 97.5 F | BODY MASS INDEX: 31.47 KG/M2 | OXYGEN SATURATION: 96 % | HEART RATE: 69 BPM | WEIGHT: 171 LBS | RESPIRATION RATE: 15 BRPM | DIASTOLIC BLOOD PRESSURE: 72 MMHG | SYSTOLIC BLOOD PRESSURE: 108 MMHG

## 2024-08-09 DIAGNOSIS — E04.1 THYROID NODULE: ICD-10-CM

## 2024-08-09 DIAGNOSIS — Z92.21 HISTORY OF CHEMOTHERAPY: ICD-10-CM

## 2024-08-09 DIAGNOSIS — Z15.01 BRCA POSITIVE: ICD-10-CM

## 2024-08-09 DIAGNOSIS — Z15.09 BRCA POSITIVE: ICD-10-CM

## 2024-08-09 DIAGNOSIS — C24.1 MALIGNANT NEOPLASM OF AMPULLA OF VATER (HCC): Primary | ICD-10-CM

## 2024-08-09 PROCEDURE — 99214 OFFICE O/P EST MOD 30 MIN: CPT | Performed by: PHYSICIAN ASSISTANT

## 2024-08-09 NOTE — PROGRESS NOTES
[facility]  Oncology Progress Note  Ana Maria Murphy, 1955, [id number]  2/9/2024    Assessment/Plan:   1. Neoplasm of ampulla of Vater; 2. 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    This is a 69-year-old female who is presently undergoing survivorship observation after being treated for the above.  Patient is doing quite well.  No major complaints today.     CT scan completed on 8/8/24 demonstrated no signs of residual recurrent metastatic malignancy in the chest abdomen or pelvis.  Patient continues to be in remission.      Continue to follow-up with medical oncology every 6 months is recommended with blood work to assess for longstanding toxicities of previously completed chemotherapy.    3. BRCA positive  S/p hysterectomy with oophorectomy.  Patient is also following with surgical oncology.  She is alternating mammogram with breast MRI every 6 months time.  We discussed other guidelines as below.     While no specific screening guidelines exist for melanoma I do think it would be beneficial for her to establish care with dermatology for yearly skin checks, or more frequent if they deem appropriate.        4. Thyroid nodule  Patient was found today to have a more right-sided enlarged thyroid gland then previously recounted by this provider.  Patient does have history of thyromegaly with continued surveillance of thyroid gland. Last US was completed on 2/12/2024. This showed stable multinodular thyroid gland with previous non-malignant biopsy results. Based on 2015 SHANNA guidelines, additional follow-up ultrasound should be performed in 12 to 24 months.     The patient is scheduled for follow-up in approximately 6 months.       Patient voiced agreement and understanding to the above.   Patient knows to call the  Hematology/Oncology office with any questions and concerns regarding the above.    Goals and Barriers:    Current Goal:   Prolong Survival from Cancer.     Barriers: None.      Patient's Capacity to Self Care:  Patient able to self care.  ________________________________________________________________________    Subjective     Chief Complaint   Patient presents with    Follow-up       History of present illness/Cancer History:   Oncology History   History of malignant neoplasm of ampulla of Vater   3/15/2021 Initial Diagnosis    Neoplasm of ampulla of Vater     4/26/2021 Surgery    B.  Celiac lymph node:     - Single lymph node; negative for malignancy (0/1).     C.  Whipple resection:     - Invasive poorly differentiated mucinous adenocarcinoma.     - Tumor arises in the background of an adenoma with high grade dysplasia.     - Lymphatic and venous channel invasion by tumor present.     - Metastatic carcinoma present in one of twenty lymph nodes (1/20).     - All margins are negative for tumor.     4/26/2021 -  Cancer Staged    Staging form: Ampulla of Vater, AJCC 8th Edition  - Pathologic stage from 4/26/2021: Stage IIIA (pT3b, pN1, cM0) - Signed by ANDRES Palma on 6/9/2023  Total positive nodes: 1  Total nodes examined: 22  Histologic grade (G): G3  Histologic grading system: 3 grade system  Residual tumor (R): R0 - None       6/9/2021 - 10/1/2021 Chemotherapy    Cycles 1-6  6/2021 through 8/20/21:  FOLFOX    Cycles 7 -8:  9/15/2021- 10/12/2021  Leucovorin 400mg/m2, IV, Day 1  5-Fu 400 mg/m2, IV , Day 1   5-Fu 1200 mg/m2, IV, CI x 46 hours  Cycle length = 2 weeks    palonosetron (ALOXI), 0.25 mg, Intravenous, Once, 5 of 5 cycles  Administration: 0.25 mg (7/21/2021), 0.25 mg (8/4/2021), 0.25 mg (8/18/2021), 0.25 mg (9/15/2021), 0.25 mg (9/29/2021)  fluorouracil (ADRUCIL), 400 mg/m2 = 605 mg, Intravenous, Once, 8 of 8 cycles  Dose modification: 340 mg/m2 (85 % of original dose 400 mg/m2, Cycle 6, Reason:  Dose Not Tolerated)  Administration: 605 mg (6/9/2021), 605 mg (6/23/2021), 605 mg (7/7/2021), 605 mg (7/21/2021), 605 mg (8/4/2021), 515 mg (8/18/2021), 610 mg (9/15/2021), 610 mg (9/29/2021)  fosaprepitant (EMEND) IVPB, 150 mg, Intravenous, Once, 6 of 6 cycles  Administration: 150 mg (7/7/2021), 150 mg (7/21/2021), 150 mg (8/4/2021), 150 mg (8/18/2021), 150 mg (9/15/2021), 150 mg (9/29/2021)  leucovorin calcium IVPB, 604 mg, Intravenous, Once, 8 of 8 cycles  Dose modification: 340 mg/m2 (85 % of original dose 400 mg/m2, Cycle 6, Reason: Dose Not Tolerated)  Administration: 600 mg (6/9/2021), 600 mg (6/23/2021), 600 mg (7/7/2021), 600 mg (7/21/2021), 600 mg (8/4/2021), 517 mg (8/18/2021), 600 mg (9/15/2021), 600 mg (9/29/2021)  oxaliplatin (ELOXATIN) chemo infusion, 85 mg/m2 = 128.35 mg, Intravenous, Once, 6 of 6 cycles  Dose modification: 72.25 mg/m2 (85 % of original dose 85 mg/m2, Cycle 6, Reason: Dose Not Tolerated)  Administration: 128.35 mg (6/9/2021), 128.35 mg (6/23/2021), 128.35 mg (7/7/2021), 128.35 mg (7/21/2021), 128.35 mg (8/4/2021), 109.82 mg (8/18/2021)  fluorouracil (ADRUCIL) ambulatory infusion Soln, 1,200 mg/m2/day = 3,625 mg, Intravenous, Over 46 hours, 8 of 8 cycles     10/1/2021 Genetic Testing    Results revealed patient has the following mutation(s):  Positive for a BRCA2 pathogenic variant      10/26/2021 -  Chemotherapy    Xeloda 825 mg/m2 BID  BSA = 1.59  Calculated to 1300 mg BID with rounding.        10/28/2021 - 12/2/2021 Radiation    Treatments:  Course: C1    Plan ID Energy Fractions Dose per Fraction (cGy) Dose Correction (cGy) Total Dose Delivered (cGy) Elapsed Days   Abdomen 6X 25 / 25 195 0 4,875 35      Treatment Dates:  10/28/2021 - 12/2/2021.               Lab Results   Component Value Date    WBC 6.09 07/30/2024    HGB 13.7 07/30/2024    HCT 41.9 07/30/2024    MCV 93 07/30/2024     07/30/2024     Lab Results   Component Value Date    SODIUM 140 07/30/2024    K 4.2  07/30/2024     07/30/2024    CO2 29 07/30/2024    AGAP 10 07/30/2024    BUN 24 07/30/2024    CREATININE 0.90 07/30/2024    GLUC 95 09/14/2023    GLUF 110 (H) 07/30/2024    CALCIUM 9.8 07/30/2024    AST 21 07/30/2024    ALT 21 07/30/2024    ALKPHOS 95 07/30/2024    TP 7.3 07/30/2024    TBILI 0.45 07/30/2024    EGFR 65 07/30/2024       Interval history: Patient is feeling well. Patient is in great spirits.  Very happy to hear the results of CT scan.  Blood work was also stable.  Patient no longer has Port-A-Cath, removed 10/2022. She denies nausea, vomiting, bowel changes, chest pain, SOB, unexplained weight loss.    Review of Systems   Constitutional:  Negative for appetite change, fatigue, fever and unexpected weight change.   HENT:  Negative for nosebleeds.    Respiratory:  Negative for cough, choking and shortness of breath.         Negative hemoptysis.   Cardiovascular:  Negative for chest pain, palpitations and leg swelling.   Gastrointestinal: Negative.  Negative for abdominal distention, abdominal pain, anal bleeding, blood in stool, constipation, diarrhea, nausea and vomiting.   Endocrine: Negative.  Negative for cold intolerance.   Genitourinary: Negative.  Negative for hematuria, menstrual problem, vaginal bleeding, vaginal discharge and vaginal pain.   Musculoskeletal: Negative.  Negative for arthralgias, myalgias, neck pain and neck stiffness.   Skin: Negative.  Negative for color change, pallor and rash.   Allergic/Immunologic: Negative.  Negative for immunocompromised state.   Neurological: Negative.  Negative for weakness and headaches.   Hematological:  Negative for adenopathy. Does not bruise/bleed easily.   All other systems reviewed and are negative.      Current Outpatient Medications:     acetaminophen (TYLENOL) 500 mg tablet, Take 1,000 mg by mouth, Disp: , Rfl:     albuterol (2.5 mg/3 mL) 0.083 % nebulizer solution, Take 3 mL (2.5 mg total) by nebulization every 6 (six) hours as needed  for wheezing or shortness of breath, Disp: 30 mL, Rfl: 1    albuterol (PROVENTIL HFA,VENTOLIN HFA) 90 mcg/act inhaler, INHALE 2 PUFFS EVERY 6 HOURS AS NEEDED FOR WHEEZING, Disp: 6.7 g, Rfl: 3    buPROPion (WELLBUTRIN SR) 150 mg 12 hr tablet, TAKE 1 TABLET BY MOUTH TWICE A DAY, Disp: 180 tablet, Rfl: 0    Fluticasone-Salmeterol (Wixela Inhub) 250-50 mcg/dose inhaler, Rinse mouth after use., Disp: 60 blister, Rfl: 5    ipratropium-albuterol (DUO-NEB) 0.5-2.5 mg/3 mL nebulizer solution, Take 3 mL by nebulization 4 (four) times a day, Disp: 60 mL, Rfl: 1    losartan-hydrochlorothiazide (HYZAAR) 50-12.5 mg per tablet, Take 1 tablet by mouth daily, Disp: 30 tablet, Rfl: 2    metoprolol tartrate (LOPRESSOR) 25 mg tablet, Take 25 mg by mouth 2 (two) times a day, Disp: , Rfl:     Multiple Vitamin (multivitamin) tablet, Take 1 tablet by mouth daily, Disp: , Rfl:     nicotine (NICODERM CQ) 7 mg/24hr TD 24 hr patch, Place 1 patch on the skin over 24 hours daily Apply a new patch every 24 hours to a clean, dry, hairless site on the upper arm or hip., Disp: 28 patch, Rfl: 0    ondansetron (Zofran ODT) 4 mg disintegrating tablet, Take 1 tablet (4 mg total) by mouth every 6 (six) hours as needed for nausea or vomiting, Disp: 10 tablet, Rfl: 0    sertraline (ZOLOFT) 25 mg tablet, Take 1 tablet (25 mg total) by mouth daily, Disp: 90 tablet, Rfl: 1    aspirin 81 mg chewable tablet, Chew 1 tablet (81 mg total) daily, Disp: 30 tablet, Rfl: 0    atorvastatin (LIPITOR) 20 mg tablet, Take 1 tablet (20 mg total) by mouth every evening, Disp: 30 tablet, Rfl: 0    benzonatate (TESSALON PERLES) 100 mg capsule, Take 1 capsule (100 mg total) by mouth 3 (three) times a day as needed for cough (Patient not taking: Reported on 2/22/2024), Disp: 30 capsule, Rfl: 0    fluticasone (FLONASE) 50 mcg/act nasal spray, 2 sprays into each nostril daily for 3 days, Disp: 16 g, Rfl: 0    Medical ID Plate MISC, Use once for 1 dose Severely and permanently  "limited in the ability to walk because of an arthritic, neurological, or orthopedic condition: or cannot walk two hundred feet without stopping to rest and meets requirements for disability license plate, Disp: 1 each, Rfl: 0  No current facility-administered medications for this visit.  Allergies   Allergen Reactions    Penicillins Rash    Tetracycline Rash     Objective   /72 (BP Location: Left arm, Patient Position: Sitting, Cuff Size: Adult)   Pulse 69   Temp 97.5 °F (36.4 °C) (Temporal)   Resp 15   Ht 5' 2\" (1.575 m)   Wt 77.6 kg (171 lb)   SpO2 96%   BMI 31.28 kg/m²   Wt Readings from Last 6 Encounters:   08/09/24 77.6 kg (171 lb)   06/03/24 77.1 kg (170 lb)   04/12/24 77.4 kg (170 lb 11.2 oz)   02/22/24 76.7 kg (169 lb 3.2 oz)   02/14/24 77.3 kg (170 lb 8 oz)   02/09/24 77.6 kg (171 lb)       Physical Exam  Constitutional:       General: She is not in acute distress.     Appearance: She is well-developed.   HENT:      Head: Normocephalic and atraumatic.      Mouth/Throat:      Pharynx: No oropharyngeal exudate.   Eyes:      General: No scleral icterus.     Pupils: Pupils are equal, round, and reactive to light.   Cardiovascular:      Rate and Rhythm: Normal rate and regular rhythm.      Heart sounds: No murmur heard.  Pulmonary:      Effort: Pulmonary effort is normal. No respiratory distress.      Breath sounds: Normal breath sounds.   Abdominal:      General: Abdomen is flat. There is no distension.      Palpations: Abdomen is soft.      Tenderness: There is no abdominal tenderness.   Musculoskeletal:         General: Normal range of motion.      Cervical back: Normal range of motion.   Lymphadenopathy:      Cervical: No cervical adenopathy.   Skin:     General: Skin is warm.      Coloration: Skin is not pale.      Findings: No rash.   Neurological:      Mental Status: She is alert and oriented to person, place, and time.      Cranial Nerves: No cranial nerve deficit.   Psychiatric:         " Thought Content: Thought content normal.     Extremities: No lower extremity edema bilaterally.  Lymph: No cervical, occipital, supraclavicular, or axillary adenopathy bilaterally.    Pertinent Laboratory Results and Imaging Review:  Appointment on 07/30/2024   Component Date Value Ref Range Status    WBC 07/30/2024 6.09  4.31 - 10.16 Thousand/uL Final    RBC 07/30/2024 4.51  3.81 - 5.12 Million/uL Final    Hemoglobin 07/30/2024 13.7  11.5 - 15.4 g/dL Final    Hematocrit 07/30/2024 41.9  34.8 - 46.1 % Final    MCV 07/30/2024 93  82 - 98 fL Final    MCH 07/30/2024 30.4  26.8 - 34.3 pg Final    MCHC 07/30/2024 32.7  31.4 - 37.4 g/dL Final    RDW 07/30/2024 12.5  11.6 - 15.1 % Final    MPV 07/30/2024 10.0  8.9 - 12.7 fL Final    Platelets 07/30/2024 311  149 - 390 Thousands/uL Final    nRBC 07/30/2024 0  /100 WBCs Final    Segmented % 07/30/2024 65  43 - 75 % Final    Immature Grans % 07/30/2024 0  0 - 2 % Final    Lymphocytes % 07/30/2024 25  14 - 44 % Final    Monocytes % 07/30/2024 7  4 - 12 % Final    Eosinophils Relative 07/30/2024 2  0 - 6 % Final    Basophils Relative 07/30/2024 1  0 - 1 % Final    Absolute Neutrophils 07/30/2024 4.01  1.85 - 7.62 Thousands/µL Final    Absolute Immature Grans 07/30/2024 0.01  0.00 - 0.20 Thousand/uL Final    Absolute Lymphocytes 07/30/2024 1.49  0.60 - 4.47 Thousands/µL Final    Absolute Monocytes 07/30/2024 0.43  0.17 - 1.22 Thousand/µL Final    Eosinophils Absolute 07/30/2024 0.11  0.00 - 0.61 Thousand/µL Final    Basophils Absolute 07/30/2024 0.04  0.00 - 0.10 Thousands/µL Final    Sodium 07/30/2024 140  135 - 147 mmol/L Final    Potassium 07/30/2024 4.2  3.5 - 5.3 mmol/L Final    Chloride 07/30/2024 101  96 - 108 mmol/L Final    CO2 07/30/2024 29  21 - 32 mmol/L Final    ANION GAP 07/30/2024 10  4 - 13 mmol/L Final    BUN 07/30/2024 24  5 - 25 mg/dL Final    Creatinine 07/30/2024 0.90  0.60 - 1.30 mg/dL Final    Standardized to IDMS reference method    Glucose, Fasting  07/30/2024 110 (H)  65 - 99 mg/dL Final    Calcium 07/30/2024 9.8  8.4 - 10.2 mg/dL Final    AST 07/30/2024 21  13 - 39 U/L Final    ALT 07/30/2024 21  7 - 52 U/L Final    Specimen collection should occur prior to Sulfasalazine administration due to the potential for falsely depressed results.     Alkaline Phosphatase 07/30/2024 95  34 - 104 U/L Final    Total Protein 07/30/2024 7.3  6.4 - 8.4 g/dL Final    Albumin 07/30/2024 4.1  3.5 - 5.0 g/dL Final    Total Bilirubin 07/30/2024 0.45  0.20 - 1.00 mg/dL Final    Use of this assay is not recommended for patients undergoing treatment with eltrombopag due to the potential for falsely elevated results.  N-acetyl-p-benzoquinone imine (metabolite of Acetaminophen) will generate erroneously low results in samples for patients that have taken an overdose of Acetaminophen.    eGFR 07/30/2024 65  ml/min/1.73sq m Final    CA 19-9 07/30/2024 <2  0 - 35 U/mL Final    Roche Diagnostics Electrochemiluminescence Immunoassay (ECLIA)  Values obtained with different assay methods or kits cannot be  used interchangeably.  Results cannot be interpreted as absolute  evidence of the presence or absence of malignant disease.     CT chest abdomen pelvis w contrast  Narrative: CT CHEST, ABDOMEN AND PELVIS WITH IV CONTRAST    INDICATION: History of ampullary cancer status post Whipple.    COMPARISON: 2/7/2024, 8/3/2023    TECHNIQUE: CT examination of the chest, abdomen and pelvis was performed. Scanning through the abdomen was performed in arterial, venous and delayed phases according a protocol specifically designed to evaluate the upper abdominal viscera. Multiplanar 2D   reformatted images were created from the source data.    This examination, like all CT scans performed in the [facility], was performed utilizing techniques to minimize radiation dose exposure, including the use of iterative reconstruction and automated exposure control. Radiation dose length   product  (DLP) for this visit: 1028.48 mGy-cm    IV Contrast: 100 mL of iohexol (OMNIPAQUE)  Enteric Contrast: Administered.    FINDINGS:    CHEST    LUNGS: Lungs are clear. No tracheal or endobronchial lesion.    PLEURA: Unremarkable.    HEART/GREAT VESSELS: Heart is unremarkable for patient's age. No thoracic aortic aneurysm.    MEDIASTINUM AND WYATT: Unremarkable.    CHEST WALL AND LOWER NECK: There are multiple right-sided thyroid nodules, the largest measuring 2 cm on the right, stable. These have been previously evaluated by ultrasound and biopsy.    ABDOMEN    LIVER/BILIARY TREE: Unremarkable. There is postoperative pneumobilia.    GALLBLADDER: Post cholecystectomy.    SPLEEN: Unremarkable.    PANCREAS: The patient is status post Whipple. There is no evidence of recurrent tumor.    ADRENAL GLANDS: Unremarkable.    KIDNEYS/URETERS: There are small renal cysts. There is a punctate nonobstructing right renal calculus. No hydronephrosis.    STOMACH AND BOWEL: Unremarkable.    APPENDIX: No findings to suggest appendicitis.    ABDOMINOPELVIC CAVITY: No ascites. No pneumoperitoneum. No lymphadenopathy.    VESSELS: Unremarkable for patient's age.    PELVIS    REPRODUCTIVE ORGANS: Post hysterectomy.    URINARY BLADDER: Unremarkable.    ABDOMINAL WALL/INGUINAL REGIONS: There is a small Tejada's hernia involving the antimesenteric wall of the mid transverse colon. There is a left inguinal hernia containing fat and a small portion of small bowel.    BONES: No acute fracture or suspicious osseous lesion.  Impression: No evidence of recurrent or metastatic disease.    Workstation performed: [id number]      The following historical data was reviewed:  Past Medical History:   Diagnosis Date    BRCA1 positive     BRCA2 positive     Bundle branch block, left     Cancer (HCC)     pancreatic    Facial droop     r/t neck surgery    History of chemotherapy     pancreatic cancer    History of radiation therapy     Hypercholesteremia      Pancreatic carcinoma (HCC)      Past Surgical History:   Procedure Laterality Date    ABLATION SOFT TISSUE N/A 4/26/2021    Procedure: INTRAOPERATIVE ULTRASOUND, ABLATION,SOFT TISSUE PANCREAS;  Surgeon: Feliciano Luevano MD;  Location: [facility];  Service: Surgical Oncology    CARDIAC CATHETERIZATION Left 9/5/2023    Procedure: Cardiac catheterization;  Surgeon: Jack Gates MD;  Location: [facility];  Service: Cardiology    CHOLECYSTECTOMY N/A 4/26/2021    Procedure: CHOLECYSTECTOMY;  Surgeon: Feliciano Luevano MD;  Location: [facility];  Service: Surgical Oncology    FL GUIDED CENTRAL VENOUS ACCESS DEVICE INSERTION  6/2/2021    HYSTERECTOMY      LAPAROTOMY N/A 4/26/2021    Procedure: LAPAROTOMY EXPLORATORY;  Surgeon: Feliciano Luevano MD;  Location: [facility];  Service: Surgical Oncology    OOPHORECTOMY      SINUS SURGERY      TUNNELED VENOUS PORT PLACEMENT Left 6/2/2021    Procedure: INSERTION VENOUS PORT (PORT-A-CATH);  Surgeon: Feliciano Luevano MD;  Location: [facility];  Service: Surgical Oncology    US GUIDED THYROID BIOPSY  7/13/2022    WHIPPLE PROCEDURE/PANCREATICO-DUODENECTOMY N/A 4/26/2021    Procedure: WHIPPLE PROCEDURE/PANCREATICO-DUODENECTOMY; PYLORIC PRESERVING;  Surgeon: Feliciano Luevano MD;  Location: [facility];  Service: Surgical Oncology     Social History     Socioeconomic History    Marital status:      Spouse name: None    Number of children: 3    Years of education: None    Highest education level: None   Occupational History    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 1-2 cigg some days  06/02/2022.   Vaping Use    Vaping status: Never Used   Substance and Sexual Activity    Alcohol use: Never    Drug use: Never    Sexual activity: Not Currently   Other Topics Concern    None   Social History Narrative    None     Social Determinants of Health     Financial Resource Strain: Low Risk  (4/12/2024)    Overall Financial Resource Strain (CARDIA)     Difficulty of Paying Living Expenses: Not hard at all   Food Insecurity: No Food Insecurity (4/12/2024)    Hunger Vital Sign     Worried About Running Out of Food in the Last Year: Never true     Ran Out of Food in the Last Year: Never true   Transportation Needs: No Transportation Needs (4/12/2024)    PRAPARE - Transportation     Lack of Transportation (Medical): No     Lack of Transportation (Non-Medical): No   Physical Activity: Insufficiently Active (6/1/2022)    Exercise Vital Sign     Days of Exercise per Week: 4 days     Minutes of Exercise per Session: 20 min   Stress: No Stress Concern Present (10/6/2023)    [employer] [place] [place] [employer] - Occupational Stress Questionnaire     Feeling of Stress : Not at all   Social Connections: Socially Isolated (6/1/2022)    Social Connection and Isolation Panel [NHANES]     Frequency of Communication with Friends and Family: More than three times a week     Frequency of Social Gatherings with Friends and Family: More than three times a week     Attends Zoroastrianism Services: Never     Active Member of Clubs or Organizations: No     Attends Club or Organization Meetings: Never     Marital Status:    Intimate Partner Violence: Not At Risk (6/1/2022)    Humiliation, Afraid, Rape, and Kick questionnaire     Fear of Current or Ex-Partner: No     Emotionally Abused: No     Physically Abused: No     Sexually Abused: No   Housing Stability: Low Risk  (4/12/2024)    Housing Stability Vital Sign     Unable to Pay for Housing in the Last Year: No     Number of Times Moved in the Last Year: 1     Homeless in  the Last Year: No     Family History   Problem Relation Age of Onset    Ulcerative colitis Mother     Prostate cancer Father     Melanoma Sister     Heart disease Brother     Prostate cancer Brother     No Known Problems Brother     No Known Problems Maternal Uncle     No Known Problems Paternal Uncle        Please note:  This report has been generated by a voice recognition software system. Therefore there may be syntax, spelling, and/or grammatical errors. Please call if you have any questions.

## 2024-08-13 ENCOUNTER — TELEPHONE (OUTPATIENT)
Age: 69
End: 2024-08-13

## 2024-08-13 NOTE — TELEPHONE ENCOUNTER
Patient called in to ask that her future lab orders to be mailed to her house.  I verified that the address on file is correct.

## 2024-08-26 DIAGNOSIS — R06.02 SOB (SHORTNESS OF BREATH): ICD-10-CM

## 2024-08-27 RX ORDER — ALBUTEROL SULFATE 90 UG/1
AEROSOL, METERED RESPIRATORY (INHALATION)
Qty: 6.7 G | Refills: 5 | Status: SHIPPED | OUTPATIENT
Start: 2024-08-27

## 2024-08-28 ENCOUNTER — OFFICE VISIT (OUTPATIENT)
Dept: SURGICAL ONCOLOGY | Facility: CLINIC | Age: 69
End: 2024-08-28
Payer: COMMERCIAL

## 2024-08-28 ENCOUNTER — TELEPHONE (OUTPATIENT)
Dept: SURGICAL ONCOLOGY | Facility: CLINIC | Age: 69
End: 2024-08-28

## 2024-08-28 VITALS
OXYGEN SATURATION: 96 % | HEIGHT: 62 IN | SYSTOLIC BLOOD PRESSURE: 144 MMHG | TEMPERATURE: 97.4 F | BODY MASS INDEX: 31.65 KG/M2 | DIASTOLIC BLOOD PRESSURE: 82 MMHG | WEIGHT: 172 LBS | HEART RATE: 77 BPM

## 2024-08-28 DIAGNOSIS — Z85.09 ENCOUNTER FOR FOLLOW-UP SURVEILLANCE OF AMPULLA OF VATER CANCER: Primary | ICD-10-CM

## 2024-08-28 DIAGNOSIS — Z12.31 VISIT FOR SCREENING MAMMOGRAM: ICD-10-CM

## 2024-08-28 DIAGNOSIS — Z91.89 INCREASED RISK OF BREAST CANCER: ICD-10-CM

## 2024-08-28 DIAGNOSIS — E04.2 MULTIPLE THYROID NODULES: ICD-10-CM

## 2024-08-28 DIAGNOSIS — Z85.09 HISTORY OF MALIGNANT NEOPLASM OF AMPULLA OF VATER: ICD-10-CM

## 2024-08-28 DIAGNOSIS — Z15.09 BRCA POSITIVE: ICD-10-CM

## 2024-08-28 DIAGNOSIS — Z08 ENCOUNTER FOR FOLLOW-UP SURVEILLANCE OF AMPULLA OF VATER CANCER: Primary | ICD-10-CM

## 2024-08-28 DIAGNOSIS — Z15.01 BRCA POSITIVE: ICD-10-CM

## 2024-08-28 PROCEDURE — G2211 COMPLEX E/M VISIT ADD ON: HCPCS

## 2024-08-28 PROCEDURE — 99214 OFFICE O/P EST MOD 30 MIN: CPT

## 2024-08-28 NOTE — PROGRESS NOTES
Surgical Oncology Follow Up       [facility] SURGICAL ONCOLOGY [facility]  [address] [facility] [address]    Ana Maria Murphy  1955  [id number]  [facility] SURGICAL ONCOLOGY [facility]  [address] [facility] [address]    1. 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 History of malignant neoplasm of ampulla of Vater  Assessment & Plan:  No evidence of disease recurrence on recent CT, and Ca 19-9 level remains low.  We will see her again in 6 months for another exam.  I have stressed the importance of colon cancer screening, and have urged her to discuss non-invasive testing with her PCP.  3. BRCA positive  Assessment & Plan:  Recent breast MRI showed no unusual findings.  We will continue with annual mammogram and MRI.  Patient declines clinical breast exams.  4. Increased risk of breast cancer  5. Multiple thyroid nodules  Assessment & Plan:  Nodules were stable as of February 2024.  Will repeat US in February 2025.  Orders:  -     US thyroid; Future; Expected date: 02/14/2025  6. Visit for screening mammogram  -     Mammo screening bilateral w 3d & cad; Future; Expected date: 11/16/2024         Chief Complaint   Patient presents with    Follow-up       No follow-ups on file.      Oncology History   History of malignant neoplasm of ampulla of Vater   3/15/2021 Initial Diagnosis    Neoplasm of ampulla of Vater     4/26/2021 Surgery    B.  Celiac lymph node:     - Single lymph node; negative for malignancy (0/1).     C.  Whipple resection:     - Invasive poorly differentiated mucinous adenocarcinoma.     - Tumor arises in the background of an adenoma with high grade dysplasia.     - Lymphatic and venous channel invasion by tumor present.     - Metastatic carcinoma present in one of twenty lymph nodes (1/20).     - All margins are negative for tumor.     4/26/2021 -  Cancer Staged     Staging form: Ampulla of Vater, AJCC 8th Edition  - Pathologic stage from 4/26/2021: Stage IIIA (pT3b, pN1, cM0) - Signed by ANDRES Palma on 6/9/2023  Total positive nodes: 1  Total nodes examined: 22  Histologic grade (G): G3  Histologic grading system: 3 grade system  Residual tumor (R): R0 - None       6/9/2021 - 10/1/2021 Chemotherapy    Cycles 1-6  6/2021 through 8/20/21:  FOLFOX    Cycles 7 -8:  9/15/2021- 10/12/2021  Leucovorin 400mg/m2, IV, Day 1  5-Fu 400 mg/m2, IV , Day 1   5-Fu 1200 mg/m2, IV, CI x 46 hours  Cycle length = 2 weeks    palonosetron (ALOXI), 0.25 mg, Intravenous, Once, 5 of 5 cycles  Administration: 0.25 mg (7/21/2021), 0.25 mg (8/4/2021), 0.25 mg (8/18/2021), 0.25 mg (9/15/2021), 0.25 mg (9/29/2021)  fluorouracil (ADRUCIL), 400 mg/m2 = 605 mg, Intravenous, Once, 8 of 8 cycles  Dose modification: 340 mg/m2 (85 % of original dose 400 mg/m2, Cycle 6, Reason: Dose Not Tolerated)  Administration: 605 mg (6/9/2021), 605 mg (6/23/2021), 605 mg (7/7/2021), 605 mg (7/21/2021), 605 mg (8/4/2021), 515 mg (8/18/2021), 610 mg (9/15/2021), 610 mg (9/29/2021)  fosaprepitant (EMEND) IVPB, 150 mg, Intravenous, Once, 6 of 6 cycles  Administration: 150 mg (7/7/2021), 150 mg (7/21/2021), 150 mg (8/4/2021), 150 mg (8/18/2021), 150 mg (9/15/2021), 150 mg (9/29/2021)  leucovorin calcium IVPB, 604 mg, Intravenous, Once, 8 of 8 cycles  Dose modification: 340 mg/m2 (85 % of original dose 400 mg/m2, Cycle 6, Reason: Dose Not Tolerated)  Administration: 600 mg (6/9/2021), 600 mg (6/23/2021), 600 mg (7/7/2021), 600 mg (7/21/2021), 600 mg (8/4/2021), 517 mg (8/18/2021), 600 mg (9/15/2021), 600 mg (9/29/2021)  oxaliplatin (ELOXATIN) chemo infusion, 85 mg/m2 = 128.35 mg, Intravenous, Once, 6 of 6 cycles  Dose modification: 72.25 mg/m2 (85 % of original dose 85 mg/m2, Cycle 6, Reason: Dose Not Tolerated)  Administration: 128.35 mg (6/9/2021), 128.35 mg (6/23/2021), 128.35 mg (7/7/2021), 128.35 mg (7/21/2021),  128.35 mg (8/4/2021), 109.82 mg (8/18/2021)  fluorouracil (ADRUCIL) ambulatory infusion Soln, 1,200 mg/m2/day = 3,625 mg, Intravenous, Over 46 hours, 8 of 8 cycles     10/1/2021 Genetic Testing    Results revealed patient has the following mutation(s):  Positive for a BRCA2 pathogenic variant      10/26/2021 -  Chemotherapy    Xeloda 825 mg/m2 BID  BSA = 1.59  Calculated to 1300 mg BID with rounding.        10/28/2021 - 12/2/2021 Radiation    Treatments:  Course: C1    Plan ID Energy Fractions Dose per Fraction (cGy) Dose Correction (cGy) Total Dose Delivered (cGy) Elapsed Days   Abdomen 6X 25 / 25 195 0 4,875 35      Treatment Dates:  10/28/2021 - 12/2/2021.                History of Present Illness: This is a 70 y/o female who returns to the office today in follow-up for her history of ampullary cancer.  In addition, she carries a high risk for breast cancer due to a BRCA2 gene mutation and also has multiple thyroid nodules requiring surveillance.  She is currently SEDA at 3 years from her ampullary cancer, and reports she feels well overall.  She denies any abdominal pain, weight loss, appetite changes, N/V or bowel changes.  She admits that she has never had a colonoscopy and is not ready at this time to consider one.  She would be open to non-invasive colon screening, however.  She denies any changes on self breast exam.  CT was performed on August 8, and breast MRI was performed on Alanis 3.  She has also had a recent Ca 19-9 level drawn.  I have reviewed these results with the patient today.      Review of Systems   Constitutional:  Negative for activity change, appetite change, fatigue and unexpected weight change.   HENT: Negative.  Negative for trouble swallowing and voice change.    Respiratory: Negative.  Negative for shortness of breath.    Cardiovascular: Negative.    Gastrointestinal: Negative.  Negative for abdominal distention, abdominal pain, diarrhea, nausea and vomiting.   Musculoskeletal: Negative.     Skin: Negative.  Negative for color change.   Neurological: Negative.  Negative for dizziness and headaches.   Hematological: Negative.  Negative for adenopathy.   Psychiatric/Behavioral: Negative.               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    Increased risk of breast cancer    Primary hypertension    Left bundle branch block    Prolonged Q-T interval on ECG    Hypokalemia    Mixed hyperlipidemia    Loose bowel movements    Smoking    SOB (shortness of breath)    Reactive airway disease    Anxiety    Generalized hyperhidrosis    Elevated C-reactive protein (CRP)    Mild intermittent asthma     Past Medical History:   Diagnosis Date    BRCA2 positive     Bundle branch block, left     Cancer (HCC)     pancreatic    Facial droop     r/t neck surgery    History of chemotherapy     pancreatic cancer    History of radiation therapy     Hypercholesteremia     Pancreatic carcinoma (HCC)      Past Surgical History:   Procedure Laterality Date    ABLATION SOFT TISSUE N/A 4/26/2021    Procedure: INTRAOPERATIVE ULTRASOUND, ABLATION,SOFT TISSUE PANCREAS;  Surgeon: Feliciano Luevano MD;  Location: [facility];  Service: Surgical Oncology    CARDIAC CATHETERIZATION Left 9/5/2023    Procedure: Cardiac catheterization;  Surgeon: Jack Gates MD;  Location: [facility];  Service: Cardiology    CHOLECYSTECTOMY N/A 4/26/2021    Procedure: CHOLECYSTECTOMY;  Surgeon: Feliciano Luevano MD;  Location: [facility];  Service: Surgical Oncology    FL GUIDED CENTRAL VENOUS ACCESS DEVICE INSERTION  6/2/2021    HYSTERECTOMY      LAPAROTOMY N/A 4/26/2021    Procedure: LAPAROTOMY EXPLORATORY;  Surgeon: Feliciano Luevano MD;  Location: [facility];  Service: Surgical Oncology     OOPHORECTOMY      SINUS SURGERY      TUNNELED VENOUS PORT PLACEMENT Left 6/2/2021    Procedure: INSERTION VENOUS PORT (PORT-A-CATH);  Surgeon: Feliciano Luevano MD;  [facility];  Service: Surgical Oncology    US GUIDED THYROID BIOPSY  7/13/2022    WHIPPLE PROCEDURE/PANCREATICO-DUODENECTOMY N/A 4/26/2021    Procedure: WHIPPLE PROCEDURE/PANCREATICO-DUODENECTOMY; PYLORIC PRESERVING;  Surgeon: Feliciano Luevano MD;  [facility];  Service: Surgical Oncology     Family History   Problem Relation Age of Onset    Ulcerative colitis Mother     Prostate cancer Father     Melanoma Sister     Heart disease Brother     Prostate cancer Brother     No Known Problems Brother     No Known Problems Maternal Uncle     No Known Problems Paternal Uncle      Social History     Socioeconomic History    Marital status:      Spouse name: Not on file    Number of children: 3    Years of education: Not on file    Highest education level: Not on file   Occupational History    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 1-2 cigg some days 06/02/2022.   Vaping Use    Vaping status: Never Used   Substance and Sexual Activity    Alcohol use: Never    Drug use: Never    Sexual activity: Not Currently   Other Topics Concern    Not on file   Social History Narrative    Not on file     Social Determinants of Health     Financial Resource Strain: Low Risk  (4/12/2024)    Overall Financial Resource Strain (CARDIA)     Difficulty of Paying Living Expenses: Not hard at all   Food Insecurity: No Food Insecurity (4/12/2024)    Hunger Vital Sign      Worried About Running Out of Food in the Last Year: Never true     Ran Out of Food in the Last Year: Never true   Transportation Needs: No Transportation Needs (4/12/2024)    PRAPARE - Transportation     Lack of Transportation (Medical): No     Lack of Transportation (Non-Medical): No   Physical Activity: Insufficiently Active (6/1/2022)    Exercise Vital Sign     Days of Exercise per Week: 4 days     Minutes of Exercise per Session: 20 min   Stress: No Stress Concern Present (10/6/2023)    [employer] - Occupational Stress Questionnaire     Feeling of Stress : Not at all   Social Connections: Socially Isolated (6/1/2022)    Social Connection and Isolation Panel [NHANES]     Frequency of Communication with Friends and Family: More than three times a week     Frequency of Social Gatherings with Friends and Family: More than three times a week     Attends Temple Services: Never     Active Member of Clubs or Organizations: No     Attends Club or Organization Meetings: Never     Marital Status:    Intimate Partner Violence: Not At Risk (6/1/2022)    Humiliation, Afraid, Rape, and Kick questionnaire     Fear of Current or Ex-Partner: No     Emotionally Abused: No     Physically Abused: No     Sexually Abused: No   Housing Stability: Low Risk  (4/12/2024)    Housing Stability Vital Sign     Unable to Pay for Housing in the Last Year: No     Number of Times Moved in the Last Year: 1     Homeless in the Last Year: No       Current Outpatient Medications:     acetaminophen (TYLENOL) 500 mg tablet, Take 1,000 mg by mouth, Disp: , Rfl:     albuterol (2.5 mg/3 mL) 0.083 % nebulizer solution, Take 3 mL (2.5 mg total) by nebulization every 6 (six) hours as needed for wheezing or shortness of breath, Disp: 30 mL, Rfl: 1    albuterol (PROVENTIL HFA,VENTOLIN HFA) 90 mcg/act inhaler, TAKE 2 PUFFS BY MOUTH EVERY 6 HOURS AS NEEDED FOR WHEEZE, Disp: 6.7 g, Rfl: 5    aspirin 81 mg chewable  tablet, Chew 1 tablet (81 mg total) daily, Disp: 30 tablet, Rfl: 0    atorvastatin (LIPITOR) 20 mg tablet, Take 1 tablet (20 mg total) by mouth every evening, Disp: 30 tablet, Rfl: 0    buPROPion (WELLBUTRIN SR) 150 mg 12 hr tablet, TAKE 1 TABLET BY MOUTH TWICE A DAY, Disp: 180 tablet, Rfl: 0    fluticasone (FLONASE) 50 mcg/act nasal spray, 2 sprays into each nostril daily for 3 days, Disp: 16 g, Rfl: 0    Fluticasone-Salmeterol (Wixela Inhub) 250-50 mcg/dose inhaler, Rinse mouth after use., Disp: 60 blister, Rfl: 5    ipratropium-albuterol (DUO-NEB) 0.5-2.5 mg/3 mL nebulizer solution, Take 3 mL by nebulization 4 (four) times a day, Disp: 60 mL, Rfl: 1    losartan-hydrochlorothiazide (HYZAAR) 50-12.5 mg per tablet, Take 1 tablet by mouth daily, Disp: 30 tablet, Rfl: 2    metoprolol tartrate (LOPRESSOR) 25 mg tablet, Take 25 mg by mouth 2 (two) times a day, Disp: , Rfl:     Multiple Vitamin (multivitamin) tablet, Take 1 tablet by mouth daily, Disp: , Rfl:     nicotine (NICODERM CQ) 7 mg/24hr TD 24 hr patch, Place 1 patch on the skin over 24 hours daily Apply a new patch every 24 hours to a clean, dry, hairless site on the upper arm or hip., Disp: 28 patch, Rfl: 0    ondansetron (Zofran ODT) 4 mg disintegrating tablet, Take 1 tablet (4 mg total) by mouth every 6 (six) hours as needed for nausea or vomiting, Disp: 10 tablet, Rfl: 0    sertraline (ZOLOFT) 25 mg tablet, Take 1 tablet (25 mg total) by mouth daily, Disp: 90 tablet, Rfl: 1    benzonatate (TESSALON PERLES) 100 mg capsule, Take 1 capsule (100 mg total) by mouth 3 (three) times a day as needed for cough (Patient not taking: Reported on 2/22/2024), Disp: 30 capsule, Rfl: 0    Medical ID Plate MISC, Use once for 1 dose Severely and permanently limited in the ability to walk because of an arthritic, neurological, or orthopedic condition: or cannot walk two hundred feet without stopping to rest and meets requirements for disability license plate, Disp: 1 each,  Rfl: 0  Allergies   Allergen Reactions    Penicillins Rash    Tetracycline Rash     Vitals:    08/28/24 1458   BP: 144/82   Pulse: 77   Temp: (!) 97.4 °F (36.3 °C)   SpO2: 96%       Physical Exam  Vitals reviewed.   Constitutional:       General: She is not in acute distress.     Appearance: Normal appearance. She is normal weight. She is not ill-appearing or toxic-appearing.   HENT:      Head: Normocephalic and atraumatic.      Nose: Nose normal.      Mouth/Throat:      Mouth: Mucous membranes are moist.   Eyes:      General: No scleral icterus.  Cardiovascular:      Rate and Rhythm: Normal rate.   Pulmonary:      Effort: Pulmonary effort is normal.   Abdominal:      General: Abdomen is flat. There is no distension.      Palpations: Abdomen is soft. There is no mass.      Tenderness: There is no abdominal tenderness. There is no guarding.      Hernia: A hernia is present.   Musculoskeletal:         General: Normal range of motion.      Cervical back: Normal range of motion and neck supple.   Skin:     General: Skin is warm and dry.      Coloration: Skin is not jaundiced.   Neurological:      General: No focal deficit present.      Mental Status: She is alert and oriented to person, place, and time.   Psychiatric:         Mood and Affect: Mood normal.         Behavior: Behavior normal.         Thought Content: Thought content normal.         Judgment: Judgment normal.             Labs:  Cancer antigen 19-9  Collected 7/30/2024        Component  Ref Range & Units 4 wk ago   CA 19-9  0 - 35 U/mL <2          Imaging  CT chest abdomen pelvis w contrast    Result Date: 8/8/2024  Narrative: CT CHEST, ABDOMEN AND PELVIS WITH IV CONTRAST INDICATION: History of ampullary cancer status post Whipple. COMPARISON: 2/7/2024, 8/3/2023 TECHNIQUE: CT examination of the chest, abdomen and pelvis was performed. Scanning through the abdomen was performed in arterial, venous and delayed phases according a protocol specifically designed  to evaluate the upper abdominal viscera. Multiplanar 2D  reformatted images were created from the source data. This examination, like all CT scans performed in the [facility], was performed utilizing techniques to minimize radiation dose exposure, including the use of iterative reconstruction and automated exposure control. Radiation dose length product (DLP) for this visit: 1028.48 mGy-cm IV Contrast: 100 mL of iohexol (OMNIPAQUE) Enteric Contrast: Administered. FINDINGS: CHEST LUNGS: Lungs are clear. No tracheal or endobronchial lesion. PLEURA: Unremarkable. HEART/GREAT VESSELS: Heart is unremarkable for patient's age. No thoracic aortic aneurysm. MEDIASTINUM AND WYATT: Unremarkable. CHEST WALL AND LOWER NECK: There are multiple right-sided thyroid nodules, the largest measuring 2 cm on the right, stable. These have been previously evaluated by ultrasound and biopsy. ABDOMEN LIVER/BILIARY TREE: Unremarkable. There is postoperative pneumobilia. GALLBLADDER: Post cholecystectomy. SPLEEN: Unremarkable. PANCREAS: The patient is status post Whipple. There is no evidence of recurrent tumor. ADRENAL GLANDS: Unremarkable. KIDNEYS/URETERS: There are small renal cysts. There is a punctate nonobstructing right renal calculus. No hydronephrosis. STOMACH AND BOWEL: Unremarkable. APPENDIX: No findings to suggest appendicitis. ABDOMINOPELVIC CAVITY: No ascites. No pneumoperitoneum. No lymphadenopathy. VESSELS: Unremarkable for patient's age. PELVIS REPRODUCTIVE ORGANS: Post hysterectomy. URINARY BLADDER: Unremarkable. ABDOMINAL WALL/INGUINAL REGIONS: There is a small Tejada's hernia involving the antimesenteric wall of the mid transverse colon. There is a left inguinal hernia containing fat and a small portion of small bowel. BONES: No acute fracture or suspicious osseous lesion.     Impression: No evidence of recurrent or metastatic disease. Workstation performed: [id number]       MRI breast bilateral w and wo  contrast w cad  06/03/2024  Narrative & Impression   DIAGNOSIS: Increased risk of breast cancer; BRCA2 positive; Breast cancer screening other than mammogram      TECHNIQUE:  MRI of both breasts was performed in axial planes utilizing a combination of localizer, axial T2 STIR, Axial T1 FSPGR in phase, axial dynamic 3D fat suppressed gradient-echo T1 sequences (VIBRANT) before and after contrast administration at multiple time points, and sagittal 3D fat suppressed gradient-echo T1 sequences (VIBRANT) post-contrast.     This exam was read in conjunction with surespot software.     MEDICATIONS ADMINISTERED:  Gadobutrol injection (SINGLE-DOSE) SOLN 8 mL, Total Given: 8 mL (1 dose)  Intravenous contrast was administered at 2cc/sec, without documented contrast reaction.     COMPARISONS: Prior breast imaging dated: 11/15/2023, 05/22/2023, 11/14/2022, and 05/20/2022     RELEVANT HISTORY:   Family Breast Cancer History: No known family history of breast cancer.  Family Medical History: No known relevant family medical history.   Personal History: No known relevant hormone history. Surgical history includes hysterectomy and oophorectomy. Medical history includes BRCA 1 positive, BRCA 2 positive, and history of chemotherapy.     RISK ASSESSMENT:   5 Year Tyrer-Cuzick: 7.7%  10 Year Tyrer-Cuzick: 13.44%  Lifetime Tyrer-Cuzick: 20.54%     TISSUE DENSITY:  FGT: The breasts have heterogeneous fibroglandular tissue.  BPE: The background parenchymal enhancement is minimal.     INDICATION: Ana Maria Murphy is a 68 y.o. female presenting for high risk screening.  Patient is BRCA2 positive.     FINDINGS:   Bilateral  Susceptibility artifact from an implantable cardiac loop recorder is visualized in the inner mid-posterior left breast and limits evaluation in this region.  There are no suspicious enhancing masses or suspicious areas of non-mass enhancement. There is no axillary or internal mammary adenopathy.      IMPRESSION:   No MRI  evidence of malignancy in either breast.     Patient will be due for bilateral annual screening mammography after 11/15/2024.     ASSESSMENT/BI-RADS CATEGORY:  Left: 2 - Benign  Right: 1 - Negative  Overall: 2 - Benign     RECOMMENDATION:       - Continue annual screening mammography for both breasts.       - Breast MRI Screening in 1 year for both breasts.          I personally reviewed and interpreted the above laboratory and imaging data.

## 2024-08-28 NOTE — TELEPHONE ENCOUNTER
Left vm for pt. Asked her to call either hope line at [phone] if she needs us to call to get her mammo scheduled or left CS for her to call to get the mammo scheduled. Will call back tomorrow/check tomorrow.

## 2024-08-28 NOTE — LETTER
August 28, 2024     Feliciano Luevano MD  [address] [facility]  2nd Floor  [facility] [address]    Patient: Ana Maria Murphy   YOB: 1955   Date of Visit: 8/28/2024       Dear Dr. Luevano:    Thank you for referring Ana Maria Murphy to me for evaluation. Below are my notes for this consultation.    If you have questions, please do not hesitate to call me. I look forward to following your patient along with you.         Sincerely,        ANDRES Palma        CC: [facility]    ANDRES Palma  8/28/2024  3:28 PM  Sign when Signing Visit               Surgical Oncology Follow Up       [address] [facility] SURGICAL ONCOLOGY [place]  [address]  [place] [address]    Ana Maria Murphy  1955  [id number]  [address] [facility] SURGICAL ONCOLOGY [place]  [address]  [place] [address]    1. 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 History of malignant neoplasm of ampulla of Vater  Assessment & Plan:  No evidence of disease recurrence on recent CT, and Ca 19-9 level remains low.  We will see her again in 6 months for another exam.  I have stressed the importance of colon cancer screening, and have urged her to discuss non-invasive testing with her PCP.  3. BRCA positive  Assessment & Plan:  Recent breast MRI showed no unusual findings.  We will continue with annual mammogram and MRI.  Patient declines clinical breast exams.  4. Increased risk of breast cancer  5. Multiple thyroid nodules  Assessment & Plan:  Nodules were stable as of February 2024.  Will repeat US in February 2025.  Orders:  -     US thyroid; Future; Expected date: 02/14/2025  6. Visit for screening mammogram  -     Mammo screening bilateral w 3d & cad; Future; Expected date: 11/16/2024         Chief Complaint   Patient presents with   • Follow-up       No follow-ups on file.      Oncology History   History of  malignant neoplasm of ampulla of Vater   3/15/2021 Initial Diagnosis    Neoplasm of ampulla of Vater     4/26/2021 Surgery    B.  Celiac lymph node:     - Single lymph node; negative for malignancy (0/1).     C.  Whipple resection:     - Invasive poorly differentiated mucinous adenocarcinoma.     - Tumor arises in the background of an adenoma with high grade dysplasia.     - Lymphatic and venous channel invasion by tumor present.     - Metastatic carcinoma present in one of twenty lymph nodes (1/20).     - All margins are negative for tumor.     4/26/2021 -  Cancer Staged    Staging form: Ampulla of Vater, AJCC 8th Edition  - Pathologic stage from 4/26/2021: Stage IIIA (pT3b, pN1, cM0) - Signed by ANDRES Palma on 6/9/2023  Total positive nodes: 1  Total nodes examined: 22  Histologic grade (G): G3  Histologic grading system: 3 grade system  Residual tumor (R): R0 - None       6/9/2021 - 10/1/2021 Chemotherapy    Cycles 1-6  6/2021 through 8/20/21:  FOLFOX    Cycles 7 -8:  9/15/2021- 10/12/2021  Leucovorin 400mg/m2, IV, Day 1  5-Fu 400 mg/m2, IV , Day 1   5-Fu 1200 mg/m2, IV, CI x 46 hours  Cycle length = 2 weeks    palonosetron (ALOXI), 0.25 mg, Intravenous, Once, 5 of 5 cycles  Administration: 0.25 mg (7/21/2021), 0.25 mg (8/4/2021), 0.25 mg (8/18/2021), 0.25 mg (9/15/2021), 0.25 mg (9/29/2021)  fluorouracil (ADRUCIL), 400 mg/m2 = 605 mg, Intravenous, Once, 8 of 8 cycles  Dose modification: 340 mg/m2 (85 % of original dose 400 mg/m2, Cycle 6, Reason: Dose Not Tolerated)  Administration: 605 mg (6/9/2021), 605 mg (6/23/2021), 605 mg (7/7/2021), 605 mg (7/21/2021), 605 mg (8/4/2021), 515 mg (8/18/2021), 610 mg (9/15/2021), 610 mg (9/29/2021)  fosaprepitant (EMEND) IVPB, 150 mg, Intravenous, Once, 6 of 6 cycles  Administration: 150 mg (7/7/2021), 150 mg (7/21/2021), 150 mg (8/4/2021), 150 mg (8/18/2021), 150 mg (9/15/2021), 150 mg (9/29/2021)  leucovorin calcium IVPB, 604 mg, Intravenous, Once, 8 of 8  cycles  Dose modification: 340 mg/m2 (85 % of original dose 400 mg/m2, Cycle 6, Reason: Dose Not Tolerated)  Administration: 600 mg (6/9/2021), 600 mg (6/23/2021), 600 mg (7/7/2021), 600 mg (7/21/2021), 600 mg (8/4/2021), 517 mg (8/18/2021), 600 mg (9/15/2021), 600 mg (9/29/2021)  oxaliplatin (ELOXATIN) chemo infusion, 85 mg/m2 = 128.35 mg, Intravenous, Once, 6 of 6 cycles  Dose modification: 72.25 mg/m2 (85 % of original dose 85 mg/m2, Cycle 6, Reason: Dose Not Tolerated)  Administration: 128.35 mg (6/9/2021), 128.35 mg (6/23/2021), 128.35 mg (7/7/2021), 128.35 mg (7/21/2021), 128.35 mg (8/4/2021), 109.82 mg (8/18/2021)  fluorouracil (ADRUCIL) ambulatory infusion Soln, 1,200 mg/m2/day = 3,625 mg, Intravenous, Over 46 hours, 8 of 8 cycles     10/1/2021 Genetic Testing    Results revealed patient has the following mutation(s):  Positive for a BRCA2 pathogenic variant      10/26/2021 -  Chemotherapy    Xeloda 825 mg/m2 BID  BSA = 1.59  Calculated to 1300 mg BID with rounding.        10/28/2021 - 12/2/2021 Radiation    Treatments:  Course: C1    Plan ID Energy Fractions Dose per Fraction (cGy) Dose Correction (cGy) Total Dose Delivered (cGy) Elapsed Days   Abdomen 6X 25 / 25 195 0 4,875 35      Treatment Dates:  10/28/2021 - 12/2/2021.                History of Present Illness: This is a 68 y/o female who returns to the office today in follow-up for her history of ampullary cancer.  In addition, she carries a high risk for breast cancer due to a BRCA2 gene mutation and also has multiple thyroid nodules requiring surveillance.  She is currently SEDA at 3 years from her ampullary cancer, and reports she feels well overall.  She denies any abdominal pain, weight loss, appetite changes, N/V or bowel changes.  She admits that she has never had a colonoscopy and is not ready at this time to consider one.  She would be open to non-invasive colon screening, however.  She denies any changes on self breast exam.  CT was performed  on August 8, and breast MRI was performed on [facility] 3.  She has also had a recent Ca 19-9 level drawn.  I have reviewed these results with the patient today.      Review of Systems   Constitutional:  Negative for activity change, appetite change, fatigue and unexpected weight change.   HENT: Negative.  Negative for trouble swallowing and voice change.    Respiratory: Negative.  Negative for shortness of breath.    Cardiovascular: Negative.    Gastrointestinal: Negative.  Negative for abdominal distention, abdominal pain, diarrhea, nausea and vomiting.   Musculoskeletal: Negative.    Skin: Negative.  Negative for color change.   Neurological: Negative.  Negative for dizziness and headaches.   Hematological: Negative.  Negative for adenopathy.   Psychiatric/Behavioral: Negative.               Patient Active Problem List   Diagnosis   • Elevated LFTs   • Dilated bile duct   • History of malignant neoplasm of ampulla of Vater   • Mild protein-calorie malnutrition (HCC)   • Breast mass   • Multiple thyroid nodules   • BRCA positive   • 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   • Increased risk of breast cancer   • Primary hypertension   • Left bundle branch block   • Prolonged Q-T interval on ECG   • Hypokalemia   • Mixed hyperlipidemia   • Loose bowel movements   • Smoking   • SOB (shortness of breath)   • Reactive airway disease   • Anxiety   • Generalized hyperhidrosis   • Elevated C-reactive protein (CRP)   • Mild intermittent asthma     Past Medical History:   Diagnosis Date   • BRCA2 positive    • Bundle branch block, left    • Cancer (HCC)     pancreatic   • Facial droop     r/t neck surgery   • History of chemotherapy     pancreatic cancer   • History of radiation therapy    • Hypercholesteremia    • Pancreatic carcinoma (HCC)      Past Surgical History:   Procedure Laterality Date   • ABLATION SOFT TISSUE N/A 4/26/2021    Procedure: INTRAOPERATIVE ULTRASOUND, ABLATION,SOFT TISSUE PANCREAS;   Surgeon: Feliciano Luevano MD;  Location: [facility];  Service: Surgical Oncology   • CARDIAC CATHETERIZATION Left 9/5/2023    Procedure: Cardiac catheterization;  Surgeon: Jack Gates MD;  Location: [facility];  Service: Cardiology   • CHOLECYSTECTOMY N/A 4/26/2021    Procedure: CHOLECYSTECTOMY;  Surgeon: Feliciano Luevano MD;  Location: [facility];  Service: Surgical Oncology   • FL GUIDED CENTRAL VENOUS ACCESS DEVICE INSERTION  6/2/2021   • HYSTERECTOMY     • LAPAROTOMY N/A 4/26/2021    Procedure: LAPAROTOMY EXPLORATORY;  Surgeon: Feliciano Luevano MD;  Location[facility];  Service: Surgical Oncology   • OOPHORECTOMY     • SINUS SURGERY     • TUNNELED VENOUS PORT PLACEMENT Left 6/2/2021    Procedure: INSERTION VENOUS PORT (PORT-A-CATH);  Surgeon: Feliciano Luevano MD;  Location: [facility];  Service: Surgical Oncology   • US GUIDED THYROID BIOPSY  7/13/2022   • WHIPPLE PROCEDURE/PANCREATICO-DUODENECTOMY N/A 4/26/2021    Procedure: WHIPPLE PROCEDURE/PANCREATICO-DUODENECTOMY; PYLORIC PRESERVING;  Surgeon: Feliciano Luevano MD;  [facility];  Service: Surgical Oncology     Family History   Problem Relation Age of Onset   • Ulcerative colitis Mother    • Prostate cancer Father    • Melanoma Sister    • Heart disease Brother    • Prostate cancer Brother    • No Known Problems Brother    • No Known Problems Maternal Uncle    • No Known Problems Paternal Uncle      Social History     Socioeconomic History   • Marital status:      Spouse name: Not on file   • Number of children: 3   • Years of education: Not on file   • Highest education level: Not on file   Occupational History   • 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 1-2 cigg some days 06/02/2022.   Vaping Use   • Vaping status: Never Used   Substance and Sexual Activity   • Alcohol use: Never   • Drug use: Never   • Sexual activity: Not Currently   Other Topics Concern   • Not on file   Social History Narrative   • Not on file     Social Determinants of Health     Financial Resource Strain: Low Risk  (4/12/2024)    Overall Financial Resource Strain (CARDIA)    • Difficulty of Paying Living Expenses: Not hard at all   Food Insecurity: No Food Insecurity (4/12/2024)    Hunger Vital Sign    • Worried About Running Out of Food in the Last Year: Never true    • Ran Out of Food in the Last Year: Never true   Transportation Needs: No Transportation Needs (4/12/2024)    PRAPARE - Transportation    • Lack of Transportation (Medical): No    • Lack of Transportation (Non-Medical): No   Physical Activity: Insufficiently Active (6/1/2022)    Exercise Vital Sign    • Days of Exercise per Week: 4 days    • Minutes of Exercise per Session: 20 min   Stress: No Stress Concern Present (10/6/2023)    [employer] [place] [employer] - Occupational Stress Questionnaire    • Feeling of Stress : Not at all   Social Connections: Socially Isolated (6/1/2022)    Social Connection and Isolation Panel [NHANES]    • Frequency of Communication with Friends and Family: More than three times a week    • Frequency of Social Gatherings with Friends and Family: More than three times a week    • Attends Confucianism Services: Never    • Active Member of Clubs or Organizations: No    • Attends Club or Organization Meetings: Never    • Marital Status:    Intimate Partner Violence: Not At Risk (6/1/2022)    Humiliation, Afraid, Rape, and Kick questionnaire    • Fear of Current or Ex-Partner: No    • Emotionally Abused: No    • Physically Abused: No    • Sexually Abused: No   Housing  Stability: Low Risk  (4/12/2024)    Housing Stability Vital Sign    • Unable to Pay for Housing in the Last Year: No    • Number of Times Moved in the Last Year: 1    • Homeless in the Last Year: No       Current Outpatient Medications:   •  acetaminophen (TYLENOL) 500 mg tablet, Take 1,000 mg by mouth, Disp: , Rfl:   •  albuterol (2.5 mg/3 mL) 0.083 % nebulizer solution, Take 3 mL (2.5 mg total) by nebulization every 6 (six) hours as needed for wheezing or shortness of breath, Disp: 30 mL, Rfl: 1  •  albuterol (PROVENTIL HFA,VENTOLIN HFA) 90 mcg/act inhaler, TAKE 2 PUFFS BY MOUTH EVERY 6 HOURS AS NEEDED FOR WHEEZE, Disp: 6.7 g, Rfl: 5  •  aspirin 81 mg chewable tablet, Chew 1 tablet (81 mg total) daily, Disp: 30 tablet, Rfl: 0  •  atorvastatin (LIPITOR) 20 mg tablet, Take 1 tablet (20 mg total) by mouth every evening, Disp: 30 tablet, Rfl: 0  •  buPROPion (WELLBUTRIN SR) 150 mg 12 hr tablet, TAKE 1 TABLET BY MOUTH TWICE A DAY, Disp: 180 tablet, Rfl: 0  •  fluticasone (FLONASE) 50 mcg/act nasal spray, 2 sprays into each nostril daily for 3 days, Disp: 16 g, Rfl: 0  •  Fluticasone-Salmeterol (Wixela Inhub) 250-50 mcg/dose inhaler, Rinse mouth after use., Disp: 60 blister, Rfl: 5  •  ipratropium-albuterol (DUO-NEB) 0.5-2.5 mg/3 mL nebulizer solution, Take 3 mL by nebulization 4 (four) times a day, Disp: 60 mL, Rfl: 1  •  losartan-hydrochlorothiazide (HYZAAR) 50-12.5 mg per tablet, Take 1 tablet by mouth daily, Disp: 30 tablet, Rfl: 2  •  metoprolol tartrate (LOPRESSOR) 25 mg tablet, Take 25 mg by mouth 2 (two) times a day, Disp: , Rfl:   •  Multiple Vitamin (multivitamin) tablet, Take 1 tablet by mouth daily, Disp: , Rfl:   •  nicotine (NICODERM CQ) 7 mg/24hr TD 24 hr patch, Place 1 patch on the skin over 24 hours daily Apply a new patch every 24 hours to a clean, dry, hairless site on the upper arm or hip., Disp: 28 patch, Rfl: 0  •  ondansetron (Zofran ODT) 4 mg disintegrating tablet, Take 1 tablet (4 mg total) by  mouth every 6 (six) hours as needed for nausea or vomiting, Disp: 10 tablet, Rfl: 0  •  sertraline (ZOLOFT) 25 mg tablet, Take 1 tablet (25 mg total) by mouth daily, Disp: 90 tablet, Rfl: 1  •  benzonatate (TESSALON PERLES) 100 mg capsule, Take 1 capsule (100 mg total) by mouth 3 (three) times a day as needed for cough (Patient not taking: Reported on 2/22/2024), Disp: 30 capsule, Rfl: 0  •  Medical ID Plate MISC, Use once for 1 dose Severely and permanently limited in the ability to walk because of an arthritic, neurological, or orthopedic condition: or cannot walk two hundred feet without stopping to rest and meets requirements for disability license plate, Disp: 1 each, Rfl: 0  Allergies   Allergen Reactions   • Penicillins Rash   • Tetracycline Rash     Vitals:    08/28/24 1458   BP: 144/82   Pulse: 77   Temp: (!) 97.4 °F (36.3 °C)   SpO2: 96%       Physical Exam  Vitals reviewed.   Constitutional:       General: She is not in acute distress.     Appearance: Normal appearance. She is normal weight. She is not ill-appearing or toxic-appearing.   HENT:      Head: Normocephalic and atraumatic.      Nose: Nose normal.      Mouth/Throat:      Mouth: Mucous membranes are moist.   Eyes:      General: No scleral icterus.  Cardiovascular:      Rate and Rhythm: Normal rate.   Pulmonary:      Effort: Pulmonary effort is normal.   Abdominal:      General: Abdomen is flat. There is no distension.      Palpations: Abdomen is soft. There is no mass.      Tenderness: There is no abdominal tenderness. There is no guarding.      Hernia: A hernia is present.   Musculoskeletal:         General: Normal range of motion.      Cervical back: Normal range of motion and neck supple.   Skin:     General: Skin is warm and dry.      Coloration: Skin is not jaundiced.   Neurological:      General: No focal deficit present.      Mental Status: She is alert and oriented to person, place, and time.   Psychiatric:         Mood and Affect: Mood  normal.         Behavior: Behavior normal.         Thought Content: Thought content normal.         Judgment: Judgment normal.             Labs:  Cancer antigen 19-9  Collected 7/30/2024        Component  Ref Range & Units 4 wk ago   CA 19-9  0 - 35 U/mL <2          Imaging  CT chest abdomen pelvis w contrast    Result Date: 8/8/2024  Narrative: CT CHEST, ABDOMEN AND PELVIS WITH IV CONTRAST INDICATION: History of ampullary cancer status post Whipple. COMPARISON: 2/7/2024, 8/3/2023 TECHNIQUE: CT examination of the chest, abdomen and pelvis was performed. Scanning through the abdomen was performed in arterial, venous and delayed phases according a protocol specifically designed to evaluate the upper abdominal viscera. Multiplanar 2D  reformatted images were created from the source data. This examination, like all CT scans performed in the [facility], was performed utilizing techniques to minimize radiation dose exposure, including the use of iterative reconstruction and automated exposure control. Radiation dose length product (DLP) for this visit: 1028.48 mGy-cm IV Contrast: 100 mL of iohexol (OMNIPAQUE) Enteric Contrast: Administered. FINDINGS: CHEST LUNGS: Lungs are clear. No tracheal or endobronchial lesion. PLEURA: Unremarkable. HEART/GREAT VESSELS: Heart is unremarkable for patient's age. No thoracic aortic aneurysm. MEDIASTINUM AND WYATT: Unremarkable. CHEST WALL AND LOWER NECK: There are multiple right-sided thyroid nodules, the largest measuring 2 cm on the right, stable. These have been previously evaluated by ultrasound and biopsy. ABDOMEN LIVER/BILIARY TREE: Unremarkable. There is postoperative pneumobilia. GALLBLADDER: Post cholecystectomy. SPLEEN: Unremarkable. PANCREAS: The patient is status post Whipple. There is no evidence of recurrent tumor. ADRENAL GLANDS: Unremarkable. KIDNEYS/URETERS: There are small renal cysts. There is a punctate nonobstructing right renal calculus. No  hydronephrosis. STOMACH AND BOWEL: Unremarkable. APPENDIX: No findings to suggest appendicitis. ABDOMINOPELVIC CAVITY: No ascites. No pneumoperitoneum. No lymphadenopathy. VESSELS: Unremarkable for patient's age. PELVIS REPRODUCTIVE ORGANS: Post hysterectomy. URINARY BLADDER: Unremarkable. ABDOMINAL WALL/INGUINAL REGIONS: There is a small Tejada's hernia involving the antimesenteric wall of the mid transverse colon. There is a left inguinal hernia containing fat and a small portion of small bowel. BONES: No acute fracture or suspicious osseous lesion.     Impression: No evidence of recurrent or metastatic disease. Workstation performed: [id number]       MRI breast bilateral w and wo contrast w cad  06/03/2024  Narrative & Impression   DIAGNOSIS: Increased risk of breast cancer; BRCA2 positive; Breast cancer screening other than mammogram      TECHNIQUE:  MRI of both breasts was performed in axial planes utilizing a combination of localizer, axial T2 STIR, Axial T1 FSPGR in phase, axial dynamic 3D fat suppressed gradient-echo T1 sequences (VIBRANT) before and after contrast administration at multiple time points, and sagittal 3D fat suppressed gradient-echo T1 sequences (VIBRANT) post-contrast.     This exam was read in conjunction with [employer] software.     MEDICATIONS ADMINISTERED:  Gadobutrol injection (SINGLE-DOSE) SOLN 8 mL, Total Given: 8 mL (1 dose)  Intravenous contrast was administered at 2cc/sec, without documented contrast reaction.     COMPARISONS: Prior breast imaging dated: 11/15/2023, 05/22/2023, 11/14/2022, and 05/20/2022     RELEVANT HISTORY:   Family Breast Cancer History: No known family history of breast cancer.  Family Medical History: No known relevant family medical history.   Personal History: No known relevant hormone history. Surgical history includes hysterectomy and oophorectomy. Medical history includes BRCA 1 positive, BRCA 2 positive, and history of chemotherapy.     RISK ASSESSMENT:    5 Year Tyrer-Cuzick: 7.7%  10 Year Tyrer-Cuzick: 13.44%  Lifetime Tyrer-Cuzick: 20.54%     TISSUE DENSITY:  FGT: The breasts have heterogeneous fibroglandular tissue.  BPE: The background parenchymal enhancement is minimal.     INDICATION: Ana Maria Murphy is a 68 y.o. female presenting for high risk screening.  Patient is BRCA2 positive.     FINDINGS:   Bilateral  Susceptibility artifact from an implantable cardiac loop recorder is visualized in the inner mid-posterior left breast and limits evaluation in this region.  There are no suspicious enhancing masses or suspicious areas of non-mass enhancement. There is no axillary or internal mammary adenopathy.      IMPRESSION:   No MRI evidence of malignancy in either breast.     Patient will be due for bilateral annual screening mammography after 11/15/2024.     ASSESSMENT/BI-RADS CATEGORY:  Left: 2 - Benign  Right: 1 - Negative  Overall: 2 - Benign     RECOMMENDATION:       - Continue annual screening mammography for both breasts.       - Breast MRI Screening in 1 year for both breasts.          I personally reviewed and interpreted the above laboratory and imaging data.

## 2024-08-28 NOTE — ASSESSMENT & PLAN NOTE
No evidence of disease recurrence on recent CT, and Ca 19-9 level remains low.  We will see her again in 6 months for another exam.  I have stressed the importance of colon cancer screening, and have urged her to discuss non-invasive testing with her PCP.

## 2024-08-28 NOTE — ASSESSMENT & PLAN NOTE
Recent breast MRI showed no unusual findings.  We will continue with annual mammogram and MRI.  Patient declines clinical breast exams.

## 2024-08-29 DIAGNOSIS — Z12.11 SCREEN FOR COLON CANCER: Primary | ICD-10-CM

## 2024-08-29 NOTE — TELEPHONE ENCOUNTER
Patient called back Yuriy and requested if the office can schedule the Mammogram for her. Patient doesn't have any day restrictions but will like to be scheduled in the afternoon.     Please call patient once mammo is scheduled.         Thank you!

## 2024-09-12 DIAGNOSIS — R19.5 POSITIVE COLORECTAL CANCER SCREENING USING COLOGUARD TEST: Primary | ICD-10-CM

## 2024-09-12 LAB — COLOGUARD RESULT REPORTABLE: POSITIVE

## 2024-09-16 ENCOUNTER — TELEPHONE (OUTPATIENT)
Age: 69
End: 2024-09-16

## 2024-09-16 ENCOUNTER — PREP FOR PROCEDURE (OUTPATIENT)
Age: 69
End: 2024-09-16

## 2024-09-16 DIAGNOSIS — Z12.11 SCREENING FOR COLON CANCER: Primary | ICD-10-CM

## 2024-09-16 NOTE — TELEPHONE ENCOUNTER
Scheduled date of colonoscopy (as of today):10/02/2024  Physician performing colonoscopy:Dr Marcus  Location of colonoscopy[facility]  Bowel prep reviewed with patient:Miralax/Dulcolax  Instructions reviewed with patient by:office to mail instructions  Clearances: N/A    Patients GI provider:      Number to return call: [phone]    Reason for call: Pt called to schedule colonoscopy. Pt requested Miralax/Dulcolax prep instructions be mailed.     Scheduled procedure/appointment date if applicable: 10/02/2024

## 2024-09-16 NOTE — TELEPHONE ENCOUNTER
09/16/24  Screened by: Nikki Funk    Referring Provider Dr Staley    Pre- Screening:     There is no height or weight on file to calculate BMI.172lbs 31.46  Has patient been referred for a routine screening Colonoscopy? yes  Is the patient between 45-75 years old? yes      Previous Colonoscopy no   If yes:    Date:     Facility:     Reason:     Does the patient want to see a Gastroenterologist prior to their procedure OR are they having any GI symptoms? no    Has the patient been hospitalized or had abdominal surgery in the past 6 months? no    Does the patient use supplemental oxygen? no    Does the patient take Coumadin, Lovenox, Plavix, Elliquis, Xarelto, or other blood thinning medication? no    Has the patient had a stroke, cardiac event, or stent placed in the past year? no      If patient is between 45yrs - 49yrs, please advise patient that we will have to confirm benefits & coverage with their insurance company for a routine screening colonoscopy.

## 2024-09-16 NOTE — TELEPHONE ENCOUNTER
----- Message from Mireya Staley DO sent at 9/16/2024  2:13 PM EDT -----  Greetings,    May either of you please make an appointment for this patient to see me sometime in the upcoming weeks, when I will be in the office. I would like to follow up on her lab results. Thank you.    Best regards,  Dr. Staley

## 2024-09-17 ENCOUNTER — TELEPHONE (OUTPATIENT)
Age: 69
End: 2024-09-17

## 2024-09-17 NOTE — TELEPHONE ENCOUNTER
Fer Hickman-  It looks like your next available appointment is 10/23/24. Would you like for me to book that date or would you like patient to be seen by another provider w/ a sooner appointment date, please advise

## 2024-09-18 ENCOUNTER — ANESTHESIA (OUTPATIENT)
Dept: ANESTHESIOLOGY | Facility: HOSPITAL | Age: 69
End: 2024-09-18

## 2024-09-18 ENCOUNTER — ANESTHESIA EVENT (OUTPATIENT)
Dept: ANESTHESIOLOGY | Facility: HOSPITAL | Age: 69
End: 2024-09-18

## 2024-09-18 ENCOUNTER — OFFICE VISIT (OUTPATIENT)
Dept: PULMONOLOGY | Facility: MEDICAL CENTER | Age: 69
End: 2024-09-18
Payer: COMMERCIAL

## 2024-09-18 VITALS
TEMPERATURE: 97.7 F | BODY MASS INDEX: 31.28 KG/M2 | SYSTOLIC BLOOD PRESSURE: 110 MMHG | RESPIRATION RATE: 12 BRPM | HEIGHT: 62 IN | DIASTOLIC BLOOD PRESSURE: 60 MMHG | WEIGHT: 170 LBS | HEART RATE: 63 BPM | OXYGEN SATURATION: 96 %

## 2024-09-18 DIAGNOSIS — J45.20 MILD INTERMITTENT ASTHMA, UNSPECIFIED WHETHER COMPLICATED: Primary | ICD-10-CM

## 2024-09-18 PROCEDURE — 99213 OFFICE O/P EST LOW 20 MIN: CPT | Performed by: STUDENT IN AN ORGANIZED HEALTH CARE EDUCATION/TRAINING PROGRAM

## 2024-09-18 PROCEDURE — G2211 COMPLEX E/M VISIT ADD ON: HCPCS | Performed by: STUDENT IN AN ORGANIZED HEALTH CARE EDUCATION/TRAINING PROGRAM

## 2024-09-18 NOTE — PATIENT INSTRUCTIONS
It was a pleasure seeing you today!    Keep using Wixella for now  Use rescue inhaler as needed  Follow up in 1 year     Makenna Hoyos MD  Pulmonary and Critical Care Medicine

## 2024-09-18 NOTE — PROGRESS NOTES
Pulmonary Outpatient Progress Note   Ana Maria Murphy 69 y.o. female MRN: [id number]  9/18/2024    Assessment:    Reactive airway disease, with peripheral eosinophilia, significant allergens on her RAST panel, tolerating ICS/LABA Wixela along with rescue albuterol and she has significant clinical improvement.  No longer utilizing the ER frequently, rarely uses rescue inhaler. Since then she has had clinical stability  Nicotine dependence in remission, she recently quit smoking and has been able to abstain since then.   Nonobstructive CAD on aspirin Cozaar hydrochlorothiazide  History of stage IIIa adenocarcinoma the pancreas status post Whipple followed by adjuvant chemotherapy and 5-FU along with radiation follows with oncology service  History of hysterectomy oophorectomy with BRCA 2 positive mutation    Plan:    Continue ICS/LABA Wixela along with rescue albuterol inhaler and nebulizer  Continue to abstain from smoking  Follow-up in 1 year     History of Present Illness   HPI:    69-year-old female here for follow-up.  Has a past medical history of reactive airway disease, nicotine dependence in remission, nonobstructive CAD, stage IIIa adenocarcinoma with Whipple surgery in April 2021 followed by adjuvant chemotherapy and radiation, history of oophorectomy and hysterectomy.  Follows with medical and surgical oncology.  Since our last visit she has had no ER visits, followed up with medical and surgical oncology.    In the interim she continues to do well, uses rescue inhaler occasionally, compliant with her Wixela.  Doing well overall with no ER visits, continues to refrain from smoking.  Significant clinical improvement since then      Review of Systems   Constitutional:  Negative for activity change and unexpected weight change.   HENT: Negative.     Eyes: Negative.    Respiratory:  Negative for cough and shortness of breath.    Cardiovascular: Negative.    Gastrointestinal: Negative.  Negative for abdominal  distention.   Endocrine: Negative.    Genitourinary: Negative.    Musculoskeletal: Negative.    Skin: Negative.    Allergic/Immunologic: Negative.    Neurological: Negative.    Hematological: Negative.    Psychiatric/Behavioral: Negative.          Historical Information   Past Medical History:   Diagnosis Date    BRCA2 positive     Bundle branch block, left     Cancer (HCC)     pancreatic    Facial droop     r/t neck surgery    History of chemotherapy     pancreatic cancer    History of radiation therapy     Hypercholesteremia     Pancreatic carcinoma (HCC)      Past Surgical History:   Procedure Laterality Date    ABLATION SOFT TISSUE N/A 4/26/2021    Procedure: INTRAOPERATIVE ULTRASOUND, ABLATION,SOFT TISSUE PANCREAS;  Surgeon: Feliciano Luevano MD;  [facility];  Service: Surgical Oncology    CARDIAC CATHETERIZATION Left 9/5/2023    Procedure: Cardiac catheterization;  Surgeon: Jack Gates MD;  [facility]: [facility];  Service: Cardiology    CHOLECYSTECTOMY N/A 4/26/2021    Procedure: CHOLECYSTECTOMY;  Surgeon: Feliciano uLevano MD;  [facility];  Service: Surgical Oncology    FL GUIDED CENTRAL VENOUS ACCESS DEVICE INSERTION  6/2/2021    HYSTERECTOMY      LAPAROTOMY N/A 4/26/2021    Procedure: LAPAROTOMY EXPLORATORY;  Surgeon: Feliciano Luevano MD;  [facility];  Service: Surgical Oncology    OOPHORECTOMY      SINUS SURGERY      TUNNELED VENOUS PORT PLACEMENT Left 6/2/2021    Procedure: INSERTION VENOUS PORT (PORT-A-CATH);  Surgeon: Feliciano Luevano MD;  [facility];  Service: Surgical Oncology    US GUIDED THYROID BIOPSY  7/13/2022    WHIPPLE PROCEDURE/PANCREATICO-DUODENECTOMY N/A 4/26/2021    Procedure: WHIPPLE PROCEDURE/PANCREATICO-DUODENECTOMY; PYLORIC PRESERVING;  Surgeon: Feliciano Luevano MD;  [facility];  Service: Surgical Oncology     Family History   Problem Relation Age of Onset    Ulcerative colitis Mother     Prostate cancer Father     Melanoma Sister     Heart  disease Brother     Prostate cancer Brother     No Known Problems Brother     No Known Problems Maternal Uncle     No Known Problems Paternal Uncle      Social History     Tobacco Use   Smoking Status Every Day    Current packs/day: 0.00    Average packs/day: 0.3 packs/day for 46.2 years (11.6 ttl pk-yrs)    Types: Cigarettes    Start date: 1975    Last attempt to quit: 4/1/2021    Years since quitting: 3.4    Passive exposure: Past   Smokeless Tobacco Never   Tobacco Comments    recently stop smoking , pt stated she smoke occ. 1-2 cigg some days 06/02/2022.       Occupational History: NA    Meds/Allergies     Current Outpatient Medications:     acetaminophen (TYLENOL) 500 mg tablet, Take 1,000 mg by mouth, Disp: , Rfl:     albuterol (2.5 mg/3 mL) 0.083 % nebulizer solution, Take 3 mL (2.5 mg total) by nebulization every 6 (six) hours as needed for wheezing or shortness of breath, Disp: 30 mL, Rfl: 1    albuterol (PROVENTIL HFA,VENTOLIN HFA) 90 mcg/act inhaler, TAKE 2 PUFFS BY MOUTH EVERY 6 HOURS AS NEEDED FOR WHEEZE, Disp: 6.7 g, Rfl: 5    aspirin 81 mg chewable tablet, Chew 1 tablet (81 mg total) daily, Disp: 30 tablet, Rfl: 0    atorvastatin (LIPITOR) 20 mg tablet, Take 1 tablet (20 mg total) by mouth every evening, Disp: 30 tablet, Rfl: 0    buPROPion (WELLBUTRIN SR) 150 mg 12 hr tablet, TAKE 1 TABLET BY MOUTH TWICE A DAY, Disp: 180 tablet, Rfl: 0    losartan-hydrochlorothiazide (HYZAAR) 50-12.5 mg per tablet, Take 1 tablet by mouth daily, Disp: 30 tablet, Rfl: 2    metoprolol tartrate (LOPRESSOR) 25 mg tablet, Take 25 mg by mouth 2 (two) times a day, Disp: , Rfl:     Multiple Vitamin (multivitamin) tablet, Take 1 tablet by mouth daily, Disp: , Rfl:     sertraline (ZOLOFT) 25 mg tablet, Take 1 tablet (25 mg total) by mouth daily, Disp: 90 tablet, Rfl: 1    benzonatate (TESSALON PERLES) 100 mg capsule, Take 1 capsule (100 mg total) by mouth 3 (three) times a day as needed for cough (Patient not taking: Reported  "on 2/22/2024), Disp: 30 capsule, Rfl: 0    fluticasone (FLONASE) 50 mcg/act nasal spray, 2 sprays into each nostril daily for 3 days, Disp: 16 g, Rfl: 0    Fluticasone-Salmeterol (Wixela Inhub) 250-50 mcg/dose inhaler, Rinse mouth after use. (Patient not taking: Reported on 9/18/2024), Disp: 60 blister, Rfl: 5    Medical ID Plate MISC, Use once for 1 dose Severely and permanently limited in the ability to walk because of an arthritic, neurological, or orthopedic condition: or cannot walk two hundred feet without stopping to rest and meets requirements for disability license plate, Disp: 1 each, Rfl: 0    ondansetron (Zofran ODT) 4 mg disintegrating tablet, Take 1 tablet (4 mg total) by mouth every 6 (six) hours as needed for nausea or vomiting (Patient not taking: Reported on 9/18/2024), Disp: 10 tablet, Rfl: 0  Allergies   Allergen Reactions    Penicillins Rash    Tetracycline Rash       Vitals: Blood pressure 110/60, pulse 63, temperature 97.7 °F (36.5 °C), temperature source Temporal, resp. rate 12, height 5' 2\" (1.575 m), weight 77.1 kg (170 lb), SpO2 96%., Body mass index is 31.09 kg/m². Oxygen Therapy  SpO2: 96 %    Physical Exam:    GEN: alert and oriented x 3, pleasant and cooperative   HEENT:  Normocephalic, atraumatic, anicteric  NECK: No JVD   HEART: Rate, normal S1 and S2  LUNGS: Clear, diminished bilaterally with no dullness to percussion   ABDOMEN:  Normoactive bowel sounds, soft, no tenderness, no distention  EXTREMITIES: peripheral pulses palpable; no edema  NEURO: no gross focal findings; cranial nerves grossly intact   SKIN:  Dry, intact, warm to touch    Labs: I have personally reviewed pertinent lab results.  Lab Results   Component Value Date    IGE 56.1 10/06/2023       Imaging and other studies: I have personally reviewed pertinent films in PACS    Pulmonary function testing:  Personally interpreted by me    Other Studies: I have personally reviewed pertinent reports.      Makenna Hoyos" MD  Pulmonary and Critical Care Medicine

## 2024-09-20 NOTE — TELEPHONE ENCOUNTER
S/w patient, she is requesting her follow up appointment be after her upcoming Colonoscopy 10/1/24. Patient states she has many upcoming appointments. Scheduled soonest appointment w/ PCP 10/23/24.

## 2024-09-23 ENCOUNTER — TELEPHONE (OUTPATIENT)
Dept: GASTROENTEROLOGY | Facility: CLINIC | Age: 69
End: 2024-09-23

## 2024-09-23 NOTE — TELEPHONE ENCOUNTER
Recalled the pt and had to leave message to apologize for leaving the wrong date of her procedure. Her procedure is October 2nd with Dr. Marcus, not the 30th of September.

## 2024-09-23 NOTE — TELEPHONE ENCOUNTER
Left message confirming procedure for 9/30 with Dr. Marcus. She will need a  and will be called day prior with her arrival time. If she doesn't have her prep instructions that were mailed or has any questions, she may call.

## 2024-09-24 ENCOUNTER — HOSPITAL ENCOUNTER (OUTPATIENT)
Dept: RADIOLOGY | Facility: HOSPITAL | Age: 69
Discharge: HOME | End: 2024-09-24
Payer: COMMERCIAL

## 2024-09-24 DIAGNOSIS — Z78.0 POSTMENOPAUSAL: ICD-10-CM

## 2024-09-24 PROCEDURE — 77080 DXA BONE DENSITY AXIAL: CPT

## 2024-09-30 ENCOUNTER — TELEPHONE (OUTPATIENT)
Age: 69
End: 2024-09-30

## 2024-09-30 DIAGNOSIS — M85.80 OSTEOPENIA WITH HIGH RISK OF FRACTURE: Primary | ICD-10-CM

## 2024-09-30 RX ORDER — CALCIUM CARBONATE 750 MG/1
1 TABLET, CHEWABLE ORAL DAILY
Qty: 30 TABLET | Refills: 2 | Status: SHIPPED | OUTPATIENT
Start: 2024-09-30

## 2024-09-30 RX ORDER — ALENDRONATE SODIUM 70 MG/1
70 TABLET ORAL
Qty: 4 TABLET | Refills: 5 | Status: SHIPPED | OUTPATIENT
Start: 2024-09-30

## 2024-09-30 NOTE — TELEPHONE ENCOUNTER
----- Message from Deepa Coleman MD sent at 9/25/2024  8:48 AM EDT -----    ----- Message -----  From: Interface, Radiology Results In  Sent: 9/25/2024   8:15 AM EDT  To: Dorita Dahl DO

## 2024-10-01 ENCOUNTER — ANESTHESIA EVENT (OUTPATIENT)
Dept: GASTROENTEROLOGY | Facility: AMBULARY SURGERY CENTER | Age: 69
End: 2024-10-01
Payer: COMMERCIAL

## 2024-10-01 RX ORDER — SODIUM CHLORIDE, SODIUM LACTATE, POTASSIUM CHLORIDE, CALCIUM CHLORIDE 600; 310; 30; 20 MG/100ML; MG/100ML; MG/100ML; MG/100ML
125 INJECTION, SOLUTION INTRAVENOUS CONTINUOUS
Status: CANCELLED | OUTPATIENT
Start: 2024-10-01

## 2024-10-02 ENCOUNTER — HOSPITAL ENCOUNTER (OUTPATIENT)
Dept: GASTROENTEROLOGY | Facility: AMBULARY SURGERY CENTER | Age: 69
Setting detail: OUTPATIENT SURGERY
Discharge: HOME | End: 2024-10-02
Attending: INTERNAL MEDICINE
Payer: COMMERCIAL

## 2024-10-02 ENCOUNTER — ANESTHESIA (OUTPATIENT)
Dept: GASTROENTEROLOGY | Facility: AMBULARY SURGERY CENTER | Age: 69
End: 2024-10-02
Payer: COMMERCIAL

## 2024-10-02 VITALS
TEMPERATURE: 98 F | WEIGHT: 162 LBS | DIASTOLIC BLOOD PRESSURE: 85 MMHG | SYSTOLIC BLOOD PRESSURE: 153 MMHG | HEART RATE: 76 BPM | BODY MASS INDEX: 29.81 KG/M2 | RESPIRATION RATE: 16 BRPM | OXYGEN SATURATION: 97 % | HEIGHT: 62 IN

## 2024-10-02 DIAGNOSIS — Z12.11 SCREENING FOR COLON CANCER: ICD-10-CM

## 2024-10-02 PROCEDURE — 88305 TISSUE EXAM BY PATHOLOGIST: CPT | Performed by: PATHOLOGY

## 2024-10-02 PROCEDURE — 45380 COLONOSCOPY AND BIOPSY: CPT | Performed by: INTERNAL MEDICINE

## 2024-10-02 PROCEDURE — 45385 COLONOSCOPY W/LESION REMOVAL: CPT | Performed by: INTERNAL MEDICINE

## 2024-10-02 RX ORDER — PROPOFOL 10 MG/ML
INJECTION, EMULSION INTRAVENOUS CONTINUOUS PRN
Status: DISCONTINUED | OUTPATIENT
Start: 2024-10-02 | End: 2024-10-02

## 2024-10-02 RX ORDER — PROPOFOL 10 MG/ML
INJECTION, EMULSION INTRAVENOUS AS NEEDED
Status: DISCONTINUED | OUTPATIENT
Start: 2024-10-02 | End: 2024-10-02

## 2024-10-02 RX ORDER — SODIUM CHLORIDE, SODIUM LACTATE, POTASSIUM CHLORIDE, CALCIUM CHLORIDE 600; 310; 30; 20 MG/100ML; MG/100ML; MG/100ML; MG/100ML
125 INJECTION, SOLUTION INTRAVENOUS CONTINUOUS
Status: DISCONTINUED | OUTPATIENT
Start: 2024-10-02 | End: 2024-10-06 | Stop reason: HOSPADM

## 2024-10-02 RX ORDER — SODIUM CHLORIDE, SODIUM LACTATE, POTASSIUM CHLORIDE, CALCIUM CHLORIDE 600; 310; 30; 20 MG/100ML; MG/100ML; MG/100ML; MG/100ML
INJECTION, SOLUTION INTRAVENOUS CONTINUOUS PRN
Status: DISCONTINUED | OUTPATIENT
Start: 2024-10-02 | End: 2024-10-02

## 2024-10-02 RX ORDER — LIDOCAINE HYDROCHLORIDE 10 MG/ML
INJECTION, SOLUTION EPIDURAL; INFILTRATION; INTRACAUDAL; PERINEURAL AS NEEDED
Status: DISCONTINUED | OUTPATIENT
Start: 2024-10-02 | End: 2024-10-02

## 2024-10-02 RX ADMIN — SODIUM CHLORIDE, SODIUM LACTATE, POTASSIUM CHLORIDE, AND CALCIUM CHLORIDE: .6; .31; .03; .02 INJECTION, SOLUTION INTRAVENOUS at 10:00

## 2024-10-02 RX ADMIN — PROPOFOL 100 MG: 10 INJECTION, EMULSION INTRAVENOUS at 10:10

## 2024-10-02 RX ADMIN — SODIUM CHLORIDE, SODIUM LACTATE, POTASSIUM CHLORIDE, AND CALCIUM CHLORIDE 125 ML/HR: .6; .31; .03; .02 INJECTION, SOLUTION INTRAVENOUS at 09:57

## 2024-10-02 RX ADMIN — PROPOFOL 100 MCG/KG/MIN: 10 INJECTION, EMULSION INTRAVENOUS at 10:10

## 2024-10-02 RX ADMIN — LIDOCAINE HYDROCHLORIDE 50 MG: 10 INJECTION, SOLUTION EPIDURAL; INFILTRATION; INTRACAUDAL; PERINEURAL at 10:10

## 2024-10-02 NOTE — ANESTHESIA PREPROCEDURE EVALUATION
Procedure:  COLONOSCOPY    Relevant Problems   CARDIO   (+) Left bundle branch block   (+) Mixed hyperlipidemia   (+) Primary hypertension      NEURO/PSYCH   (+) Anxiety      PULMONARY   (+) Mild intermittent asthma   (+) SOB (shortness of breath)   (+) Smoking        Physical Exam    Airway    Mallampati score: I  TM Distance: >3 FB  Neck ROM: full     Dental       Cardiovascular      Pulmonary      Other Findings  post-pubertal.      Anesthesia Plan  ASA Score- 2     Anesthesia Type- IV sedation with anesthesia with ASA Monitors.         Additional Monitors:     Airway Plan:            Plan Factors-Exercise tolerance (METS): >4 METS.    Chart reviewed.    Patient summary reviewed.    Patient is not a current smoker.  Patient did not smoke on day of surgery.            Induction- intravenous.    Postoperative Plan-     Perioperative Resuscitation Plan - Level 1 - Full Code.       Informed Consent- Anesthetic plan and risks discussed with patient.  I personally reviewed this patient with the CRNA. Discussed and agreed on the Anesthesia Plan with the CRNA..

## 2024-10-02 NOTE — H&P
History and Physical - [facility]  Ana Maria Murphy 69 y.o. female MRN: [id number]    HPI: Ana Maria Murphy is a 69 y.o. year old female who presents with colon cancer screening.       Review of Systems    Historical Information   Past Medical History:   Diagnosis Date    BRCA2 positive     Bundle branch block, left     Cancer (HCC)     pancreatic    Facial droop     r/t neck surgery    History of chemotherapy     pancreatic cancer    History of radiation therapy     Hypercholesteremia     Pancreatic carcinoma (HCC)      Past Surgical History:   Procedure Laterality Date    ABLATION SOFT TISSUE N/A 4/26/2021    Procedure: INTRAOPERATIVE ULTRASOUND, ABLATION,SOFT TISSUE PANCREAS;  Surgeon: Feliciano Luevano MD;  [facility];  Service: Surgical Oncology    CARDIAC CATHETERIZATION Left 9/5/2023    Procedure: Cardiac catheterization;  Surgeon: Jack Gates MD;  [facility];  Service: Cardiology    CHOLECYSTECTOMY N/A 4/26/2021    Procedure: CHOLECYSTECTOMY;  Surgeon: Feliciano Luevano MD;  [facility];  Service: Surgical Oncology    FL GUIDED CENTRAL VENOUS ACCESS DEVICE INSERTION  6/2/2021    HYSTERECTOMY      LAPAROTOMY N/A 4/26/2021    Procedure: LAPAROTOMY EXPLORATORY;  Surgeon: Feliciano Luevano MD;  [facility];  Service: Surgical Oncology    OOPHORECTOMY      SINUS SURGERY      TUNNELED VENOUS PORT PLACEMENT Left 6/2/2021    Procedure: INSERTION VENOUS PORT (PORT-A-CATH);  Surgeon: Feliciano Luevano MD;  [facility];  Service: Surgical Oncology    US GUIDED THYROID BIOPSY  7/13/2022    WHIPPLE PROCEDURE/PANCREATICO-DUODENECTOMY N/A 4/26/2021    Procedure: WHIPPLE PROCEDURE/PANCREATICO-DUODENECTOMY; PYLORIC PRESERVING;  Surgeon: Feliciano Luevano MD;  [facility];  Service: Surgical Oncology     Social History   Social History     Substance and Sexual Activity   Alcohol Use Never     Social History     Substance and Sexual Activity   Drug Use  "Never     Social History     Tobacco Use   Smoking Status Every Day    Current packs/day: 0.00    Average packs/day: 0.3 packs/day for 46.2 years (11.6 ttl pk-yrs)    Types: Cigarettes    Start date: 1975    Last attempt to quit: 4/1/2021    Years since quitting: 3.5    Passive exposure: Past   Smokeless Tobacco Never   Tobacco Comments    recently stop smoking , pt stated she smoke occ. 1-2 cigg some days 06/02/2022.     Family History   Problem Relation Age of Onset    Ulcerative colitis Mother     Prostate cancer Father     Melanoma Sister     Heart disease Brother     Prostate cancer Brother     No Known Problems Brother     No Known Problems Maternal Uncle     No Known Problems Paternal Uncle        Meds/Allergies     Not in a hospital admission.    Allergies   Allergen Reactions    Penicillins Rash    Tetracycline Rash       Objective     /93   Pulse 102   Temp 98 °F (36.7 °C) (Oral)   Resp 18   Ht 5' 2\" (1.575 m)   Wt 73.5 kg (162 lb)   SpO2 94%   BMI 29.63 kg/m²       PHYSICAL EXAM    Gen: NAD  CV: RRR  CHEST: Clear  ABD: soft, NT/ND  EXT: no edema  Neuro: AAO      ASSESSMENT/PLAN:  This is a 69 y.o. year old female here for colon cancer screening.     PLAN:   Procedure: colonoscopy      "

## 2024-10-03 ENCOUNTER — TELEPHONE (OUTPATIENT)
Age: 69
End: 2024-10-03

## 2024-10-07 PROCEDURE — 88305 TISSUE EXAM BY PATHOLOGIST: CPT | Performed by: PATHOLOGY

## 2024-10-10 NOTE — RESULT ENCOUNTER NOTE
Please inform the patient that both polyps that were removed were hyperplastic polyps.  These have no cancer potential.    Recommend repeat colonoscopy in 10 years rather than 5 years originally discussed.  I will update procedure report reflect this.    Please have her call with any questions or concerns.

## 2024-10-14 ENCOUNTER — TELEPHONE (OUTPATIENT)
Dept: OTHER | Facility: HOSPITAL | Age: 69
End: 2024-10-14

## 2024-10-24 ENCOUNTER — TELEPHONE (OUTPATIENT)
Dept: SURGICAL ONCOLOGY | Facility: CLINIC | Age: 69
End: 2024-10-24

## 2024-10-24 NOTE — TELEPHONE ENCOUNTER
Patient called stating she received a letter in the mail to schedule MRI breast bilateral  w and wo contrast w cad. Per patient letter states she had last MRI on June 2024. Advised patient message will be sent to provider and team to place order if needed and she will receive a call. Patient is aware Mammo is scheduled for December.     Please call patient.       Thank you!

## 2024-11-25 DIAGNOSIS — F41.9 ANXIETY: Chronic | ICD-10-CM

## 2024-11-25 DIAGNOSIS — F17.200 SMOKING: ICD-10-CM

## 2024-11-25 RX ORDER — BUPROPION HYDROCHLORIDE 150 MG/1
150 TABLET, EXTENDED RELEASE ORAL 2 TIMES DAILY
Qty: 60 TABLET | Refills: 0 | Status: SHIPPED | OUTPATIENT
Start: 2024-11-25

## 2024-11-25 RX ORDER — BUPROPION HYDROCHLORIDE 150 MG/1
150 TABLET, EXTENDED RELEASE ORAL 2 TIMES DAILY
Qty: 180 TABLET | Refills: 0 | Status: CANCELLED | OUTPATIENT
Start: 2024-11-25

## 2024-11-25 NOTE — TELEPHONE ENCOUNTER
Ashish left on rx line:    Yes, my name is Ana Maria Pereira. My YOB: 1955. I need a refill on the Bupropion HCLSR 150 milligram tablet. It goes to [facility] and the number is [phone]. Thank you very much and you have a great day. Chevy.

## 2024-12-03 NOTE — PROGRESS NOTES
Name: Ana Maria Murphy      : 1955      MRN: [id number]  Encounter Provider: Mireya Staley DO  Encounter Date: 2024   Encounter department: [facility]  :  Assessment & Plan  Depression, unspecified depression type  Patient has been experiencing worsening depression since starting chemo. Patient is done with her chemo but still experiencing depression. Patient has been taking Buproprion and Zoloft for her mood disorders. She states she is stable on the dose she is on and feels like her mood has been good. Patient was advised on multiple ways to tackle depression such as increasing outdoor activity, exercising, and trying a mind diet; all of which she was amenable to. Patient scored a 0 on her PHQ2 screening test today. She states she has been doing well, and her kids have kept her grounded and positive.          Encounter for immunization  Patient was informed on the risks and benefits of getting the Pneumococcal and Zoster vaccine(s). Patient was also informed about the risks of not getting the vaccine(s), especially considering her cancer history, smoking history, and gastric surgery history. After demonstrating understanding of the aforementioned vaccine(s), patient decided to NOT get the vaccines.         BRCA positive  Patient has a history of breast cancer s/p chemo. Patient gets yearly mammograms to check for recurrence, and her next one is in 24, which patient is aware of and plans to honor.       Common cold  Patient endorses feelings of generalized malaise and a headache since having family over for Thanksgiving. Patient states that she had a cough productive of clear sputum at the start but that has since resolved. She denied fever, chills, sick contacts, and congestion. Patient has been taking Tylenol for sxs., with mild relief. Patient was instructed to get some rest and continue to stay hydrated as this was likely a viral process, and should  resolve on its own. Patient was advised that if she starts experiencing worsening of her sxs. or develops a fever, she should come back for further evaluation. A Covid test was performed in the office which was negative.   Orders:    POCT Rapid Covid Ag           History of Present Illness     Patient is a 69 yr. old female, with PMH of primary HTN, smoking, mild intermittent asthma, anxiety BRCA (+), ampulla of Vater cancer s/p Whipple, who presents with complaints of needing a follow up of labs. Patient states she has had a cold since Thanksgiving, and it has slowly been resolving, but not quite gone yet. Patient has tried Tylenol for sxs, and has experienced relief. Patient also has been taking Buproprion for her depression which she states in combo with Zoloft, her mood has been stable.   Patient admits generalized malaise, headaches, cough (resolved)  Patient denies fever, chills, congestion, chills, sick contacts    Patient did not have charming charlie access and requested that the email address we should send her charming charlie Status to, is her son's because she doesn't have an email anymore.    Health Maintenance:  -  declines flu shot, pneumococcal shot, and zoster vaccine      Review of Systems   Constitutional:  Negative for chills and fever.   HENT:  Negative for ear pain.    Eyes:  Negative for photophobia and pain.   Respiratory:  Negative for cough and shortness of breath.    Cardiovascular:  Negative for chest pain.   Gastrointestinal:  Negative for abdominal pain, diarrhea, nausea and vomiting.   Genitourinary:  Negative for difficulty urinating and dysuria.   Musculoskeletal:  Positive for arthralgias (Rt. shoulder and upper back pain; patient sleeps on that side). Negative for back pain and neck pain.   Neurological:  Positive for headaches. Negative for dizziness, seizures, syncope and numbness.   Psychiatric/Behavioral:  Negative for behavioral problems, confusion and hallucinations.    All other systems  reviewed and are negative.         Objective   /82 (BP Location: Left arm, Patient Position: Sitting, Cuff Size: Standard)   Pulse 60   Temp 98.2 °F (36.8 °C) (Tympanic)   Resp 18   Wt 74.4 kg (164 lb)   SpO2 98%   BMI 30.00 kg/m²      Physical Exam  Vitals reviewed.   Constitutional:       General: She is not in acute distress.     Appearance: Normal appearance. She is obese. She is not ill-appearing or toxic-appearing.   HENT:      Head: Normocephalic and atraumatic.      Nose: Nose normal.      Mouth/Throat:      Mouth: Mucous membranes are moist.      Pharynx: Oropharynx is clear.   Eyes:      Conjunctiva/sclera: Conjunctivae normal.      Pupils: Pupils are equal, round, and reactive to light.   Cardiovascular:      Rate and Rhythm: Normal rate and regular rhythm.      Pulses: Normal pulses.      Heart sounds: Normal heart sounds.   Pulmonary:      Effort: Pulmonary effort is normal. No respiratory distress.      Breath sounds: Normal breath sounds.   Abdominal:      General: Abdomen is flat. Bowel sounds are normal.      Palpations: Abdomen is soft.      Tenderness: There is no abdominal tenderness. There is no guarding or rebound.   Musculoskeletal:         General: Normal range of motion.      Cervical back: Normal range of motion and neck supple.      Right lower leg: No edema.      Left lower leg: No edema.   Skin:     General: Skin is warm and dry.   Neurological:      General: No focal deficit present.      Mental Status: She is alert and oriented to person, place, and time. Mental status is at baseline.   Psychiatric:         Mood and Affect: Mood normal.         Behavior: Behavior normal.         Thought Content: Thought content normal.         Judgment: Judgment normal.

## 2024-12-04 ENCOUNTER — OFFICE VISIT (OUTPATIENT)
Age: 69
End: 2024-12-04

## 2024-12-04 VITALS
DIASTOLIC BLOOD PRESSURE: 82 MMHG | WEIGHT: 164 LBS | SYSTOLIC BLOOD PRESSURE: 136 MMHG | HEART RATE: 60 BPM | RESPIRATION RATE: 18 BRPM | BODY MASS INDEX: 30 KG/M2 | TEMPERATURE: 98.2 F | OXYGEN SATURATION: 98 %

## 2024-12-04 DIAGNOSIS — F32.A DEPRESSION, UNSPECIFIED DEPRESSION TYPE: Primary | ICD-10-CM

## 2024-12-04 DIAGNOSIS — Z23 ENCOUNTER FOR IMMUNIZATION: ICD-10-CM

## 2024-12-04 DIAGNOSIS — Z15.01 BRCA POSITIVE: ICD-10-CM

## 2024-12-04 DIAGNOSIS — J00 COMMON COLD: ICD-10-CM

## 2024-12-04 DIAGNOSIS — Z15.09 BRCA POSITIVE: ICD-10-CM

## 2024-12-04 LAB
SARS-COV-2 AG UPPER RESP QL IA: NEGATIVE
VALID CONTROL: NORMAL

## 2024-12-04 PROCEDURE — G2211 COMPLEX E/M VISIT ADD ON: HCPCS | Performed by: FAMILY MEDICINE

## 2024-12-04 PROCEDURE — 99213 OFFICE O/P EST LOW 20 MIN: CPT | Performed by: FAMILY MEDICINE

## 2024-12-04 PROCEDURE — 87811 SARS-COV-2 COVID19 W/OPTIC: CPT | Performed by: FAMILY MEDICINE

## 2024-12-04 NOTE — ASSESSMENT & PLAN NOTE
Patient endorses feelings of generalized malaise and a headache since having family over for Thanksgiving. Patient states that she had a cough productive of clear sputum at the start but that has since resolved. She denied fever, chills, sick contacts, and congestion. Patient has been taking Tylenol for sxs., with mild relief. Patient was instructed to get some rest and continue to stay hydrated as this was likely a viral process, and should resolve on its own. Patient was advised that if she starts experiencing worsening of her sxs. or develops a fever, she should come back for further evaluation. A Covid test was performed in the office which was negative.   Orders:    POCT Rapid Covid Ag

## 2024-12-04 NOTE — ASSESSMENT & PLAN NOTE
Patient has been experiencing worsening depression since starting chemo. Patient is done with her chemo but still experiencing depression. Patient has been taking Buproprion and Zoloft for her mood disorders. She states she is stable on the dose she is on and feels like her mood has been good. Patient was advised on multiple ways to tackle depression such as increasing outdoor activity, exercising, and trying a mind diet; all of which she was amenable to. Patient scored a 0 on her PHQ2 screening test today. She states she has been doing well, and her kids have kept her grounded and positive.

## 2024-12-04 NOTE — PATIENT INSTRUCTIONS
The MIND diet is a variation and combination of a healthy Mediterranean and DASH diet with scientific evidence to support neurological and mental health. Thus it is an excellent healthy eating pattern for many people in general, as well as with neurological disorders such as peripheral neuropathy, nerve injuries such as Bell's Palsy,  cerebrovascular accidents (strokes), cognitive impairment, early dementia. It can also be helpful in mental health issues such as depression, anxiety, neurodevelopmental disorders such as ADHD, Autism spectrum.     Your doctor and/or nutritionist can individualize the  MIND diet to meet your specific clinical situation.     Note that if your are diabetic and you are on blood sugar lowering medications such as insulin or sulfonylureas (like glipizide, glimepiride, etc), this diet is so effective at improving and even reversing diabetes that your need for medication may go way down. To avoid very low blood sugar (hypoglycemia),  be sure to monitor your blood sugar very closely and adjust down your medications if needed under the guidance of your physician.         [email or web address]%2F%[email or web address].ones.io%2Fwp-content%[email or web address]%[email or web address]%[email or web address]-[email or web address]-[email or web address]-[email or web address]    Here are the key aspects of the MIND diet:    Eat whole, unprocessed food, mainly plants. 'Eat the rainbow' - eat different color veggies to get the full range of their nutrients. Vegetables should be about 1/2 of the plate, 1/4 of plate a (mainly plant based) protein source such as tofu), 1/4 of plate healthy starch or carb such as a whole grain or root vegetable.     Eat less animal products, and choose healthy sources, such as healthy fish (e.g. sardines, mackerel, herring, flounder, high quality salmon).  No more than the size of a pack of cards of animal products per day. Avoid all industrially produced (feedlot) red meat and poultry.     Eat plenty of  these key foods that support brain, nerve and mental health:  Avocados  Beans  Blueberries and other berries  Broccoli, Kale and other leafy greens are key: have a minimum of 1 serving a day in addition to other veggies  Extra-virgin olive oil  Flax seed (grind just prior to eating and toss on breakfast cereal, in salads). Don't heat it because the omega-3 fatty acids are very delicate.  Herbal teas: hibiscus, lemon balm, mint, etc.  Fresh herbs & spices like cilantro, dill, rosemary, thyme, oregano, basil, mint, parsley; cinnamon, oregano, marjoram, allspice, saffron and others. Turmeric is especially anti-inflammatory - adding a small amount of black pepper will help its absorption. Many herbs and spices are rich in anti-oxidants  Nuts & seeds, e.g. almonds, walnuts, john seeds, pumpkin, sunflower seeds. A handful a day will give you essential fatty acids and minerals like magnesium.  Whole grains: oats, buckwheat, millet, teff, sorghum, amaranth. Quinoa is the only grain that is a complete protein source and therefore is a great staple grain. Whole wheat in moderation is ok. AVOID all white flour products, as they are often largely empty calories deficient in micronutrients and are high in glycemic index that induce inflammation and over-eating.  Sweet potatoes (yams) are preferred over white potatoes.      AVOID or at least minimize all these:  Processed foods: chips, cookies, frozen dinners, white bread, bagels, pastries, sweets and similar bakery products   Fast food, fried foods. Avoid all fast food restaurants like [employer], [employer], [employer], [employer], most Chinese take-out and similar cheap unhealthy food establishments  Processed and industrially produced meats: martin, salami, pastrami, hot dogs, luncheon meats are filled with saturated fat and sodium that induce nerve and brain inflammation and damage. Occasional free range poultry or grass fed bison or wild game meat like venison (e.g. once or  twice a week, 6 oz. -  the size of a pack of cards) is probably ok. Do not eat regular chicken, which is the main source of cholesterol in the standard American diet and a major contributor to obesity and nerve/brain inflammation.  Butter or margarine. These are high in saturated or dangerous trans fats  Cheese is high in saturated fat. Use not at all or minimally, such as a little sprinkle of a highly flavored cheese on food. Don't eat any food in which cheese is a main ingredient.    Sugary drinks such as soda. Also avoid artificially sweetened sodas, which confuse our energy balancing mechanism and disturb our gut microbiome  Excess alcohol. Don't drink at all if there is a contraindication to drinking such as alcohol use disorder, liver disease, etc., Men should never exceed two drinks per day, women no more than 1 drink per day. A drink = one small glass of wine, one 12 oz. Beer or one shot of hard liquor.    Here are some resources to learn more about the MIND diet and improving brain and nerve health:  The Mind diet cookbook 2021 by Liz Max. Brief intro then a year's work of nice recipes         Smoothie for brain & nerve health (could have one a day):  Place in :   About 1 cup or more of water, milk, almond or soy milk, adjust for proper consistency  1 cup fresh kale or spinach or other dark leafy green  1 cup berries such as blueberries, blackberries or raspberries  1 very ripe banana or other fruit like colette, or other fruit of your choice  1 - 2 tablespoons of freshly ground flaxseed  1/4 cup nuts such as walnuts, almonds, hazelnuts, etc.     Can also add per your preference:  Freshly ground Shailesh or other seeds  Various spices: a dash of nutmeg, 1/2 tsp or so of turmeric, cinnamon. cardamom, coriander, etc.   Flavors such as vanilla, cocoa powder (with a little sugar, like 1 teaspoon for palatability if needed)    If you have diabetes, add 1/2 tsp of cinnamon and sip slowly over 1 hour to avoid  a sugar rush.     Have fun experimenting and adjusting to your taste!    The MIND diet should be part of a comprehensive program to help support healing from your medical condition. Be sure this is under the guidance of your health care provider.

## 2024-12-04 NOTE — ASSESSMENT & PLAN NOTE
Patient has a history of breast cancer s/p chemo. Patient gets yearly mammograms to check for recurrence, and her next one is in 12/17/24, which patient is aware of and plans to honor.

## 2024-12-05 ENCOUNTER — TELEPHONE (OUTPATIENT)
Age: 69
End: 2024-12-05

## 2024-12-05 NOTE — TELEPHONE ENCOUNTER
Pt called asking if a mri of breast order can be mailed to the pt. She stated when she saw karen in aug she never received order. Thank you

## 2024-12-17 ENCOUNTER — HOSPITAL ENCOUNTER (OUTPATIENT)
Dept: RADIOLOGY | Facility: HOSPITAL | Age: 69
Discharge: HOME | End: 2024-12-17
Payer: COMMERCIAL

## 2024-12-17 VITALS — HEIGHT: 62 IN | WEIGHT: 160 LBS | BODY MASS INDEX: 29.44 KG/M2

## 2024-12-17 DIAGNOSIS — Z12.31 VISIT FOR SCREENING MAMMOGRAM: ICD-10-CM

## 2024-12-17 PROCEDURE — 77063 BREAST TOMOSYNTHESIS BI: CPT

## 2024-12-17 PROCEDURE — 77067 SCR MAMMO BI INCL CAD: CPT

## 2024-12-27 DIAGNOSIS — F41.9 ANXIETY: Chronic | ICD-10-CM

## 2024-12-27 DIAGNOSIS — F17.200 SMOKING: ICD-10-CM

## 2025-01-01 DIAGNOSIS — F17.200 SMOKING: ICD-10-CM

## 2025-01-01 DIAGNOSIS — F41.9 ANXIETY: Chronic | ICD-10-CM

## 2025-01-01 RX ORDER — BUPROPION HYDROCHLORIDE 150 MG/1
150 TABLET, EXTENDED RELEASE ORAL 2 TIMES DAILY
Qty: 180 TABLET | Refills: 0 | Status: SHIPPED | OUTPATIENT
Start: 2025-01-01

## 2025-01-01 RX ORDER — BUPROPION HYDROCHLORIDE 150 MG/1
150 TABLET, EXTENDED RELEASE ORAL 2 TIMES DAILY
Qty: 180 TABLET | Refills: 1 | OUTPATIENT
Start: 2025-01-01

## 2025-01-03 PROBLEM — J00 COMMON COLD: Status: RESOLVED | Noted: 2024-12-04 | Resolved: 2025-01-03

## 2025-01-21 ENCOUNTER — OFFICE VISIT (OUTPATIENT)
Age: 70
End: 2025-01-21

## 2025-01-21 VITALS
SYSTOLIC BLOOD PRESSURE: 128 MMHG | BODY MASS INDEX: 30.91 KG/M2 | HEIGHT: 62 IN | WEIGHT: 168 LBS | OXYGEN SATURATION: 97 % | DIASTOLIC BLOOD PRESSURE: 76 MMHG | TEMPERATURE: 97.8 F | HEART RATE: 62 BPM

## 2025-01-21 DIAGNOSIS — M75.51 ACUTE BURSITIS OF RIGHT SHOULDER: Primary | ICD-10-CM

## 2025-01-21 PROCEDURE — G2211 COMPLEX E/M VISIT ADD ON: HCPCS | Performed by: FAMILY MEDICINE

## 2025-01-21 PROCEDURE — 99213 OFFICE O/P EST LOW 20 MIN: CPT | Performed by: FAMILY MEDICINE

## 2025-01-21 RX ORDER — DICLOFENAC SODIUM 75 MG/1
75 TABLET, DELAYED RELEASE ORAL 2 TIMES DAILY
Qty: 28 TABLET | Refills: 0 | Status: SHIPPED | OUTPATIENT
Start: 2025-01-21 | End: 2025-02-04

## 2025-01-22 NOTE — PROGRESS NOTES
Name: Ana Maria Murphy      : 1955      MRN: [id number]  Encounter Provider: Jeannette Kaur MD  Encounter Date: 2025   Encounter department: [facility]  :  Assessment & Plan  Acute bursitis of right shoulder  Patient with history of radiation to chest and port in left (used for chemo)  increased right shoulder pain  Aggravated by movement and describes it as aching and sharp  There is no specific injury  Range of motion limited due to pain (patient is unable to do simple tasks such as brushing hair, putting on a shirt or bra.)  Rotator cuff muscles weak on examination due to pain  Differential diagnosis: Acute subacromial bursitis vs rotator cuff tendinitis vs adhesive capsulitis vs shoulder impingement syndrome    Plan  Diclofenac twice daily for 14 days  Offered patient physical therapy (patient declined)  Provided patient with at home exercises  Recommend using cold compresses  Educated patient on acute bursitis of the shoulder  Provided patient with referral to sports medicine  Orders:    diclofenac (VOLTAREN) 75 mg EC tablet; Take 1 tablet (75 mg total) by mouth 2 (two) times a day for 14 days    Ambulatory Referral to Orthopedic Surgery; Future        BMI Counseling: Body mass index is 30.73 kg/m². The BMI is above normal. Nutrition recommendations include decreasing portion sizes and encouraging healthy choices of fruits and vegetables. Exercise recommendations include exercising 3-5 times per week and strength training exercises. Rationale for BMI follow-up plan is due to patient being overweight or obese.       History of Present Illness   Ana Maria is a 69-year-old female with a past medical history of cancer requiring a Port-A-Cath on the left and radiation to chest.  Patient notes that for approximately 2 weeks she has been having right shoulder pain.  She denies any history of trauma falls or injury.  She describes it as an aching and sharp pain  "that is significantly limiting her range of motion.  She notes that she is unable to get dressed as putting on a shirt, raw or brushing her hair is very difficult.  She denies any fevers, joint locking, joint swelling, or stiffness.  She notes that she feels her strength is decreased, however this is at her baseline.  Her symptoms are aggravated by activity.  She does not have a history of rheumatoid arthritis, diabetes, or osteoarthritis.      Review of Systems   Constitutional:  Negative for chills and fever.   HENT:  Negative for ear pain and sore throat.    Eyes:  Negative for pain and visual disturbance.   Respiratory:  Negative for cough and shortness of breath.    Cardiovascular:  Negative for chest pain and palpitations.   Gastrointestinal:  Negative for abdominal pain and vomiting.   Genitourinary:  Negative for dysuria and hematuria.   Musculoskeletal:  Negative for arthralgias and back pain.   Skin:  Negative for color change and rash.   Neurological:  Negative for seizures and syncope.   All other systems reviewed and are negative.      Objective   /76 (BP Location: Left arm, Patient Position: Sitting, Cuff Size: Large)   Pulse 62   Temp 97.8 °F (36.6 °C) (Tympanic)   Ht 5' 2\" (1.575 m)   Wt 76.2 kg (168 lb)   SpO2 97%   BMI 30.73 kg/m²      Physical Exam  Vitals and nursing note reviewed.   Constitutional:       General: She is not in acute distress.     Appearance: She is well-developed.   HENT:      Head: Normocephalic and atraumatic.      Right Ear: External ear normal.      Left Ear: External ear normal.      Nose: Nose normal.      Mouth/Throat:      Mouth: Mucous membranes are moist.   Eyes:      Extraocular Movements: Extraocular movements intact.      Conjunctiva/sclera: Conjunctivae normal.   Cardiovascular:      Rate and Rhythm: Normal rate and regular rhythm.      Pulses: Normal pulses.      Heart sounds: Normal heart sounds. No murmur heard.  Pulmonary:      Effort: Pulmonary " effort is normal. No respiratory distress.      Breath sounds: Normal breath sounds.   Abdominal:      General: Abdomen is flat. Bowel sounds are normal.      Palpations: Abdomen is soft.      Tenderness: There is no abdominal tenderness.   Musculoskeletal:         General: No swelling.      Right shoulder: Tenderness present. No swelling, deformity, effusion, laceration, bony tenderness or crepitus. Decreased range of motion. Normal strength. Normal pulse.      Left shoulder: Normal.      Cervical back: Normal range of motion and neck supple.      Comments: To palpation over subacromial area  Pain with active and passive range of motion, particularly in abduction and flexion  Patient noted to have decreased strength with bilateral rotator cuffs (baseline)   Skin:     General: Skin is warm and dry.      Capillary Refill: Capillary refill takes less than 2 seconds.   Neurological:      Mental Status: She is alert and oriented to person, place, and time.   Psychiatric:         Mood and Affect: Mood normal.         Behavior: Behavior normal.         Thought Content: Thought content normal.         Judgment: Judgment normal.

## 2025-01-28 ENCOUNTER — APPOINTMENT (OUTPATIENT)
Dept: RADIOLOGY | Facility: CLINIC | Age: 70
End: 2025-01-28
Payer: COMMERCIAL

## 2025-01-28 ENCOUNTER — OFFICE VISIT (OUTPATIENT)
Dept: OBGYN CLINIC | Facility: CLINIC | Age: 70
End: 2025-01-28
Payer: COMMERCIAL

## 2025-01-28 VITALS — WEIGHT: 168 LBS | BODY MASS INDEX: 30.91 KG/M2 | HEIGHT: 62 IN

## 2025-01-28 DIAGNOSIS — M19.011 OSTEOARTHRITIS OF GLENOHUMERAL JOINT, RIGHT: ICD-10-CM

## 2025-01-28 DIAGNOSIS — M75.51 ACUTE BURSITIS OF RIGHT SHOULDER: ICD-10-CM

## 2025-01-28 PROCEDURE — 73030 X-RAY EXAM OF SHOULDER: CPT

## 2025-01-28 PROCEDURE — 99203 OFFICE O/P NEW LOW 30 MIN: CPT | Performed by: ORTHOPAEDIC SURGERY

## 2025-01-28 NOTE — PROGRESS NOTES
Assessment/Plan:  1. Osteoarthritis of glenohumeral joint, right  Ambulatory Referral to Orthopedic Surgery    XR shoulder 2+ vw right          Ana Maria has right-sided shoulder pain consistent with moderate osteoarthritis of the glenohumeral joint.  Given her ongoing pain and discomfort on exam as well as the x-ray I recommended an ultrasound-guided glenohumeral injection as a potential treatment option for her.  I did offer to send her to physical therapy but she declined this.  She will follow-up in 1 week for an ultrasound-guided injection and hopefully this gives her longstanding relief.    Subjective:   Ana Maria Murphy is a 69 y.o. female who presents to the office for right-sided shoulder pain.  She has been feeling ongoing discomfort in the right shoulder for several months.  She was referred here by her PCP office.  She has had aching throbbing pain in the right shoulder and difficulty lifting arm overhead.  She denies any fall or trauma.  She feels like the pain began about 3 years ago after undergoing chemo and radiation for pancreatic malignancy and Whipple's procedure.  She has only been treated with oral anti-inflammatories from her PCP and they helped slightly.  She does feel pain rating down the right arm with certain motions and can feel some grinding and clicking in her shoulder.      Review of Systems   Constitutional:  Negative for chills, fever and unexpected weight change.   HENT:  Negative for hearing loss, nosebleeds and sore throat.    Eyes:  Negative for pain, redness and visual disturbance.   Respiratory:  Negative for cough, shortness of breath and wheezing.    Cardiovascular:  Negative for chest pain, palpitations and leg swelling.   Gastrointestinal:  Negative for abdominal pain, nausea and vomiting.   Endocrine: Negative for polydipsia and polyuria.   Genitourinary:  Negative for dysuria and hematuria.   Musculoskeletal:         See HPI   Skin:  Negative for rash and wound.    Neurological:  Negative for dizziness, numbness and headaches.   Psychiatric/Behavioral:  Negative for decreased concentration and suicidal ideas. The patient is not nervous/anxious.          Past Medical History:   Diagnosis Date    BRCA2 positive     Bundle branch block, left     Cancer (HCC)     pancreatic    Facial droop     r/t neck surgery    History of chemotherapy     pancreatic cancer    History of radiation therapy     Hypercholesteremia     Pancreatic carcinoma (HCC)        Past Surgical History:   Procedure Laterality Date    ABLATION SOFT TISSUE N/A 4/26/2021    Procedure: INTRAOPERATIVE ULTRASOUND, ABLATION,SOFT TISSUE PANCREAS;  Surgeon: Feliciano Luevano MD;  Location: [facility];  Service: Surgical Oncology    CARDIAC CATHETERIZATION Left 9/5/2023    Procedure: Cardiac catheterization;  Surgeon: Jack Gates MD;  Location: [facility];  Service: Cardiology    CHOLECYSTECTOMY N/A 4/26/2021    Procedure: CHOLECYSTECTOMY;  Surgeon: Feliciano Luevano MD;  Location: [facility];  Service: Surgical Oncology    FL GUIDED CENTRAL VENOUS ACCESS DEVICE INSERTION  6/2/2021    HYSTERECTOMY      LAPAROTOMY N/A 4/26/2021    Procedure: LAPAROTOMY EXPLORATORY;  Surgeon: Feliciano Luevano MD;  Location: [facility];  Service: Surgical Oncology    OOPHORECTOMY      SINUS SURGERY      TUNNELED VENOUS PORT PLACEMENT Left 6/2/2021    Procedure: INSERTION VENOUS PORT (PORT-A-CATH);  Surgeon: Feliciano Luevano MD;  Location: [facility];  Service: Surgical Oncology    US GUIDED THYROID BIOPSY  7/13/2022    WHIPPLE PROCEDURE/PANCREATICO-DUODENECTOMY N/A 4/26/2021    Procedure: WHIPPLE PROCEDURE/PANCREATICO-DUODENECTOMY; PYLORIC PRESERVING;  Surgeon: Feliciano Luevano MD;  Location: [facility];  Service: Surgical Oncology       Family History   Problem Relation Age of Onset    Ulcerative colitis Mother     Prostate cancer Father     Melanoma Sister     Heart disease Brother     Prostate cancer Brother     No Known Problems Brother      No Known Problems Maternal Uncle     No Known Problems Paternal Uncle        Social History     Occupational History    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 1-2 cigg some days 06/02/2022.   Vaping Use    Vaping status: Never Used   Substance and Sexual Activity    Alcohol use: Never    Drug use: Never    Sexual activity: Not Currently         Current Outpatient Medications:     acetaminophen (TYLENOL) 500 mg tablet, Take 1,000 mg by mouth, Disp: , Rfl:     albuterol (2.5 mg/3 mL) 0.083 % nebulizer solution, Take 3 mL (2.5 mg total) by nebulization every 6 (six) hours as needed for wheezing or shortness of breath, Disp: 30 mL, Rfl: 1    albuterol (PROVENTIL HFA,VENTOLIN HFA) 90 mcg/act inhaler, TAKE 2 PUFFS BY MOUTH EVERY 6 HOURS AS NEEDED FOR WHEEZE, Disp: 6.7 g, Rfl: 5    alendronate (Fosamax) 70 mg tablet, Take 1 tablet (70 mg total) by mouth every 7 days (Patient not taking: Reported on 1/21/2025), Disp: 4 tablet, Rfl: 5    aspirin 81 mg chewable tablet, Chew 1 tablet (81 mg total) daily, Disp: 30 tablet, Rfl: 0    atorvastatin (LIPITOR) 20 mg tablet, Take 1 tablet (20 mg total) by mouth every evening, Disp: 30 tablet, Rfl: 0    benzonatate (TESSALON PERLES) 100 mg capsule, Take 1 capsule (100 mg total) by mouth 3 (three) times a day as needed for cough (Patient not taking: Reported on 1/21/2025), Disp: 30 capsule, Rfl: 0    buPROPion (WELLBUTRIN SR) 150 mg 12 hr tablet, Take 1 tablet (150 mg total) by mouth 2 (two) times a day, Disp: 180 tablet, Rfl: 0    calcium carbonate (TUMS EX) 750 mg chewable tablet,  Chew 1 tablet (750 mg total) daily, Disp: 30 tablet, Rfl: 2    Cholecalciferol (VITAMIN D3) 1,000 units tablet, Take 1 tablet (1,000 Units total) by mouth daily, Disp: 30 tablet, Rfl: 2    diclofenac (VOLTAREN) 75 mg EC tablet, Take 1 tablet (75 mg total) by mouth 2 (two) times a day for 14 days, Disp: 28 tablet, Rfl: 0    fluticasone (FLONASE) 50 mcg/act nasal spray, 2 sprays into each nostril daily for 3 days, Disp: 16 g, Rfl: 0    Fluticasone-Salmeterol (Wixela Inhub) 250-50 mcg/dose inhaler, Rinse mouth after use., Disp: 60 blister, Rfl: 5    losartan-hydrochlorothiazide (HYZAAR) 50-12.5 mg per tablet, Take 1 tablet by mouth daily, Disp: 30 tablet, Rfl: 2    Medical ID Plate MISC, Use once for 1 dose Severely and permanently limited in the ability to walk because of an arthritic, neurological, or orthopedic condition: or cannot walk two hundred feet without stopping to rest and meets requirements for disability license plate, Disp: 1 each, Rfl: 0    metoprolol tartrate (LOPRESSOR) 25 mg tablet, Take 25 mg by mouth 2 (two) times a day, Disp: , Rfl:     Multiple Vitamin (multivitamin) tablet, Take 1 tablet by mouth daily, Disp: , Rfl:     ondansetron (Zofran ODT) 4 mg disintegrating tablet, Take 1 tablet (4 mg total) by mouth every 6 (six) hours as needed for nausea or vomiting (Patient not taking: Reported on 1/21/2025), Disp: 10 tablet, Rfl: 0    sertraline (ZOLOFT) 25 mg tablet, Take 1 tablet (25 mg total) by mouth daily, Disp: 90 tablet, Rfl: 1    Allergies   Allergen Reactions    Penicillins Rash    Tetracycline Rash       Objective:  There were no vitals filed for this visit.         Right Shoulder Exam     Tenderness   Right shoulder tenderness location: Anterior and posterior glenohumeral joint.    Range of Motion   Active abduction:  140 abnormal   Passive abduction:  150 abnormal   Extension:  normal   External rotation:  normal   Forward flexion:  150 abnormal     Muscle Strength   Abduction: 4/5    Internal rotation: 5/5   External rotation: 4/5   Supraspinatus: 4/5   Subscapularis: 5/5     Tests   Storm test: positive  Impingement: positive  Drop arm: negative    Other   Erythema: absent  Sensation: normal  Pulse: present            Physical Exam  Vitals and nursing note reviewed.   Constitutional:       Appearance: Normal appearance. She is well-developed.   HENT:      Head: Normocephalic and atraumatic.      Right Ear: External ear normal.      Left Ear: External ear normal.   Eyes:      General: No scleral icterus.     Extraocular Movements: Extraocular movements intact.      Conjunctiva/sclera: Conjunctivae normal.   Cardiovascular:      Rate and Rhythm: Normal rate.   Pulmonary:      Effort: Pulmonary effort is normal. No respiratory distress.   Musculoskeletal:      Cervical back: Normal range of motion and neck supple.      Comments: See Ortho exam   Skin:     General: Skin is warm and dry.   Neurological:      General: No focal deficit present.      Mental Status: She is alert and oriented to person, place, and time.   Psychiatric:         Behavior: Behavior normal.         I have personally reviewed pertinent films in PACS and my interpretation is as follows:  X-ray of the right shoulder demonstrates moderate glenohumeral osteoarthritis.      This document was created using speech voice recognition software.   Grammatical errors, random word insertions, pronoun errors, and incomplete sentences are an occasional consequence of this system due to software limitations, ambient noise, and hardware issues.   Any formal questions or concerns about content, text, or information contained within the body of this dictation should be directly addressed to the provider for clarification.

## 2025-02-01 ENCOUNTER — APPOINTMENT (OUTPATIENT)
Dept: LAB | Facility: HOSPITAL | Age: 70
End: 2025-02-01
Payer: COMMERCIAL

## 2025-02-01 DIAGNOSIS — C24.1 MALIGNANT NEOPLASM OF AMPULLA OF VATER (HCC): ICD-10-CM

## 2025-02-01 LAB
ALBUMIN SERPL BCG-MCNC: 4.4 G/DL (ref 3.5–5)
ALP SERPL-CCNC: 84 U/L (ref 34–104)
ALT SERPL W P-5'-P-CCNC: 37 U/L (ref 7–52)
ANION GAP SERPL CALCULATED.3IONS-SCNC: 12 MMOL/L (ref 4–13)
AST SERPL W P-5'-P-CCNC: 29 U/L (ref 13–39)
BASOPHILS # BLD AUTO: 0.05 THOUSANDS/ΜL (ref 0–0.1)
BASOPHILS NFR BLD AUTO: 1 % (ref 0–1)
BILIRUB SERPL-MCNC: 0.41 MG/DL (ref 0.2–1)
BUN SERPL-MCNC: 19 MG/DL (ref 5–25)
CALCIUM SERPL-MCNC: 9.1 MG/DL (ref 8.4–10.2)
CHLORIDE SERPL-SCNC: 102 MMOL/L (ref 96–108)
CO2 SERPL-SCNC: 23 MMOL/L (ref 21–32)
CREAT SERPL-MCNC: 0.79 MG/DL (ref 0.6–1.3)
EOSINOPHIL # BLD AUTO: 0.15 THOUSAND/ΜL (ref 0–0.61)
EOSINOPHIL NFR BLD AUTO: 2 % (ref 0–6)
ERYTHROCYTE [DISTWIDTH] IN BLOOD BY AUTOMATED COUNT: 12.6 % (ref 11.6–15.1)
GFR SERPL CREATININE-BSD FRML MDRD: 76 ML/MIN/1.73SQ M
GLUCOSE P FAST SERPL-MCNC: 114 MG/DL (ref 65–99)
HCT VFR BLD AUTO: 42.8 % (ref 34.8–46.1)
HGB BLD-MCNC: 13.9 G/DL (ref 11.5–15.4)
IMM GRANULOCYTES # BLD AUTO: 0.01 THOUSAND/UL (ref 0–0.2)
IMM GRANULOCYTES NFR BLD AUTO: 0 % (ref 0–2)
LYMPHOCYTES # BLD AUTO: 1.23 THOUSANDS/ΜL (ref 0.6–4.47)
LYMPHOCYTES NFR BLD AUTO: 19 % (ref 14–44)
MCH RBC QN AUTO: 30.5 PG (ref 26.8–34.3)
MCHC RBC AUTO-ENTMCNC: 32.5 G/DL (ref 31.4–37.4)
MCV RBC AUTO: 94 FL (ref 82–98)
MONOCYTES # BLD AUTO: 0.42 THOUSAND/ΜL (ref 0.17–1.22)
MONOCYTES NFR BLD AUTO: 7 % (ref 4–12)
NEUTROPHILS # BLD AUTO: 4.55 THOUSANDS/ΜL (ref 1.85–7.62)
NEUTS SEG NFR BLD AUTO: 71 % (ref 43–75)
NRBC BLD AUTO-RTO: 0 /100 WBCS
PLATELET # BLD AUTO: 314 THOUSANDS/UL (ref 149–390)
PMV BLD AUTO: 9.7 FL (ref 8.9–12.7)
POTASSIUM SERPL-SCNC: 4.2 MMOL/L (ref 3.5–5.3)
PROT SERPL-MCNC: 7.6 G/DL (ref 6.4–8.4)
RBC # BLD AUTO: 4.55 MILLION/UL (ref 3.81–5.12)
SODIUM SERPL-SCNC: 137 MMOL/L (ref 135–147)
WBC # BLD AUTO: 6.41 THOUSAND/UL (ref 4.31–10.16)

## 2025-02-01 PROCEDURE — 36415 COLL VENOUS BLD VENIPUNCTURE: CPT

## 2025-02-01 PROCEDURE — 80053 COMPREHEN METABOLIC PANEL: CPT

## 2025-02-01 PROCEDURE — 85025 COMPLETE CBC W/AUTO DIFF WBC: CPT

## 2025-02-01 PROCEDURE — 86301 IMMUNOASSAY TUMOR CA 19-9: CPT

## 2025-02-02 LAB — CANCER AG19-9 SERPL-ACNC: <2 U/ML (ref 0–35)

## 2025-02-06 ENCOUNTER — TELEPHONE (OUTPATIENT)
Age: 70
End: 2025-02-06

## 2025-02-06 NOTE — TELEPHONE ENCOUNTER
Caller: Patient    Doctor: Dr. Guadalupe    Reason for call: Calling to reschedule her USGI     Call back#: [phone]

## 2025-02-10 ENCOUNTER — APPOINTMENT (OUTPATIENT)
Dept: RADIOLOGY | Facility: HOSPITAL | Age: 70
End: 2025-02-10
Payer: COMMERCIAL

## 2025-02-10 ENCOUNTER — HOSPITAL ENCOUNTER (OUTPATIENT)
Dept: RADIOLOGY | Facility: HOSPITAL | Age: 70
Discharge: HOME | End: 2025-02-10
Payer: COMMERCIAL

## 2025-02-10 DIAGNOSIS — C24.1 MALIGNANT NEOPLASM OF AMPULLA OF VATER (HCC): ICD-10-CM

## 2025-02-10 PROCEDURE — 71260 CT THORAX DX C+: CPT

## 2025-02-10 PROCEDURE — 74177 CT ABD & PELVIS W/CONTRAST: CPT

## 2025-02-10 RX ADMIN — IOHEXOL 100 ML: 350 INJECTION, SOLUTION INTRAVENOUS at 09:00

## 2025-02-13 DIAGNOSIS — F17.200 SMOKING: ICD-10-CM

## 2025-02-13 DIAGNOSIS — F41.9 ANXIETY: Chronic | ICD-10-CM

## 2025-02-13 DIAGNOSIS — M85.80 OSTEOPENIA WITH HIGH RISK OF FRACTURE: Primary | ICD-10-CM

## 2025-02-13 DIAGNOSIS — M85.80 OSTEOPENIA WITH HIGH RISK OF FRACTURE: ICD-10-CM

## 2025-02-13 RX ORDER — SERTRALINE HYDROCHLORIDE 25 MG/1
25 TABLET, FILM COATED ORAL DAILY
Qty: 90 TABLET | Refills: 1 | Status: SHIPPED | OUTPATIENT
Start: 2025-02-13 | End: 2025-08-12

## 2025-02-13 RX ORDER — BUPROPION HYDROCHLORIDE 150 MG/1
150 TABLET, EXTENDED RELEASE ORAL 2 TIMES DAILY
Qty: 180 TABLET | Refills: 0 | Status: SHIPPED | OUTPATIENT
Start: 2025-02-13

## 2025-02-13 RX ORDER — ALENDRONATE SODIUM 70 MG/1
70 TABLET ORAL
Qty: 12 TABLET | Refills: 1 | Status: SHIPPED | OUTPATIENT
Start: 2025-02-13

## 2025-02-13 NOTE — TELEPHONE ENCOUNTER
left on rx line    Yes, my name is Ana Maria Stevens ALONDRA. Need a refill on the Sertraline HCL tablet? It's 25 milligrams the phone. My YOB: 1955. The phone number is [facility] and the phone number is [phone] and the quantity is 90. Thank you so much and have a great day. Chevy.

## 2025-02-14 ENCOUNTER — HOSPITAL ENCOUNTER (OUTPATIENT)
Dept: RADIOLOGY | Facility: HOSPITAL | Age: 70
Discharge: HOME | End: 2025-02-14
Payer: COMMERCIAL

## 2025-02-14 DIAGNOSIS — E04.2 MULTIPLE THYROID NODULES: ICD-10-CM

## 2025-02-14 PROCEDURE — 76536 US EXAM OF HEAD AND NECK: CPT

## 2025-02-17 ENCOUNTER — OFFICE VISIT (OUTPATIENT)
Dept: HEMATOLOGY ONCOLOGY | Facility: MEDICAL CENTER | Age: 70
End: 2025-02-17
Payer: COMMERCIAL

## 2025-02-17 VITALS
HEIGHT: 62 IN | HEART RATE: 74 BPM | SYSTOLIC BLOOD PRESSURE: 130 MMHG | RESPIRATION RATE: 17 BRPM | DIASTOLIC BLOOD PRESSURE: 82 MMHG | TEMPERATURE: 98.1 F | OXYGEN SATURATION: 95 % | WEIGHT: 168 LBS | BODY MASS INDEX: 30.91 KG/M2

## 2025-02-17 DIAGNOSIS — Z85.09 HISTORY OF MALIGNANT NEOPLASM OF AMPULLA OF VATER: Primary | ICD-10-CM

## 2025-02-17 DIAGNOSIS — Z15.01 BRCA POSITIVE: ICD-10-CM

## 2025-02-17 DIAGNOSIS — Z15.09 BRCA POSITIVE: ICD-10-CM

## 2025-02-17 PROCEDURE — 99214 OFFICE O/P EST MOD 30 MIN: CPT | Performed by: PHYSICIAN ASSISTANT

## 2025-02-17 NOTE — PROGRESS NOTES
Name: Ana Maria Murphy      : 1955      MRN: [id number]  Encounter Provider: Rosa Elena Lopez PA-C  Encounter Date: 2025   Encounter department: [facility] [place]  :  Assessment & Plan  History of malignant neoplasm of ampulla of Vater  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, S/P Whipple in 2021, followed by adjuvant chemotherapy for 8 cycles of 5FU based chemotherapy followed by concurrent chemoradiation.      Patient is now on surveillance. Patient no longer has Port-A-Cath, removed 10/2022.     CT scan completed on 2/10/25 demonstrated stable postsurgical changes of Whipple procedure.  No evidence for recurrent or metastatic disease.  2 mm nonobstructing right intrarenal calculus.  Hepatic steatosis.  I offered to send patient to Urology. She prefers to defer for now.  She knows to monitor for hematuria, right flank pain. She will work on increasing her hydration.    Patient continues to be in remission.  She is doing well.  CBC/CMP both normal/unremarkable.  CA 19 9 < 2.  No worrisome findings on clinical exam.     We discussed NCCN guidelines as listed below.      She will be scheduled for CT scan of the chest, abdomen and pelvis in 6 months time.  She will return to clinic thereafter.    Orders:    CT chest abdomen pelvis w contrast; Future    CBC and differential; Future    Comprehensive metabolic panel; Future    Cancer antigen 19-9; Future      BRCA positive  Patient is s/p hysterectomy with oophorectomy.  Patient is also following with surgical oncology.  She is alternating mammogram with breast MRI every 6 months time.  Next due for bilateral breast MRI in 2025.  Will schedule this today.      We discussed other guidelines as below.      While no specific screening guidelines exist for melanoma I do think it would be beneficial for her to establish care with dermatology for yearly skin checks, or more frequent if they deem appropriate. She  has consultation scheduled for September 2025.          Orders:    MRI breast bilateral w and wo contrast w cad; Future        History of Present Illness   Chief Complaint   Patient presents with    Follow-up     Oncology History   Cancer Staging   History of malignant neoplasm of ampulla of Vater  Staging form: Ampulla of Vater, AJCC 8th Edition  - Pathologic stage from 4/26/2021: Stage IIIA (pT3b, pN1, cM0) - Signed by ANDRES Palma on 6/9/2023  Total positive nodes: 1  Total nodes examined: 22  Histologic grade (G): G3  Histologic grading system: 3 grade system  Residual tumor (R): R0 - None  Oncology History   History of malignant neoplasm of ampulla of Vater   3/15/2021 Initial Diagnosis    Neoplasm of ampulla of Vater     4/26/2021 Surgery    B.  Celiac lymph node:     - Single lymph node; negative for malignancy (0/1).     C.  Whipple resection:     - Invasive poorly differentiated mucinous adenocarcinoma.     - Tumor arises in the background of an adenoma with high grade dysplasia.     - Lymphatic and venous channel invasion by tumor present.     - Metastatic carcinoma present in one of twenty lymph nodes (1/20).     - All margins are negative for tumor.     4/26/2021 -  Cancer Staged    Staging form: Ampulla of Vater, AJCC 8th Edition  - Pathologic stage from 4/26/2021: Stage IIIA (pT3b, pN1, cM0) - Signed by ANDRES Palma on 6/9/2023  Total positive nodes: 1  Total nodes examined: 22  Histologic grade (G): G3  Histologic grading system: 3 grade system  Residual tumor (R): R0 - None       6/9/2021 - 10/1/2021 Chemotherapy    Cycles 1-6  6/2021 through 8/20/21:  FOLFOX    Cycles 7 -8:  9/15/2021- 10/12/2021  Leucovorin 400mg/m2, IV, Day 1  5-Fu 400 mg/m2, IV , Day 1   5-Fu 1200 mg/m2, IV, CI x 46 hours  Cycle length = 2 weeks    palonosetron (ALOXI), 0.25 mg, Intravenous, Once, 5 of 5 cycles  Administration: 0.25 mg (7/21/2021), 0.25 mg (8/4/2021), 0.25 mg (8/18/2021), 0.25 mg (9/15/2021),  0.25 mg (9/29/2021)  fluorouracil (ADRUCIL), 400 mg/m2 = 605 mg, Intravenous, Once, 8 of 8 cycles  Dose modification: 340 mg/m2 (85 % of original dose 400 mg/m2, Cycle 6, Reason: Dose Not Tolerated)  Administration: 605 mg (6/9/2021), 605 mg (6/23/2021), 605 mg (7/7/2021), 605 mg (7/21/2021), 605 mg (8/4/2021), 515 mg (8/18/2021), 610 mg (9/15/2021), 610 mg (9/29/2021)  fosaprepitant (EMEND) IVPB, 150 mg, Intravenous, Once, 6 of 6 cycles  Administration: 150 mg (7/7/2021), 150 mg (7/21/2021), 150 mg (8/4/2021), 150 mg (8/18/2021), 150 mg (9/15/2021), 150 mg (9/29/2021)  leucovorin calcium IVPB, 604 mg, Intravenous, Once, 8 of 8 cycles  Dose modification: 340 mg/m2 (85 % of original dose 400 mg/m2, Cycle 6, Reason: Dose Not Tolerated)  Administration: 600 mg (6/9/2021), 600 mg (6/23/2021), 600 mg (7/7/2021), 600 mg (7/21/2021), 600 mg (8/4/2021), 517 mg (8/18/2021), 600 mg (9/15/2021), 600 mg (9/29/2021)  oxaliplatin (ELOXATIN) chemo infusion, 85 mg/m2 = 128.35 mg, Intravenous, Once, 6 of 6 cycles  Dose modification: 72.25 mg/m2 (85 % of original dose 85 mg/m2, Cycle 6, Reason: Dose Not Tolerated)  Administration: 128.35 mg (6/9/2021), 128.35 mg (6/23/2021), 128.35 mg (7/7/2021), 128.35 mg (7/21/2021), 128.35 mg (8/4/2021), 109.82 mg (8/18/2021)  fluorouracil (ADRUCIL) ambulatory infusion Soln, 1,200 mg/m2/day = 3,625 mg, Intravenous, Over 46 hours, 8 of 8 cycles     10/1/2021 Genetic Testing    Results revealed patient has the following mutation(s):  Positive for a BRCA2 pathogenic variant      10/26/2021 -  Chemotherapy    Xeloda 825 mg/m2 BID  BSA = 1.59  Calculated to 1300 mg BID with rounding.        10/28/2021 - 12/2/2021 Radiation    Treatments:  Course: C1    Plan ID Energy Fractions Dose per Fraction (cGy) Dose Correction (cGy) Total Dose Delivered (cGy) Elapsed Days   Abdomen 6X 25 / 25 195 0 4,875 35      Treatment Dates:  10/28/2021 - 12/2/2021.             Interval history: Patient is feeling well. Very  happy to hear the results of CT scan.  Blood work was also stable.  She denies nausea, vomiting, bowel changes, chest pain, SOB, unexplained weight loss.     Review of Systems   Constitutional:  Negative for appetite change, fatigue, fever and unexpected weight change.   HENT:  Negative for nosebleeds.    Respiratory:  Negative for cough, choking and shortness of breath.         Negative hemoptysis.   Cardiovascular:  Negative for chest pain, palpitations and leg swelling.   Gastrointestinal: Negative.  Negative for abdominal distention, abdominal pain, anal bleeding, blood in stool, constipation, diarrhea, nausea and vomiting.   Endocrine: Negative.  Negative for cold intolerance.   Genitourinary: Negative.  Negative for hematuria, menstrual problem, vaginal bleeding, vaginal discharge and vaginal pain.   Musculoskeletal: Negative.  Negative for arthralgias, myalgias, neck pain and neck stiffness.   Skin: Negative.  Negative for color change, pallor and rash.   Allergic/Immunologic: Negative.  Negative for immunocompromised state.   Neurological: Negative.  Negative for weakness and headaches.   Hematological:  Negative for adenopathy. Does not bruise/bleed easily.   All other systems reviewed and are negative.      Objective   Vitals:    02/17/25 0953   BP: 130/82   Pulse: 74   Resp: 17   Temp: 98.1 °F (36.7 °C)   SpO2: 95%     ECOG   0  Physical Exam  Constitutional:       General: She is not in acute distress.     Appearance: Normal appearance. She is well-developed.   HENT:      Head: Normocephalic and atraumatic.   Eyes:      General: No scleral icterus.     Conjunctiva/sclera: Conjunctivae normal.   Cardiovascular:      Rate and Rhythm: Normal rate and regular rhythm.   Pulmonary:      Effort: Pulmonary effort is normal. No respiratory distress.      Breath sounds: Normal breath sounds.   Abdominal:      General: Abdomen is flat. There is no distension.      Palpations: Abdomen is soft.      Tenderness: There  is no abdominal tenderness.   Musculoskeletal:      Right lower leg: No edema.      Left lower leg: No edema.   Skin:     General: Skin is warm.      Coloration: Skin is not pale.      Findings: No rash.   Neurological:      Mental Status: She is alert and oriented to person, place, and time.   Psychiatric:         Mood and Affect: Mood normal.         Thought Content: Thought content normal.         Judgment: Judgment normal.     Lymph: No cervical, occipital, supraclavicular, or axillary adenopathy bilaterally.     Labs: I have reviewed the following labs:  Lab Results   Component Value Date/Time    WBC 6.41 02/01/2025 08:09 AM    RBC 4.55 02/01/2025 08:09 AM    Hemoglobin 13.9 02/01/2025 08:09 AM    Hematocrit 42.8 02/01/2025 08:09 AM    MCV 94 02/01/2025 08:09 AM    MCH 30.5 02/01/2025 08:09 AM    RDW 12.6 02/01/2025 08:09 AM    Platelets 314 02/01/2025 08:09 AM    Segmented % 71 02/01/2025 08:09 AM    Lymphocytes % 19 02/01/2025 08:09 AM    Monocytes % 7 02/01/2025 08:09 AM    Eosinophils Relative 2 02/01/2025 08:09 AM    Basophils Relative 1 02/01/2025 08:09 AM    Immature Grans % 0 02/01/2025 08:09 AM    Absolute Neutrophils 4.55 02/01/2025 08:09 AM     Lab Results   Component Value Date/Time    Potassium 4.2 02/01/2025 08:09 AM    Chloride 102 02/01/2025 08:09 AM    CO2 23 02/01/2025 08:09 AM    BUN 19 02/01/2025 08:09 AM    Creatinine 0.79 02/01/2025 08:09 AM    Glucose, Fasting 114 (H) 02/01/2025 08:09 AM    Calcium 9.1 02/01/2025 08:09 AM    AST 29 02/01/2025 08:09 AM    ALT 37 02/01/2025 08:09 AM    Alkaline Phosphatase 84 02/01/2025 08:09 AM    Total Protein 7.6 02/01/2025 08:09 AM    Albumin 4.4 02/01/2025 08:09 AM    Total Bilirubin 0.41 02/01/2025 08:09 AM    eGFR 76 02/01/2025 08:09 AM

## 2025-02-17 NOTE — ASSESSMENT & PLAN NOTE
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, S/P Whipple in April 2021, followed by adjuvant chemotherapy for 8 cycles of 5FU based chemotherapy followed by concurrent chemoradiation.      Patient is now on surveillance. Patient no longer has Port-A-Cath, removed 10/2022.     CT scan completed on 2/10/25 demonstrated stable postsurgical changes of Whipple procedure.  No evidence for recurrent or metastatic disease.  2 mm nonobstructing right intrarenal calculus.  Hepatic steatosis.  I offered to send patient to Urology. She prefers to defer for now.  She knows to monitor for hematuria, right flank pain. She will work on increasing her hydration.    Patient continues to be in remission.  She is doing well.  CBC/CMP both normal/unremarkable.  CA 19 9 < 2.  No worrisome findings on clinical exam.     We discussed NCCN guidelines as listed below.      She will be scheduled for CT scan of the chest, abdomen and pelvis in 6 months time.  She will return to clinic thereafter.    Orders:    CT chest abdomen pelvis w contrast; Future    CBC and differential; Future    Comprehensive metabolic panel; Future    Cancer antigen 19-9; Future

## 2025-02-17 NOTE — ASSESSMENT & PLAN NOTE
Patient is s/p hysterectomy with oophorectomy.  Patient is also following with surgical oncology.  She is alternating mammogram with breast MRI every 6 months time.  Next due for bilateral breast MRI in June 2025.  Will schedule this today.      We discussed other guidelines as below.      While no specific screening guidelines exist for melanoma I do think it would be beneficial for her to establish care with dermatology for yearly skin checks, or more frequent if they deem appropriate. She has consultation scheduled for September 2025.          Orders:    MRI breast bilateral w and wo contrast w cad; Future

## 2025-02-18 ENCOUNTER — OFFICE VISIT (OUTPATIENT)
Dept: OBGYN CLINIC | Facility: CLINIC | Age: 70
End: 2025-02-18
Payer: COMMERCIAL

## 2025-02-18 ENCOUNTER — TELEPHONE (OUTPATIENT)
Age: 70
End: 2025-02-18

## 2025-02-18 VITALS — WEIGHT: 168 LBS | HEIGHT: 62 IN | BODY MASS INDEX: 30.91 KG/M2

## 2025-02-18 DIAGNOSIS — M19.011 OSTEOARTHRITIS OF GLENOHUMERAL JOINT, RIGHT: Primary | ICD-10-CM

## 2025-02-18 PROCEDURE — 20611 DRAIN/INJ JOINT/BURSA W/US: CPT | Performed by: ORTHOPAEDIC SURGERY

## 2025-02-18 PROCEDURE — 99213 OFFICE O/P EST LOW 20 MIN: CPT | Performed by: ORTHOPAEDIC SURGERY

## 2025-02-18 RX ORDER — TRIAMCINOLONE ACETONIDE 40 MG/ML
40 INJECTION, SUSPENSION INTRA-ARTICULAR; INTRAMUSCULAR
Status: COMPLETED | OUTPATIENT
Start: 2025-02-18 | End: 2025-02-18

## 2025-02-18 RX ORDER — LIDOCAINE HYDROCHLORIDE 10 MG/ML
4 INJECTION, SOLUTION INFILTRATION; PERINEURAL
Status: COMPLETED | OUTPATIENT
Start: 2025-02-18 | End: 2025-02-18

## 2025-02-18 RX ADMIN — LIDOCAINE HYDROCHLORIDE 4 ML: 10 INJECTION, SOLUTION INFILTRATION; PERINEURAL at 14:30

## 2025-02-18 RX ADMIN — TRIAMCINOLONE ACETONIDE 40 MG: 40 INJECTION, SUSPENSION INTRA-ARTICULAR; INTRAMUSCULAR at 14:30

## 2025-02-18 NOTE — TELEPHONE ENCOUNTER
Spoke to patient and advised her to complete Vitamin D level at her earliest convenience. Patient agreeable

## 2025-02-18 NOTE — TELEPHONE ENCOUNTER
Notified patient that Dr. Staley ordered Vitamin D blood work for her. Advised patient to complete blood work at her earliest convenience at any [facility]'s lab. Patient agreeable.

## 2025-02-18 NOTE — PROGRESS NOTES
"Assessment/Plan:  1. Osteoarthritis of glenohumeral joint, right            Ana Maria tolerated an ultrasound-guided glenohumeral injection of the right shoulder today.  She did tolerate the injection well.  Hopefully this gives her significant relief in the coming few weeks.  This could be repeated in the future if clinically indicated.  I informed her that about 4 to 6 months in between injections is ideal.    Large joint arthrocentesis: R glenohumeral  [place] Protocol:  Consent: Verbal consent obtained.  Risks and benefits: risks, benefits and alternatives were discussed  Consent given by: patient  Time out: Immediately prior to procedure a \"time out\" was called to verify the correct patient, procedure, equipment, support staff and site/side marked as required.  Site marked: the operative site was marked  Supporting Documentation  Indications: pain   Procedure Details  Location: shoulder - R glenohumeral  Preparation: Patient was prepped and draped in the usual sterile fashion  Needle size: 25 G  Ultrasound guidance: yes  Approach: posterior  Medications administered: 4 mL lidocaine 1 %; 40 mg triamcinolone acetonide 40 mg/mL    Patient tolerance: patient tolerated the procedure well with no immediate complications  Dressing:  Sterile dressing applied            Subjective:   Ana Maria Murphy is a 69 y.o. female who presents to the office for an ultrasound-guided glenohumeral injection of the right shoulder.  She has a history of osteoarthritis in the right shoulder and was last seen in the office 3 weeks ago.  Today she has aching throbbing pain and grinding within the shoulder that worsens with lifting.      Review of Systems   Constitutional:  Negative for chills, fever and unexpected weight change.   HENT:  Negative for hearing loss, nosebleeds and sore throat.    Eyes:  Negative for pain, redness and visual disturbance.   Respiratory:  Negative for cough, shortness of breath and wheezing.  "   Cardiovascular:  Negative for chest pain, palpitations and leg swelling.   Gastrointestinal:  Negative for abdominal pain, nausea and vomiting.   Endocrine: Negative for polydipsia and polyuria.   Genitourinary:  Negative for dysuria and hematuria.   Musculoskeletal:         See HPI   Skin:  Negative for rash and wound.   Neurological:  Negative for dizziness, numbness and headaches.   Psychiatric/Behavioral:  Negative for decreased concentration and suicidal ideas. The patient is not nervous/anxious.          Past Medical History:   Diagnosis Date    BRCA2 positive     Bundle branch block, left     Cancer (HCC)     pancreatic    Facial droop     r/t neck surgery    History of chemotherapy     pancreatic cancer    History of radiation therapy     Hypercholesteremia     Pancreatic carcinoma (HCC)        Past Surgical History:   Procedure Laterality Date    ABLATION SOFT TISSUE N/A 4/26/2021    Procedure: INTRAOPERATIVE ULTRASOUND, ABLATION,SOFT TISSUE PANCREAS;  Surgeon: Feliciano Luevano MD;  Location: [facility];  Service: Surgical Oncology    CARDIAC CATHETERIZATION Left 9/5/2023    Procedure: Cardiac catheterization;  Surgeon: Jack Gates MD;  Location: [facility];  Service: Cardiology    CHOLECYSTECTOMY N/A 4/26/2021    Procedure: CHOLECYSTECTOMY;  Surgeon: Feliciano Luevano MD;  Location: [facility];  Service: Surgical Oncology    FL GUIDED CENTRAL VENOUS ACCESS DEVICE INSERTION  6/2/2021    HYSTERECTOMY      LAPAROTOMY N/A 4/26/2021    Procedure: LAPAROTOMY EXPLORATORY;  Surgeon: Feliciano Luevano MD;  Location: [facility];  Service: Surgical Oncology    OOPHORECTOMY      SINUS SURGERY      TUNNELED VENOUS PORT PLACEMENT Left 6/2/2021    Procedure: INSERTION VENOUS PORT (PORT-A-CATH);  Surgeon: Feliciano Luevano MD;  Location: [facility];  Service: Surgical Oncology    US GUIDED THYROID BIOPSY  7/13/2022    WHIPPLE PROCEDURE/PANCREATICO-DUODENECTOMY N/A 4/26/2021    Procedure: WHIPPLE  PROCEDURE/PANCREATICO-DUODENECTOMY; PYLORIC PRESERVING;  Surgeon: Feliciano Luevano MD;  Location: [facility];  Service: Surgical Oncology       Family History   Problem Relation Age of Onset    Ulcerative colitis Mother     Prostate cancer Father     Melanoma Sister     Heart disease Brother     Prostate cancer Brother     No Known Problems Brother     No Known Problems Maternal Uncle     No Known Problems Paternal Uncle        Social History     Occupational History    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 1-2 cigg some days 06/02/2022.   Vaping Use    Vaping status: Never Used   Substance and Sexual Activity    Alcohol use: Never    Drug use: Never    Sexual activity: Not Currently         Current Outpatient Medications:     acetaminophen (TYLENOL) 500 mg tablet, Take 1,000 mg by mouth, Disp: , Rfl:     albuterol (2.5 mg/3 mL) 0.083 % nebulizer solution, Take 3 mL (2.5 mg total) by nebulization every 6 (six) hours as needed for wheezing or shortness of breath, Disp: 30 mL, Rfl: 1    albuterol (PROVENTIL HFA,VENTOLIN HFA) 90 mcg/act inhaler, TAKE 2 PUFFS BY MOUTH EVERY 6 HOURS AS NEEDED FOR WHEEZE, Disp: 6.7 g, Rfl: 5    alendronate (FOSAMAX) 70 mg tablet, TAKE 1 TABLET (70 MG TOTAL) BY MOUTH EVERY 7 DAYS, Disp: 12 tablet, Rfl: 1    aspirin 81 mg chewable tablet, Chew 1 tablet (81 mg total) daily, Disp: 30 tablet, Rfl: 0    atorvastatin (LIPITOR) 20 mg tablet, Take 1 tablet (20 mg total) by mouth every evening, Disp: 30 tablet, Rfl: 0    benzonatate (TESSALON PERLES) 100 mg capsule, Take 1 capsule (100 mg total) by  mouth 3 (three) times a day as needed for cough (Patient not taking: Reported on 1/21/2025), Disp: 30 capsule, Rfl: 0    buPROPion (WELLBUTRIN SR) 150 mg 12 hr tablet, TAKE 1 TABLET BY MOUTH TWICE A DAY, Disp: 180 tablet, Rfl: 0    calcium carbonate (TUMS EX) 750 mg chewable tablet, Chew 1 tablet (750 mg total) daily, Disp: 30 tablet, Rfl: 2    Cholecalciferol (VITAMIN D3) 1,000 units tablet, Take 1 tablet (1,000 Units total) by mouth daily, Disp: 30 tablet, Rfl: 2    diclofenac (VOLTAREN) 75 mg EC tablet, Take 1 tablet (75 mg total) by mouth 2 (two) times a day for 14 days, Disp: 28 tablet, Rfl: 0    fluticasone (FLONASE) 50 mcg/act nasal spray, 2 sprays into each nostril daily for 3 days, Disp: 16 g, Rfl: 0    Fluticasone-Salmeterol (Wixela Inhub) 250-50 mcg/dose inhaler, Rinse mouth after use., Disp: 60 blister, Rfl: 5    losartan-hydrochlorothiazide (HYZAAR) 50-12.5 mg per tablet, Take 1 tablet by mouth daily, Disp: 30 tablet, Rfl: 2    Medical ID Plate MISC, Use once for 1 dose Severely and permanently limited in the ability to walk because of an arthritic, neurological, or orthopedic condition: or cannot walk two hundred feet without stopping to rest and meets requirements for disability license plate, Disp: 1 each, Rfl: 0    metoprolol tartrate (LOPRESSOR) 25 mg tablet, Take 25 mg by mouth 2 (two) times a day, Disp: , Rfl:     Multiple Vitamin (multivitamin) tablet, Take 1 tablet by mouth daily, Disp: , Rfl:     ondansetron (Zofran ODT) 4 mg disintegrating tablet, Take 1 tablet (4 mg total) by mouth every 6 (six) hours as needed for nausea or vomiting (Patient not taking: Reported on 1/21/2025), Disp: 10 tablet, Rfl: 0    sertraline (ZOLOFT) 25 mg tablet, TAKE 1 TABLET (25 MG TOTAL) BY MOUTH DAILY., Disp: 90 tablet, Rfl: 1    Allergies   Allergen Reactions    Penicillins Rash    Tetracycline Rash       Objective:  There were no vitals filed for this visit.         Right Shoulder Exam     Tenderness   Right  shoulder tenderness location: Tenderness palpation over posterior glenohumeral joint.            Physical Exam  Vitals and nursing note reviewed.   Constitutional:       Appearance: Normal appearance. She is well-developed.   HENT:      Head: Normocephalic and atraumatic.      Right Ear: External ear normal.      Left Ear: External ear normal.   Eyes:      General: No scleral icterus.     Extraocular Movements: Extraocular movements intact.      Conjunctiva/sclera: Conjunctivae normal.   Cardiovascular:      Rate and Rhythm: Normal rate.   Pulmonary:      Effort: Pulmonary effort is normal. No respiratory distress.   Musculoskeletal:      Cervical back: Normal range of motion and neck supple.      Comments: See Ortho exam   Skin:     General: Skin is warm and dry.   Neurological:      General: No focal deficit present.      Mental Status: She is alert and oriented to person, place, and time.   Psychiatric:         Behavior: Behavior normal.               This document was created using speech voice recognition software.   Grammatical errors, random word insertions, pronoun errors, and incomplete sentences are an occasional consequence of this system due to software limitations, ambient noise, and hardware issues.   Any formal questions or concerns about content, text, or information contained within the body of this dictation should be directly addressed to the provider for clarification.

## 2025-02-20 ENCOUNTER — APPOINTMENT (OUTPATIENT)
Dept: LAB | Facility: CLINIC | Age: 70
End: 2025-02-20
Payer: COMMERCIAL

## 2025-02-20 DIAGNOSIS — M85.80 OSTEOPENIA WITH HIGH RISK OF FRACTURE: ICD-10-CM

## 2025-02-20 LAB — 25(OH)D3 SERPL-MCNC: 39.6 NG/ML (ref 30–100)

## 2025-02-20 PROCEDURE — 82306 VITAMIN D 25 HYDROXY: CPT

## 2025-02-20 PROCEDURE — 36415 COLL VENOUS BLD VENIPUNCTURE: CPT

## 2025-03-06 ENCOUNTER — OFFICE VISIT (OUTPATIENT)
Dept: SURGICAL ONCOLOGY | Facility: CLINIC | Age: 70
End: 2025-03-06
Payer: COMMERCIAL

## 2025-03-06 VITALS
TEMPERATURE: 97.1 F | OXYGEN SATURATION: 93 % | DIASTOLIC BLOOD PRESSURE: 78 MMHG | SYSTOLIC BLOOD PRESSURE: 128 MMHG | WEIGHT: 170 LBS | HEIGHT: 62 IN | BODY MASS INDEX: 31.28 KG/M2 | RESPIRATION RATE: 16 BRPM | HEART RATE: 73 BPM

## 2025-03-06 DIAGNOSIS — Z15.09 BRCA POSITIVE: ICD-10-CM

## 2025-03-06 DIAGNOSIS — E04.2 MULTIPLE THYROID NODULES: Primary | ICD-10-CM

## 2025-03-06 DIAGNOSIS — Z15.01 BRCA POSITIVE: ICD-10-CM

## 2025-03-06 PROCEDURE — G2211 COMPLEX E/M VISIT ADD ON: HCPCS

## 2025-03-06 PROCEDURE — 99213 OFFICE O/P EST LOW 20 MIN: CPT

## 2025-03-06 NOTE — ASSESSMENT & PLAN NOTE
High risk and cancer surveillance is being managed by her Medical Oncology team. This includes breast MRI and CT chest, abdomen and pelvis.

## 2025-03-06 NOTE — PROGRESS NOTES
Name: Ana Maria Murphy      : 1955      MRN: [id number]  Encounter Provider: ANDRES Palma  Encounter Date: 3/6/2025   Encounter department: [facility]  :  Assessment & Plan  Multiple thyroid nodules  Nodules remain stable on imaging.  We will plan to repeat ultrasound in 1 year.  Orders:  •  US thyroid; Future    BRCA positive  High risk and cancer surveillance is being managed by her Medical Oncology team. This includes breast MRI and CT chest, abdomen and pelvis.            History of Present Illness   Chief Complaint   Patient presents with   • Follow-up     Return in about 1 year (around 3/6/2026) for Office Visit, Imaging - See orders.    Pertinent Medical History   This is a 68 y/o female who returns to the office today for thyroid nodule surveillance. The largest right-sided nodule as well as the left-sided nodule were both proven benign on biopsy in . She denies any new symptoms, such as difficulty swallowing, voice changes, or enlargement of her goiter.  US was performed on , and I have reviewed these results today. She dies carry a BRCA2 mutation and has a history of ampullary cancer, with surveillance managed by her Medical Oncology team.         Oncology History   Cancer Staging   History of malignant neoplasm of ampulla of Vater  Staging form: Ampulla of Vater, AJCC 8th Edition  - Pathologic stage from 2021: Stage IIIA (pT3b, pN1, cM0) - Signed by ANDRES Palma on 2023  Total positive nodes: 1  Total nodes examined: 22  Histologic grade (G): G3  Histologic grading system: 3 grade system  Residual tumor (R): R0 - None  Oncology History   History of malignant neoplasm of ampulla of Vater   3/15/2021 Initial Diagnosis    Neoplasm of ampulla of Vater     2021 Surgery    B.  Celiac lymph node:     - Single lymph node; negative for malignancy (0/1).     C.  Whipple resection:     - Invasive poorly differentiated  mucinous adenocarcinoma.     - Tumor arises in the background of an adenoma with high grade dysplasia.     - Lymphatic and venous channel invasion by tumor present.     - Metastatic carcinoma present in one of twenty lymph nodes (1/20).     - All margins are negative for tumor.     4/26/2021 -  Cancer Staged    Staging form: Ampulla of Vater, AJCC 8th Edition  - Pathologic stage from 4/26/2021: Stage IIIA (pT3b, pN1, cM0) - Signed by ANDRES Palma on 6/9/2023  Total positive nodes: 1  Total nodes examined: 22  Histologic grade (G): G3  Histologic grading system: 3 grade system  Residual tumor (R): R0 - None       6/9/2021 - 10/1/2021 Chemotherapy    Cycles 1-6  6/2021 through 8/20/21:  FOLFOX    Cycles 7 -8:  9/15/2021- 10/12/2021  Leucovorin 400mg/m2, IV, Day 1  5-Fu 400 mg/m2, IV , Day 1   5-Fu 1200 mg/m2, IV, CI x 46 hours  Cycle length = 2 weeks    palonosetron (ALOXI), 0.25 mg, Intravenous, Once, 5 of 5 cycles  Administration: 0.25 mg (7/21/2021), 0.25 mg (8/4/2021), 0.25 mg (8/18/2021), 0.25 mg (9/15/2021), 0.25 mg (9/29/2021)  fluorouracil (ADRUCIL), 400 mg/m2 = 605 mg, Intravenous, Once, 8 of 8 cycles  Dose modification: 340 mg/m2 (85 % of original dose 400 mg/m2, Cycle 6, Reason: Dose Not Tolerated)  Administration: 605 mg (6/9/2021), 605 mg (6/23/2021), 605 mg (7/7/2021), 605 mg (7/21/2021), 605 mg (8/4/2021), 515 mg (8/18/2021), 610 mg (9/15/2021), 610 mg (9/29/2021)  fosaprepitant (EMEND) IVPB, 150 mg, Intravenous, Once, 6 of 6 cycles  Administration: 150 mg (7/7/2021), 150 mg (7/21/2021), 150 mg (8/4/2021), 150 mg (8/18/2021), 150 mg (9/15/2021), 150 mg (9/29/2021)  leucovorin calcium IVPB, 604 mg, Intravenous, Once, 8 of 8 cycles  Dose modification: 340 mg/m2 (85 % of original dose 400 mg/m2, Cycle 6, Reason: Dose Not Tolerated)  Administration: 600 mg (6/9/2021), 600 mg (6/23/2021), 600 mg (7/7/2021), 600 mg (7/21/2021), 600 mg (8/4/2021), 517 mg (8/18/2021), 600 mg (9/15/2021), 600 mg  (9/29/2021)  oxaliplatin (ELOXATIN) chemo infusion, 85 mg/m2 = 128.35 mg, Intravenous, Once, 6 of 6 cycles  Dose modification: 72.25 mg/m2 (85 % of original dose 85 mg/m2, Cycle 6, Reason: Dose Not Tolerated)  Administration: 128.35 mg (6/9/2021), 128.35 mg (6/23/2021), 128.35 mg (7/7/2021), 128.35 mg (7/21/2021), 128.35 mg (8/4/2021), 109.82 mg (8/18/2021)  fluorouracil (ADRUCIL) ambulatory infusion Soln, 1,200 mg/m2/day = 3,625 mg, Intravenous, Over 46 hours, 8 of 8 cycles     10/1/2021 Genetic Testing    Results revealed patient has the following mutation(s):  Positive for a BRCA2 pathogenic variant      10/26/2021 -  Chemotherapy    Xeloda 825 mg/m2 BID  BSA = 1.59  Calculated to 1300 mg BID with rounding.        10/28/2021 - 12/2/2021 Radiation    Treatments:  Course: C1    Plan ID Energy Fractions Dose per Fraction (cGy) Dose Correction (cGy) Total Dose Delivered (cGy) Elapsed Days   Abdomen 6X 25 / 25 195 0 4,875 35      Treatment Dates:  10/28/2021 - 12/2/2021.               Review of Systems A complete review of systems is negative other than that noted above in the HPI.       Current Outpatient Medications   Medication Sig Dispense Refill   • acetaminophen (TYLENOL) 500 mg tablet Take 1,000 mg by mouth     • albuterol (2.5 mg/3 mL) 0.083 % nebulizer solution Take 3 mL (2.5 mg total) by nebulization every 6 (six) hours as needed for wheezing or shortness of breath 30 mL 1   • albuterol (PROVENTIL HFA,VENTOLIN HFA) 90 mcg/act inhaler TAKE 2 PUFFS BY MOUTH EVERY 6 HOURS AS NEEDED FOR WHEEZE 6.7 g 5   • alendronate (FOSAMAX) 70 mg tablet TAKE 1 TABLET (70 MG TOTAL) BY MOUTH EVERY 7 DAYS 12 tablet 1   • buPROPion (WELLBUTRIN SR) 150 mg 12 hr tablet TAKE 1 TABLET BY MOUTH TWICE A  tablet 0   • Fluticasone-Salmeterol (Wixela Inhub) 250-50 mcg/dose inhaler Rinse mouth after use. 60 blister 5   • losartan-hydrochlorothiazide (HYZAAR) 50-12.5 mg per tablet Take 1 tablet by mouth daily 30 tablet 2   •  "metoprolol tartrate (LOPRESSOR) 25 mg tablet Take 25 mg by mouth 2 (two) times a day     • Multiple Vitamin (multivitamin) tablet Take 1 tablet by mouth daily     • sertraline (ZOLOFT) 25 mg tablet TAKE 1 TABLET (25 MG TOTAL) BY MOUTH DAILY. 90 tablet 1   • aspirin 81 mg chewable tablet Chew 1 tablet (81 mg total) daily 30 tablet 0   • atorvastatin (LIPITOR) 20 mg tablet Take 1 tablet (20 mg total) by mouth every evening 30 tablet 0   • benzonatate (TESSALON PERLES) 100 mg capsule Take 1 capsule (100 mg total) by mouth 3 (three) times a day as needed for cough (Patient not taking: Reported on 1/21/2025) 30 capsule 0   • calcium carbonate (TUMS EX) 750 mg chewable tablet Chew 1 tablet (750 mg total) daily 30 tablet 2   • Cholecalciferol (VITAMIN D3) 1,000 units tablet Take 1 tablet (1,000 Units total) by mouth daily 30 tablet 2   • diclofenac (VOLTAREN) 75 mg EC tablet Take 1 tablet (75 mg total) by mouth 2 (two) times a day for 14 days 28 tablet 0   • fluticasone (FLONASE) 50 mcg/act nasal spray 2 sprays into each nostril daily for 3 days 16 g 0   • Medical ID Plate MISC Use once for 1 dose Severely and permanently limited in the ability to walk because of an arthritic, neurological, or orthopedic condition: or cannot walk two hundred feet without stopping to rest and meets requirements for disability license plate 1 each 0   • ondansetron (Zofran ODT) 4 mg disintegrating tablet Take 1 tablet (4 mg total) by mouth every 6 (six) hours as needed for nausea or vomiting (Patient not taking: Reported on 1/21/2025) 10 tablet 0     No current facility-administered medications for this visit.      Objective   /78 (Patient Position: Sitting, Cuff Size: Standard)   Pulse 73   Temp (!) 97.1 °F (36.2 °C) (Temporal)   Resp 16   Ht 5' 2\" (1.575 m)   Wt 77.1 kg (170 lb)   SpO2 93%   BMI 31.09 kg/m²       Physical Exam  Vitals reviewed.   Constitutional:       General: She is not in acute distress.     Appearance: " Normal appearance. She is not ill-appearing or toxic-appearing.   HENT:      Head: Normocephalic and atraumatic.   Eyes:      General: No scleral icterus.  Neck:      Comments: Large right-sided nodule is palpable and visible on exam.  There is no adenopathy present.  Cardiovascular:      Rate and Rhythm: Normal rate.   Pulmonary:      Effort: Pulmonary effort is normal.   Musculoskeletal:         General: Normal range of motion.      Cervical back: Normal range of motion and neck supple. No rigidity or tenderness.   Lymphadenopathy:      Cervical: No cervical adenopathy.      Upper Body:      Right upper body: No supraclavicular adenopathy.      Left upper body: No supraclavicular adenopathy.   Skin:     General: Skin is warm and dry.   Neurological:      General: No focal deficit present.      Mental Status: She is alert and oriented to person, place, and time.   Psychiatric:         Mood and Affect: Mood normal.         Behavior: Behavior normal.         Thought Content: Thought content normal.         Judgment: Judgment normal.          Radiology Results Review: I have reviewed radiology reports from 2/14/2025 including: Ultrasound(s).    US thyroid  Narrative & Impression   THYROID ULTRASOUND     INDICATION: E04.2: Nontoxic multinodular goiter.     COMPARISON: Thyroid ultrasound 2/12/2024, dating back to 6/6/2022. Biopsy 7/13/2022.     TECHNIQUE: Ultrasound of the thyroid was performed with a high frequency linear transducer in transverse and sagittal planes including volumetric imaging sweeps as well as traditional still imaging technique.     FINDINGS:  Thyroid texture: Normal homogeneous smooth echotexture.     Right lobe: 4.7 x 2.2 x 2.7 cm. Volume 13.3 mL  Left lobe: 2.5 x 0.0 x 1.6 cm. Volume 0.0 mL  Isthmus: 0.6 cm.     Nodule #1. Image 3.  RIGHT upper pole nodule measuring 3.1 x 1.7 x 2.3 cm. Unchanged from prior.  COMPOSITION: 2 points, solid or almost completely solid.  ECHOGENICITY: 1 point,  hyperechoic or isoechoic.  SHAPE: 0 points, wider-than-tall.  MARGIN: 0 points, smooth.  ECHOGENIC FOCI: 0 points, none or large comet-tail artifacts.  TI-RADS Classification: TR 3 (3 points). FNA if >/= 2.5 cm. Follow if >/= 1.5 cm. This nodule has had a previous benign biopsy ([place] III, [facility] Benign molecular testing, 7/13/2022). As it is stable, no further sampling recommended at this time.   Further surveillance at 2 year intervals could be considered to confirm continued stability for a period of greater than 5 years.     Nodule #2. Image 10.  RIGHT lower pole nodule measuring 1.0 x 0.9 x 1.1 cm. Unchanged from prior.  COMPOSITION: 2 points, solid or almost completely solid.  ECHOGENICITY: 2 points, hypoechoic.  SHAPE: 0 points, wider-than-tall.  MARGIN: 0 points, smooth.  ECHOGENIC FOCI: 0 points, none or large comet-tail artifacts.  TI-RADS Classification: TR 4 (4-6 points). FNA if >/= 1.5 cm. Follow if >/= 1 cm.     Nodule #3. Image 40.  LEFT midgland nodule measuring 1.5 x 1.1 x 0.5 cm. Unchanged from prior.  COMPOSITION: 2 points, solid or almost completely solid.  ECHOGENICITY: 2 points, hypoechoic.  SHAPE: 3 points, taller-than-wide.  MARGIN: 0 points, smooth.  ECHOGENIC FOCI: 0 points, none or large comet-tail artifacts.  TI-RADS Classification: TR 5 (7 or > points), Highly suspicious. FNA if >/= 1 cm. Follow if >/= 0.5 cm. This nodule has had a previous benign biopsy ([place] Category II, 7/13/2022). As it is stable, no further sampling recommended at this time. Further   surveillance at 2 year intervals could be considered to confirm continued stability for a period of greater than 5 years.     There are additional nodules of lesser size and/or TI-RADS score. At the time of follow-up for the above dominant nodules, these will also be reassessed, but do not warrant further delineation at this time based on ACR parameters.     IMPRESSION:     Stable multinodular thyroid gland with previous  non-malignant biopsy results as above. Based on 2015 SHANNA guidelines, additional follow-up ultrasound should be performed in 12 to 24 months.

## 2025-03-06 NOTE — LETTER
2025     Feliciano Luevano MD  [address] [facility]  2nd Floor  [facility] [address]    Patient: Ana Maria uMrphy   YOB: 1955   Date of Visit: 3/6/2025       Dear Dr. Luevano:    Thank you for referring Ana Maria Murphy to me for evaluation. Below are my notes for this consultation.    If you have questions, please do not hesitate to call me. I look forward to following your patient along with you.         Sincerely,        ANDRES Palma        CC: No Recipients    ANDRES Palma  3/6/2025  2:10 PM  Sign when Signing Visit  Name: Ana Maria Murphy      : 1955      MRN: [id number]  Encounter Provider: ANDRES Palma  Encounter Date: 3/6/2025   Encounter department: CANCER CARE [facility] SURGICAL ONCOLOGY [place]  :  Assessment & Plan  Multiple thyroid nodules  Nodules remain stable on imaging.  We will plan to repeat ultrasound in 1 year.  Orders:  •  US thyroid; Future    BRCA positive  High risk and cancer surveillance is being managed by her Medical Oncology team. This includes breast MRI and CT chest, abdomen and pelvis.            History of Present Illness  Chief Complaint   Patient presents with   • Follow-up     Return in about 1 year (around 3/6/2026) for Office Visit, Imaging - See orders.    Pertinent Medical History  This is a 68 y/o female who returns to the office today for thyroid nodule surveillance. The largest right-sided nodule as well as the left-sided nodule were both proven benign on biopsy in . She denies any new symptoms, such as difficulty swallowing, voice changes, or enlargement of her goiter.  US was performed on , and I have reviewed these results today. She dies carry a BRCA2 mutation and has a history of ampullary cancer, with surveillance managed by her Medical Oncology team.        Oncology History  Cancer Staging   History of malignant neoplasm of ampulla of Vater  Staging form: Ampulla of Vater, AJCC 8th Edition  -  Pathologic stage from 4/26/2021: Stage IIIA (pT3b, pN1, cM0) - Signed by ANDRES Palma on 6/9/2023  Total positive nodes: 1  Total nodes examined: 22  Histologic grade (G): G3  Histologic grading system: 3 grade system  Residual tumor (R): R0 - None  Oncology History   History of malignant neoplasm of ampulla of Vater   3/15/2021 Initial Diagnosis    Neoplasm of ampulla of Vater     4/26/2021 Surgery    B.  Celiac lymph node:     - Single lymph node; negative for malignancy (0/1).     C.  Whipple resection:     - Invasive poorly differentiated mucinous adenocarcinoma.     - Tumor arises in the background of an adenoma with high grade dysplasia.     - Lymphatic and venous channel invasion by tumor present.     - Metastatic carcinoma present in one of twenty lymph nodes (1/20).     - All margins are negative for tumor.     4/26/2021 -  Cancer Staged    Staging form: Ampulla of Vater, AJCC 8th Edition  - Pathologic stage from 4/26/2021: Stage IIIA (pT3b, pN1, cM0) - Signed by ANDRES Palma on 6/9/2023  Total positive nodes: 1  Total nodes examined: 22  Histologic grade (G): G3  Histologic grading system: 3 grade system  Residual tumor (R): R0 - None       6/9/2021 - 10/1/2021 Chemotherapy    Cycles 1-6  6/2021 through 8/20/21:  FOLFOX    Cycles 7 -8:  9/15/2021- 10/12/2021  Leucovorin 400mg/m2, IV, Day 1  5-Fu 400 mg/m2, IV , Day 1   5-Fu 1200 mg/m2, IV, CI x 46 hours  Cycle length = 2 weeks    palonosetron (ALOXI), 0.25 mg, Intravenous, Once, 5 of 5 cycles  Administration: 0.25 mg (7/21/2021), 0.25 mg (8/4/2021), 0.25 mg (8/18/2021), 0.25 mg (9/15/2021), 0.25 mg (9/29/2021)  fluorouracil (ADRUCIL), 400 mg/m2 = 605 mg, Intravenous, Once, 8 of 8 cycles  Dose modification: 340 mg/m2 (85 % of original dose 400 mg/m2, Cycle 6, Reason: Dose Not Tolerated)  Administration: 605 mg (6/9/2021), 605 mg (6/23/2021), 605 mg (7/7/2021), 605 mg (7/21/2021), 605 mg (8/4/2021), 515 mg (8/18/2021), 610 mg (9/15/2021),  610 mg (9/29/2021)  fosaprepitant (EMEND) IVPB, 150 mg, Intravenous, Once, 6 of 6 cycles  Administration: 150 mg (7/7/2021), 150 mg (7/21/2021), 150 mg (8/4/2021), 150 mg (8/18/2021), 150 mg (9/15/2021), 150 mg (9/29/2021)  leucovorin calcium IVPB, 604 mg, Intravenous, Once, 8 of 8 cycles  Dose modification: 340 mg/m2 (85 % of original dose 400 mg/m2, Cycle 6, Reason: Dose Not Tolerated)  Administration: 600 mg (6/9/2021), 600 mg (6/23/2021), 600 mg (7/7/2021), 600 mg (7/21/2021), 600 mg (8/4/2021), 517 mg (8/18/2021), 600 mg (9/15/2021), 600 mg (9/29/2021)  oxaliplatin (ELOXATIN) chemo infusion, 85 mg/m2 = 128.35 mg, Intravenous, Once, 6 of 6 cycles  Dose modification: 72.25 mg/m2 (85 % of original dose 85 mg/m2, Cycle 6, Reason: Dose Not Tolerated)  Administration: 128.35 mg (6/9/2021), 128.35 mg (6/23/2021), 128.35 mg (7/7/2021), 128.35 mg (7/21/2021), 128.35 mg (8/4/2021), 109.82 mg (8/18/2021)  fluorouracil (ADRUCIL) ambulatory infusion Soln, 1,200 mg/m2/day = 3,625 mg, Intravenous, Over 46 hours, 8 of 8 cycles     10/1/2021 Genetic Testing    Results revealed patient has the following mutation(s):  Positive for a BRCA2 pathogenic variant      10/26/2021 -  Chemotherapy    Xeloda 825 mg/m2 BID  BSA = 1.59  Calculated to 1300 mg BID with rounding.        10/28/2021 - 12/2/2021 Radiation    Treatments:  Course: C1    Plan ID Energy Fractions Dose per Fraction (cGy) Dose Correction (cGy) Total Dose Delivered (cGy) Elapsed Days   Abdomen 6X 25 / 25 195 0 4,875 35      Treatment Dates:  10/28/2021 - 12/2/2021.              Review of Systems A complete review of systems is negative other than that noted above in the HPI.       Current Outpatient Medications   Medication Sig Dispense Refill   • acetaminophen (TYLENOL) 500 mg tablet Take 1,000 mg by mouth     • albuterol (2.5 mg/3 mL) 0.083 % nebulizer solution Take 3 mL (2.5 mg total) by nebulization every 6 (six) hours as needed for wheezing or shortness of breath 30  mL 1   • albuterol (PROVENTIL HFA,VENTOLIN HFA) 90 mcg/act inhaler TAKE 2 PUFFS BY MOUTH EVERY 6 HOURS AS NEEDED FOR WHEEZE 6.7 g 5   • alendronate (FOSAMAX) 70 mg tablet TAKE 1 TABLET (70 MG TOTAL) BY MOUTH EVERY 7 DAYS 12 tablet 1   • buPROPion (WELLBUTRIN SR) 150 mg 12 hr tablet TAKE 1 TABLET BY MOUTH TWICE A  tablet 0   • Fluticasone-Salmeterol (Wixela Inhub) 250-50 mcg/dose inhaler Rinse mouth after use. 60 blister 5   • losartan-hydrochlorothiazide (HYZAAR) 50-12.5 mg per tablet Take 1 tablet by mouth daily 30 tablet 2   • metoprolol tartrate (LOPRESSOR) 25 mg tablet Take 25 mg by mouth 2 (two) times a day     • Multiple Vitamin (multivitamin) tablet Take 1 tablet by mouth daily     • sertraline (ZOLOFT) 25 mg tablet TAKE 1 TABLET (25 MG TOTAL) BY MOUTH DAILY. 90 tablet 1   • aspirin 81 mg chewable tablet Chew 1 tablet (81 mg total) daily 30 tablet 0   • atorvastatin (LIPITOR) 20 mg tablet Take 1 tablet (20 mg total) by mouth every evening 30 tablet 0   • benzonatate (TESSALON PERLES) 100 mg capsule Take 1 capsule (100 mg total) by mouth 3 (three) times a day as needed for cough (Patient not taking: Reported on 1/21/2025) 30 capsule 0   • calcium carbonate (TUMS EX) 750 mg chewable tablet Chew 1 tablet (750 mg total) daily 30 tablet 2   • Cholecalciferol (VITAMIN D3) 1,000 units tablet Take 1 tablet (1,000 Units total) by mouth daily 30 tablet 2   • diclofenac (VOLTAREN) 75 mg EC tablet Take 1 tablet (75 mg total) by mouth 2 (two) times a day for 14 days 28 tablet 0   • fluticasone (FLONASE) 50 mcg/act nasal spray 2 sprays into each nostril daily for 3 days 16 g 0   • Medical ID Plate MISC Use once for 1 dose Severely and permanently limited in the ability to walk because of an arthritic, neurological, or orthopedic condition: or cannot walk two hundred feet without stopping to rest and meets requirements for disability license plate 1 each 0   • ondansetron (Zofran ODT) 4 mg disintegrating tablet Take  "1 tablet (4 mg total) by mouth every 6 (six) hours as needed for nausea or vomiting (Patient not taking: Reported on 1/21/2025) 10 tablet 0     No current facility-administered medications for this visit.      Objective  /78 (Patient Position: Sitting, Cuff Size: Standard)   Pulse 73   Temp (!) 97.1 °F (36.2 °C) (Temporal)   Resp 16   Ht 5' 2\" (1.575 m)   Wt 77.1 kg (170 lb)   SpO2 93%   BMI 31.09 kg/m²       Physical Exam  Vitals reviewed.   Constitutional:       General: She is not in acute distress.     Appearance: Normal appearance. She is not ill-appearing or toxic-appearing.   HENT:      Head: Normocephalic and atraumatic.   Eyes:      General: No scleral icterus.  Neck:      Comments: Large right-sided nodule is palpable and visible on exam.  There is no adenopathy present.  Cardiovascular:      Rate and Rhythm: Normal rate.   Pulmonary:      Effort: Pulmonary effort is normal.   Musculoskeletal:         General: Normal range of motion.      Cervical back: Normal range of motion and neck supple. No rigidity or tenderness.   Lymphadenopathy:      Cervical: No cervical adenopathy.      Upper Body:      Right upper body: No supraclavicular adenopathy.      Left upper body: No supraclavicular adenopathy.   Skin:     General: Skin is warm and dry.   Neurological:      General: No focal deficit present.      Mental Status: She is alert and oriented to person, place, and time.   Psychiatric:         Mood and Affect: Mood normal.         Behavior: Behavior normal.         Thought Content: Thought content normal.         Judgment: Judgment normal.          Radiology Results Review: I have reviewed radiology reports from 2/14/2025 including: Ultrasound(s).    US thyroid  Narrative & Impression   THYROID ULTRASOUND     INDICATION: E04.2: Nontoxic multinodular goiter.     COMPARISON: Thyroid ultrasound 2/12/2024, dating back to 6/6/2022. Biopsy 7/13/2022.     TECHNIQUE: Ultrasound of the thyroid was " performed with a high frequency linear transducer in transverse and sagittal planes including volumetric imaging sweeps as well as traditional still imaging technique.     FINDINGS:  Thyroid texture: Normal homogeneous smooth echotexture.     Right lobe: 4.7 x 2.2 x 2.7 cm. Volume 13.3 mL  Left lobe: 2.5 x 0.0 x 1.6 cm. Volume 0.0 mL  Isthmus: 0.6 cm.     Nodule #1. Image 3.  RIGHT upper pole nodule measuring 3.1 x 1.7 x 2.3 cm. Unchanged from prior.  COMPOSITION: 2 points, solid or almost completely solid.  ECHOGENICITY: 1 point, hyperechoic or isoechoic.  SHAPE: 0 points, wider-than-tall.  MARGIN: 0 points, smooth.  ECHOGENIC FOCI: 0 points, none or large comet-tail artifacts.  TI-RADS Classification: TR 3 (3 points). FNA if >/= 2.5 cm. Follow if >/= 1.5 cm. This nodule has had a previous benign biopsy ([place] [facility], [facility] Benign molecular testing, 7/13/2022). As it is stable, no further sampling recommended at this time.   Further surveillance at 2 year intervals could be considered to confirm continued stability for a period of greater than 5 years.     Nodule #2. Image 10.  RIGHT lower pole nodule measuring 1.0 x 0.9 x 1.1 cm. Unchanged from prior.  COMPOSITION: 2 points, solid or almost completely solid.  ECHOGENICITY: 2 points, hypoechoic.  SHAPE: 0 points, wider-than-tall.  MARGIN: 0 points, smooth.  ECHOGENIC FOCI: 0 points, none or large comet-tail artifacts.  TI-RADS Classification: TR 4 (4-6 points). FNA if >/= 1.5 cm. Follow if >/= 1 cm.     Nodule #3. Image 40.  LEFT midgland nodule measuring 1.5 x 1.1 x 0.5 cm. Unchanged from prior.  COMPOSITION: 2 points, solid or almost completely solid.  ECHOGENICITY: 2 points, hypoechoic.  SHAPE: 3 points, taller-than-wide.  MARGIN: 0 points, smooth.  ECHOGENIC FOCI: 0 points, none or large comet-tail artifacts.  TI-RADS Classification: TR 5 (7 or > points), Highly suspicious. FNA if >/= 1 cm. Follow if >/= 0.5 cm. This nodule has had a previous benign biopsy  ([place] Category II, 7/13/2022). As it is stable, no further sampling recommended at this time. Further   surveillance at 2 year intervals could be considered to confirm continued stability for a period of greater than 5 years.     There are additional nodules of lesser size and/or TI-RADS score. At the time of follow-up for the above dominant nodules, these will also be reassessed, but do not warrant further delineation at this time based on ACR parameters.     IMPRESSION:     Stable multinodular thyroid gland with previous non-malignant biopsy results as above. Based on 2015 SHANNA guidelines, additional follow-up ultrasound should be performed in 12 to 24 months.

## 2025-03-06 NOTE — ASSESSMENT & PLAN NOTE
Nodules remain stable on imaging.  We will plan to repeat ultrasound in 1 year.  Orders:  •  US thyroid; Future

## 2025-04-09 ENCOUNTER — OFFICE VISIT (OUTPATIENT)
Age: 70
End: 2025-04-09

## 2025-04-09 VITALS
OXYGEN SATURATION: 95 % | BODY MASS INDEX: 31.28 KG/M2 | WEIGHT: 170 LBS | DIASTOLIC BLOOD PRESSURE: 73 MMHG | HEART RATE: 65 BPM | HEIGHT: 62 IN | SYSTOLIC BLOOD PRESSURE: 110 MMHG

## 2025-04-09 DIAGNOSIS — Z85.09 HISTORY OF MALIGNANT NEOPLASM OF AMPULLA OF VATER: ICD-10-CM

## 2025-04-09 DIAGNOSIS — Z12.31 ENCOUNTER FOR SCREENING MAMMOGRAM FOR BREAST CANCER: ICD-10-CM

## 2025-04-09 DIAGNOSIS — Z00.00 MEDICARE ANNUAL WELLNESS VISIT, SUBSEQUENT: Primary | ICD-10-CM

## 2025-04-09 DIAGNOSIS — Z23 ENCOUNTER FOR IMMUNIZATION: ICD-10-CM

## 2025-04-09 DIAGNOSIS — E78.2 MIXED HYPERLIPIDEMIA: ICD-10-CM

## 2025-04-09 DIAGNOSIS — Z91.09 ENVIRONMENTAL ALLERGIES: ICD-10-CM

## 2025-04-09 DIAGNOSIS — Z11.59 NEED FOR HEPATITIS C SCREENING TEST: ICD-10-CM

## 2025-04-09 PROCEDURE — G0439 PPPS, SUBSEQ VISIT: HCPCS | Performed by: FAMILY MEDICINE

## 2025-04-09 NOTE — ASSESSMENT & PLAN NOTE
- completed chemoradiation  - currently in remission  - currently undergoing surveillance; has Ct abdomen pelvis upcoming; follows [facility]

## 2025-04-09 NOTE — PROGRESS NOTES
Name: Ana Maria Murphy      : 1955      MRN: [id number]  Encounter Provider: Mireya Staley DO  Encounter Date: 2025   Encounter department: [facility]  :  Assessment & Plan       Preventive health issues were discussed with patient, and age appropriate screening tests were ordered as noted in patient's After Visit Summary. Personalized health advice and appropriate referrals for health education or preventive services given if needed, as noted in patient's After Visit Summary.    History of Present Illness     HPI   Patient Care Team:  Mireya Staley DO as PCP - General (Family Medicine)  Kojo Jose MD as PCP - PCP-[facility] ([facility])  Feliciano Luevano MD as Surgeon (Surgical Oncology)  Carlos Will MD (Radiation Oncology)  Shannan Solo RD (Nutrition)  Naldo Callaway MD as Cardiologist (Cardiology)  ANDRES Palma as Nurse Practitioner (Surgical Oncology)  Rosa Elena Lopez PA-C as Physician Assistant (Hematology and Oncology)    Review of Systems  Medical History Reviewed by provider this encounter:       Annual Wellness Visit Questionnaire       Immunizations:  - Immunizations due: Influenza, Prevnar 20 and Zoster (Shingrix)    Social Drivers of Health     Financial Resource Strain: Low Risk  (2025)    Overall Financial Resource Strain (CARDIA)     Difficulty of Paying Living Expenses: Not hard at all   Food Insecurity: No Food Insecurity (2025)    Hunger Vital Sign     Worried About Running Out of Food in the Last Year: Never true     Ran Out of Food in the Last Year: Never true   Transportation Needs: No Transportation Needs (2025)    PRAPARE - Transportation     Lack of Transportation (Medical): No     Lack of Transportation (Non-Medical): No   Housing Stability: Low Risk  (2025)    Housing Stability Vital Sign     Unable to Pay for Housing in the Last Year: No     Number of Times Moved in the Last Year: 0      Homeless in the Last Year: No   Utilities: Not At Risk (1/21/2025)    [facility] Utilities     Threatened with loss of utilities: No     No results found.    Objective   There were no vitals taken for this visit.    Physical Exam

## 2025-04-09 NOTE — PATIENT INSTRUCTIONS
Medicare Preventive Visit Patient Instructions  Thank you for completing your Welcome to Medicare Visit or Medicare Annual Wellness Visit today. Your next wellness visit will be due in one year (4/10/2026).  The screening/preventive services that you may require over the next 5-10 years are detailed below. Some tests may not apply to you based off risk factors and/or age. Screening tests ordered at today's visit but not completed yet may show as past due. Also, please note that scanned in results may not display below.  Preventive Screenings:  Service Recommendations Previous Testing/Comments   Colorectal Cancer Screening  * Colonoscopy    * Fecal Occult Blood Test (FOBT)/Fecal Immunochemical Test (FIT)  * Fecal DNA/Cologuard Test  * Flexible Sigmoidoscopy Age: 45-75 years old   Colonoscopy: every 10 years (may be performed more frequently if at higher risk)  OR  FOBT/FIT: every 1 year  OR  Cologuard: every 3 years  OR  Sigmoidoscopy: every 5 years  Screening may be recommended earlier than age 45 if at higher risk for colorectal cancer. Also, an individualized decision between you and your healthcare provider will decide whether screening between the ages of 76-85 would be appropriate. Colonoscopy: 10/02/2024  FOBT/FIT: Not on file  Cologuard: 09/09/2024  Sigmoidoscopy: Not on file    Screening Current     Breast Cancer Screening Age: 40+ years old  Frequency: every 1-2 years  Not required if history of left and right mastectomy Mammogram: 12/17/2024    Screening Current   Cervical Cancer Screening Between the ages of 21-29, pap smear recommended once every 3 years.   Between the ages of 30-65, can perform pap smear with HPV co-testing every 5 years.   Recommendations may differ for women with a history of total hysterectomy, cervical cancer, or abnormal pap smears in past. Pap Smear: 06/08/2023    Screening Not Indicated   Hepatitis C Screening Once for adults born between 1945 and 1965  More frequently in  patients at high risk for Hepatitis C Hep C Antibody: Not on file    Screening Current   Diabetes Screening 1-2 times per year if you're at risk for diabetes or have pre-diabetes Fasting glucose: 114 mg/dL (2/1/2025)  A1C: 5.8 % (4/9/2021)  Screening Current   Cholesterol Screening Once every 5 years if you don't have a lipid disorder. May order more often based on risk factors. Lipid panel: 12/05/2023    Screening Not Indicated  History Lipid Disorder     Other Preventive Screenings Covered by Medicare:  Abdominal Aortic Aneurysm (AAA) Screening: covered once if your at risk. You're considered to be at risk if you have a family history of AAA.  Lung Cancer Screening: covers low dose CT scan once per year if you meet all of the following conditions: (1) Age 55-77; (2) No signs or symptoms of lung cancer; (3) Current smoker or have quit smoking within the last 15 years; (4) You have a tobacco smoking history of at least 20 pack years (packs per day multiplied by number of years you smoked); (5) You get a written order from a healthcare provider.  Glaucoma Screening: covered annually if you're considered high risk: (1) You have diabetes OR (2) Family history of glaucoma OR (3)  aged 50 and older OR (4)  American aged 65 and older  Osteoporosis Screening: covered every 2 years if you meet one of the following conditions: (1) You're estrogen deficient and at risk for osteoporosis based off medical history and other findings; (2) Have a vertebral abnormality; (3) On glucocorticoid therapy for more than 3 months; (4) Have primary hyperparathyroidism; (5) On osteoporosis medications and need to assess response to drug therapy.   Last bone density test (DXA Scan): 09/24/2024.  HIV Screening: covered annually if you're between the age of 15-65. Also covered annually if you are younger than 15 and older than 65 with risk factors for HIV infection. For pregnant patients, it is covered up to 3 times per  pregnancy.    Immunizations:  Immunization Recommendations   Influenza Vaccine Annual influenza vaccination during flu season is recommended for all persons aged >= 6 months who do not have contraindications   Pneumococcal Vaccine   * Pneumococcal conjugate vaccine = PCV13 (Prevnar 13), PCV15 (Vaxneuvance), PCV20 (Prevnar 20)  * Pneumococcal polysaccharide vaccine = PPSV23 (Pneumovax) Adults 19-65 yo with certain risk factors or if 65+ yo  If never received any pneumonia vaccine: recommend Prevnar 20 (PCV20)  Give PCV20 if previously received 1 dose of PCV13 or PPSV23   Hepatitis B Vaccine 3 dose series if at intermediate or high risk (ex: diabetes, end stage renal disease, liver disease)   Respiratory syncytial virus (RSV) Vaccine - COVERED BY MEDICARE PART D  * RSVPreF3 (Arexvy) CDC recommends that adults 60 years of age and older may receive a single dose of RSV vaccine using shared clinical decision-making (SCDM)   Tetanus (Td) Vaccine - COST NOT COVERED BY MEDICARE PART B Following completion of primary series, a booster dose should be given every 10 years to maintain immunity against tetanus. Td may also be given as tetanus wound prophylaxis.   Tdap Vaccine - COST NOT COVERED BY MEDICARE PART B Recommended at least once for all adults. For pregnant patients, recommended with each pregnancy.   Shingles Vaccine (Shingrix) - COST NOT COVERED BY MEDICARE PART B  2 shot series recommended in those 19 years and older who have or will have weakened immune systems or those 50 years and older     Health Maintenance Due:      Topic Date Due   • Hepatitis C Screening  04/12/2025 (Originally 1955)   • Breast Cancer Screening: Mammogram  12/17/2025   • Colorectal Cancer Screening  09/30/2034     Immunizations Due:      Topic Date Due   • Pneumococcal Vaccine: 65+ Years (1 of 2 - PCV) Never done   • Influenza Vaccine (1) Never done   • COVID-19 Vaccine (3 - 2024-25 season) 09/01/2024     Advance Directives   What are  advance directives?  Advance directives are legal documents that state your wishes and plans for medical care. These plans are made ahead of time in case you lose your ability to make decisions for yourself. Advance directives can apply to any medical decision, such as the treatments you want, and if you want to donate organs.   What are the types of advance directives?  There are many types of advance directives, and each state has rules about how to use them. You may choose a combination of any of the following:  Living will:  This is a written record of the treatment you want. You can also choose which treatments you do not want, which to limit, and which to stop at a certain time. This includes surgery, medicine, IV fluid, and tube feedings.   Durable power of  for healthcare (DPAHC):  This is a written record that states who you want to make healthcare choices for you when you are unable to make them for yourself. This person, called a proxy, is usually a family member or a friend. You may choose more than 1 proxy.  Do not resuscitate (DNR) order:  A DNR order is used in case your heart stops beating or you stop breathing. It is a request not to have certain forms of treatment, such as CPR. A DNR order may be included in other types of advance directives.  Medical directive:  This covers the care that you want if you are in a coma, near death, or unable to make decisions for yourself. You can list the treatments you want for each condition. Treatment may include pain medicine, surgery, blood transfusions, dialysis, IV or tube feedings, and a ventilator (breathing machine).  Values history:  This document has questions about your views, beliefs, and how you feel and think about life. This information can help others choose the care that you would choose.  Why are advance directives important?  An advance directive helps you control your care. Although spoken wishes may be used, it is better to have your  wishes written down. Spoken wishes can be misunderstood, or not followed. Treatments may be given even if you do not want them. An advance directive may make it easier for your family to make difficult choices about your care.   Cigarette Smoking and Your Health   Risks to your health if you smoke:  Nicotine and other chemicals found in tobacco damage every cell in your body. Even if you are a light smoker, you have an increased risk for cancer, heart disease, and lung disease. If you are pregnant or have diabetes, smoking increases your risk for complications.   Benefits to your health if you stop smoking:   You decrease respiratory symptoms such as coughing, wheezing, and shortness of breath.   You reduce your risk for cancers of the lung, mouth, throat, kidney, bladder, pancreas, stomach, and cervix. If you already have cancer, you increase the benefits of chemotherapy. You also reduce your risk for cancer returning or a second cancer from developing.   You reduce your risk for heart disease, blood clots, heart attack, and stroke.   You reduce your risk for lung infections, and diseases such as pneumonia, asthma, chronic bronchitis, and emphysema.  Your circulation improves. More oxygen can be delivered to your body. If you have diabetes, you lower your risk for complications, such as kidney, artery, and eye diseases. You also lower your risk for nerve damage. Nerve damage can lead to amputations, poor vision, and blindness.  You improve your body's ability to heal and to fight infections.  For more information and support to stop smoking:   bretta[place]roved.gov  Phone: 1- 595 - 531-4482  Web Address: www.[employer].jobsite123  Weight Management   Why it is important to manage your weight:  Being overweight increases your risk of health conditions such as heart disease, high blood pressure, type 2 diabetes, and certain types of cancer. It can also increase your risk for osteoarthritis, sleep apnea, and other respiratory  problems. Aim for a slow, steady weight loss. Even a small amount of weight loss can lower your risk of health problems.  How to lose weight safely:  A safe and healthy way to lose weight is to eat fewer calories and get regular exercise. You can lose up about 1 pound a week by decreasing the number of calories you eat by 500 calories each day.   Healthy meal plan for weight management:  A healthy meal plan includes a variety of foods, contains fewer calories, and helps you stay healthy. A healthy meal plan includes the following:  Eat whole-grain foods more often.  A healthy meal plan should contain fiber. Fiber is the part of grains, fruits, and vegetables that is not broken down by your body. Whole-grain foods are healthy and provide extra fiber in your diet. Some examples of whole-grain foods are whole-wheat breads and pastas, oatmeal, brown rice, and bulgur.  Eat a variety of vegetables every day.  Include dark, leafy greens such as spinach, kale, sarah greens, and mustard greens. Eat yellow and orange vegetables such as carrots, sweet potatoes, and winter squash.   Eat a variety of fruits every day.  Choose fresh or canned fruit (canned in its own juice or light syrup) instead of juice. Fruit juice has very little or no fiber.  Eat low-fat dairy foods.  Drink fat-free (skim) milk or 1% milk. Eat fat-free yogurt and low-fat cottage cheese. Try low-fat cheeses such as mozzarella and other reduced-fat cheeses.  Choose meat and other protein foods that are low in fat.  Choose beans or other legumes such as split peas or lentils. Choose fish, skinless poultry (chicken or turkey), or lean cuts of red meat (beef or pork). Before you cook meat or poultry, cut off any visible fat.   Use less fat and oil.  Try baking foods instead of frying them. Add less fat, such as margarine, sour cream, regular salad dressing and mayonnaise to foods. Eat fewer high-fat foods. Some examples of high-fat foods include french fries,  doughnuts, ice cream, and cakes.  Eat fewer sweets.  Limit foods and drinks that are high in sugar. This includes candy, cookies, regular soda, and sweetened drinks.  Exercise:  Exercise at least 30 minutes per day on most days of the week. Some examples of exercise include walking, biking, dancing, and swimming. You can also fit in more physical activity by taking the stairs instead of the elevator or parking farther away from stores. Ask your healthcare provider about the best exercise plan for you.      © Copyright [employer] 2018 Information is for End User's use only and may not be sold, redistributed or otherwise used for commercial purposes. All illustrations and images included in CareNotes® are the copyrighted property of [employer].D.A[employer]M., Inc. or [employer]

## 2025-04-09 NOTE — PROGRESS NOTES
Name: Ana Maria Murphy      : 1955      MRN: [id number]  Encounter Provider: Mireya Staley DO  Encounter Date: 2025   Encounter department: [facility]  :  Assessment & Plan  Medicare annual wellness visit, subsequent  Patient is here for an annual physical. Lifestyle modifications were discussed, including but not limited to diet and exercise recommendations. The patient received appropriate screening blood work based on what was indicated on questioning and physical examination. The patient's care gaps were addressed including any vaccinations they were due for and any screening exams they needed, and appropriate interventions were made. Importance of complying with medications and medical recommendations were discussed.   - doing well; still in remission, undergoing regular surveillance with Heme onc    - daughter got genetic testing and is BRCA gene negative; sons refuse to get genetic testing b/ they follow w/ PCP regularly       History of malignant neoplasm of ampulla of Vater  - completed chemoradiation  - currently in remission  - currently undergoing surveillance; has Ct abdomen pelvis upcoming; follows [facility]        Encounter for immunization  Patient was informed on the risks and benefits of getting the Pneumococcal vaccine(s). Patient was also informed about the risks of not getting the vaccine(s). After demonstrating understanding of the aforementioned vaccine(s), patient decided to NOT get the vaccine.       Need for hepatitis C screening test  Screening purposes only. No signs or sxs.  Orders:    Hepatitis C Antibody; Future    Mixed hyperlipidemia  Last lipid panel was in , and was abnormal. Patient states that since her Whipple procedure in , she gets full very quickly when eating, even now, she still can't eat a lot.   - advised to eat a low carb diet, practice portion control, and try to avoid high cholesterol containing  "foods  - invited to office Group Visits for healthier eating education; patient agreeable to  Orders:    Lipid Panel with Direct LDL reflex; Future    Environmental allergies  Patient is complaining of b/l itchy eyes and \"the sniffles\". Says she takes Benadryl at home for sxs.   - advised to get OTC Allegra  - advised to refrain from taking Benadryl during the day due to drowsiness       Encounter for screening mammogram for breast cancer  Patient had mammogram in Dec 2024; benign b/l. Patient is BRCA (+) and only her daughter got genetic testing (she is negative).  - due for repeat in dec 2025; patient agreeable to   Orders:    Mammo screening bilateral w 3d and cad; Future      Depression Screening and Follow-up Plan: Patient was screened for depression during today's encounter. They screened negative with a PHQ-9 score of 0.        Preventive health issues were discussed with patient, and age appropriate screening tests were ordered as noted in patient's After Visit Summary. Personalized health advice and appropriate referrals for health education or preventive services given if needed, as noted in patient's After Visit Summary.    History of Present Illness     Patient is a 69 yr. old female, with PMH of primary HTN, smoking, mild intermittent asthma, anxiety, BRCA (+), ampulla of Vater cancer s/p [place], who presents with complaints of  needing an annual wellness visit. Patient states she is doing well despite her seasonal allergies.  Patient admits scratchy eyes, allergies  Patient denies fever, chills, n/v/d    Health Maintenance:  - family history of cancers? Her, father, sister, and brother   - family history of heart disease? no  - needs breast CA screening - UTD, next one in December 2025       Patient Care Team:  Mireya Staley DO as PCP - General (Family Medicine)  Kojo Jose MD as PCP - [facility] ([facility])  Feliciano Luevano MD as Surgeon (Surgical Oncology)  Carlos Will MD " (Radiation Oncology)  Shannan Solo RD (Nutrition)  Naldo Callaway MD as Cardiologist (Cardiology)  ANDRES Palma as Nurse Practitioner (Surgical Oncology)  Rosa Elena Lopez PA-C as Physician Assistant (Hematology and Oncology)    Review of Systems   Constitutional:  Negative for chills and fever.   HENT:  Negative for ear pain.    Eyes:  Negative for photophobia and pain.   Respiratory:  Negative for shortness of breath.    Cardiovascular:  Negative for chest pain.   Gastrointestinal:  Negative for abdominal pain, diarrhea, nausea and vomiting.   Genitourinary:  Negative for difficulty urinating.   Musculoskeletal:  Negative for back pain and neck pain.   Neurological:  Negative for headaches.   Psychiatric/Behavioral:  Negative for hallucinations.    All other systems reviewed and are negative.    Medical History Reviewed by provider this encounter:  Tobacco  Allergies  Meds  Problems  Med Hx  Surg Hx  Fam Hx       Annual Wellness Visit Questionnaire   Ana Maria is here for her Subsequent Wellness visit. Last Medicare Wellness visit information reviewed, patient interviewed and updates made to the record today.      Health Risk Assessment:   Patient rates overall health as good. Patient feels that their physical health rating is much better. Patient is very satisfied with their life. Eyesight was rated as same. Hearing was rated as same. Patient feels that their emotional and mental health rating is much better. Patients states they are never, rarely angry. Patient states they are sometimes unusually tired/fatigued. Pain experienced in the last 7 days has been some. Patient's pain rating has been 4/10. Patient states that she has experienced no weight loss or gain in last 6 months.     Depression Screening:   PHQ-9 Score: 0      Fall Risk Screening:   In the past year, patient has experienced: no history of falling in past year      Urinary Incontinence Screening:   Patient has not leaked urine  accidently in the last six months.     Home Safety:  Patient does not have trouble with stairs inside or outside of their home. Patient has working smoke alarms and has working carbon monoxide detector. Home safety hazards include: none.     Nutrition:   Current diet is Regular.     Medications:   Patient is not currently taking any over-the-counter supplements. Patient is able to manage medications.     Activities of Daily Living (ADLs)/Instrumental Activities of Daily Living (IADLs):   Walk and transfer into and out of bed and chair?: Yes  Dress and groom yourself?: Yes    Bathe or shower yourself?: Yes    Feed yourself? Yes  Do your laundry/housekeeping?: Yes  Manage your money, pay your bills and track your expenses?: Yes  Make your own meals?: Yes    Do your own shopping?: Yes    Previous Hospitalizations:   Any hospitalizations or ED visits within the last 12 months?: No      Advance Care Planning:   Living will: No    Durable POA for healthcare: No    Advanced directive: No      Preventive Screenings      Cardiovascular Screening:    General: Screening Not Indicated and History Lipid Disorder      Diabetes Screening:     General: Screening Current      Colorectal Cancer Screening:     General: Screening Current      Breast Cancer Screening:     General: Screening Current      Cervical Cancer Screening:    General: Screening Not Indicated      Lung Cancer Screening:     General: Screening Not Indicated      Hepatitis C Screening:    General: Screening Current    Immunizations:  - Immunizations due: Influenza, Prevnar 20 and Zoster (Shingrix)    Screening, Brief Intervention, and Referral to Treatment (SBIRT)     Screening  Typical number of drinks in a day: 0  Typical number of drinks in a week: 0  Interpretation: Low risk drinking behavior.    Single Item Drug Screening:  How often have you used an illegal drug (including marijuana) or a prescription medication for non-medical reasons in the past year?  "never    Single Item Drug Screen Score: 0  Interpretation: Negative screen for possible drug use disorder    Social Drivers of Health     Financial Resource Strain: Low Risk  (4/9/2025)    Overall Financial Resource Strain (CARDIA)     Difficulty of Paying Living Expenses: Not very hard   Food Insecurity: No Food Insecurity (4/9/2025)    Hunger Vital Sign     Worried About Running Out of Food in the Last Year: Never true     Ran Out of Food in the Last Year: Never true   Transportation Needs: No Transportation Needs (4/9/2025)    PRAPARE - Transportation     Lack of Transportation (Medical): No     Lack of Transportation (Non-Medical): No   Housing Stability: Low Risk  (4/9/2025)    Housing Stability Vital Sign     Unable to Pay for Housing in the Last Year: No     Number of Times Moved in the Last Year: 0     Homeless in the Last Year: No   Utilities: Not At Risk (4/9/2025)    [facility] Utilities     Threatened with loss of utilities: No     No results found.    Objective   /73 (BP Location: Right arm, Patient Position: Sitting)   Pulse 65   Ht 5' 2\" (1.575 m)   Wt 77.1 kg (170 lb)   SpO2 95%   BMI 31.09 kg/m²     Physical Exam  Vitals reviewed.   Constitutional:       Appearance: Normal appearance.   HENT:      Head: Normocephalic and atraumatic.      Nose: Nose normal.      Mouth/Throat:      Mouth: Mucous membranes are moist.      Pharynx: Oropharynx is clear.   Eyes:      Conjunctiva/sclera: Conjunctivae normal.      Pupils: Pupils are equal, round, and reactive to light.   Cardiovascular:      Rate and Rhythm: Normal rate and regular rhythm.      Pulses: Normal pulses.      Heart sounds: Normal heart sounds.   Pulmonary:      Effort: Pulmonary effort is normal. No respiratory distress.      Breath sounds: Normal breath sounds.   Abdominal:      General: Abdomen is flat. Bowel sounds are normal.      Palpations: Abdomen is soft.      Tenderness: There is no abdominal tenderness. There is no guarding or " rebound.   Musculoskeletal:         General: Normal range of motion.      Cervical back: Normal range of motion and neck supple.      Right lower leg: No edema.      Left lower leg: No edema.   Skin:     General: Skin is warm and dry.   Neurological:      General: No focal deficit present.      Mental Status: She is alert and oriented to person, place, and time. Mental status is at baseline.   Psychiatric:         Mood and Affect: Mood normal.         Behavior: Behavior normal.         Thought Content: Thought content normal.         Judgment: Judgment normal.

## 2025-04-17 ENCOUNTER — APPOINTMENT (OUTPATIENT)
Dept: LAB | Facility: CLINIC | Age: 70
End: 2025-04-17
Payer: COMMERCIAL

## 2025-04-17 DIAGNOSIS — Z11.59 NEED FOR HEPATITIS C SCREENING TEST: ICD-10-CM

## 2025-04-17 DIAGNOSIS — E78.2 MIXED HYPERLIPIDEMIA: ICD-10-CM

## 2025-04-17 LAB
CHOLEST SERPL-MCNC: 297 MG/DL (ref ?–200)
HDLC SERPL-MCNC: 75 MG/DL
LDLC SERPL CALC-MCNC: 182 MG/DL (ref 0–100)
TRIGL SERPL-MCNC: 198 MG/DL (ref ?–150)

## 2025-04-17 PROCEDURE — 80061 LIPID PANEL: CPT

## 2025-04-17 PROCEDURE — 36415 COLL VENOUS BLD VENIPUNCTURE: CPT

## 2025-04-17 PROCEDURE — 86803 HEPATITIS C AB TEST: CPT

## 2025-04-18 LAB — HCV AB SER QL: NORMAL

## 2025-04-18 NOTE — PROGRESS NOTES
Patient has myalgias daily with statin  Stopped statin previously  Diet and exercise discussed  Start taking fish oil/krill oil daily  Take CoQ10 daily  Monitor lft and flp routine  Dietician referral, patient does not want to see dietician at this time   [facility] PRE-OPERATIVE EVALUATION  [facility] PRACTICE    NAME: Amanda Newman  AGE: 77 y o  SEX: female  : 1955     DATE: 2022    [facility] Pre-Operative Evaluation      Chief Complaint: Pre-operative Evaluation     Surgery: cataracs  Anticipated Date of Surgery: ,    History of Present Illness:     Patient has no prior history of bleeding issues or blood clots  Chronic conditions, medications and allergies were reviewed  There is no currently active infectious process  Assessment of Cardiac Risk:  · No unstable or severe angina or MI in the last 6 weeks or history of stent placement in the last year   · No decompensated heart failure (e g  New onset heart failure, NYHA functional class IV heart failure, or worsening existing heart failure) in past 3 mos  · No severe heart valve disease including aortic stenosis or symptomatic mitral stenosis     Exercise Capacity:  · Able to walk 4 blocks without symptoms  · Able to walk 2 flights without symptoms    NO Prior Anesthesia Reactions       No prolonged steroid use in past 6 mos  P A T  If done reviewed  Review of Systems:     Review of Systems    Current Problem List:     Patient Active Problem List   Diagnosis    Elevated LFTs    Dilated bile duct    Neoplasm of ampulla of Vater    Mild protein-calorie malnutrition (HCC)    Breast mass       Allergies:      Allergies   Allergen Reactions    Penicillins Rash    Tetracycline Rash       Current Medications:       Current Outpatient Medications:     lidocaine-prilocaine (EMLA) cream, Apply topically as needed for mild pain, Disp: 30 g, Rfl: 0    Multiple Vitamin (multivitamin) tablet, Take 1 tablet by mouth daily, Disp: , Rfl:     acetaminophen (TYLENOL) 500 mg tablet, Take 1,000 mg by mouth, Disp: , Rfl:     Medical ID Plate MISC, Use once for 1 dose Severely and permanently limited in the ability to walk because of an arthritic, neurological, or orthopedic condition: or cannot walk two hundred feet without stopping to rest and meets requirements for disability license plate, Disp: 1 each, Rfl: 0    Past Medical History:       Past Medical History:   Diagnosis Date    BRCA1 positive     BRCA2 positive     Cancer ([facility] [address] )     pancreatic    History of chemotherapy     History of radiation therapy     Pancreatic carcinoma (HCC)         Past Surgical History:   Procedure Laterality Date    ABLATION SOFT TISSUE N/A 4/26/2021    Procedure: INTRAOPERATIVE ULTRASOUND, ABLATION,SOFT TISSUE PANCREAS;  Surgeon: Kirit Harper MD;  [facility];  Service: Surgical Oncology    CHOLECYSTECTOMY N/A 4/26/2021    Procedure: CHOLECYSTECTOMY;  Surgeon: Kirit Harper MD;  [facility];  Service: Surgical Oncology    FL GUIDED CENTRAL VENOUS ACCESS DEVICE INSERTION  6/2/2021    HYSTERECTOMY      LAPAROTOMY N/A 4/26/2021    Procedure: LAPAROTOMY EXPLORATORY;  Surgeon: Kirit Harper MD;  [facility];  Service: Surgical Oncology    OOPHORECTOMY      SINUS SURGERY      TUNNELED VENOUS PORT PLACEMENT Left 6/2/2021    Procedure: INSERTION VENOUS PORT (PORT-A-CATH); Surgeon: Kirit Harper MD;  [facility];  Service: Surgical Oncology    WHIPPLE PROCEDURE/PANCREATICO-DUODENECTOMY N/A 4/26/2021    Procedure:  WHIPPLE PROCEDURE/PANCREATICO-DUODENECTOMY; PYLORIC PRESERVING;  Surgeon: Kirit Harper MD;  [facility];  Service: Surgical Oncology        Family History   Problem Relation Age of Onset    Ulcerative colitis Mother     Prostate cancer Father     Melanoma Sister     Heart disease Brother     Prostate cancer Brother         Social History     Socioeconomic History    Marital status:      Spouse name: Not on file    Number of children: 3    Years of education: Not on file    Highest education level: Not on file   Occupational History    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   Tobacco Use    Smoking status: Current Some Day Smoker     Packs/day: 0 50     Start date: 65     Last attempt to quit: 2021     Years since quittin 1    Smokeless tobacco: Never Used    Tobacco comment: recently stop smoking , pt stated she smoke occ  1-2 cigg some days 2022  Vaping Use    Vaping Use: Never used   Substance and Sexual Activity    Alcohol use: Never    Drug use: Never    Sexual activity: Not Currently   Other Topics Concern    Not on file   Social History Narrative    Not on file     Social Determinants of Health     Financial Resource Strain: Low Risk     Difficulty of Paying Living Expenses: Not hard at all   Food Insecurity: No Food Insecurity    Worried About Running Out of Food in the Last Year: Never true    [address] in the Last Year: Never true   Transportation Needs: No Transportation Needs    Lack of Transportation (Medical): No    Lack of Transportation (Non-Medical):  No   Physical Activity: Insufficiently Active    Days of Exercise per Week: 4 days    Minutes of Exercise per Session: 20 min   Stress: No Stress Concern Present    Feeling of Stress : Not at all   Social Connections: Socially Isolated    Frequency of Communication with Friends and Family: More than three times a week    Frequency of Social Gatherings with Friends and Family: More than three times a week    Attends Mandaen Services: Never    Active Member of Clubs or Organizations: No    Attends Club or Organization Meetings: Never    Marital Status:    Intimate Partner Violence: Not At Risk    Fear of Current or Ex-Partner: No    Emotionally Abused: No    Physically Abused: No    Sexually Abused: No   Housing Stability: Unknown    Unable to Pay for Housing in the Last Year: No    Number of [place] in the Last Year: Not on file    Unstable Housing in the Last Year: No        Physical Exam:     /78 (BP Location: Right arm, Patient Position: Sitting, Cuff Size: Standard)   Pulse 64   Temp (!) 96 7 °F (35 9 °C) (Tympanic)   Resp 18   Ht 5' 2" (1 575 m)   Wt 62 6 kg (138 lb)   SpO2 97%   BMI 25 24 kg/m²     Physical Exam    Operative site has been examined and clear of skin infection and inflammation  Assessment & Recommendations:     Preop clearance  Patient has a history of abdominal surgery without complication the anesthesia    Patient is currently optimized for surgery as detailed above       Neck mass  Anterior right-sided neck mass appreciated on palpation without tenderness  - thyroid ultrasound ordered

## 2025-04-21 ENCOUNTER — TELEPHONE (OUTPATIENT)
Age: 70
End: 2025-04-21

## 2025-04-22 NOTE — TELEPHONE ENCOUNTER
Fer Corea,    Patient called back she would like to discuss her lipid panel with you. I did read your note verbatim to her as well.    Please advise.    Patient can be contacted back at:  [phone]

## 2025-04-23 ENCOUNTER — RESULTS FOLLOW-UP (OUTPATIENT)
Age: 70
End: 2025-04-23

## 2025-04-23 DIAGNOSIS — E78.2 MIXED HYPERLIPIDEMIA: Primary | ICD-10-CM

## 2025-04-23 RX ORDER — ATORVASTATIN CALCIUM 20 MG/1
20 TABLET, FILM COATED ORAL EVERY EVENING
Qty: 30 TABLET | Refills: 2 | Status: SHIPPED | OUTPATIENT
Start: 2025-04-23 | End: 2025-07-22

## 2025-04-24 ENCOUNTER — TELEPHONE (OUTPATIENT)
Age: 70
End: 2025-04-24

## 2025-04-24 NOTE — TELEPHONE ENCOUNTER
Dr. Corea,    Patient called this morning returning your call, she stated that she is already on Lipitor 20mg from her Cardiologist. She is requesting a call back to discuss. Please advise.    Thank you.    Patient can be contacted back at:  [phone]

## 2025-04-25 DIAGNOSIS — R06.02 SOB (SHORTNESS OF BREATH): ICD-10-CM

## 2025-04-25 RX ORDER — ALBUTEROL SULFATE 90 UG/1
INHALANT RESPIRATORY (INHALATION)
Qty: 6.7 G | Refills: 5 | Status: SHIPPED | OUTPATIENT
Start: 2025-04-25

## 2025-04-30 ENCOUNTER — OFFICE VISIT (OUTPATIENT)
Age: 70
End: 2025-04-30

## 2025-04-30 VITALS
TEMPERATURE: 98 F | BODY MASS INDEX: 31.28 KG/M2 | SYSTOLIC BLOOD PRESSURE: 110 MMHG | HEIGHT: 62 IN | WEIGHT: 170 LBS | OXYGEN SATURATION: 96 % | RESPIRATION RATE: 16 BRPM | HEART RATE: 80 BPM | DIASTOLIC BLOOD PRESSURE: 75 MMHG

## 2025-04-30 DIAGNOSIS — A08.4 VIRAL GASTROENTERITIS: Primary | ICD-10-CM

## 2025-04-30 PROCEDURE — 99213 OFFICE O/P EST LOW 20 MIN: CPT | Performed by: FAMILY MEDICINE

## 2025-04-30 NOTE — LETTER
April 30, 2025     Patient: Ana Maria Murphy  YOB: 1955  Date of Visit: 4/30/2025      To Whom it May Concern:    Ana Maria Murphy is under my professional care. Ana Maria was seen in my office on 4/30/2025. Ana Maria may return to work on Monday 05/05/2025 . Please excuse her absence starting from 04/29/2025.     If you have any questions or concerns, please don't hesitate to call.         Sincerely,              Shalini Sheffield, DO

## 2025-04-30 NOTE — PROGRESS NOTES
Name: Ana Maria Murphy      : 1955      MRN: [id number]  Encounter Provider: Shalini Sheffield DO  Encounter Date: 2025   Encounter department: [facility] PRACT[facility]  :  Assessment & Plan  Viral gastroenteritis  Is the most likely etiology given the patient's history and physical   Afebrile, normotensive and normal heart rate    Advised the patient to stay well hydrated  Discussed BRAT diet and continuing liquid diet, advancing to solid foods as tolerated   Refrain from dairy products for about 1 week   Educated about good hand hygiene  Can take Tylenol PRN for fevers, ibuprofen PRN for headaches/body aches   If taking Ibuprofen, take with food   Advised the patient to report to the emergency department or return to the office if cannot maintain adequate p.o. intake, uncontrolled fever, notice blood in the stool, or cannot pass bowel movements  Work note provided to patient during this visit           History of Present Illness {?Quick Links Encounters * My Last Note * Last Note in Specialty * Snapshot * Since Last Visit * History :37136}  Ana Maria Murphy is a 69 y.o. female who presents for nausea, vomiting, and diarrhea since  night. States that she has been spending more time with her downstairs neighbor lately after she lost her . Per patient, her neighbor had similar symptoms last week, is doing better now.   Patient states vomiting and diarrhea have resolved. She reports very low energy and still feeling nauseous, only able to eat crackers and drink water/ginger ale    She just started a new job as a  on Monday, but had to miss work yesterday due to symptoms. Needs a work note.     Nausea  Associated symptoms include abdominal pain, fatigue, nausea and vomiting (now resolved). Pertinent negatives include no arthralgias, chills, congestion, coughing, fever, headaches, myalgias, rash or sore throat.     Review of Systems   Constitutional:   "Positive for fatigue. Negative for chills and fever.   HENT:  Negative for congestion and sore throat.    Respiratory:  Negative for cough.    Gastrointestinal:  Positive for abdominal pain, diarrhea (now resolved), nausea and vomiting (now resolved).   Genitourinary:  Negative for difficulty urinating.   Musculoskeletal:  Negative for arthralgias and myalgias.   Skin:  Negative for color change and rash.   Neurological:  Negative for dizziness and headaches.   All other systems reviewed and are negative.      Objective {?Quick Links Trend Vitals * Enter New Vitals * Results Review * Timeline (Adult) * Labs * Imaging * Cardiology * Procedures * Lung Cancer Screening * Surgical eConsent :93839}  /75 (BP Location: Left arm, Patient Position: Sitting, Cuff Size: Large)   Pulse 80   Temp 98 °F (36.7 °C) (Tympanic)   Resp 16   Ht 5' 2\" (1.575 m)   Wt 77.1 kg (170 lb)   SpO2 96%   BMI 31.09 kg/m²      Physical Exam  Vitals reviewed.   Constitutional:       General: She is not in acute distress.     Appearance: She is obese.   HENT:      Head: Normocephalic and atraumatic.      Right Ear: Tympanic membrane, ear canal and external ear normal.      Left Ear: Tympanic membrane, ear canal and external ear normal.      Nose: Nose normal.      Mouth/Throat:      Mouth: Mucous membranes are moist.   Eyes:      General: No scleral icterus.     Extraocular Movements: Extraocular movements intact.      Conjunctiva/sclera: Conjunctivae normal.   Cardiovascular:      Rate and Rhythm: Normal rate and regular rhythm.      Heart sounds: Normal heart sounds. No murmur heard.  Pulmonary:      Effort: Pulmonary effort is normal. No respiratory distress.      Breath sounds: Normal breath sounds.   Abdominal:      General: Bowel sounds are normal.      Palpations: Abdomen is soft.      Tenderness: There is abdominal tenderness (mild tenderness to palpation) in the left lower quadrant.   Musculoskeletal:         General: Normal " range of motion.      Cervical back: Normal range of motion.      Right lower leg: No edema.      Left lower leg: No edema.   Skin:     General: Skin is warm.   Neurological:      General: No focal deficit present.      Mental Status: She is alert and oriented to person, place, and time.   Psychiatric:         Mood and Affect: Mood normal.         Behavior: Behavior normal.

## 2025-06-06 DIAGNOSIS — F41.9 ANXIETY: Chronic | ICD-10-CM

## 2025-06-06 DIAGNOSIS — M85.80 OSTEOPENIA WITH HIGH RISK OF FRACTURE: ICD-10-CM

## 2025-06-06 PROBLEM — R73.03 PREDIABETES: Status: ACTIVE | Noted: 2025-06-06

## 2025-06-06 RX ORDER — SERTRALINE HYDROCHLORIDE 25 MG/1
25 TABLET, FILM COATED ORAL DAILY
Qty: 90 TABLET | Refills: 1 | Status: SHIPPED | OUTPATIENT
Start: 2025-06-06

## 2025-06-06 RX ORDER — BUPROPION HYDROCHLORIDE 150 MG/1
150 TABLET, EXTENDED RELEASE ORAL 2 TIMES DAILY
Qty: 180 TABLET | Refills: 0 | Status: SHIPPED | OUTPATIENT
Start: 2025-06-06

## 2025-06-06 RX ORDER — ALENDRONATE SODIUM 70 MG/1
70 TABLET ORAL
Qty: 12 TABLET | Refills: 1 | Status: SHIPPED | OUTPATIENT
Start: 2025-06-06

## 2025-06-06 NOTE — PROGRESS NOTES
Name: Ana Maria Murphy      : 1955      MRN: [id number]  Encounter Provider: Jerry Davis MD  Encounter Date: 2025   Encounter department: [facility] PRACTICE  :  Assessment & Plan  Rash  Patient has rash on both arm on sun exposed area. History of chemo and radiation due to ampulla of vater. Most likely due to sun exposure reactive rash due to thinning of skin 2/2 chemo and radiation.    Will try hydrocortisone 1% cream BID for 7 days.  Patient was advised to call back if rash does not improve with 1% hydrocortisone. Will increase hydrocortisone potency to 2.5% then.  Orders:  •  hydrocortisone 1 % cream; Apply topically 2 (two) times a day    Prediabetes  History of prediabetes.    Will recheck HbA1c  Orders:  •  Hemoglobin A1C; Future           History of Present Illness {?Quick Links Encounters * My Last Note * Last Note in Specialty * Snapshot * Since Last Visit * History :10950}  Rash  This is a recurrent problem. Episode onset: 3 months. The problem has been waxing and waning since onset. The affected locations include the right arm and left arm. The problem is moderate. The rash is characterized by dryness and redness. She was exposed to nothing. The rash first occurred at another residence. Pertinent negatives include no anorexia, congestion, cough, decreased physical activity, decreased responsiveness, decreased sleep, drinking less, diarrhea, facial edema, fatigue, fever, itching, joint pain, rhinorrhea, shortness of breath, sore throat or vomiting. Past treatments include moisturizer. The treatment provided no relief. There is no history of allergies, asthma, eczema or varicella. There were no sick contacts.     Review of Systems   Constitutional:  Negative for chills, decreased responsiveness, fatigue and fever.   HENT:  Negative for congestion, ear pain, rhinorrhea and sore throat.    Eyes:  Negative for pain and visual disturbance.   Respiratory:  Negative  "for cough and shortness of breath.    Cardiovascular:  Negative for chest pain and palpitations.   Gastrointestinal:  Negative for abdominal pain, anorexia, constipation, diarrhea, nausea and vomiting.   Genitourinary:  Negative for dysuria and hematuria.   Musculoskeletal:  Negative for arthralgias, back pain, joint pain, neck pain and neck stiffness.   Skin:  Positive for rash. Negative for color change and itching.   Neurological:  Negative for dizziness, weakness and headaches.   Psychiatric/Behavioral:  Negative for agitation and confusion. The patient is not nervous/anxious.    All other systems reviewed and are negative.      Objective {?Quick Links Trend Vitals * Enter New Vitals * Results Review * Timeline (Adult) * Labs * Imaging * Cardiology * Procedures * Lung Cancer Screening * Surgical eConsent :93932}  /76 (BP Location: Left arm, Patient Position: Sitting, Cuff Size: Standard)   Pulse 63   Temp 97.6 °F (36.4 °C) (Tympanic)   Ht 5' 2\" (1.575 m)   Wt 75.3 kg (166 lb)   SpO2 97%   BMI 30.36 kg/m²      Physical Exam  Vitals and nursing note reviewed.   Constitutional:       General: She is not in acute distress.     Appearance: Normal appearance. She is well-developed. She is obese. She is not ill-appearing.   HENT:      Head: Normocephalic and atraumatic.      Right Ear: External ear normal.      Left Ear: External ear normal.      Nose: Nose normal. No congestion or rhinorrhea.      Mouth/Throat:      Mouth: Mucous membranes are moist.      Pharynx: Oropharynx is clear. No oropharyngeal exudate or posterior oropharyngeal erythema.     Eyes:      General:         Right eye: No discharge.         Left eye: No discharge.      Conjunctiva/sclera: Conjunctivae normal.       Cardiovascular:      Rate and Rhythm: Normal rate and regular rhythm.      Pulses: Normal pulses.      Heart sounds: Normal heart sounds. No murmur heard.     No friction rub. No gallop.   Pulmonary:      Effort: Pulmonary " effort is normal. No respiratory distress.      Breath sounds: Normal breath sounds. No stridor. No wheezing, rhonchi or rales.   Chest:      Chest wall: No tenderness.   Abdominal:      General: Abdomen is flat. Bowel sounds are normal. There is no distension.      Palpations: Abdomen is soft.      Tenderness: There is no abdominal tenderness. There is no guarding.     Musculoskeletal:         General: No swelling, tenderness, deformity or signs of injury. Normal range of motion.      Cervical back: Normal range of motion and neck supple. No rigidity or tenderness.      Right lower leg: No edema.      Left lower leg: No edema.   Lymphadenopathy:      Cervical: No cervical adenopathy.     Skin:     General: Skin is warm and dry.      Capillary Refill: Capillary refill takes less than 2 seconds.      Findings: Erythema and rash (on bilateral upper arm sun exposed area, no signs of bug bites) present. No bruising or lesion.     Neurological:      General: No focal deficit present.      Mental Status: She is alert and oriented to person, place, and time. Mental status is at baseline.      Motor: No weakness.      Gait: Gait normal.      Deep Tendon Reflexes: Reflexes normal.     Psychiatric:         Mood and Affect: Mood normal.         Behavior: Behavior normal.         Thought Content: Thought content normal.

## 2025-06-09 ENCOUNTER — LAB (OUTPATIENT)
Dept: LAB | Facility: CLINIC | Age: 70
End: 2025-06-09
Payer: COMMERCIAL

## 2025-06-09 ENCOUNTER — OFFICE VISIT (OUTPATIENT)
Age: 70
End: 2025-06-09

## 2025-06-09 VITALS
SYSTOLIC BLOOD PRESSURE: 131 MMHG | BODY MASS INDEX: 30.55 KG/M2 | HEART RATE: 63 BPM | HEIGHT: 62 IN | DIASTOLIC BLOOD PRESSURE: 76 MMHG | TEMPERATURE: 97.6 F | OXYGEN SATURATION: 97 % | WEIGHT: 166 LBS

## 2025-06-09 DIAGNOSIS — R73.03 PREDIABETES: ICD-10-CM

## 2025-06-09 DIAGNOSIS — R21 RASH: Primary | ICD-10-CM

## 2025-06-09 LAB
EST. AVERAGE GLUCOSE BLD GHB EST-MCNC: 154 MG/DL
HBA1C MFR BLD: 7 %

## 2025-06-09 PROCEDURE — 36415 COLL VENOUS BLD VENIPUNCTURE: CPT

## 2025-06-09 PROCEDURE — 99213 OFFICE O/P EST LOW 20 MIN: CPT | Performed by: FAMILY MEDICINE

## 2025-06-09 PROCEDURE — 83036 HEMOGLOBIN GLYCOSYLATED A1C: CPT

## 2025-06-09 RX ORDER — BENZOCAINE/MENTHOL 6 MG-10 MG
LOZENGE MUCOUS MEMBRANE 2 TIMES DAILY
Qty: 28 G | Refills: 2 | Status: SHIPPED | OUTPATIENT
Start: 2025-06-09

## 2025-06-10 ENCOUNTER — RESULTS FOLLOW-UP (OUTPATIENT)
Age: 70
End: 2025-06-10

## 2025-06-10 ENCOUNTER — TELEPHONE (OUTPATIENT)
Age: 70
End: 2025-06-10

## 2025-06-10 DIAGNOSIS — E11.9 TYPE 2 DIABETES MELLITUS WITHOUT COMPLICATION, WITHOUT LONG-TERM CURRENT USE OF INSULIN (HCC): Primary | ICD-10-CM

## 2025-06-10 RX ORDER — METFORMIN HYDROCHLORIDE 500 MG/1
500 TABLET, EXTENDED RELEASE ORAL
Qty: 90 TABLET | Refills: 2 | Status: SHIPPED | OUTPATIENT
Start: 2025-06-10 | End: 2025-06-20 | Stop reason: ALTCHOICE

## 2025-06-10 NOTE — RESULT ENCOUNTER NOTE
Pt was called to notify that her HbA1c is 7.0 which she is now diabetic. Patient was advised to start taking metformin 500mg daily and start doing diabetic diet. Patient was advised to make 3 months DM follow up appointment. Pt acknowledged her understanding and answered all her questions.

## 2025-06-10 NOTE — TELEPHONE ENCOUNTER
----- Message from Jerry Davis MD sent at 6/10/2025  9:41 AM EDT -----  Please make 3 months DM follow up visit for this patient. Thank you

## 2025-06-11 DIAGNOSIS — J45.20 MILD INTERMITTENT ASTHMA, UNSPECIFIED WHETHER COMPLICATED: ICD-10-CM

## 2025-06-11 RX ORDER — FLUTICASONE PROPIONATE AND SALMETEROL 250; 50 UG/1; UG/1
POWDER RESPIRATORY (INHALATION)
Qty: 60 BLISTER | Refills: 2 | Status: SHIPPED | OUTPATIENT
Start: 2025-06-11

## 2025-06-11 NOTE — TELEPHONE ENCOUNTER
Reason for call:   [x] Refill   [] Prior Auth  [] Other:     Office:   [] PCP/Provider -   [x] Specialty/Provider - pulm/ ANDRES Wilson     Medication: Fluticasone-Salmeterol (Wixela Inhub) 250-50 mcg/dose inhaler     Dose/Frequency: Rinse mouth after use     Quantity: 60    Pharmacy: [facility]/pharmacy #[id number] [address]   Does the patient have enough for 3 days?   [x] Yes   [] No - Send as HP to POD    Mail Away Pharmacy   Does the patient have enough for 10 days?   [] Yes   [] No - Send as HP to POD

## 2025-06-18 ENCOUNTER — TELEPHONE (OUTPATIENT)
Age: 70
End: 2025-06-18

## 2025-06-18 NOTE — TELEPHONE ENCOUNTER
Patient called to say that the metformin she is now on is making her very sick. Stomach pains, throwing up, diarrhea, etc.  Please advise.

## 2025-06-18 NOTE — PROGRESS NOTES
Name: Ana Maria Murphy      : 1955      MRN: [id number]  Encounter Provider: Jerry Davis MD  Encounter Date: 2025   Encounter department: [facility]  :  Assessment & Plan  Type 2 diabetes mellitus without complication, without long-term current use of insulin ([facility])    Lab Results   Component Value Date    HGBA1C 7.0 (H) 2025     Newly diagnosed Diabetic since 2 weeks ago. Patient was started on metformin  mg daily, but Patient could not tolerate metformin due to nausea and vomiting.    -Will switch metformin to glipizide XL 2.5mg daily and follow up in 3 months to recheck HbA1c.  -Patient was advised to call back if Patient starts to have dizziness with glipizide.  -Patient was also advised to carry some small candy or chocolate to have. Just in case she has low blood sugar and has dizziness.  -DM foot exam showed diminished vibration sensation, absent microfilament sensation and diminished pulse on bilateral feet. Possibly 2/2 side effects of previous chemotherapy vs diabetic neuropathy vs PAD.  -will refer her to Podiatry for complete foot exam and possible specialized foot wear, and will also obtain bilateral LE VAS arterial doppler to rule out PAD.  -IRIS DM eye exam image sent  -ordered Urine albumin/creatinine ratio test       Orders:  •  IRIS Diabetic eye exam  •  Ambulatory Referral to Podiatry; Future  •  glipiZIDE (GLUCOTROL XL) 2.5 mg 24 hr tablet; Take 1 tablet (2.5 mg total) by mouth daily  •  Albumin / creatinine urine ratio; Future    Loss of protective sensation of skin of foot    Orders:  •  VAS ARTERIAL DUPLEX- LOWER LIMB BILATERAL; Future  •  Ambulatory Referral to Podiatry; Future    Weak pulse    Orders:  •  VAS ARTERIAL DUPLEX- LOWER LIMB BILATERAL; Future  •  Ambulatory Referral to Podiatry; Future           History of Present Illness {?Quick Links Encounters * My Last Note * Last Note in Specialty * Snapshot * Since Last Visit  "* History :64976}  Patient was recently diagnosed with DM2 2 weeks ago and started on metformin 500mg daily. Patient could not tolerate metformin due to nausea and vomiting.        Review of Systems   Constitutional:  Negative for chills and fever.   HENT:  Negative for ear pain and sore throat.    Eyes:  Negative for pain and visual disturbance.   Respiratory:  Negative for cough and shortness of breath.    Cardiovascular:  Negative for chest pain and palpitations.   Gastrointestinal:  Positive for nausea (when taking metformin) and vomiting (when taking metformin). Negative for abdominal pain, constipation and diarrhea.   Genitourinary:  Negative for dysuria and hematuria.   Musculoskeletal:  Negative for arthralgias, back pain, neck pain and neck stiffness.   Skin:  Negative for color change and rash.   Neurological:  Negative for dizziness, weakness and headaches.   Psychiatric/Behavioral:  Negative for agitation and confusion. The patient is not nervous/anxious.    All other systems reviewed and are negative.      Objective {?Quick Links Trend Vitals * Enter New Vitals * Results Review * Timeline (Adult) * Labs * Imaging * Cardiology * Procedures * Lung Cancer Screening * Surgical eConsent :93169}  /63 (BP Location: Left arm, Patient Position: Sitting, Cuff Size: Standard)   Pulse 56   Temp 98.3 °F (36.8 °C) (Tympanic)   Ht 5' 2\" (1.575 m)   Wt 73.5 kg (162 lb)   SpO2 98%   BMI 29.63 kg/m²   Patient's shoes and socks removed.    Right Foot/Ankle   Right Foot Inspection  Skin Exam: skin normal and skin intact. No dry skin, no warmth, no callus, no erythema, no maceration, no abnormal color, no pre-ulcer, no ulcer and no callus.     Toe Exam: ROM and strength within normal limits. No swelling, no tenderness, erythema and  no right toe deformity    Sensory   Vibration: diminished  Proprioception: intact  Monofilament testing: absent    Vascular  Capillary refills: < 3 seconds  The right DP pulse is 1+. " The right PT pulse is 1+.     Left Foot/Ankle  Left Foot Inspection  Skin Exam: skin normal and skin intact. No dry skin, no warmth, no erythema, no maceration, normal color, no pre-ulcer, no ulcer and no callus.     Toe Exam: ROM and strength within normal limits. No swelling, no tenderness, no erythema and no left toe deformity.     Sensory   Vibration: diminished  Proprioception: intact  Monofilament testing: absent    Vascular  Capillary refills: < 3 seconds  The left DP pulse is 1+. The left PT pulse is 1+.     Assign Risk Category  No deformity present  Loss of protective sensation  Weak pulses  Risk: 2      Physical Exam  Vitals and nursing note reviewed.   Constitutional:       General: She is not in acute distress.     Appearance: Normal appearance. She is well-developed. She is not ill-appearing.   HENT:      Head: Normocephalic and atraumatic.      Right Ear: External ear normal.      Left Ear: External ear normal.      Nose: Nose normal. No congestion or rhinorrhea.      Mouth/Throat:      Mouth: Mucous membranes are moist.      Pharynx: Oropharynx is clear. No oropharyngeal exudate or posterior oropharyngeal erythema.     Eyes:      General:         Right eye: No discharge.         Left eye: No discharge.      Conjunctiva/sclera: Conjunctivae normal.       Cardiovascular:      Rate and Rhythm: Normal rate and regular rhythm.      Pulses: Pulses are weak.           Dorsalis pedis pulses are 1+ on the right side and 1+ on the left side.        Posterior tibial pulses are 1+ on the right side and 1+ on the left side.      Heart sounds: Normal heart sounds. No murmur heard.     No friction rub. No gallop.   Pulmonary:      Effort: Pulmonary effort is normal. No respiratory distress.      Breath sounds: Normal breath sounds. No stridor. No wheezing, rhonchi or rales.   Chest:      Chest wall: No tenderness.   Abdominal:      General: Abdomen is flat. Bowel sounds are normal. There is no distension.       Palpations: Abdomen is soft.      Tenderness: There is no abdominal tenderness. There is no guarding.     Musculoskeletal:         General: No swelling, tenderness, deformity or signs of injury. Normal range of motion.      Cervical back: Normal range of motion and neck supple. No rigidity or tenderness.      Right lower leg: No edema.      Left lower leg: No edema.   Feet:      Right foot:      Skin integrity: No ulcer, skin breakdown, erythema, warmth, callus or dry skin.      Left foot:      Skin integrity: No ulcer, skin breakdown, erythema, warmth, callus or dry skin.   Lymphadenopathy:      Cervical: No cervical adenopathy.     Skin:     General: Skin is warm and dry.      Capillary Refill: Capillary refill takes less than 2 seconds.      Findings: No bruising, erythema, lesion or rash.     Neurological:      General: No focal deficit present.      Mental Status: She is alert and oriented to person, place, and time. Mental status is at baseline.      Motor: No weakness.      Gait: Gait normal.      Deep Tendon Reflexes: Reflexes normal.     Psychiatric:         Mood and Affect: Mood normal.         Behavior: Behavior normal.         Thought Content: Thought content normal.

## 2025-06-18 NOTE — ASSESSMENT & PLAN NOTE
Lab Results   Component Value Date    HGBA1C 7.0 (H) 06/09/2025     Newly diagnosed Diabetic since 2 weeks ago. Patient was started on metformin  mg daily, but Patient could not tolerate metformin due to nausea and vomiting.    -Will switch metformin to glipizide XL 2.5mg daily and follow up in 3 months to recheck HbA1c.  -Patient was advised to call back if Patient starts to have dizziness with glipizide.  -Patient was also advised to carry some small candy or chocolate to have. Just in case she has low blood sugar and has dizziness.  -DM foot exam showed diminished vibration sensation, absent microfilament sensation and diminished pulse on bilateral feet. Possibly 2/2 side effects of previous chemotherapy vs diabetic neuropathy vs PAD.  -will refer her to Podiatry for complete foot exam and possible specialized foot wear, and will also obtain bilateral LE VAS arterial doppler to rule out PAD.  -IRIS DM eye exam image sent  -ordered Urine albumin/creatinine ratio test       Orders:  •  IRIS Diabetic eye exam  •  Ambulatory Referral to Podiatry; Future  •  glipiZIDE (GLUCOTROL XL) 2.5 mg 24 hr tablet; Take 1 tablet (2.5 mg total) by mouth daily  •  Albumin / creatinine urine ratio; Future

## 2025-06-20 ENCOUNTER — OFFICE VISIT (OUTPATIENT)
Age: 70
End: 2025-06-20

## 2025-06-20 ENCOUNTER — LAB (OUTPATIENT)
Dept: LAB | Facility: CLINIC | Age: 70
End: 2025-06-20
Payer: COMMERCIAL

## 2025-06-20 VITALS
HEIGHT: 62 IN | HEART RATE: 56 BPM | DIASTOLIC BLOOD PRESSURE: 63 MMHG | TEMPERATURE: 98.3 F | OXYGEN SATURATION: 98 % | BODY MASS INDEX: 29.81 KG/M2 | SYSTOLIC BLOOD PRESSURE: 105 MMHG | WEIGHT: 162 LBS

## 2025-06-20 DIAGNOSIS — R09.89 WEAK PULSE: ICD-10-CM

## 2025-06-20 DIAGNOSIS — R20.8 LOSS OF PROTECTIVE SENSATION OF SKIN OF FOOT: ICD-10-CM

## 2025-06-20 DIAGNOSIS — E11.9 TYPE 2 DIABETES MELLITUS WITHOUT COMPLICATION, WITHOUT LONG-TERM CURRENT USE OF INSULIN (HCC): Primary | ICD-10-CM

## 2025-06-20 DIAGNOSIS — E11.9 TYPE 2 DIABETES MELLITUS WITHOUT COMPLICATION, WITHOUT LONG-TERM CURRENT USE OF INSULIN (HCC): ICD-10-CM

## 2025-06-20 LAB
CREAT UR-MCNC: 91.4 MG/DL
LEFT EYE DIABETIC RETINOPATHY: NORMAL
LEFT EYE IMAGE QUALITY: NORMAL
LEFT EYE MACULAR EDEMA: NORMAL
LEFT EYE OTHER RETINOPATHY: NORMAL
MICROALBUMIN UR-MCNC: <7 MG/L
RIGHT EYE DIABETIC RETINOPATHY: NORMAL
RIGHT EYE IMAGE QUALITY: NORMAL
RIGHT EYE MACULAR EDEMA: NORMAL
RIGHT EYE OTHER RETINOPATHY: NORMAL
SEVERITY (EYE EXAM): NORMAL

## 2025-06-20 PROCEDURE — 82043 UR ALBUMIN QUANTITATIVE: CPT

## 2025-06-20 PROCEDURE — 99213 OFFICE O/P EST LOW 20 MIN: CPT | Performed by: FAMILY MEDICINE

## 2025-06-20 PROCEDURE — 82570 ASSAY OF URINE CREATININE: CPT

## 2025-06-20 RX ORDER — GLIPIZIDE 2.5 MG/1
2.5 TABLET, EXTENDED RELEASE ORAL DAILY
Qty: 100 TABLET | Refills: 0 | Status: SHIPPED | OUTPATIENT
Start: 2025-06-20

## 2025-06-21 ENCOUNTER — RESULTS FOLLOW-UP (OUTPATIENT)
Age: 70
End: 2025-06-21

## 2025-06-21 NOTE — RESULT ENCOUNTER NOTE
Pt was called to notify that her urine Albumin/creatinine ratio is normal and her DM IRIS eye exam is normal as well. Pt acknowledged her understanding and answered all her questions.

## 2025-06-24 ENCOUNTER — HOSPITAL ENCOUNTER (OUTPATIENT)
Dept: RADIOLOGY | Facility: HOSPITAL | Age: 70
Discharge: HOME | End: 2025-06-24
Attending: PHYSICIAN ASSISTANT
Payer: COMMERCIAL

## 2025-06-24 DIAGNOSIS — Z15.09 BRCA POSITIVE: ICD-10-CM

## 2025-06-24 DIAGNOSIS — Z15.01 BRCA POSITIVE: ICD-10-CM

## 2025-06-24 PROCEDURE — A9585 GADOBUTROL INJECTION: HCPCS | Performed by: PHYSICIAN ASSISTANT

## 2025-06-24 PROCEDURE — C8908 MRI W/O FOL W/CONT, BREAST,: HCPCS

## 2025-06-24 PROCEDURE — C8937 CAD BREAST MRI: HCPCS

## 2025-06-24 RX ORDER — GADOBUTROL 604.72 MG/ML
7 INJECTION INTRAVENOUS
Status: COMPLETED | OUTPATIENT
Start: 2025-06-24 | End: 2025-06-24

## 2025-06-24 RX ADMIN — GADOBUTROL 7 ML: 604.72 INJECTION INTRAVENOUS at 14:53

## 2025-06-25 ENCOUNTER — RESULTS FOLLOW-UP (OUTPATIENT)
Dept: HEMATOLOGY ONCOLOGY | Facility: MEDICAL CENTER | Age: 70
End: 2025-06-25

## 2025-07-16 ENCOUNTER — TELEPHONE (OUTPATIENT)
Age: 70
End: 2025-07-16

## 2025-07-16 NOTE — TELEPHONE ENCOUNTER
Caller: Patient    Doctor: Dr. Guadalupe    Reason for call:     Patient received a call from the office, I checked the messages but did not see any.  Did the office need to speak with her?  Please advise..    Call back#: [phone]

## 2025-07-22 ENCOUNTER — HOSPITAL ENCOUNTER (OUTPATIENT)
Dept: RADIOLOGY | Facility: HOSPITAL | Age: 70
Discharge: HOME | End: 2025-07-22
Payer: COMMERCIAL

## 2025-07-22 DIAGNOSIS — R20.8 LOSS OF PROTECTIVE SENSATION OF SKIN OF FOOT: ICD-10-CM

## 2025-07-22 DIAGNOSIS — R09.89 WEAK PULSE: ICD-10-CM

## 2025-07-22 PROCEDURE — 93923 UPR/LXTR ART STDY 3+ LVLS: CPT

## 2025-07-22 PROCEDURE — 93922 UPR/L XTREMITY ART 2 LEVELS: CPT | Performed by: SURGERY

## 2025-07-22 PROCEDURE — 93925 LOWER EXTREMITY STUDY: CPT

## 2025-07-22 PROCEDURE — 93925 LOWER EXTREMITY STUDY: CPT | Performed by: SURGERY

## 2025-08-01 DIAGNOSIS — F41.9 ANXIETY: Chronic | ICD-10-CM

## 2025-08-01 RX ORDER — SERTRALINE HYDROCHLORIDE 25 MG/1
25 TABLET, FILM COATED ORAL DAILY
Qty: 90 TABLET | Refills: 1 | Status: SHIPPED | OUTPATIENT
Start: 2025-08-01

## 2025-08-05 ENCOUNTER — APPOINTMENT (OUTPATIENT)
Dept: LAB | Facility: CLINIC | Age: 70
End: 2025-08-05
Attending: PHYSICIAN ASSISTANT
Payer: COMMERCIAL

## 2025-08-05 DIAGNOSIS — Z85.09 HISTORY OF MALIGNANT NEOPLASM OF AMPULLA OF VATER: ICD-10-CM

## 2025-08-05 LAB
ALBUMIN SERPL BCG-MCNC: 4 G/DL (ref 3.5–5)
ALP SERPL-CCNC: 72 U/L (ref 34–104)
ALT SERPL W P-5'-P-CCNC: 19 U/L (ref 7–52)
ANION GAP SERPL CALCULATED.3IONS-SCNC: 12 MMOL/L (ref 4–13)
AST SERPL W P-5'-P-CCNC: 20 U/L (ref 13–39)
BASOPHILS # BLD AUTO: 0.05 THOUSANDS/ÂΜL (ref 0–0.1)
BASOPHILS NFR BLD AUTO: 1 % (ref 0–1)
BILIRUB SERPL-MCNC: 0.48 MG/DL (ref 0.2–1)
BUN SERPL-MCNC: 24 MG/DL (ref 5–25)
CALCIUM SERPL-MCNC: 10 MG/DL (ref 8.4–10.2)
CHLORIDE SERPL-SCNC: 100 MMOL/L (ref 96–108)
CO2 SERPL-SCNC: 30 MMOL/L (ref 21–32)
CREAT SERPL-MCNC: 0.73 MG/DL (ref 0.6–1.3)
EOSINOPHIL # BLD AUTO: 0.1 THOUSAND/ÂΜL (ref 0–0.61)
EOSINOPHIL NFR BLD AUTO: 2 % (ref 0–6)
ERYTHROCYTE [DISTWIDTH] IN BLOOD BY AUTOMATED COUNT: 12.8 % (ref 11.6–15.1)
GFR SERPL CREATININE-BSD FRML MDRD: 84 ML/MIN/1.73SQ M
GLUCOSE P FAST SERPL-MCNC: 112 MG/DL (ref 65–99)
HCT VFR BLD AUTO: 38.4 % (ref 34.8–46.1)
HGB BLD-MCNC: 12.7 G/DL (ref 11.5–15.4)
IMM GRANULOCYTES # BLD AUTO: 0.01 THOUSAND/UL (ref 0–0.2)
IMM GRANULOCYTES NFR BLD AUTO: 0 % (ref 0–2)
LYMPHOCYTES # BLD AUTO: 1.36 THOUSANDS/ÂΜL (ref 0.6–4.47)
LYMPHOCYTES NFR BLD AUTO: 21 % (ref 14–44)
MCH RBC QN AUTO: 30.8 PG (ref 26.8–34.3)
MCHC RBC AUTO-ENTMCNC: 33.1 G/DL (ref 31.4–37.4)
MCV RBC AUTO: 93 FL (ref 82–98)
MONOCYTES # BLD AUTO: 0.46 THOUSAND/ÂΜL (ref 0.17–1.22)
MONOCYTES NFR BLD AUTO: 7 % (ref 4–12)
NEUTROPHILS # BLD AUTO: 4.39 THOUSANDS/ÂΜL (ref 1.85–7.62)
NEUTS SEG NFR BLD AUTO: 69 % (ref 43–75)
NRBC BLD AUTO-RTO: 0 /100 WBCS
PLATELET # BLD AUTO: 299 THOUSANDS/UL (ref 149–390)
PMV BLD AUTO: 10.2 FL (ref 8.9–12.7)
POTASSIUM SERPL-SCNC: 4.9 MMOL/L (ref 3.5–5.3)
PROT SERPL-MCNC: 7 G/DL (ref 6.4–8.4)
RBC # BLD AUTO: 4.12 MILLION/UL (ref 3.81–5.12)
SODIUM SERPL-SCNC: 142 MMOL/L (ref 135–147)
WBC # BLD AUTO: 6.37 THOUSAND/UL (ref 4.31–10.16)

## 2025-08-05 PROCEDURE — 86301 IMMUNOASSAY TUMOR CA 19-9: CPT

## 2025-08-05 PROCEDURE — 80053 COMPREHEN METABOLIC PANEL: CPT

## 2025-08-05 PROCEDURE — 85025 COMPLETE CBC W/AUTO DIFF WBC: CPT

## 2025-08-05 PROCEDURE — 36415 COLL VENOUS BLD VENIPUNCTURE: CPT

## 2025-08-06 LAB — CANCER AG19-9 SERPL-ACNC: <2 U/ML (ref 0–35)

## 2025-08-18 ENCOUNTER — HOSPITAL ENCOUNTER (OUTPATIENT)
Dept: RADIOLOGY | Facility: HOSPITAL | Age: 70
Discharge: HOME | End: 2025-08-18
Payer: COMMERCIAL

## 2025-08-18 DIAGNOSIS — Z85.09 HISTORY OF MALIGNANT NEOPLASM OF AMPULLA OF VATER: ICD-10-CM

## 2025-08-18 PROCEDURE — 71260 CT THORAX DX C+: CPT

## 2025-08-18 PROCEDURE — 74177 CT ABD & PELVIS W/CONTRAST: CPT

## 2025-08-18 RX ADMIN — IOHEXOL 100 ML: 350 INJECTION, SOLUTION INTRAVENOUS at 07:56

## (undated) DEVICE — PROXIMATE SKIN STAPLERS (35 WIDE) CONTAINS 35 STAINLESS STEEL STAPLES (FIXED HEAD): Brand: PROXIMATE

## (undated) DEVICE — TRAY FOLEY 16FR URIMETER SURESTEP

## (undated) DEVICE — GAUZE SPONGES,USP TYPE VII GAUZE, 12 PLY: Brand: CURITY

## (undated) DEVICE — 1840 FOAM BLOCK NEEDLE COUNTER: Brand: DEVON

## (undated) DEVICE — PAD GROUNDING ADULT

## (undated) DEVICE — TUBING SUCTION 5MM X 12 FT

## (undated) DEVICE — SUT SILK 2-0 18 IN A185H

## (undated) DEVICE — 3M™ STERI-STRIP™ REINFORCED ADHESIVE SKIN CLOSURES, R1547, 1/2 IN X 4 IN (12 MM X 100 MM), 6 STRIPS/ENVELOPE: Brand: 3M™ STERI-STRIP™

## (undated) DEVICE — LIGHTWEIGHT CLIPS AND CLAMPS

## (undated) DEVICE — VESSEL LOOPS X-RAY DETECTABLE: Brand: DEROYAL

## (undated) DEVICE — SURGICEL 4 X 8

## (undated) DEVICE — LIGACLIP MCA MULTIPLE CLIP APPLIERS, 20 MEDIUM CLIPS: Brand: LIGACLIP

## (undated) DEVICE — PLUMEPEN PRO 10FT

## (undated) DEVICE — BETHLEHEM MAJOR GENERAL PACK: Brand: CARDINAL HEALTH

## (undated) DEVICE — 3M™ TEGADERM™ TRANSPARENT FILM DRESSING FRAME STYLE, 1626W, 4 IN X 4-3/4 IN (10 CM X 12 CM), 50/CT 4CT/CASE: Brand: 3M™ TEGADERM™

## (undated) DEVICE — BAG DECANTER

## (undated) DEVICE — GLOVE INDICATOR PI UNDERGLOVE SZ 8 BLUE

## (undated) DEVICE — GAUZE SPONGES,16 PLY: Brand: CURITY

## (undated) DEVICE — INTENDED FOR TISSUE SEPARATION, AND OTHER PROCEDURES THAT REQUIRE A SHARP SURGICAL BLADE TO PUNCTURE OR CUT.: Brand: BARD-PARKER SAFETY BLADES SIZE 11, STERILE

## (undated) DEVICE — INTENDED FOR TISSUE SEPARATION, AND OTHER PROCEDURES THAT REQUIRE A SHARP SURGICAL BLADE TO PUNCTURE OR CUT.: Brand: BARD-PARKER SAFETY BLADES SIZE 10, STERILE

## (undated) DEVICE — Device: Brand: ASAHI SILVERWAY

## (undated) DEVICE — GLOVE SRG BIOGEL ECLIPSE 8

## (undated) DEVICE — SUT VICRYL 4-0 RB-1 27 IN J214H

## (undated) DEVICE — JP CHANNEL DRAIN 19FR, FULL FLUTES: Brand: JACKSON-PRATT

## (undated) DEVICE — MEDI-VAC YANK SUCT HNDL W/TPRD BULBOUS TIP: Brand: CARDINAL HEALTH

## (undated) DEVICE — Device

## (undated) DEVICE — CHLORAPREP HI-LITE 26ML ORANGE

## (undated) DEVICE — STRL BETHLACCESS CATHETER PACK: Brand: CARDINAL HEALTH

## (undated) DEVICE — INTENDED FOR TISSUE SEPARATION, AND OTHER PROCEDURES THAT REQUIRE A SHARP SURGICAL BLADE TO PUNCTURE OR CUT.: Brand: BARD-PARKER SAFETY BLADES SIZE 15, STERILE

## (undated) DEVICE — SUT MONOCRYL 4-0 PS-2 27 IN Y426H

## (undated) DEVICE — NEEDLE PERCUTANEOUS 21G X 4CM

## (undated) DEVICE — 40601 PROLONGED POSITIONING SYSTEM: Brand: 40601 PROLONGED POSITIONING SYSTEM

## (undated) DEVICE — PROBE SINGLE ELECTRODE 15CM NANOKNIFE

## (undated) DEVICE — 3M™ TEGADERM™ TRANSPARENT FILM DRESSING FRAME STYLE, 1628, 6 IN X 8 IN (15 CM X 20 CM), 10/CT 8CT/CASE: Brand: 3M™ TEGADERM™

## (undated) DEVICE — SUT SILK 3-0 18 IN A184H

## (undated) DEVICE — GLIDESHEATH SLENDER STAINLESS STEEL KIT: Brand: GLIDESHEATH SLENDER

## (undated) DEVICE — PROXIMATE LINEAR CUTTER RELOAD, BLUE, 75MM: Brand: PROXIMATE

## (undated) DEVICE — SUT PROLENE 2-0 SH 36 IN 8523H

## (undated) DEVICE — SUT SILK 3-0 SH 30 IN K832H

## (undated) DEVICE — SUT ETHILON 3-0 FSLX 30 IN 1673H

## (undated) DEVICE — POOLE SUCTION HANDLE: Brand: CARDINAL HEALTH

## (undated) DEVICE — SUT VICRYL 2-0 D-SPECIAL 27 IN D8114

## (undated) DEVICE — SUT PROLENE 3-0 SH 36 IN 8522H

## (undated) DEVICE — SUT SILK 2-0 SH 30 IN K833H

## (undated) DEVICE — PACK CUSTOM C V DRAPE SCV11CDSLA

## (undated) DEVICE — SUT PDS PLUS 1 CTX 36IN PDP371T

## (undated) DEVICE — 3000CC GUARDIAN II: Brand: GUARDIAN

## (undated) DEVICE — MANIFOLD KIT NETWORK - CCL

## (undated) DEVICE — SUT SILK 3-0 SH CR/8 18 IN C013D

## (undated) DEVICE — CATH DIAG 5FR IMPULSE 100CM FR3.5

## (undated) DEVICE — GUIDEWIRE .035 260CM 3MM J TIP

## (undated) DEVICE — BLANKET HYPOTHERMIA ADULT GAYMAR

## (undated) DEVICE — VEIN PICK

## (undated) DEVICE — PROXIMATE RELOADABLE LINEAR CUTTER WITH SAFETY LOCK-OUT, 75MM: Brand: PROXIMATE

## (undated) DEVICE — LIGACLIP MCA MULTIPLE CLIP APPLIERS, 20 SMALL CLIPS: Brand: LIGACLIP

## (undated) DEVICE — SUT SILK 2-0 SH CR/8 18 IN C012D

## (undated) DEVICE — RADIFOCUS OPTITORQUE ANGIOGRAPHIC CATHETER: Brand: OPTITORQUE

## (undated) DEVICE — HARMONIC 1100 SHEARS, 20CM SHAFT LENGTH: Brand: HARMONIC

## (undated) DEVICE — 3M™ IOBAN™ 2 ANTIMICROBIAL INCISE DRAPE 6650EZ: Brand: IOBAN™ 2

## (undated) DEVICE — BULB SYRINGE,IRRIGATION WITH PROTECTIVE CAP: Brand: DOVER

## (undated) DEVICE — JACKSON-PRATT 100CC BULB RESERVOIR: Brand: CARDINAL HEALTH

## (undated) DEVICE — SUT VICRYL 3-0 SH 27 IN J416H

## (undated) DEVICE — TR BAND RADIAL ARTERY COMPRESSION DEVICE: Brand: TR BAND